# Patient Record
Sex: FEMALE | Race: WHITE | NOT HISPANIC OR LATINO | URBAN - METROPOLITAN AREA
[De-identification: names, ages, dates, MRNs, and addresses within clinical notes are randomized per-mention and may not be internally consistent; named-entity substitution may affect disease eponyms.]

---

## 2017-06-01 ENCOUNTER — OUTPATIENT (OUTPATIENT)
Dept: OUTPATIENT SERVICES | Facility: HOSPITAL | Age: 82
LOS: 1 days | End: 2017-06-01
Payer: MEDICARE

## 2017-06-01 DIAGNOSIS — M54.16 RADICULOPATHY, LUMBAR REGION: ICD-10-CM

## 2017-06-01 PROCEDURE — 77003 FLUOROGUIDE FOR SPINE INJECT: CPT

## 2017-06-01 PROCEDURE — 62323 NJX INTERLAMINAR LMBR/SAC: CPT

## 2017-06-15 ENCOUNTER — OUTPATIENT (OUTPATIENT)
Dept: OUTPATIENT SERVICES | Facility: HOSPITAL | Age: 82
LOS: 1 days | End: 2017-06-15
Payer: MEDICARE

## 2017-06-15 DIAGNOSIS — M54.16 RADICULOPATHY, LUMBAR REGION: ICD-10-CM

## 2017-06-15 PROCEDURE — 77003 FLUOROGUIDE FOR SPINE INJECT: CPT

## 2017-06-15 PROCEDURE — 62323 NJX INTERLAMINAR LMBR/SAC: CPT

## 2018-08-02 ENCOUNTER — INPATIENT (INPATIENT)
Facility: HOSPITAL | Age: 83
LOS: 6 days | Discharge: HOME | DRG: 683 | End: 2018-08-09
Attending: INTERNAL MEDICINE | Admitting: INTERNAL MEDICINE
Payer: MEDICARE

## 2018-08-02 VITALS
HEIGHT: 61 IN | TEMPERATURE: 98 F | DIASTOLIC BLOOD PRESSURE: 72 MMHG | OXYGEN SATURATION: 96 % | RESPIRATION RATE: 23 BRPM | WEIGHT: 139.99 LBS | SYSTOLIC BLOOD PRESSURE: 114 MMHG | HEART RATE: 76 BPM

## 2018-08-02 DIAGNOSIS — R53.1 WEAKNESS: ICD-10-CM

## 2018-08-02 DIAGNOSIS — M10.9 GOUT, UNSPECIFIED: ICD-10-CM

## 2018-08-02 DIAGNOSIS — Z29.9 ENCOUNTER FOR PROPHYLACTIC MEASURES, UNSPECIFIED: ICD-10-CM

## 2018-08-02 DIAGNOSIS — N39.0 URINARY TRACT INFECTION, SITE NOT SPECIFIED: ICD-10-CM

## 2018-08-02 DIAGNOSIS — I10 ESSENTIAL (PRIMARY) HYPERTENSION: ICD-10-CM

## 2018-08-02 DIAGNOSIS — N17.9 ACUTE KIDNEY FAILURE, UNSPECIFIED: ICD-10-CM

## 2018-08-02 DIAGNOSIS — K85.90 ACUTE PANCREATITIS WITHOUT NECROSIS OR INFECTION, UNSPECIFIED: ICD-10-CM

## 2018-08-02 DIAGNOSIS — E78.5 HYPERLIPIDEMIA, UNSPECIFIED: ICD-10-CM

## 2018-08-02 DIAGNOSIS — E83.51 HYPOCALCEMIA: ICD-10-CM

## 2018-08-02 DIAGNOSIS — F32.9 MAJOR DEPRESSIVE DISORDER, SINGLE EPISODE, UNSPECIFIED: ICD-10-CM

## 2018-08-02 LAB
ALBUMIN SERPL ELPH-MCNC: 3.5 G/DL — SIGNIFICANT CHANGE UP (ref 3.3–5)
ALP SERPL-CCNC: 67 U/L — SIGNIFICANT CHANGE UP (ref 40–120)
ALT FLD-CCNC: 19 U/L — SIGNIFICANT CHANGE UP (ref 12–78)
ANION GAP SERPL CALC-SCNC: 18 MMOL/L — HIGH (ref 5–17)
ANION GAP SERPL CALC-SCNC: 24 MMOL/L — HIGH (ref 5–17)
APPEARANCE UR: ABNORMAL
AST SERPL-CCNC: 15 U/L — SIGNIFICANT CHANGE UP (ref 15–37)
BASOPHILS # BLD AUTO: 0.03 K/UL — SIGNIFICANT CHANGE UP (ref 0–0.2)
BASOPHILS NFR BLD AUTO: 0.5 % — SIGNIFICANT CHANGE UP (ref 0–2)
BILIRUB SERPL-MCNC: 0.3 MG/DL — SIGNIFICANT CHANGE UP (ref 0.2–1.2)
BILIRUB UR-MCNC: NEGATIVE — SIGNIFICANT CHANGE UP
BUN SERPL-MCNC: 122 MG/DL — HIGH (ref 7–23)
BUN SERPL-MCNC: 137 MG/DL — HIGH (ref 7–23)
CALCIUM SERPL-MCNC: 5.5 MG/DL — CRITICAL LOW (ref 8.5–10.1)
CALCIUM SERPL-MCNC: 6 MG/DL — CRITICAL LOW (ref 8.5–10.1)
CHLORIDE SERPL-SCNC: 100 MMOL/L — SIGNIFICANT CHANGE UP (ref 96–108)
CHLORIDE SERPL-SCNC: 106 MMOL/L — SIGNIFICANT CHANGE UP (ref 96–108)
CK MB BLD-MCNC: 1.3 % — SIGNIFICANT CHANGE UP (ref 0–3.5)
CK MB CFR SERPL CALC: 4.2 NG/ML — HIGH (ref 0–3.6)
CK SERPL-CCNC: 317 U/L — HIGH (ref 26–192)
CO2 SERPL-SCNC: 13 MMOL/L — LOW (ref 22–31)
CO2 SERPL-SCNC: 16 MMOL/L — LOW (ref 22–31)
COLOR SPEC: YELLOW — SIGNIFICANT CHANGE UP
CREAT SERPL-MCNC: 12 MG/DL — HIGH (ref 0.5–1.3)
CREAT SERPL-MCNC: 9.7 MG/DL — HIGH (ref 0.5–1.3)
DIFF PNL FLD: ABNORMAL
EOSINOPHIL # BLD AUTO: 0.27 K/UL — SIGNIFICANT CHANGE UP (ref 0–0.5)
EOSINOPHIL NFR BLD AUTO: 4.2 % — SIGNIFICANT CHANGE UP (ref 0–6)
GLUCOSE SERPL-MCNC: 154 MG/DL — HIGH (ref 70–99)
GLUCOSE SERPL-MCNC: 166 MG/DL — HIGH (ref 70–99)
GLUCOSE UR QL: NEGATIVE — SIGNIFICANT CHANGE UP
HCT VFR BLD CALC: 31.1 % — LOW (ref 34.5–45)
HGB BLD-MCNC: 10.6 G/DL — LOW (ref 11.5–15.5)
IMM GRANULOCYTES NFR BLD AUTO: 0.5 % — SIGNIFICANT CHANGE UP (ref 0–1.5)
KETONES UR-MCNC: NEGATIVE — SIGNIFICANT CHANGE UP
LACTATE SERPL-SCNC: 1.2 MMOL/L — SIGNIFICANT CHANGE UP (ref 0.7–2)
LEUKOCYTE ESTERASE UR-ACNC: ABNORMAL
LIDOCAIN IGE QN: 1072 U/L — HIGH (ref 73–393)
LYMPHOCYTES # BLD AUTO: 0.77 K/UL — LOW (ref 1–3.3)
LYMPHOCYTES # BLD AUTO: 12 % — LOW (ref 13–44)
MAGNESIUM SERPL-MCNC: 2 MG/DL — SIGNIFICANT CHANGE UP (ref 1.6–2.6)
MCHC RBC-ENTMCNC: 29.9 PG — SIGNIFICANT CHANGE UP (ref 27–34)
MCHC RBC-ENTMCNC: 34.1 GM/DL — SIGNIFICANT CHANGE UP (ref 32–36)
MCV RBC AUTO: 87.6 FL — SIGNIFICANT CHANGE UP (ref 80–100)
MONOCYTES # BLD AUTO: 0.65 K/UL — SIGNIFICANT CHANGE UP (ref 0–0.9)
MONOCYTES NFR BLD AUTO: 10.1 % — SIGNIFICANT CHANGE UP (ref 2–14)
NEUTROPHILS # BLD AUTO: 4.67 K/UL — SIGNIFICANT CHANGE UP (ref 1.8–7.4)
NEUTROPHILS NFR BLD AUTO: 72.7 % — SIGNIFICANT CHANGE UP (ref 43–77)
NITRITE UR-MCNC: NEGATIVE — SIGNIFICANT CHANGE UP
NT-PROBNP SERPL-SCNC: 1496 PG/ML — HIGH (ref 0–450)
PH UR: 5 — SIGNIFICANT CHANGE UP (ref 5–8)
PLATELET # BLD AUTO: 245 K/UL — SIGNIFICANT CHANGE UP (ref 150–400)
POTASSIUM SERPL-MCNC: 3 MMOL/L — LOW (ref 3.5–5.3)
POTASSIUM SERPL-MCNC: 3.3 MMOL/L — LOW (ref 3.5–5.3)
POTASSIUM SERPL-SCNC: 3 MMOL/L — LOW (ref 3.5–5.3)
POTASSIUM SERPL-SCNC: 3.3 MMOL/L — LOW (ref 3.5–5.3)
PROCALCITONIN SERPL-MCNC: 0.41 NG/ML — HIGH (ref 0–0.04)
PROT SERPL-MCNC: 7.7 G/DL — SIGNIFICANT CHANGE UP (ref 6–8.3)
PROT UR-MCNC: 100
RBC # BLD: 3.55 M/UL — LOW (ref 3.8–5.2)
RBC # FLD: 14 % — SIGNIFICANT CHANGE UP (ref 10.3–14.5)
SODIUM SERPL-SCNC: 137 MMOL/L — SIGNIFICANT CHANGE UP (ref 135–145)
SODIUM SERPL-SCNC: 140 MMOL/L — SIGNIFICANT CHANGE UP (ref 135–145)
SP GR SPEC: 1.02 — SIGNIFICANT CHANGE UP (ref 1.01–1.02)
TROPONIN I SERPL-MCNC: <.015 NG/ML — SIGNIFICANT CHANGE UP (ref 0.01–0.04)
URATE SERPL-MCNC: 10 MG/DL — HIGH (ref 2.5–7)
UROBILINOGEN FLD QL: NEGATIVE — SIGNIFICANT CHANGE UP
WBC # BLD: 6.42 K/UL — SIGNIFICANT CHANGE UP (ref 3.8–10.5)
WBC # FLD AUTO: 6.42 K/UL — SIGNIFICANT CHANGE UP (ref 3.8–10.5)

## 2018-08-02 PROCEDURE — 99285 EMERGENCY DEPT VISIT HI MDM: CPT

## 2018-08-02 PROCEDURE — 93010 ELECTROCARDIOGRAM REPORT: CPT

## 2018-08-02 PROCEDURE — 71045 X-RAY EXAM CHEST 1 VIEW: CPT | Mod: 26

## 2018-08-02 PROCEDURE — 74176 CT ABD & PELVIS W/O CONTRAST: CPT | Mod: 26

## 2018-08-02 PROCEDURE — 99223 1ST HOSP IP/OBS HIGH 75: CPT | Mod: AI,GC

## 2018-08-02 PROCEDURE — 70450 CT HEAD/BRAIN W/O DYE: CPT | Mod: 26

## 2018-08-02 RX ORDER — CEFTRIAXONE 500 MG/1
1 INJECTION, POWDER, FOR SOLUTION INTRAMUSCULAR; INTRAVENOUS EVERY 24 HOURS
Qty: 0 | Refills: 0 | Status: DISCONTINUED | OUTPATIENT
Start: 2018-08-02 | End: 2018-08-06

## 2018-08-02 RX ORDER — BACITRACIN ZINC 500 UNIT/G
1 OINTMENT IN PACKET (EA) TOPICAL
Qty: 0 | Refills: 0 | Status: DISCONTINUED | OUTPATIENT
Start: 2018-08-02 | End: 2018-08-09

## 2018-08-02 RX ORDER — SODIUM BICARBONATE 1 MEQ/ML
0.24 SYRINGE (ML) INTRAVENOUS
Qty: 150 | Refills: 0 | Status: DISCONTINUED | OUTPATIENT
Start: 2018-08-02 | End: 2018-08-02

## 2018-08-02 RX ORDER — FLUOXETINE HCL 10 MG
20 CAPSULE ORAL DAILY
Qty: 0 | Refills: 0 | Status: DISCONTINUED | OUTPATIENT
Start: 2018-08-02 | End: 2018-08-09

## 2018-08-02 RX ORDER — ACETAMINOPHEN 500 MG
650 TABLET ORAL EVERY 6 HOURS
Qty: 0 | Refills: 0 | Status: DISCONTINUED | OUTPATIENT
Start: 2018-08-02 | End: 2018-08-09

## 2018-08-02 RX ORDER — SODIUM CHLORIDE 9 MG/ML
1000 INJECTION INTRAMUSCULAR; INTRAVENOUS; SUBCUTANEOUS ONCE
Qty: 0 | Refills: 0 | Status: COMPLETED | OUTPATIENT
Start: 2018-08-02 | End: 2018-08-02

## 2018-08-02 RX ORDER — TRAMADOL HYDROCHLORIDE 50 MG/1
50 TABLET ORAL EVERY 6 HOURS
Qty: 0 | Refills: 0 | Status: DISCONTINUED | OUTPATIENT
Start: 2018-08-02 | End: 2018-08-02

## 2018-08-02 RX ORDER — FENOFIBRATE,MICRONIZED 130 MG
145 CAPSULE ORAL DAILY
Qty: 0 | Refills: 0 | Status: DISCONTINUED | OUTPATIENT
Start: 2018-08-02 | End: 2018-08-02

## 2018-08-02 RX ORDER — ASPIRIN/CALCIUM CARB/MAGNESIUM 324 MG
81 TABLET ORAL DAILY
Qty: 0 | Refills: 0 | Status: DISCONTINUED | OUTPATIENT
Start: 2018-08-02 | End: 2018-08-09

## 2018-08-02 RX ORDER — MAGNESIUM SULFATE 500 MG/ML
2 VIAL (ML) INJECTION ONCE
Qty: 0 | Refills: 0 | Status: COMPLETED | OUTPATIENT
Start: 2018-08-02 | End: 2018-08-02

## 2018-08-02 RX ORDER — HEPARIN SODIUM 5000 [USP'U]/ML
5000 INJECTION INTRAVENOUS; SUBCUTANEOUS EVERY 8 HOURS
Qty: 0 | Refills: 0 | Status: DISCONTINUED | OUTPATIENT
Start: 2018-08-02 | End: 2018-08-09

## 2018-08-02 RX ORDER — SODIUM CHLORIDE 9 MG/ML
3 INJECTION INTRAMUSCULAR; INTRAVENOUS; SUBCUTANEOUS ONCE
Qty: 0 | Refills: 0 | Status: COMPLETED | OUTPATIENT
Start: 2018-08-02 | End: 2018-08-02

## 2018-08-02 RX ORDER — CEFTRIAXONE 500 MG/1
1 INJECTION, POWDER, FOR SOLUTION INTRAMUSCULAR; INTRAVENOUS ONCE
Qty: 0 | Refills: 0 | Status: COMPLETED | OUTPATIENT
Start: 2018-08-02 | End: 2018-08-02

## 2018-08-02 RX ORDER — CALCIUM GLUCONATE 100 MG/ML
1 VIAL (ML) INTRAVENOUS ONCE
Qty: 0 | Refills: 0 | Status: COMPLETED | OUTPATIENT
Start: 2018-08-02 | End: 2018-08-02

## 2018-08-02 RX ORDER — CALCIUM CARBONATE 500(1250)
1 TABLET ORAL THREE TIMES A DAY
Qty: 0 | Refills: 0 | Status: DISCONTINUED | OUTPATIENT
Start: 2018-08-02 | End: 2018-08-02

## 2018-08-02 RX ORDER — POTASSIUM CHLORIDE 20 MEQ
40 PACKET (EA) ORAL ONCE
Qty: 0 | Refills: 0 | Status: COMPLETED | OUTPATIENT
Start: 2018-08-02 | End: 2018-08-02

## 2018-08-02 RX ORDER — POTASSIUM CHLORIDE 20 MEQ
40 PACKET (EA) ORAL EVERY 4 HOURS
Qty: 0 | Refills: 0 | Status: COMPLETED | OUTPATIENT
Start: 2018-08-02 | End: 2018-08-03

## 2018-08-02 RX ORDER — SODIUM CHLORIDE 9 MG/ML
1000 INJECTION, SOLUTION INTRAVENOUS
Qty: 0 | Refills: 0 | Status: DISCONTINUED | OUTPATIENT
Start: 2018-08-02 | End: 2018-08-04

## 2018-08-02 RX ORDER — METOPROLOL TARTRATE 50 MG
12.5 TABLET ORAL
Qty: 0 | Refills: 0 | Status: DISCONTINUED | OUTPATIENT
Start: 2018-08-02 | End: 2018-08-08

## 2018-08-02 RX ORDER — SIMVASTATIN 20 MG/1
20 TABLET, FILM COATED ORAL AT BEDTIME
Qty: 0 | Refills: 0 | Status: DISCONTINUED | OUTPATIENT
Start: 2018-08-02 | End: 2018-08-09

## 2018-08-02 RX ORDER — CALCIUM GLUCONATE 100 MG/ML
2 VIAL (ML) INTRAVENOUS ONCE
Qty: 0 | Refills: 0 | Status: COMPLETED | OUTPATIENT
Start: 2018-08-02 | End: 2018-08-02

## 2018-08-02 RX ADMIN — Medication 40 MILLIEQUIVALENT(S): at 18:24

## 2018-08-02 RX ADMIN — Medication 1 TABLET(S): at 23:54

## 2018-08-02 RX ADMIN — SODIUM CHLORIDE 1000 MILLILITER(S): 9 INJECTION INTRAMUSCULAR; INTRAVENOUS; SUBCUTANEOUS at 19:02

## 2018-08-02 RX ADMIN — SODIUM CHLORIDE 100 MILLILITER(S): 9 INJECTION, SOLUTION INTRAVENOUS at 23:55

## 2018-08-02 RX ADMIN — Medication 40 MILLIEQUIVALENT(S): at 23:54

## 2018-08-02 RX ADMIN — SODIUM CHLORIDE 1000 MILLILITER(S): 9 INJECTION INTRAMUSCULAR; INTRAVENOUS; SUBCUTANEOUS at 16:10

## 2018-08-02 RX ADMIN — Medication 200 GRAM(S): at 23:55

## 2018-08-02 RX ADMIN — Medication 200 GRAM(S): at 19:01

## 2018-08-02 RX ADMIN — SIMVASTATIN 20 MILLIGRAM(S): 20 TABLET, FILM COATED ORAL at 23:54

## 2018-08-02 RX ADMIN — SODIUM CHLORIDE 1000 MILLILITER(S): 9 INJECTION INTRAMUSCULAR; INTRAVENOUS; SUBCUTANEOUS at 16:29

## 2018-08-02 RX ADMIN — Medication 200 MEQ/KG/HR: at 16:30

## 2018-08-02 RX ADMIN — SODIUM CHLORIDE 3 MILLILITER(S): 9 INJECTION INTRAMUSCULAR; INTRAVENOUS; SUBCUTANEOUS at 16:06

## 2018-08-02 RX ADMIN — Medication 50 GRAM(S): at 18:23

## 2018-08-02 RX ADMIN — CEFTRIAXONE 1 GRAM(S): 500 INJECTION, POWDER, FOR SOLUTION INTRAMUSCULAR; INTRAVENOUS at 18:20

## 2018-08-02 RX ADMIN — HEPARIN SODIUM 5000 UNIT(S): 5000 INJECTION INTRAVENOUS; SUBCUTANEOUS at 23:54

## 2018-08-02 RX ADMIN — CEFTRIAXONE 100 GRAM(S): 500 INJECTION, POWDER, FOR SOLUTION INTRAMUSCULAR; INTRAVENOUS at 20:56

## 2018-08-02 RX ADMIN — Medication 1 TABLET(S): at 18:24

## 2018-08-02 RX ADMIN — CEFTRIAXONE 100 GRAM(S): 500 INJECTION, POWDER, FOR SOLUTION INTRAMUSCULAR; INTRAVENOUS at 17:54

## 2018-08-02 RX ADMIN — Medication 100 MEQ/KG/HR: at 18:24

## 2018-08-02 NOTE — H&P ADULT - PROBLEM SELECTOR PLAN 9
chronic, stable  -Continue Bystolic 2.5mg IMPROVE VTE Individual Risk Assessment          RISK                                                          Points  [  ] Previous VTE                                                3  [  ] Thrombophilia                                             2  [  ] Lower limb paralysis                                   2        (unable to hold up >15 seconds)    [  ] Current Cancer                                             2         (within 6 months)  [  ] Immobilization > 24 hrs                              1  [  ] ICU/CCU stay > 24 hours                             1  [x  ] Age > 60                                                         1    IMPROVE VTE Score: 1  DVT prophylaxis: Heparin 5000u q8   Renal diet

## 2018-08-02 NOTE — ED ADULT NURSE NOTE - PMH
Cervical cancer  1970's  Chronic UTI (urinary tract infection)    Colostomy status  2006  Depression    Diabetes  type 2  Dyslipidemia    Hernia, incisional    Herniated lumbar intervertebral disc    Kidney atrophy  right  Tremor

## 2018-08-02 NOTE — ED ADULT NURSE NOTE - OBJECTIVE STATEMENT
c/o weak/dizzy/SOB   x 2 wks getting progressively worse.. red raised itching rash back legs arms c/o weak/dizzy/SOB   x 2 wks getting progressively worse.. red raised itching rash upper back legs arms. colostomy present

## 2018-08-02 NOTE — H&P ADULT - PROBLEM SELECTOR PLAN 4
CT showed the ureter is also thickening which may represent inflammation/infection. Patient reports symptom of hematuria when experiencing chronic UTIs  -Start IV Rocephin -Hold Allopurinol and Colchicine in setting of MEHDI

## 2018-08-02 NOTE — ED ADULT NURSE NOTE - NSIMPLEMENTINTERV_GEN_ALL_ED
Implemented All Fall with Harm Risk Interventions:  [place] to call system. Call bell, personal items and telephone within reach. Instruct patient to call for assistance. Room bathroom lighting operational. Non-slip footwear when patient is off stretcher. Physically safe environment: no spills, clutter or unnecessary equipment. Stretcher in lowest position, wheels locked, appropriate side rails in place. Provide visual cue, wrist band, yellow gown, etc. Monitor gait and stability. Monitor for mental status changes and reorient to person, place, and time. Review medications for side effects contributing to fall risk. Reinforce activity limits and safety measures with patient and family. Provide visual clues: red socks.

## 2018-08-02 NOTE — H&P ADULT - PROBLEM SELECTOR PLAN 1
likely due to dehydration, baseline Cr 3-4 (3/2016 Cr 2.1)  -Admit to medicine  -Start IVF with bicarb  -Electrolytes repleted   -Strict I&O, renea in place   -Avoid nephrotoxic agents - hold Allopurinol, Colchicine  -Hold Methenamine Hippurate as it needs an acidic environment and patient receiving Sodium Bicarbonate   -Dr. You, nephrologist, recs appreciated - Plan to follow electrolytes and renal function trend to determine need for RRT likely due to dehydration, baseline Cr 3-4 (3/2016 Cr 2.1)  -Admit to medicine  -Start IVF with bicarb  -Electrolytes repleted, monitor electrolytes closely as patient on bicarbonate drip which can cause hypokalemia   -Strict I&O, Perez in place   -Avoid nephrotoxic agents - hold Allopurinol, Colchicine  -Hold Methenamine Hippurate as it needs an acidic environment and patient receiving Sodium Bicarbonate   -Dr. You, nephrologist, recs appreciated - Plan to follow electrolytes and renal function trend to determine need for RRT  -Follow up 9PM CMP if bicarbonate increase then switch to 1/2NS + 75mEq sodium bicarbonate at 100cc/hr  -Follow up 12AM CMP - replete potassium likely due to dehydration, baseline Cr 3-4 (3/2016 Cr 2.1)  -Admit to medicine  -Start IVF with bicarb  -Electrolytes repleted, monitor electrolytes closely as patient on bicarbonate drip which can cause hypokalemia   -Strict I&O, Perez in place   -Avoid nephrotoxic agents - hold Allopurinol, Colchicine  -Hold Methenamine Hippurate as it needs an acidic environment and patient receiving Sodium Bicarbonate   -Dr. You, nephrologist, recs appreciated - Plan to follow electrolytes and renal function trend to determine need for RRT  -Follow up 9PM CMP if bicarbonate increase then switch to 1/2NS + 75mEq sodium bicarbonate at 100cc/hr  -Follow up 12AM CMP - replete potassium  f/u with nephrology

## 2018-08-02 NOTE — H&P ADULT - NSHPPHYSICALEXAM_GEN_ALL_CORE
Height (cm): 154.94 (08-02 @ 14:10)  Weight (kg): 63.5 (08-02 @ 14:10)  BMI (kg/m2): 26.5 (08-02 @ 14:10)  BSA (m2): 1.62 (08-02 @ 14:10)  Vital Signs Last 24 Hrs  T(C): 36.7 (02 Aug 2018 19:06), Max: 36.8 (02 Aug 2018 14:10)  T(F): 98 (02 Aug 2018 19:06), Max: 98.2 (02 Aug 2018 14:10)  HR: 59 (02 Aug 2018 19:06) (59 - 76)  BP: 129/82 (02 Aug 2018 19:06) (114/72 - 129/82)  BP(mean): --  RR: 15 (02 Aug 2018 19:06) (15 - 23)  SpO2: 99% (02 Aug 2018 19:06) (96% - 99%)  [ X] room air   [ ] 02    PHYSICAL EXAM:  GENERAL:  No acute distress, well appearing, chronic facial twitching   HEAD:  AT/NC  ENMT: EOMI, PERRL  NECK:  supple  NERVOUS SYSTEM:  Alert & Oriented X3, no focal deficits, grossly moves upper and lower extremities  CHEST/LUNG: Clear to auscultation bilaterally  HEART:  Regular rate and rhythm, No murmurs, rubs, or gallops  ABDOMEN:  soft, nontender, nondistended, positive bowel sounds, +colostomy with appropriately colored stool, +renae draining light yellow urine  EXTREMITIES: No clubbing, cyanosis or edema  SKIN: diffuse scabs/abrasions from chronic itching on upper back and lower extremities

## 2018-08-02 NOTE — ED PROVIDER NOTE - CARE PLAN
Principal Discharge DX:	Acute renal failure  Assessment and plan of treatment:	admit  Secondary Diagnosis:	Hypocalcemia  Secondary Diagnosis:	Pancreatitis

## 2018-08-02 NOTE — H&P ADULT - PROBLEM SELECTOR PLAN 3
Lipase elevated  -Continue IVF CT showed the ureter is also thickening which may represent inflammation/infection. Patient reports symptom of hematuria when experiencing chronic UTIs  -Start IV Rocephin

## 2018-08-02 NOTE — H&P ADULT - NSHPREVIEWOFSYSTEMS_GEN_ALL_CORE
CONSTITUTIONAL: No fever, No chills, positive fatigue  EYES: No eye pain, visual disturbances, or discharge  ENMT:  No ear pain; No sinus or throat pain  NECK: No pain, No stiffness  RESPIRATORY: No cough, wheezing, No hemoptysis; No shortness of breath  CARDIOVASCULAR: No chest pain, palpitations, leg swelling  GASTROINTESTINAL: No abdominal or epigastric pain. No nausea, No vomiting; No diarrhea or constipation.   GENITOURINARY: No dysuria, decreased frequency, No urgency, positive hematuria  NEUROLOGICAL: alert and oriented x 3,  No headaches, positive dizziness, No numbness  SKIN: chronic itching/scratching   MUSCULOSKELETAL: No joint pain or swelling; chronic back pain, No extremity pain  PSYCHIATRIC: No anxiety, mood swings

## 2018-08-02 NOTE — ED PROVIDER NOTE - PHYSICAL EXAMINATION
gouty flare noted to left foot 1st digit no evidence of cellulitis gouty flare noted to left foot 1st digit no evidence of cellulitis  Open shingles rash noted to right posterior shoulder region

## 2018-08-02 NOTE — H&P ADULT - PROBLEM SELECTOR PLAN 5
-Hold Allopurinol and Colchicine in setting of MEHDI Likely due to MEHDI on CKD  -Physical Therapy evaluation

## 2018-08-02 NOTE — H&P ADULT - PROBLEM SELECTOR PLAN 8
chronic, stable  -Continue Simvastatin   -Will hold Fenofibrate as combination can cause worsening renal function chronic, stable  -Continue Bystolic 2.5mg chronic, stable  -Continue Metoprolol 12.5mg daily (therapeutic interchange for Bystolic 2.5mg)

## 2018-08-02 NOTE — CONSULT NOTE ADULT - ASSESSMENT
·	MEHDI, CKD 4, Solitary functioning kidney: Prerenal azotemia, ? ATN  ·	Metabolic acidosis  ·	Diabetes  ·	Hypertension  ·	Hypokalemia    IV hydration with bicarb. Will lower bicarb in IVF. Replete potassium. Monitor potassium closely on bicarb drip. Monitor I & O. No emergent need for hemodialysis.   Avoid nephrotoxic meds as possible. Monitor blood sugar levels. Insulin coverage as needed. Dietary restriction. Monitor BP trend. Titrate BP meds as needed. Salt restriction.   Will follow electrolytes and renal function trend. D/w pt and her son at bedside regarding possible need for RRT if no improvement in renal function.   Further recommendations pending clinical course. Thank you for the courtesy of this referral.

## 2018-08-02 NOTE — H&P ADULT - PROBLEM SELECTOR PLAN 7
chronic, stable  -Continue Fluoxetine chronic, stable  -Continue Simvastatin   -Will hold Fenofibrate as combination can cause worsening renal function

## 2018-08-02 NOTE — H&P ADULT - HISTORY OF PRESENT ILLNESS
83F with PMH HTN, HLD, Colon Ca s/p colostomy, CKD (baseline Cr 3-4 per daughter), Depression, Gout, Osteoporosis, hx of recurrent UTI presents with weakness, fatigue, dizziness, lightheadedness, and double vision for one week. Reports one week ago moved into her son's house because her bathroom was getting remodeled, reports significant decrease in fluid intake because she didn't want to use the stairs to go to the bathroom. Admits to decreased urination and hematuria for two days. Reports hematuria usually associated with infection, denies dysuria and burning while voiding. Reports "extremely tired, I had to sit down to brush my teeth." Reports laying down improved symptoms and movement exacerbated symptoms. Also reports chronic significant itching throughout back and lower extremities. Denies change in appetite, recent sick contacts, recent travel. Reports seeing nephrologist, Dr. Coto less than 2 months ago and Cr improved to ~3, per daughter usual baseline is 4. Also reports recently recovering from Gout attack and was prescribed colchicine.     In the ED: temp 98.2, HR 76, /72, RR 23 repeat 15, SpO2 96% RA. H/H: 10.6/31.1, K 3.3, BUN/Cr: 137/12, Ca 6, Lipase 1072. UA moderate LEC, large blood, WBC >50. Chest xray: no acute findings. CT head: No acute intracranial hemorrhage or acute territorial infarct. CT stone hunt: Relative atrophy of the right kidney with mild hydronephrosis and hydroureter without evidence of stone. The right ureter appears to course to the left pelvis. The ureter is also thickening which may represent inflammation/infection. T12 compression fracture indeterminate in age. Suture line in small bowel loop in the midabdomen with distention of the small bowel loop at the area of the sutures with herniation of the small bowel through the anterior abdominal wall without obstruction. Dr. You, nephrologist, evaluated patient in the ED. 83F with PMH HTN, HLD, Colon Ca s/p colostomy, CKD (baseline Cr 3-4 per daughter), Depression, Gout, Osteoporosis, hx of recurrent UTI presents with weakness, fatigue, dizziness, lightheadedness,  for one week. Reports one week ago moved into her son's house because her bathroom was getting remodeled, reports significant decrease in fluid intake because she didn't want to use the stairs to go to the bathroom. Admits to decreased urination and hematuria for two days. Reports hematuria usually associated with infection, denies dysuria and burning while voiding. Reports "extremely tired, I had to sit down to brush my teeth." Reports laying down improved symptoms and movement exacerbated symptoms. Also reports chronic significant itching throughout back and lower extremities. Denies change in appetite, recent sick contacts, recent travel. Reports seeing nephrologist, Dr. Coto less than 2 months ago and Cr improved to ~3, per daughter usual baseline is 4. Also reports recently recovering from Gout attack and was prescribed colchicine.     In the ED: temp 98.2, HR 76, /72, RR 23 repeat 15, SpO2 96% RA. H/H: 10.6/31.1, K 3.3, BUN/Cr: 137/12, Ca 6, Lipase 1072. UA moderate LEC, large blood, WBC >50. Chest xray: no acute findings. CT head: No acute intracranial hemorrhage or acute territorial infarct. CT stone hunt: Relative atrophy of the right kidney with mild hydronephrosis and hydroureter without evidence of stone. The right ureter appears to course to the left pelvis. The ureter is also thickening which may represent inflammation/infection. T12 compression fracture indeterminate in age. Suture line in small bowel loop in the midabdomen with distention of the small bowel loop at the area of the sutures with herniation of the small bowel through the anterior abdominal wall without obstruction. Dr. You, nephrologist, evaluated patient in the ED.

## 2018-08-02 NOTE — ED PROVIDER NOTE - OBJECTIVE STATEMENT
Pt is a 84 yo female who presents to the ED with a cc of weakness.  PMHx of cervical cancer, chronic UTIs, depression, DM, HLD, atrophy of right kidney, essential tremor, h/o colostomy, gout.  Pt report that for some time she has been experiencing increasing weakness but that symptoms have progressively worsened over the last 4-5 days.  She reports worsening exertional SOB, and lightheadedness.  Pt reports that she has become so fatigued she needs assistance ambulating around the house.  Family is concerned that she may be dehydrated because she dehydrates easily.  Denies HA, N/V/C, CP, SOB, abd pain, cough.  Pt does report pain to her 1st digit left foot and states that she is suffering from a gout flare.  Denies numbness or tingling in ext

## 2018-08-02 NOTE — ED PROVIDER NOTE - MEDICAL DECISION MAKING DETAILS
screening septic work up, Mg, Phos, cardiac enzymes, BNP, uric acid, procalcitonin, EKG, chest x-ray, CT head, renal stone hunt, bicarb gtt, observation

## 2018-08-02 NOTE — H&P ADULT - NSHPSOCIALHISTORY_GEN_ALL_CORE
Lives with: daughter, no stairs in house  Performs ADLs independently     Tobacco Usage: former, quit 40 years ago, 10 pack year hx   Alcohol Usage: social

## 2018-08-02 NOTE — CONSULT NOTE ADULT - SUBJECTIVE AND OBJECTIVE BOX
Patient is a 83y old  Female who presents with a chief complaint of weakness, nausea.     HPI: 82 y/o F with h/o DM, HTN, HLD, Colon Ca with radiation injury, s/p colostomy, CKD 4, Rt atrophic kidney, Depression. Anemia, Gout, Osteoporosis, RLS, Sec hyperpara, UTI, Uterine CA came to [facility] ER with c/o weakness, Nausea.     Renal consult called for MEHDI. Pt last seen by Dr. Coto. ? Last crea 2.7. Pt denies use of NSAIDs.       PAST MEDICAL HISTORY:  Herniated lumbar intervertebral disc  Depression  Tremor  Rt Kidney atrophy  Colostomy status  Chronic UTI (urinary tract infection)  Cervical cancer  Dyslipidemia  Diabetes  Hernia, incisional  CKD 4      PAST SURGICAL HISTORY:  S/P hip replacement  S/P colon resection  S/P hysterectomy  Colostomy      FAMILY HISTORY:  Family history of ovarian cancer (Mother, Sibling)      SOCIAL HISTORY: No smoking, Occasional alcohol use    Allergies    Levaquin (Pruritus)  mercury (Pruritus; Rash)  sulfa drugs (Pruritus)    Intolerances      Home Medications:  allopurinol 100 mg oral tablet: 1 tab(s) orally 2 times a day (16 Mar 2016 08:48)  CoQ10 300 mg oral capsule: 1 cap(s) orally once a day (16 Mar 2016 08:48)  methenamine hippurate hippurate 1 g oral tablet: 1 tab(s) orally 2 times a day (16 Mar 2016 08:48)  multivitamin:    (16 Mar 2016 08:48)  Percocet 10/325 oral tablet: 1 tab(s) orally every 6 hours, As Needed for back pain (16 Mar 2016 08:48)  PROzac 20 mg oral capsule: 1 cap(s) orally once a day (16 Mar 2016 08:48)  simvastatin 20 mg oral tablet: 1 tab(s) orally once a day (at bedtime) (16 Mar 2016 08:48)  TheraCran HP oral capsule:  orally  (16 Mar 2016 08:48)    MEDICATIONS  (STANDING):  sodium bicarbonate  Infusion 0.472 mEq/kG/Hr (200 mL/Hr) IV Continuous <Continuous>    MEDICATIONS  (PRN):      REVIEW OF SYSTEMS:  General: wekaness  Respiratory: No cough, SOB  Cardiovascular: No CP or Palpitations	  Gastrointestinal: + nausea, Vomiting. + colostomy  Genitourinary: No urinary complaints	  Musculoskeletal: No leg swelling, No new rash or lesions	  Neurological: AA  all other systems negative    T(F): 98.2 (18 @ 14:10), Max: 98.2 (18 @ 14:10)  HR: 76 (18 @ 14:10) (76 - 76)  BP: 114/72 (18 @ 14:10) (114/72 - 114/72)  RR: 23 (18 @ 14:10) (23 - 23)  SpO2: 96% (18 @ 14:10) (96% - 96%)  Wt(kg): --    PHYSICAL EXAM:  General: NAD  Respiratory: b/l air entry  Cardiovascular: S1 S2  Gastrointestinal: soft, + colostomy   Extremities: no edema            137  |  100  |  137<H>  ----------------------------<  154<H>  3.3<L>   |  13<L>  |  12.00<H>    Ca    6.0<LL>      02 Aug 2018 15:05  Phos  11.9       Mg     1.2         TPro  7.7  /  Alb  3.5  /  TBili  0.3  /  DBili  x   /  AST  15  /  ALT  19  /  AlkPhos  67                            10.6   6.42  )-----------( 245      ( 02 Aug 2018 15:05 )             31.1       Hemoglobin: 10.6 g/dL ( @ 15:05)  Hematocrit: 31.1 % ( @ 15:05)  Potassium, Serum: 3.3 mmol/L ( @ 15:05)  Blood Urea Nitrogen, Serum: 137 mg/dL ( @ 15:05)      Creatinine, Serum: 12.00 ( @ 15:05)      Urinalysis Basic - ( 02 Aug 2018 16:04 )    Color: Yellow / Appearance: Turbid / S.020 / pH: x  Gluc: x / Ketone: Negative  / Bili: Negative / Urobili: Negative   Blood: x / Protein: 100 / Nitrite: Negative   Leuk Esterase: Moderate / RBC: 3-5 /HPF / WBC >50   Sq Epi: x / Non Sq Epi: Occasional / Bacteria: Moderate      LIVER FUNCTIONS - ( 02 Aug 2018 15:05 )  Alb: 3.5 g/dL / Pro: 7.7 g/dL / ALK PHOS: 67 U/L / ALT: 19 U/L / AST: 15 U/L / GGT: x           CARDIAC MARKERS ( 02 Aug 2018 15:05 )  <.015 ng/mL / x     / 317 U/L / x     / 4.2 ng/mL      Creatine Kinase, Serum: 317 U/L (18 @ 15:05)        < from: Xray Chest 1 View- PORTABLE-Urgent (18 @ 14:54) >  EXAM:  XR CHEST PORTABLE URGENT 1V                            PROCEDURE DATE:  2018          INTERPRETATION:  AP semierect chest on 2018 at 2:42 PM. Patient   has weakness and dizziness.    The heart is magnified by technique.    On2016 there was a congested appearance in the lower lung   fields which is not presently seen. Presently lungs are clear.    IMPRESSION: Clear lungs at this time.    < end of copied text >

## 2018-08-02 NOTE — ED ADULT NURSE REASSESSMENT NOTE - NS ED NURSE REASSESS COMMENT FT1
bladder scan showing 100cc.  m18F renae cath placed 24cc urine immediately emitted. UA urine C&S sent to lab

## 2018-08-02 NOTE — H&P ADULT - ATTENDING COMMENTS
pt is 83  year female with h/o ckd , htn , hyperlipidemia , gout admitted for generalized body weakness , fatigue , w/u show MEHDI , UTI   mehdi most likely sec to decrease po fluid intake as she does not want to  to go down stairs to get water  evaluated by nephro   she is started on iv sodium bicarbonate drip with electrolyte replacement   will do careful monitoring of electrolytes and replace as needed  nephro f/u   hold nephrotoxic agents   high lipase ??etiology she is completely asymptomatic will trend and f/u   fall precaution   dvt ppx

## 2018-08-02 NOTE — H&P ADULT - ASSESSMENT
83F with PMH HTN, HLD, Colon Ca s/p colostomy, CKD (baseline Cr 3-4 per daughter), Depression, Gout, Osteoporosis, hx of recurrent UTI presents with weakness, fatigue, dizziness, lightheadedness, and double vision for one week admitted for MEHDI on CKD, pancreatitis, UTI. 83F with PMH HTN, HLD, Colon Ca s/p colostomy, CKD (baseline Cr 3-4 per daughter), Depression, Gout, Osteoporosis, hx of recurrent UTI presents with weakness, fatigue, dizziness, lightheadedness, and double vision for one week admitted for MEHDI on CKD, UTI. 83F with PMH HTN, HLD, Colon Ca s/p colostomy, CKD (baseline Cr 3-4 per daughter), Depression, Gout, Osteoporosis, hx of recurrent UTI presents with weakness, fatigue, dizziness, lightheadedness, x 1 week admitted for MEHDI on CKD, UTI.

## 2018-08-03 ENCOUNTER — TRANSCRIPTION ENCOUNTER (OUTPATIENT)
Age: 83
End: 2018-08-03

## 2018-08-03 DIAGNOSIS — N18.9 CHRONIC KIDNEY DISEASE, UNSPECIFIED: ICD-10-CM

## 2018-08-03 DIAGNOSIS — R19.7 DIARRHEA, UNSPECIFIED: ICD-10-CM

## 2018-08-03 LAB
ALBUMIN SERPL ELPH-MCNC: 2.6 G/DL — LOW (ref 3.3–5)
ALP SERPL-CCNC: 49 U/L — SIGNIFICANT CHANGE UP (ref 40–120)
ALT FLD-CCNC: 15 U/L — SIGNIFICANT CHANGE UP (ref 12–78)
ANION GAP SERPL CALC-SCNC: 19 MMOL/L — HIGH (ref 5–17)
AST SERPL-CCNC: 16 U/L — SIGNIFICANT CHANGE UP (ref 15–37)
BILIRUB SERPL-MCNC: 0.3 MG/DL — SIGNIFICANT CHANGE UP (ref 0.2–1.2)
BUN SERPL-MCNC: 115 MG/DL — HIGH (ref 7–23)
CALCIUM SERPL-MCNC: 6.1 MG/DL — CRITICAL LOW (ref 8.5–10.1)
CHLORIDE SERPL-SCNC: 105 MMOL/L — SIGNIFICANT CHANGE UP (ref 96–108)
CO2 SERPL-SCNC: 18 MMOL/L — LOW (ref 22–31)
CREAT SERPL-MCNC: 9.2 MG/DL — HIGH (ref 0.5–1.3)
GLUCOSE SERPL-MCNC: 74 MG/DL — SIGNIFICANT CHANGE UP (ref 70–99)
HCT VFR BLD CALC: 24.2 % — LOW (ref 34.5–45)
HGB BLD-MCNC: 8.4 G/DL — LOW (ref 11.5–15.5)
MCHC RBC-ENTMCNC: 30.2 PG — SIGNIFICANT CHANGE UP (ref 27–34)
MCHC RBC-ENTMCNC: 34.7 GM/DL — SIGNIFICANT CHANGE UP (ref 32–36)
MCV RBC AUTO: 87.1 FL — SIGNIFICANT CHANGE UP (ref 80–100)
NRBC # BLD: 0 /100 WBCS — SIGNIFICANT CHANGE UP (ref 0–0)
PHOSPHATE SERPL-MCNC: 8.7 MG/DL — HIGH (ref 2.5–4.5)
PLATELET # BLD AUTO: 202 K/UL — SIGNIFICANT CHANGE UP (ref 150–400)
POTASSIUM SERPL-MCNC: 3.3 MMOL/L — LOW (ref 3.5–5.3)
POTASSIUM SERPL-SCNC: 3.3 MMOL/L — LOW (ref 3.5–5.3)
PROT SERPL-MCNC: 4.8 G/DL — LOW (ref 6–8.3)
PROT SERPL-MCNC: 4.8 G/DL — LOW (ref 6–8.3)
PROT SERPL-MCNC: 5.6 G/DL — LOW (ref 6–8.3)
PTH-INTACT FLD-MCNC: 463 PG/ML — HIGH (ref 15–65)
RBC # BLD: 2.78 M/UL — LOW (ref 3.8–5.2)
RBC # FLD: 13.7 % — SIGNIFICANT CHANGE UP (ref 10.3–14.5)
SODIUM SERPL-SCNC: 142 MMOL/L — SIGNIFICANT CHANGE UP (ref 135–145)
WBC # BLD: 5.9 K/UL — SIGNIFICANT CHANGE UP (ref 3.8–10.5)
WBC # FLD AUTO: 5.9 K/UL — SIGNIFICANT CHANGE UP (ref 3.8–10.5)

## 2018-08-03 PROCEDURE — 99233 SBSQ HOSP IP/OBS HIGH 50: CPT | Mod: GC

## 2018-08-03 RX ORDER — LACTOBACILLUS ACIDOPHILUS 100MM CELL
1 CAPSULE ORAL DAILY
Qty: 0 | Refills: 0 | Status: DISCONTINUED | OUTPATIENT
Start: 2018-08-03 | End: 2018-08-06

## 2018-08-03 RX ORDER — POTASSIUM CHLORIDE 20 MEQ
20 PACKET (EA) ORAL
Qty: 0 | Refills: 0 | Status: COMPLETED | OUTPATIENT
Start: 2018-08-03 | End: 2018-08-03

## 2018-08-03 RX ADMIN — Medication 1 TABLET(S): at 13:19

## 2018-08-03 RX ADMIN — Medication 81 MILLIGRAM(S): at 11:38

## 2018-08-03 RX ADMIN — CEFTRIAXONE 100 GRAM(S): 500 INJECTION, POWDER, FOR SOLUTION INTRAMUSCULAR; INTRAVENOUS at 21:03

## 2018-08-03 RX ADMIN — HEPARIN SODIUM 5000 UNIT(S): 5000 INJECTION INTRAVENOUS; SUBCUTANEOUS at 21:03

## 2018-08-03 RX ADMIN — HEPARIN SODIUM 5000 UNIT(S): 5000 INJECTION INTRAVENOUS; SUBCUTANEOUS at 13:19

## 2018-08-03 RX ADMIN — SIMVASTATIN 20 MILLIGRAM(S): 20 TABLET, FILM COATED ORAL at 21:03

## 2018-08-03 RX ADMIN — Medication 20 MILLIEQUIVALENT(S): at 09:07

## 2018-08-03 RX ADMIN — Medication 12.5 MILLIGRAM(S): at 06:11

## 2018-08-03 RX ADMIN — Medication 20 MILLIGRAM(S): at 11:38

## 2018-08-03 RX ADMIN — Medication 12.5 MILLIGRAM(S): at 17:00

## 2018-08-03 RX ADMIN — SODIUM CHLORIDE 100 MILLILITER(S): 9 INJECTION, SOLUTION INTRAVENOUS at 11:38

## 2018-08-03 RX ADMIN — Medication 40 MILLIEQUIVALENT(S): at 03:41

## 2018-08-03 RX ADMIN — Medication 1 TABLET(S): at 06:11

## 2018-08-03 RX ADMIN — Medication 1 APPLICATION(S): at 06:12

## 2018-08-03 RX ADMIN — Medication 1 TABLET(S): at 21:03

## 2018-08-03 RX ADMIN — Medication 1 APPLICATION(S): at 17:00

## 2018-08-03 RX ADMIN — HEPARIN SODIUM 5000 UNIT(S): 5000 INJECTION INTRAVENOUS; SUBCUTANEOUS at 06:11

## 2018-08-03 RX ADMIN — Medication 20 MILLIEQUIVALENT(S): at 09:06

## 2018-08-03 RX ADMIN — Medication 1 TABLET(S): at 11:38

## 2018-08-03 NOTE — DISCHARGE NOTE ADULT - SECONDARY DIAGNOSIS.
Depression Dyslipidemia Gout HTN (hypertension) UTI (urinary tract infection) Diarrhea, unspecified type Anemia

## 2018-08-03 NOTE — PHYSICAL THERAPY INITIAL EVALUATION ADULT - PERTINENT HX OF CURRENT PROBLEM, REHAB EVAL
Pt admitted with weakness, fatigue, dizziness, lightheadedness, Pt had moved into son's house while her bathroom was being remodeled and didn't want to use the stairs to go up to the bathroom. Pt diagnosed with ARF, hypocalcemia, UTI, gout. PMH includes HTN, HLD, colon CA, sp colostomy, depression, recurrent UTI

## 2018-08-03 NOTE — PHYSICAL THERAPY INITIAL EVALUATION ADULT - GAIT TRAINING, PT EVAL
In 3-5 sessions pt will amb 40 feet x 1 with appropriate assistive device independently and climb up and down 5 steps

## 2018-08-03 NOTE — PROGRESS NOTE ADULT - PROBLEM SELECTOR PLAN 2
replete with Calcium Carbonate   -Follow up CMP Corrected Ca this AM is 7.2  - will continue to trend lytes Corrected Ca this AM is 7.2  - will continue to trend

## 2018-08-03 NOTE — PROGRESS NOTE ADULT - PROBLEM SELECTOR PLAN 1
likely due to dehydration, baseline Cr 3-4 (3/2016 Cr 2.1). Cr trending down at 9.2  - continue with 1/2NS with 75 mEq sodium bicarbonate  - K repleted 20mEq PO x2, will continue to monitor electrolytes  -Strict I&O, Perez in place   -Avoid nephrotoxic agents - hold Allopurinol, Colchicine  -Hold Methenamine Hippurate as it needs an acidic environment and patient receiving Sodium Bicarbonate   -Dr. You, nephrologist, recs appreciated - Plan to follow electrolytes and renal function trend to determine need for RRT  f/u with nephrology Likely secondary to dehydration, baseline Cr 3-4 (3/2016 Cr 2.1). Renal function improving - Cr trending down at 9.2  - As per nephro, RRT is not indicated at this time.  - continue with 1/2NS with 75 mEq sodium bicarbonate  - K repleted 20mEq PO x2, will continue to monitor electrolytes  - Strict I&O, Perez in place   -Avoid nephrotoxic agents - hold Allopurinol, Colchicine  -Hold Methenamine Hippurate as it needs an acidic environment and patient receiving Sodium Bicarbonate   - Nephrology recs appreciated

## 2018-08-03 NOTE — DISCHARGE NOTE ADULT - PATIENT PORTAL LINK FT
You can access the [facility] Patient Portal, offered by [facility], by registering with the following website: [email or web address]

## 2018-08-03 NOTE — PHYSICAL THERAPY INITIAL EVALUATION ADULT - BALANCE TRAINING, PT EVAL
Statement Selected In 3-5 sessions pt. will demonstrate good dynamic balance with appropriate assistive device

## 2018-08-03 NOTE — PROGRESS NOTE ADULT - SUBJECTIVE AND OBJECTIVE BOX
HPI: 83F with PMH HTN, HLD, Colon Ca s/p colostomy, CKD (baseline Cr 3-4 per daughter), Depression, Gout, Osteoporosis, hx of recurrent UTI presents with weakness, fatigue, dizziness, lightheadedness, x 1 week admitted for MEHDI on CKD, UTI.     Interval history: Patient was seen at bedside and examined. She reports loose stools in her colostomy bag, blurry vision. Denies weakness, chills, CP, dysuria.      REVIEW OF SYSTEMS:    CONSTITUTIONAL: No weakness, fevers or chills  EYES/ENT: + blurry vision  RESPIRATORY: No shortness of breath  CARDIOVASCULAR: No chest pain or palpitations  GASTROINTESTINAL: + loose stools in her colostomy bag. No abdominal pain  GENITOURINARY: No dysuria, frequency or hematuria  NEUROLOGICAL: No dizziness, numbness, or weakness  All other review of systems is negative unless indicated above.    VITAL SIGNS:  Vital Signs Last 24 Hrs  T(C): 36.3 (03 Aug 2018 12:00), Max: 37.1 (03 Aug 2018 07:41)  T(F): 97.3 (03 Aug 2018 12:00), Max: 98.7 (03 Aug 2018 07:41)  HR: 55 (03 Aug 2018 12:00) (53 - 76)  BP: 126/67 (03 Aug 2018 12:00) (103/63 - 129/82)  BP(mean): --  RR: 15 (03 Aug 2018 12:00) (15 - 23)  SpO2: 99% (03 Aug 2018 12:00) (96% - 100%)      PHYSICAL EXAM:     GENERAL: no acute distress  HEENT: NC/AT  RESPIRATORY: LCTAB/L, no rhonchi, rales, or wheezing  CARDIOVASCULAR: RRR, no murmurs, gallops, rubs  ABDOMINAL: soft, non-tender, non-distended. Skin at colostomy site clean, dry, intact.  EXTREMITIES: no pitting edema  NEUROLOGICAL: alert and oriented x 3, non-focal                          8.4    5.90  )-----------( 202      ( 03 Aug 2018 06:34 )             24.2     08-03    142  |  105  |  115<H>  ----------------------------<  74  3.3<L>   |  18<L>  |  9.20<H>    Ca    6.1<LL>      03 Aug 2018 06:34  Phos  8.7     08-03  Mg     2.0     08-02    TPro  5.6<L>  /  Alb  2.6<L>  /  TBili  0.3  /  DBili  x   /  AST  16  /  ALT  15  /  AlkPhos  49  08-03      CAPILLARY BLOOD GLUCOSE          MEDICATIONS  (STANDING):  aspirin enteric coated 81 milliGRAM(s) Oral daily  BACItracin   Ointment 1 Application(s) Topical two times a day  calcium carbonate 1250 mG  + Vitamin D (OsCal 500 + D) 1 Tablet(s) Oral three times a day  cefTRIAXone   IVPB 1 Gram(s) IV Intermittent every 24 hours  FLUoxetine 20 milliGRAM(s) Oral daily  heparin  Injectable 5000 Unit(s) SubCutaneous every 8 hours  lactobacillus acidophilus 1 Tablet(s) Oral daily  metoprolol tartrate 12.5 milliGRAM(s) Oral two times a day  simvastatin 20 milliGRAM(s) Oral at bedtime  sodium chloride 0.45% 1000 milliLiter(s) (100 mL/Hr) IV Continuous <Continuous> HPI: 83F with PMH HTN, HLD, Colon Ca s/p colostomy, CKD (baseline Cr 3-4 per daughter), Depression, Gout, Osteoporosis, hx of recurrent UTI presents with weakness, fatigue, dizziness, lightheadedness, x 1 week admitted for MEHDI on CKD, UTI.     Interval history: Patient was seen at bedside and examined. She reports loose stools in her colostomy bag, blurry vision. Denies weakness, chills, CP, dysuria.      REVIEW OF SYSTEMS:    CONSTITUTIONAL: No weakness, fevers or chills  EYES/ENT: + blurry vision  RESPIRATORY: No shortness of breath  CARDIOVASCULAR: No chest pain or palpitations  GASTROINTESTINAL: + loose stools in her colostomy bag. No abdominal pain  GENITOURINARY: No dysuria, frequency or hematuria  NEUROLOGICAL: No dizziness, numbness, or weakness  All other review of systems is negative unless indicated above.    VITAL SIGNS:  Vital Signs Last 24 Hrs  T(C): 36.3 (03 Aug 2018 12:00), Max: 37.1 (03 Aug 2018 07:41)  T(F): 97.3 (03 Aug 2018 12:00), Max: 98.7 (03 Aug 2018 07:41)  HR: 55 (03 Aug 2018 12:00) (53 - 76)  BP: 126/67 (03 Aug 2018 12:00) (103/63 - 129/82)  BP(mean): --  RR: 15 (03 Aug 2018 12:00) (15 - 23)  SpO2: 99% (03 Aug 2018 12:00) (96% - 100%)      PHYSICAL EXAM:     GENERAL: no acute distress  HEENT: NC/AT  RESPIRATORY: LCTAB/L, no rhonchi, rales, or wheezing  CARDIOVASCULAR: RRR, no murmurs, gallops, rubs  ABDOMINAL: soft, non-tender, non-distended. Colostomy bag at RLQ. Skin at colostomy site clean, dry, intact.  : Perez catheter in place. Significant amount of sediment noted in catheter tubing.  EXTREMITIES: no pitting edema  NEUROLOGICAL: alert and oriented x 3, non-focal                          8.4    5.90  )-----------( 202      ( 03 Aug 2018 06:34 )             24.2     08-03    142  |  105  |  115<H>  ----------------------------<  74  3.3<L>   |  18<L>  |  9.20<H>    Ca    6.1<LL>      03 Aug 2018 06:34  Phos  8.7     08-03  Mg     2.0     08-02    TPro  5.6<L>  /  Alb  2.6<L>  /  TBili  0.3  /  DBili  x   /  AST  16  /  ALT  15  /  AlkPhos  49  08-03      CAPILLARY BLOOD GLUCOSE          MEDICATIONS  (STANDING):  aspirin enteric coated 81 milliGRAM(s) Oral daily  BACItracin   Ointment 1 Application(s) Topical two times a day  calcium carbonate 1250 mG  + Vitamin D (OsCal 500 + D) 1 Tablet(s) Oral three times a day  cefTRIAXone   IVPB 1 Gram(s) IV Intermittent every 24 hours  FLUoxetine 20 milliGRAM(s) Oral daily  heparin  Injectable 5000 Unit(s) SubCutaneous every 8 hours  lactobacillus acidophilus 1 Tablet(s) Oral daily  metoprolol tartrate 12.5 milliGRAM(s) Oral two times a day  simvastatin 20 milliGRAM(s) Oral at bedtime  sodium chloride 0.45% 1000 milliLiter(s) (100 mL/Hr) IV Continuous <Continuous> HPI: 83F with PMH HTN, HLD, Colon Ca s/p colostomy, CKD (baseline Cr 3-4 per daughter), Depression, Gout, Osteoporosis, hx of recurrent UTI presents with weakness, fatigue, dizziness, lightheadedness, x 1 week admitted for MEHDI on CKD, UTI.     Interval history: Patient was seen at bedside and examined. She reports watery stool in her colostomy bag, episoded of blurry vision lately. Denies weakness, chills, CP, dysuria.      REVIEW OF SYSTEMS:    CONSTITUTIONAL: No weakness, fevers or chills  EYES/ENT: + blurry vision  RESPIRATORY: No shortness of breath  CARDIOVASCULAR: No chest pain or palpitations  GASTROINTESTINAL: + loose stools in her colostomy bag. No abdominal pain  GENITOURINARY: No dysuria, frequency or hematuria  NEUROLOGICAL: No dizziness, numbness, or weakness  All other review of systems is negative unless indicated above.    VITAL SIGNS:  Vital Signs Last 24 Hrs  T(C): 36.3 (03 Aug 2018 12:00), Max: 37.1 (03 Aug 2018 07:41)  T(F): 97.3 (03 Aug 2018 12:00), Max: 98.7 (03 Aug 2018 07:41)  HR: 55 (03 Aug 2018 12:00) (53 - 76)  BP: 126/67 (03 Aug 2018 12:00) (103/63 - 129/82)  BP(mean): --  RR: 15 (03 Aug 2018 12:00) (15 - 23)  SpO2: 99% (03 Aug 2018 12:00) (96% - 100%)      PHYSICAL EXAM:     GENERAL: no acute distress  HEENT: NC/AT  RESPIRATORY: LCTAB/L, no rhonchi, rales, or wheezing  CARDIOVASCULAR: RRR, no murmurs, gallops, rubs  ABDOMINAL: soft, non-tender, non-distended. Colostomy bag at RLQ. Skin at colostomy site clean, dry, intact.  : Perez catheter in place. Significant amount of sediment noted in catheter tubing.  EXTREMITIES: no pitting edema  NEUROLOGICAL: alert and oriented x 3, non-focal                          8.4    5.90  )-----------( 202      ( 03 Aug 2018 06:34 )             24.2     08-03    142  |  105  |  115<H>  ----------------------------<  74  3.3<L>   |  18<L>  |  9.20<H>    Ca    6.1<LL>      03 Aug 2018 06:34  Phos  8.7     08-03  Mg     2.0     08-02    TPro  5.6<L>  /  Alb  2.6<L>  /  TBili  0.3  /  DBili  x   /  AST  16  /  ALT  15  /  AlkPhos  49  08-03      CAPILLARY BLOOD GLUCOSE          MEDICATIONS  (STANDING):  aspirin enteric coated 81 milliGRAM(s) Oral daily  BACItracin   Ointment 1 Application(s) Topical two times a day  calcium carbonate 1250 mG  + Vitamin D (OsCal 500 + D) 1 Tablet(s) Oral three times a day  cefTRIAXone   IVPB 1 Gram(s) IV Intermittent every 24 hours  FLUoxetine 20 milliGRAM(s) Oral daily  heparin  Injectable 5000 Unit(s) SubCutaneous every 8 hours  lactobacillus acidophilus 1 Tablet(s) Oral daily  metoprolol tartrate 12.5 milliGRAM(s) Oral two times a day  simvastatin 20 milliGRAM(s) Oral at bedtime  sodium chloride 0.45% 1000 milliLiter(s) (100 mL/Hr) IV Continuous <Continuous>

## 2018-08-03 NOTE — PROGRESS NOTE ADULT - PROBLEM SELECTOR PLAN 6
Likely due to MEHDI on CKD  -Physical Therapy evaluation -Hold Allopurinol and Colchicine in setting of MEHDI

## 2018-08-03 NOTE — DISCHARGE NOTE ADULT - PLAN OF CARE
stable continue fluoextine continue simvastatin  discontinue fenofibrate ?allopurinol, colchicine continue metoprolol You had a UTI which was treated with antibiotics Resolved During your stay you developed acute renal failure likely due to dehydration which has since resolved. Please follow up with your nephrologist (Dr. Coto - see contact information below) During your stay you developed a UTI which was treated successfully with antibiotics. Please follow up with your PCP should you develop symptoms including burning or pain with urination. During your stay you developed diarrhea During your stay you were found to be anemic likely secondary to your CKD. Please continue to see your nephrology regularly and followup with your PCP should you feel light headed or tired, or notice blood in your urine or stool. Continue your home fluoxetine. Your home medications of allopurinol and colchicine were held due to your acute renal failure. Continue to monitor for clinical symptoms of gout. Continue your home simvastatin. During your stay you developed acute renal failure likely due to dehydration which has since resolved. Please try to eat regularly and drink fluids regularly. Please follow up with your nephrologist (Dr. Coto - see contact information below) During your stay you developed diarrhea which was treated with cholestyramine, bacid, loperamide and PRN lomotil. Please follow up with your PCP if you start to notice high output from your ostomy. resolution You had very poor kidney function when you came to the hospital which has been improving with hydration. Please try to eat regularly and drink fluids regularly. Please follow up with your nephrologist (Dr. Coto - see contact information below) within a week and monitor you kidney function. Your current BUN/Creatinine are 62/4.9. Do not take NSAIDS (medications like Advil, Aleve, Motrin, Ibuprofen). If you have pain, take Tylenol, or if severe, may take your tramadol (but no more than two tramadol tablets per day). You had a urinary tract infection which was treated in the hospital and has resolved. Please follow up with your PMD should you develop symptoms including burning or pain with urination. You had diarrhea that may have resulted in you getting dehydrated and caused your admission to the hospital. This has resolved with medications and all the stool studies were negative for infection. Take the prescribed cholestyramine once daily. You were also prescribed loperamide, which you can take only if needed for diarrhea /loose stools. . Please follow up with gastroenterologist, Dr. Jones (phone number below) in 2 weeks. If you start to notice high output from your ostomy despite taking the imodium call Dr. Jones for more medication. During your stay you were found to be anemic likely secondary to your CKD. Please continue to see your nephrologist regularly and follow-up with your PCP should you feel lightheaded or tired, or notice blood in your urine or stool. You may take your allopurinol but stop taking colchicine for now as your kidneys are recovering. Ask Dr. Coto when it is safe for you to resume. Continue your home simvastatin. Do not take the fenofibrate with it as that may affect your kidney function.

## 2018-08-03 NOTE — DISCHARGE NOTE ADULT - HOSPITAL COURSE
83F with PMH HTN, HLD, Colon Ca s/p colostomy, CKD (baseline Cr 3-4 per daughter), Depression, Gout, Osteoporosis, hx of recurrent UTI presented with weakness, fatigue, dizziness, lightheadedness, x 1 week admitted for MEHDI on CKD, UTI. Patient was started on IVF with electrolyte replacement and IV Bicarb. Allopurinol, colichine was held. Methenamine was held as well. Nephrologist consulted. Calcium was repleted. Fenofibrate held. UTI treated with Rocephin. 83F with PMH HTN, HLD, Colon Ca s/p colostomy, CKD (baseline Cr 3-4 per daughter), Depression, Gout, Osteoporosis, hx of recurrent UTI presented with weakness, fatigue, dizziness, lightheadedness, x 1 week admitted for MEHDI on CKD, UTI. Patient was started on IVF with electrolyte replacement and IV Bicarb. Allopurinol, colichine and methenamine was held as well. Nephrologist consulted. Calcium was repleted. Fenofibrate held. UTI treated with Rocephin. 83F with PMH HTN, HLD, Colon Ca s/p colostomy, CKD (baseline Cr 3-4 per daughter), Depression, Gout, Osteoporosis, hx of recurrent UTI presented with weakness, fatigue, dizziness, lightheadedness, x 1 week admitted for MEHDI on CKD and UTI on 8/2. Patient was started on IVF with electrolyte replacement and IV Bicarb and continued with just IVF. Urine cultures grew positive for Serratia and CT abdomen/pelvis exhibited thickening of the right ureter. US of kidney showed no post renal obstruction and evidence of a small right kidney consisted with chronic kidney disease. Patient completed a full course of rocephin for UTI. Patient endorsed watery diarrhea output from colostomy, GI PCR, C diff, stool culture and ova & parasites were all negative. Patient's was placed on cholestyramine, bacid, loperamide and PRN lomotil - ostomy output gradually became more solid and symptoms resolved. Patient also exhibited elevated lipase and amylase - US gallbladder showed findings suggestive of chronic calculus cholecystitis, thus surgery was consulted but recommended no intervention and indicated a low suspicious for cholecystitis. 83F with PMH HTN, HLD, Colon Ca s/p colostomy, CKD (baseline Cr 3-4 per daughter), Depression, Gout, Osteoporosis, hx of recurrent UTI presented with weakness, fatigue, dizziness, lightheadedness, x 1 week admitted for MEHDI on CKD and UTI on 8/2. Patient was started on IVF with electrolyte replacement and IV Bicarb and continued with just IVF. Urine cultures grew positive for Serratia and CT abdomen/pelvis exhibited thickening of the right ureter. US of kidney showed no post renal obstruction and evidence of a small right kidney consisted with chronic kidney disease. Patient completed a full course of rocephin for UTI. Patient endorsed watery diarrhea output from colostomy, GI PCR, C diff, stool culture and ova & parasites were all negative. Patient's was placed on cholestyramine, bacid, loperamide and PRN lomotil - ostomy output gradually became more solid and symptoms resolved. Patient also exhibited elevated lipase and amylase - US gallbladder showed findings suggestive of chronic calculus cholecystitis, thus surgery was consulted but recommended no intervention and indicated a low suspicious for cholecystitis.Patient improved clinically throughout hospital course. Patient seen and examined on day of discharge.    Vital Signs Last 24 Hrs  T(C): 37.1 (09 Aug 2018 06:57), Max: 37.1 (09 Aug 2018 04:32)  T(F): 98.8 (09 Aug 2018 06:57), Max: 98.8 (09 Aug 2018 04:32)  HR: 78 (09 Aug 2018 06:57) (57 - 78)  BP: 113/66 (09 Aug 2018 06:57) (99/61 - 128/77)  BP(mean): --  RR: 18 (09 Aug 2018 06:57) (16 - 18)  SpO2: 97% (09 Aug 2018 06:57) (96% - 97%)    Physical Exam:  General: NAD  HEENT: NCAT  Neurology: A&Ox3, nonfocal deficits  Respiratory: CTA B/L, No W/R/R  CV: RRR, +S1/S2, no murmurs, rubs or gallops  Abdominal: Soft, NT, ND +BSx4. + ostomy with semi-formed pasty stool. Skin around site clear, dry and intact.  Extremities: +1 pitting edema in LE b/l, + peripheral pulses    Patient is medically stable for discharge home with outpatient follow up. 83F with PMH HTN, HLD, Colon Ca s/p colostomy, CKD (baseline Cr 3-4 per daughter), Depression, Gout, Osteoporosis, hx of recurrent UTI presented with weakness, fatigue, dizziness, lightheadedness, x 1 week admitted for MEHDI on CKD and UTI on 8/2. Patient was started on IVF with electrolyte replacement and IV Bicarb and continued with just IVF. Urine cultures grew positive for Serratia and CT abdomen/pelvis exhibited thickening of the right ureter. US of kidney showed no post renal obstruction and evidence of a small right kidney consisted with chronic kidney disease. Patient completed a full course of rocephin for UTI. Patient endorsed watery diarrhea output from colostomy, GI PCR, C diff, stool culture and ova & parasites were all negative. Patient's was placed on cholestyramine, bacid, loperamide and PRN lomotil - ostomy output gradually became more solid and symptoms resolved. Patient also exhibited elevated lipase and amylase - US gallbladder showed findings suggestive of chronic calculus cholecystitis, thus surgery was consulted but recommended no intervention and indicated a low suspicious for cholecystitis. Of note, patient's home PPI was discontinued during this stay as there was no indication for patient to continue as prescribed. Patient improved clinically throughout hospital course. Patient seen and examined on day of discharge.    Vital Signs Last 24 Hrs  T(C): 37.1 (09 Aug 2018 06:57), Max: 37.1 (09 Aug 2018 04:32)  T(F): 98.8 (09 Aug 2018 06:57), Max: 98.8 (09 Aug 2018 04:32)  HR: 78 (09 Aug 2018 06:57) (57 - 78)  BP: 113/66 (09 Aug 2018 06:57) (99/61 - 128/77)  BP(mean): --  RR: 18 (09 Aug 2018 06:57) (16 - 18)  SpO2: 97% (09 Aug 2018 06:57) (96% - 97%)    Physical Exam:  General: NAD  HEENT: NCAT  Neurology: A&Ox3, nonfocal deficits  Respiratory: CTA B/L, No W/R/R  CV: RRR, +S1/S2, no murmurs, rubs or gallops  Abdominal: Soft, NT, ND +BSx4. + ostomy with semi-formed pasty stool. Skin around site clear, dry and intact.  Extremities: +1 pitting edema in LE b/l, + peripheral pulses    Patient is medically stable for discharge home with outpatient follow up.

## 2018-08-03 NOTE — PROGRESS NOTE ADULT - PROBLEM SELECTOR PLAN 4
300 mL watery stool in colostomy bag  - f/u C diff toxin 300 mL watery stool in colostomy bag  - f/u C diff toxin, output is typically semi-formed per patient.   Start Bacid.  If C.diff negative consider Cholestyramine.

## 2018-08-03 NOTE — PROGRESS NOTE ADULT - PROBLEM SELECTOR PLAN 5
-Hold Allopurinol and Colchicine in setting of MEHDI Hgb 10.6 in ED, likely secondary to hemoconcentration/ dehydration  - Hgb 8.4 today, likely baseline and secondary to CKD

## 2018-08-03 NOTE — DISCHARGE NOTE ADULT - CARE PLAN
Principal Discharge DX:	Acute renal failure  Secondary Diagnosis:	Depression  Goal:	stable  Assessment and plan of treatment:	continue fluoextine  Secondary Diagnosis:	Dyslipidemia  Goal:	stable  Assessment and plan of treatment:	continue simvastatin  discontinue fenofibrate  Secondary Diagnosis:	Gout  Goal:	stable  Assessment and plan of treatment:	?allopurinol, colchicine  Secondary Diagnosis:	HTN (hypertension)  Goal:	stable  Assessment and plan of treatment:	continue metoprolol  Secondary Diagnosis:	UTI (urinary tract infection)  Goal:	stable  Assessment and plan of treatment:	You had a UTI which was treated with antibiotics Principal Discharge DX:	Acute renal failure  Goal:	Resolved  Assessment and plan of treatment:	During your stay you developed acute renal failure likely due to dehydration which has since resolved. Please follow up with your nephrologist (Dr. Coto - see contact information below)  Secondary Diagnosis:	UTI (urinary tract infection)  Assessment and plan of treatment:	During your stay you developed a UTI which was treated successfully with antibiotics. Please follow up with your PCP should you develop symptoms including burning or pain with urination.  Secondary Diagnosis:	Diarrhea, unspecified type  Assessment and plan of treatment:	During your stay you developed diarrhea  Goal:	stable Principal Discharge DX:	Acute renal failure  Goal:	Resolved  Assessment and plan of treatment:	During your stay you developed acute renal failure likely due to dehydration which has since resolved. Please try to eat regularly and drink fluids regularly. Please follow up with your nephrologist (Dr. Coto - see contact information below)  Secondary Diagnosis:	UTI (urinary tract infection)  Assessment and plan of treatment:	During your stay you developed a UTI which was treated successfully with antibiotics. Please follow up with your PCP should you develop symptoms including burning or pain with urination.  Secondary Diagnosis:	Diarrhea, unspecified type  Assessment and plan of treatment:	During your stay you developed diarrhea which was treated with cholestyramine, bacid, loperamide and PRN lomotil. Please follow up with your PCP if you start to notice high output from your ostomy.  Secondary Diagnosis:	Anemia  Assessment and plan of treatment:	During your stay you were found to be anemic likely secondary to your CKD. Please continue to see your nephrology regularly and followup with your PCP should you feel light headed or tired, or notice blood in your urine or stool.  Secondary Diagnosis:	Depression  Assessment and plan of treatment:	Continue your home fluoxetine.  Secondary Diagnosis:	Gout  Assessment and plan of treatment:	Your home medications of allopurinol and colchicine were held due to your acute renal failure. Continue to monitor for clinical symptoms of gout.  Secondary Diagnosis:	Dyslipidemia  Assessment and plan of treatment:	Continue your home simvastatin. Principal Discharge DX:	Acute renal failure  Goal:	resolution  Assessment and plan of treatment:	During your stay you developed acute renal failure likely due to dehydration which has since resolved. Please try to eat regularly and drink fluids regularly. Please follow up with your nephrologist (Dr. Coto - see contact information below)  Secondary Diagnosis:	UTI (urinary tract infection)  Assessment and plan of treatment:	During your stay you developed a UTI which was treated successfully with antibiotics. Please follow up with your PCP should you develop symptoms including burning or pain with urination.  Secondary Diagnosis:	Diarrhea, unspecified type  Assessment and plan of treatment:	During your stay you developed diarrhea which was treated with cholestyramine, bacid, loperamide and PRN lomotil. Please follow up with your PCP if you start to notice high output from your ostomy.  Secondary Diagnosis:	Anemia  Assessment and plan of treatment:	During your stay you were found to be anemic likely secondary to your CKD. Please continue to see your nephrology regularly and followup with your PCP should you feel light headed or tired, or notice blood in your urine or stool.  Secondary Diagnosis:	Depression  Assessment and plan of treatment:	Continue your home fluoxetine.  Secondary Diagnosis:	Gout  Assessment and plan of treatment:	Your home medications of allopurinol and colchicine were held due to your acute renal failure. Continue to monitor for clinical symptoms of gout.  Secondary Diagnosis:	Dyslipidemia  Assessment and plan of treatment:	Continue your home simvastatin. Principal Discharge DX:	Acute renal failure  Goal:	resolution  Assessment and plan of treatment:	You had very poor kidney function when you came to the hospital which has been improving with hydration. Please try to eat regularly and drink fluids regularly. Please follow up with your nephrologist (Dr. Coto - see contact information below) within a week and monitor you kidney function. Your current BUN/Creatinine are 62/4.9. Do not take NSAIDS (medications like Advil, Aleve, Motrin, Ibuprofen). If you have pain, take Tylenol, or if severe, may take your tramadol (but no more than two tramadol tablets per day).  Secondary Diagnosis:	UTI (urinary tract infection)  Assessment and plan of treatment:	You had a urinary tract infection which was treated in the hospital and has resolved. Please follow up with your PMD should you develop symptoms including burning or pain with urination.  Secondary Diagnosis:	Diarrhea, unspecified type  Assessment and plan of treatment:	You had diarrhea that may have resulted in you getting dehydrated and caused your admission to the hospital. This has resolved with medications and all the stool studies were negative for infection. Take the prescribed cholestyramine once daily. You were also prescribed loperamide, which you can take only if needed for diarrhea /loose stools. . Please follow up with gastroenterologist, Dr. Jones (phone number below) in 2 weeks. If you start to notice high output from your ostomy despite taking the imodium call Dr. Jones for more medication.  Secondary Diagnosis:	Anemia  Assessment and plan of treatment:	During your stay you were found to be anemic likely secondary to your CKD. Please continue to see your nephrologist regularly and follow-up with your PCP should you feel lightheaded or tired, or notice blood in your urine or stool.  Secondary Diagnosis:	Depression  Assessment and plan of treatment:	Continue your home fluoxetine.  Secondary Diagnosis:	Gout  Assessment and plan of treatment:	You may take your allopurinol but stop taking colchicine for now as your kidneys are recovering. Ask Dr. Coto when it is safe for you to resume.  Secondary Diagnosis:	Dyslipidemia  Assessment and plan of treatment:	Continue your home simvastatin. Do not take the fenofibrate with it as that may affect your kidney function.

## 2018-08-03 NOTE — DISCHARGE NOTE ADULT - PROVIDER TOKENS
TOKEN:'[id number]:'[id number]' TOKEN:'[id number]:'[id number]:'[id number]'

## 2018-08-03 NOTE — DISCHARGE NOTE ADULT - ADDITIONAL INSTRUCTIONS
-Please follow up with your primary doctor within one week.  -Please follow up with Nephrologist outpatient (information below).  -Patient and family to set up follow up appointments.  -Continue taking your medications as directed above.  -If symptoms persist/worsen, please call your PMD or return to the ED. Stop taking bystolic as your blood pressure was a bit low in the hospital at times.

## 2018-08-03 NOTE — DISCHARGE NOTE ADULT - MEDICATION SUMMARY - MEDICATIONS TO TAKE
I will START or STAY ON the medications listed below when I get home from the hospital:    aspirin 81 mg oral tablet  -- 1 tab(s) by mouth once a day  -- Indication: For Heart health    traMADol 50 mg oral tablet  -- 1 tab(s) by mouth every 12 hours, As Needed for severe pain  -- Indication: For take for severe pain only if needed    FLUoxetine 20 mg oral capsule  -- 1 cap(s) by mouth once a day  -- Indication: For Depression    loperamide 2 mg oral capsule  -- 1 cap(s) by mouth 4 times a day, As Needed for diarrhea  -- Indication: For take for diarrhea only if needed    allopurinol 100 mg oral tablet  -- 1 tab(s) by mouth once a day  -- Indication: For Gout    simvastatin 20 mg oral tablet  -- 1 tab(s) by mouth once a day (at bedtime)  -- Indication: For Hyperlipidemia    Questran Packets 4 g/9 g oral powder for reconstitution  -- 1 packet(s) by mouth once a day   -- Indication: For loose ostomy output    methenamine hippurate hippurate 1 g oral tablet  -- 1 tab(s) by mouth once a day  -- Indication: For Home medication - prevent UTIs    multivitamin  --     -- Indication: For Home vitamin    Vitamin D3 5000 intl units oral capsule  -- 1 cap(s) by mouth once a day  -- Indication: For Home vitamin D

## 2018-08-03 NOTE — PROGRESS NOTE ADULT - ASSESSMENT
83F with PMH HTN, HLD, Colon Ca s/p colostomy, CKD (baseline Cr 3-4 per daughter), Depression, Gout, Osteoporosis, hx of recurrent UTI presents with weakness, fatigue, dizziness, lightheadedness, x 1 week admitted for MEHDI on CKD, UTI.

## 2018-08-03 NOTE — DISCHARGE NOTE ADULT - CARE PROVIDER_API CALL
Scott Galvan (), Family Medicine  [address] [facility]  2nd floor  [place], [place] [id number]  Phone: [phone]  Fax: [phone]    Oscar Coto), Nephrology  [address] [facility]  [address] [place] [id number]  Phone: [phone]  Fax: [phone] Scott Galvan (DO), Family Medicine  [address] [facility]  2nd floor  [place], [place] [id number]  Phone: [phone]  Fax: [phone]    Oscar Coto (MD), Nephrology  [address] [facility]  [address] [place] [id number]  Phone: [phone]  Fax: [phone]    Maximus Jones (DO), Gastroenterology; Internal Medicine  [address] [place] [id number]  Phone: [phone]  Fax: [phone]

## 2018-08-03 NOTE — DISCHARGE NOTE ADULT - MEDICATION SUMMARY - MEDICATIONS TO STOP TAKING
I will STOP taking the medications listed below when I get home from the hospital:    Bystolic 2.5 mg oral tablet  -- 1 tab(s) by mouth once a day    fenofibrate 145 mg oral tablet  -- 1 tab(s) by mouth once a day    Colcrys 0.6 mg oral tablet  -- 1 tab(s) by mouth once a day

## 2018-08-03 NOTE — PROGRESS NOTE ADULT - PROBLEM SELECTOR PLAN 3
CT showed the ureter is also thickening which may represent inflammation/infection. Patient reports symptom of hematuria when experiencing chronic UTIs  - continue with IV Rocephin CT showed the ureter is also thickening which may represent inflammation/infection. Patient reports symptom of hematuria when experiencing chronic UTIs  - continue with IV Rocephin  - f/u blood and urine cultures

## 2018-08-03 NOTE — PHYSICAL THERAPY INITIAL EVALUATION ADULT - ADDITIONAL COMMENTS
Pt lives in a private home. It is [place]. There are several small stair cases with one handrail.

## 2018-08-04 LAB
-  AMIKACIN: SIGNIFICANT CHANGE UP
-  AMOXICILLIN/CLAVULANIC ACID: SIGNIFICANT CHANGE UP
-  AMPICILLIN/SULBACTAM: SIGNIFICANT CHANGE UP
-  AMPICILLIN: SIGNIFICANT CHANGE UP
-  AZTREONAM: SIGNIFICANT CHANGE UP
-  CEFAZOLIN: SIGNIFICANT CHANGE UP
-  CEFEPIME: SIGNIFICANT CHANGE UP
-  CEFOXITIN: SIGNIFICANT CHANGE UP
-  CEFTRIAXONE: SIGNIFICANT CHANGE UP
-  CIPROFLOXACIN: SIGNIFICANT CHANGE UP
-  ERTAPENEM: SIGNIFICANT CHANGE UP
-  GENTAMICIN: SIGNIFICANT CHANGE UP
-  IMIPENEM: SIGNIFICANT CHANGE UP
-  LEVOFLOXACIN: SIGNIFICANT CHANGE UP
-  MEROPENEM: SIGNIFICANT CHANGE UP
-  NITROFURANTOIN: SIGNIFICANT CHANGE UP
-  PIPERACILLIN/TAZOBACTAM: SIGNIFICANT CHANGE UP
-  TIGECYCLINE: SIGNIFICANT CHANGE UP
-  TOBRAMYCIN: SIGNIFICANT CHANGE UP
-  TRIMETHOPRIM/SULFAMETHOXAZOLE: SIGNIFICANT CHANGE UP
ALBUMIN SERPL ELPH-MCNC: 2.6 G/DL — LOW (ref 3.3–5)
ALP SERPL-CCNC: 47 U/L — SIGNIFICANT CHANGE UP (ref 40–120)
ALT FLD-CCNC: 14 U/L — SIGNIFICANT CHANGE UP (ref 12–78)
ANION GAP SERPL CALC-SCNC: 14 MMOL/L — SIGNIFICANT CHANGE UP (ref 5–17)
AST SERPL-CCNC: 17 U/L — SIGNIFICANT CHANGE UP (ref 15–37)
BASOPHILS # BLD AUTO: 0.03 K/UL — SIGNIFICANT CHANGE UP (ref 0–0.2)
BASOPHILS NFR BLD AUTO: 0.4 % — SIGNIFICANT CHANGE UP (ref 0–2)
BILIRUB SERPL-MCNC: 0.2 MG/DL — SIGNIFICANT CHANGE UP (ref 0.2–1.2)
BUN SERPL-MCNC: 94 MG/DL — HIGH (ref 7–23)
C DIFF BY PCR RESULT: SIGNIFICANT CHANGE UP
C DIFF TOX GENS STL QL NAA+PROBE: SIGNIFICANT CHANGE UP
CALCIUM SERPL-MCNC: 5.7 MG/DL — CRITICAL LOW (ref 8.5–10.1)
CHLORIDE SERPL-SCNC: 106 MMOL/L — SIGNIFICANT CHANGE UP (ref 96–108)
CO2 SERPL-SCNC: 26 MMOL/L — SIGNIFICANT CHANGE UP (ref 22–31)
CREAT SERPL-MCNC: 8 MG/DL — HIGH (ref 0.5–1.3)
CULTURE RESULTS: SIGNIFICANT CHANGE UP
EOSINOPHIL # BLD AUTO: 0.44 K/UL — SIGNIFICANT CHANGE UP (ref 0–0.5)
EOSINOPHIL NFR BLD AUTO: 6 % — SIGNIFICANT CHANGE UP (ref 0–6)
GLUCOSE SERPL-MCNC: 82 MG/DL — SIGNIFICANT CHANGE UP (ref 70–99)
HCT VFR BLD CALC: 24 % — LOW (ref 34.5–45)
HGB BLD-MCNC: 8.3 G/DL — LOW (ref 11.5–15.5)
IMM GRANULOCYTES NFR BLD AUTO: 0.3 % — SIGNIFICANT CHANGE UP (ref 0–1.5)
LYMPHOCYTES # BLD AUTO: 0.89 K/UL — LOW (ref 1–3.3)
LYMPHOCYTES # BLD AUTO: 12.1 % — LOW (ref 13–44)
MCHC RBC-ENTMCNC: 30.1 PG — SIGNIFICANT CHANGE UP (ref 27–34)
MCHC RBC-ENTMCNC: 34.6 GM/DL — SIGNIFICANT CHANGE UP (ref 32–36)
MCV RBC AUTO: 87 FL — SIGNIFICANT CHANGE UP (ref 80–100)
METHOD TYPE: SIGNIFICANT CHANGE UP
MONOCYTES # BLD AUTO: 0.6 K/UL — SIGNIFICANT CHANGE UP (ref 0–0.9)
MONOCYTES NFR BLD AUTO: 8.1 % — SIGNIFICANT CHANGE UP (ref 2–14)
NEUTROPHILS # BLD AUTO: 5.4 K/UL — SIGNIFICANT CHANGE UP (ref 1.8–7.4)
NEUTROPHILS NFR BLD AUTO: 73.1 % — SIGNIFICANT CHANGE UP (ref 43–77)
ORGANISM # SPEC MICROSCOPIC CNT: SIGNIFICANT CHANGE UP
ORGANISM # SPEC MICROSCOPIC CNT: SIGNIFICANT CHANGE UP
PLATELET # BLD AUTO: 188 K/UL — SIGNIFICANT CHANGE UP (ref 150–400)
POTASSIUM SERPL-MCNC: 3.4 MMOL/L — LOW (ref 3.5–5.3)
POTASSIUM SERPL-SCNC: 3.4 MMOL/L — LOW (ref 3.5–5.3)
PROT SERPL-MCNC: 5.5 G/DL — LOW (ref 6–8.3)
RBC # BLD: 2.76 M/UL — LOW (ref 3.8–5.2)
RBC # FLD: 14 % — SIGNIFICANT CHANGE UP (ref 10.3–14.5)
SODIUM SERPL-SCNC: 146 MMOL/L — HIGH (ref 135–145)
SPECIMEN SOURCE: SIGNIFICANT CHANGE UP
WBC # BLD: 7.38 K/UL — SIGNIFICANT CHANGE UP (ref 3.8–10.5)
WBC # FLD AUTO: 7.38 K/UL — SIGNIFICANT CHANGE UP (ref 3.8–10.5)

## 2018-08-04 PROCEDURE — 99233 SBSQ HOSP IP/OBS HIGH 50: CPT

## 2018-08-04 RX ORDER — SODIUM CHLORIDE 9 MG/ML
1000 INJECTION, SOLUTION INTRAVENOUS
Qty: 0 | Refills: 0 | Status: DISCONTINUED | OUTPATIENT
Start: 2018-08-04 | End: 2018-08-08

## 2018-08-04 RX ORDER — POTASSIUM CHLORIDE 20 MEQ
40 PACKET (EA) ORAL ONCE
Qty: 0 | Refills: 0 | Status: COMPLETED | OUTPATIENT
Start: 2018-08-04 | End: 2018-08-04

## 2018-08-04 RX ORDER — CALCIUM GLUCONATE 100 MG/ML
1 VIAL (ML) INTRAVENOUS EVERY 8 HOURS
Qty: 0 | Refills: 0 | Status: COMPLETED | OUTPATIENT
Start: 2018-08-04 | End: 2018-08-05

## 2018-08-04 RX ORDER — CALCIUM ACETATE 667 MG
1334 TABLET ORAL
Qty: 0 | Refills: 0 | Status: COMPLETED | OUTPATIENT
Start: 2018-08-04 | End: 2018-08-07

## 2018-08-04 RX ADMIN — Medication 20 MILLIGRAM(S): at 11:19

## 2018-08-04 RX ADMIN — SIMVASTATIN 20 MILLIGRAM(S): 20 TABLET, FILM COATED ORAL at 21:47

## 2018-08-04 RX ADMIN — HEPARIN SODIUM 5000 UNIT(S): 5000 INJECTION INTRAVENOUS; SUBCUTANEOUS at 05:02

## 2018-08-04 RX ADMIN — Medication 200 GRAM(S): at 21:47

## 2018-08-04 RX ADMIN — CEFTRIAXONE 100 GRAM(S): 500 INJECTION, POWDER, FOR SOLUTION INTRAMUSCULAR; INTRAVENOUS at 19:58

## 2018-08-04 RX ADMIN — Medication 1334 MILLIGRAM(S): at 11:21

## 2018-08-04 RX ADMIN — Medication 1 APPLICATION(S): at 13:43

## 2018-08-04 RX ADMIN — Medication 1334 MILLIGRAM(S): at 17:21

## 2018-08-04 RX ADMIN — Medication 1 TABLET(S): at 11:19

## 2018-08-04 RX ADMIN — Medication 650 MILLIGRAM(S): at 00:42

## 2018-08-04 RX ADMIN — Medication 81 MILLIGRAM(S): at 11:19

## 2018-08-04 RX ADMIN — Medication 40 MILLIEQUIVALENT(S): at 11:20

## 2018-08-04 RX ADMIN — Medication 650 MILLIGRAM(S): at 01:00

## 2018-08-04 RX ADMIN — Medication 1 TABLET(S): at 05:02

## 2018-08-04 RX ADMIN — Medication 200 GRAM(S): at 13:37

## 2018-08-04 RX ADMIN — Medication 1 APPLICATION(S): at 05:02

## 2018-08-04 RX ADMIN — HEPARIN SODIUM 5000 UNIT(S): 5000 INJECTION INTRAVENOUS; SUBCUTANEOUS at 21:47

## 2018-08-04 RX ADMIN — HEPARIN SODIUM 5000 UNIT(S): 5000 INJECTION INTRAVENOUS; SUBCUTANEOUS at 13:37

## 2018-08-04 NOTE — PROGRESS NOTE ADULT - PROBLEM SELECTOR PLAN 3
CT showed the ureter is also thickening which may represent inflammation/infection. Patient reports symptom of hematuria when experiencing chronic UTIs  - continue with IV Rocephin  - f/u blood and urine cultures (UCx growing Serratia - f/up sensitivities)

## 2018-08-04 NOTE — PROGRESS NOTE ADULT - SUBJECTIVE AND OBJECTIVE BOX
Patient is a 83y old  Female who presents with a chief complaint of I was dizzy and out of breath (03 Aug 2018 14:09)      INTERVAL HPI/OVERNIGHT EVENTS: Pt states she feels much improved. Still has some generalized weakness, and continues to have high output of liquid stool in the colostomy. Denies fever, chills, CP, SOB.    MEDICATIONS  (STANDING):  aspirin enteric coated 81 milliGRAM(s) Oral daily  BACItracin   Ointment 1 Application(s) Topical two times a day  calcium acetate 1334 milliGRAM(s) Oral three times a day with meals  calcium gluconate IVPB 1 Gram(s) IV Intermittent every 8 hours  cefTRIAXone   IVPB 1 Gram(s) IV Intermittent every 24 hours  FLUoxetine 20 milliGRAM(s) Oral daily  heparin  Injectable 5000 Unit(s) SubCutaneous every 8 hours  lactobacillus acidophilus 1 Tablet(s) Oral daily  metoprolol tartrate 12.5 milliGRAM(s) Oral two times a day  simvastatin 20 milliGRAM(s) Oral at bedtime  sodium chloride 0.45%. 1000 milliLiter(s) (100 mL/Hr) IV Continuous <Continuous>    MEDICATIONS  (PRN):  acetaminophen   Tablet. 650 milliGRAM(s) Oral every 6 hours PRN pain      Allergies    Levaquin (Pruritus)  mercury (Pruritus; Rash)  sulfa drugs (Pruritus)    Intolerances        REVIEW OF SYSTEMS:  CONSTITUTIONAL: +generalized weakness ; No fever or chills  HEENT:  No headache, no sore throat  RESPIRATORY: No cough, wheezing, or shortness of breath  CARDIOVASCULAR: No chest pain, palpitations, or leg swelling  GASTROINTESTINAL: No abd pain, nausea, vomiting; ++diarrhea  GENITOURINARY: No dysuria, frequency, or hematuria, though has renae in currently  NEUROLOGICAL: no focal weakness or dizziness  MUSCULOSKELETAL: no myalgias     Vital Signs Last 24 Hrs  T(C): 37.1 (04 Aug 2018 23:18), Max: 37.2 (04 Aug 2018 15:51)  T(F): 98.8 (04 Aug 2018 23:18), Max: 98.9 (04 Aug 2018 15:51)  HR: 67 (04 Aug 2018 23:18) (60 - 67)  BP: 121/74 (04 Aug 2018 23:18) (92/50 - 121/74)  BP(mean): --  RR: 18 (04 Aug 2018 23:18) (16 - 18)  SpO2: 95% (04 Aug 2018 23:18) (95% - 98%)    PHYSICAL EXAM:  GENERAL: NAD  HEENT:  anicteric, moist mucous membranes  CHEST/LUNG:  CTA b/l, no rales, wheezes, or rhonchi  HEART:  RRR, S1, S2  ABDOMEN:  BS+, soft, nontender, nondistended, colostomy in place  : renae in place, draining light yellow urine  EXTREMITIES: no edema, cyanosis, or calf tenderness  NERVOUS SYSTEM: AA&Ox3    LABS:                        8.3    7.38  )-----------( 188      ( 04 Aug 2018 05:52 )             24.0     CBC Full  -  ( 04 Aug 2018 05:52 )  WBC Count : 7.38 K/uL  Hemoglobin : 8.3 g/dL  Hematocrit : 24.0 %  Platelet Count - Automated : 188 K/uL  Mean Cell Volume : 87.0 fl  Mean Cell Hemoglobin : 30.1 pg  Mean Cell Hemoglobin Concentration : 34.6 gm/dL  Auto Neutrophil # : 5.40 K/uL  Auto Lymphocyte # : 0.89 K/uL  Auto Monocyte # : 0.60 K/uL  Auto Eosinophil # : 0.44 K/uL  Auto Basophil # : 0.03 K/uL  Auto Neutrophil % : 73.1 %  Auto Lymphocyte % : 12.1 %  Auto Monocyte % : 8.1 %  Auto Eosinophil % : 6.0 %  Auto Basophil % : 0.4 %    04 Aug 2018 05:52    146    |  106    |  94     ----------------------------<  82     3.4     |  26     |  8.00     Ca    5.7        04 Aug 2018 05:52    TPro  5.5    /  Alb  2.6    /  TBili  0.2    /  DBili  x      /  AST  17     /  ALT  14     /  AlkPhos  47     04 Aug 2018 05:52        CAPILLARY BLOOD GLUCOSE            Culture - Blood (collected 08-02-18 @ 21:55)  Source: .Blood Blood-Venous  Preliminary Report (08-03-18 @ 22:01):    No growth to date.    Culture - Blood (collected 08-02-18 @ 21:55)  Source: .Blood Blood-Venous  Preliminary Report (08-03-18 @ 22:01):    No growth to date.    Culture - Urine (collected 08-02-18 @ 21:39)  Source: .Urine Clean Catch (Midstream)  Final Report (08-04-18 @ 21:03):    >100,000 CFU/ml Serratia marcescens  Organism: Serratia marcescens (08-04-18 @ 21:03)  Organism: Serratia marcescens (08-04-18 @ 21:03)      -  Amikacin: S <=8      -  Amoxicillin/Clavulanic Acid: R >16/8      -  Ampicillin: R 16 These ampicillin results predict results for amoxicillin      -  Ampicillin/Sulbactam: R 16/8      -  Aztreonam: S <=4      -  Cefazolin: R >16      -  Cefepime: S <=2      -  Cefoxitin: R 16      -  Ceftriaxone: S <=1 Enterobacter, Citrobacter, and Serratia may develop resistance during prolonged therapy      -  Ciprofloxacin: I 2      -  Ertapenem: S <=0.5      -  Gentamicin: S <=1      -  Imipenem: S <=1      -  Levofloxacin: S <=1      -  Meropenem: S <=1      -  Nitrofurantoin: R >64 Should not be used to treat pyelonephritis      -  Piperacillin/Tazobactam: S <=8      -  Tigecycline: S 2      -  Tobramycin: S <=2      -  Trimethoprim/Sulfamethoxazole: S <=0.5/9.5      Method Type: SUBHASH        RADIOLOGY & ADDITIONAL TESTS:    Personally reviewed.     Consultant(s) Notes Reviewed:  [x] YES  [ ] NO Patient is a 83y old  Female who presents with a chief complaint of I was dizzy and out of breath (03 Aug 2018 14:09)    INTERVAL HPI/OVERNIGHT EVENTS: Pt states she feels much improved. Still has some generalized weakness, and continues to have high output of liquid stool in the colostomy. Denies fever, chills, CP, SOB.    MEDICATIONS  (STANDING):  aspirin enteric coated 81 milliGRAM(s) Oral daily  BACItracin   Ointment 1 Application(s) Topical two times a day  calcium acetate 1334 milliGRAM(s) Oral three times a day with meals  calcium gluconate IVPB 1 Gram(s) IV Intermittent every 8 hours  cefTRIAXone   IVPB 1 Gram(s) IV Intermittent every 24 hours  FLUoxetine 20 milliGRAM(s) Oral daily  heparin  Injectable 5000 Unit(s) SubCutaneous every 8 hours  lactobacillus acidophilus 1 Tablet(s) Oral daily  metoprolol tartrate 12.5 milliGRAM(s) Oral two times a day  simvastatin 20 milliGRAM(s) Oral at bedtime  sodium chloride 0.45%. 1000 milliLiter(s) (100 mL/Hr) IV Continuous <Continuous>    MEDICATIONS  (PRN):  acetaminophen   Tablet. 650 milliGRAM(s) Oral every 6 hours PRN pain      Allergies    Levaquin (Pruritus)  mercury (Pruritus; Rash)  sulfa drugs (Pruritus)    Intolerances        REVIEW OF SYSTEMS:  CONSTITUTIONAL: +generalized weakness ; No fever or chills  HEENT:  No headache, no sore throat  RESPIRATORY: No cough, wheezing, or shortness of breath  CARDIOVASCULAR: No chest pain, palpitations, or leg swelling  GASTROINTESTINAL: No abd pain, nausea, vomiting; ++diarrhea  GENITOURINARY: No dysuria, frequency, or hematuria, though has renae in currently  NEUROLOGICAL: no focal weakness or dizziness  MUSCULOSKELETAL: no myalgias     Vital Signs Last 24 Hrs  T(C): 37.1 (04 Aug 2018 23:18), Max: 37.2 (04 Aug 2018 15:51)  T(F): 98.8 (04 Aug 2018 23:18), Max: 98.9 (04 Aug 2018 15:51)  HR: 67 (04 Aug 2018 23:18) (60 - 67)  BP: 121/74 (04 Aug 2018 23:18) (92/50 - 121/74)  BP(mean): --  RR: 18 (04 Aug 2018 23:18) (16 - 18)  SpO2: 95% (04 Aug 2018 23:18) (95% - 98%)    PHYSICAL EXAM:  GENERAL: NAD  HEENT:  anicteric, moist mucous membranes  CHEST/LUNG:  CTA b/l, no rales, wheezes, or rhonchi  HEART:  RRR, S1, S2  ABDOMEN:  BS+, soft, nontender, nondistended, colostomy in place  : renae in place, draining light yellow urine  EXTREMITIES: no edema, cyanosis, or calf tenderness  NERVOUS SYSTEM: AA&Ox3    LABS:                        8.3    7.38  )-----------( 188      ( 04 Aug 2018 05:52 )             24.0     CBC Full  -  ( 04 Aug 2018 05:52 )  WBC Count : 7.38 K/uL  Hemoglobin : 8.3 g/dL  Hematocrit : 24.0 %  Platelet Count - Automated : 188 K/uL  Mean Cell Volume : 87.0 fl  Mean Cell Hemoglobin : 30.1 pg  Mean Cell Hemoglobin Concentration : 34.6 gm/dL  Auto Neutrophil # : 5.40 K/uL  Auto Lymphocyte # : 0.89 K/uL  Auto Monocyte # : 0.60 K/uL  Auto Eosinophil # : 0.44 K/uL  Auto Basophil # : 0.03 K/uL  Auto Neutrophil % : 73.1 %  Auto Lymphocyte % : 12.1 %  Auto Monocyte % : 8.1 %  Auto Eosinophil % : 6.0 %  Auto Basophil % : 0.4 %    04 Aug 2018 05:52    146    |  106    |  94     ----------------------------<  82     3.4     |  26     |  8.00     Ca    5.7        04 Aug 2018 05:52    TPro  5.5    /  Alb  2.6    /  TBili  0.2    /  DBili  x      /  AST  17     /  ALT  14     /  AlkPhos  47     04 Aug 2018 05:52        CAPILLARY BLOOD GLUCOSE            Culture - Blood (collected 08-02-18 @ 21:55)  Source: .Blood Blood-Venous  Preliminary Report (08-03-18 @ 22:01):    No growth to date.    Culture - Blood (collected 08-02-18 @ 21:55)  Source: .Blood Blood-Venous  Preliminary Report (08-03-18 @ 22:01):    No growth to date.    Culture - Urine (collected 08-02-18 @ 21:39)  Source: .Urine Clean Catch (Midstream)  Final Report (08-04-18 @ 21:03):    >100,000 CFU/ml Serratia marcescens  Organism: Serratia marcescens (08-04-18 @ 21:03)  Organism: Serratia marcescens (08-04-18 @ 21:03)      -  Amikacin: S <=8      -  Amoxicillin/Clavulanic Acid: R >16/8      -  Ampicillin: R 16 These ampicillin results predict results for amoxicillin      -  Ampicillin/Sulbactam: R 16/8      -  Aztreonam: S <=4      -  Cefazolin: R >16      -  Cefepime: S <=2      -  Cefoxitin: R 16      -  Ceftriaxone: S <=1 Enterobacter, Citrobacter, and Serratia may develop resistance during prolonged therapy      -  Ciprofloxacin: I 2      -  Ertapenem: S <=0.5      -  Gentamicin: S <=1      -  Imipenem: S <=1      -  Levofloxacin: S <=1      -  Meropenem: S <=1      -  Nitrofurantoin: R >64 Should not be used to treat pyelonephritis      -  Piperacillin/Tazobactam: S <=8      -  Tigecycline: S 2      -  Tobramycin: S <=2      -  Trimethoprim/Sulfamethoxazole: S <=0.5/9.5      Method Type: SUBHASH        RADIOLOGY & ADDITIONAL TESTS:    Personally reviewed.     Consultant(s) Notes Reviewed:  [x] YES  [ ] NO

## 2018-08-04 NOTE — PROGRESS NOTE ADULT - SUBJECTIVE AND OBJECTIVE BOX
Patient is a 83y old  Female who presents with a chief complaint of I was dizzy and out of breathe (03 Aug 2018 14:09)  Patient seen in follow up for MEHDI, CKD 4, Metabolic acidosis, Hypo-k, Ca, Mg.       Slowly improving renal indices. + Electrolyte imbalance.     PAST MEDICAL HISTORY:  Herniated lumbar intervertebral disc  Depression  Tremor  Kidney atrophy  Colostomy status  Chronic UTI (urinary tract infection)  Cervical cancer  Dyslipidemia  Diabetes  Hernia, incisional    MEDICATIONS  (STANDING):  aspirin enteric coated 81 milliGRAM(s) Oral daily  BACItracin   Ointment 1 Application(s) Topical two times a day  calcium acetate 1334 milliGRAM(s) Oral three times a day with meals  calcium carbonate 1250 mG  + Vitamin D (OsCal 500 + D) 1 Tablet(s) Oral three times a day  calcium gluconate IVPB 1 Gram(s) IV Intermittent every 8 hours  cefTRIAXone   IVPB 1 Gram(s) IV Intermittent every 24 hours  FLUoxetine 20 milliGRAM(s) Oral daily  heparin  Injectable 5000 Unit(s) SubCutaneous every 8 hours  lactobacillus acidophilus 1 Tablet(s) Oral daily  metoprolol tartrate 12.5 milliGRAM(s) Oral two times a day  simvastatin 20 milliGRAM(s) Oral at bedtime  sodium chloride 0.45% 1000 milliLiter(s) (100 mL/Hr) IV Continuous <Continuous>    MEDICATIONS  (PRN):  acetaminophen   Tablet. 650 milliGRAM(s) Oral every 6 hours PRN pain    T(C): 37.1 (18 @ 08:00), Max: 37.3 (18 @ 00:06)  HR: 65 (18 @ 08:00) (53 - 66)  BP: 116/70 (18 @ 08:00) (92/50 - 126/67)  RR: 16 (18 @ 08:00) (15 - 18)  SpO2: 97% (18 @ 08:00) (95% - 99%)  Wt(kg): --  I&O's Detail    03 Aug 2018 07:01  -  04 Aug 2018 07:00  --------------------------------------------------------  IN:    Oral Fluid: 620 mL    sodium chloride 0.45%: 2400 mL  Total IN: 3020 mL    OUT:    Colostomy: 2300 mL    Indwelling Catheter - Urethral: 500 mL  Total OUT: 2800 mL    Total NET: 220 mL          PHYSICAL EXAM:  General: NAD  Respiratory: b/l air entry  Cardiovascular: S1 S2  Gastrointestinal: soft, + Colostomy  Extremities:  no edema                          8.3    7.38  )-----------( 188      ( 04 Aug 2018 05:52 )             24.0     08-    146<H>  |  106  |  94<H>  ----------------------------<  82  3.4<L>   |  26  |  8.00<H>    Ca    5.7<LL>      04 Aug 2018 05:52  Phos  8.7       Mg     2.0         TPro  5.5<L>  /  Alb  2.6<L>  /  TBili  0.2  /  DBili  x   /  AST  17  /  ALT  14  /  AlkPhos  47  0804    CARDIAC MARKERS ( 02 Aug 2018 15:05 )  <.015 ng/mL / x     / 317 U/L / x     / 4.2 ng/mL      LIVER FUNCTIONS - ( 04 Aug 2018 05:52 )  Alb: 2.6 g/dL / Pro: 5.5 g/dL / ALK PHOS: 47 U/L / ALT: 14 U/L / AST: 17 U/L / GGT: x           Urinalysis Basic - ( 02 Aug 2018 16:04 )    Color: Yellow / Appearance: Turbid / S.020 / pH: x  Gluc: x / Ketone: Negative  / Bili: Negative / Urobili: Negative   Blood: x / Protein: 100 / Nitrite: Negative   Leuk Esterase: Moderate / RBC: 3-5 /HPF / WBC >50   Sq Epi: x / Non Sq Epi: Occasional / Bacteria: Moderate        Sodium, Serum: 146 ( @ 05:52)  Sodium, Serum: 142 ( @ 06:34)  Sodium, Serum: 140 ( @ 22:22)  Sodium, Serum: 137 ( @ 15:05)    Creatinine, Serum: 8.00 ( @ 05:52)  Creatinine, Serum: 9.20 ( @ 06:34)  Creatinine, Serum: 9.70 ( @ 22:22)  Creatinine, Serum: 12.00 ( @ 15:05)    Potassium, Serum: 3.4 ( @ 05:52)  Potassium, Serum: 3.3 ( @ 06:34)  Potassium, Serum: 3.0 ( @ 22:22)  Potassium, Serum: 3.3 ( @ 15:05)    Hemoglobin: 8.3 ( @ 05:52)  Hemoglobin: 8.4 ( @ 06:34)  Hemoglobin: 10.6 ( @ 15:05) Patient is a 83y old  Female who presents with a chief complaint of I was dizzy and out of breathe (03 Aug 2018 14:09)  Patient seen in follow up for MEHDI, CKD 4, Metabolic acidosis, Hypo-k, Ca, Mg.       Slowly improving renal indices. + Electrolyte imbalance. Large colostomy out put.     PAST MEDICAL HISTORY:  Herniated lumbar intervertebral disc  Depression  Tremor  Kidney atrophy  Colostomy status  Chronic UTI (urinary tract infection)  Cervical cancer  Dyslipidemia  Diabetes  Hernia, incisional    MEDICATIONS  (STANDING):  aspirin enteric coated 81 milliGRAM(s) Oral daily  BACItracin   Ointment 1 Application(s) Topical two times a day  calcium acetate 1334 milliGRAM(s) Oral three times a day with meals  calcium carbonate 1250 mG  + Vitamin D (OsCal 500 + D) 1 Tablet(s) Oral three times a day  calcium gluconate IVPB 1 Gram(s) IV Intermittent every 8 hours  cefTRIAXone   IVPB 1 Gram(s) IV Intermittent every 24 hours  FLUoxetine 20 milliGRAM(s) Oral daily  heparin  Injectable 5000 Unit(s) SubCutaneous every 8 hours  lactobacillus acidophilus 1 Tablet(s) Oral daily  metoprolol tartrate 12.5 milliGRAM(s) Oral two times a day  simvastatin 20 milliGRAM(s) Oral at bedtime  sodium chloride 0.45% 1000 milliLiter(s) (100 mL/Hr) IV Continuous <Continuous>    MEDICATIONS  (PRN):  acetaminophen   Tablet. 650 milliGRAM(s) Oral every 6 hours PRN pain    T(C): 37.1 (18 @ 08:00), Max: 37.3 (18 @ 00:06)  HR: 65 (18 @ 08:00) (53 - 66)  BP: 116/70 (18 @ 08:00) (92/50 - 126/67)  RR: 16 (18 @ 08:00) (15 - 18)  SpO2: 97% (18 @ 08:00) (95% - 99%)  Wt(kg): --  I&O's Detail    03 Aug 2018 07:01  -  04 Aug 2018 07:00  --------------------------------------------------------  IN:    Oral Fluid: 620 mL    sodium chloride 0.45%: 2400 mL  Total IN: 3020 mL    OUT:    Colostomy: 2300 mL    Indwelling Catheter - Urethral: 500 mL  Total OUT: 2800 mL    Total NET: 220 mL          PHYSICAL EXAM:  General: NAD  Respiratory: b/l air entry  Cardiovascular: S1 S2  Gastrointestinal: soft, + Colostomy  Extremities:  no edema                          8.3    7.38  )-----------( 188      ( 04 Aug 2018 05:52 )             24.0     08-    146<H>  |  106  |  94<H>  ----------------------------<  82  3.4<L>   |  26  |  8.00<H>    Ca    5.7<LL>      04 Aug 2018 05:52  Phos  8.7     08-  Mg     2.0     08-    TPro  5.5<L>  /  Alb  2.6<L>  /  TBili  0.2  /  DBili  x   /  AST  17  /  ALT  14  /  AlkPhos  47  08-04    CARDIAC MARKERS ( 02 Aug 2018 15:05 )  <.015 ng/mL / x     / 317 U/L / x     / 4.2 ng/mL      LIVER FUNCTIONS - ( 04 Aug 2018 05:52 )  Alb: 2.6 g/dL / Pro: 5.5 g/dL / ALK PHOS: 47 U/L / ALT: 14 U/L / AST: 17 U/L / GGT: x           Urinalysis Basic - ( 02 Aug 2018 16:04 )    Color: Yellow / Appearance: Turbid / S.020 / pH: x  Gluc: x / Ketone: Negative  / Bili: Negative / Urobili: Negative   Blood: x / Protein: 100 / Nitrite: Negative   Leuk Esterase: Moderate / RBC: 3-5 /HPF / WBC >50   Sq Epi: x / Non Sq Epi: Occasional / Bacteria: Moderate        Sodium, Serum: 146 ( @ 05:52)  Sodium, Serum: 142 ( @ 06:34)  Sodium, Serum: 140 ( @ 22:22)  Sodium, Serum: 137 ( @ 15:05)    Creatinine, Serum: 8.00 ( @ 05:52)  Creatinine, Serum: 9.20 ( @ 06:34)  Creatinine, Serum: 9.70 ( @ 22:22)  Creatinine, Serum: 12.00 ( @ 15:05)    Potassium, Serum: 3.4 ( @ 05:52)  Potassium, Serum: 3.3 ( @ 06:34)  Potassium, Serum: 3.0 ( @ 22:22)  Potassium, Serum: 3.3 ( @ 15:05)    Hemoglobin: 8.3 ( @ 05:52)  Hemoglobin: 8.4 ( @ 06:34)  Hemoglobin: 10.6 ( @ 15:05)

## 2018-08-04 NOTE — PROGRESS NOTE ADULT - PROBLEM SELECTOR PLAN 1
Likely secondary to dehydration and possibly an element of ATN;, baseline Cr 3-4 (3/2016 Cr 2.1). Renal function improving - Cr trending down at 8.0 (down from 9.2 yesterday)  - As per nephro, HD is not indicated at this time.  - continue with 1/2NS at 100cc/hr ; the bicarb portion was discontinued today by renal   - repleted hypokalemia.  - Strict I&O, Perez in place   -Avoid nephrotoxic agents - hold Allopurinol, Colchicine  -Hold Methenamine Hippurate as it needs an acidic environment and patient receiving Sodium Bicarbonate   - Nephrology recs appreciated

## 2018-08-04 NOTE — PROGRESS NOTE ADULT - ASSESSMENT
·	MEHDI, CKD 4, Solitary functioning kidney: Prerenal azotemia, ? ATN  ·	Metabolic acidosis  ·	Diabetes  ·	Hypertension  ·	Hypo- K, Ca, Mg  ·	Hyperphosphatemia    Improving renal function. Acidosis improved. Will d/c IV with bicarb. Continue IVF. Encourage PO intake as tolerated.   Potassium, Calcium supps as ordered. Start phoslo. Monitor blood sugar levels. Insulin coverage as needed. Dietary restriction.   Monitor BP trend. Titrate BP meds as needed. Salt restriction. Will follow electrolytes and renal function trend. ·	MEHDI, CKD 4, Solitary functioning kidney: Prerenal azotemia, ? ATN  ·	Metabolic acidosis  ·	Diabetes  ·	Hypertension  ·	Hypo- K, Ca, Mg  ·	Hyperphosphatemia    Improving renal function. Acidosis improved. Will d/c IV with bicarb. Continue IVF. Encourage PO intake as tolerated.   Potassium, Calcium supps as ordered. Start phoslo. Monitor blood sugar levels. Insulin coverage as needed. Dietary restriction.   Monitor BP trend. Titrate BP meds as needed. Salt restriction. Consider GI evaluation for large colostomy out put and elevated lipase on admission.   Will follow electrolytes and renal function trend.

## 2018-08-04 NOTE — PROGRESS NOTE ADULT - PROBLEM SELECTOR PLAN 4
-c. diff negative; output is typically soft to liquid per patient   c/w Bacid.  Pt has no abd pain or any tenderness to palpation, but will recheck lipase, which was elevated on admission. CT Abd/P (albeit non-con) on admission did not see anything notable in the pancreas.   -consider Cholestyramine.

## 2018-08-04 NOTE — PROGRESS NOTE ADULT - PROBLEM SELECTOR PLAN 2
Corrected Ca this AM is 6.9  - will continue to trend; asymptomatic currently  - renal changed po calcium supplement to calcium gluconate via the IV; monitor Ca++

## 2018-08-04 NOTE — PROVIDER CONTACT NOTE (CRITICAL VALUE NOTIFICATION) - ACTION/TREATMENT ORDERED:
no new orders now , f/u with morning team
calcium gluconate 2gm x 1 dose , potassium 40meq x 2 doses , bicarb infusion in  1/2 ns @ 100mls /hr

## 2018-08-05 DIAGNOSIS — K85.80 OTHER ACUTE PANCREATITIS WITHOUT NECROSIS OR INFECTION: ICD-10-CM

## 2018-08-05 LAB
ALBUMIN SERPL ELPH-MCNC: 2.5 G/DL — LOW (ref 3.3–5)
AMYLASE P1 CFR SERPL: 296 U/L — HIGH (ref 25–115)
ANION GAP SERPL CALC-SCNC: 15 MMOL/L — SIGNIFICANT CHANGE UP (ref 5–17)
BASOPHILS # BLD AUTO: 0.03 K/UL — SIGNIFICANT CHANGE UP (ref 0–0.2)
BASOPHILS NFR BLD AUTO: 0.5 % — SIGNIFICANT CHANGE UP (ref 0–2)
BUN SERPL-MCNC: 82 MG/DL — HIGH (ref 7–23)
CALCIUM SERPL-MCNC: 6.2 MG/DL — CRITICAL LOW (ref 8.4–10.5)
CALCIUM SERPL-MCNC: 6.7 MG/DL — LOW (ref 8.5–10.1)
CHLORIDE SERPL-SCNC: 103 MMOL/L — SIGNIFICANT CHANGE UP (ref 96–108)
CO2 SERPL-SCNC: 24 MMOL/L — SIGNIFICANT CHANGE UP (ref 22–31)
CREAT SERPL-MCNC: 7 MG/DL — HIGH (ref 0.5–1.3)
EOSINOPHIL # BLD AUTO: 0.59 K/UL — HIGH (ref 0–0.5)
EOSINOPHIL NFR BLD AUTO: 9 % — HIGH (ref 0–6)
GLUCOSE SERPL-MCNC: 85 MG/DL — SIGNIFICANT CHANGE UP (ref 70–99)
HCT VFR BLD CALC: 24.4 % — LOW (ref 34.5–45)
HGB BLD-MCNC: 8.3 G/DL — LOW (ref 11.5–15.5)
IMM GRANULOCYTES NFR BLD AUTO: 0.3 % — SIGNIFICANT CHANGE UP (ref 0–1.5)
LIDOCAIN IGE QN: 445 U/L — HIGH (ref 73–393)
LYMPHOCYTES # BLD AUTO: 1.18 K/UL — SIGNIFICANT CHANGE UP (ref 1–3.3)
LYMPHOCYTES # BLD AUTO: 17.9 % — SIGNIFICANT CHANGE UP (ref 13–44)
MAGNESIUM SERPL-MCNC: 1.2 MG/DL — LOW (ref 1.6–2.6)
MCHC RBC-ENTMCNC: 30.5 PG — SIGNIFICANT CHANGE UP (ref 27–34)
MCHC RBC-ENTMCNC: 34 GM/DL — SIGNIFICANT CHANGE UP (ref 32–36)
MCV RBC AUTO: 89.7 FL — SIGNIFICANT CHANGE UP (ref 80–100)
MONOCYTES # BLD AUTO: 0.52 K/UL — SIGNIFICANT CHANGE UP (ref 0–0.9)
MONOCYTES NFR BLD AUTO: 7.9 % — SIGNIFICANT CHANGE UP (ref 2–14)
NEUTROPHILS # BLD AUTO: 4.25 K/UL — SIGNIFICANT CHANGE UP (ref 1.8–7.4)
NEUTROPHILS NFR BLD AUTO: 64.4 % — SIGNIFICANT CHANGE UP (ref 43–77)
PHOSPHATE SERPL-MCNC: 5.5 MG/DL — HIGH (ref 2.5–4.5)
PLATELET # BLD AUTO: 192 K/UL — SIGNIFICANT CHANGE UP (ref 150–400)
POTASSIUM SERPL-MCNC: 3.1 MMOL/L — LOW (ref 3.5–5.3)
POTASSIUM SERPL-SCNC: 3.1 MMOL/L — LOW (ref 3.5–5.3)
RBC # BLD: 2.72 M/UL — LOW (ref 3.8–5.2)
RBC # FLD: 14 % — SIGNIFICANT CHANGE UP (ref 10.3–14.5)
SODIUM SERPL-SCNC: 142 MMOL/L — SIGNIFICANT CHANGE UP (ref 135–145)
WBC # BLD: 6.59 K/UL — SIGNIFICANT CHANGE UP (ref 3.8–10.5)
WBC # FLD AUTO: 6.59 K/UL — SIGNIFICANT CHANGE UP (ref 3.8–10.5)

## 2018-08-05 PROCEDURE — 99233 SBSQ HOSP IP/OBS HIGH 50: CPT

## 2018-08-05 RX ORDER — CHOLESTYRAMINE 4 G/9G
4 POWDER, FOR SUSPENSION ORAL
Qty: 0 | Refills: 0 | Status: DISCONTINUED | OUTPATIENT
Start: 2018-08-05 | End: 2018-08-09

## 2018-08-05 RX ORDER — POTASSIUM CHLORIDE 20 MEQ
10 PACKET (EA) ORAL
Qty: 0 | Refills: 0 | Status: COMPLETED | OUTPATIENT
Start: 2018-08-05 | End: 2018-08-05

## 2018-08-05 RX ORDER — DIPHENOXYLATE HCL/ATROPINE 2.5-.025MG
1 TABLET ORAL THREE TIMES A DAY
Qty: 0 | Refills: 0 | Status: DISCONTINUED | OUTPATIENT
Start: 2018-08-05 | End: 2018-08-09

## 2018-08-05 RX ORDER — MAGNESIUM SULFATE 500 MG/ML
2 VIAL (ML) INJECTION ONCE
Qty: 0 | Refills: 0 | Status: COMPLETED | OUTPATIENT
Start: 2018-08-05 | End: 2018-08-05

## 2018-08-05 RX ORDER — LOPERAMIDE HCL 2 MG
2 TABLET ORAL
Qty: 0 | Refills: 0 | Status: DISCONTINUED | OUTPATIENT
Start: 2018-08-05 | End: 2018-08-09

## 2018-08-05 RX ORDER — MAGNESIUM OXIDE 400 MG ORAL TABLET 241.3 MG
400 TABLET ORAL EVERY 4 HOURS
Qty: 0 | Refills: 0 | Status: COMPLETED | OUTPATIENT
Start: 2018-08-05 | End: 2018-08-05

## 2018-08-05 RX ADMIN — Medication 100 MILLIEQUIVALENT(S): at 15:36

## 2018-08-05 RX ADMIN — CEFTRIAXONE 100 GRAM(S): 500 INJECTION, POWDER, FOR SOLUTION INTRAMUSCULAR; INTRAVENOUS at 22:12

## 2018-08-05 RX ADMIN — Medication 2 MILLIGRAM(S): at 16:59

## 2018-08-05 RX ADMIN — MAGNESIUM OXIDE 400 MG ORAL TABLET 400 MILLIGRAM(S): 241.3 TABLET ORAL at 11:08

## 2018-08-05 RX ADMIN — Medication 81 MILLIGRAM(S): at 11:08

## 2018-08-05 RX ADMIN — HEPARIN SODIUM 5000 UNIT(S): 5000 INJECTION INTRAVENOUS; SUBCUTANEOUS at 22:12

## 2018-08-05 RX ADMIN — Medication 1 TABLET(S): at 11:08

## 2018-08-05 RX ADMIN — HEPARIN SODIUM 5000 UNIT(S): 5000 INJECTION INTRAVENOUS; SUBCUTANEOUS at 05:43

## 2018-08-05 RX ADMIN — Medication 50 GRAM(S): at 18:55

## 2018-08-05 RX ADMIN — Medication 100 MILLIEQUIVALENT(S): at 14:01

## 2018-08-05 RX ADMIN — Medication 1 APPLICATION(S): at 17:01

## 2018-08-05 RX ADMIN — Medication 200 GRAM(S): at 05:42

## 2018-08-05 RX ADMIN — Medication 1334 MILLIGRAM(S): at 16:59

## 2018-08-05 RX ADMIN — MAGNESIUM OXIDE 400 MG ORAL TABLET 400 MILLIGRAM(S): 241.3 TABLET ORAL at 16:59

## 2018-08-05 RX ADMIN — SODIUM CHLORIDE 100 MILLILITER(S): 9 INJECTION, SOLUTION INTRAVENOUS at 05:42

## 2018-08-05 RX ADMIN — Medication 1 APPLICATION(S): at 05:51

## 2018-08-05 RX ADMIN — Medication 12.5 MILLIGRAM(S): at 05:43

## 2018-08-05 RX ADMIN — HEPARIN SODIUM 5000 UNIT(S): 5000 INJECTION INTRAVENOUS; SUBCUTANEOUS at 13:10

## 2018-08-05 RX ADMIN — Medication 50 GRAM(S): at 11:07

## 2018-08-05 RX ADMIN — Medication 2 MILLIGRAM(S): at 21:10

## 2018-08-05 RX ADMIN — SIMVASTATIN 20 MILLIGRAM(S): 20 TABLET, FILM COATED ORAL at 21:10

## 2018-08-05 RX ADMIN — CHOLESTYRAMINE 4 GRAM(S): 4 POWDER, FOR SUSPENSION ORAL at 22:12

## 2018-08-05 RX ADMIN — Medication 20 MILLIGRAM(S): at 11:08

## 2018-08-05 RX ADMIN — Medication 1334 MILLIGRAM(S): at 12:22

## 2018-08-05 NOTE — PROGRESS NOTE ADULT - PROBLEM SELECTOR PLAN 4
-c. diff negative; output is typically soft to liquid per patient   c/w Bacid.  Pt has no abd pain or any tenderness to palpation, and rechecked lipase (trended down toward normal range, amylase is elevated), which was elevated on admission. CT Abd/P (albeit non-con) on admission did not see anything notable in the pancreas.   -consulted GI who started pt on Cholestyramine, loperamide and PRN lomotil  -monitor output  -ordered GI PCR, O&P, stool culture

## 2018-08-05 NOTE — PROGRESS NOTE ADULT - PROBLEM SELECTOR PLAN 3
CT showed the ureter has some thickening, which may represent inflammation/infection. Patient reports symptom of hematuria when experiencing chronic UTIs  - continue with IV Rocephin  - f/up blood and urine cultures (UCx growing Serratia - f/up sensitivities -- switch Abx in the morning.)

## 2018-08-05 NOTE — PROGRESS NOTE ADULT - PROBLEM SELECTOR PLAN 2
Corrected Ca this AM is 7.9 so significant improvement.  - will continue to trend; asymptomatic currently  - renal changed po calcium supplement to calcium gluconate via the IV; monitor Ca++

## 2018-08-05 NOTE — PROGRESS NOTE ADULT - ASSESSMENT
·	MEHDI, CKD 4, Solitary functioning kidney: Prerenal azotemia, ? ATN  ·	Metabolic acidosis  ·	Diabetes  ·	Hypertension  ·	Hypo- K, Ca, Mg  ·	Hyperphosphatemia    Improving renal function. Continue IVF. Encourage PO intake as tolerated. GI follow up. Can d/c renae. Check renal sonogram.   Potassium, Calcium supps as ordered. Start phoslo. Monitor blood sugar levels. Insulin coverage as needed. Dietary restriction.   Monitor BP trend. Titrate BP meds as needed. Salt restriction. Will follow electrolytes and renal function trend.

## 2018-08-05 NOTE — CONSULT NOTE ADULT - SUBJECTIVE AND OBJECTIVE BOX
Chief Complaint:  Patient is a 83y old  Female who presents with a chief complaint of I was dizzy and out of breathe 83F with PMH HTN, HLD, Colon Ca s/p colostomy, CKD (baseline Cr 3-4 per daughter), Depression, Gout, Osteoporosis, hx of recurrent UTI presents with weakness, fatigue, dizziness, lightheadedness,  for one week. Reports one week ago moved into her son's house because her bathroom was getting remodeled, reports significant decrease in fluid intake because she didn't want to use the stairs to go to the bathroom. Admits to decreased urination and hematuria for two days. Reports hematuria usually associated with infection, denies dysuria and burning while voiding. Reports "extremely tired, I had to sit down to brush my teeth." Reports laying down improved symptoms and movement exacerbated symptoms. Also reports chronic significant itching throughout back and lower extremities. Denies change in appetite, recent sick contacts, recent travel. Reports seeing nephrologist, Dr. Coto less than 2 months ago and Cr improved to ~3, per daughter usual baseline is 4. Also reports recently recovering from Gout attack and was prescribed colchicine.     In the ED: temp 98.2, HR 76, /72, RR 23 repeat 15, SpO2 96% RA. H/H: 10.6/31.1, K 3.3, BUN/Cr: 137/12, Ca 6, Lipase 1072. UA moderate LEC, large blood, WBC >50. Chest xray: no acute findings. CT head: No acute intracranial hemorrhage or acute territorial infarct. CT stone hunt: Relative atrophy of the right kidney with mild hydronephrosis and hydroureter without evidence of stone. The right ureter appears to course to the left pelvis. The ureter is also thickening which may represent inflammation/infection. T12 compression fracture indeterminate in age. Suture line in small bowel loop in the midabdomen with distention of the small bowel loop at the area of the sutures with herniation of the small bowel through the anterior abdominal wall without obstruction. Dr. You, nephrologist, evaluated patient in the ED  had colonosocpy done in the past    Allergies:  Levaquin (Pruritus)  mercury (Pruritus; Rash)  sulfa drugs (Pruritus)      Medications:  acetaminophen   Tablet. 650 milliGRAM(s) Oral every 6 hours PRN  aspirin enteric coated 81 milliGRAM(s) Oral daily  BACItracin   Ointment 1 Application(s) Topical two times a day  calcium acetate 1334 milliGRAM(s) Oral three times a day with meals  cefTRIAXone   IVPB 1 Gram(s) IV Intermittent every 24 hours  FLUoxetine 20 milliGRAM(s) Oral daily  heparin  Injectable 5000 Unit(s) SubCutaneous every 8 hours  lactobacillus acidophilus 1 Tablet(s) Oral daily  magnesium oxide 400 milliGRAM(s) Oral every 4 hours  magnesium sulfate  IVPB 2 Gram(s) IV Intermittent once  metoprolol tartrate 12.5 milliGRAM(s) Oral two times a day  simvastatin 20 milliGRAM(s) Oral at bedtime  sodium chloride 0.45%. 1000 milliLiter(s) IV Continuous <Continuous>      PMHX/PSHX:  Herniated lumbar intervertebral disc  Depression  Tremor  Kidney atrophy  Colostomy status  Chronic UTI (urinary tract infection)  Cervical cancer  Dyslipidemia  Diabetes  Hernia, incisional  S/P hip replacement  S/P colon resection  S/P hysterectomy      Family history:  Family history of ovarian cancer (Sibling)      Social History: no etoh no cigs no ivda no prbc    ROS:     General:  No wt loss, fevers, chills, night sweats, fatigue,   Eyes:  Good vision, no reported pain  ENT:  No sore throat, pain, runny nose, dysphagia  CV:  No pain, palpitations, hypo/hypertension  Resp:  No dyspnea, cough, tachypnea, wheezing  GI:  No pain, No nausea, No vomiting, No diarrhea, No constipation, No weight loss, No fever, No pruritis, No rectal bleeding, No tarry stools, No dysphagia,  :  No pain, bleeding, incontinence, nocturia  Muscle:  No pain, weakness  Neuro:  No weakness, tingling, memory problems  Psych:  No fatigue, insomnia, mood problems, depression  Endocrine:  No polyuria, polydipsia, cold/heat intolerance  Heme:  No petechiae, ecchymosis, easy bruisability  Skin:  No rash, tattoos, scars, edema      PHYSICAL EXAM:   Vital Signs:  Vital Signs Last 24 Hrs  T(C): 36.9 (05 Aug 2018 13:06), Max: 37.2 (04 Aug 2018 15:51)  T(F): 98.5 (05 Aug 2018 13:06), Max: 98.9 (04 Aug 2018 15:51)  HR: 65 (05 Aug 2018 13:06) (60 - 67)  BP: 128/62 (05 Aug 2018 13:06) (103/62 - 128/62)  BP(mean): --  RR: 16 (05 Aug 2018 13:06) (16 - 18)  SpO2: 99% (05 Aug 2018 13:06) (95% - 99%)  Daily     Daily Weight in k.5 (05 Aug 2018 04:29)    GENERAL:  Appears stated age, well-groomed, well-nourished, no distress  HEENT:  NC/AT,  conjunctivae clear and pink, no thyromegaly, nodules, adenopathy, no JVD, sclera -anicteric  CHEST:  Full & symmetric excursion, no increased effort, breath sounds clear  HEART:  Regular rhythm, S1, S2, no murmur/rub/S3/S4, no abdominal bruit, no edema  ABDOMEN:  Soft, non-tender, non-distended, normoactive bowel sounds,  no masses ,no hepato-splenomegaly, no signs of chronic liver disease  EXTEREMITIES:  no cyanosis,clubbing or edema  SKIN:  No rash/erythema/ecchymoses/petechiae/wounds/abscess/warm/dry  NEURO:  Alert, oriented, no asterixis, no tremor, no encephalopathy    LABS:                        8.3    6.59  )-----------( 192      ( 05 Aug 2018 08:36 )             24.4     08-05    142  |  103  |  82<H>  ----------------------------<  85  3.1<L>   |  24  |  7.00<H>    Ca    6.7<L>      05 Aug 2018 08:36  Phos  5.5     08-05  Mg     1.2     08-05    TPro  x   /  Alb  2.5<L>  /  TBili  x   /  DBili  x   /  AST  x   /  ALT  x   /  AlkPhos  x   08-05    LIVER FUNCTIONS - ( 05 Aug 2018 08:36 )  Alb: 2.5 g/dL / Pro: x     / ALK PHOS: x     / ALT: x     / AST: x     / GGT: x               Amylase [id number]      Lipase [id number]       Ammonia--      Imaging:

## 2018-08-05 NOTE — PROGRESS NOTE ADULT - SUBJECTIVE AND OBJECTIVE BOX
Patient is a 83y old  Female who presents with a chief complaint of I was dizzy and out of breathe (03 Aug 2018 14:09)  Patient seen in follow up for MEHDI, CKD 4, Metabolic acidosis, Hypo-k, Ca, Mg.       Slowly improving renal indices. + Electrolyte imbalance. Large colostomy out put.     PAST MEDICAL HISTORY:  Herniated lumbar intervertebral disc  Depression  Tremor  Kidney atrophy  Colostomy status  Chronic UTI (urinary tract infection)  Cervical cancer  Dyslipidemia  Diabetes  Hernia, incisional    MEDICATIONS  (STANDING):  aspirin enteric coated 81 milliGRAM(s) Oral daily  BACItracin   Ointment 1 Application(s) Topical two times a day  calcium acetate 1334 milliGRAM(s) Oral three times a day with meals  cefTRIAXone   IVPB 1 Gram(s) IV Intermittent every 24 hours  cholestyramine Powder (Sugar-Free) 4 Gram(s) Oral two times a day  FLUoxetine 20 milliGRAM(s) Oral daily  heparin  Injectable 5000 Unit(s) SubCutaneous every 8 hours  lactobacillus acidophilus 1 Tablet(s) Oral daily  loperamide 2 milliGRAM(s) Oral five times a day  magnesium oxide 400 milliGRAM(s) Oral every 4 hours  magnesium sulfate  IVPB 2 Gram(s) IV Intermittent once  metoprolol tartrate 12.5 milliGRAM(s) Oral two times a day  potassium chloride  10 mEq/100 mL IVPB 10 milliEquivalent(s) IV Intermittent every 1 hour  simvastatin 20 milliGRAM(s) Oral at bedtime  sodium chloride 0.45%. 1000 milliLiter(s) (100 mL/Hr) IV Continuous <Continuous>    MEDICATIONS  (PRN):  acetaminophen   Tablet. 650 milliGRAM(s) Oral every 6 hours PRN pain  diphenoxylate/atropine 1 Tablet(s) Oral three times a day PRN Diarrhea    T(C): 36.9 (08-05-18 @ 13:06), Max: 37.3 (08-04-18 @ 00:06)  HR: 65 (08-05-18 @ 13:06) (58 - 67)  BP: 128/62 (08-05-18 @ 13:06) (92/50 - 128/62)  RR: 16 (08-05-18 @ 13:06)  SpO2: 99% (08-05-18 @ 13:06)  Wt(kg): --  I&O's Detail    04 Aug 2018 07:01  -  05 Aug 2018 07:00  --------------------------------------------------------  IN:  Total IN: 0 mL    OUT:    Colostomy: 1750 mL    Indwelling Catheter - Urethral: 850 mL  Total OUT: 2600 mL    Total NET: -2600 mL        PHYSICAL EXAM:  General: NAD  Respiratory: b/l air entry  Cardiovascular: S1 S2  Gastrointestinal: soft, + Colostomy  Extremities:  no edema         LABORATORY:                        8.3    6.59  )-----------( 192      ( 05 Aug 2018 08:36 )             24.4     08-05    142  |  103  |  82<H>  ----------------------------<  85  3.1<L>   |  24  |  7.00<H>    Ca    6.7<L>      05 Aug 2018 08:36  Phos  5.5     08-05  Mg     1.2     08-05    TPro  x   /  Alb  2.5<L>  /  TBili  x   /  DBili  x   /  AST  x   /  ALT  x   /  AlkPhos  x   08-05    Sodium, Serum: 142 mmol/L (08-05 @ 08:36)  Sodium, Serum: 146 mmol/L (08-04 @ 05:52)    Potassium, Serum: 3.1 mmol/L (08-05 @ 08:36)  Potassium, Serum: 3.4 mmol/L (08-04 @ 05:52)    Hemoglobin: 8.3 g/dL (08-05 @ 08:36)  Hemoglobin: 8.3 g/dL (08-04 @ 05:52)  Hemoglobin: 8.4 g/dL (08-03 @ 06:34)  Hemoglobin: 10.6 g/dL (08-02 @ 15:05)    Creatinine, Serum 7.00 (08-05 @ 08:36)  Creatinine, Serum 8.00 (08-04 @ 05:52)  Creatinine, Serum 9.20 (08-03 @ 06:34)  Creatinine, Serum 9.70 (08-02 @ 22:22)        LIVER FUNCTIONS - ( 05 Aug 2018 08:36 )  Alb: 2.5 g/dL / Pro: x     / ALK PHOS: x     / ALT: x     / AST: x     / GGT: x

## 2018-08-05 NOTE — PROGRESS NOTE ADULT - PROBLEM SELECTOR PLAN 1
Likely secondary to dehydration and possibly an element of ATN;, baseline Cr 3-4 (3/2016 Cr 2.1). Renal function improving - Cr trending down, at 7.0 (down from 9.2 earlier).  - As per nephro, HD is not indicated at this time.  - continue with 1/2NS at 100cc/hr ; the bicarb portion was discontinued by renal   - repleted hypokalemia.  - Strict I&O, Perez in place   -Avoid nephrotoxic agents - hold Allopurinol, Colchicine   - Nephrology recs appreciated

## 2018-08-05 NOTE — PROGRESS NOTE ADULT - ASSESSMENT
84yo F with PMH of HTN, HLD, colon resection w/ colostomy s/p extensive radiation damage , CKD (baseline Cr 3-4 per daughter), Depression, Gout, Osteoporosis, hx of recurrent UTI presents with weakness, fatigue, dizziness, lightheadedness, x 1 week admitted for MEHDI on CKD, UTI, course c/b diarrhea.

## 2018-08-06 LAB
% ALBUMIN: 56.2 % — SIGNIFICANT CHANGE UP
% ALPHA 1: 7.4 % — SIGNIFICANT CHANGE UP
% ALPHA 2: 14.5 % — SIGNIFICANT CHANGE UP
% BETA: 10.9 % — SIGNIFICANT CHANGE UP
% GAMMA: 11 % — SIGNIFICANT CHANGE UP
ALBUMIN SERPL ELPH-MCNC: 2.7 G/DL — LOW (ref 3.6–5.5)
ALBUMIN/GLOB SERPL ELPH: 1.3 RATIO — SIGNIFICANT CHANGE UP
ALPHA1 GLOB SERPL ELPH-MCNC: 0.4 G/DL — SIGNIFICANT CHANGE UP (ref 0.1–0.4)
ALPHA2 GLOB SERPL ELPH-MCNC: 0.7 G/DL — SIGNIFICANT CHANGE UP (ref 0.5–1)
AMYLASE P1 CFR SERPL: 342 U/L — HIGH (ref 25–115)
ANION GAP SERPL CALC-SCNC: 15 MMOL/L — SIGNIFICANT CHANGE UP (ref 5–17)
B-GLOBULIN SERPL ELPH-MCNC: 0.5 G/DL — SIGNIFICANT CHANGE UP (ref 0.5–1)
BUN SERPL-MCNC: 72 MG/DL — HIGH (ref 7–23)
CALCIUM SERPL-MCNC: 6.9 MG/DL — LOW (ref 8.5–10.1)
CHLORIDE SERPL-SCNC: 103 MMOL/L — SIGNIFICANT CHANGE UP (ref 96–108)
CHOLEST SERPL-MCNC: 74 MG/DL — SIGNIFICANT CHANGE UP (ref 10–199)
CO2 SERPL-SCNC: 25 MMOL/L — SIGNIFICANT CHANGE UP (ref 22–31)
CREAT SERPL-MCNC: 6.3 MG/DL — HIGH (ref 0.5–1.3)
CULTURE RESULTS: SIGNIFICANT CHANGE UP
GAMMA GLOBULIN: 0.5 G/DL — LOW (ref 0.6–1.6)
GLUCOSE SERPL-MCNC: 84 MG/DL — SIGNIFICANT CHANGE UP (ref 70–99)
HCT VFR BLD CALC: 26.2 % — LOW (ref 34.5–45)
HGB BLD-MCNC: 8.6 G/DL — LOW (ref 11.5–15.5)
LIDOCAIN IGE QN: 511 U/L — HIGH (ref 73–393)
MCHC RBC-ENTMCNC: 30.2 PG — SIGNIFICANT CHANGE UP (ref 27–34)
MCHC RBC-ENTMCNC: 32.8 GM/DL — SIGNIFICANT CHANGE UP (ref 32–36)
MCV RBC AUTO: 91.9 FL — SIGNIFICANT CHANGE UP (ref 80–100)
NRBC # BLD: 0 /100 WBCS — SIGNIFICANT CHANGE UP (ref 0–0)
PLATELET # BLD AUTO: 194 K/UL — SIGNIFICANT CHANGE UP (ref 150–400)
POTASSIUM SERPL-MCNC: 3.7 MMOL/L — SIGNIFICANT CHANGE UP (ref 3.5–5.3)
POTASSIUM SERPL-SCNC: 3.7 MMOL/L — SIGNIFICANT CHANGE UP (ref 3.5–5.3)
PROT PATTERN SERPL ELPH-IMP: SIGNIFICANT CHANGE UP
RBC # BLD: 2.85 M/UL — LOW (ref 3.8–5.2)
RBC # FLD: 13.7 % — SIGNIFICANT CHANGE UP (ref 10.3–14.5)
SODIUM SERPL-SCNC: 143 MMOL/L — SIGNIFICANT CHANGE UP (ref 135–145)
SPECIMEN SOURCE: SIGNIFICANT CHANGE UP
TRIGL SERPL-MCNC: 69 MG/DL — SIGNIFICANT CHANGE UP (ref 10–149)
TSH SERPL-MCNC: 0.52 UIU/ML — SIGNIFICANT CHANGE UP (ref 0.36–3.74)
WBC # BLD: 7.48 K/UL — SIGNIFICANT CHANGE UP (ref 3.8–10.5)
WBC # FLD AUTO: 7.48 K/UL — SIGNIFICANT CHANGE UP (ref 3.8–10.5)

## 2018-08-06 PROCEDURE — 99233 SBSQ HOSP IP/OBS HIGH 50: CPT | Mod: GC

## 2018-08-06 PROCEDURE — 76705 ECHO EXAM OF ABDOMEN: CPT | Mod: 26

## 2018-08-06 PROCEDURE — 76770 US EXAM ABDO BACK WALL COMP: CPT | Mod: 26

## 2018-08-06 RX ORDER — LACTOBACILLUS ACIDOPHILUS 100MM CELL
1 CAPSULE ORAL
Qty: 0 | Refills: 0 | Status: DISCONTINUED | OUTPATIENT
Start: 2018-08-06 | End: 2018-08-09

## 2018-08-06 RX ORDER — PANTOPRAZOLE SODIUM 20 MG/1
40 TABLET, DELAYED RELEASE ORAL
Qty: 0 | Refills: 0 | Status: DISCONTINUED | OUTPATIENT
Start: 2018-08-06 | End: 2018-08-07

## 2018-08-06 RX ADMIN — Medication 12.5 MILLIGRAM(S): at 04:50

## 2018-08-06 RX ADMIN — Medication 1334 MILLIGRAM(S): at 07:45

## 2018-08-06 RX ADMIN — Medication 1 APPLICATION(S): at 17:02

## 2018-08-06 RX ADMIN — Medication 1 TABLET(S): at 11:14

## 2018-08-06 RX ADMIN — CHOLESTYRAMINE 4 GRAM(S): 4 POWDER, FOR SUSPENSION ORAL at 08:13

## 2018-08-06 RX ADMIN — CHOLESTYRAMINE 4 GRAM(S): 4 POWDER, FOR SUSPENSION ORAL at 21:14

## 2018-08-06 RX ADMIN — HEPARIN SODIUM 5000 UNIT(S): 5000 INJECTION INTRAVENOUS; SUBCUTANEOUS at 21:14

## 2018-08-06 RX ADMIN — Medication 1 TABLET(S): at 17:04

## 2018-08-06 RX ADMIN — Medication 2 MILLIGRAM(S): at 11:14

## 2018-08-06 RX ADMIN — Medication 81 MILLIGRAM(S): at 11:14

## 2018-08-06 RX ADMIN — Medication 2 MILLIGRAM(S): at 17:03

## 2018-08-06 RX ADMIN — Medication 2 MILLIGRAM(S): at 07:45

## 2018-08-06 RX ADMIN — Medication 2 MILLIGRAM(S): at 02:35

## 2018-08-06 RX ADMIN — Medication 1334 MILLIGRAM(S): at 17:03

## 2018-08-06 RX ADMIN — SIMVASTATIN 20 MILLIGRAM(S): 20 TABLET, FILM COATED ORAL at 21:14

## 2018-08-06 RX ADMIN — Medication 1 APPLICATION(S): at 04:50

## 2018-08-06 RX ADMIN — Medication 20 MILLIGRAM(S): at 11:14

## 2018-08-06 RX ADMIN — Medication 2 MILLIGRAM(S): at 21:14

## 2018-08-06 RX ADMIN — HEPARIN SODIUM 5000 UNIT(S): 5000 INJECTION INTRAVENOUS; SUBCUTANEOUS at 04:49

## 2018-08-06 RX ADMIN — HEPARIN SODIUM 5000 UNIT(S): 5000 INJECTION INTRAVENOUS; SUBCUTANEOUS at 14:04

## 2018-08-06 RX ADMIN — Medication 1334 MILLIGRAM(S): at 11:14

## 2018-08-06 NOTE — PROGRESS NOTE ADULT - PROBLEM SELECTOR PLAN 2
questionable  pancreas unremarkable on CT  lipase levels noted, possibly 2/2 renal insufficiency  abd exam benign  US suggestive of chronic calculus cholecystitis in the appropriate setting  lfts wnl, afebrile, no leukocytosis, denies abd pain  recheck lfts in am  consider surgery eval  monitor abd exam  trend labs  triglycerides noted check [id number] for completeness  PPI QD  will follow

## 2018-08-06 NOTE — PROGRESS NOTE ADULT - PROBLEM SELECTOR PLAN 4
- C. diff negative; output is typically soft to liquid per patient   - Pt has no abd pain or any tenderness to palpation  - amylase and lipase values trending upwards: CT Abd/P (albeit non-con) on admission did not see anything notable in the pancreas.   - continue with bacid, Cholestyramine, loperamide and PRN lomotil  -monitor output  - GI PCR negative,  - f/u O&P and stool culture  - US of gallbladder states findings suggestive of chronic calculus cholecystitis in   the appropriate setting - continue to monitor for clinical symptoms. - C. diff negative; output is typically soft to liquid per patient   - Pt has no abd pain or any tenderness to palpation  - amylase and lipase values trended somewhat upwards today, though lower than admission: CT Abd/P (albeit non-con) on admission did not see anything notable in the pancreas.   - continue with bacid, Cholestyramine, loperamide and PRN lomotil  -monitor output  - GI PCR negative,  - f/u O&P and stool culture  - US of gallbladder states findings may be suggestive of chronic calculus cholecystitis in the appropriate setting - continue to monitor for clinical symptoms, which pt does not currently have --surgery eval

## 2018-08-06 NOTE — PROGRESS NOTE ADULT - SUBJECTIVE AND OBJECTIVE BOX
Patient is a 83y old  Female who presents with a chief complaint of I was dizzy and out of breathe (03 Aug 2018 14:09)  Patient seen in follow up for MEHDI, CKD 4, Metabolic acidosis, Hypo-k, Ca, Mg.       Slowly improving renal indices. + Electrolyte imbalance. Colostomy out put improving.     PAST MEDICAL HISTORY:  Herniated lumbar intervertebral disc  Depression  Tremor  Kidney atrophy  Colostomy status  Chronic UTI (urinary tract infection)  Cervical cancer  Dyslipidemia  Diabetes  Hernia, incisional    MEDICATIONS  (STANDING):  aspirin enteric coated 81 milliGRAM(s) Oral daily  BACItracin   Ointment 1 Application(s) Topical two times a day  calcium acetate 1334 milliGRAM(s) Oral three times a day with meals  cholestyramine Powder (Sugar-Free) 4 Gram(s) Oral two times a day  FLUoxetine 20 milliGRAM(s) Oral daily  heparin  Injectable 5000 Unit(s) SubCutaneous every 8 hours  lactobacillus acidophilus 1 Tablet(s) Oral three times a day with meals  loperamide 2 milliGRAM(s) Oral five times a day  metoprolol tartrate 12.5 milliGRAM(s) Oral two times a day  pantoprazole    Tablet 40 milliGRAM(s) Oral before breakfast  simvastatin 20 milliGRAM(s) Oral at bedtime  sodium chloride 0.45%. 1000 milliLiter(s) (100 mL/Hr) IV Continuous <Continuous>    MEDICATIONS  (PRN):  acetaminophen   Tablet. 650 milliGRAM(s) Oral every 6 hours PRN pain  diphenoxylate/atropine 1 Tablet(s) Oral three times a day PRN Diarrhea    T(C): 36.8 (08-06-18 @ 10:05), Max: 37.2 (08-04-18 @ 15:51)  HR: 56 (08-06-18 @ 10:05) (55 - 67)  BP: 115/69 (08-06-18 @ 10:05) (103/62 - 151/75)  RR: 14 (08-06-18 @ 10:05)  SpO2: 98% (08-06-18 @ 10:05)  Wt(kg): --  I&O's Detail    05 Aug 2018 07:01  -  06 Aug 2018 07:00  --------------------------------------------------------  IN:    sodium chloride 0.45%.: 900 mL  Total IN: 900 mL    OUT:    Colostomy: 1400 mL    Indwelling Catheter - Urethral: 700 mL  Total OUT: 2100 mL    Total NET: -1200 mL      PHYSICAL EXAM:  General: NAD  Respiratory: b/l air entry  Cardiovascular: S1 S2  Gastrointestinal: soft, + Colostomy  Extremities:  no edema    LABORATORY:                        8.6    7.48  )-----------( 194      ( 06 Aug 2018 06:33 )             26.2     08-06    143  |  103  |  72<H>  ----------------------------<  84  3.7   |  25  |  6.30<H>    Ca    6.9<L>      06 Aug 2018 06:33  Phos  5.1     08-06  Mg     2.7     08-06    TPro  x   /  Alb  2.5<L>  /  TBili  x   /  DBili  x   /  AST  x   /  ALT  x   /  AlkPhos  x   08-06    Sodium, Serum: 143 mmol/L (08-06 @ 06:33)  Sodium, Serum: 142 mmol/L (08-05 @ 08:36)    Potassium, Serum: 3.7 mmol/L (08-06 @ 06:33)  Potassium, Serum: 3.1 mmol/L (08-05 @ 08:36)    Hemoglobin: 8.6 g/dL (08-06 @ 06:33)  Hemoglobin: 8.3 g/dL (08-05 @ 08:36)  Hemoglobin: 8.3 g/dL (08-04 @ 05:52)    Creatinine, Serum 6.30 (08-06 @ 06:33)  Creatinine, Serum 7.00 (08-05 @ 08:36)  Creatinine, Serum 8.00 (08-04 @ 05:52)        LIVER FUNCTIONS - ( 06 Aug 2018 06:33 )  Alb: 2.5 g/dL / Pro: x     / ALK PHOS: x     / ALT: x     / AST: x     / GGT: x           < from: US Kidney and Bladder (08.06.18 @ 08:58) >  EXAM:  US KIDNEYS AND BLADDER                            PROCEDURE DATE:  08/06/2018          INTERPRETATION:  History: Acute kidney injury.    Bilateral renal ultrasound.    Right kidney 7.1 the left 9.3 cm long dimension. Bilateral increased   cortical echotexture suggests medical renal disease. Relatively small   right kidney may reflect  remote or chronic ischemic insult. There is no   hydronephrosis bilaterally. No shadowing calculus solid or cystic renal   mass. Bladder decompressed with Perez.    Impression: No post renal obstruction. Medical renal disease with a small   right kidney.    < end of copied text >

## 2018-08-06 NOTE — PROGRESS NOTE ADULT - SUBJECTIVE AND OBJECTIVE BOX
INTERVAL HPI/OVERNIGHT EVENTS:  pt seen and examined  denies n/v/abd pain, states loose bms improving, pasty stool in ostomy  per overnight rn stool more formed, no vomiting  labs noted afebrile overnight    MEDICATIONS  (STANDING):  aspirin enteric coated 81 milliGRAM(s) Oral daily  BACItracin   Ointment 1 Application(s) Topical two times a day  calcium acetate 1334 milliGRAM(s) Oral three times a day with meals  cefTRIAXone   IVPB 1 Gram(s) IV Intermittent every 24 hours  cholestyramine Powder (Sugar-Free) 4 Gram(s) Oral two times a day  FLUoxetine 20 milliGRAM(s) Oral daily  heparin  Injectable 5000 Unit(s) SubCutaneous every 8 hours  lactobacillus acidophilus 1 Tablet(s) Oral daily  loperamide 2 milliGRAM(s) Oral five times a day  metoprolol tartrate 12.5 milliGRAM(s) Oral two times a day  simvastatin 20 milliGRAM(s) Oral at bedtime  sodium chloride 0.45%. 1000 milliLiter(s) (100 mL/Hr) IV Continuous <Continuous>    MEDICATIONS  (PRN):  acetaminophen   Tablet. 650 milliGRAM(s) Oral every 6 hours PRN pain  diphenoxylate/atropine 1 Tablet(s) Oral three times a day PRN Diarrhea      Allergies    Levaquin (Pruritus)  mercury (Pruritus; Rash)  sulfa drugs (Pruritus)    Intolerances        Review of Systems:    General:  No wt loss, fevers, chills, night sweats, fatigue   Eyes:  Good vision, no reported pain  ENT:  No sore throat, pain, runny nose, dysphagia  CV:  No pain, palpitations, hypo/hypertension  Resp:  No dyspnea, cough, tachypnea, wheezing  GI:  No pain, No nausea, No vomiting, +ostomy loose bms improving, No weight loss, No fever, No pruritis, No rectal bleeding, No melena, No dysphagia  :  No pain, bleeding, incontinence, nocturia  Muscle:  No pain, weakness  Neuro:  No weakness, tingling, memory problems  Psych:  No fatigue, insomnia, mood problems, depression  Endocrine:  No polyuria, polydypsia, cold/heat intolerance  Heme:  No petechiae, ecchymosis, easy bruisability  Skin:  No rash, tattoos, scars, edema      Vital Signs Last 24 Hrs  T(C): 36.8 (06 Aug 2018 10:05), Max: 37.1 (05 Aug 2018 23:13)  T(F): 98.2 (06 Aug 2018 10:05), Max: 98.7 (05 Aug 2018 23:13)  HR: 56 (06 Aug 2018 10:05) (55 - 65)  BP: 115/69 (06 Aug 2018 10:05) (115/69 - 151/75)  BP(mean): --  RR: 14 (06 Aug 2018 10:05) (14 - 18)  SpO2: 98% (06 Aug 2018 10:05) (97% - 100%)    PHYSICAL EXAM:    Constitutional: NAD, lying in bed  HEENT: EOMI, perrl  Neck: No LAD  Respiratory: dec bs  Cardiovascular: S1 and S2  Gastrointestinal: obese, BS+, soft, nt nd +ostomy with pasty stool +scar to abd  Extremities: mild edema  Vascular: 2+ peripheral pulses  Neurological: Awake alert responds appropriately  Skin: No rashes      LABS:                        8.6    7.48  )-----------( 194      ( 06 Aug 2018 06:33 )             26.2     08-06    143  |  103  |  72<H>  ----------------------------<  84  3.7   |  25  |  6.30<H>    Ca    6.9<L>      06 Aug 2018 06:33  Phos  5.1     08-06  Mg     2.7     08-06    TPro  x   /  Alb  2.5<L>  /  TBili  x   /  DBili  x   /  AST  x   /  ALT  x   /  AlkPhos  x   08-06          RADIOLOGY & ADDITIONAL TESTS:  < from: US Gallbladder (08.06.18 @ 08:57) >    EXAM:  US GALLBLADDER                            PROCEDURE DATE:  08/06/2018          INTERPRETATION:  History: Gallstone noted on CT of 8/2/2018.    Gallbladder ultrasound.    Gallbladder partially contracted with a shadowing calculus. Mild diffuse   gallbladder wall thickening. No pericholecystic fluid. In the appropriate   clinical setting, findings could represent  chronic calculus   cholecystitis. If there is suspicion for superimposed acute cholecystitis   HIDA scan can be obtained. Thereis no biliary dilatation. Common duct   0.5 cm.    Impression: Findings are suggestive of chronic calculus cholecystitis in   the appropriate setting. If there is suspicion for acute cholecystitis,   HIDA scan can be obtained.                CLAUDIO MICHEL M.D., ATTENDING RADIOLOGIST  This document has been electronically signed. Aug  6 2018  9:11AM                < end of copied text >

## 2018-08-06 NOTE — PROGRESS NOTE ADULT - PROBLEM SELECTOR PLAN 6
- Patient endorses on and off pain in the left toe in the same area she experienced a reaction from gout however at time of exam did not endorse pain - on physical exam area was not red or inflamed.   - Allopurinol and Colchicine held due to MEHDI  - Continue to monitor for clinical symptoms of gout. - Patient endorses on and off pain in the left toe in the same area she experienced a reaction from gout, however, at time of exam did not endorse pain - on physical exam area was not red or inflamed.   - Allopurinol and Colchicine held due to MEHDI  - Continue to monitor for clinical symptoms of gout.

## 2018-08-06 NOTE — PROGRESS NOTE ADULT - PROBLEM SELECTOR PLAN 1
pt states stools becoming more formed, tolerating diet, afebrile, no leukocytosis, denies abd pain  bld cxs ngtd, cdiff neg  f/u gi pcr  cont bacid, cholestyramine  cont low residue lactose free diet as tolerated

## 2018-08-06 NOTE — PROGRESS NOTE ADULT - ASSESSMENT
82yo F with PMH of HTN, HLD, colon resection w/ colostomy s/p extensive radiation damage , CKD (baseline Cr 3-4 per daughter), Depression, Gout, Osteoporosis, hx of recurrent UTI presents with weakness, fatigue, dizziness, lightheadedness, x 1 week admitted for MEHDI on CKD, UTI, course c/b diarrhea.

## 2018-08-06 NOTE — PROGRESS NOTE ADULT - ASSESSMENT
·	MEHDI, CKD 4, Solitary functioning kidney: Prerenal azotemia, ? ATN (Atrophic right kidney)  ·	Metabolic acidosis  ·	Diabetes  ·	Hypertension  ·	Hypo- K, Ca, Mg  ·	Hyperphosphatemia    Improving renal function. Continue IVF. Encourage PO intake as tolerated. GI follow up. Can d/c renae.   Potassium, Calcium supps as needed. Monitor blood sugar levels. Insulin coverage as needed. Dietary restriction.   Monitor BP trend. Titrate BP meds as needed. Salt restriction. Will follow electrolytes and renal function trend.

## 2018-08-06 NOTE — PROGRESS NOTE ADULT - SUBJECTIVE AND OBJECTIVE BOX
HPI:  83F with PMH HTN, HLD, Colon Ca s/p colostomy, CKD (baseline Cr 3-4 per daughter), Depression, Gout, Osteoporosis, hx of recurrent UTI presents with weakness, fatigue, dizziness, lightheadedness for one week.    INTERVAL EVENTS:  No significant events overnight. Patient denies light headedness, dizziness, chest pain, nausea/vomiting. Endorses on and off left toe pain.    REVIEW OF SYSTEMS:    CONSTITUTIONAL: No weakness, light headedness.  CARDIOVASCULAR: No chest pain  GASTROINTESTINAL: No nausea/vomiting  NEUROLOGICAL: No dizziness  All other review of systems is negative unless indicated above.    Vital Signs Last 24 Hrs  T(C): 36.8 (06 Aug 2018 12:20), Max: 37.1 (05 Aug 2018 23:13)  T(F): 98.2 (06 Aug 2018 12:20), Max: 98.7 (05 Aug 2018 23:13)  HR: 57 (06 Aug 2018 12:20) (55 - 64)  BP: 103/67 (06 Aug 2018 12:20) (103/67 - 151/75)  BP(mean): --  RR: 15 (06 Aug 2018 12:20) (14 - 18)  SpO2: 98% (06 Aug 2018 12:20) (97% - 100%)    PHYSICAL EXAM:     GENERAL: No acute distress  HEENT: NC/AT  RESPIRATORY: LCTAB/L, no rhonchi, rales, or wheezing  CARDIOVASCULAR: RRR, no murmurs, gallops, rubs  ABDOMINAL: soft, non-tender, non-distended. + colostomy with dark green, soft solid and liquid output. Area around colostomy clear, dry and intact.  EXTREMITIES: no clubbing, cyanosis. +1 non pitting edema of left extremity  NEUROLOGICAL: alert and oriented x 3, non-focal  SKIN: no rashes or lesions   MUSCULOSKELETAL: no gross joint deformity  : + renae                          8.6    7.48  )-----------( 194      ( 06 Aug 2018 06:33 )             26.2     08-06    143  |  103  |  72<H>  ----------------------------<  84  3.7   |  25  |  6.30<H>    Ca    6.9<L>      06 Aug 2018 06:33  Phos  5.1     08-06  Mg     2.7     08-06    TPro  x   /  Alb  2.5<L>  /  TBili  x   /  DBili  x   /  AST  x   /  ALT  x   /  AlkPhos  x   08-06      CAPILLARY BLOOD GLUCOSE          MEDICATIONS  (STANDING):  aspirin enteric coated 81 milliGRAM(s) Oral daily  BACItracin   Ointment 1 Application(s) Topical two times a day  calcium acetate 1334 milliGRAM(s) Oral three times a day with meals  cholestyramine Powder (Sugar-Free) 4 Gram(s) Oral two times a day  FLUoxetine 20 milliGRAM(s) Oral daily  heparin  Injectable 5000 Unit(s) SubCutaneous every 8 hours  lactobacillus acidophilus 1 Tablet(s) Oral three times a day with meals  loperamide 2 milliGRAM(s) Oral five times a day  metoprolol tartrate 12.5 milliGRAM(s) Oral two times a day  pantoprazole    Tablet 40 milliGRAM(s) Oral before breakfast  simvastatin 20 milliGRAM(s) Oral at bedtime  sodium chloride 0.45%. 1000 milliLiter(s) (100 mL/Hr) IV Continuous <Continuous> HPI:  83F with PMH HTN, HLD, Colon Ca s/p colostomy, CKD (baseline Cr 3-4 per daughter), Depression, Gout, Osteoporosis, hx of recurrent UTI presents with weakness, fatigue, dizziness, lightheadedness for one week.    INTERVAL EVENTS:  No significant events overnight. Patient denies light headedness, dizziness, chest pain, nausea/vomiting. Endorses on and off left toe pain. Improving ostomy output. No fever, chills, abd pain.     REVIEW OF SYSTEMS:    CONSTITUTIONAL: No weakness, light headedness.  CARDIOVASCULAR: No chest pain, palpitations  GASTROINTESTINAL: No nausea/vomiting, abd pain; ostomy output decreasing  NEUROLOGICAL: No dizziness  All other review of systems is negative unless indicated above.    Vital Signs Last 24 Hrs  T(C): 36.8 (06 Aug 2018 12:20), Max: 37.1 (05 Aug 2018 23:13)  T(F): 98.2 (06 Aug 2018 12:20), Max: 98.7 (05 Aug 2018 23:13)  HR: 57 (06 Aug 2018 12:20) (55 - 64)  BP: 103/67 (06 Aug 2018 12:20) (103/67 - 151/75)  BP(mean): --  RR: 15 (06 Aug 2018 12:20) (14 - 18)  SpO2: 98% (06 Aug 2018 12:20) (97% - 100%)    PHYSICAL EXAM:     GENERAL: No acute distress  HEENT: NC/AT  RESPIRATORY: CTA B/L, no rhonchi, rales, or wheezing  CARDIOVASCULAR: RRR, no murmurs, gallops, rubs  ABDOMINAL: soft, non-tender, non-distended. + colostomy with dark green, soft solid and liquid output. Area around colostomy clear, dry and intact.  EXTREMITIES: no clubbing, cyanosis. +1 non pitting edema of left extremity  NEUROLOGICAL: alert and oriented x 3, non-focal  SKIN: no rash  MUSCULOSKELETAL: no gross joint deformity  : + renae draining yellow urine                          8.6    7.48  )-----------( 194      ( 06 Aug 2018 06:33 )             26.2     08-06    143  |  103  |  72<H>  ----------------------------<  84  3.7   |  25  |  6.30<H>    Ca    6.9<L>      06 Aug 2018 06:33  Phos  5.1     08-06  Mg     2.7     08-06    TPro  x   /  Alb  2.5<L>  /  TBili  x   /  DBili  x   /  AST  x   /  ALT  x   /  AlkPhos  x   08-06      CAPILLARY BLOOD GLUCOSE          MEDICATIONS  (STANDING):  aspirin enteric coated 81 milliGRAM(s) Oral daily  BACItracin   Ointment 1 Application(s) Topical two times a day  calcium acetate 1334 milliGRAM(s) Oral three times a day with meals  cholestyramine Powder (Sugar-Free) 4 Gram(s) Oral two times a day  FLUoxetine 20 milliGRAM(s) Oral daily  heparin  Injectable 5000 Unit(s) SubCutaneous every 8 hours  lactobacillus acidophilus 1 Tablet(s) Oral three times a day with meals  loperamide 2 milliGRAM(s) Oral five times a day  metoprolol tartrate 12.5 milliGRAM(s) Oral two times a day  pantoprazole    Tablet 40 milliGRAM(s) Oral before breakfast  simvastatin 20 milliGRAM(s) Oral at bedtime  sodium chloride 0.45%. 1000 milliLiter(s) (100 mL/Hr) IV Continuous <Continuous>

## 2018-08-06 NOTE — CONSULT NOTE ADULT - SUBJECTIVE AND OBJECTIVE BOX
History of Present Illness: 	  83F with PMH HTN, HLD, Colon Ca s/p colostomy, CKD (baseline Cr 3-4 per daughter), Depression, Gout, Osteoporosis, hx of recurrent UTI presents with weakness, fatigue, dizziness, lightheadedness,  for one week. Reports one week ago moved into her son's house because her bathroom was getting remodeled, reports significant decrease in fluid intake because she didn't want to use the stairs to go to the bathroom. Admits to decreased urination and hematuria for two days. Reports hematuria usually associated with infection, denies dysuria and burning while voiding. Reports "extremely tired, I had to sit down to brush my teeth." Reports laying down improved symptoms and movement exacerbated symptoms. Also reports chronic significant itching throughout back and lower extremities. Denies change in appetite, recent sick contacts, recent travel. Reports seeing nephrologist, Dr. Coto less than 2 months ago and Cr improved to ~3, per daughter usual baseline is 4. Also reports recently recovering from Gout attack and was prescribed colchicine.     Pt has no upper abdominal pain.  No history of fatty food intolerance or gallbladder type symptoms. Never jaundiced    PE:  No upper abdominal tenderness, mass or guarding    Bili and LFTs normal   WBC normal  lipase 511  Sonogram images and report reviewed    Impr; not suspicious for acute cholecystitis; I defer to GI as to significance of slight elevation of lipase

## 2018-08-06 NOTE — PROGRESS NOTE ADULT - PROBLEM SELECTOR PLAN 1
Likely secondary to dehydration and possibly an element of ATN;, baseline Cr 3-4 (3/2016 Cr 2.1). Renal function improving - Cr trending down 7.0 --> 6.3  - continue with 1/2NS at 100cc/hr ; the bicarb portion was discontinued by renal   - Strict I&O, Perez in place - will d/c today  -Avoid nephrotoxic agents - hold Allopurinol, Colchicine   - Renal ultrasound shows evidence of no post renal obstruction.  - Nephrology recs appreciated Likely mostly secondary to dehydration and possibly an element of ATN; baseline Cr 3-4. Renal function improving - Cr trending down 7.0 --> 6.3  - continue with 1/2NS at 100cc/hr ; the bicarb portion was discontinued by renal   - Strict I&O, Perez in place - will d/c today and have TOV  -Avoid nephrotoxic agents - hold Allopurinol, Colchicine   - Renal ultrasound shows evidence of no post renal obstruction.  - Nephrology recs appreciated

## 2018-08-06 NOTE — PROGRESS NOTE ADULT - PROBLEM SELECTOR PLAN 3
CT showed the ureter has some thickening, which may represent inflammation/infection. Patient reports symptom of hematuria when experiencing chronic UTIs  - IV Rocephin course completed and discontinued.  - f/up blood and urine cultures (UCx growing Serratia - f/up sensitivities -- switch Abx in the morning.) CT showed the ureter has some thickening, which may represent inflammation/infection. Patient reports symptom of hematuria when experiencing chronic UTIs  - IV Rocephin course completed and discontinued.

## 2018-08-07 DIAGNOSIS — D64.9 ANEMIA, UNSPECIFIED: ICD-10-CM

## 2018-08-07 LAB
ANION GAP SERPL CALC-SCNC: 13 MMOL/L — SIGNIFICANT CHANGE UP (ref 5–17)
BUN SERPL-MCNC: 68 MG/DL — HIGH (ref 7–23)
CALCIUM SERPL-MCNC: 7.3 MG/DL — LOW (ref 8.5–10.1)
CHLORIDE SERPL-SCNC: 104 MMOL/L — SIGNIFICANT CHANGE UP (ref 96–108)
CO2 SERPL-SCNC: 24 MMOL/L — SIGNIFICANT CHANGE UP (ref 22–31)
CREAT SERPL-MCNC: 5.6 MG/DL — HIGH (ref 0.5–1.3)
CULTURE RESULTS: SIGNIFICANT CHANGE UP
GLUCOSE SERPL-MCNC: 86 MG/DL — SIGNIFICANT CHANGE UP (ref 70–99)
HCT VFR BLD CALC: 26 % — LOW (ref 34.5–45)
HGB BLD-MCNC: 8.3 G/DL — LOW (ref 11.5–15.5)
MAGNESIUM SERPL-MCNC: 2.2 MG/DL — SIGNIFICANT CHANGE UP (ref 1.6–2.6)
MCHC RBC-ENTMCNC: 30.1 PG — SIGNIFICANT CHANGE UP (ref 27–34)
MCHC RBC-ENTMCNC: 31.9 GM/DL — LOW (ref 32–36)
MCV RBC AUTO: 94.2 FL — SIGNIFICANT CHANGE UP (ref 80–100)
NRBC # BLD: 0 /100 WBCS — SIGNIFICANT CHANGE UP (ref 0–0)
PHOSPHATE SERPL-MCNC: 4.5 MG/DL — SIGNIFICANT CHANGE UP (ref 2.5–4.5)
PLATELET # BLD AUTO: 187 K/UL — SIGNIFICANT CHANGE UP (ref 150–400)
POTASSIUM SERPL-MCNC: 4.1 MMOL/L — SIGNIFICANT CHANGE UP (ref 3.5–5.3)
POTASSIUM SERPL-SCNC: 4.1 MMOL/L — SIGNIFICANT CHANGE UP (ref 3.5–5.3)
RBC # BLD: 2.76 M/UL — LOW (ref 3.8–5.2)
RBC # FLD: 13.6 % — SIGNIFICANT CHANGE UP (ref 10.3–14.5)
SODIUM SERPL-SCNC: 141 MMOL/L — SIGNIFICANT CHANGE UP (ref 135–145)
SPECIMEN SOURCE: SIGNIFICANT CHANGE UP
WBC # BLD: 7.91 K/UL — SIGNIFICANT CHANGE UP (ref 3.8–10.5)
WBC # FLD AUTO: 7.91 K/UL — SIGNIFICANT CHANGE UP (ref 3.8–10.5)

## 2018-08-07 PROCEDURE — 99233 SBSQ HOSP IP/OBS HIGH 50: CPT | Mod: GC

## 2018-08-07 RX ADMIN — Medication 2 MILLIGRAM(S): at 12:23

## 2018-08-07 RX ADMIN — Medication 1 APPLICATION(S): at 17:06

## 2018-08-07 RX ADMIN — Medication 20 MILLIGRAM(S): at 12:23

## 2018-08-07 RX ADMIN — CHOLESTYRAMINE 4 GRAM(S): 4 POWDER, FOR SUSPENSION ORAL at 12:18

## 2018-08-07 RX ADMIN — HEPARIN SODIUM 5000 UNIT(S): 5000 INJECTION INTRAVENOUS; SUBCUTANEOUS at 15:04

## 2018-08-07 RX ADMIN — Medication 1 TABLET(S): at 12:23

## 2018-08-07 RX ADMIN — Medication 1334 MILLIGRAM(S): at 12:18

## 2018-08-07 RX ADMIN — SIMVASTATIN 20 MILLIGRAM(S): 20 TABLET, FILM COATED ORAL at 21:46

## 2018-08-07 RX ADMIN — Medication 81 MILLIGRAM(S): at 12:23

## 2018-08-07 RX ADMIN — HEPARIN SODIUM 5000 UNIT(S): 5000 INJECTION INTRAVENOUS; SUBCUTANEOUS at 05:19

## 2018-08-07 RX ADMIN — Medication 1 APPLICATION(S): at 05:18

## 2018-08-07 RX ADMIN — Medication 2 MILLIGRAM(S): at 08:43

## 2018-08-07 RX ADMIN — PANTOPRAZOLE SODIUM 40 MILLIGRAM(S): 20 TABLET, DELAYED RELEASE ORAL at 05:18

## 2018-08-07 RX ADMIN — Medication 1 TABLET(S): at 17:06

## 2018-08-07 RX ADMIN — Medication 2 MILLIGRAM(S): at 21:46

## 2018-08-07 RX ADMIN — Medication 2 MILLIGRAM(S): at 17:06

## 2018-08-07 RX ADMIN — Medication 2 MILLIGRAM(S): at 23:31

## 2018-08-07 RX ADMIN — Medication 1 TABLET(S): at 08:43

## 2018-08-07 RX ADMIN — HEPARIN SODIUM 5000 UNIT(S): 5000 INJECTION INTRAVENOUS; SUBCUTANEOUS at 21:46

## 2018-08-07 RX ADMIN — Medication 12.5 MILLIGRAM(S): at 05:18

## 2018-08-07 RX ADMIN — CHOLESTYRAMINE 4 GRAM(S): 4 POWDER, FOR SUSPENSION ORAL at 21:46

## 2018-08-07 NOTE — PROGRESS NOTE ADULT - PROBLEM SELECTOR PLAN 6
- Patient no longer endorses on and off pain in the left toe.  - Allopurinol and Colchicine held due to MEHDI  - Continue to monitor for clinical symptoms of gout.

## 2018-08-07 NOTE — PROGRESS NOTE ADULT - PROBLEM SELECTOR PLAN 4
- C. diff negative; output is typically soft to liquid per patient   - Pt has no abd pain or any tenderness to palpation  - amylase and lipase values trending downward today. CT Abd/P (albeit non-con) on admission did not see anything notable in the pancreas.   - continue with bacid, Cholestyramine, loperamide and PRN lomotil  -monitor output  - GI PCR negative; stool cultures NGTD  - f/u O&P and stool culture  - US of gallbladder states findings may be suggestive of chronic calculus cholecystitis in the appropriate setting.  - Surgery recs appreciated - suspicious for acute cholecystitis low - no interventions at this point. - C. diff negative; output is typically soft to liquid per patient   - Pt has no abd pain or any tenderness to palpation  - amylase and lipase values trending downward today. CT Abd/P (albeit non-con) on admission did not see anything notable in the pancreas.   - continue with bacid, Cholestyramine, loperamide and PRN lomotil  -monitor output  - GI PCR negative; stool cultures NGTD; O&P negative  - US of gallbladder states findings may be suggestive of chronic calculus cholecystitis in the appropriate setting.  - Surgery recs appreciated - suspicion for acute cholecystitis low - no interventions at this point.

## 2018-08-07 NOTE — PROGRESS NOTE ADULT - SUBJECTIVE AND OBJECTIVE BOX
GURJIT HERRERA  83y  Female    Patient is a 83y old  Female who presents with a chief complaint of I was dizzy and out of breathe (03 Aug 2018 14:09)      out of bed to chair.   feels much better.     PAST MEDICAL & SURGICAL HISTORY:  Herniated lumbar intervertebral disc  Depression  Tremor  Kidney atrophy: right  Colostomy status: 2006  Chronic UTI (urinary tract infection)  Cervical cancer: 1970&#x27;s  Dyslipidemia  Diabetes: type 2  Hernia, incisional  S/P hip replacement: right 2013  S/P colon resection: 2006  S/P hysterectomy: 1977      PHYSICAL EXAM:    T(C): 36.7 (08-07-18 @ 11:50), Max: 37.1 (08-06-18 @ 23:20)  HR: 66 (08-07-18 @ 11:50) (53 - 66)  BP: 100/63 (08-07-18 @ 11:50) (98/61 - 120/67)  RR: 16 (08-07-18 @ 11:50) (16 - 17)  SpO2: 97% (08-07-18 @ 11:50) (95% - 97%)  Wt(kg): --    I&O's Detail    06 Aug 2018 07:01  -  07 Aug 2018 07:00  --------------------------------------------------------  IN:    sodium chloride 0.45%.: 1200 mL  Total IN: 1200 mL    OUT:  Total OUT: 0 mL    Total NET: 1200 mL      07 Aug 2018 07:01  -  07 Aug 2018 12:59  --------------------------------------------------------  IN:    sodium chloride 0.45%.: 400 mL  Total IN: 400 mL    OUT:  Total OUT: 0 mL    Total NET: 400 mL      Respiratory: clear anteriorly, decreased BS at bases  Cardiovascular: S1 S2  Gastrointestinal: soft NT ND +BS  Extremities: trace edema   Neuro: Awake and alert    MEDICATIONS  (STANDING):  aspirin enteric coated 81 milliGRAM(s) Oral daily  BACItracin   Ointment 1 Application(s) Topical two times a day  cholestyramine Powder (Sugar-Free) 4 Gram(s) Oral two times a day  FLUoxetine 20 milliGRAM(s) Oral daily  heparin  Injectable 5000 Unit(s) SubCutaneous every 8 hours  lactobacillus acidophilus 1 Tablet(s) Oral three times a day with meals  loperamide 2 milliGRAM(s) Oral five times a day  metoprolol tartrate 12.5 milliGRAM(s) Oral two times a day  simvastatin 20 milliGRAM(s) Oral at bedtime  sodium chloride 0.45%. 1000 milliLiter(s) (100 mL/Hr) IV Continuous <Continuous>    MEDICATIONS  (PRN):  acetaminophen   Tablet. 650 milliGRAM(s) Oral every 6 hours PRN pain  diphenoxylate/atropine 1 Tablet(s) Oral three times a day PRN Diarrhea                            8.3    7.91  )-----------( 187      ( 07 Aug 2018 07:20 )             26.0       08-07    141  |  104  |  68<H>  ----------------------------<  86  4.1   |  24  |  5.60<H>    Ca    7.3<L>      07 Aug 2018 07:20  Phos  4.5     08-07  Mg     2.2     08-07    TPro  5.4<L>  /  Alb  2.5<L>  /  TBili  0.2  /  DBili  <.10  /  AST  16  /  ALT  16  /  AlkPhos  58  08-07

## 2018-08-07 NOTE — PROGRESS NOTE ADULT - PROBLEM SELECTOR PLAN 1
Likely mostly secondary to dehydration and possibly an element of ATN; baseline Cr 3-4. Renal function improving - Cr trending down 6.3 --> 5.6  - continue with 1/2NS at 100cc/hr  - Strict I&O, renae removed  -Avoid nephrotoxic agents - hold Allopurinol, Colchicine   - Renal ultrasound shows evidence of no post renal obstruction.  - Nephrology recs appreciated Likely mostly secondary to dehydration and possibly an element of ATN; baseline Cr 3-4. Renal function improving - Cr trending down 6.3 --> 5.6  - continue with 1/2NS at 100cc/hr today as per renal recs  - Strict I&O, renae removed  -Avoid nephrotoxic agents - hold Allopurinol, Colchicine   - Renal ultrasound shows no evidence of post renal obstruction.  - Nephrology recs appreciated

## 2018-08-07 NOTE — PROGRESS NOTE ADULT - PROBLEM SELECTOR PLAN 3
hgb low but stable, no overt s/s gib  possibly 2/2 ckd, chronic dz  serial cbcs, transfuse prn   consider iron studies  cont ppi  will follow hgb low but stable, no overt s/s gib  possibly 2/2 ckd, chronic dz  serial cbcs, transfuse prn   consider iron studies  will follow

## 2018-08-07 NOTE — PROGRESS NOTE ADULT - PROBLEM SELECTOR PLAN 2
questionable  pancreas unremarkable on CT  lipase levels noted, likely 2/2 renal insufficiency, now downtrending  abd exam benign  US suggestive of chronic calculus cholecystitis in the appropriate setting  seen by surgery, no suspicion for acute christel  monitor abd exam  PPI QD  will follow questionable  pancreas unremarkable on CT  lipase levels noted, likely 2/2 renal insufficiency, now downtrending  abd exam benign  US suggestive of chronic calculus cholecystitis in the appropriate setting  seen by surgery, no suspicion for acute christel  will follow

## 2018-08-07 NOTE — PROGRESS NOTE ADULT - ASSESSMENT
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 Renal indices are now improving with IVF resuscitation. will follow closely.

## 2018-08-07 NOTE — PROGRESS NOTE ADULT - PROBLEM SELECTOR PLAN 1
improving  bld cxs ngtd, cdiff neg, gi pcr neg  cont bacid, cholestyramine, lomotil prn, imodium  cont low residue lactose free diet as tolerated

## 2018-08-07 NOTE — PROGRESS NOTE ADULT - SUBJECTIVE AND OBJECTIVE BOX
HPI:  83F with PMH HTN, HLD, Colon Ca s/p colostomy, CKD (baseline Cr 3-4 per daughter), Depression, Gout, Osteoporosis, hx of recurrent UTI presents with weakness, fatigue, dizziness, lightheadedness for one week.    INTERVAL EVENTS: No significant events overnight. Patient denies chest pain, abdominal pain, n/v, foot pain or weakness.    REVIEW OF SYSTEMS:    CONSTITUTIONAL: No weakness, fevers or chills  RESPIRATORY: No shortness of breath  CARDIOVASCULAR: No chest pain  GASTROINTESTINAL: No abdominal pain, nausea, vomiting.  NEUROLOGICAL: No dizziness or weakness  All other review of systems is negative unless indicated above.    Vital Signs Last 24 Hrs  T(C): 36.9 (07 Aug 2018 07:37), Max: 37.1 (06 Aug 2018 23:20)  T(F): 98.5 (07 Aug 2018 07:37), Max: 98.7 (06 Aug 2018 23:20)  HR: 53 (07 Aug 2018 07:37) (53 - 62)  BP: 120/67 (07 Aug 2018 07:37) (98/61 - 120/67)  BP(mean): --  RR: 16 (07 Aug 2018 07:37) (14 - 17)  SpO2: 95% (07 Aug 2018 07:37) (95% - 98%)      PHYSICAL EXAM:     GENERAL: no acute distress  HEENT: NC/AT  RESPIRATORY: LCTAB/L, no rhonchi, rales, or wheezing  CARDIOVASCULAR: RRR, no murmurs, gallops, rubs  ABDOMINAL: soft, non-tender, non-distended. + ostomy in LLQ - output soft solid, area is clear, dry and intact  EXTREMITIES: no clubbing or cyanosis. 1+ non-pitting edema in legs bilaterally  NEUROLOGICAL: alert and oriented x 3, non-focal                          8.3    7.91  )-----------( 187      ( 07 Aug 2018 07:20 )             26.0     08-07    141  |  104  |  68<H>  ----------------------------<  86  4.1   |  24  |  5.60<H>    Ca    7.3<L>      07 Aug 2018 07:20  Phos  4.5     08-07  Mg     2.2     08-07    TPro  5.4<L>  /  Alb  2.5<L>  /  TBili  0.2  /  DBili  <.10  /  AST  16  /  ALT  16  /  AlkPhos  58  08-07      CAPILLARY BLOOD GLUCOSE          MEDICATIONS  (STANDING):  aspirin enteric coated 81 milliGRAM(s) Oral daily  BACItracin   Ointment 1 Application(s) Topical two times a day  calcium acetate 1334 milliGRAM(s) Oral three times a day with meals  cholestyramine Powder (Sugar-Free) 4 Gram(s) Oral two times a day  FLUoxetine 20 milliGRAM(s) Oral daily  heparin  Injectable 5000 Unit(s) SubCutaneous every 8 hours  lactobacillus acidophilus 1 Tablet(s) Oral three times a day with meals  loperamide 2 milliGRAM(s) Oral five times a day  metoprolol tartrate 12.5 milliGRAM(s) Oral two times a day  pantoprazole    Tablet 40 milliGRAM(s) Oral before breakfast  simvastatin 20 milliGRAM(s) Oral at bedtime  sodium chloride 0.45%. 1000 milliLiter(s) (100 mL/Hr) IV Continuous <Continuous> HPI:  83F with PMH HTN, HLD, Colon Ca s/p colostomy, CKD (baseline Cr 3-4 per daughter), Depression, Gout, Osteoporosis, hx of recurrent UTI presents with weakness, fatigue, dizziness, lightheadedness for one week.    INTERVAL EVENTS: No significant events overnight. Stool output from ostomy back to baseline (thicker, less frequent than earlier on admission). Patient denies chest pain, abdominal pain, n/v, foot pain or weakness.    REVIEW OF SYSTEMS:    CONSTITUTIONAL: No weakness, fevers or chills  RESPIRATORY: No shortness of breath  CARDIOVASCULAR: No chest pain  GASTROINTESTINAL: No abdominal pain, nausea, vomiting.  NEUROLOGICAL: No dizziness or weakness  All other review of systems is negative unless indicated above.    Vital Signs Last 24 Hrs  T(C): 36.9 (07 Aug 2018 07:37), Max: 37.1 (06 Aug 2018 23:20)  T(F): 98.5 (07 Aug 2018 07:37), Max: 98.7 (06 Aug 2018 23:20)  HR: 53 (07 Aug 2018 07:37) (53 - 62)  BP: 120/67 (07 Aug 2018 07:37) (98/61 - 120/67)  BP(mean): --  RR: 16 (07 Aug 2018 07:37) (14 - 17)  SpO2: 95% (07 Aug 2018 07:37) (95% - 98%)      PHYSICAL EXAM:     GENERAL: no acute distress  HEENT: NC/AT, anicteric  RESPIRATORY: CTA B/L, no rhonchi, rales, or wheezing  CARDIOVASCULAR: RRR, no murmurs, gallops, rubs  ABDOMINAL: soft, non-tender, non-distended. + ostomy in LLQ - output soft solid, area is clear, dry and intact  EXTREMITIES: no clubbing or cyanosis. trace edema in legs bilaterally  NEUROLOGICAL: alert and oriented x 3, non-focal                          8.3    7.91  )-----------( 187      ( 07 Aug 2018 07:20 )             26.0     08-07    141  |  104  |  68<H>  ----------------------------<  86  4.1   |  24  |  5.60<H>    Ca    7.3<L>      07 Aug 2018 07:20  Phos  4.5     08-07  Mg     2.2     08-07    TPro  5.4<L>  /  Alb  2.5<L>  /  TBili  0.2  /  DBili  <.10  /  AST  16  /  ALT  16  /  AlkPhos  58  08-07      CAPILLARY BLOOD GLUCOSE          MEDICATIONS  (STANDING):  aspirin enteric coated 81 milliGRAM(s) Oral daily  BACItracin   Ointment 1 Application(s) Topical two times a day  calcium acetate 1334 milliGRAM(s) Oral three times a day with meals  cholestyramine Powder (Sugar-Free) 4 Gram(s) Oral two times a day  FLUoxetine 20 milliGRAM(s) Oral daily  heparin  Injectable 5000 Unit(s) SubCutaneous every 8 hours  lactobacillus acidophilus 1 Tablet(s) Oral three times a day with meals  loperamide 2 milliGRAM(s) Oral five times a day  metoprolol tartrate 12.5 milliGRAM(s) Oral two times a day  pantoprazole    Tablet 40 milliGRAM(s) Oral before breakfast  simvastatin 20 milliGRAM(s) Oral at bedtime  sodium chloride 0.45%. 1000 milliLiter(s) (100 mL/Hr) IV Continuous <Continuous>

## 2018-08-07 NOTE — PROGRESS NOTE ADULT - SUBJECTIVE AND OBJECTIVE BOX
INTERVAL HPI/OVERNIGHT EVENTS:  pt seen and examined  denies n/v/abd pain, states stool output more formed  labs noted afebrile overnight  300cc output from ostomy  noted for yesterday    MEDICATIONS  (STANDING):  aspirin enteric coated 81 milliGRAM(s) Oral daily  BACItracin   Ointment 1 Application(s) Topical two times a day  calcium acetate 1334 milliGRAM(s) Oral three times a day with meals  cholestyramine Powder (Sugar-Free) 4 Gram(s) Oral two times a day  FLUoxetine 20 milliGRAM(s) Oral daily  heparin  Injectable 5000 Unit(s) SubCutaneous every 8 hours  lactobacillus acidophilus 1 Tablet(s) Oral three times a day with meals  loperamide 2 milliGRAM(s) Oral five times a day  metoprolol tartrate 12.5 milliGRAM(s) Oral two times a day  pantoprazole    Tablet 40 milliGRAM(s) Oral before breakfast  simvastatin 20 milliGRAM(s) Oral at bedtime  sodium chloride 0.45%. 1000 milliLiter(s) (100 mL/Hr) IV Continuous <Continuous>    MEDICATIONS  (PRN):  acetaminophen   Tablet. 650 milliGRAM(s) Oral every 6 hours PRN pain  diphenoxylate/atropine 1 Tablet(s) Oral three times a day PRN Diarrhea      Allergies    Levaquin (Pruritus)  mercury (Pruritus; Rash)  sulfa drugs (Pruritus)    Intolerances        Review of Systems:    General:  No wt loss, fevers, chills, night sweats, fatigue   Eyes:  Good vision, no reported pain  ENT:  No sore throat, pain, runny nose, dysphagia  CV:  No pain, palpitations, hypo/hypertension  Resp:  No dyspnea, cough, tachypnea, wheezing  GI:  No pain, No nausea, No vomiting, +ostomy No weight loss, No fever, No pruritis, No rectal bleeding, No melena, No dysphagia  :  No pain, bleeding, incontinence, nocturia  Muscle:  No pain, weakness  Neuro:  No weakness, tingling, memory problems  Psych:  No fatigue, insomnia, mood problems, depression  Endocrine:  No polyuria, polydypsia, cold/heat intolerance  Heme:  No petechiae, ecchymosis, easy bruisability  Skin:  No rash, tattoos, scars, edema      Vital Signs Last 24 Hrs  T(C): 36.9 (07 Aug 2018 07:37), Max: 37.1 (06 Aug 2018 23:20)  T(F): 98.5 (07 Aug 2018 07:37), Max: 98.7 (06 Aug 2018 23:20)  HR: 53 (07 Aug 2018 07:37) (53 - 62)  BP: 120/67 (07 Aug 2018 07:37) (98/61 - 120/67)  BP(mean): --  RR: 16 (07 Aug 2018 07:37) (15 - 17)  SpO2: 95% (07 Aug 2018 07:37) (95% - 98%)    PHYSICAL EXAM:  Constitutional: NAD, lying in bed  HEENT: EOMI, perrl  Neck: No LAD  Respiratory: dec bs  Cardiovascular: S1 and S2  Gastrointestinal: obese, BS+, soft, nt nd +ostomy with semi liquid stool +scar to abd  Extremities: mild edema  Vascular: 2+ peripheral pulses  Neurological: Awake alert responds appropriately  Skin: No rashes      LABS:                        8.3    7.91  )-----------( 187      ( 07 Aug 2018 07:20 )             26.0     08-07    141  |  104  |  68<H>  ----------------------------<  86  4.1   |  24  |  5.60<H>    Ca    7.3<L>      07 Aug 2018 07:20  Phos  4.5     08-07  Mg     2.2     08-07    TPro  5.4<L>  /  Alb  2.5<L>  /  TBili  0.2  /  DBili  <.10  /  AST  16  /  ALT  16  /  AlkPhos  58  08-07          RADIOLOGY & ADDITIONAL TESTS:

## 2018-08-07 NOTE — PROGRESS NOTE ADULT - PROBLEM SELECTOR PLAN 3
CT showed the ureter has some thickening, which may represent inflammation/infection. Patient reports symptom of hematuria when experiencing chronic UTIs  - IV Rocephin course completed and discontinued.  - Patient's renae removed. CT showed the ureter has some thickening, which may represent inflammation/infection. Patient reports symptom of hematuria when experiencing chronic UTIs  - IV Rocephin course completed and discontinued.  - Patient's renae removed and pt passed TOV

## 2018-08-08 LAB
ALBUMIN SERPL ELPH-MCNC: 2.2 G/DL — LOW (ref 3.3–5)
ANION GAP SERPL CALC-SCNC: 10 MMOL/L — SIGNIFICANT CHANGE UP (ref 5–17)
BUN SERPL-MCNC: 62 MG/DL — HIGH (ref 7–23)
CALCIUM SERPL-MCNC: 7.3 MG/DL — LOW (ref 8.5–10.1)
CHLORIDE SERPL-SCNC: 105 MMOL/L — SIGNIFICANT CHANGE UP (ref 96–108)
CO2 SERPL-SCNC: 22 MMOL/L — SIGNIFICANT CHANGE UP (ref 22–31)
CREAT SERPL-MCNC: 5.5 MG/DL — HIGH (ref 0.5–1.3)
CULTURE RESULTS: SIGNIFICANT CHANGE UP
GLUCOSE SERPL-MCNC: 87 MG/DL — SIGNIFICANT CHANGE UP (ref 70–99)
HCT VFR BLD CALC: 24 % — LOW (ref 34.5–45)
HCT VFR BLD CALC: 25.8 % — LOW (ref 34.5–45)
HGB BLD-MCNC: 7.6 G/DL — LOW (ref 11.5–15.5)
HGB BLD-MCNC: 8.2 G/DL — LOW (ref 11.5–15.5)
MCHC RBC-ENTMCNC: 30.4 PG — SIGNIFICANT CHANGE UP (ref 27–34)
MCHC RBC-ENTMCNC: 30.7 PG — SIGNIFICANT CHANGE UP (ref 27–34)
MCHC RBC-ENTMCNC: 31.7 GM/DL — LOW (ref 32–36)
MCHC RBC-ENTMCNC: 31.8 GM/DL — LOW (ref 32–36)
MCV RBC AUTO: 96 FL — SIGNIFICANT CHANGE UP (ref 80–100)
MCV RBC AUTO: 96.6 FL — SIGNIFICANT CHANGE UP (ref 80–100)
NRBC # BLD: 0 /100 WBCS — SIGNIFICANT CHANGE UP (ref 0–0)
NRBC # BLD: 0 /100 WBCS — SIGNIFICANT CHANGE UP (ref 0–0)
PLATELET # BLD AUTO: 160 K/UL — SIGNIFICANT CHANGE UP (ref 150–400)
PLATELET # BLD AUTO: 173 K/UL — SIGNIFICANT CHANGE UP (ref 150–400)
POTASSIUM SERPL-MCNC: 4.3 MMOL/L — SIGNIFICANT CHANGE UP (ref 3.5–5.3)
POTASSIUM SERPL-SCNC: 4.3 MMOL/L — SIGNIFICANT CHANGE UP (ref 3.5–5.3)
RBC # BLD: 2.5 M/UL — LOW (ref 3.8–5.2)
RBC # BLD: 2.67 M/UL — LOW (ref 3.8–5.2)
RBC # FLD: 13.4 % — SIGNIFICANT CHANGE UP (ref 10.3–14.5)
RBC # FLD: 13.5 % — SIGNIFICANT CHANGE UP (ref 10.3–14.5)
SODIUM SERPL-SCNC: 137 MMOL/L — SIGNIFICANT CHANGE UP (ref 135–145)
SPECIMEN SOURCE: SIGNIFICANT CHANGE UP
WBC # BLD: 6.69 K/UL — SIGNIFICANT CHANGE UP (ref 3.8–10.5)
WBC # BLD: 7.16 K/UL — SIGNIFICANT CHANGE UP (ref 3.8–10.5)
WBC # FLD AUTO: 6.69 K/UL — SIGNIFICANT CHANGE UP (ref 3.8–10.5)
WBC # FLD AUTO: 7.16 K/UL — SIGNIFICANT CHANGE UP (ref 3.8–10.5)

## 2018-08-08 PROCEDURE — 99233 SBSQ HOSP IP/OBS HIGH 50: CPT | Mod: GC

## 2018-08-08 RX ADMIN — Medication 1 TABLET(S): at 07:37

## 2018-08-08 RX ADMIN — Medication 1 APPLICATION(S): at 17:03

## 2018-08-08 RX ADMIN — Medication 81 MILLIGRAM(S): at 11:16

## 2018-08-08 RX ADMIN — Medication 1 TABLET(S): at 13:30

## 2018-08-08 RX ADMIN — HEPARIN SODIUM 5000 UNIT(S): 5000 INJECTION INTRAVENOUS; SUBCUTANEOUS at 13:30

## 2018-08-08 RX ADMIN — Medication 2 MILLIGRAM(S): at 20:45

## 2018-08-08 RX ADMIN — Medication 2 MILLIGRAM(S): at 07:37

## 2018-08-08 RX ADMIN — Medication 12.5 MILLIGRAM(S): at 05:02

## 2018-08-08 RX ADMIN — SIMVASTATIN 20 MILLIGRAM(S): 20 TABLET, FILM COATED ORAL at 21:32

## 2018-08-08 RX ADMIN — Medication 1 TABLET(S): at 16:59

## 2018-08-08 RX ADMIN — Medication 1 APPLICATION(S): at 05:02

## 2018-08-08 RX ADMIN — Medication 2 MILLIGRAM(S): at 16:59

## 2018-08-08 RX ADMIN — CHOLESTYRAMINE 4 GRAM(S): 4 POWDER, FOR SUSPENSION ORAL at 09:11

## 2018-08-08 RX ADMIN — CHOLESTYRAMINE 4 GRAM(S): 4 POWDER, FOR SUSPENSION ORAL at 20:45

## 2018-08-08 RX ADMIN — HEPARIN SODIUM 5000 UNIT(S): 5000 INJECTION INTRAVENOUS; SUBCUTANEOUS at 05:02

## 2018-08-08 RX ADMIN — Medication 2 MILLIGRAM(S): at 11:16

## 2018-08-08 RX ADMIN — Medication 20 MILLIGRAM(S): at 11:16

## 2018-08-08 RX ADMIN — HEPARIN SODIUM 5000 UNIT(S): 5000 INJECTION INTRAVENOUS; SUBCUTANEOUS at 21:32

## 2018-08-08 RX ADMIN — Medication 2 MILLIGRAM(S): at 23:09

## 2018-08-08 NOTE — PROGRESS NOTE ADULT - NSHPATTENDINGPLANDISCUSS_GEN_ALL_CORE
pt, nursing, consultants
pt, nursing, consultants
pt, pt's family, consultants
patient, renal

## 2018-08-08 NOTE — PROGRESS NOTE ADULT - PROBLEM SELECTOR PLAN 1
Likely mostly secondary to dehydration and possibly an element of ATN; baseline Cr 3-4. Renal function improving - Cr stable from yesterday 5.6 ---> 5.5  - d/c 1/2NS as per nephro rec and encourage PO fluid intake  - Strict I&O, renae removed  -Avoid nephrotoxic agents - hold Allopurinol, Colchicine   - Renal ultrasound shows no evidence of post renal obstruction. Likely mostly secondary to dehydration and possibly an element of ATN; baseline Cr 3-4. Renal function improving - Cr stable from yesterday 5.6 ---> 5.5  - d/c IVF as per nephro rec and encourage PO fluid intake  - Strict I&O, renae removed  -Avoid nephrotoxic agents - hold Allopurinol, Colchicine   - Renal ultrasound shows no evidence of post renal obstruction.

## 2018-08-08 NOTE — PROGRESS NOTE ADULT - PROBLEM SELECTOR PLAN 1
improving  bld cxs ngtd, cdiff neg, gi pcr neg  cont bacid, cholestyramine, lomotil prn, imodium  cont diet as tolerated

## 2018-08-08 NOTE — PROGRESS NOTE ADULT - PROBLEM SELECTOR PROBLEM 9
HTN (hypertension)
Depression
HTN (hypertension)
Depression

## 2018-08-08 NOTE — PROGRESS NOTE ADULT - SUBJECTIVE AND OBJECTIVE BOX
84yo F with PMH of HTN, HLD, colon resection w/ colostomy s/p extensive radiation damage , CKD (baseline Cr 3-4 per daughter), Depression, Gout, Osteoporosis, hx of recurrent UTI presents with weakness, fatigue, dizziness, lightheadedness, x 1 week admitted for MEHDI on CKD, UTI, course c/b diarrhea.     INTERVAL HPI/OVERNIGHT EVENTS: Patient was seen and examined. Patient denies HA, CP, SOB, N/V, dysuria, loose stools in colostomy. She reports weakness and dizziness when standing and ambulating "to the point of exhaustion" and trace hematuria. As per telemonitoring, she was sinus jose @ 55.     REVIEW OF SYSTEMS:  CONSTITUTIONAL: No fever or chills  HEENT:  No headache  RESPIRATORY: No cough, shortness of breath  CARDIOVASCULAR: No chest pain, palpitations, or leg swelling  GASTROINTESTINAL: No abd pain, nausea, vomiting, or diarrhea  GENITOURINARY: + trace hematuria; no dysuria  NEUROLOGICAL: + weakness and dizziness on standing and ambulation    Vital Signs Last 24 Hrs  T(C): 36.3 (08 Aug 2018 11:18), Max: 37.2 (08 Aug 2018 04:28)  T(F): 97.4 (08 Aug 2018 11:18), Max: 98.9 (08 Aug 2018 04:28)  HR: 57 (08 Aug 2018 11:18) (57 - 67)  BP: 115/77 (08 Aug 2018 11:18) (103/55 - 116/65)  BP(mean): --  RR: 16 (08 Aug 2018 11:18) (16 - 18)  SpO2: 97% (08 Aug 2018 11:18) (96% - 98%)    PHYSICAL EXAM:  GENERAL: No acute distress, sitting comfortably in chair  CHEST/LUNG:  CTA b/l, no rales, wheezes, or rhonchi  HEART:  RRR, S1, S2  ABDOMEN:  BS+, soft, nontender, nondistendended. Ostomy output is soft and solid. Area around ostomy is clean dry and intact.  NERVOUS SYSTEM: AA&Ox3      MEDICATIONS  (STANDING):  aspirin enteric coated 81 milliGRAM(s) Oral daily  BACItracin   Ointment 1 Application(s) Topical two times a day  cholestyramine Powder (Sugar-Free) 4 Gram(s) Oral two times a day  FLUoxetine 20 milliGRAM(s) Oral daily  heparin  Injectable 5000 Unit(s) SubCutaneous every 8 hours  lactobacillus acidophilus 1 Tablet(s) Oral three times a day with meals  loperamide 2 milliGRAM(s) Oral five times a day  simvastatin 20 milliGRAM(s) Oral at bedtime    MEDICATIONS  (PRN):  acetaminophen   Tablet. 650 milliGRAM(s) Oral every 6 hours PRN pain  diphenoxylate/atropine 1 Tablet(s) Oral three times a day PRN Diarrhea      Allergies    Levaquin (Pruritus)  mercury (Pruritus; Rash)  sulfa drugs (Pruritus)    Intolerances          LABS:                        7.6    6.69  )-----------( 173      ( 08 Aug 2018 06:23 )             24.0     CBC Full  -  ( 08 Aug 2018 06:23 )  WBC Count : 6.69 K/uL  Hemoglobin : 7.6 g/dL  Hematocrit : 24.0 %  Platelet Count - Automated : 173 K/uL  Mean Cell Volume : 96.0 fl  Mean Cell Hemoglobin : 30.4 pg  Mean Cell Hemoglobin Concentration : 31.7 gm/dL  Auto Neutrophil # : x  Auto Lymphocyte # : x  Auto Monocyte # : x  Auto Eosinophil # : x  Auto Basophil # : x  Auto Neutrophil % : x  Auto Lymphocyte % : x  Auto Monocyte % : x  Auto Eosinophil % : x  Auto Basophil % : x    08 Aug 2018 06:23    137    |  105    |  62     ----------------------------<  87     4.3     |  22     |  5.50     Ca    7.3        08 Aug 2018 06:23    TPro  x      /  Alb  2.2    /  TBili  x      /  DBili  x      /  AST  x      /  ALT  x      /  AlkPhos  x      08 Aug 2018 06:23        CAPILLARY BLOOD GLUCOSE            Culture - Stool (collected 08-06-18 @ 00:46)  Source: .Stool Feces  Final Report (08-07-18 @ 19:30):    No enteric pathogens isolated.    (Stool culture examined for Salmonella,    Shigella, Campylobacter, Aeromonas, Plesiomonas,    Vibrio, E.coli O157 and Yersinia)    No enteric gram negative rods isolated    Few Yeast like cells    GI PCR Panel, Stool (collected 08-06-18 @ 00:12)  Source: .Stool Feces  Final Report (08-06-18 @ 12:03):    GI PCR Results: NOT detected    *******Please Note:*******    GI panel PCR evaluates for:    Campylobacter, Plesiomonas shigelloides, Salmonella,    Vibrio, Yersinia enterocolitica, Enteroaggregative    Escherichia coli (EAEC), Enteropathogenic E.coli (EPEC),    Enterotoxigenic E. coli (ETEC) lt/st, Shiga-like    toxin-producing E. coli (STEC) stx1/stx2,    Shigella/ Enteroinvasive E. coli (EIEC), Cryptosporidium,    Cyclospora cayetanensis, Entamoeba histolytica,    Giardia lamblia, Adenovirus F 40/41, Astrovirus,    Norovirus GI/GII, Rotavirus A, Sapovirus    Culture - Blood (collected 08-02-18 @ 21:55)  Source: .Blood Blood-Venous  Final Report (08-07-18 @ 22:00):    No growth at 5 days.    Culture - Blood (collected 08-02-18 @ 21:55)  Source: .Blood Blood-Venous  Final Report (08-07-18 @ 22:00):    No growth at 5 days.    Culture - Urine (collected 08-02-18 @ 21:39)  Source: .Urine Clean Catch (Midstream)  Final Report (08-04-18 @ 21:03):    >100,000 CFU/ml Serratia marcescens  Organism: Serratia marcescens (08-04-18 @ 21:03)  Organism: Serratia marcescens (08-04-18 @ 21:03)      -  Amikacin: S <=8      -  Amoxicillin/Clavulanic Acid: R >16/8      -  Ampicillin: R 16 These ampicillin results predict results for amoxicillin      -  Ampicillin/Sulbactam: R 16/8      -  Aztreonam: S <=4      -  Cefazolin: R >16      -  Cefepime: S <=2      -  Cefoxitin: R 16      -  Ceftriaxone: S <=1 Enterobacter, Citrobacter, and Serratia may develop resistance during prolonged therapy      -  Ciprofloxacin: I 2      -  Ertapenem: S <=0.5      -  Gentamicin: S <=1      -  Imipenem: S <=1      -  Levofloxacin: S <=1      -  Meropenem: S <=1      -  Nitrofurantoin: R >64 Should not be used to treat pyelonephritis      -  Piperacillin/Tazobactam: S <=8      -  Tigecycline: S 2      -  Tobramycin: S <=2      -  Trimethoprim/Sulfamethoxazole: S <=0.5/9.5      Method Type: SUBHASH        RADIOLOGY & ADDITIONAL TESTS:    Personally reviewed.     Consultant(s) Notes Reviewed:  [x] YES  [ ] NO 82yo F with PMH of HTN, HLD, colon resection w/ colostomy s/p extensive radiation damage , CKD (baseline Cr 3-4 per daughter), Depression, Gout, Osteoporosis, hx of recurrent UTI presents with weakness, fatigue, dizziness, lightheadedness, x 1 week admitted for MEHDI on CKD, UTI, course c/b diarrhea.     INTERVAL HPI/OVERNIGHT EVENTS: Patient was seen and examined. Patient denies HA, CP, SOB, N/V, dysuria, loose stools in colostomy. She reports having some lightheadedness when she stood up this morning. As per telemonitoring, she was sinus jose @ ~55bpm.     REVIEW OF SYSTEMS:  CONSTITUTIONAL: No fever or chills  HEENT:  No headache  RESPIRATORY: No cough, shortness of breath  CARDIOVASCULAR: No chest pain, palpitations, or leg swelling  GASTROINTESTINAL: No abd pain, nausea, vomiting, or diarrhea  GENITOURINARY: + trace hematuria after renae removal; no dysuria  NEUROLOGICAL: + weakness and dizziness on standing and ambulation    Vital Signs Last 24 Hrs  T(C): 36.3 (08 Aug 2018 11:18), Max: 37.2 (08 Aug 2018 04:28)  T(F): 97.4 (08 Aug 2018 11:18), Max: 98.9 (08 Aug 2018 04:28)  HR: 57 (08 Aug 2018 11:18) (57 - 67)  BP: 115/77 (08 Aug 2018 11:18) (103/55 - 116/65)  BP(mean): --  RR: 16 (08 Aug 2018 11:18) (16 - 18)  SpO2: 97% (08 Aug 2018 11:18) (96% - 98%)    PHYSICAL EXAM:  GENERAL: No acute distress, sitting comfortably in chair  CHEST/LUNG:  CTA b/l, no rales, wheezes, or rhonchi  HEART:  RRR, S1, S2  ABDOMEN:  BS+, soft, nontender, nondistendended. Ostomy output is soft and solid. Area around ostomy is clean dry and intact.  NERVOUS SYSTEM: AA&Ox3      MEDICATIONS  (STANDING):  aspirin enteric coated 81 milliGRAM(s) Oral daily  BACItracin   Ointment 1 Application(s) Topical two times a day  cholestyramine Powder (Sugar-Free) 4 Gram(s) Oral two times a day  FLUoxetine 20 milliGRAM(s) Oral daily  heparin  Injectable 5000 Unit(s) SubCutaneous every 8 hours  lactobacillus acidophilus 1 Tablet(s) Oral three times a day with meals  loperamide 2 milliGRAM(s) Oral five times a day  simvastatin 20 milliGRAM(s) Oral at bedtime    MEDICATIONS  (PRN):  acetaminophen   Tablet. 650 milliGRAM(s) Oral every 6 hours PRN pain  diphenoxylate/atropine 1 Tablet(s) Oral three times a day PRN Diarrhea      Allergies    Levaquin (Pruritus)  mercury (Pruritus; Rash)  sulfa drugs (Pruritus)    Intolerances          LABS:                        7.6    6.69  )-----------( 173      ( 08 Aug 2018 06:23 )             24.0     CBC Full  -  ( 08 Aug 2018 06:23 )  WBC Count : 6.69 K/uL  Hemoglobin : 7.6 g/dL  Hematocrit : 24.0 %  Platelet Count - Automated : 173 K/uL  Mean Cell Volume : 96.0 fl  Mean Cell Hemoglobin : 30.4 pg  Mean Cell Hemoglobin Concentration : 31.7 gm/dL  Auto Neutrophil # : x  Auto Lymphocyte # : x  Auto Monocyte # : x  Auto Eosinophil # : x  Auto Basophil # : x  Auto Neutrophil % : x  Auto Lymphocyte % : x  Auto Monocyte % : x  Auto Eosinophil % : x  Auto Basophil % : x    08 Aug 2018 06:23    137    |  105    |  62     ----------------------------<  87     4.3     |  22     |  5.50     Ca    7.3        08 Aug 2018 06:23    TPro  x      /  Alb  2.2    /  TBili  x      /  DBili  x      /  AST  x      /  ALT  x      /  AlkPhos  x      08 Aug 2018 06:23        CAPILLARY BLOOD GLUCOSE            Culture - Stool (collected 08-06-18 @ 00:46)  Source: .Stool Feces  Final Report (08-07-18 @ 19:30):    No enteric pathogens isolated.    (Stool culture examined for Salmonella,    Shigella, Campylobacter, Aeromonas, Plesiomonas,    Vibrio, E.coli O157 and Yersinia)    No enteric gram negative rods isolated    Few Yeast like cells    GI PCR Panel, Stool (collected 08-06-18 @ 00:12)  Source: .Stool Feces  Final Report (08-06-18 @ 12:03):    GI PCR Results: NOT detected    *******Please Note:*******    GI panel PCR evaluates for:    Campylobacter, Plesiomonas shigelloides, Salmonella,    Vibrio, Yersinia enterocolitica, Enteroaggregative    Escherichia coli (EAEC), Enteropathogenic E.coli (EPEC),    Enterotoxigenic E. coli (ETEC) lt/st, Shiga-like    toxin-producing E. coli (STEC) stx1/stx2,    Shigella/ Enteroinvasive E. coli (EIEC), Cryptosporidium,    Cyclospora cayetanensis, Entamoeba histolytica,    Giardia lamblia, Adenovirus F 40/41, Astrovirus,    Norovirus GI/GII, Rotavirus A, Sapovirus    Culture - Blood (collected 08-02-18 @ 21:55)  Source: .Blood Blood-Venous  Final Report (08-07-18 @ 22:00):    No growth at 5 days.    Culture - Blood (collected 08-02-18 @ 21:55)  Source: .Blood Blood-Venous  Final Report (08-07-18 @ 22:00):    No growth at 5 days.    Culture - Urine (collected 08-02-18 @ 21:39)  Source: .Urine Clean Catch (Midstream)  Final Report (08-04-18 @ 21:03):    >100,000 CFU/ml Serratia marcescens  Organism: Serratia marcescens (08-04-18 @ 21:03)  Organism: Serratia marcescens (08-04-18 @ 21:03)      -  Amikacin: S <=8      -  Amoxicillin/Clavulanic Acid: R >16/8      -  Ampicillin: R 16 These ampicillin results predict results for amoxicillin      -  Ampicillin/Sulbactam: R 16/8      -  Aztreonam: S <=4      -  Cefazolin: R >16      -  Cefepime: S <=2      -  Cefoxitin: R 16      -  Ceftriaxone: S <=1 Enterobacter, Citrobacter, and Serratia may develop resistance during prolonged therapy      -  Ciprofloxacin: I 2      -  Ertapenem: S <=0.5      -  Gentamicin: S <=1      -  Imipenem: S <=1      -  Levofloxacin: S <=1      -  Meropenem: S <=1      -  Nitrofurantoin: R >64 Should not be used to treat pyelonephritis      -  Piperacillin/Tazobactam: S <=8      -  Tigecycline: S 2      -  Tobramycin: S <=2      -  Trimethoprim/Sulfamethoxazole: S <=0.5/9.5      Method Type: SUBHASH        RADIOLOGY & ADDITIONAL TESTS:    Personally reviewed.     Consultant(s) Notes Reviewed:  [x] YES  [ ] NO

## 2018-08-08 NOTE — PROGRESS NOTE ADULT - ASSESSMENT
·	MEHDI, CKD 4, Solitary functioning kidney: Prerenal azotemia, ? ATN (Atrophic right kidney)  ·	Metabolic acidosis  ·	Diabetes  ·	Hypertension  ·	Hypo- K, Ca, Mg  ·	Hyperphosphatemia    Improving renal function. Will d/c IVF. Encourage PO intake as tolerated. GI follow up.  Low BP. D/c metoprolol.   Monitor blood sugar levels. Insulin coverage as needed. Dietary restriction.   Monitor BP trend. Titrate BP meds as needed. Salt restriction. Will follow electrolytes and renal function trend.   D/c planning if stable and out pt follow up with Dr. Coto.

## 2018-08-08 NOTE — PROGRESS NOTE ADULT - PROBLEM SELECTOR PLAN 4
Improving. Ostomy output is soft, solid  - C. diff, GI PCR, stool O&P negative  - Final stool cx shows no growth of enteric pathogens  - blood cx NGTD  - continue with bacid, Cholestyramine, loperamide and PRN lomotil  - monitor output Improving. Ostomy output is soft, solid  - C. diff, GI PCR, stool O&P negative  - Final stool cx shows no growth of enteric pathogens  - blood cx NGTD  - continue with bacid, Cholestyramine, loperamide and PRN lomotil -- consider decrease in loperamide  - monitor output

## 2018-08-08 NOTE — PROGRESS NOTE ADULT - PROBLEM SELECTOR PLAN 8
-Continue Simvastatin   -Will hold Fenofibrate as combination can cause worsening renal function
- Patient no longer endorses on and off pain in the left toe.  - Allopurinol and Colchicine held due to MEHDI  - Continue to monitor for clinical symptoms of gout.
chronic, stable  -Continue Simvastatin   -Will hold Fenofibrate as combination can cause worsening renal function
-Continue Simvastatin   -Will hold Fenofibrate as combination can cause worsening renal function
- Patient no longer endorses on and off pain in the left toe.  - Allopurinol and Colchicine held due to MEHDI  - Continue to monitor for clinical symptoms of gout.

## 2018-08-08 NOTE — PROGRESS NOTE ADULT - PROBLEM SELECTOR PROBLEM 4
Diarrhea, unspecified type

## 2018-08-08 NOTE — PROGRESS NOTE ADULT - PROBLEM SELECTOR PLAN 4
Improving. Ostomy output is soft, solid  - C. diff, GI PCR, stool O&P negative  - Final stool cx shows no growth of enteric pathogens  - blood cx NGTD  - continue with bacid, Cholestyramine, loperamide and PRN lomotil  - monitor output

## 2018-08-08 NOTE — PROGRESS NOTE ADULT - PROBLEM SELECTOR PLAN 2
questionable, pt asymptomatic  pancreas unremarkable on CT  lipase levels noted, likely 2/2 renal insufficiency, now downtrending  abd exam benign  US suggestive of chronic calculus cholecystitis in the appropriate setting  seen by surgery, no suspicion for acute christel  will follow

## 2018-08-08 NOTE — PROGRESS NOTE ADULT - PROBLEM SELECTOR PLAN 5
- Continue Metoprolol 12.5mg daily (therapeutic interchange for Bystolic 2.5mg) BP's have been low/normal throughout hospital course.   - d/c metoprolol as per nephro rec  - monitor BP and HH

## 2018-08-08 NOTE — PROGRESS NOTE ADULT - PROBLEM SELECTOR PLAN 6
As per GI, questionable as patient is asymptomatic and pancreas was unremarkable on CT. Lipase levels noted, likely 2/2 renal insufficiency, now downtrending  - abdominal exam: soft, NT/ND, +BS  - US suggestive of chronic calculus cholecystitis in the appropriate setting  seen by surgery, no suspicion for acute christel  - GI following

## 2018-08-08 NOTE — PROGRESS NOTE ADULT - PROBLEM SELECTOR PLAN 7
-Continue Fluoxetine
- Corrected Ca this AM is 8.7  - will continue to trend; asymptomatic currently
chronic, stable  -Continue Fluoxetine
-Continue Fluoxetine
- Corrected Ca this AM is 8.7  - will continue to trend; asymptomatic currently

## 2018-08-08 NOTE — PROGRESS NOTE ADULT - SUBJECTIVE AND OBJECTIVE BOX
82yo F with PMH of HTN, HLD, colon resection w/ colostomy s/p extensive radiation damage , CKD (baseline Cr 3-4 per daughter), Depression, Gout, Osteoporosis, hx of recurrent UTI presents with weakness, fatigue, dizziness, lightheadedness, x 1 week admitted for MEHDI on CKD, UTI, course c/b diarrhea.     INTERVAL HPI/OVERNIGHT EVENTS: Patient was seen and examined. Patient denies HA, CP, SOB, N/V, dysuria, loose stools in colostomy. She reports weakness and dizziness when standing and ambulating "to the point of exhaustion" and trace hematuria. As per telemonitoring, she was sinus jose @ 55.     MEDICATIONS  (STANDING):  aspirin enteric coated 81 milliGRAM(s) Oral daily  BACItracin   Ointment 1 Application(s) Topical two times a day  cholestyramine Powder (Sugar-Free) 4 Gram(s) Oral two times a day  FLUoxetine 20 milliGRAM(s) Oral daily  heparin  Injectable 5000 Unit(s) SubCutaneous every 8 hours  lactobacillus acidophilus 1 Tablet(s) Oral three times a day with meals  loperamide 2 milliGRAM(s) Oral five times a day  simvastatin 20 milliGRAM(s) Oral at bedtime    MEDICATIONS  (PRN):  acetaminophen   Tablet. 650 milliGRAM(s) Oral every 6 hours PRN pain  diphenoxylate/atropine 1 Tablet(s) Oral three times a day PRN Diarrhea      Allergies    Levaquin (Pruritus)  mercury (Pruritus; Rash)  sulfa drugs (Pruritus)    Intolerances        REVIEW OF SYSTEMS:  CONSTITUTIONAL: No fever or chills  HEENT:  No headache  RESPIRATORY: No cough, shortness of breath  CARDIOVASCULAR: No chest pain, palpitations, or leg swelling  GASTROINTESTINAL: No abd pain, nausea, vomiting, or diarrhea  GENITOURINARY: + trace hematuria; no dysuria  NEUROLOGICAL: + weakness and dizziness on standing and ambulation    Vital Signs Last 24 Hrs  T(C): 36.3 (08 Aug 2018 11:18), Max: 37.2 (08 Aug 2018 04:28)  T(F): 97.4 (08 Aug 2018 11:18), Max: 98.9 (08 Aug 2018 04:28)  HR: 57 (08 Aug 2018 11:18) (57 - 67)  BP: 115/77 (08 Aug 2018 11:18) (103/55 - 116/65)  BP(mean): --  RR: 16 (08 Aug 2018 11:18) (16 - 18)  SpO2: 97% (08 Aug 2018 11:18) (96% - 98%)    PHYSICAL EXAM:  GENERAL: No acute distress, sitting comfortably in chair  CHEST/LUNG:  CTA b/l, no rales, wheezes, or rhonchi  HEART:  RRR, S1, S2  ABDOMEN:  BS+, soft, nontender, nondistendended. Ostomy output is soft and solid. Area around ostomy is clean dry and intact.  NERVOUS SYSTEM: AA&Ox3    LABS:                        7.6    6.69  )-----------( 173      ( 08 Aug 2018 06:23 )             24.0     CBC Full  -  ( 08 Aug 2018 06:23 )  WBC Count : 6.69 K/uL  Hemoglobin : 7.6 g/dL  Hematocrit : 24.0 %  Platelet Count - Automated : 173 K/uL  Mean Cell Volume : 96.0 fl  Mean Cell Hemoglobin : 30.4 pg  Mean Cell Hemoglobin Concentration : 31.7 gm/dL  Auto Neutrophil # : x  Auto Lymphocyte # : x  Auto Monocyte # : x  Auto Eosinophil # : x  Auto Basophil # : x  Auto Neutrophil % : x  Auto Lymphocyte % : x  Auto Monocyte % : x  Auto Eosinophil % : x  Auto Basophil % : x    08 Aug 2018 06:23    137    |  105    |  62     ----------------------------<  87     4.3     |  22     |  5.50     Ca    7.3        08 Aug 2018 06:23    TPro  x      /  Alb  2.2    /  TBili  x      /  DBili  x      /  AST  x      /  ALT  x      /  AlkPhos  x      08 Aug 2018 06:23        CAPILLARY BLOOD GLUCOSE            Culture - Stool (collected 08-06-18 @ 00:46)  Source: .Stool Feces  Final Report (08-07-18 @ 19:30):    No enteric pathogens isolated.    (Stool culture examined for Salmonella,    Shigella, Campylobacter, Aeromonas, Plesiomonas,    Vibrio, E.coli O157 and Yersinia)    No enteric gram negative rods isolated    Few Yeast like cells    GI PCR Panel, Stool (collected 08-06-18 @ 00:12)  Source: .Stool Feces  Final Report (08-06-18 @ 12:03):    GI PCR Results: NOT detected    *******Please Note:*******    GI panel PCR evaluates for:    Campylobacter, Plesiomonas shigelloides, Salmonella,    Vibrio, Yersinia enterocolitica, Enteroaggregative    Escherichia coli (EAEC), Enteropathogenic E.coli (EPEC),    Enterotoxigenic E. coli (ETEC) lt/st, Shiga-like    toxin-producing E. coli (STEC) stx1/stx2,    Shigella/ Enteroinvasive E. coli (EIEC), Cryptosporidium,    Cyclospora cayetanensis, Entamoeba histolytica,    Giardia lamblia, Adenovirus F 40/41, Astrovirus,    Norovirus GI/GII, Rotavirus A, Sapovirus    Culture - Blood (collected 08-02-18 @ 21:55)  Source: .Blood Blood-Venous  Final Report (08-07-18 @ 22:00):    No growth at 5 days.    Culture - Blood (collected 08-02-18 @ 21:55)  Source: .Blood Blood-Venous  Final Report (08-07-18 @ 22:00):    No growth at 5 days.    Culture - Urine (collected 08-02-18 @ 21:39)  Source: .Urine Clean Catch (Midstream)  Final Report (08-04-18 @ 21:03):    >100,000 CFU/ml Serratia marcescens  Organism: Serratia marcescens (08-04-18 @ 21:03)  Organism: Serratia marcescens (08-04-18 @ 21:03)      -  Amikacin: S <=8      -  Amoxicillin/Clavulanic Acid: R >16/8      -  Ampicillin: R 16 These ampicillin results predict results for amoxicillin      -  Ampicillin/Sulbactam: R 16/8      -  Aztreonam: S <=4      -  Cefazolin: R >16      -  Cefepime: S <=2      -  Cefoxitin: R 16      -  Ceftriaxone: S <=1 Enterobacter, Citrobacter, and Serratia may develop resistance during prolonged therapy      -  Ciprofloxacin: I 2      -  Ertapenem: S <=0.5      -  Gentamicin: S <=1      -  Imipenem: S <=1      -  Levofloxacin: S <=1      -  Meropenem: S <=1      -  Nitrofurantoin: R >64 Should not be used to treat pyelonephritis      -  Piperacillin/Tazobactam: S <=8      -  Tigecycline: S 2      -  Tobramycin: S <=2      -  Trimethoprim/Sulfamethoxazole: S <=0.5/9.5      Method Type: SUBHASH        RADIOLOGY & ADDITIONAL TESTS:    Personally reviewed.     Consultant(s) Notes Reviewed:  [x] YES  [ ] NO

## 2018-08-08 NOTE — PROGRESS NOTE ADULT - SUBJECTIVE AND OBJECTIVE BOX
Patient is a 83y old  Female who presents with a chief complaint of I was dizzy and out of breathe (03 Aug 2018 14:09)  Patient seen in follow up for MEHDI, CKD 4, Metabolic acidosis, Hypo-k, Ca, Mg.     Slowly improving renal indices. + Electrolyte imbalance. Colostomy out put improved.     PAST MEDICAL HISTORY:  Herniated lumbar intervertebral disc  Depression  Tremor  Kidney atrophy  Colostomy status  Chronic UTI (urinary tract infection)  Cervical cancer  Dyslipidemia  Diabetes  Hernia, incisional    MEDICATIONS  (STANDING):  aspirin enteric coated 81 milliGRAM(s) Oral daily  BACItracin   Ointment 1 Application(s) Topical two times a day  cholestyramine Powder (Sugar-Free) 4 Gram(s) Oral two times a day  FLUoxetine 20 milliGRAM(s) Oral daily  heparin  Injectable 5000 Unit(s) SubCutaneous every 8 hours  lactobacillus acidophilus 1 Tablet(s) Oral three times a day with meals  loperamide 2 milliGRAM(s) Oral five times a day  metoprolol tartrate 12.5 milliGRAM(s) Oral two times a day  simvastatin 20 milliGRAM(s) Oral at bedtime    MEDICATIONS  (PRN):  acetaminophen   Tablet. 650 milliGRAM(s) Oral every 6 hours PRN pain  diphenoxylate/atropine 1 Tablet(s) Oral three times a day PRN Diarrhea    T(C): 36.3 (08-08-18 @ 11:18), Max: 37.2 (08-08-18 @ 04:28)  HR: 57 (08-08-18 @ 11:18) (53 - 67)  BP: 115/77 (08-08-18 @ 11:18) (98/61 - 120/67)  RR: 16 (08-08-18 @ 11:18)  SpO2: 97% (08-08-18 @ 11:18)  Wt(kg): --  I&O's Detail    07 Aug 2018 07:01  -  08 Aug 2018 07:00  --------------------------------------------------------  IN:    sodium chloride 0.45%: 2300 mL  Total IN: 2300 mL    OUT:    Colostomy: 200 mL  Total OUT: 200 mL    Total NET: 2100 mL          PHYSICAL EXAM:  General: NAD  Respiratory: b/l air entry  Cardiovascular: S1 S2  Gastrointestinal: soft, + Colostomy  Extremities:  no edema    LABORATORY:                        7.6    6.69  )-----------( 173      ( 08 Aug 2018 06:23 )             24.0     08-08    137  |  105  |  62<H>  ----------------------------<  87  4.3   |  22  |  5.50<H>    Ca    7.3<L>      08 Aug 2018 06:23  Phos  4.5     08-07  Mg     2.2     08-07    TPro  x   /  Alb  2.2<L>  /  TBili  x   /  DBili  x   /  AST  x   /  ALT  x   /  AlkPhos  x   08-08    Sodium, Serum: 137 mmol/L (08-08 @ 06:23)  Sodium, Serum: 141 mmol/L (08-07 @ 07:20)    Potassium, Serum: 4.3 mmol/L (08-08 @ 06:23)  Potassium, Serum: 4.1 mmol/L (08-07 @ 07:20)    Hemoglobin: 7.6 g/dL (08-08 @ 06:23)  Hemoglobin: 8.3 g/dL (08-07 @ 07:20)  Hemoglobin: 8.6 g/dL (08-06 @ 06:33)    Creatinine, Serum 5.50 (08-08 @ 06:23)  Creatinine, Serum 5.60 (08-07 @ 07:20)  Creatinine, Serum 6.30 (08-06 @ 06:33)        LIVER FUNCTIONS - ( 08 Aug 2018 06:23 )  Alb: 2.2 g/dL / Pro: x     / ALK PHOS: x     / ALT: x     / AST: x     / GGT: x

## 2018-08-08 NOTE — PROGRESS NOTE ADULT - PROBLEM SELECTOR PLAN 10
DVT prophylaxis: Heparin 5000un sq q8h    11. Dyslipidemia  -Continue Simvastatin   -Will hold Fenofibrate as combination can cause worsening renal function
DVT prophylaxis: Heparin 5000un sq q8h
IMPROVE VTE Individual Risk Assessment          RISK                                                          Points  [  ] Previous VTE                                                3  [  ] Thrombophilia                                             2  [  ] Lower limb paralysis                                   2        (unable to hold up >15 seconds)    [  ] Current Cancer                                             2         (within 6 months)  [  ] Immobilization > 24 hrs                              1  [  ] ICU/CCU stay > 24 hours                             1  [x  ] Age > 60                                                         1    IMPROVE VTE Score: 1  DVT prophylaxis: Heparin 5000u q8   Renal diet
DVT prophylaxis: Heparin 5000un sq q8h
DVT prophylaxis: Heparin 5000un sq q8h    11. Dyslipidemia  -Continue Simvastatin   -Will hold Fenofibrate as combination can cause worsening renal function

## 2018-08-08 NOTE — PROGRESS NOTE ADULT - ATTENDING COMMENTS
Advanced care planning was discussed with patient and family.  Advanced care planning forms were reviewed and discussed.  Risks, benefits and alternatives of gastroenterologic procedures were discussed in detail and all questions were answered.    30 minutes spent.
Note written by attending, see above.  Time spent: 45min. More than 50% of the visit was spent counseling the patient on her condition - hypocalcemia, acute renal failure.
Note written by attending, see above.  Time spent: 45min. More than 50% of the visit was spent counseling the patient on her condition - hypocalcemia, acute renal failure.
Pt seen + examined. Case discussed with intern/resident. Agree with assessment and plan above (edited) with following addendum:  Time spent: 40min. More than 50% of the visit was spent counseling the patient on medical condition -- hypocalcemia, MEHDI on CKD, cholelithiasis.    82yo F with PMH of HTN, HLD, colon resection w/ colostomy s/p extensive radiation damage, CKD (baseline Cr 3-4 per daughter), Depression, Gout, Osteoporosis, hx of recurrent UTI presents with weakness, fatigue, dizziness, lightheadedness, x 1 week admitted for MEHDI on CKD, UTI, course c/b diarrhea.   -ostomy output improving to baseline -- all cultures, O&P, and GI PCR and c diff PCR negative  -some equivocal RUQ US findings that may be interpreted as chronic calculous cholecystitis in appropriate clinical setting --- consulted surgery, Dr. Baig - recs appreciated; no concern for pathology that needs intervention and does not recommend further investigation such as HIDA scan. GI recs appreciated.  -MEHDI on CKD continues to improve -- c/w IVF as per renal ; pt tolerating well, no sign of fluid overload  -electrolytes improving; hypocalcemia resolved as of today's corrected value of 8.5  -mildly elevated lipase of unclear significance (trending down) -- GI recs appreciated / f/up  -completed 3-day course of rocephin for a suspected uncomplicated UTI; no significant symptoms of infection, will not prolong course further - monitor  -removed renae and pt passed TOV
Pt seen + examined. Case discussed with intern/resident. Agree with assessment and plan above (edited) with following addendum:  Time spent: 40min. More than 50% of the visit was spent counseling the patient on medical condition -- hypocalcemia, MEHDI on CKD, cholelithiasis.    84yo F with PMH of HTN, HLD, colon resection w/ colostomy s/p extensive radiation damage, CKD (baseline Cr 3-4 per daughter), Depression, Gout, Osteoporosis, hx of recurrent UTI presents with weakness, fatigue, dizziness, lightheadedness, x 1 week admitted for MEHDI on CKD, UTI, course c/b diarrhea.   -ostomy output improving -- all cultures and GI PCR and c diff negative  -some equivocal RUQ US findings that may be interpreted as chronic calculous cholecystitis in appropriate clinical setting --- consulted surgery, Dr. Baig - recs appreciated; no concern for pathology that needs intervention and does not recommend further investigation such as HIDA scan. GI recs appreciated.  -MEHDI on CKD continues to improve -- c/w IVF as per renal ; pt tolerating well, no sign of fluid overload  -electrolytes improving; hypocalcemia nearly resolved  -mildly elevated lipase of unclear significance -- GI recs appreciated / f/up  -completed 3-day course of rocephin for a possible UTI; no significant symptoms of infection, will not prolong course further - monitor  -remove renae and TOV today
Pt seen + examined. Case discussed with intern/resident. Agree with assessment and plan above (edited) with following addendum:  Time spent: 40min. More than 50% of the visit was spent counseling the patient on medical condition -- hypocalcemia, MEHDI on CKD, cholelithiasis.    84yo F with PMH of HTN, HLD, colon resection w/ colostomy s/p extensive radiation damage, CKD (baseline Cr 3-4 per daughter), Depression, Gout, Osteoporosis, hx of recurrent UTI presents with weakness, fatigue, dizziness, lightheadedness, x 1 week admitted for MEHDI on CKD, UTI, course c/b diarrhea.   -ostomy output improving to baseline -- all cultures, O&P, and GI PCR and c diff PCR negative  -some equivocal RUQ US findings that may be interpreted as chronic calculous cholecystitis in appropriate clinical setting --- consulted surgery, Dr. Baig - recs appreciated; no concern for pathology that needs intervention and does not recommend further investigation such as HIDA scan. GI recs appreciated.  -MEHDI on CKD continues to improve though slightly today -- d/c IVF as per renal and monitor BMP; if no significant rise in BUN/Cr on po intake, consider d/c tomorrow with outpt f/up  -electrolytes improving; hypocalcemia resolved as of today's corrected value of 8.7  -mildly elevated lipase of unclear significance (trending down) -- GI recs appreciated / f/up - may be due to acute renal failure on admission  -completed 3-day course of rocephin for a suspected uncomplicated UTI; no significant symptoms of infection, will not prolong course further - monitor  -removed renae and pt passed TOV  -discuss epogen with renal regarding anemia

## 2018-08-08 NOTE — PROGRESS NOTE ADULT - PROBLEM SELECTOR PLAN 3
CT showed the ureter has some thickening, which may represent inflammation/infection. Patient reports symptom of hematuria when experiencing chronic UTIs  - IV Rocephin course completed and discontinued.  - Patient's renae removed and pt passed TOV

## 2018-08-08 NOTE — PROGRESS NOTE ADULT - SUBJECTIVE AND OBJECTIVE BOX
INTERVAL HPI/OVERNIGHT EVENTS:  pt seen and examined  pt denies current n/v/abd pain, states stools more formed in ostomy  labs noted afebrile overnight  per overnight rn no overt s/s gib no acute gi issues    MEDICATIONS  (STANDING):  aspirin enteric coated 81 milliGRAM(s) Oral daily  BACItracin   Ointment 1 Application(s) Topical two times a day  cholestyramine Powder (Sugar-Free) 4 Gram(s) Oral two times a day  FLUoxetine 20 milliGRAM(s) Oral daily  heparin  Injectable 5000 Unit(s) SubCutaneous every 8 hours  lactobacillus acidophilus 1 Tablet(s) Oral three times a day with meals  loperamide 2 milliGRAM(s) Oral five times a day  metoprolol tartrate 12.5 milliGRAM(s) Oral two times a day  simvastatin 20 milliGRAM(s) Oral at bedtime    MEDICATIONS  (PRN):  acetaminophen   Tablet. 650 milliGRAM(s) Oral every 6 hours PRN pain  diphenoxylate/atropine 1 Tablet(s) Oral three times a day PRN Diarrhea      Allergies    Levaquin (Pruritus)  mercury (Pruritus; Rash)  sulfa drugs (Pruritus)    Intolerances        Review of Systems:    General:  No wt loss, fevers, chills, night sweats, fatigue   Eyes:  Good vision, no reported pain  ENT:  No sore throat, pain, runny nose, dysphagia  CV:  No pain, palpitations, hypo/hypertension  Resp:  No dyspnea, cough, tachypnea, wheezing  GI:  No pain, No nausea, No vomiting, +ostomy  No weight loss, No fever, No pruritis, No rectal bleeding, No melena, No dysphagia  :  No pain, bleeding, incontinence, nocturia  Muscle:  No pain, weakness  Neuro:  No weakness, tingling, memory problems  Psych:  No fatigue, insomnia, mood problems, depression  Endocrine:  No polyuria, polydypsia, cold/heat intolerance  Heme:  No petechiae, ecchymosis, easy bruisability  Skin:  No rash, tattoos, scars, edema      Vital Signs Last 24 Hrs  T(C): 36.3 (08 Aug 2018 11:18), Max: 37.2 (08 Aug 2018 04:28)  T(F): 97.4 (08 Aug 2018 11:18), Max: 98.9 (08 Aug 2018 04:28)  HR: 57 (08 Aug 2018 11:18) (57 - 67)  BP: 115/77 (08 Aug 2018 11:18) (103/55 - 116/65)  BP(mean): --  RR: 16 (08 Aug 2018 11:18) (16 - 18)  SpO2: 97% (08 Aug 2018 11:18) (96% - 98%)    PHYSICAL EXAM:    Constitutional: NAD, lying in bed  HEENT: EOMI, perrl  Neck: No LAD  Respiratory: dec bs  Cardiovascular: S1 and S2  Gastrointestinal: obese, BS+, soft, nt nd +ostomy with semi formed stool +scar to abd  Extremities: trace edema  Vascular: 2+ peripheral pulses  Neurological: Awake alert responds appropriately  Skin: No rashes    LABS:                        7.6    6.69  )-----------( 173      ( 08 Aug 2018 06:23 )             24.0     08-08    137  |  105  |  62<H>  ----------------------------<  87  4.3   |  22  |  5.50<H>    Ca    7.3<L>      08 Aug 2018 06:23  Phos  4.5     08-07  Mg     2.2     08-07    TPro  x   /  Alb  2.2<L>  /  TBili  x   /  DBili  x   /  AST  x   /  ALT  x   /  AlkPhos  x   08-08          RADIOLOGY & ADDITIONAL TESTS:

## 2018-08-08 NOTE — PROGRESS NOTE ADULT - PROBLEM SELECTOR PLAN 3
drop in hgb noted, ?etiology  no overt s/s gib  repeat labs pending  possibly 2/2 ckd, chronic dz  serial cbcs, transfuse prn   consider iron studies  consider checking fobt if hgb downtrends  further w/u pending above

## 2018-08-08 NOTE — PROGRESS NOTE ADULT - PROBLEM SELECTOR PLAN 5
BP's have been low/normal throughout hospital course.   - d/c metoprolol as per nephro rec  - monitor BP and HH BP's have been low/normal throughout hospital course.   - d/c metoprolol as per nephro rec  - monitor BP and HR BP's have been low/normal throughout hospital course.   - d/c metoprolol as per nephro rec as it is only given for "HTN" and pt has some lightheadedness with walkingL  - monitor BP and HR

## 2018-08-08 NOTE — PROGRESS NOTE ADULT - PROBLEM SELECTOR PLAN 9
-Continue Metoprolol 12.5mg daily (therapeutic interchange for Bystolic 2.5mg)
-Continue Fluoxetine
chronic, stable  -Continue Metoprolol 12.5mg daily (therapeutic interchange for Bystolic 2.5mg)
-Continue Metoprolol 12.5mg daily (therapeutic interchange for Bystolic 2.5mg)
-Continue Fluoxetine

## 2018-08-08 NOTE — PROGRESS NOTE ADULT - PROBLEM SELECTOR PLAN 2
- Likely secondary to CKD  - Hgb dropped to 7.6 from 8.3 yesterday. Patient reports weakness, dizziness on standing and ambulation.  - Repeat Hgb 8.2 at noon, no signs of active bleeding. Continue to monitor H/H  - Measured orthostatics - patient's HTN is stable. - Likely secondary to CKD  - Hgb dropped to 7.6 from 8.3 yesterday.   - Repeat Hgb 8.2 at noon, no signs of significant active bleeding. Continue to monitor H/H  - Measured orthostatics which were normal - patient's HTN is stable.  - discuss epogen with renal

## 2018-08-08 NOTE — PROGRESS NOTE ADULT - PROBLEM SELECTOR PLAN 2
Likely secondary to CKD  - H  - monitor H&H, consider epogen Likely secondary to CKD  - Hgb dropped to 7.6 from 8.3 yesterday. Patient reports weakness, dizziness on standing and ambulation.  - Repeat Hgb 8.2. Will continue to monitor H/H  - Monitor orthostatics  - Consider epogen

## 2018-08-09 VITALS
RESPIRATION RATE: 17 BRPM | DIASTOLIC BLOOD PRESSURE: 70 MMHG | TEMPERATURE: 98 F | OXYGEN SATURATION: 96 % | SYSTOLIC BLOOD PRESSURE: 104 MMHG | HEART RATE: 70 BPM

## 2018-08-09 LAB
ANA PAT FLD IF-IMP: SIGNIFICANT CHANGE UP
ANA TITR SER: ABNORMAL
ANION GAP SERPL CALC-SCNC: 13 MMOL/L — SIGNIFICANT CHANGE UP (ref 5–17)
BUN SERPL-MCNC: 62 MG/DL — HIGH (ref 7–23)
CALCIUM SERPL-MCNC: 7.8 MG/DL — LOW (ref 8.5–10.1)
CHLORIDE SERPL-SCNC: 109 MMOL/L — HIGH (ref 96–108)
CO2 SERPL-SCNC: 19 MMOL/L — LOW (ref 22–31)
CREAT SERPL-MCNC: 4.9 MG/DL — HIGH (ref 0.5–1.3)
GLUCOSE SERPL-MCNC: 87 MG/DL — SIGNIFICANT CHANGE UP (ref 70–99)
POTASSIUM SERPL-MCNC: 4.9 MMOL/L — SIGNIFICANT CHANGE UP (ref 3.5–5.3)
POTASSIUM SERPL-SCNC: 4.9 MMOL/L — SIGNIFICANT CHANGE UP (ref 3.5–5.3)
SODIUM SERPL-SCNC: 141 MMOL/L — SIGNIFICANT CHANGE UP (ref 135–145)

## 2018-08-09 PROCEDURE — 99239 HOSP IP/OBS DSCHRG MGMT >30: CPT

## 2018-08-09 RX ORDER — COLCHICINE 0.6 MG
1 TABLET ORAL
Qty: 0 | Refills: 0 | COMMUNITY

## 2018-08-09 RX ORDER — TRAMADOL HYDROCHLORIDE 50 MG/1
1 TABLET ORAL
Qty: 0 | Refills: 0 | COMMUNITY

## 2018-08-09 RX ORDER — LOPERAMIDE HCL 2 MG
1 TABLET ORAL
Qty: 0 | Refills: 0 | DISCHARGE
Start: 2018-08-09 | End: 2018-08-29

## 2018-08-09 RX ORDER — CHOLESTYRAMINE 4 G/9G
1 POWDER, FOR SUSPENSION ORAL
Qty: 30 | Refills: 0
Start: 2018-08-09

## 2018-08-09 RX ORDER — LOPERAMIDE HCL 2 MG
1 TABLET ORAL
Qty: 84 | Refills: 0 | OUTPATIENT
Start: 2018-08-09 | End: 2018-08-29

## 2018-08-09 RX ORDER — FENOFIBRATE,MICRONIZED 130 MG
1 CAPSULE ORAL
Qty: 0 | Refills: 0 | COMMUNITY

## 2018-08-09 RX ORDER — CHOLESTYRAMINE 4 G/9G
1 POWDER, FOR SUSPENSION ORAL
Qty: 30 | Refills: 0 | OUTPATIENT
Start: 2018-08-09

## 2018-08-09 RX ORDER — NEBIVOLOL HYDROCHLORIDE 5 MG/1
1 TABLET ORAL
Qty: 0 | Refills: 0 | COMMUNITY

## 2018-08-09 RX ADMIN — Medication 20 MILLIGRAM(S): at 12:47

## 2018-08-09 RX ADMIN — HEPARIN SODIUM 5000 UNIT(S): 5000 INJECTION INTRAVENOUS; SUBCUTANEOUS at 05:00

## 2018-08-09 RX ADMIN — Medication 1 TABLET(S): at 12:47

## 2018-08-09 RX ADMIN — CHOLESTYRAMINE 4 GRAM(S): 4 POWDER, FOR SUSPENSION ORAL at 09:00

## 2018-08-09 RX ADMIN — HEPARIN SODIUM 5000 UNIT(S): 5000 INJECTION INTRAVENOUS; SUBCUTANEOUS at 13:48

## 2018-08-09 RX ADMIN — Medication 1 TABLET(S): at 07:59

## 2018-08-09 RX ADMIN — Medication 2 MILLIGRAM(S): at 07:59

## 2018-08-09 RX ADMIN — Medication 2 MILLIGRAM(S): at 12:47

## 2018-08-09 RX ADMIN — Medication 1 APPLICATION(S): at 05:00

## 2018-08-09 RX ADMIN — Medication 81 MILLIGRAM(S): at 12:47

## 2018-08-09 NOTE — PROGRESS NOTE ADULT - PROBLEM SELECTOR PROBLEM 2
Hypocalcemia
Other acute pancreatitis without infection or necrosis
Anemia due to chronic kidney disease
Other acute pancreatitis without infection or necrosis
Hypocalcemia
Anemia due to chronic kidney disease

## 2018-08-09 NOTE — DIETITIAN INITIAL EVALUATION ADULT. - PROBLEM SELECTOR PLAN 1
likely due to dehydration, baseline Cr 3-4 (3/2016 Cr 2.1)  -Admit to medicine  -Start IVF with bicarb  -Electrolytes repleted, monitor electrolytes closely as patient on bicarbonate drip which can cause hypokalemia   -Strict I&O, Perez in place   -Avoid nephrotoxic agents - hold Allopurinol, Colchicine  -Hold Methenamine Hippurate as it needs an acidic environment and patient receiving Sodium Bicarbonate   -Dr. You, nephrologist, recs appreciated - Plan to follow electrolytes and renal function trend to determine need for RRT  -Follow up 9PM CMP if bicarbonate increase then switch to 1/2NS + 75mEq sodium bicarbonate at 100cc/hr  -Follow up 12AM CMP - replete potassium  f/u with nephrology

## 2018-08-09 NOTE — DIETITIAN INITIAL EVALUATION ADULT. - PROBLEM SELECTOR PLAN 3
CT showed the ureter is also thickening which may represent inflammation/infection. Patient reports symptom of hematuria when experiencing chronic UTIs  -Start IV Rocephin

## 2018-08-09 NOTE — PROGRESS NOTE ADULT - PROBLEM SELECTOR PLAN 1
continues to improve  bld cxs ngtd, cdiff neg, gi pcr neg  cont bacid, cholestyramine, lomotil prn, imodium  cont diet as tolerated

## 2018-08-09 NOTE — DIETITIAN INITIAL EVALUATION ADULT. - NS AS NUTRI INTERV MEALS SNACK
Continue with current low residue, no concentrated phosphorus diet as medically feasible. Consider adding additional DASH/TLC diet restriction./General/healthful diet

## 2018-08-09 NOTE — PROGRESS NOTE ADULT - PROVIDER SPECIALTY LIST ADULT
Gastroenterology
Hospitalist
Nephrology
Gastroenterology
Hospitalist
Hospitalist

## 2018-08-09 NOTE — PROGRESS NOTE ADULT - PROBLEM SELECTOR PLAN 3
am cbc pending  ?etiology of fluctuating hgb  no overt s/s gib, renal function improving  possibly 2/2 ckd, chronic dz  serial cbcs, transfuse prn   consider iron studies  consider checking fobt if hgb downtrends

## 2018-08-09 NOTE — DIETITIAN INITIAL EVALUATION ADULT. - OTHER INFO
As per chart pt is 83 year old female with PMH of HTN, HLD, colon resection w/ colostomy s/p extensive radiation damage , CKD (baseline Cr 3-4 per daughter), Depression, Gout, Osteoporosis, hx of recurrent UTI presents with weakness, fatigue, dizziness, lightheadedness, x 1 week admitted for MEHDI on CKD, UTI, course c/b diarrhea. Pt found to have hypo- K, Ca, Mg; Hyperphosphatemia, pt's levels are currently within normal limits. Pt reports currently good appetite and PO intake, pt reports she loves the food and is eating well, per chart 100% PO intake of meals in house, Pt's current weight per chart (8/9) 148.1lbs, (admission wt) 140lbs. Pt reports current UBW of 137lbs. Pt reports intentional 60lbs weight loss x 2 years due to diet modification and exercise. Per chart pt still with loose stool in colostomy, however pt reports that the output is becoming thicker. Pt denies any chewing or swallowing issues, denies any nausea or vomiting. Pt with +1 generalized edema and stage 1 sacrum pressure injury. As per chart pt is 83 year old female with PMH of HTN, HLD, colon resection w/ colostomy s/p extensive radiation damage , CKD (baseline Cr 3-4 per daughter), Depression, Gout, Osteoporosis, hx of recurrent UTI presents with weakness, fatigue, dizziness, lightheadedness, x 1 week admitted for MEHDI on CKD, UTI, course c/b diarrhea. Pt found to have hypo- K, Ca, Mg; Hyperphosphatemia, pt's K, Mg, Phos levels are currently within normal limits. Pt reports currently good appetite and PO intake, pt reports she loves the food and is eating well, per chart 100% PO intake of meals in house, Pt's current weight per chart (8/9) 148.1lbs, (admission wt) 140lbs. Pt reports current UBW of 137lbs. Pt reports intentional 60lbs weight loss x 2 years due to diet modification and exercise. Per chart pt still with loose stool in colostomy, however pt reports that the output is becoming thicker. Pt denies any chewing or swallowing issues, denies any nausea or vomiting. Pt with +1 generalized edema and stage 1 sacrum pressure injury.

## 2018-08-09 NOTE — DIETITIAN INITIAL EVALUATION ADULT. - NS AS NUTRI INTERV ED CONTENT
Purpose of the nutrition education/Priority modifications/Recommended modifications/Nutrition relationship to health/disease/Pt provided with written and oral eduction low fiber, colostomy diet. Reviewed with pt: 1) food low in fiber, 2) foods to help thicken output. RD to remain available.

## 2018-08-09 NOTE — DIETITIAN INITIAL EVALUATION ADULT. - PROBLEM SELECTOR PLAN 9
IMPROVE VTE Individual Risk Assessment          RISK                                                          Points  [  ] Previous VTE                                                3  [  ] Thrombophilia                                             2  [  ] Lower limb paralysis                                   2        (unable to hold up >15 seconds)    [  ] Current Cancer                                             2         (within 6 months)  [  ] Immobilization > 24 hrs                              1  [  ] ICU/CCU stay > 24 hours                             1  [x  ] Age > 60                                                         1    IMPROVE VTE Score: 1  DVT prophylaxis: Heparin 5000u q8   Renal diet

## 2018-08-09 NOTE — PROGRESS NOTE ADULT - PROBLEM SELECTOR PROBLEM 1
Diarrhea, unspecified type
Acute renal failure
Diarrhea, unspecified type
Acute renal failure

## 2018-08-09 NOTE — PROGRESS NOTE ADULT - SUBJECTIVE AND OBJECTIVE BOX
Patient is a 83y old  Female who presents with a chief complaint of I was dizzy and out of breathe (03 Aug 2018 14:09)  Patient seen in follow up for MEHDI, CKD 4, Metabolic acidosis, Hypo-k, Ca, Mg.     Slowly improving renal indices. + Electrolyte imbalance. Colostomy out put improved.     PAST MEDICAL HISTORY:  Herniated lumbar intervertebral disc  Depression  Tremor  Kidney atrophy  Colostomy status  Chronic UTI (urinary tract infection)  Cervical cancer  Dyslipidemia  Diabetes  Hernia, incisional     MEDICATIONS  (STANDING):  aspirin enteric coated 81 milliGRAM(s) Oral daily  BACItracin   Ointment 1 Application(s) Topical two times a day  cholestyramine Powder (Sugar-Free) 4 Gram(s) Oral two times a day  FLUoxetine 20 milliGRAM(s) Oral daily  heparin  Injectable 5000 Unit(s) SubCutaneous every 8 hours  lactobacillus acidophilus 1 Tablet(s) Oral three times a day with meals  loperamide 2 milliGRAM(s) Oral five times a day  simvastatin 20 milliGRAM(s) Oral at bedtime    MEDICATIONS  (PRN):  acetaminophen   Tablet. 650 milliGRAM(s) Oral every 6 hours PRN pain  diphenoxylate/atropine 1 Tablet(s) Oral three times a day PRN Diarrhea    T(C): 36.8 (08-09-18 @ 12:00), Max: 37.2 (08-08-18 @ 04:28)  HR: 69 (08-09-18 @ 12:00) (57 - 78)  BP: 120/59 (08-09-18 @ 12:00) (99/61 - 128/77)  RR: 18 (08-09-18 @ 12:00)  SpO2: 97% (08-09-18 @ 12:00)  Wt(kg): --  I&O's Detail        PHYSICAL EXAM:  General: NAD  Respiratory: b/l air entry  Cardiovascular: S1 S2  Gastrointestinal: soft, + Colostomy  Extremities:  no edema    LABORATORY:                        8.2    7.16  )-----------( 160      ( 08 Aug 2018 12:48 )             25.8     08-09    141  |  109<H>  |  62<H>  ----------------------------<  87  4.9   |  19<L>  |  4.90<H>    Ca    7.8<L>      09 Aug 2018 06:36    TPro  x   /  Alb  2.2<L>  /  TBili  x   /  DBili  x   /  AST  x   /  ALT  x   /  AlkPhos  x   08-08    Sodium, Serum: 141 mmol/L (08-09 @ 06:36)  Sodium, Serum: 137 mmol/L (08-08 @ 06:23)    Potassium, Serum: 4.9 mmol/L (08-09 @ 06:36)  Potassium, Serum: 4.3 mmol/L (08-08 @ 06:23)    Hemoglobin: 8.2 g/dL (08-08 @ 12:48)  Hemoglobin: 7.6 g/dL (08-08 @ 06:23)  Hemoglobin: 8.3 g/dL (08-07 @ 07:20)    Creatinine, Serum 4.90 (08-09 @ 06:36)  Creatinine, Serum 5.50 (08-08 @ 06:23)  Creatinine, Serum 5.60 (08-07 @ 07:20)        LIVER FUNCTIONS - ( 08 Aug 2018 06:23 )  Alb: 2.2 g/dL / Pro: x     / ALK PHOS: x     / ALT: x     / AST: x     / GGT: x

## 2018-08-09 NOTE — DIETITIAN INITIAL EVALUATION ADULT. - PROBLEM SELECTOR PLAN 7
chronic, stable  -Continue Simvastatin   -Will hold Fenofibrate as combination can cause worsening renal function

## 2018-08-09 NOTE — PROGRESS NOTE ADULT - ASSESSMENT
·	MEHDI, CKD 4, Solitary functioning kidney: Prerenal azotemia, ? ATN (Atrophic right kidney)  ·	Metabolic acidosis  ·	Diabetes  ·	Hypertension  ·	Hypo- K, Ca, Mg  ·	Hyperphosphatemia    Improving renal function. Off IVF. Encourage PO intake as tolerated. GI follow up. BP stable.   Monitor blood sugar levels. Insulin coverage as needed. Dietary restriction.   Monitor BP trend. Titrate BP meds as needed. Salt restriction. Will follow electrolytes and renal function trend.   D/c planning, out pt follow up with Dr. Coto.

## 2018-08-09 NOTE — PROGRESS NOTE ADULT - PROBLEM SELECTOR PROBLEM 3
UTI (urinary tract infection)
Anemia
UTI (urinary tract infection)

## 2018-08-09 NOTE — PROGRESS NOTE ADULT - SUBJECTIVE AND OBJECTIVE BOX
INTERVAL HPI/OVERNIGHT EVENTS:  pt seen and examined  pt denies gi complaints, no acute gi issues overnight per rn  bmp noted afebrile overnight    MEDICATIONS  (STANDING):  aspirin enteric coated 81 milliGRAM(s) Oral daily  BACItracin   Ointment 1 Application(s) Topical two times a day  cholestyramine Powder (Sugar-Free) 4 Gram(s) Oral two times a day  FLUoxetine 20 milliGRAM(s) Oral daily  heparin  Injectable 5000 Unit(s) SubCutaneous every 8 hours  lactobacillus acidophilus 1 Tablet(s) Oral three times a day with meals  loperamide 2 milliGRAM(s) Oral five times a day  simvastatin 20 milliGRAM(s) Oral at bedtime    MEDICATIONS  (PRN):  acetaminophen   Tablet. 650 milliGRAM(s) Oral every 6 hours PRN pain  diphenoxylate/atropine 1 Tablet(s) Oral three times a day PRN Diarrhea      Allergies    Levaquin (Pruritus)  mercury (Pruritus; Rash)  sulfa drugs (Pruritus)    Intolerances        Review of Systems:    General:  No wt loss, fevers, chills, night sweats, fatigue   Eyes:  Good vision, no reported pain  ENT:  No sore throat, pain, runny nose, dysphagia  CV:  No pain, palpitations, hypo/hypertension  Resp:  No dyspnea, cough, tachypnea, wheezing  GI:  No pain, No nausea, No vomiting, +ostomy, No weight loss, No fever, No pruritis, No rectal bleeding, No melena, No dysphagia  :  No pain, bleeding, incontinence, nocturia  Muscle:  No pain, weakness  Neuro:  No weakness, tingling, memory problems  Psych:  No fatigue, insomnia, mood problems, depression  Endocrine:  No polyuria, polydypsia, cold/heat intolerance  Heme:  No petechiae, ecchymosis, easy bruisability  Skin:  No rash, tattoos, scars, edema      Vital Signs Last 24 Hrs  T(C): 37.1 (09 Aug 2018 06:57), Max: 37.1 (09 Aug 2018 04:32)  T(F): 98.8 (09 Aug 2018 06:57), Max: 98.8 (09 Aug 2018 04:32)  HR: 78 (09 Aug 2018 06:57) (57 - 78)  BP: 113/66 (09 Aug 2018 06:57) (99/61 - 128/77)  BP(mean): --  RR: 18 (09 Aug 2018 06:57) (16 - 18)  SpO2: 97% (09 Aug 2018 06:57) (96% - 97%)    PHYSICAL EXAM:    Constitutional: NAD, lying in bed  HEENT: EOMI, perrl  Neck: No LAD  Respiratory: dec bs  Cardiovascular: S1 and S2  Gastrointestinal: obese, BS+, soft, nt nd +ostomy with semi formed pasty stool +scar to abd  Extremities: no edema  Vascular: 2+ peripheral pulses  Neurological: Awake alert responds appropriately  Skin: No rashes      LABS:                        8.2    7.16  )-----------( 160      ( 08 Aug 2018 12:48 )             25.8     08-09    141  |  109<H>  |  62<H>  ----------------------------<  87  4.9   |  19<L>  |  4.90<H>    Ca    7.8<L>      09 Aug 2018 06:36    TPro  x   /  Alb  2.2<L>  /  TBili  x   /  DBili  x   /  AST  x   /  ALT  x   /  AlkPhos  x   08-08          RADIOLOGY & ADDITIONAL TESTS:

## 2018-10-03 ENCOUNTER — INPATIENT (INPATIENT)
Facility: HOSPITAL | Age: 83
LOS: 2 days | Discharge: HOME | DRG: 683 | End: 2018-10-06
Attending: STUDENT IN AN ORGANIZED HEALTH CARE EDUCATION/TRAINING PROGRAM | Admitting: FAMILY MEDICINE
Payer: MEDICARE

## 2018-10-03 VITALS
RESPIRATION RATE: 14 BRPM | OXYGEN SATURATION: 96 % | SYSTOLIC BLOOD PRESSURE: 104 MMHG | WEIGHT: 130.07 LBS | DIASTOLIC BLOOD PRESSURE: 55 MMHG | HEIGHT: 64 IN | HEART RATE: 72 BPM | TEMPERATURE: 99 F

## 2018-10-03 DIAGNOSIS — Z93.3 COLOSTOMY STATUS: ICD-10-CM

## 2018-10-03 DIAGNOSIS — Z29.9 ENCOUNTER FOR PROPHYLACTIC MEASURES, UNSPECIFIED: ICD-10-CM

## 2018-10-03 DIAGNOSIS — E78.5 HYPERLIPIDEMIA, UNSPECIFIED: ICD-10-CM

## 2018-10-03 DIAGNOSIS — N17.9 ACUTE KIDNEY FAILURE, UNSPECIFIED: ICD-10-CM

## 2018-10-03 DIAGNOSIS — E86.0 DEHYDRATION: ICD-10-CM

## 2018-10-03 DIAGNOSIS — K56.609 UNSPECIFIED INTESTINAL OBSTRUCTION, UNSPECIFIED AS TO PARTIAL VERSUS COMPLETE OBSTRUCTION: ICD-10-CM

## 2018-10-03 DIAGNOSIS — R25.1 TREMOR, UNSPECIFIED: ICD-10-CM

## 2018-10-03 DIAGNOSIS — R74.0 NONSPECIFIC ELEVATION OF LEVELS OF TRANSAMINASE AND LACTIC ACID DEHYDROGENASE [LDH]: ICD-10-CM

## 2018-10-03 LAB
ALBUMIN SERPL ELPH-MCNC: 3.3 G/DL — SIGNIFICANT CHANGE UP (ref 3.3–5)
ALP SERPL-CCNC: 77 U/L — SIGNIFICANT CHANGE UP (ref 40–120)
ALT FLD-CCNC: 14 U/L — SIGNIFICANT CHANGE UP (ref 12–78)
AMYLASE P1 CFR SERPL: 239 U/L — HIGH (ref 25–115)
ANION GAP SERPL CALC-SCNC: 13 MMOL/L — SIGNIFICANT CHANGE UP (ref 5–17)
AST SERPL-CCNC: 17 U/L — SIGNIFICANT CHANGE UP (ref 15–37)
BILIRUB SERPL-MCNC: 0.2 MG/DL — SIGNIFICANT CHANGE UP (ref 0.2–1.2)
BUN SERPL-MCNC: 95 MG/DL — HIGH (ref 7–23)
CALCIUM SERPL-MCNC: 7.4 MG/DL — LOW (ref 8.5–10.1)
CHLORIDE SERPL-SCNC: 109 MMOL/L — HIGH (ref 96–108)
CO2 SERPL-SCNC: 18 MMOL/L — LOW (ref 22–31)
CREAT SERPL-MCNC: 5.4 MG/DL — HIGH (ref 0.5–1.3)
GLUCOSE SERPL-MCNC: 93 MG/DL — SIGNIFICANT CHANGE UP (ref 70–99)
HCT VFR BLD CALC: 28.1 % — LOW (ref 34.5–45)
HGB BLD-MCNC: 9 G/DL — LOW (ref 11.5–15.5)
LIDOCAIN IGE QN: 802 U/L — HIGH (ref 73–393)
MCHC RBC-ENTMCNC: 30.5 PG — SIGNIFICANT CHANGE UP (ref 27–34)
MCHC RBC-ENTMCNC: 32 GM/DL — SIGNIFICANT CHANGE UP (ref 32–36)
MCV RBC AUTO: 95.3 FL — SIGNIFICANT CHANGE UP (ref 80–100)
NRBC # BLD: 0 /100 WBCS — SIGNIFICANT CHANGE UP (ref 0–0)
PLATELET # BLD AUTO: 233 K/UL — SIGNIFICANT CHANGE UP (ref 150–400)
POTASSIUM SERPL-MCNC: 5.1 MMOL/L — SIGNIFICANT CHANGE UP (ref 3.5–5.3)
POTASSIUM SERPL-SCNC: 5.1 MMOL/L — SIGNIFICANT CHANGE UP (ref 3.5–5.3)
PROT SERPL-MCNC: 7.3 G/DL — SIGNIFICANT CHANGE UP (ref 6–8.3)
RBC # BLD: 2.95 M/UL — LOW (ref 3.8–5.2)
RBC # FLD: 13.9 % — SIGNIFICANT CHANGE UP (ref 10.3–14.5)
SODIUM SERPL-SCNC: 140 MMOL/L — SIGNIFICANT CHANGE UP (ref 135–145)
WBC # BLD: 6.63 K/UL — SIGNIFICANT CHANGE UP (ref 3.8–10.5)
WBC # FLD AUTO: 6.63 K/UL — SIGNIFICANT CHANGE UP (ref 3.8–10.5)

## 2018-10-03 PROCEDURE — 93010 ELECTROCARDIOGRAM REPORT: CPT

## 2018-10-03 PROCEDURE — 99285 EMERGENCY DEPT VISIT HI MDM: CPT

## 2018-10-03 PROCEDURE — 99223 1ST HOSP IP/OBS HIGH 75: CPT | Mod: AI,GC

## 2018-10-03 RX ORDER — SODIUM CHLORIDE 9 MG/ML
1000 INJECTION INTRAMUSCULAR; INTRAVENOUS; SUBCUTANEOUS ONCE
Qty: 0 | Refills: 0 | Status: COMPLETED | OUTPATIENT
Start: 2018-10-03 | End: 2018-10-03

## 2018-10-03 RX ORDER — FLUOXETINE HCL 10 MG
1 CAPSULE ORAL
Qty: 0 | Refills: 0 | COMMUNITY

## 2018-10-03 RX ORDER — LOPERAMIDE HCL 2 MG
2 TABLET ORAL THREE TIMES A DAY
Qty: 0 | Refills: 0 | Status: DISCONTINUED | OUTPATIENT
Start: 2018-10-03 | End: 2018-10-03

## 2018-10-03 RX ORDER — CHOLESTYRAMINE 4 G/9G
4 POWDER, FOR SUSPENSION ORAL DAILY
Qty: 0 | Refills: 0 | Status: DISCONTINUED | OUTPATIENT
Start: 2018-10-03 | End: 2018-10-06

## 2018-10-03 RX ORDER — INFLUENZA VIRUS VACCINE 15; 15; 15; 15 UG/.5ML; UG/.5ML; UG/.5ML; UG/.5ML
0.5 SUSPENSION INTRAMUSCULAR ONCE
Qty: 0 | Refills: 0 | Status: COMPLETED | OUTPATIENT
Start: 2018-10-03 | End: 2018-10-03

## 2018-10-03 RX ORDER — ONDANSETRON 8 MG/1
4 TABLET, FILM COATED ORAL ONCE
Qty: 0 | Refills: 0 | Status: COMPLETED | OUTPATIENT
Start: 2018-10-03 | End: 2018-10-03

## 2018-10-03 RX ORDER — SIMVASTATIN 20 MG/1
20 TABLET, FILM COATED ORAL AT BEDTIME
Qty: 0 | Refills: 0 | Status: DISCONTINUED | OUTPATIENT
Start: 2018-10-03 | End: 2018-10-06

## 2018-10-03 RX ORDER — LACTOBACILLUS ACIDOPHILUS 100MM CELL
1 CAPSULE ORAL
Qty: 0 | Refills: 0 | Status: DISCONTINUED | OUTPATIENT
Start: 2018-10-03 | End: 2018-10-06

## 2018-10-03 RX ORDER — ALLOPURINOL 300 MG
1 TABLET ORAL
Qty: 0 | Refills: 0 | COMMUNITY

## 2018-10-03 RX ORDER — SODIUM CHLORIDE 9 MG/ML
250 INJECTION INTRAMUSCULAR; INTRAVENOUS; SUBCUTANEOUS ONCE
Qty: 0 | Refills: 0 | Status: COMPLETED | OUTPATIENT
Start: 2018-10-03 | End: 2018-10-04

## 2018-10-03 RX ORDER — HEPARIN SODIUM 5000 [USP'U]/ML
5000 INJECTION INTRAVENOUS; SUBCUTANEOUS EVERY 12 HOURS
Qty: 0 | Refills: 0 | Status: DISCONTINUED | OUTPATIENT
Start: 2018-10-03 | End: 2018-10-04

## 2018-10-03 RX ORDER — SODIUM CHLORIDE 9 MG/ML
1000 INJECTION INTRAMUSCULAR; INTRAVENOUS; SUBCUTANEOUS
Qty: 0 | Refills: 0 | Status: DISCONTINUED | OUTPATIENT
Start: 2018-10-03 | End: 2018-10-04

## 2018-10-03 RX ORDER — TRAMADOL HYDROCHLORIDE 50 MG/1
1 TABLET ORAL
Qty: 0 | Refills: 0 | COMMUNITY

## 2018-10-03 RX ORDER — FAMOTIDINE 10 MG/ML
20 INJECTION INTRAVENOUS ONCE
Qty: 0 | Refills: 0 | Status: COMPLETED | OUTPATIENT
Start: 2018-10-03 | End: 2018-10-03

## 2018-10-03 RX ADMIN — SIMVASTATIN 20 MILLIGRAM(S): 20 TABLET, FILM COATED ORAL at 21:49

## 2018-10-03 RX ADMIN — SODIUM CHLORIDE 100 MILLILITER(S): 9 INJECTION INTRAMUSCULAR; INTRAVENOUS; SUBCUTANEOUS at 21:52

## 2018-10-03 RX ADMIN — SODIUM CHLORIDE 1000 MILLILITER(S): 9 INJECTION INTRAMUSCULAR; INTRAVENOUS; SUBCUTANEOUS at 15:23

## 2018-10-03 RX ADMIN — HEPARIN SODIUM 5000 UNIT(S): 5000 INJECTION INTRAVENOUS; SUBCUTANEOUS at 20:04

## 2018-10-03 RX ADMIN — FAMOTIDINE 20 MILLIGRAM(S): 10 INJECTION INTRAVENOUS at 15:27

## 2018-10-03 RX ADMIN — SODIUM CHLORIDE 1000 MILLILITER(S): 9 INJECTION INTRAMUSCULAR; INTRAVENOUS; SUBCUTANEOUS at 15:27

## 2018-10-03 NOTE — CONSULT NOTE ADULT - PROBLEM SELECTOR RECOMMENDATION 9
Writer contacted \[facility]\"" as patient is currently in the chamber and not able to take a call. Writer spoke with Marylin Moscoso and made her aware of the fluid bolus needed today and tomorrow and writer will contact him later today to speak with him about the [facility]. Therapy plan addended for fluid bolus and CPK draw tomorrow. avoid all non essential hepatotoxic drugs	  Patient presented with persistent abnormal LFT  Obtain ABD sonogram  Check viral hepatitis panel  JENNIE,ASMA,AMA  Iron studies, celiac serologies  AFP, alpha 1 antitrypsin level, ceruloplasmin    Potential liver biopsy in future may be needed if persistent elevated liver function.    diet and wt control  avoid all hepatotoxic drugs also  avoid alcohol and herbel medication.  follow up liver function test.    consider milk thistle one tablet once a day and vitamin E one tablet a day  If lft's remain elevated may try pentoxyfilline

## 2018-10-03 NOTE — H&P ADULT - NSHPREVIEWOFSYSTEMS_GEN_ALL_CORE
REVIEW OF SYSTEMS:    CONSTITUTIONAL: + weakness, no fevers or chills  EYES/ENT: No visual changes;  No vertigo or throat pain   NECK: No pain or stiffness  RESPIRATORY: No cough, wheezing, hemoptysis; No shortness of breath  CARDIOVASCULAR: No chest pain or palpitations  GASTROINTESTINAL: No abdominal or epigastric pain. No nausea, vomiting, or hematemesis; No diarrhea or constipation. No melena or hematochezia.  GENITOURINARY: No dysuria, frequency or hematuria  NEUROLOGICAL: No numbness or weakness  SKIN: No itching, rashes REVIEW OF SYSTEMS:    CONSTITUTIONAL: + weakness, no fevers or chills  EYES/ENT: No visual changes;  No vertigo or throat pain   NECK: No pain or stiffness  RESPIRATORY: No cough, wheezing, No shortness of breath  CARDIOVASCULAR: No chest pain or palpitations  GASTROINTESTINAL: No abdominal or epigastric pain. No nausea, vomiting, or hematemesis; No diarrhea or constipation. No melena  GENITOURINARY: No dysuria, frequency or hematuria  NEUROLOGICAL: No numbness or weakness  SKIN: No itching, rashes

## 2018-10-03 NOTE — H&P ADULT - NSHPSOCIALHISTORY_GEN_ALL_CORE
Patient lives with daughter at home.  Ambulates with a cane at baseline.  Patient denies alcohol, smoking or illicit drug use, quit smoking 30 years ago.  Patient has had pneumonia shot but has not had influenza shot for this year.

## 2018-10-03 NOTE — H&P ADULT - PROBLEM SELECTOR PLAN 1
- Acute, MEHDI on CKD stage 5 likely secondary to dehydration.  - s/p 2L fluid bolus in ED, will continue   - Avoid all nephrotoxic agents - will hold home baby aspirin  - Patient produces urine - f/u bladder scan to assess for retention.  - Monitor Is&Os  - Renal restriction, DASH/TLC diet  - Dr. You (nephrology) consulted - Acute, MEHDI on CKD stage 5 likely secondary to dehydration.  - s/p 2L fluid bolus in ED, will continue fluids 100 cc/hr of NS.  - Avoid all nephrotoxic agents - will hold home baby aspirin  - Patient produces urine - f/u bladder scan to assess for retention.  - Monitor Is&Os  - Renal restriction, DASH/TLC diet  - Dr. You (nephrology) consulted

## 2018-10-03 NOTE — H&P ADULT - PROBLEM SELECTOR PLAN 2
- Chronic, s/p colon obstruction with ostomy 15 years ago  - Continue home loperamide TID  - Continue home cholestyramine daily  - Monitor ostomy output - Chronic, s/p colon obstruction with ostomy 15 years ago  - Will hold home loperamide given QT prolongation.  - Continue home cholestyramine daily  - Monitor ostomy output  - GI (Dr. Jones consulted) - Chronic, s/p colon obstruction with ostomy 15 years ago  - Will hold home loperamide given QT prolongation on ekg .   - Continue home cholestyramine daily  - Monitor ostomy output  - GI (Dr. Jones consulted)

## 2018-10-03 NOTE — H&P ADULT - NSHPPHYSICALEXAM_GEN_ALL_CORE
Vital Signs Last 24 Hrs  T(C): 37 (03 Oct 2018 14:27), Max: 37 (03 Oct 2018 14:27)  T(F): 98.6 (03 Oct 2018 14:27), Max: 98.6 (03 Oct 2018 14:27)  HR: 72 (03 Oct 2018 14:27) (72 - 72)  BP: 104/55 (03 Oct 2018 14:27) (104/55 - 104/55)  RR: 14 (03 Oct 2018 14:27) (14 - 14)  SpO2: 96% (03 Oct 2018 14:27) (96% - 96%) Vital Signs Last 24 Hrs  T(C): 37 (03 Oct 2018 14:27), Max: 37 (03 Oct 2018 14:27)  T(F): 98.6 (03 Oct 2018 14:27), Max: 98.6 (03 Oct 2018 14:27)  HR: 72 (03 Oct 2018 14:27) (72 - 72)  BP: 104/55 (03 Oct 2018 14:27) (104/55 - 104/55)  RR: 14 (03 Oct 2018 14:27) (14 - 14)  SpO2: 96% (03 Oct 2018 14:27) (96% - 96%)    Physical Exam:  General: Well developed, well nourished, NAD  HEENT: NCAT, PERRLA, EOMI bl, moist mucous membranes   Neck: Supple, nontender, no mass  Neurology: A&Ox3, nonfocal, CN II-XII grossly intact, sensation intact  Respiratory: CTA B/L, No W/R/R  CV: RRR, +S1/S2, no murmurs, rubs or gallops  Abdominal: Soft, NT, ND +BSx4. + left sided ostomy with little output, skin around site c/d/i  Extremities: No C/C/E, + peripheral pulses  MSK: Normal ROM, no joint erythema or warmth, no joint swelling   Skin: warm, dry, normal color. +diffuse abrasions throughout lower back

## 2018-10-03 NOTE — H&P ADULT - PROBLEM SELECTOR PLAN 6
- Heparin 5000 q12  IMPROVE VTE Individual Risk Assessment        RISK                                                          Points  [  ] Previous VTE                                                3  [  ] Thrombophilia                                             2  [  ] Lower limb paralysis                                   2        (unable to hold up >15 seconds)    [  ] Current Cancer                                            2         (within 6 months)  [  ] Immobilization > 24 hrs                              1  [  ] ICU/CCU stay > 24 hours                            1  [ x ] Age > 60                                                    1    IMPROVE VTE Score _1___

## 2018-10-03 NOTE — H&P ADULT - PROBLEM SELECTOR PLAN 4
- Chronic, continue home simvastatin  - Renal restriction, DASH/TLC diet - Chronic, patient takes bystolic 2.5 mg every other day.  - Unavailable in pharmacy, will inform patient to bring it in from home.

## 2018-10-03 NOTE — CONSULT NOTE ADULT - SUBJECTIVE AND OBJECTIVE BOX
Chief Complaint:  Patient is a 83y old  Female who presents with a chief complaint of MEHDI on CKD Stage 5 83 year old female with PMH of CKD (baseline creatinine around 4), colon obstruction (s/p ostomy 15 years ago), cervical cancer (s/p radical hysterectomy), tremors, HLD, history of frequent UTIs, presenting with a chief complaint of weakness. As per patient and daughter at bedside patient has been experiencing worsening weakness and fatigue over the past 10 days. Patient states she has been sleeping more frequently than usual, has not had any desire to ambulate and when she does ambulate she begins to feel tired and weak. Patient felt nauseous last night with nonbloody, bilious vomiting prompting visit to her nephrologist who recommended she come to the ED. Of note patient was recently hospitalized 8/2 - 8/9 for MEHDI on CKD 4 after experiencing similar symptoms and found to have a creatinine of 12, started on IVF and clinically improved. Patient currently denies any light headedness, dizziness, shortness of breath, abdominal pain, current nausea or vomiting, dysuria or increased or decreased output from ostomy.      In the ED, /55, HR 72, RR 14, 96% on RA.   Labs significant for Cr 5.4, BUN 95, Amylase 239, Lipase 802. Given 2L NS, Zofran and Pepcid.    EKG - Sinus bradycardia with prolonged QTc of 492     Review of Systems:  Review of Systems: REVIEW OF SYSTEMS:   CONSTITUTIONAL: + weakness, no fevers or chills  EYES/ENT: No visual changes;  No vertigo or throat pain   NECK: No pain or stiffness  RESPIRATORY: No cough, wheezing, No shortness of breath  CARDIOVASCULAR: No chest pain or palpitations  GASTROINTESTINAL: No abdominal or epigastric pain. No nausea, vomiting, or hematemesis; No diarrhea or constipation. No melena  GENITOURINARY: No dysuria, frequency or hematuria  NEUROLOGICAL: No numbness or weakness SKIN: No itching, rashes	  	  Other Review of Systems: All other review of systems negative, except as noted in HPI	  pt reports outpt is less from the ostomy   Allergies:  Levaquin (Pruritus)  mercury (Pruritus; Rash)  sulfa drugs (Pruritus)      Medications:  cholestyramine Powder (Sugar-Free) 4 Gram(s) Oral daily  heparin  Injectable 5000 Unit(s) SubCutaneous every 12 hours  multivitamin 1 Tablet(s) Oral daily  simvastatin 20 milliGRAM(s) Oral at bedtime  sodium chloride 0.9%. 1000 milliLiter(s) IV Continuous <Continuous>      PMHX/PSHX:  CKD (chronic kidney disease) stage 5, GFR less than 15 ml/min  Herniated lumbar intervertebral disc  Depression  Tremor  Kidney atrophy  Colostomy status  Chronic UTI (urinary tract infection)  Cervical cancer  Dyslipidemia  Diabetes  Hernia, incisional  S/P hip replacement  S/P colon resection  S/P hysterectomy      Family history:  Family history of ovarian cancer (Sibling)      Social History:     ROS:     General:  No wt loss, fevers, chills, night sweats, fatigue,   Eyes:  Good vision, no reported pain  ENT:  No sore throat, pain, runny nose, dysphagia  CV:  No pain, palpitations, hypo/hypertension  Resp:  No dyspnea, cough, tachypnea, wheezing  GI:  No pain, No nausea, No vomiting, No diarrhea, No constipation, No weight loss, No fever, No pruritis, No rectal bleeding, No tarry stools, No dysphagia,  :  No pain, bleeding, incontinence, nocturia  Muscle:  No pain, weakness  Neuro:  No weakness, tingling, memory problems  Psych:  No fatigue, insomnia, mood problems, depression  Endocrine:  No polyuria, polydipsia, cold/heat intolerance  Heme:  No petechiae, ecchymosis, easy bruisability  Skin:  No rash, tattoos, scars, edema      PHYSICAL EXAM:   Vital Signs:  Vital Signs Last 24 Hrs  T(C): 37 (03 Oct 2018 14:27), Max: 37 (03 Oct 2018 14:27)  T(F): 98.6 (03 Oct 2018 14:27), Max: 98.6 (03 Oct 2018 14:27)  HR: 72 (03 Oct 2018 14:27) (72 - 72)  BP: 104/55 (03 Oct 2018 14:27) (104/55 - 104/55)  BP(mean): --  RR: 14 (03 Oct 2018 14:27) (14 - 14)  SpO2: 96% (03 Oct 2018 14:27) (96% - 96%)  Daily Height in cm: 162.56 (03 Oct 2018 14:27)    Daily     GENERAL:  Appears stated age, well-groomed, well-nourished, no distress  HEENT:  NC/AT,  conjunctivae clear and pink, no thyromegaly, nodules, adenopathy, no JVD, sclera -anicteric  CHEST:  Full & symmetric excursion, no increased effort, breath sounds clear  HEART:  Regular rhythm, S1, S2, no murmur/rub/S3/S4, no abdominal bruit, no edema  ABDOMEN:  Soft, non-tender, non-distended, normoactive bowel sounds,  no masses ,no hepato-splenomegaly, no signs of chronic liver disease ostomy llq  EXTEREMITIES:  no cyanosis,clubbing or edema  SKIN:  No rash/erythema/ecchymoses/petechiae/wounds/abscess/warm/dry  NEURO:  Alert, oriented, no asterixis, no tremor, no encephalopathy    LABS:                        9.0    6.63  )-----------( 233      ( 03 Oct 2018 15:30 )             28.1     10-03    140  |  109<H>  |  95<H>  ----------------------------<  93  5.1   |  18<L>  |  5.40<H>    Ca    7.4<L>      03 Oct 2018 15:30    TPro  7.3  /  Alb  3.3  /  TBili  0.2  /  DBili  x   /  AST  17  /  ALT  14  /  AlkPhos  77  10-03    LIVER FUNCTIONS - ( 03 Oct 2018 15:30 )  Alb: 3.3 g/dL / Pro: 7.3 g/dL / ALK PHOS: 77 U/L / ALT: 14 U/L / AST: 17 U/L / GGT: x               Amylase [id number]      Lipase [id number]       Ammonia--      Imaging:

## 2018-10-03 NOTE — H&P ADULT - HISTORY OF PRESENT ILLNESS
In the ED /55, Cr 5.4, BUN 95, Amylase 239, lipase 802.  Given 2L NS, Zofran and Pepcid.  EKG shows............ 83 year old female with PMH of CKD (baseline creatinine around 4), colon obstruction (s/p ostomy 15 years ago), cervical cancer (s/p radical hysterectomy), tremors, HLD, history of frequent UTIs, presenting with a chief complaint of weakness. As per patient and daughter at bedside patient has been experiencing worsening weakness and fatigue over the past 10 days. Patient states she has been sleeping more frequently than usual, has not had any desire to ambulate and when she does ambulate she begins to feel tired and weak. Patient felt nauseous last night with nonbloody, bilious vomiting prompting visit to her nephrologist who recommended she come to the ED. Of note patient was recently hospitalized 8/2 - 8/9 for MEHDI on CKD after experiencing similar symptoms and found to have a creatinine of 12, started on IVF and clinically improved. Patient currently denies any light headedness, dizziness, shortness of breath, abdominal pain, current nausea or vomiting, dysuria or increased or decreased output from ostomy.        In the ED, /55, HR 72, RR 14, 96% on RA.   Labs significant for Cr 5.4, BUN 95, Amylase 239, Lipase 802. Given 2L NS, Zofran and Pepcid.    EKG - 83 year old female with PMH of CKD (baseline creatinine around 4), colon obstruction (s/p ostomy 15 years ago), cervical cancer (s/p radical hysterectomy), tremors, HLD, history of frequent UTIs, presenting with a chief complaint of weakness. As per patient and daughter at bedside patient has been experiencing worsening weakness and fatigue over the past 10 days. Patient states she has been sleeping more frequently than usual, has not had any desire to ambulate and when she does ambulate she begins to feel tired and weak. Patient felt nauseous last night with nonbloody, bilious vomiting prompting visit to her nephrologist who recommended she come to the ED. Of note patient was recently hospitalized 8/2 - 8/9 for MEHDI on CKD after experiencing similar symptoms and found to have a creatinine of 12, started on IVF and clinically improved. Patient currently denies any light headedness, dizziness, shortness of breath, abdominal pain, current nausea or vomiting, dysuria or increased or decreased output from ostomy.      In the ED, /55, HR 72, RR 14, 96% on RA.   Labs significant for Cr 5.4, BUN 95, Amylase 239, Lipase 802. Given 2L NS, Zofran and Pepcid.    EKG - Sinus bradycardia with prolonged QTc of 492 83 year old female with PMH of CKD (baseline creatinine around 4), colon obstruction (s/p ostomy 15 years ago), cervical cancer (s/p radical hysterectomy), tremors, HLD, history of frequent UTIs, presenting with a chief complaint of weakness. As per patient and daughter at bedside patient has been experiencing worsening weakness and fatigue over the past 10 days. Patient states she has been sleeping more frequently than usual, has not had any desire to ambulate and when she does ambulate she begins to feel tired and weak. Patient felt nauseous last night with nonbloody, bilious vomiting prompting visit to her nephrologist who recommended she come to the ED. Of note patient was recently hospitalized 8/2 - 8/9 for MEHDI on CKD 4 after experiencing similar symptoms and found to have a creatinine of 12, started on IVF and clinically improved. Patient currently denies any light headedness, dizziness, shortness of breath, abdominal pain, current nausea or vomiting, dysuria or increased or decreased output from ostomy.      In the ED, /55, HR 72, RR 14, 96% on RA.   Labs significant for Cr 5.4, BUN 95, Amylase 239, Lipase 802. Given 2L NS, Zofran and Pepcid.    EKG - Sinus bradycardia with prolonged QTc of 492

## 2018-10-03 NOTE — H&P ADULT - ASSESSMENT
83 year old female with PMH of CKD (baseline creatinine around 4), colon obstruction (s/p ostomy 15 years ago), cervical cancer (s/p radical hysterectomy), tremors, HLD, history of frequent UTIs, presenting with a chief complaint of weakness admitted for MEHDI on CKD stage 5. 83 year old female with PMH of CKD (baseline creatinine around 4), colon obstruction (s/p ostomy 15 years ago), cervical cancer (s/p radical hysterectomy), tremors, HLD, history of frequent UTIs, presenting with a chief complaint of weakness admitted for MEHDI on CKD stage 5 secondary to dehydration.

## 2018-10-03 NOTE — H&P ADULT - PROBLEM SELECTOR PLAN 5
- Heparin 5000 q12  IMPROVE VTE Individual Risk Assessment        RISK                                                          Points  [  ] Previous VTE                                                3  [  ] Thrombophilia                                             2  [  ] Lower limb paralysis                                   2        (unable to hold up >15 seconds)    [  ] Current Cancer                                            2         (within 6 months)  [  ] Immobilization > 24 hrs                              1  [  ] ICU/CCU stay > 24 hours                            1  [ x ] Age > 60                                                    1    IMPROVE VTE Score _1___ - Chronic, continue home simvastatin  - Renal restriction, DASH/TLC diet

## 2018-10-03 NOTE — ED PROVIDER NOTE - OBJECTIVE STATEMENT
84 yo white female with H/O CKD, HTN, DM, Colon Ca and High Cholesterol well up until 10-days ago when she began to experience weakness, fatigue and nausea w/o fever, chills, vomiting, diarrhea, chest pain, abdominal pain, dysuria, hematuria or SOB. States that she felt like this last month and was found to have creatinine that went from 4 to 12. After hospitalization and after receiving IVFs>>>>Felt better. 84 yo white female with H/O CKD, HTN, DM, Cervical Ca and High Cholesterol well up until 10-days ago when she began to experience weakness, fatigue and nausea w/o fever, chills, vomiting, diarrhea, chest pain, abdominal pain, dysuria, hematuria or SOB. States that she felt like this last month and was found to have creatinine that went from 4 to 12. After hospitalization and after receiving IVFs>>>>Felt better.

## 2018-10-03 NOTE — H&P ADULT - PROBLEM SELECTOR PLAN 3
- Chronic, patient takes bystolic 2.5 mg every other day.  - Unavailable in pharmacy, will inform patient to bring it in from home. - Elevated lipase of 802 and amylase of 239.  - As per chart review, patient has elevated lipase and amylase on previous admission and had ultrasound of gallbladder which showed findings suggestive of chronic calculus cholecystitis. Surgery was consulted but recommended no intervention and indicated a low suspicious for cholecystitis at the time.  - Suspicion still remains low given lack of fever, lack of leukocytosis and lack of abdominal pain, will continue to monitor.

## 2018-10-03 NOTE — H&P ADULT - ATTENDING COMMENTS
pt seen and examine see above -admitted for MEHDI on CKD stage 5 secondary to dehydration  possible  low po intake and high colostomy out put   some time - started on iv hydration , fu bmp , gi siderdis  . pt daughter bed side  full code.  on ekg qt prolongation hold imodium .

## 2018-10-03 NOTE — H&P ADULT - PMH
Cervical cancer  1970's  Chronic UTI (urinary tract infection)    Colostomy status  2006  Depression    Diabetes  type 2  Dyslipidemia    Hernia, incisional    Herniated lumbar intervertebral disc    Kidney atrophy  right  Tremor Cervical cancer  1970's  Chronic UTI (urinary tract infection)    CKD (chronic kidney disease) stage 5, GFR less than 15 ml/min    Colostomy status  2006  Dyslipidemia    Hernia, incisional    Herniated lumbar intervertebral disc    Kidney atrophy  right  Tremor

## 2018-10-04 ENCOUNTER — TRANSCRIPTION ENCOUNTER (OUTPATIENT)
Age: 83
End: 2018-10-04

## 2018-10-04 DIAGNOSIS — D64.9 ANEMIA, UNSPECIFIED: ICD-10-CM

## 2018-10-04 DIAGNOSIS — M79.675 PAIN IN LEFT TOE(S): ICD-10-CM

## 2018-10-04 LAB
ALBUMIN SERPL ELPH-MCNC: 2.5 G/DL — LOW (ref 3.3–5)
ALP SERPL-CCNC: 56 U/L — SIGNIFICANT CHANGE UP (ref 40–120)
ALT FLD-CCNC: 11 U/L — LOW (ref 12–78)
ANION GAP SERPL CALC-SCNC: 9 MMOL/L — SIGNIFICANT CHANGE UP (ref 5–17)
AST SERPL-CCNC: 17 U/L — SIGNIFICANT CHANGE UP (ref 15–37)
BASOPHILS # BLD AUTO: 0.03 K/UL — SIGNIFICANT CHANGE UP (ref 0–0.2)
BASOPHILS NFR BLD AUTO: 0.7 % — SIGNIFICANT CHANGE UP (ref 0–2)
BILIRUB SERPL-MCNC: 0.2 MG/DL — SIGNIFICANT CHANGE UP (ref 0.2–1.2)
BUN SERPL-MCNC: 82 MG/DL — HIGH (ref 7–23)
CALCIUM SERPL-MCNC: 6.7 MG/DL — LOW (ref 8.5–10.1)
CHLORIDE SERPL-SCNC: 118 MMOL/L — HIGH (ref 96–108)
CO2 SERPL-SCNC: 17 MMOL/L — LOW (ref 22–31)
CREAT SERPL-MCNC: 4.6 MG/DL — HIGH (ref 0.5–1.3)
EOSINOPHIL # BLD AUTO: 0.29 K/UL — SIGNIFICANT CHANGE UP (ref 0–0.5)
EOSINOPHIL NFR BLD AUTO: 6.8 % — HIGH (ref 0–6)
GLUCOSE SERPL-MCNC: 75 MG/DL — SIGNIFICANT CHANGE UP (ref 70–99)
HCT VFR BLD CALC: 23.3 % — LOW (ref 34.5–45)
HCT VFR BLD CALC: 23.4 % — LOW (ref 34.5–45)
HGB BLD-MCNC: 7.3 G/DL — LOW (ref 11.5–15.5)
HGB BLD-MCNC: 7.4 G/DL — LOW (ref 11.5–15.5)
IMM GRANULOCYTES NFR BLD AUTO: 0.2 % — SIGNIFICANT CHANGE UP (ref 0–1.5)
LYMPHOCYTES # BLD AUTO: 0.96 K/UL — LOW (ref 1–3.3)
LYMPHOCYTES # BLD AUTO: 22.6 % — SIGNIFICANT CHANGE UP (ref 13–44)
MCHC RBC-ENTMCNC: 30.2 PG — SIGNIFICANT CHANGE UP (ref 27–34)
MCHC RBC-ENTMCNC: 31.2 GM/DL — LOW (ref 32–36)
MCV RBC AUTO: 96.7 FL — SIGNIFICANT CHANGE UP (ref 80–100)
MONOCYTES # BLD AUTO: 0.29 K/UL — SIGNIFICANT CHANGE UP (ref 0–0.9)
MONOCYTES NFR BLD AUTO: 6.8 % — SIGNIFICANT CHANGE UP (ref 2–14)
NEUTROPHILS # BLD AUTO: 2.67 K/UL — SIGNIFICANT CHANGE UP (ref 1.8–7.4)
NEUTROPHILS NFR BLD AUTO: 62.9 % — SIGNIFICANT CHANGE UP (ref 43–77)
PLATELET # BLD AUTO: 187 K/UL — SIGNIFICANT CHANGE UP (ref 150–400)
POTASSIUM SERPL-MCNC: 5.1 MMOL/L — SIGNIFICANT CHANGE UP (ref 3.5–5.3)
POTASSIUM SERPL-SCNC: 5.1 MMOL/L — SIGNIFICANT CHANGE UP (ref 3.5–5.3)
PROT SERPL-MCNC: 5.7 G/DL — LOW (ref 6–8.3)
RBC # BLD: 2.42 M/UL — LOW (ref 3.8–5.2)
RBC # FLD: 14.1 % — SIGNIFICANT CHANGE UP (ref 10.3–14.5)
SODIUM SERPL-SCNC: 144 MMOL/L — SIGNIFICANT CHANGE UP (ref 135–145)
URATE SERPL-MCNC: 11.6 MG/DL — HIGH (ref 2.5–7)
WBC # BLD: 4.25 K/UL — SIGNIFICANT CHANGE UP (ref 3.8–10.5)
WBC # FLD AUTO: 4.25 K/UL — SIGNIFICANT CHANGE UP (ref 3.8–10.5)

## 2018-10-04 PROCEDURE — 99233 SBSQ HOSP IP/OBS HIGH 50: CPT | Mod: GC

## 2018-10-04 PROCEDURE — 93010 ELECTROCARDIOGRAM REPORT: CPT

## 2018-10-04 RX ORDER — SODIUM CHLORIDE 9 MG/ML
1000 INJECTION, SOLUTION INTRAVENOUS
Qty: 0 | Refills: 0 | Status: DISCONTINUED | OUTPATIENT
Start: 2018-10-04 | End: 2018-10-05

## 2018-10-04 RX ORDER — SODIUM CHLORIDE 9 MG/ML
250 INJECTION INTRAMUSCULAR; INTRAVENOUS; SUBCUTANEOUS ONCE
Qty: 0 | Refills: 0 | Status: COMPLETED | OUTPATIENT
Start: 2018-10-04 | End: 2018-10-04

## 2018-10-04 RX ORDER — ERYTHROPOIETIN 10000 [IU]/ML
10000 INJECTION, SOLUTION INTRAVENOUS; SUBCUTANEOUS
Qty: 0 | Refills: 0 | Status: DISCONTINUED | OUTPATIENT
Start: 2018-10-04 | End: 2018-10-06

## 2018-10-04 RX ORDER — SODIUM BICARBONATE 1 MEQ/ML
650 SYRINGE (ML) INTRAVENOUS
Qty: 0 | Refills: 0 | Status: DISCONTINUED | OUTPATIENT
Start: 2018-10-04 | End: 2018-10-06

## 2018-10-04 RX ADMIN — ERYTHROPOIETIN 10000 UNIT(S): 10000 INJECTION, SOLUTION INTRAVENOUS; SUBCUTANEOUS at 15:00

## 2018-10-04 RX ADMIN — SODIUM CHLORIDE 60 MILLILITER(S): 9 INJECTION, SOLUTION INTRAVENOUS at 22:52

## 2018-10-04 RX ADMIN — CHOLESTYRAMINE 4 GRAM(S): 4 POWDER, FOR SUSPENSION ORAL at 10:37

## 2018-10-04 RX ADMIN — HEPARIN SODIUM 5000 UNIT(S): 5000 INJECTION INTRAVENOUS; SUBCUTANEOUS at 05:42

## 2018-10-04 RX ADMIN — SODIUM CHLORIDE 60 MILLILITER(S): 9 INJECTION, SOLUTION INTRAVENOUS at 14:25

## 2018-10-04 RX ADMIN — Medication 650 MILLIGRAM(S): at 18:07

## 2018-10-04 RX ADMIN — Medication 1 TABLET(S): at 08:53

## 2018-10-04 RX ADMIN — SODIUM CHLORIDE 1000 MILLILITER(S): 9 INJECTION INTRAMUSCULAR; INTRAVENOUS; SUBCUTANEOUS at 02:38

## 2018-10-04 RX ADMIN — SODIUM CHLORIDE 1000 MILLILITER(S): 9 INJECTION INTRAMUSCULAR; INTRAVENOUS; SUBCUTANEOUS at 00:10

## 2018-10-04 RX ADMIN — SIMVASTATIN 20 MILLIGRAM(S): 20 TABLET, FILM COATED ORAL at 22:52

## 2018-10-04 NOTE — DISCHARGE NOTE ADULT - CARE PLAN
Principal Discharge DX:	MEHDI (acute kidney injury)  Goal:	Improvement of symptoms  Secondary Diagnosis:	Colostomy status  Secondary Diagnosis:	Tremor  Secondary Diagnosis:	Dyslipidemia Principal Discharge DX:	MEHDI (acute kidney injury)  Goal:	Improvement of symptoms  Assessment and plan of treatment:	Encourage oral hydration.  Secondary Diagnosis:	Colostomy status  Secondary Diagnosis:	Tremor  Secondary Diagnosis:	Dyslipidemia Principal Discharge DX:	MEHDI (acute kidney injury)  Goal:	Improvement of symptoms  Assessment and plan of treatment:	Encourage oral hydration. Please follow up with your nephrologist, Dr. Coto within 1 week of discharge.  Secondary Diagnosis:	Colostomy status  Assessment and plan of treatment:	Please continue your ostomy care. Continue home cholestyramine.  Secondary Diagnosis:	Tremor  Assessment and plan of treatment:	Please continue bystolic 2.5mg every other day.  Secondary Diagnosis:	Dyslipidemia  Assessment and plan of treatment:	Please continue home Simvastatin.

## 2018-10-04 NOTE — PROGRESS NOTE ADULT - PROBLEM SELECTOR PLAN 5
- Chronic, patient takes bystolic 2.5 mg every other day.  - Therapeutic equivalent - Chronic, patient takes bystolic 2.5 mg every other day.  - Hold for now due to hypotension

## 2018-10-04 NOTE — CONSULT NOTE ADULT - ASSESSMENT
·	MEHDI, CKD 4, Solitary functioning kidney: Prerenal azotemia, (Atrophic right kidney)  ·	Metabolic acidosis  ·	Diabetes  ·	Hypertension  ·	Anemia    Improving renal function. Continue IVF. Encourage PO intake as tolerated. BP stable. PO bicarb.   Monitor blood sugar levels. Insulin coverage as needed. Dietary restriction.   Monitor BP trend. Titrate BP meds as needed. Salt restriction. Will follow electrolytes and renal function trend.   Monitor h/h trend. Procrit for anemia. Further recommendations pending clinical course. Thank you for the courtesy of this referral. ·	MEHDI, CKD 4, Solitary functioning kidney: Prerenal azotemia, (Atrophic right kidney)  ·	Metabolic acidosis  ·	Diabetes  ·	Hypertension  ·	Anemia    Improving renal function. Continue IVF. Encourage PO intake as tolerated. BP stable. PO bicarb.   Monitor blood sugar levels. Insulin coverage as needed. Dietary restriction.   Monitor BP trend. Titrate BP meds as needed. Salt restriction. Will follow electrolytes and renal function trend.   Monitor h/h trend. Procrit for anemia. Check iron studies. Transfuse PRBCs PRN. Further recommendations pending clinical course.   Thank you for the courtesy of this referral.

## 2018-10-04 NOTE — DISCHARGE NOTE ADULT - PLAN OF CARE
Improvement of symptoms Encourage oral hydration. Encourage oral hydration. Please follow up with your nephrologist, Dr. Coto within 1 week of discharge. Please continue your ostomy care. Continue home cholestyramine. Please continue bystolic 2.5mg every other day. Please continue home Simvastatin.

## 2018-10-04 NOTE — DISCHARGE NOTE ADULT - ADDITIONAL INSTRUCTIONS
-Please follow up with your PMD within one week.  -Please follow up with outpatient nephrologist.   -Continue taking your medications as directed above.  -If symptoms persist/worsen, please call your PMD or return to the ED. -Please follow up with your PMD within 3 to 5 days   -Please follow up with outpatient nephrologist.   -Continue taking your medications as directed above.  -If symptoms persist/worsen, please call your PMD or return to the ED.  -F/u with all your doctors.

## 2018-10-04 NOTE — DISCHARGE NOTE ADULT - CARE PROVIDER_API CALL
Scott Galvan (DO), Family Medicine  [address] [facility]  2nd floor  [place], [place] [id number]  Phone: [phone]  Fax: [phone]    Salvador Villa (MD; MPH), Urology  [address] [place] [address]  Phone: [phone]  Fax: [phone]    Oscar Coto), Nephrology  [address] [id number]  Phone: [phone]  Fax: [phone]

## 2018-10-04 NOTE — PROGRESS NOTE ADULT - SUBJECTIVE AND OBJECTIVE BOX
INTERVAL HPI/OVERNIGHT EVENTS:  pt seen and examined earlier this am  denies n/v/abd pain  per overnight rn ostomy functioning, no vomiting, pt empties bag independently  afebrile overnight labs noted    MEDICATIONS  (STANDING):  cholestyramine Powder (Sugar-Free) 4 Gram(s) Oral daily  dextrose 5% + sodium chloride 0.45%. 1000 milliLiter(s) (60 mL/Hr) IV Continuous <Continuous>  epoetin jean carlos Injectable 25048 Unit(s) SubCutaneous <User Schedule>  heparin  Injectable 5000 Unit(s) SubCutaneous every 12 hours  influenza   Vaccine 0.5 milliLiter(s) IntraMuscular once  lactobacillus acidophilus 1 Tablet(s) Oral three times a day with meals  multivitamin 1 Tablet(s) Oral daily  simvastatin 20 milliGRAM(s) Oral at bedtime  sodium bicarbonate 650 milliGRAM(s) Oral two times a day    MEDICATIONS  (PRN):      Allergies    Levaquin (Pruritus)  mercury (Pruritus; Rash)  sulfa drugs (Pruritus)    Intolerances        Review of Systems:    General:  No wt loss, fevers, chills, night sweats, fatigue   Eyes:  Good vision, no reported pain  ENT:  No sore throat, pain, runny nose, dysphagia  CV:  No pain, palpitations, hypo/hypertension  Resp:  No dyspnea, cough, tachypnea, wheezing  GI:  No pain, No nausea, No vomiting, +ostomy, No weight loss, No fever, No pruritis, No rectal bleeding, No melena, No dysphagia  :  No pain, bleeding, incontinence, nocturia  Muscle:  No pain, weakness  Neuro:  No weakness, tingling, memory problems  Psych:  No fatigue, insomnia, mood problems, depression  Endocrine:  No polyuria, polydypsia, cold/heat intolerance  Heme:  No petechiae, ecchymosis, easy bruisability  Skin:  No rash, tattoos, scars, edema      Vital Signs Last 24 Hrs  T(C): 36.4 (04 Oct 2018 07:47), Max: 37 (03 Oct 2018 14:27)  T(F): 97.6 (04 Oct 2018 07:47), Max: 98.6 (03 Oct 2018 14:27)  HR: 55 (04 Oct 2018 07:47) (51 - 72)  BP: 115/48 (04 Oct 2018 07:47) (90/54 - 132/51)  BP(mean): --  RR: 17 (04 Oct 2018 07:47) (14 - 17)  SpO2: 99% (04 Oct 2018 07:47) (96% - 99%)    PHYSICAL EXAM:    Constitutional: NAD, lying in bed  HEENT: perrl  Neck: No LAD  Respiratory:  dec bs  Cardiovascular: S1 and S2, RRR  Gastrointestinal: soft nt nd +ostomy site c/d/i with pasty semi solid yellow stool  Extremities: No peripheral edema  Vascular: 2+ peripheral pulses  Neurological: awake alert responds appropriately  Skin: No rashes      LABS:                        7.3    4.25  )-----------( 187      ( 04 Oct 2018 06:54 )             23.4     10-04    144  |  118<H>  |  82<H>  ----------------------------<  75  5.1   |  17<L>  |  4.60<H>    Ca    6.7<L>      04 Oct 2018 06:54    TPro  5.7<L>  /  Alb  2.5<L>  /  TBili  0.2  /  DBili  x   /  AST  17  /  ALT  11<L>  /  AlkPhos  56  10-04          RADIOLOGY & ADDITIONAL TESTS:

## 2018-10-04 NOTE — PROGRESS NOTE ADULT - PROBLEM SELECTOR PLAN 8
- Heparin 5000 q12  IMPROVE VTE Individual Risk Assessment        RISK                                                          Points  [  ] Previous VTE                                                3  [  ] Thrombophilia                                             2  [  ] Lower limb paralysis                                   2        (unable to hold up >15 seconds)    [  ] Current Cancer                                            2         (within 6 months)  [  ] Immobilization > 24 hrs                              1  [  ] ICU/CCU stay > 24 hours                            1  [ x ] Age > 60                                                    1    IMPROVE VTE Score _1___ - Sequential compression stockings due to anemia  IMPROVE VTE Individual Risk Assessment        RISK                                                          Points  [  ] Previous VTE                                                3  [  ] Thrombophilia                                             2  [  ] Lower limb paralysis                                   2        (unable to hold up >15 seconds)    [  ] Current Cancer                                            2         (within 6 months)  [  ] Immobilization > 24 hrs                              1  [  ] ICU/CCU stay > 24 hours                            1  [ x ] Age > 60                                                    1    IMPROVE VTE Score _1___

## 2018-10-04 NOTE — PHYSICAL THERAPY INITIAL EVALUATION ADULT - ADDITIONAL COMMENTS
Pt lives with her dtr in a house with steps to enter/exit. Pt ambulates independently without device. Uses SC or RW as needed and is independent with ADLs.

## 2018-10-04 NOTE — PROGRESS NOTE ADULT - PROBLEM SELECTOR PLAN 3
Patient's baseline has been in 8's most of previous hospitalization and patient has baseline CKD. Patient also received 2.5 L of fluid since admission  - Procrit per nephrology   - Stat H&H  - Considering Heme eval if sx persist or worsen  - Hold ASA, NSAIDS  - May need transfusion if level drops further Patient's baseline has been in 8's most of previous hospitalization and patient has baseline CKD. Patient also received 2.5 L of fluid since admission  - Procrit per nephrology   - Stat H&H  - Hold ASA, NSAIDS  - May need transfusion if level drops further etiology: anemia of chronic disease and mostly chronic kidney disease  Patient's baseline has been 8-9 most of previous hospitalization and patient has baseline CKD. Patient also received 2.5 L of fluid since admission  - Procrit per nephrology   - Stat H&H  - Hold ASA, NSAIDS

## 2018-10-04 NOTE — PROGRESS NOTE ADULT - SUBJECTIVE AND OBJECTIVE BOX
Patient is a 83y old  Female who presents with a chief complaint of MEHDI on CKD Stage 5 (04 Oct 2018 13:45)      FROM ADMISSION H+P:   HPI:  83 year old female with PMH of CKD (baseline creatinine around 4), colon obstruction (s/p ostomy 15 years ago), cervical cancer (s/p radical hysterectomy), tremors, HLD, history of frequent UTIs, presenting with a chief complaint of weakness. As per patient and daughter at bedside patient has been experiencing worsening weakness and fatigue over the past 10 days. Patient states she has been sleeping more frequently than usual, has not had any desire to ambulate and when she does ambulate she begins to feel tired and weak. Patient felt nauseous last night with nonbloody, bilious vomiting prompting visit to her nephrologist who recommended she come to the ED. Of note patient was recently hospitalized 8/2 - 8/9 for MEHDI on CKD 4 after experiencing similar symptoms and found to have a creatinine of 12, started on IVF and clinically improved. Patient currently denies any light headedness, dizziness, shortness of breath, abdominal pain, current nausea or vomiting, dysuria or increased or decreased output from ostomy.      In the ED, /55, HR 72, RR 14, 96% on RA.   Labs significant for Cr 5.4, BUN 95, Amylase 239, Lipase 802. Given 2L NS, Zofran and Pepcid.    EKG - Sinus bradycardia with prolonged QTc of 492 (03 Oct 2018 16:36)      ----  INTERVAL HPI/OVERNIGHT EVENTS: Pt seen and evaluated at the bedside. Patient was found to be hypotensive overnight. Weakness improved. Patient complains of left toe pain, worse with movement. Also complains of occasional numbness of feet bilaterally, has hx of neuropathy. Admits chronic diarrhea. Denies headaches, dizziness, chest pain, palpitations, nausea, vomiting, abd pain, dysuria, hematuria.    ----  PAST MEDICAL & SURGICAL HISTORY:  CKD (chronic kidney disease) stage 5, GFR less than 15 ml/min  Herniated lumbar intervertebral disc  Tremor  Kidney atrophy: right  Colostomy status: 2006  Chronic UTI (urinary tract infection)  Cervical cancer: 1970&#x27;s  Dyslipidemia  Hernia, incisional  S/P hip replacement: right 2013  S/P colon resection: 2006  S/P hysterectomy: 1977      FAMILY HISTORY:  Family history of ovarian cancer (Sibling)      ----  MEDICATIONS  (STANDING):  cholestyramine Powder (Sugar-Free) 4 Gram(s) Oral daily  dextrose 5% + sodium chloride 0.45%. 1000 milliLiter(s) (60 mL/Hr) IV Continuous <Continuous>  epoetin jean carlos Injectable 55916 Unit(s) SubCutaneous <User Schedule>  heparin  Injectable 5000 Unit(s) SubCutaneous every 12 hours  influenza   Vaccine 0.5 milliLiter(s) IntraMuscular once  lactobacillus acidophilus 1 Tablet(s) Oral three times a day with meals  multivitamin 1 Tablet(s) Oral daily  simvastatin 20 milliGRAM(s) Oral at bedtime  sodium bicarbonate 650 milliGRAM(s) Oral two times a day    MEDICATIONS  (PRN):      ----  REVIEW OF SYSTEMS:  CONSTITUTIONAL: admits weakness denies fever, chills, fatigue  HEENT: denies blurred vision, sore throat  SKIN: denies new lesions, rash  CARDIOVASCULAR: denies chest pain, chest pressure, palpitations  RESPIRATORY: denies shortness of breath, sputum production  GASTROINTESTINAL: denies nausea, vomiting, diarrhea, abdominal pain  GENITOURINARY: denies dysuria, discharge  NEUROLOGICAL: admits occasional numbness of feet, denies headache, focal weakness  MUSCULOSKELETAL: admits left toe pain denies joint pain, muscle aches  HEMATOLOGIC: denies gross bleeding, bruising  LYMPHATICS: denies enlarged lymph nodes, extremity swelling  PSYCHIATRIC: denies recent changes in anxiety, depression  ENDOCRINOLOGIC: denies sweating, cold or heat intolerance    ----  PHYSICAL EXAM:  GENERAL: patient appears well, no acute distress, appropriate, pleasant  EYES: sclera clear, no exudates  ENMT: oropharynx clear without erythema, no exudates, moist mucous membranes  NECK: supple, soft, no thyromegaly noted  LUNGS: good air entry bilaterally, clear to auscultation, symmetric breath sound  HEART: soft S1/S2, regular rate and rhythm, no murmurs noted, no lower extremity edema  GASTROINTESTINAL: Colostomy in place. Abdomen is soft, nontender, nondistended, normoactive bowel sounds, no palpable masses  INTEGUMENT: good skin turgor, no lesions noted  MUSCULOSKELETAL: no clubbing or cyanosis, no obvious deformity  NEUROLOGIC: awake, alert, oriented x3, good muscle tone in 4 extremities, no obvious sensory deficits  PSYCHIATRIC: mood is good, affect is congruent, linear and logical thought process  HEME/LYMPH: no palpable supraclavicular nodules, no obvious ecchymosis or petechiae     T(C): 36.9 (10-04-18 @ 13:45), Max: 36.9 (10-04-18 @ 13:45)  HR: 58 (10-04-18 @ 13:45) (51 - 58)  BP: 100/58 (10-04-18 @ 13:45) (90/54 - 132/51)  RR: 15 (10-04-18 @ 13:45) (15 - 17)  SpO2: 100% (10-04-18 @ 13:45) (96% - 100%)  Wt(kg): --    ----  I&O's Summary      LABS:                        7.3    4.25  )-----------( 187      ( 04 Oct 2018 06:54 )             23.4     10-04    144  |  118<H>  |  82<H>  ----------------------------<  75  5.1   |  17<L>  |  4.60<H>    Ca    6.7<L>      04 Oct 2018 06:54    TPro  5.7<L>  /  Alb  2.5<L>  /  TBili  0.2  /  DBili  x   /  AST  17  /  ALT  11<L>  /  AlkPhos  56  10-04        CAPILLARY BLOOD GLUCOSE                    ----  Personally reviewed:  Vital sign trends: [x  ] yes    [  ] no     [  ] n/a  Laboratory results: x[  ] yes    [  ] no     [  ] n/a  Radiology results: [ x ] yes    [  ] no     [  ] n/a  Culture results: [  ] yes    [  ] no     [  ] n/a  Consultant recommendations: [  x] yes    [  ] no     [  ] n/a Patient is a 83y old  Female who presents with a chief complaint of MEHDI on CKD Stage 5 (04 Oct 2018 13:45)      FROM ADMISSION H+P:   HPI:  83 year old female with PMH of CKD (baseline creatinine around 4), colon obstruction (s/p ostomy 15 years ago), cervical cancer (s/p radical hysterectomy), tremors, HLD, history of frequent UTIs, presenting with a chief complaint of weakness. As per patient and daughter at bedside patient has been experiencing worsening weakness and fatigue over the past 10 days. Patient states she has been sleeping more frequently than usual, has not had any desire to ambulate and when she does ambulate she begins to feel tired and weak. Patient felt nauseous last night with nonbloody, bilious vomiting prompting visit to her nephrologist who recommended she come to the ED. Of note patient was recently hospitalized 8/2 - 8/9 for MEHDI on CKD 4 after experiencing similar symptoms and found to have a creatinine of 12, started on IVF and clinically improved. Patient currently denies any light headedness, dizziness, shortness of breath, abdominal pain, current nausea or vomiting, dysuria or increased or decreased output from ostomy.      In the ED, /55, HR 72, RR 14, 96% on RA.   Labs significant for Cr 5.4, BUN 95, Amylase 239, Lipase 802. Given 2L NS, Zofran and Pepcid.    EKG - Sinus bradycardia with prolonged QTc of 492 (03 Oct 2018 16:36)      ----  INTERVAL HPI/OVERNIGHT EVENTS: Pt seen and evaluated at the bedside. Patient was found to be hypotensive overnight. Weakness improved. Patient complains of left toe pain, worse with movement. Also complains of occasional numbness of feet bilaterally, has hx of neuropathy. Admits chronic diarrhea. Denies headaches, dizziness, chest pain, palpitations, nausea, vomiting, abd pain, dysuria, hematuria.    ----  PAST MEDICAL & SURGICAL HISTORY:  CKD (chronic kidney disease) stage 5, GFR less than 15 ml/min  Herniated lumbar intervertebral disc  Tremor  Kidney atrophy: right  Colostomy status: 2006  Chronic UTI (urinary tract infection)  Cervical cancer: 1970&#x27;s  Dyslipidemia  Hernia, incisional  S/P hip replacement: right 2013  S/P colon resection: 2006  S/P hysterectomy: 1977      FAMILY HISTORY:  Family history of ovarian cancer (Sibling)      ----  MEDICATIONS  (STANDING):  cholestyramine Powder (Sugar-Free) 4 Gram(s) Oral daily  dextrose 5% + sodium chloride 0.45%. 1000 milliLiter(s) (60 mL/Hr) IV Continuous <Continuous>  epoetin jean carlos Injectable 95028 Unit(s) SubCutaneous <User Schedule>  heparin  Injectable 5000 Unit(s) SubCutaneous every 12 hours  influenza   Vaccine 0.5 milliLiter(s) IntraMuscular once  lactobacillus acidophilus 1 Tablet(s) Oral three times a day with meals  multivitamin 1 Tablet(s) Oral daily  simvastatin 20 milliGRAM(s) Oral at bedtime  sodium bicarbonate 650 milliGRAM(s) Oral two times a day    MEDICATIONS  (PRN):      ----  REVIEW OF SYSTEMS:  CONSTITUTIONAL: admits weakness denies fever, chills, fatigue  HEENT: denies blurred vision, sore throat  SKIN: denies new lesions, rash  CARDIOVASCULAR: denies chest pain, chest pressure, palpitations  RESPIRATORY: denies shortness of breath, sputum production  GASTROINTESTINAL: denies nausea, vomiting, diarrhea, abdominal pain  GENITOURINARY: denies dysuria, discharge  NEUROLOGICAL: admits occasional numbness of feet, denies headache, focal weakness  MUSCULOSKELETAL: admits left toe pain denies joint pain, muscle aches  HEMATOLOGIC: denies gross bleeding, bruising  LYMPHATICS: denies enlarged lymph nodes, extremity swelling  PSYCHIATRIC: denies recent changes in anxiety, depression  ENDOCRINOLOGIC: denies sweating, cold or heat intolerance    ----  PHYSICAL EXAM:  GENERAL: patient appears well, no acute distress, appropriate, pleasant  EYES: sclera clear, no exudates  ENMT: oropharynx clear without erythema, no exudates, moist mucous membranes  NECK: supple, soft, no thyromegaly noted  LUNGS: good air entry bilaterally, clear to auscultation, symmetric breath sound  HEART: soft S1/S2, regular rate and rhythm, no murmurs noted, no lower extremity edema  GASTROINTESTINAL: Colostomy in place. Abdomen is soft, nontender, nondistended, normoactive bowel sounds, no palpable masses  INTEGUMENT: good skin turgor, no lesions noted  MUSCULOSKELETAL: + tremor no clubbing or cyanosis, no obvious deformity  NEUROLOGIC: awake, alert, oriented x3, good muscle tone in 4 extremities, no obvious sensory deficits  PSYCHIATRIC: mood is good, affect is congruent, linear and logical thought process  HEME/LYMPH: no palpable supraclavicular nodules, no obvious ecchymosis or petechiae     T(C): 36.9 (10-04-18 @ 13:45), Max: 36.9 (10-04-18 @ 13:45)  HR: 58 (10-04-18 @ 13:45) (51 - 58)  BP: 100/58 (10-04-18 @ 13:45) (90/54 - 132/51)  RR: 15 (10-04-18 @ 13:45) (15 - 17)  SpO2: 100% (10-04-18 @ 13:45) (96% - 100%)  Wt(kg): --    ----  I&O's Summary      LABS:                        7.3    4.25  )-----------( 187      ( 04 Oct 2018 06:54 )             23.4     10-04    144  |  118<H>  |  82<H>  ----------------------------<  75  5.1   |  17<L>  |  4.60<H>    Ca    6.7<L>      04 Oct 2018 06:54    TPro  5.7<L>  /  Alb  2.5<L>  /  TBili  0.2  /  DBili  x   /  AST  17  /  ALT  11<L>  /  AlkPhos  56  10-04        CAPILLARY BLOOD GLUCOSE                    ----  Personally reviewed:  Vital sign trends: [x  ] yes    [  ] no     [  ] n/a  Laboratory results: x[  ] yes    [  ] no     [  ] n/a  Radiology results: [ x ] yes    [  ] no     [  ] n/a  Culture results: [  ] yes    [  ] no     [  ] n/a  Consultant recommendations: [  x] yes    [  ] no     [  ] n/a

## 2018-10-04 NOTE — CONSULT NOTE ADULT - SUBJECTIVE AND OBJECTIVE BOX
Patient is a 83y old  Female who presents with a chief complaint of weakness (03 Oct 2018 22:26)    HPI:  83 year old female with PMH of CKD (baseline creatinine around 4), colon obstruction (s/p ostomy 15 years ago), cervical cancer (s/p radical hysterectomy), tremors, HLD, history of frequent UTIs, presenting with a chief complaint of weakness. As per patient and daughter at bedside patient has been experiencing worsening weakness and fatigue over the past 10 days. Patient states she has been sleeping more frequently than usual, has not had any desire to ambulate and when she does ambulate she begins to feel tired and weak. Patient felt nauseous last night with nonbloody, bilious vomiting prompting visit to her nephrologist who recommended she come to the ED. Of note patient was recently hospitalized 8/2 - 8/9 for MEHDI on CKD 4 after experiencing similar symptoms and found to have a creatinine of 12, started on IVF and clinically improved. Patient currently denies any light headedness, dizziness, shortness of breath, abdominal pain, current nausea or vomiting, dysuria or increased or decreased output from ostomy.      In the ED, /55, HR 72, RR 14, 96% on RA.   Labs significant for Cr 5.4, BUN 95, Amylase 239, Lipase 802. Given 2L NS, Zofran and Pepcid.    EKG - Sinus bradycardia with prolonged QTc of 492 (03 Oct 2018 16:36)    Renal consult called for MEHDI.       PAST MEDICAL HISTORY:  CKD (chronic kidney disease) stage 5, GFR less than 15 ml/min  Herniated lumbar intervertebral disc  Depression  Tremor  Kidney atrophy  Colostomy status  Chronic UTI (urinary tract infection)  Cervical cancer  Dyslipidemia  Diabetes  Hernia, incisional      PAST SURGICAL HISTORY:  S/P hip replacement  S/P colon resection  S/P hysterectomy      FAMILY HISTORY:  Family history of ovarian cancer (Sibling)      SOCIAL HISTORY: No smoking or alcohol use     Allergies    Levaquin (Pruritus)  mercury (Pruritus; Rash)  sulfa drugs (Pruritus)    Intolerances      Home Medications:  aspirin 81 mg oral tablet: 1 tab(s) orally once a day (03 Oct 2018 17:28)  Bystolic 2.5 mg oral tablet: 1 tab(s) orally every other day (03 Oct 2018 17:28)  loperamide 2 mg oral capsule: 1 cap(s) orally 3 times a day, As Needed (03 Oct 2018 17:28)  methenamine hippurate hippurate 1 g oral tablet: 1 tab(s) orally once a day (03 Oct 2018 17:28)  multivitamin:    (03 Oct 2018 17:28)  simvastatin 20 mg oral tablet: 1 tab(s) orally once a day (at bedtime) (03 Oct 2018 17:28)  Vitamin D3 5000 intl units oral capsule: 1 cap(s) orally once a day (03 Oct 2018 17:28)    MEDICATIONS  (STANDING):  cholestyramine Powder (Sugar-Free) 4 Gram(s) Oral daily  heparin  Injectable 5000 Unit(s) SubCutaneous every 12 hours  influenza   Vaccine 0.5 milliLiter(s) IntraMuscular once  lactobacillus acidophilus 1 Tablet(s) Oral three times a day with meals  multivitamin 1 Tablet(s) Oral daily  simvastatin 20 milliGRAM(s) Oral at bedtime  sodium chloride 0.9%. 1000 milliLiter(s) (100 mL/Hr) IV Continuous <Continuous>    MEDICATIONS  (PRN):      REVIEW OF SYSTEMS:  General: wekaness  Respiratory: No cough, SOB  Cardiovascular: No CP or Palpitations	  Gastrointestinal: No nausea, Vomiting. No diarrhea  Genitourinary: No urinary complaints	  Musculoskeletal: No leg swelling, No new rash or lesions	  Neurological: 	  all other systems negative    T(F): 97.6 (10-04-18 @ 07:47), Max: 98.6 (10-03-18 @ 14:27)  HR: 55 (10-04-18 @ 07:47) (51 - 72)  BP: 115/48 (10-04-18 @ 07:47) (90/54 - 132/51)  RR: 17 (10-04-18 @ 07:47) (14 - 17)  SpO2: 99% (10-04-18 @ 07:47) (96% - 99%)  Wt(kg): --    PHYSICAL EXAM:  General: NAD  Respiratory: b/l air entry  Cardiovascular: S1 S2  Gastrointestinal: soft  Extremities: no edema        10-04    144  |  118<H>  |  82<H>  ----------------------------<  75  5.1   |  17<L>  |  4.60<H>    Ca    6.7<L>      04 Oct 2018 06:54    TPro  5.7<L>  /  Alb  2.5<L>  /  TBili  0.2  /  DBili  x   /  AST  17  /  ALT  11<L>  /  AlkPhos  56  10-04                          7.3    4.25  )-----------( 187      ( 04 Oct 2018 06:54 )             23.4       Potassium, Serum: 5.1 mmol/L (10-04 @ 06:54)  Blood Urea Nitrogen, Serum: 82 mg/dL (10-04 @ 06:54)  Calcium, Total Serum: 6.7 mg/dL (10-04 @ 06:54)  Hemoglobin: 7.3 g/dL (10-04 @ 06:54)      Creatinine, Serum: 4.60 (10-04 @ 06:54)  Creatinine, Serum: 5.40 (10-03 @ 15:30)        LIVER FUNCTIONS - ( 04 Oct 2018 06:54 )  Alb: 2.5 g/dL / Pro: 5.7 g/dL / ALK PHOS: 56 U/L / ALT: 11 U/L / AST: 17 U/L / GGT: x Patient is a 83y old  Female who presents with a chief complaint of weakness (03 Oct 2018 22:26)    HPI:  83 year old female with PMH of CKD (baseline creatinine around 4), colon obstruction (s/p ostomy 15 years ago), cervical cancer (s/p radical hysterectomy), tremors, HLD, history of frequent UTIs, presenting with a chief complaint of weakness. As per patient and daughter at bedside patient has been experiencing worsening weakness and fatigue over the past 10 days. Patient states she has been sleeping more frequently than usual, has not had any desire to ambulate and when she does ambulate she begins to feel tired and weak. Patient felt nauseous last night with nonbloody, bilious vomiting prompting visit to her nephrologist who recommended she come to the ED. Of note patient was recently hospitalized 8/2 - 8/9 for MEHDI on CKD 4 after experiencing similar symptoms and found to have a creatinine of 12, started on IVF and clinically improved. Patient currently denies any light headedness, dizziness, shortness of breath, abdominal pain, current nausea or vomiting, dysuria or increased or decreased output from ostomy.      In the ED, /55, HR 72, RR 14, 96% on RA.   Labs significant for Cr 5.4, BUN 95, Amylase 239, Lipase 802. Given 2L NS, Zofran and Pepcid.    EKG - Sinus bradycardia with prolonged QTc of 492 (03 Oct 2018 16:36)    Renal consult called for MEHDI.       PAST MEDICAL HISTORY:  CKD (chronic kidney disease) stage 5, GFR less than 15 ml/min  Herniated lumbar intervertebral disc  Depression  Tremor  Kidney atrophy  Colostomy status  Chronic UTI (urinary tract infection)  Cervical cancer  Dyslipidemia  Diabetes  Hernia, incisional      PAST SURGICAL HISTORY:  S/P hip replacement  S/P colon resection  S/P hysterectomy      FAMILY HISTORY:  Family history of ovarian cancer (Sibling)      SOCIAL HISTORY: No smoking or alcohol use     Allergies    Levaquin (Pruritus)  mercury (Pruritus; Rash)  sulfa drugs (Pruritus)    Intolerances      Home Medications:  aspirin 81 mg oral tablet: 1 tab(s) orally once a day (03 Oct 2018 17:28)  Bystolic 2.5 mg oral tablet: 1 tab(s) orally every other day (03 Oct 2018 17:28)  loperamide 2 mg oral capsule: 1 cap(s) orally 3 times a day, As Needed (03 Oct 2018 17:28)  methenamine hippurate hippurate 1 g oral tablet: 1 tab(s) orally once a day (03 Oct 2018 17:28)  multivitamin:    (03 Oct 2018 17:28)  simvastatin 20 mg oral tablet: 1 tab(s) orally once a day (at bedtime) (03 Oct 2018 17:28)  Vitamin D3 5000 intl units oral capsule: 1 cap(s) orally once a day (03 Oct 2018 17:28)    MEDICATIONS  (STANDING):  cholestyramine Powder (Sugar-Free) 4 Gram(s) Oral daily  heparin  Injectable 5000 Unit(s) SubCutaneous every 12 hours  influenza   Vaccine 0.5 milliLiter(s) IntraMuscular once  lactobacillus acidophilus 1 Tablet(s) Oral three times a day with meals  multivitamin 1 Tablet(s) Oral daily  simvastatin 20 milliGRAM(s) Oral at bedtime  sodium chloride 0.9%. 1000 milliLiter(s) (100 mL/Hr) IV Continuous <Continuous>    MEDICATIONS  (PRN):      REVIEW OF SYSTEMS:  General: wekaness  Respiratory: No cough, SOB  Cardiovascular: No CP or Palpitations	  Gastrointestinal: No nausea, Vomiting. No diarrhea  Genitourinary: No urinary complaints	  Musculoskeletal: No leg swelling, No new rash or lesions	  Neurological: 	  all other systems negative    T(F): 97.6 (10-04-18 @ 07:47), Max: 98.6 (10-03-18 @ 14:27)  HR: 55 (10-04-18 @ 07:47) (51 - 72)  BP: 115/48 (10-04-18 @ 07:47) (90/54 - 132/51)  RR: 17 (10-04-18 @ 07:47) (14 - 17)  SpO2: 99% (10-04-18 @ 07:47) (96% - 99%)  Wt(kg): --    PHYSICAL EXAM:  General: NAD  Respiratory: b/l air entry  Cardiovascular: S1 S2  Gastrointestinal: soft, colostomy  Extremities: no edema        10-04    144  |  118<H>  |  82<H>  ----------------------------<  75  5.1   |  17<L>  |  4.60<H>    Ca    6.7<L>      04 Oct 2018 06:54    TPro  5.7<L>  /  Alb  2.5<L>  /  TBili  0.2  /  DBili  x   /  AST  17  /  ALT  11<L>  /  AlkPhos  56  10-04                          7.3    4.25  )-----------( 187      ( 04 Oct 2018 06:54 )             23.4       Potassium, Serum: 5.1 mmol/L (10-04 @ 06:54)  Blood Urea Nitrogen, Serum: 82 mg/dL (10-04 @ 06:54)  Calcium, Total Serum: 6.7 mg/dL (10-04 @ 06:54)  Hemoglobin: 7.3 g/dL (10-04 @ 06:54)      Creatinine, Serum: 4.60 (10-04 @ 06:54)  Creatinine, Serum: 5.40 (10-03 @ 15:30)        LIVER FUNCTIONS - ( 04 Oct 2018 06:54 )  Alb: 2.5 g/dL / Pro: 5.7 g/dL / ALK PHOS: 56 U/L / ALT: 11 U/L / AST: 17 U/L / GGT: x

## 2018-10-04 NOTE — DISCHARGE NOTE ADULT - MEDICATION SUMMARY - MEDICATIONS TO TAKE
I will START or STAY ON the medications listed below when I get home from the hospital:    aspirin 81 mg oral tablet  -- 1 tab(s) by mouth once a day  -- Indication: For Home Med    loperamide 2 mg oral capsule  -- 1 cap(s) by mouth 3 times a day, As Needed  -- Indication: For Home Med    simvastatin 20 mg oral tablet  -- 1 tab(s) by mouth once a day (at bedtime)  -- Indication: For Home Med    Questran Packets 4 g/9 g oral powder for reconstitution  -- 1 packet(s) by mouth once a day   -- Indication: For Home Med    Bystolic 2.5 mg oral tablet  -- 1 tab(s) by mouth every other day  -- Indication: For Home Med    guaiFENesin 100 mg/5 mL oral liquid  -- 10 milliliter(s) by mouth every 6 hours, As needed, Cough  -- Indication: For As needed for cough     fluticasone 50 mcg/inh nasal spray  -- 1 spray(s) into nose 2 times a day  -- Indication: For Nasal congestion     methenamine hippurate hippurate 1 g oral tablet  -- 1 tab(s) by mouth once a day  -- Indication: For Home Med    multivitamin  --     -- Indication: For Home Supplements    Vitamin D3 5000 intl units oral capsule  -- 1 cap(s) by mouth once a day  -- Indication: For Home Supplements

## 2018-10-04 NOTE — CHART NOTE - NSCHARTNOTEFT_GEN_A_CORE
83/F with hx of CKD5, HTN, s/p colon resection with colostomy, admitted for MEHDI, notified by RN for low BP reading, 90/54 manual, HR 51. Patient receiving NS at 100ml/hr, and is s/p 2L in ED    Patient seen and evaluated at the bedside, resting comfortably, NAD. Patient denies any difficulty breathing, difficulty laying flat, chest pain, dizziness n/v/d. Patient reports that she was very dizzy and weak, unsteady on her feet when she came to the ED, but feels significantly better at present. No complaints at the current time, VS stable aside from BP.     Physical Exam:  General: Well developed, well nourished, NAD  HEENT: NCAT, moist mucous membranes   Neurology: A&Ox3, nonfocal  Respiratory: CTA B/L, No W/R/R  CV: RRR, +S1/S2, no murmurs, rubs or gallops  Abdominal: Soft, NT, ND +BSx4, left sided ostomy in place with some output, clean dry intact otherwise.   Extremities: No C/C/E, + peripheral pulses, No LE edema,  MSK: Normal ROM, no joint erythema or warmth, no joint swelling   Skin: warm, dry, normal color, no rash or abnormal lesions    A/p    83 year old female with PMH of CKD (baseline creatinine around 4), colon obstruction (s/p ostomy 15 years ago), cervical cancer (s/p radical hysterectomy), tremors, HLD, history of frequent UTIs, presenting with a chief complaint of weakness admitted for MEHDI on CKD stage 5 secondary to dehydration, notified by RN for low BP 90/54    -Patient currently asymptomatic, afebrile, reports subjective improvement of symptoms since morning.  -Hypotension likely secondary to dehydration, continue IVF, f/u BUN/Cr  -Will administer 250cc bolus fluid challenge, reassess BP after completion, may give second 250cc bolus if not improved  -continue to monitor VS, BP  -Plan discussed with senior resident and Dr. Stephens, in agreement

## 2018-10-04 NOTE — PROGRESS NOTE ADULT - PROBLEM SELECTOR PLAN 1
[facility] wnl  elev amylase/lipase noted, pt asymptomatic, possibly related to renal insufficiency  hydrate as able   surgery consulted, no interventions planned, low suspicion for acute christel  monitor abd exam/labs/need for further w/u pending clinical course

## 2018-10-04 NOTE — DISCHARGE NOTE ADULT - HOSPITAL COURSE
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 As per patient and daughter at bedside patient has been experiencing worsening weakness and fatigue over the past 10 days. Patient states she has been sleeping more frequently than usual, has not had any desire to ambulate and when she does ambulate she begins to feel tired and weak. Patient felt nauseous last night with nonbloody, bilious vomiting prompting visit to her nephrologist who recommended she come to the ED. Of note patient was recently hospitalized 8/2 - 8/9 for MEHDI on CKD 4 after experiencing similar symptoms and found to have a creatinine of 12, started on IVF and clinically improved. Patient currently denies any light headedness, dizziness, shortness of breath, abdominal pain, current nausea or vomiting, dysuria or increased or decreased output from ostomy.      In the ED, /55, HR 72, RR 14, 96% on RA.   Labs significant for Cr 5.4, BUN 95, Amylase 239, Lipase 802. Given 2L NS, Zofran and Pepcid.    EKG - Sinus bradycardia with prolonged QTc of 492 (03 Oct 2018 16:36)      ---  HOSPITAL COURSE:     ---  CONSULTANTS:   GI: Dr. Jones   Nephro: Dr. 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 As per patient and daughter at bedside patient has been experiencing worsening weakness and fatigue over the past 10 days. Patient states she has been sleeping more frequently than usual, has not had any desire to ambulate and when she does ambulate she begins to feel tired and weak. Patient felt nauseous last night with nonbloody, bilious vomiting prompting visit to her nephrologist who recommended she come to the ED. Of note patient was recently hospitalized 8/2 - 8/9 for MEHDI on CKD 4 after experiencing similar symptoms and found to have a creatinine of 12, started on IVF and clinically improved. Patient currently denies any light headedness, dizziness, shortness of breath, abdominal pain, current nausea or vomiting, dysuria or increased or decreased output from ostomy.      In the ED, /55, HR 72, RR 14, 96% on RA.   Labs significant for Cr 5.4, BUN 95, Amylase 239, Lipase 802. Given 2L NS, Zofran and Pepcid.    EKG - Sinus bradycardia with prolonged QTc of 492 (03 Oct 2018 16:36)      ---  HOSPITAL COURSE:   Pt admitted to telemetry. No tele events. Renal function improved w/ IVF. Nephro/GI consulted. No events occurred during hospitalization. Lytes repleted. D/c home in stable condition. Discussed likely need for HD in the near future. Pt prefers to monitor and "wait and see" what happens w/ renal function. Nephro f/u as outpatient. Needs AV fistula. Needs to plan for eventual HD. High risk for admission to due complex comorbidity and poor overall functional status. Pt is aware.     ---  CONSULTANTS:   GI: Dr. Jones   Nephro: Dr. You     ---  TIME SPENT:  The total amount of time spent reviewing the hospital notes, laboratory values, imaging findings, assessing/counseling the patient, discussing with consultant physicians, social work, nursing staff took -- minutes    ---  FINAL DISCHARGE DIAGNOSIS LIST:  Please see last daily progress note for final discharge diagnoses    ---  Primary care provider was made aware of plan for discharge:      [  ] NO     [  ] YES

## 2018-10-04 NOTE — DISCHARGE NOTE ADULT - PATIENT PORTAL LINK FT
You can access the [facility], offered by [facility], by registering with the following website: [email or web address]

## 2018-10-04 NOTE — PROGRESS NOTE ADULT - PROBLEM SELECTOR PLAN 1
- Acute, MEHDI on CKD stage 5 likely secondary to dehydration.  - Improving renal function.  - Nephro recs appreciated  - Started on procrit for anemia.   - Check iron studies.   - May need PRBC transfusion if HGB drops further   - Avoid all nephrotoxic agents - will hold home baby aspirin  - Renal restriction, DASH/TLC diet - Acute, MEHDI on CKD stage 5 likely secondary to dehydration - renal indices trending toward improvement but pt has advanced dz at baseline  - Nephro recs appreciated  - Started on procrit for anemia, Check iron studies in AM (alone w/ B12/folate)  - May need PRBC transfusion if HGB drops further but will only transfuse if symptoms present  - Avoid all nephrotoxic agents. holding [id number]

## 2018-10-05 LAB
ANION GAP SERPL CALC-SCNC: 13 MMOL/L — SIGNIFICANT CHANGE UP (ref 5–17)
BUN SERPL-MCNC: 73 MG/DL — HIGH (ref 7–23)
CALCIUM SERPL-MCNC: 6.7 MG/DL — LOW (ref 8.5–10.1)
CHLORIDE SERPL-SCNC: 116 MMOL/L — HIGH (ref 96–108)
CO2 SERPL-SCNC: 15 MMOL/L — LOW (ref 22–31)
CREAT SERPL-MCNC: 4.3 MG/DL — HIGH (ref 0.5–1.3)
FERRITIN SERPL-MCNC: 92 NG/ML — SIGNIFICANT CHANGE UP (ref 15–150)
FOLATE SERPL-MCNC: 15.7 NG/ML — SIGNIFICANT CHANGE UP
GLUCOSE SERPL-MCNC: 101 MG/DL — HIGH (ref 70–99)
HCT VFR BLD CALC: 24.8 % — LOW (ref 34.5–45)
HGB BLD-MCNC: 7.7 G/DL — LOW (ref 11.5–15.5)
INR BLD: 1.18 RATIO — HIGH (ref 0.88–1.16)
IRON SATN MFR SERPL: 15 UG/DL — LOW (ref 30–160)
IRON SATN MFR SERPL: 8 % — LOW (ref 14–50)
MCHC RBC-ENTMCNC: 30 PG — SIGNIFICANT CHANGE UP (ref 27–34)
MCHC RBC-ENTMCNC: 31 GM/DL — LOW (ref 32–36)
MCV RBC AUTO: 96.5 FL — SIGNIFICANT CHANGE UP (ref 80–100)
NRBC # BLD: 0 /100 WBCS — SIGNIFICANT CHANGE UP (ref 0–0)
PLATELET # BLD AUTO: 202 K/UL — SIGNIFICANT CHANGE UP (ref 150–400)
POTASSIUM SERPL-MCNC: 4.5 MMOL/L — SIGNIFICANT CHANGE UP (ref 3.5–5.3)
POTASSIUM SERPL-SCNC: 4.5 MMOL/L — SIGNIFICANT CHANGE UP (ref 3.5–5.3)
PROTHROM AB SERPL-ACNC: 12.9 SEC — HIGH (ref 9.8–12.7)
RBC # BLD: 2.57 M/UL — LOW (ref 3.8–5.2)
RBC # FLD: 14.2 % — SIGNIFICANT CHANGE UP (ref 10.3–14.5)
SODIUM SERPL-SCNC: 144 MMOL/L — SIGNIFICANT CHANGE UP (ref 135–145)
TIBC SERPL-MCNC: 191 UG/DL — LOW (ref 220–430)
UIBC SERPL-MCNC: 176 UG/DL — SIGNIFICANT CHANGE UP (ref 110–370)
VIT B12 SERPL-MCNC: 254 PG/ML — SIGNIFICANT CHANGE UP (ref 232–1245)
WBC # BLD: 4.84 K/UL — SIGNIFICANT CHANGE UP (ref 3.8–10.5)
WBC # FLD AUTO: 4.84 K/UL — SIGNIFICANT CHANGE UP (ref 3.8–10.5)

## 2018-10-05 PROCEDURE — 99233 SBSQ HOSP IP/OBS HIGH 50: CPT | Mod: GC

## 2018-10-05 RX ORDER — SODIUM CHLORIDE 9 MG/ML
1000 INJECTION, SOLUTION INTRAVENOUS
Qty: 0 | Refills: 0 | Status: DISCONTINUED | OUTPATIENT
Start: 2018-10-05 | End: 2018-10-06

## 2018-10-05 RX ORDER — BENZOCAINE AND MENTHOL 5; 1 G/100ML; G/100ML
1 LIQUID ORAL THREE TIMES A DAY
Qty: 0 | Refills: 0 | Status: DISCONTINUED | OUTPATIENT
Start: 2018-10-05 | End: 2018-10-06

## 2018-10-05 RX ADMIN — Medication 1 TABLET(S): at 11:32

## 2018-10-05 RX ADMIN — Medication 200 MILLIGRAM(S): at 22:39

## 2018-10-05 RX ADMIN — Medication 200 MILLIGRAM(S): at 15:41

## 2018-10-05 RX ADMIN — SODIUM CHLORIDE 60 MILLILITER(S): 9 INJECTION, SOLUTION INTRAVENOUS at 15:10

## 2018-10-05 RX ADMIN — SIMVASTATIN 20 MILLIGRAM(S): 20 TABLET, FILM COATED ORAL at 22:39

## 2018-10-05 RX ADMIN — SODIUM CHLORIDE 60 MILLILITER(S): 9 INJECTION, SOLUTION INTRAVENOUS at 11:06

## 2018-10-05 RX ADMIN — Medication 650 MILLIGRAM(S): at 17:29

## 2018-10-05 RX ADMIN — Medication 650 MILLIGRAM(S): at 04:32

## 2018-10-05 RX ADMIN — Medication 1 TABLET(S): at 11:15

## 2018-10-05 RX ADMIN — Medication 1 TABLET(S): at 17:29

## 2018-10-05 RX ADMIN — CHOLESTYRAMINE 4 GRAM(S): 4 POWDER, FOR SUSPENSION ORAL at 11:16

## 2018-10-05 RX ADMIN — BENZOCAINE AND MENTHOL 1 LOZENGE: 5; 1 LIQUID ORAL at 22:39

## 2018-10-05 NOTE — PROGRESS NOTE ADULT - ATTENDING COMMENTS
Advanced care planning was discussed with patient and family.  Advanced care planning forms were reviewed and discussed.  Risks, benefits and alternatives of gastroenterologic procedures were discussed in detail and all questions were answered.    30 minutes spent.
I personally conducted a physical examination of the patient. I personally gathered the patient's history. I edited the above listed findings which were prepared by the listed resident physician. I personally discussed the plan of care with the patient. The questions and concerns were addressed to the best of my ability. The patient is in agreement with the listed treatment plan. '    A/P:  - d/c home tomorrow. monitor renal function. discussed needed for AV fistula w/ pt. she's aware. declines this today. wants to continue monitoring renal function as outpatient w/ nephro. spoke w/ nephro. nephro attending requests 1 additional day of IVF and monitor renal function.  - daughter aware and agrees w/ plan [phone]
I personally conducted a physical examination of the patient. I personally gathered the patient's history. I edited the above listed findings which were prepared by the listed resident physician. I personally discussed the plan of care with the patient. The questions and concerns were addressed to the best of my ability. The patient is in agreement with the listed treatment plan.     A/P:   - advanced CKD - monitor closely - MEHDI present but resolving - nephro recs appreciated. hemodynamically stable. d/c planning  - anemia - monitor for blood loss but likely chronic anemia - may need transfusion prior to d/c home due to advanced comorbidity  - spoke w/ pt's daughter @ [phone] - updated her regarding progress and tx plan. d/c planning back home within 24hrs

## 2018-10-05 NOTE — PROGRESS NOTE ADULT - PROBLEM SELECTOR PLAN 1
monitor intake/output  IVF as needed  cont cholestyramine, bacid  resume home loperamide as needed if w increased output and qt improved  trend labs, renal function improved  routine ostomy care

## 2018-10-05 NOTE — PROGRESS NOTE ADULT - PROBLEM SELECTOR PLAN 3
lfts wnl  elev amylase/lipase noted, pt asymptomatic, possibly related to renal insufficiency  no further labs to assess  surgery consulted, no interventions planned, low suspicion for acute christel  monitor abd exam/labs/need for further w/u

## 2018-10-05 NOTE — PROGRESS NOTE ADULT - PROBLEM SELECTOR PLAN 6
- Chronic, continue home simvastatin  - Renal restriction, DASH/TLC diet
- Chronic, continue home simvastatin  - Renal restriction, DASH/TLC diet

## 2018-10-05 NOTE — PROGRESS NOTE ADULT - SUBJECTIVE AND OBJECTIVE BOX
INTERVAL HPI/OVERNIGHT EVENTS:  pt seen and examined earlier this am  denies n/v/abd pain  states ostomy output now more loose  per overnight rn no s/s overt gib, pt self empties ostomy  afebrile overnight labs noted    MEDICATIONS  (STANDING):  cholestyramine Powder (Sugar-Free) 4 Gram(s) Oral daily  epoetin jean carlos Injectable 96292 Unit(s) SubCutaneous <User Schedule>  influenza   Vaccine 0.5 milliLiter(s) IntraMuscular once  lactobacillus acidophilus 1 Tablet(s) Oral three times a day with meals  multivitamin 1 Tablet(s) Oral daily  simvastatin 20 milliGRAM(s) Oral at bedtime  sodium bicarbonate 650 milliGRAM(s) Oral two times a day    MEDICATIONS  (PRN):      Allergies    Levaquin (Pruritus)  mercury (Pruritus; Rash)  sulfa drugs (Pruritus)    Intolerances        Review of Systems:    General:  No wt loss, fevers, chills, night sweats, fatigue   Eyes:  Good vision, no reported pain  ENT:  No sore throat, pain, runny nose, dysphagia  CV:  No pain, palpitations, hypo/hypertension  Resp:  No dyspnea, cough, tachypnea, wheezing  GI:  No pain, No nausea, No vomiting, inc ostomy output dysphagia  :  No pain, bleeding, incontinence, nocturia  Muscle:  No pain, weakness  Neuro:  No weakness, tingling, memory problems  Psych:  No fatigue, insomnia, mood problems, depression  Endocrine:  No polyuria, polydypsia, cold/heat intolerance  Heme:  No petechiae, ecchymosis, easy bruisability  Skin:  No rash, tattoos, scars, edema      Vital Signs Last 24 Hrs  T(C): 36.8 (05 Oct 2018 07:56), Max: 37.1 (04 Oct 2018 19:56)  T(F): 98.2 (05 Oct 2018 07:56), Max: 98.7 (04 Oct 2018 19:56)  HR: 66 (05 Oct 2018 07:56) (56 - 66)  BP: 127/64 (05 Oct 2018 07:56) (100/58 - 144/56)  BP(mean): --  RR: 22 (05 Oct 2018 07:56) (15 - 22)  SpO2: 98% (05 Oct 2018 07:56) (97% - 100%)    PHYSICAL EXAM:    Constitutional: NAD, lying in bed  HEENT: perrl  Neck: No LAD  Respiratory:  dec bs  Cardiovascular: S1 and S2, RRR  Gastrointestinal: soft nt nd +ostomy site c/d/i with liquid green stool  Extremities: No peripheral edema  Vascular: 2+ peripheral pulses  Neurological: awake alert responds appropriately  Skin: No rashes    LABS:                        7.7    4.84  )-----------( 202      ( 05 Oct 2018 06:43 )             24.8     10-05    144  |  116<H>  |  73<H>  ----------------------------<  101<H>  4.5   |  15<L>  |  4.30<H>    Ca    6.7<L>      05 Oct 2018 06:43    TPro  5.7<L>  /  Alb  2.5<L>  /  TBili  0.2  /  DBili  x   /  AST  17  /  ALT  11<L>  /  AlkPhos  56  10-04    PT/INR - ( 05 Oct 2018 06:43 )   PT: 12.9 sec;   INR: 1.18 ratio               RADIOLOGY & ADDITIONAL TESTS:

## 2018-10-05 NOTE — PROGRESS NOTE ADULT - SUBJECTIVE AND OBJECTIVE BOX
Patient is a 83y old  Female who presents with a chief complaint of MEHDI on CKD Stage 5 (04 Oct 2018 15:04)      FROM ADMISSION H+P:   HPI:  83 year old female with PMH of CKD (baseline creatinine around 4), colon obstruction (s/p ostomy 15 years ago), cervical cancer (s/p radical hysterectomy), tremors, HLD, history of frequent UTIs, presenting with a chief complaint of weakness. As per patient and daughter at bedside patient has been experiencing worsening weakness and fatigue over the past 10 days. Patient states she has been sleeping more frequently than usual, has not had any desire to ambulate and when she does ambulate she begins to feel tired and weak. Patient felt nauseous last night with nonbloody, bilious vomiting prompting visit to her nephrologist who recommended she come to the ED. Of note patient was recently hospitalized 8/2 - 8/9 for MEHDI on CKD 4 after experiencing similar symptoms and found to have a creatinine of 12, started on IVF and clinically improved. Patient currently denies any light headedness, dizziness, shortness of breath, abdominal pain, current nausea or vomiting, dysuria or increased or decreased output from ostomy.      In the ED, /55, HR 72, RR 14, 96% on RA.   Labs significant for Cr 5.4, BUN 95, Amylase 239, Lipase 802. Given 2L NS, Zofran and Pepcid.    EKG - Sinus bradycardia with prolonged QTc of 492 (03 Oct 2018 16:36)      ----  INTERVAL HPI/OVERNIGHT EVENTS: Pt seen and evaluated at the bedside. No acute overnight events occurred.     ----  PAST MEDICAL & SURGICAL HISTORY:  CKD (chronic kidney disease) stage 5, GFR less than 15 ml/min  Herniated lumbar intervertebral disc  Tremor  Kidney atrophy: right  Colostomy status: 2006  Chronic UTI (urinary tract infection)  Cervical cancer: 1970&#x27;s  Dyslipidemia  Hernia, incisional  S/P hip replacement: right 2013  S/P colon resection: 2006  S/P hysterectomy: 1977      FAMILY HISTORY:  Family history of ovarian cancer (Sibling)      ----  MEDICATIONS  (STANDING):  cholestyramine Powder (Sugar-Free) 4 Gram(s) Oral daily  epoetin jean carlos Injectable 71651 Unit(s) SubCutaneous <User Schedule>  influenza   Vaccine 0.5 milliLiter(s) IntraMuscular once  lactobacillus acidophilus 1 Tablet(s) Oral three times a day with meals  multivitamin 1 Tablet(s) Oral daily  simvastatin 20 milliGRAM(s) Oral at bedtime  sodium bicarbonate 650 milliGRAM(s) Oral two times a day    MEDICATIONS  (PRN):      ----  REVIEW OF SYSTEMS:  CONSTITUTIONAL: denies fever, chills, fatigue, weakness  HEENT: denies blurred vision, sore throat  SKIN: denies new lesions, rash  CARDIOVASCULAR: denies chest pain, chest pressure, palpitations  RESPIRATORY: denies shortness of breath, sputum production  GASTROINTESTINAL: denies nausea, vomiting, diarrhea, abdominal pain  GENITOURINARY: denies dysuria, discharge  NEUROLOGICAL: denies numbness, headache, focal weakness  MUSCULOSKELETAL: denies new joint pain, muscle aches  HEMATOLOGIC: denies gross bleeding, bruising  LYMPHATICS: denies enlarged lymph nodes, extremity swelling  PSYCHIATRIC: denies recent changes in anxiety, depression  ENDOCRINOLOGIC: denies sweating, cold or heat intolerance    ----  PHYSICAL EXAM:  GENERAL: patient appears well, no acute distress, appropriate, pleasant  EYES: sclera clear, no exudates  ENMT: oropharynx clear without erythema, no exudates, moist mucous membranes  NECK: supple, soft, no thyromegaly noted  LUNGS: good air entry bilaterally, clear to auscultation, symmetric breath sounds, no wheezing or rhonchi appreciated  HEART: soft S1/S2, regular rate and rhythm, no murmurs noted, no lower extremity edema  GASTROINTESTINAL: abdomen is soft, nontender, nondistended, normoactive bowel sounds, no palpable masses  INTEGUMENT: good skin turgor, no lesions noted  MUSCULOSKELETAL: no clubbing or cyanosis, no obvious deformity  NEUROLOGIC: awake, alert, oriented x3, good muscle tone in 4 extremities, no obvious sensory deficits  PSYCHIATRIC: mood is good, affect is congruent, linear and logical thought process  HEME/LYMPH: no palpable supraclavicular nodules, no obvious ecchymosis or petechiae     T(C): 36.8 (10-05-18 @ 07:56), Max: 37.1 (10-04-18 @ 19:56)  HR: 66 (10-05-18 @ 07:56) (56 - 66)  BP: 127/64 (10-05-18 @ 07:56) (100/58 - 144/56)  RR: 22 (10-05-18 @ 07:56) (15 - 22)  SpO2: 98% (10-05-18 @ 07:56) (97% - 100%)  Wt(kg): --    ----  I&O's Summary    04 Oct 2018 07:01  -  05 Oct 2018 07:00  --------------------------------------------------------  IN: 1140 mL / OUT: 0 mL / NET: 1140 mL        LABS:                        7.7    4.84  )-----------( 202      ( 05 Oct 2018 06:43 )             24.8     10-05    144  |  116<H>  |  73<H>  ----------------------------<  101<H>  4.5   |  15<L>  |  4.30<H>    Ca    6.7<L>      05 Oct 2018 06:43    TPro  5.7<L>  /  Alb  2.5<L>  /  TBili  0.2  /  DBili  x   /  AST  17  /  ALT  11<L>  /  AlkPhos  56  10-04    PT/INR - ( 05 Oct 2018 06:43 )   PT: 12.9 sec;   INR: 1.18 ratio             CAPILLARY BLOOD GLUCOSE                    ----  Personally reviewed:  Vital sign trends: [  ] yes    [  ] no     [  ] n/a  Laboratory results: [  ] yes    [  ] no     [  ] n/a  Radiology results: [  ] yes    [  ] no     [  ] n/a  Culture results: [  ] yes    [  ] no     [  ] n/a  Consultant recommendations: [  ] yes    [  ] no     [  ] n/a Patient is a 83y old  Female who presents with a chief complaint of MEHDI on CKD Stage 5 (04 Oct 2018 15:04)      FROM ADMISSION H+P:   HPI:  83 year old female with PMH of CKD (baseline creatinine around 4), colon obstruction (s/p ostomy 15 years ago), cervical cancer (s/p radical hysterectomy), tremors, HLD, history of frequent UTIs, presenting with a chief complaint of weakness. As per patient and daughter at bedside patient has been experiencing worsening weakness and fatigue over the past 10 days. Patient states she has been sleeping more frequently than usual, has not had any desire to ambulate and when she does ambulate she begins to feel tired and weak. Patient felt nauseous last night with nonbloody, bilious vomiting prompting visit to her nephrologist who recommended she come to the ED. Of note patient was recently hospitalized 8/2 - 8/9 for MEHDI on CKD 4 after experiencing similar symptoms and found to have a creatinine of 12, started on IVF and clinically improved. Patient currently denies any light headedness, dizziness, shortness of breath, abdominal pain, current nausea or vomiting, dysuria or increased or decreased output from ostomy.      In the ED, /55, HR 72, RR 14, 96% on RA.   Labs significant for Cr 5.4, BUN 95, Amylase 239, Lipase 802. Given 2L NS, Zofran and Pepcid.    EKG - Sinus bradycardia with prolonged QTc of 492 (03 Oct 2018 16:36)      ----  INTERVAL HPI/OVERNIGHT EVENTS: Pt seen and evaluated at the bedside. No acute overnight events occurred.     ----  PAST MEDICAL & SURGICAL HISTORY:  CKD (chronic kidney disease) stage 5, GFR less than 15 ml/min  Herniated lumbar intervertebral disc  Tremor  Kidney atrophy: right  Colostomy status: 2006  Chronic UTI (urinary tract infection)  Cervical cancer: 1970&#x27;s  Dyslipidemia  Hernia, incisional  S/P hip replacement: right 2013  S/P colon resection: 2006  S/P hysterectomy: 1977      FAMILY HISTORY:  Family history of ovarian cancer (Sibling)      ----  MEDICATIONS  (STANDING):  cholestyramine Powder (Sugar-Free) 4 Gram(s) Oral daily  epoetin jean carlos Injectable 44039 Unit(s) SubCutaneous <User Schedule>  influenza   Vaccine 0.5 milliLiter(s) IntraMuscular once  lactobacillus acidophilus 1 Tablet(s) Oral three times a day with meals  multivitamin 1 Tablet(s) Oral daily  simvastatin 20 milliGRAM(s) Oral at bedtime  sodium bicarbonate 650 milliGRAM(s) Oral two times a day    MEDICATIONS  (PRN):      ----  REVIEW OF SYSTEMS:  CONSTITUTIONAL: denies fever, chills, fatigue, weakness  HEENT: denies blurred vision, sore throat  SKIN: denies new lesions, rash  CARDIOVASCULAR: denies chest pain, chest pressure, palpitations  RESPIRATORY: denies shortness of breath, sputum production  GASTROINTESTINAL: denies nausea, vomiting, diarrhea, abdominal pain  GENITOURINARY: denies dysuria, discharge  NEUROLOGICAL: denies numbness, headache, focal weakness  MUSCULOSKELETAL: denies new joint pain, muscle aches  HEMATOLOGIC: denies gross bleeding, bruising  LYMPHATICS: denies enlarged lymph nodes, extremity swelling  PSYCHIATRIC: denies recent changes in anxiety, depression  ENDOCRINOLOGIC: denies sweating, cold or heat intolerance    ----  PHYSICAL EXAM:  GENERAL: patient appears well, no acute distress, appropriate, pleasant  EYES: sclera clear, no exudates  ENMT: oropharynx clear without erythema, no exudates, moist mucous membranes  NECK: supple, soft, no thyromegaly noted  LUNGS: good air entry bilaterally, clear to auscultation, symmetric breath sounds, no wheezing or rhonchi appreciated  HEART: soft S1/S2, regular rate and rhythm, no murmurs noted, no lower extremity edema  GASTROINTESTINAL: abdomen is soft, nontender, nondistended, normoactive bowel sounds, no palpable masses  INTEGUMENT: good skin turgor, no lesions noted  MUSCULOSKELETAL: no clubbing or cyanosis, no obvious deformity  NEUROLOGIC: awake, alert, oriented x3, good muscle tone in 4 extremities, no obvious sensory deficits  PSYCHIATRIC: mood is good, affect is congruent, linear and logical thought process  HEME/LYMPH: no palpable supraclavicular nodules, no obvious ecchymosis or petechiae     T(C): 36.8 (10-05-18 @ 07:56), Max: 37.1 (10-04-18 @ 19:56)  HR: 66 (10-05-18 @ 07:56) (56 - 66)  BP: 127/64 (10-05-18 @ 07:56) (100/58 - 144/56)  RR: 22 (10-05-18 @ 07:56) (15 - 22)  SpO2: 98% (10-05-18 @ 07:56) (97% - 100%)  Wt(kg): --    ----  I&O's Summary    04 Oct 2018 07:01  -  05 Oct 2018 07:00  --------------------------------------------------------  IN: 1140 mL / OUT: 0 mL / NET: 1140 mL        LABS:                        7.7    4.84  )-----------( 202      ( 05 Oct 2018 06:43 )             24.8     10-05    144  |  116<H>  |  73<H>  ----------------------------<  101<H>  4.5   |  15<L>  |  4.30<H>    Ca    6.7<L>      05 Oct 2018 06:43    TPro  5.7<L>  /  Alb  2.5<L>  /  TBili  0.2  /  DBili  x   /  AST  17  /  ALT  11<L>  /  AlkPhos  56  10-04    PT/INR - ( 05 Oct 2018 06:43 )   PT: 12.9 sec;   INR: 1.18 ratio             CAPILLARY BLOOD GLUCOSE                    ----  Personally reviewed:  Vital sign trends: [x  ] yes    [  ] no     [  ] n/a  Laboratory results: [ x] yes    [  ] no     [  ] n/a  Radiology results: [ x ] yes    [  ] no     [  ] n/a  Culture results: [  ] yes    [  ] no     [x  ] n/a  Consultant recommendations: [x  ] yes    [  ] no     [  ] n/a Patient is a 83y old  Female who presents with a chief complaint of MEHDI on CKD Stage 5 (04 Oct 2018 15:04)      FROM ADMISSION H+P:   HPI:  83 year old female with PMH of CKD (baseline creatinine around 4), colon obstruction (s/p ostomy 15 years ago), cervical cancer (s/p radical hysterectomy), tremors, HLD, history of frequent UTIs, presenting with a chief complaint of weakness. As per patient and daughter at bedside patient has been experiencing worsening weakness and fatigue over the past 10 days. Patient states she has been sleeping more frequently than usual, has not had any desire to ambulate and when she does ambulate she begins to feel tired and weak. Patient felt nauseous last night with nonbloody, bilious vomiting prompting visit to her nephrologist who recommended she come to the ED. Of note patient was recently hospitalized 8/2 - 8/9 for MEHDI on CKD 4 after experiencing similar symptoms and found to have a creatinine of 12, started on IVF and clinically improved. Patient currently denies any light headedness, dizziness, shortness of breath, abdominal pain, current nausea or vomiting, dysuria or increased or decreased output from ostomy.      In the ED, /55, HR 72, RR 14, 96% on RA.   Labs significant for Cr 5.4, BUN 95, Amylase 239, Lipase 802. Given 2L NS, Zofran and Pepcid.    EKG - Sinus bradycardia with prolonged QTc of 492 (03 Oct 2018 16:36)      ----  INTERVAL HPI/OVERNIGHT EVENTS: Pt seen and evaluated at the bedside. No acute overnight events occurred. Toe pain improving. C/o sneezing and runny nose this am. Has been afebrile overnight. Denies CP, SOB, n, v,d     ----  PAST MEDICAL & SURGICAL HISTORY:  CKD (chronic kidney disease) stage 5, GFR less than 15 ml/min  Herniated lumbar intervertebral disc  Tremor  Kidney atrophy: right  Colostomy status: 2006  Chronic UTI (urinary tract infection)  Cervical cancer: 1970&#x27;s  Dyslipidemia  Hernia, incisional  S/P hip replacement: right 2013  S/P colon resection: 2006  S/P hysterectomy: 1977      FAMILY HISTORY:  Family history of ovarian cancer (Sibling)      ----  MEDICATIONS  (STANDING):  cholestyramine Powder (Sugar-Free) 4 Gram(s) Oral daily  epoetin jean carlos Injectable 77540 Unit(s) SubCutaneous <User Schedule>  influenza   Vaccine 0.5 milliLiter(s) IntraMuscular once  lactobacillus acidophilus 1 Tablet(s) Oral three times a day with meals  multivitamin 1 Tablet(s) Oral daily  simvastatin 20 milliGRAM(s) Oral at bedtime  sodium bicarbonate 650 milliGRAM(s) Oral two times a day    MEDICATIONS  (PRN):      ----  REVIEW OF SYSTEMS:  CONSTITUTIONAL: improving weakness denies fever, chills, fatigue  HEENT: denies blurred vision, sore throat  SKIN: denies new lesions, rash  CARDIOVASCULAR: denies chest pain, chest pressure, palpitations  RESPIRATORY: denies shortness of breath, sputum production  GASTROINTESTINAL: denies nausea, vomiting, diarrhea, abdominal pain  GENITOURINARY: denies dysuria, discharge  NEUROLOGICAL: admits occasional numbness of feet, denies headache, focal weakness  MUSCULOSKELETAL: denies joint pain, muscle aches  HEMATOLOGIC: denies gross bleeding, bruising  LYMPHATICS: denies enlarged lymph nodes, extremity swelling  PSYCHIATRIC: denies recent changes in anxiety, depression  ENDOCRINOLOGIC: denies sweating, cold or heat intolerance    ----  PHYSICAL EXAM:  GENERAL: patient appears well, no acute distress, appropriate, pleasant  EYES: sclera clear, no exudates  ENMT: oropharynx clear without erythema, no exudates, moist mucous membranes  NECK: supple, soft, no thyromegaly noted  LUNGS: good air entry bilaterally, clear to auscultation, symmetric breath sound  HEART: soft S1/S2, regular rate and rhythm, no murmurs noted, no lower extremity edema  GASTROINTESTINAL: Colostomy in place. Abdomen is soft, nontender, nondistended, normoactive bowel sounds, no palpable masses  INTEGUMENT: good skin turgor, no lesions noted  MUSCULOSKELETAL: + tremor no clubbing or cyanosis, no obvious deformity  NEUROLOGIC: awake, alert, oriented x3, good muscle tone in 4 extremities, no obvious sensory deficits  PSYCHIATRIC: mood is good, affect is congruent, linear and logical thought process  HEME/LYMPH: no palpable supraclavicular nodules, no obvious ecchymosis or petechiae     T(C): 36.8 (10-05-18 @ 07:56), Max: 37.1 (10-04-18 @ 19:56)  HR: 66 (10-05-18 @ 07:56) (56 - 66)  BP: 127/64 (10-05-18 @ 07:56) (100/58 - 144/56)  RR: 22 (10-05-18 @ 07:56) (15 - 22)  SpO2: 98% (10-05-18 @ 07:56) (97% - 100%)  Wt(kg): --    ----  I&O's Summary    04 Oct 2018 07:01  -  05 Oct 2018 07:00  --------------------------------------------------------  IN: 1140 mL / OUT: 0 mL / NET: 1140 mL        LABS:                        7.7    4.84  )-----------( 202      ( 05 Oct 2018 06:43 )             24.8     10-05    144  |  116<H>  |  73<H>  ----------------------------<  101<H>  4.5   |  15<L>  |  4.30<H>    Ca    6.7<L>      05 Oct 2018 06:43    TPro  5.7<L>  /  Alb  2.5<L>  /  TBili  0.2  /  DBili  x   /  AST  17  /  ALT  11<L>  /  AlkPhos  56  10-04    PT/INR - ( 05 Oct 2018 06:43 )   PT: 12.9 sec;   INR: 1.18 ratio             CAPILLARY BLOOD GLUCOSE                    ----  Personally reviewed:  Vital sign trends: [x  ] yes    [  ] no     [  ] n/a  Laboratory results: [ x] yes    [  ] no     [  ] n/a  Radiology results: [ x ] yes    [  ] no     [  ] n/a  Culture results: [  ] yes    [  ] no     [x  ] n/a  Consultant recommendations: [x  ] yes    [  ] no     [  ] n/a

## 2018-10-05 NOTE — PROGRESS NOTE ADULT - NSHPATTENDINGPLANDISCUSS_GEN_ALL_CORE
pt, RN, SW, CM, residency team, nephro attending, family over the phone
pt, RN, SW, CM, residency team, family

## 2018-10-05 NOTE — PROGRESS NOTE ADULT - PROBLEM SELECTOR PLAN 1
- Acute, MEHDI on CKD stage 5 likely secondary to dehydration - renal indices trending toward improvement but pt has advanced dz at baseline  - Nephro recs appreciated  - Started on procrit for anemia  - May need PRBC transfusion if HGB drops further but will only transfuse if symptoms present  - Avoid all nephrotoxic agents. holding [id number]

## 2018-10-05 NOTE — PROGRESS NOTE ADULT - PROBLEM SELECTOR PLAN 8
- Sequential compression stockings due to anemia  IMPROVE VTE Individual Risk Assessment        RISK                                                          Points  [  ] Previous VTE                                                3  [  ] Thrombophilia                                             2  [  ] Lower limb paralysis                                   2        (unable to hold up >15 seconds)    [  ] Current Cancer                                            2         (within 6 months)  [  ] Immobilization > 24 hrs                              1  [  ] ICU/CCU stay > 24 hours                            1  [ x ] Age > 60                                                    1    IMPROVE VTE Score _1___

## 2018-10-05 NOTE — PROGRESS NOTE ADULT - PROBLEM SELECTOR PLAN 4
multifactorial  hgb trend noted  sp IVF  baseline around 8 range per prior records  no overt s/s gib  trend h/h, transfuse prn  check coags  hold ac asa nsaids  consider fobt, iron studies, heme eval  will follow
- Elevated lipase of 802 and amylase of 239.  - As per chart review, patient has elevated lipase and amylase on previous admission and had ultrasound of gallbladder which showed findings suggestive of chronic calculus cholecystitis. Surgery was consulted on last admission but recommended no intervention and indicated a low suspicious for cholecystitis at the time.
- As per chart review, patient has elevated lipase and amylase on previous admission and had ultrasound of gallbladder which showed findings suggestive of chronic calculus cholecystitis. Surgery was consulted on last admission but recommended no intervention and indicated a low suspicious for cholecystitis at the time.

## 2018-10-05 NOTE — PROGRESS NOTE ADULT - ASSESSMENT
83 year old female with PMH of CKD (baseline creatinine around 4), colon obstruction (s/p ostomy 15 years ago), cervical cancer (s/p radical hysterectomy), tremors, HLD, history of frequent UTIs, presenting with a chief complaint of weakness admitted for MEHDI on CKD stage 5 secondary to dehydration.
·	MEHDI, CKD 4, Solitary functioning kidney: Prerenal azotemia, (Atrophic right kidney)  ·	Metabolic acidosis due to CKD, will  continue bicarbonate tabs  ·	Diabetes  ·	Hypertension  ·	Anemia    Improving renal function. Continue IVF. Encourage PO intake as tolerated. BP stable.   spoke to patient at bed side in detail regarding the possibility of AVF creation in the near future. patient is agreeable for the same. continue IVF until AM and possible discharge in AM.
83 year old female with PMH of CKD (baseline creatinine around 4), colon obstruction (s/p ostomy 15 years ago), cervical cancer (s/p radical hysterectomy), tremors, HLD, history of frequent UTIs, presenting with a chief complaint of weakness admitted for MEHDI on CKD stage 5 secondary to dehydration.

## 2018-10-05 NOTE — PROGRESS NOTE ADULT - SUBJECTIVE AND OBJECTIVE BOX
GURJIT HERRERA  83y  Female    Patient is a 83y old  Female who presents with a chief complaint of MEHDI on CKD Stage 5 (05 Oct 2018 11:59)      feels much better.   has CKD stage 4-5  with a baseline creatinine of around 4    PAST MEDICAL & SURGICAL HISTORY:  CKD (chronic kidney disease) stage 5, GFR less than 15 ml/min  Herniated lumbar intervertebral disc  Tremor  Kidney atrophy: right  Colostomy status: 2006  Chronic UTI (urinary tract infection)  Cervical cancer: 1970&#x27;s  Dyslipidemia  Hernia, incisional  S/P hip replacement: right 2013  S/P colon resection: 2006  S/P hysterectomy: 1977    PHYSICAL EXAM:    T(C): 36.8 (10-05-18 @ 07:56), Max: 37.1 (10-04-18 @ 19:56)  HR: 66 (10-05-18 @ 07:56) (56 - 66)  BP: 127/64 (10-05-18 @ 07:56) (100/58 - 144/56)  RR: 22 (10-05-18 @ 07:56) (15 - 22)  SpO2: 98% (10-05-18 @ 07:56) (97% - 100%)  Wt(kg): --    I&O's Detail    04 Oct 2018 07:01  -  05 Oct 2018 07:00  --------------------------------------------------------  IN:    dextrose 5% + sodium chloride 0.45%.: 900 mL    Oral Fluid: 240 mL  Total IN: 1140 mL    OUT:  Total OUT: 0 mL    Total NET: 1140 mL    Respiratory: clear anteriorly, decreased BS at bases  Cardiovascular: S1 S2  Gastrointestinal: soft NT ND +BS  Extremities: trace edema   Neuro: Awake and alert    MEDICATIONS  (STANDING):  cholestyramine Powder (Sugar-Free) 4 Gram(s) Oral daily  epoetin jean carlos Injectable 18627 Unit(s) SubCutaneous <User Schedule>  influenza   Vaccine 0.5 milliLiter(s) IntraMuscular once  lactobacillus acidophilus 1 Tablet(s) Oral three times a day with meals  multivitamin 1 Tablet(s) Oral daily  simvastatin 20 milliGRAM(s) Oral at bedtime  sodium bicarbonate 650 milliGRAM(s) Oral two times a day    MEDICATIONS  (PRN):                            7.7    4.84  )-----------( 202      ( 05 Oct 2018 06:43 )             24.8       10-05    144  |  116<H>  |  73<H>  ----------------------------<  101<H>  4.5   |  15<L>  |  4.30<H>    Ca    6.7<L>      05 Oct 2018 06:43    TPro  5.7<L>  /  Alb  2.5<L>  /  TBili  0.2  /  DBili  x   /  AST  17  /  ALT  11<L>  /  AlkPhos  56  10-04

## 2018-10-05 NOTE — PROGRESS NOTE ADULT - PROBLEM SELECTOR PLAN 3
etiology: anemia of chronic disease and mostly chronic kidney disease  Patient's baseline has been 8-9 most of previous hospitalization and patient has baseline CKD.  - Procrit per nephrology   - Hold ASA, NSAIDS

## 2018-10-06 VITALS
OXYGEN SATURATION: 100 % | HEART RATE: 76 BPM | TEMPERATURE: 99 F | RESPIRATION RATE: 16 BRPM | DIASTOLIC BLOOD PRESSURE: 66 MMHG | SYSTOLIC BLOOD PRESSURE: 133 MMHG

## 2018-10-06 LAB
ANION GAP SERPL CALC-SCNC: 14 MMOL/L — SIGNIFICANT CHANGE UP (ref 5–17)
BUN SERPL-MCNC: 66 MG/DL — HIGH (ref 7–23)
CALCIUM SERPL-MCNC: 6.5 MG/DL — CRITICAL LOW (ref 8.5–10.1)
CHLORIDE SERPL-SCNC: 119 MMOL/L — HIGH (ref 96–108)
CO2 SERPL-SCNC: 12 MMOL/L — LOW (ref 22–31)
CREAT SERPL-MCNC: 3.8 MG/DL — HIGH (ref 0.5–1.3)
GLUCOSE SERPL-MCNC: 110 MG/DL — HIGH (ref 70–99)
HCT VFR BLD CALC: 25.3 % — LOW (ref 34.5–45)
HGB BLD-MCNC: 8 G/DL — LOW (ref 11.5–15.5)
MCHC RBC-ENTMCNC: 29.9 PG — SIGNIFICANT CHANGE UP (ref 27–34)
MCHC RBC-ENTMCNC: 31.6 GM/DL — LOW (ref 32–36)
MCV RBC AUTO: 94.4 FL — SIGNIFICANT CHANGE UP (ref 80–100)
NRBC # BLD: 0 /100 WBCS — SIGNIFICANT CHANGE UP (ref 0–0)
PLATELET # BLD AUTO: 228 K/UL — SIGNIFICANT CHANGE UP (ref 150–400)
POTASSIUM SERPL-MCNC: 4.2 MMOL/L — SIGNIFICANT CHANGE UP (ref 3.5–5.3)
POTASSIUM SERPL-SCNC: 4.2 MMOL/L — SIGNIFICANT CHANGE UP (ref 3.5–5.3)
RBC # BLD: 2.68 M/UL — LOW (ref 3.8–5.2)
RBC # FLD: 14.3 % — SIGNIFICANT CHANGE UP (ref 10.3–14.5)
SODIUM SERPL-SCNC: 145 MMOL/L — SIGNIFICANT CHANGE UP (ref 135–145)
WBC # BLD: 5.41 K/UL — SIGNIFICANT CHANGE UP (ref 3.8–10.5)
WBC # FLD AUTO: 5.41 K/UL — SIGNIFICANT CHANGE UP (ref 3.8–10.5)

## 2018-10-06 PROCEDURE — 99239 HOSP IP/OBS DSCHRG MGMT >30: CPT

## 2018-10-06 RX ORDER — FLUTICASONE PROPIONATE 50 MCG
1 SPRAY, SUSPENSION NASAL
Qty: 1 | Refills: 0
Start: 2018-10-06

## 2018-10-06 RX ORDER — FLUTICASONE PROPIONATE 50 MCG
1 SPRAY, SUSPENSION NASAL
Qty: 0 | Refills: 0 | Status: DISCONTINUED | OUTPATIENT
Start: 2018-10-06 | End: 2018-10-06

## 2018-10-06 RX ADMIN — CHOLESTYRAMINE 4 GRAM(S): 4 POWDER, FOR SUSPENSION ORAL at 08:56

## 2018-10-06 RX ADMIN — Medication 650 MILLIGRAM(S): at 08:56

## 2018-10-06 RX ADMIN — Medication 1 TABLET(S): at 11:35

## 2018-10-06 RX ADMIN — Medication 200 MILLIGRAM(S): at 06:32

## 2018-10-06 RX ADMIN — Medication 1 TABLET(S): at 08:55

## 2018-10-06 RX ADMIN — BENZOCAINE AND MENTHOL 1 LOZENGE: 5; 1 LIQUID ORAL at 00:45

## 2018-10-06 RX ADMIN — Medication 1 SPRAY(S): at 07:22

## 2018-10-06 NOTE — PROGRESS NOTE ADULT - PROBLEM SELECTOR PLAN 2
monitor intake/output  IVF  cont cholestyramine, bacid  trend labs  routine ostomy care
multifactorial  hgb trend noted  sp IVF  baseline around 8 range per prior records  no overt s/s gib  trend h/h, transfuse prn  on epo  hold ac asa nsaids in setting of active gib  fobt/iron studies pending  will follow
multifactorial  hgb trend noted  sp IVF  baseline around 8 range per prior records  no overt s/s gib  trend h/h, transfuse prn  on epo  hold ac asa nsaids in setting of active gib  fobt/iron studies pending  will follow
- Chronic, s/p colon obstruction with ostomy 15 years ago  - Loperamide held in setting of prolonged QT, repeat EKG ordered  - Continue home cholestyramine and Bacid daily  - GI recs appreciated
- Chronic, s/p colon obstruction with ostomy 15 years ago  - Loperamide held in setting of prolonged QT  - Continue home cholestyramine and Bacid daily  - GI recs appreciated

## 2018-10-06 NOTE — CHART NOTE - NSCHARTNOTEFT_GEN_A_CORE
83 year old female with PMH of CKD (baseline creatinine around 4), colon obstruction (s/p ostomy 15 years ago), cervical cancer (s/p radical hysterectomy), tremors, HLD, history of frequent UTIs, presenting with a chief complaint of weakness, admitted with MEHDI.  Called by RN to evaluate patient who was having difficulty breathing out of her nose and an associated dry cough.     Patient was seen and examined at the bedside, seen resting comfortably, NAD. Patient reports nasal congestion and runny nose with clear runny discharge since waking this morning with associated intermittent dry cough, and sinus congestion and pressure. Cough is not productive. Reports having had a dose of robitussin earlier in the night with temporary relief. Denies any sore throat. No fever, chills SOB n/v/d chest pain.     Vital Signs Last 24 Hrs  T(C): 36.6 (06 Oct 2018 02:30), Max: 37.3 (05 Oct 2018 23:51)  T(F): 97.8 (06 Oct 2018 02:30), Max: 99.2 (05 Oct 2018 23:51)  HR: 85 (06 Oct 2018 02:30) (63 - 85)  BP: 114/82 (06 Oct 2018 02:30) (110/66 - 132/74)  BP(mean): --  RR: 22 (06 Oct 2018 02:30) (16 - 22)  SpO2: 99% (06 Oct 2018 02:30) (98% - 100%)    PHYSICAL EXAM:  GENERAL: NAD pleasant  ENMT: No erythema, no exudates, moist mucous membranes  LUNGS: CTA b/l  HEART: RRR, S1 S2, No murmurs rubs gallops  GASTROINTESTINAL: Colostomy in place. Abdomen is soft, NT, ND,BSx4  NEUROLOGIC: a&ox3                            7.7    4.84  )-----------( 202      ( 05 Oct 2018 06:43 )             24.8     05 Oct 2018 06:43    144    |  116    |  73     ----------------------------<  101    4.5     |  15     |  4.30     Ca    6.7        05 Oct 2018 06:43    TPro  5.7    /  Alb  2.5    /  TBili  0.2    /  DBili  x      /  AST  17     /  ALT  11     /  AlkPhos  56     04 Oct 2018 06:54    LIVER FUNCTIONS - ( 04 Oct 2018 06:54 )  Alb: 2.5 g/dL / Pro: 5.7 g/dL / ALK PHOS: 56 U/L / ALT: 11 U/L / AST: 17 U/L / GGT: x           PT/INR - ( 05 Oct 2018 06:43 )   PT: 12.9 sec;   INR: 1.18 ratio           CAPILLARY BLOOD GLUCOSE      A/P: 83 year old female with PMH of CKD5, HTN, HLD, Colonic obstruction with ostomy admitted for MEHDI, complaining of nasal congestion dry cough and sinus pressure  -Patient's symptoms likely secondary to viral URI - no evidence of purulent sputum, tonsillar exudates, fever, or other suggestions of bacterial source  -VS and labs reviewed, stable, NAD. No need for additional labs or imaging at present time  -Will order for flonase nasal spray, has cepacol lozenges as PRN cough order  -Continue to monitor patient

## 2018-10-06 NOTE — PROGRESS NOTE ADULT - PROBLEM SELECTOR PROBLEM 1
Colostomy status
Colostomy status
Transaminitis
MEHDI (acute kidney injury)
MEHDI (acute kidney injury)

## 2018-10-06 NOTE — PROGRESS NOTE ADULT - REASON FOR ADMISSION
MEHDI on CKD Stage 5

## 2018-10-06 NOTE — PROGRESS NOTE ADULT - SUBJECTIVE AND OBJECTIVE BOX
INTERVAL HPI/OVERNIGHT EVENTS:  No new overnight event.  No N/V/D.  Tolerating diet.    Allergies    Levaquin (Pruritus)  mercury (Pruritus; Rash)  sulfa drugs (Pruritus)    Intolerances          General:  No wt loss, fevers, chills, night sweats, fatigue,   Eyes:  Good vision, no reported pain  ENT:  No sore throat, pain, runny nose, dysphagia  CV:  No pain, palpitations, hypo/hypertension  Resp:  No dyspnea, cough, tachypnea, wheezing  GI:  No pain, No nausea, No vomiting, No diarrhea, No constipation, No weight loss, No fever, No pruritis, No rectal bleeding, No tarry stools, No dysphagia,  :  No pain, bleeding, incontinence, nocturia  Muscle:  No pain, weakness  Neuro:  No weakness, tingling, memory problems  Psych:  No fatigue, insomnia, mood problems, depression  Endocrine:  No polyuria, polydipsia, cold/heat intolerance  Heme:  No petechiae, ecchymosis, easy bruisability  Skin:  No rash, tattoos, scars, edema      PHYSICAL EXAM:   Vital Signs:  Vital Signs Last 24 Hrs  T(C): 37.3 (06 Oct 2018 07:42), Max: 37.3 (05 Oct 2018 23:51)  T(F): 99.2 (06 Oct 2018 07:42), Max: 99.2 (05 Oct 2018 23:51)  HR: 76 (06 Oct 2018 07:42) (63 - 85)  BP: 133/66 (06 Oct 2018 07:42) (110/66 - 142/76)  BP(mean): --  RR: 16 (06 Oct 2018 07:42) (16 - 22)  SpO2: 100% (06 Oct 2018 07:42) (99% - 100%)  Daily     Daily Weight in k.8 (06 Oct 2018 04:11)I&O's Summary    05 Oct 2018 07:  -  06 Oct 2018 07:00  --------------------------------------------------------  IN: 400 mL / OUT: 0 mL / NET: 400 mL    06 Oct 2018 07:01  -  06 Oct 2018 14:27  --------------------------------------------------------  IN: 480 mL / OUT: 2 mL / NET: 478 mL        GENERAL:  Appears stated age, well-groomed, well-nourished, no distress  HEENT:  NC/AT,  conjunctivae clear and pink, no thyromegaly, nodules, adenopathy, no JVD, sclera -anicteric  CHEST:  Full & symmetric excursion, no increased effort, breath sounds clear  HEART:  Regular rhythm, S1, S2, no murmur/rub/S3/S4, no abdominal bruit, no edema  ABDOMEN:  Soft, non-tender, non-distended, normoactive bowel sounds,  no masses ,no hepato-splenomegaly, no signs of chronic liver disease, ostomy  EXTEREMITIES:  no cyanosis,clubbing or edema  SKIN:  No rash/erythema/ecchymoses/petechiae/wounds/abscess/warm/dry  NEURO:  Alert, oriented, no asterixis, no tremor, no encephalopathy      LABS:                        8.0    5.41  )-----------( 228      ( 06 Oct 2018 06:21 )             25.3     10-06    145  |  119<H>  |  66<H>  ----------------------------<  110<H>  4.2   |  12<L>  |  3.80<H>    Ca    6.5<LL>      06 Oct 2018 06:21      PT/INR - ( 05 Oct 2018 06:43 )   PT: 12.9 sec;   INR: 1.18 ratio             amylaseAmylase, Serum Total: 239 U/L (10-03 @ 15:30)     lipaseLipase, Serum: 802 U/L (10-03 @ 15:30)    RADIOLOGY & ADDITIONAL TESTS:

## 2018-10-24 PROCEDURE — 93005 ELECTROCARDIOGRAM TRACING: CPT

## 2018-10-24 PROCEDURE — 83970 ASSAY OF PARATHORMONE: CPT

## 2018-10-24 PROCEDURE — 82310 ASSAY OF CALCIUM: CPT

## 2018-10-24 PROCEDURE — 71045 X-RAY EXAM CHEST 1 VIEW: CPT

## 2018-10-24 PROCEDURE — 82465 ASSAY BLD/SERUM CHOLESTEROL: CPT

## 2018-10-24 PROCEDURE — 70450 CT HEAD/BRAIN W/O DYE: CPT

## 2018-10-24 PROCEDURE — 84100 ASSAY OF PHOSPHORUS: CPT

## 2018-10-24 PROCEDURE — 76770 US EXAM ABDO BACK WALL COMP: CPT

## 2018-10-24 PROCEDURE — 87086 URINE CULTURE/COLONY COUNT: CPT

## 2018-10-24 PROCEDURE — 87177 OVA AND PARASITES SMEARS: CPT

## 2018-10-24 PROCEDURE — 96376 TX/PRO/DX INJ SAME DRUG ADON: CPT

## 2018-10-24 PROCEDURE — 74176 CT ABD & PELVIS W/O CONTRAST: CPT

## 2018-10-24 PROCEDURE — 84484 ASSAY OF TROPONIN QUANT: CPT

## 2018-10-24 PROCEDURE — 80053 COMPREHEN METABOLIC PANEL: CPT

## 2018-10-24 PROCEDURE — 84478 ASSAY OF TRIGLYCERIDES: CPT

## 2018-10-24 PROCEDURE — 84443 ASSAY THYROID STIM HORMONE: CPT

## 2018-10-24 PROCEDURE — 96372 THER/PROPH/DIAG INJ SC/IM: CPT | Mod: XU

## 2018-10-24 PROCEDURE — 82962 GLUCOSE BLOOD TEST: CPT

## 2018-10-24 PROCEDURE — 83521 IG LIGHT CHAINS FREE EACH: CPT

## 2018-10-24 PROCEDURE — 36415 COLL VENOUS BLD VENIPUNCTURE: CPT

## 2018-10-24 PROCEDURE — 96375 TX/PRO/DX INJ NEW DRUG ADDON: CPT

## 2018-10-24 PROCEDURE — 82040 ASSAY OF SERUM ALBUMIN: CPT

## 2018-10-24 PROCEDURE — 80076 HEPATIC FUNCTION PANEL: CPT

## 2018-10-24 PROCEDURE — 87493 C DIFF AMPLIFIED PROBE: CPT

## 2018-10-24 PROCEDURE — 83735 ASSAY OF MAGNESIUM: CPT

## 2018-10-24 PROCEDURE — 82553 CREATINE MB FRACTION: CPT

## 2018-10-24 PROCEDURE — 84165 PROTEIN E-PHORESIS SERUM: CPT

## 2018-10-24 PROCEDURE — 85027 COMPLETE CBC AUTOMATED: CPT

## 2018-10-24 PROCEDURE — 96365 THER/PROPH/DIAG IV INF INIT: CPT

## 2018-10-24 PROCEDURE — 87507 IADNA-DNA/RNA PROBE TQ 12-25: CPT

## 2018-10-24 PROCEDURE — 87045 FECES CULTURE AEROBIC BACT: CPT

## 2018-10-24 PROCEDURE — 97162 PT EVAL MOD COMPLEX 30 MIN: CPT

## 2018-10-24 PROCEDURE — 82150 ASSAY OF AMYLASE: CPT

## 2018-10-24 PROCEDURE — 86038 ANTINUCLEAR ANTIBODIES: CPT

## 2018-10-24 PROCEDURE — 83690 ASSAY OF LIPASE: CPT

## 2018-10-24 PROCEDURE — 80048 BASIC METABOLIC PNL TOTAL CA: CPT

## 2018-10-24 PROCEDURE — 76705 ECHO EXAM OF ABDOMEN: CPT

## 2018-10-24 PROCEDURE — 83605 ASSAY OF LACTIC ACID: CPT

## 2018-10-24 PROCEDURE — 82550 ASSAY OF CK (CPK): CPT

## 2018-10-24 PROCEDURE — 87046 STOOL CULTR AEROBIC BACT EA: CPT

## 2018-10-24 PROCEDURE — 87186 SC STD MICRODIL/AGAR DIL: CPT

## 2018-10-24 PROCEDURE — 97530 THERAPEUTIC ACTIVITIES: CPT

## 2018-10-24 PROCEDURE — 99285 EMERGENCY DEPT VISIT HI MDM: CPT | Mod: 25

## 2018-10-24 PROCEDURE — 97116 GAIT TRAINING THERAPY: CPT

## 2018-10-24 PROCEDURE — 81001 URINALYSIS AUTO W/SCOPE: CPT

## 2018-10-24 PROCEDURE — 83880 ASSAY OF NATRIURETIC PEPTIDE: CPT

## 2018-10-24 PROCEDURE — 87040 BLOOD CULTURE FOR BACTERIA: CPT

## 2018-10-24 PROCEDURE — 84155 ASSAY OF PROTEIN SERUM: CPT

## 2018-10-24 PROCEDURE — 84145 PROCALCITONIN (PCT): CPT

## 2018-10-24 PROCEDURE — 84550 ASSAY OF BLOOD/URIC ACID: CPT

## 2018-11-11 PROCEDURE — 80053 COMPREHEN METABOLIC PANEL: CPT

## 2018-11-11 PROCEDURE — 82728 ASSAY OF FERRITIN: CPT

## 2018-11-11 PROCEDURE — 36415 COLL VENOUS BLD VENIPUNCTURE: CPT

## 2018-11-11 PROCEDURE — 84550 ASSAY OF BLOOD/URIC ACID: CPT

## 2018-11-11 PROCEDURE — 86900 BLOOD TYPING SEROLOGIC ABO: CPT

## 2018-11-11 PROCEDURE — 97116 GAIT TRAINING THERAPY: CPT

## 2018-11-11 PROCEDURE — 93005 ELECTROCARDIOGRAM TRACING: CPT

## 2018-11-11 PROCEDURE — 86901 BLOOD TYPING SEROLOGIC RH(D): CPT

## 2018-11-11 PROCEDURE — 85014 HEMATOCRIT: CPT

## 2018-11-11 PROCEDURE — 82746 ASSAY OF FOLIC ACID SERUM: CPT

## 2018-11-11 PROCEDURE — 99285 EMERGENCY DEPT VISIT HI MDM: CPT | Mod: 25

## 2018-11-11 PROCEDURE — 82150 ASSAY OF AMYLASE: CPT

## 2018-11-11 PROCEDURE — 85018 HEMOGLOBIN: CPT

## 2018-11-11 PROCEDURE — 97161 PT EVAL LOW COMPLEX 20 MIN: CPT

## 2018-11-11 PROCEDURE — 83550 IRON BINDING TEST: CPT

## 2018-11-11 PROCEDURE — 82607 VITAMIN B-12: CPT

## 2018-11-11 PROCEDURE — 80048 BASIC METABOLIC PNL TOTAL CA: CPT

## 2018-11-11 PROCEDURE — 85027 COMPLETE CBC AUTOMATED: CPT

## 2018-11-11 PROCEDURE — 94640 AIRWAY INHALATION TREATMENT: CPT

## 2018-11-11 PROCEDURE — 83690 ASSAY OF LIPASE: CPT

## 2018-11-11 PROCEDURE — 97530 THERAPEUTIC ACTIVITIES: CPT

## 2018-11-11 PROCEDURE — 86850 RBC ANTIBODY SCREEN: CPT

## 2018-11-11 PROCEDURE — 85610 PROTHROMBIN TIME: CPT

## 2019-04-16 NOTE — ED PROVIDER NOTE - CONDUCTED A DETAILED DISCUSSION WITH PATIENT AND/OR GUARDIAN REGARDING, MDM
need to admit/lab results/return to ED if symptoms worsen, persist or questions arise/radiology results No

## 2019-07-26 NOTE — PROGRESS NOTE ADULT - SUBJECTIVE AND OBJECTIVE BOX
GURJIT HERRERA  83y  Female    Patient is a 83y old  Female who presents with a chief complaint of I was dizzy and out of breathe (02 Aug 2018 22:08)      HPI:  83F with PMH HTN, HLD, Colon Ca s/p colostomy, CKD (baseline Cr 3-4 per daughter), Depression, Gout, Osteoporosis, hx of recurrent UTI presents with weakness, fatigue, dizziness, lightheadedness,  for one week. Reports one week ago moved into her son's house because her bathroom was getting remodeled, reports significant decrease in fluid intake because she didn't want to use the stairs to go to the bathroom. Admits to decreased urination and hematuria for two days. Reports hematuria usually associated with infection, denies dysuria and burning while voiding. Reports "extremely tired, I had to sit down to brush my teeth." Reports laying down improved symptoms and movement exacerbated symptoms. Also reports chronic significant itching throughout back and lower extremities. Denies change in appetite, recent sick contacts, recent travel. Reports seeing nephrologist, Dr. Coto less than 2 months ago and Cr improved to ~3, per daughter usual baseline is 4. Also reports recently recovering from Gout attack and was prescribed colchicine.     out of bed to chair.   denies any chest pain or shortness of breath.   ate better today.    PAST MEDICAL & SURGICAL HISTORY:  Herniated lumbar intervertebral disc  Depression  Tremor  Kidney atrophy: right  Colostomy status:   Chronic UTI (urinary tract infection)  Cervical cancer: &#x27;s  Dyslipidemia  Diabetes: type 2  Hernia, incisional  S/P hip replacement: right   S/P colon resection:   S/P hysterectomy:     PHYSICAL EXAM:    T(C): 36.3 (18 @ 12:00), Max: 37.1 (18 @ 07:41)  HR: 55 (18 @ 12:00) (53 - 76)  BP: 126/67 (18 @ 12:00) (103/63 - 129/82)  RR: 15 (18 @ 12:00) (15 - 23)  SpO2: 99% (18 @ 12:00) (96% - 100%)  Wt(kg): --    I&O's Detail    02 Aug 2018 07:01  -  03 Aug 2018 07:00  --------------------------------------------------------  IN:    IV PiggyBack: 50 mL    Oral Fluid: 100 mL    sodium bicarbonate  Infusion: 300 mL    sodium bicarbonate  Infusion: 200 mL    sodium chloride 0.45%: 400 mL    Sodium Chloride 0.9% IV Bolus: 1000 mL  Total IN: 2050 mL    OUT:    Colostomy: 300 mL    Indwelling Catheter - Urethral: 200 mL  Total OUT: 500 mL    Total NET: 1550 mL    Respiratory: clear anteriorly, decreased BS at bases  Cardiovascular: S1 S2  Gastrointestinal: soft NT ND +BS  Extremities: edema   Neuro: Awake and alert    MEDICATIONS  (STANDING):  aspirin enteric coated 81 milliGRAM(s) Oral daily  BACItracin   Ointment 1 Application(s) Topical two times a day  calcium carbonate 1250 mG  + Vitamin D (OsCal 500 + D) 1 Tablet(s) Oral three times a day  cefTRIAXone   IVPB 1 Gram(s) IV Intermittent every 24 hours  FLUoxetine 20 milliGRAM(s) Oral daily  heparin  Injectable 5000 Unit(s) SubCutaneous every 8 hours  lactobacillus acidophilus 1 Tablet(s) Oral daily  metoprolol tartrate 12.5 milliGRAM(s) Oral two times a day  simvastatin 20 milliGRAM(s) Oral at bedtime  sodium chloride 0.45% 1000 milliLiter(s) (100 mL/Hr) IV Continuous <Continuous>    MEDICATIONS  (PRN):  acetaminophen   Tablet. 650 milliGRAM(s) Oral every 6 hours PRN pain                           8.4    5.90  )-----------( 202      ( 03 Aug 2018 06:34 )             24.2       08-03    142  |  105  |  115<H>  ----------------------------<  74  3.3<L>   |  18<L>  |  9.20<H>    Ca    6.1<LL>      03 Aug 2018 06:34  Phos  8.7     08-  Mg     2.0     08-    TPro  5.6<L>  /  Alb  2.6<L>  /  TBili  0.3  /  DBili  x   /  AST  16  /  ALT  15  /  AlkPhos  49        Urinalysis Basic - ( 02 Aug 2018 16:04 )    Color: Yellow / Appearance: Turbid / S.020 / pH: x  Gluc: x / Ketone: Negative  / Bili: Negative / Urobili: Negative   Blood: x / Protein: 100 / Nitrite: Negative   Leuk Esterase: Moderate / RBC: 3-5 /HPF / WBC >50   Sq Epi: x / Non Sq Epi: Occasional / Bacteria: Moderate      < from: CT Renal Stone Hunt (18 @ 17:24) >  EXAM:  CT RENAL STONE HUNT                            PROCEDURE DATE:  2018          INTERPRETATION:  CLINICAL STATEMENT: acute renal failure, assess for stone    TECHNIQUE: CT of the abdomen and pelvis was performed in the axial plane   utilizing thin slices without IV or oral contrast. Sagittal and coronal   reformatting was performed.    COMPARISON: None.    FINDINGS:    The lower chest demonstrates coronary artery calcification.    The evaluation of the abdominal viscera and the bowelis limited without   contrast.    The liver is grossly unremarkable. The gallbladder is partially distended   and demonstrates calculus    The spleen, pancreas and adrenal glands are grossly unremarkable.    There is no hydronephrosis or urinary calculus. The right kidney is   atrophic The bladder is decompressed by a Perez catheter. There are   multiple surgical clips in the pelvis. Please correlate with surgical   history.     There is no bowel obstruction.  There is no intraperitoneal free air.    There is no free fluid. There is a midline ventral hernia containing a   dilated bowel loop with a suture line. There is a left lower quadrant   colostomy.    There is no abdominal or pelvic lymphadenopathy.  The pelvic structures   are grossly unremarkable.    The aorta is not aneurysmal. There is no significant retroperitoneal or   pelvic lymphadenopathy.     The bony structures demonstrate osteopenia and degenerative change. There   is a right total hip replacement. There is moderate compression fracture   of T12 which is indeterminate in age. There is levoscoliosis.    IMPRESSION: Left lower quadrant colostomy  Relative atrophy of the right kidney with mild hydronephrosis and   hydroureter without evidence of stone. The right ureter appears to course   to the left pelvis. The ureter is also thickening which may represent   inflammation/infection.  T12 compression fracture indeterminate in age   multiple surgical clips in the pelvis with hysterectomy.  Suture line in small bowel loop in the midabdomen with distention of the   small bowel loop at the area of the sutures with herniation of the small   bowel through the anterior abdominal wall without obstruction Yes

## 2020-02-16 ENCOUNTER — INPATIENT (INPATIENT)
Facility: HOSPITAL | Age: 85
LOS: 2 days | Discharge: HOME | DRG: 682 | End: 2020-02-19
Attending: STUDENT IN AN ORGANIZED HEALTH CARE EDUCATION/TRAINING PROGRAM | Admitting: STUDENT IN AN ORGANIZED HEALTH CARE EDUCATION/TRAINING PROGRAM
Payer: MEDICARE

## 2020-02-16 VITALS
TEMPERATURE: 98 F | OXYGEN SATURATION: 97 % | DIASTOLIC BLOOD PRESSURE: 79 MMHG | WEIGHT: 119.93 LBS | SYSTOLIC BLOOD PRESSURE: 153 MMHG | RESPIRATION RATE: 16 BRPM | HEART RATE: 78 BPM | HEIGHT: 61 IN

## 2020-02-16 DIAGNOSIS — R10.30 LOWER ABDOMINAL PAIN, UNSPECIFIED: ICD-10-CM

## 2020-02-16 LAB
ALBUMIN SERPL ELPH-MCNC: 2.7 G/DL — LOW (ref 3.3–5)
ALP SERPL-CCNC: 74 U/L — SIGNIFICANT CHANGE UP (ref 40–120)
ALT FLD-CCNC: 16 U/L — SIGNIFICANT CHANGE UP (ref 12–78)
ANION GAP SERPL CALC-SCNC: 13 MMOL/L — SIGNIFICANT CHANGE UP (ref 5–17)
APPEARANCE UR: ABNORMAL
APTT BLD: 26.5 SEC — LOW (ref 28.5–37)
AST SERPL-CCNC: 15 U/L — SIGNIFICANT CHANGE UP (ref 15–37)
BACTERIA # UR AUTO: ABNORMAL
BASOPHILS # BLD AUTO: 0.04 K/UL — SIGNIFICANT CHANGE UP (ref 0–0.2)
BASOPHILS NFR BLD AUTO: 0.3 % — SIGNIFICANT CHANGE UP (ref 0–2)
BILIRUB SERPL-MCNC: 0.2 MG/DL — SIGNIFICANT CHANGE UP (ref 0.2–1.2)
BILIRUB UR-MCNC: NEGATIVE — SIGNIFICANT CHANGE UP
BUN SERPL-MCNC: 60 MG/DL — HIGH (ref 7–23)
CALCIUM SERPL-MCNC: 7 MG/DL — LOW (ref 8.5–10.1)
CHLORIDE SERPL-SCNC: 110 MMOL/L — HIGH (ref 96–108)
CO2 SERPL-SCNC: 14 MMOL/L — LOW (ref 22–31)
COLOR SPEC: YELLOW — SIGNIFICANT CHANGE UP
CREAT SERPL-MCNC: 6.6 MG/DL — HIGH (ref 0.5–1.3)
DIFF PNL FLD: ABNORMAL
EOSINOPHIL # BLD AUTO: 0.02 K/UL — SIGNIFICANT CHANGE UP (ref 0–0.5)
EOSINOPHIL NFR BLD AUTO: 0.1 % — SIGNIFICANT CHANGE UP (ref 0–6)
EPI CELLS # UR: SIGNIFICANT CHANGE UP
GLUCOSE SERPL-MCNC: 136 MG/DL — HIGH (ref 70–99)
GLUCOSE UR QL: NEGATIVE — SIGNIFICANT CHANGE UP
HCT VFR BLD CALC: 30 % — LOW (ref 34.5–45)
HGB BLD-MCNC: 9.4 G/DL — LOW (ref 11.5–15.5)
IMM GRANULOCYTES NFR BLD AUTO: 0.4 % — SIGNIFICANT CHANGE UP (ref 0–1.5)
INR BLD: 1.56 RATIO — HIGH (ref 0.88–1.16)
KETONES UR-MCNC: NEGATIVE — SIGNIFICANT CHANGE UP
LEUKOCYTE ESTERASE UR-ACNC: ABNORMAL
LIDOCAIN IGE QN: 56 U/L — LOW (ref 73–393)
LYMPHOCYTES # BLD AUTO: 0.68 K/UL — LOW (ref 1–3.3)
LYMPHOCYTES # BLD AUTO: 5 % — LOW (ref 13–44)
MCHC RBC-ENTMCNC: 29.5 PG — SIGNIFICANT CHANGE UP (ref 27–34)
MCHC RBC-ENTMCNC: 31.3 GM/DL — LOW (ref 32–36)
MCV RBC AUTO: 94 FL — SIGNIFICANT CHANGE UP (ref 80–100)
MONOCYTES # BLD AUTO: 0.78 K/UL — SIGNIFICANT CHANGE UP (ref 0–0.9)
MONOCYTES NFR BLD AUTO: 5.7 % — SIGNIFICANT CHANGE UP (ref 2–14)
NEUTROPHILS # BLD AUTO: 12.05 K/UL — HIGH (ref 1.8–7.4)
NEUTROPHILS NFR BLD AUTO: 88.5 % — HIGH (ref 43–77)
NITRITE UR-MCNC: NEGATIVE — SIGNIFICANT CHANGE UP
NRBC # BLD: 0 /100 WBCS — SIGNIFICANT CHANGE UP (ref 0–0)
PH UR: 6 — SIGNIFICANT CHANGE UP (ref 5–8)
PLATELET # BLD AUTO: 368 K/UL — SIGNIFICANT CHANGE UP (ref 150–400)
POTASSIUM SERPL-MCNC: 5.4 MMOL/L — HIGH (ref 3.5–5.3)
POTASSIUM SERPL-SCNC: 5.4 MMOL/L — HIGH (ref 3.5–5.3)
PROT SERPL-MCNC: 6.8 G/DL — SIGNIFICANT CHANGE UP (ref 6–8.3)
PROT UR-MCNC: 500 MG/DL
PROTHROM AB SERPL-ACNC: 17.7 SEC — HIGH (ref 10–12.9)
RBC # BLD: 3.19 M/UL — LOW (ref 3.8–5.2)
RBC # FLD: 14.8 % — HIGH (ref 10.3–14.5)
RBC CASTS # UR COMP ASSIST: ABNORMAL /HPF (ref 0–4)
SODIUM SERPL-SCNC: 137 MMOL/L — SIGNIFICANT CHANGE UP (ref 135–145)
SP GR SPEC: 1.02 — SIGNIFICANT CHANGE UP (ref 1.01–1.02)
UROBILINOGEN FLD QL: NEGATIVE — SIGNIFICANT CHANGE UP
WBC # BLD: 13.63 K/UL — HIGH (ref 3.8–10.5)
WBC # FLD AUTO: 13.63 K/UL — HIGH (ref 3.8–10.5)
WBC UR QL: >50

## 2020-02-16 PROCEDURE — 99223 1ST HOSP IP/OBS HIGH 75: CPT | Mod: GC,AI

## 2020-02-16 PROCEDURE — 93010 ELECTROCARDIOGRAM REPORT: CPT

## 2020-02-16 PROCEDURE — 74176 CT ABD & PELVIS W/O CONTRAST: CPT | Mod: 26

## 2020-02-16 PROCEDURE — 99284 EMERGENCY DEPT VISIT MOD MDM: CPT

## 2020-02-16 RX ORDER — CEFTRIAXONE 500 MG/1
1000 INJECTION, POWDER, FOR SOLUTION INTRAMUSCULAR; INTRAVENOUS ONCE
Refills: 0 | Status: COMPLETED | OUTPATIENT
Start: 2020-02-16 | End: 2020-02-16

## 2020-02-16 RX ORDER — IOHEXOL 300 MG/ML
30 INJECTION, SOLUTION INTRAVENOUS ONCE
Refills: 0 | Status: COMPLETED | OUTPATIENT
Start: 2020-02-16 | End: 2020-02-16

## 2020-02-16 RX ORDER — SODIUM CHLORIDE 9 MG/ML
1000 INJECTION INTRAMUSCULAR; INTRAVENOUS; SUBCUTANEOUS ONCE
Refills: 0 | Status: COMPLETED | OUTPATIENT
Start: 2020-02-16 | End: 2020-02-16

## 2020-02-16 RX ORDER — ONDANSETRON 8 MG/1
4 TABLET, FILM COATED ORAL ONCE
Refills: 0 | Status: COMPLETED | OUTPATIENT
Start: 2020-02-16 | End: 2020-02-16

## 2020-02-16 RX ADMIN — IOHEXOL 30 MILLILITER(S): 300 INJECTION, SOLUTION INTRAVENOUS at 19:22

## 2020-02-16 RX ADMIN — SODIUM CHLORIDE 1000 MILLILITER(S): 9 INJECTION INTRAMUSCULAR; INTRAVENOUS; SUBCUTANEOUS at 20:22

## 2020-02-16 RX ADMIN — ONDANSETRON 4 MILLIGRAM(S): 8 TABLET, FILM COATED ORAL at 19:22

## 2020-02-16 RX ADMIN — CEFTRIAXONE 100 MILLIGRAM(S): 500 INJECTION, POWDER, FOR SOLUTION INTRAMUSCULAR; INTRAVENOUS at 23:47

## 2020-02-16 RX ADMIN — SODIUM CHLORIDE 1000 MILLILITER(S): 9 INJECTION INTRAMUSCULAR; INTRAVENOUS; SUBCUTANEOUS at 19:22

## 2020-02-16 NOTE — H&P ADULT - PROBLEM SELECTOR PLAN 6
anemia likely due to iron deficiency vs. chronic kidney disease  - Continue ferrous sulfate 325mg qd Stable. Hgb 9.5  - anemia likely due to iron deficiency vs. chronic kidney disease  - Continue ferrous sulfate 325mg qd

## 2020-02-16 NOTE — H&P ADULT - PROBLEM SELECTOR PLAN 1
Admit to [facility]  - Likely due to dehydration and urinary retention from renae catheter problem  - s/p renae catheter replacement in ER  - s/p 1L normal saline IVF  - continue gentle fluid IVF hydration  - Hyperkalemia likely due to chronic renal failure. No EKG changes noted  - Follow up AM BMP  - Avoid nephrotoxic medications  - Nephro consulted- Dr. Coto Admit to [facility]  - Likely due to dehydration and urinary retention from renae catheter problem  - s/p renae catheter replacement in ER  - Continue vitamin D3  - s/p 1L normal saline IVF  - continue gentle fluid IVF hydration  - Hyperkalemia likely due to chronic renal failure. No EKG changes noted  - Follow up AM BMP  - Avoid nephrotoxic medications  - Nephro consulted- Dr. Coto Admit to [facility]  - Likely due to dehydration and urinary retention from renae catheter problem  - s/p renae catheter replacement in ER  - Continue vitamin D3  - s/p 1L normal saline IVF  - continue gentle fluid IVF hydration  - Hyperkalemia likely due to chronic renal failure. No EKG changes noted  - with metabolic acidosis on bicarb  - Follow up AM BMP  - Avoid nephrotoxic medications  - Nephro consulted- Dr. Coto

## 2020-02-16 NOTE — H&P ADULT - NSICDXPASTSURGICALHX_GEN_ALL_CORE_FT
PAST SURGICAL HISTORY:  S/P colon resection 2006    S/P hip replacement right 2013    S/P hysterectomy 1977

## 2020-02-16 NOTE — H&P ADULT - ASSESSMENT
84 year old female with PMH of CKD (baseline creatinine <5), colon obstruction (s/p ostomy 16 years ago) and chronic diarrhea, cervical cancer (s/p radical hysterectomy), tremors, eczema, depression, recent hx of sacral wound, HTN, HLD, history of frequent UTIs with chronic renae, presenting with lower abdominal pain and leaking renae catheter. Admitted for MEHDI on CKD and Sacral wound infection. 84 year old female with PMH of CKD (baseline creatinine <5), colon obstruction (s/p ostomy 16 years ago) and chronic diarrhea, cervical cancer (s/p radical hysterectomy), tremors, eczema, depression, recent hx of sacral wound, HTN, HLD, history of frequent UTIs with chronic renae, presenting with lower abdominal pain and leaking renae catheter. Admitted for MEHDI on CKD5 and Sacral wound infection.

## 2020-02-16 NOTE — ED ADULT NURSE NOTE - NSIMPLEMENTINTERV_GEN_ALL_ED
Implemented All Fall Risk Interventions:  [place] to call system. Call bell, personal items and telephone within reach. Instruct patient to call for assistance. Room bathroom lighting operational. Non-slip footwear when patient is off stretcher. Physically safe environment: no spills, clutter or unnecessary equipment. Stretcher in lowest position, wheels locked, appropriate side rails in place. Provide visual cue, wrist band, yellow gown, etc. Monitor gait and stability. Monitor for mental status changes and reorient to person, place, and time. Review medications for side effects contributing to fall risk. Reinforce activity limits and safety measures with patient and family.

## 2020-02-16 NOTE — ED PROVIDER NOTE - CARE PLAN
Principal Discharge DX:	Lower abdominal pain Principal Discharge DX:	Lower abdominal pain  Secondary Diagnosis:	Acute on chronic renal failure

## 2020-02-16 NOTE — ED ADULT NURSE NOTE - PMH
Cervical cancer  1970's  Chronic UTI (urinary tract infection)    CKD (chronic kidney disease) stage 5, GFR less than 15 ml/min    Colostomy status  2006  Dyslipidemia    Hernia, incisional    Herniated lumbar intervertebral disc    Kidney atrophy  right  Tremor

## 2020-02-16 NOTE — H&P ADULT - PROBLEM SELECTOR PLAN 3
Pt with recent hx of sacral wound infection requiring IV abx x 2 months and a wound vac from [facility] (Dr. Arceo)  - Primary team to retrieve records in AM  - Wound care MD to be consulted in AM Pt with recent hx of sacral wound infection requiring IV abx x 2 months and a wound vac from [facility] (Dr. Arceo)  - Primary team to retrieve records in AM  - Pain management with tylenol and tramadol prn. Lidocaine patch qd  - Wound care MD to be consulted in AM Pt with recent hx of sacral wound infection requiring IV abx x 2 months and a wound vac from [facility] (Dr. Arceo)  - Primary team to retrieve records in AM  - Pain management with tylenol and tramadol prn. Lidocaine patch qd  - I-STOP completed and placed in chart  - Wound care MD to be consulted in AM Pt with recent hx of sacral wound infection requiring IV abx x 2 months and a wound vac from [facility] (Dr. Arceo)  - CT A/P showed New presacral fat stranding without definite fluid collection.  - Primary team to retrieve records in AM  - Pain management with tylenol and tramadol prn. Lidocaine patch qd  - I-STOP completed and placed in chart  - Wound care MD to be consulted in AM

## 2020-02-16 NOTE — H&P ADULT - NSHPSOCIALHISTORY_GEN_ALL_CORE
Patient lives with daughter at home.  Ambulates with a cane at baseline.  Patient denies alcohol, smoking or illicit drug use, quit smoking 30 years ago.  Patient has had pneumonia shot but has not had influenza shot for this year. Patient lives with daughter at home. Has a visiting nurse  Ambulates with a cane at baseline but does not often walk  Patient denies alcohol, smoking or illicit drug use, quit smoking over 30 years ago. 20 pack year history

## 2020-02-16 NOTE — H&P ADULT - PROBLEM SELECTOR PLAN 2
with chronic renae, s/p replaced in ER  - s/p Rocephin in ER. Continue Rocephin  - Follow up urine culture  - gentle hydration

## 2020-02-16 NOTE — H&P ADULT - NSHPPHYSICALEXAM_GEN_ALL_CORE
Vital Signs Last 24 Hrs  T(C): 36.7 (16 Feb 2020 18:06), Max: 36.7 (16 Feb 2020 18:06)  T(F): 98 (16 Feb 2020 18:06), Max: 98 (16 Feb 2020 18:06)  HR: 78 (16 Feb 2020 18:06) (78 - 78)  BP: 153/79 (16 Feb 2020 18:06) (153/79 - 153/79)  RR: 16 (16 Feb 2020 18:06) (16 - 16)  SpO2: 97% (16 Feb 2020 18:06) (97% - 97%)    Physical Exam:  General: Well developed, well nourished, NAD  HEENT: NCAT, PERRLA, EOMI bl, moist mucous membranes   Neck: Supple, nontender, no mass  Neurology: A&Ox3, nonfocal, CN II-XII grossly intact, sensation intact, no gait abnormalities   Respiratory: CTA B/L, No W/R/R  CV: RRR, +S1/S2, no murmurs, rubs or gallops  Abdominal: Soft, NT, ND +BSx4  Extremities: No C/C/E, + peripheral pulses  MSK: Normal ROM, no joint erythema or warmth, no joint swelling   Skin: warm, dry, normal color, no rash or abnormal lesions Vital Signs Last 24 Hrs  T(C): 36.7 (16 Feb 2020 18:06), Max: 36.7 (16 Feb 2020 18:06)  T(F): 98 (16 Feb 2020 18:06), Max: 98 (16 Feb 2020 18:06)  HR: 78 (16 Feb 2020 18:06) (78 - 78)  BP: 153/79 (16 Feb 2020 18:06) (153/79 - 153/79)  RR: 16 (16 Feb 2020 18:06) (16 - 16)  SpO2: 97% (16 Feb 2020 18:06) (97% - 97%)    Physical Exam:  General: Well developed, well nourished, NAD  HEENT: NCAT, PERRLA, EOMI bl, moist mucous membranes   Neck: Supple  Neurology: A&Ox3, nonfocal, CN II-XII grossly intact, sensation intact, no gait abnormalities   Respiratory: CTA B/L, No W/R/R  CV: RRR, +S1/S2, no murmurs, rubs or gallops  Abdominal: +LLQ colostomy with green/light brown soft stool, Soft, NT, ND +BSx4  Extremities: No C/C/E, + peripheral pulses  MSK: Normal ROM, no joint erythema or warmth, no joint swelling   Skin: diffuse excoriations on b/l extensor surfaces of arms and legs, erythematous excoriated lesions on back, stage 3 sacral wound with bandage without active bleeding or drainage

## 2020-02-16 NOTE — H&P ADULT - NSICDXPASTMEDICALHX_GEN_ALL_CORE_FT
PAST MEDICAL HISTORY:  Cervical cancer 1970's    Chronic UTI (urinary tract infection)     CKD (chronic kidney disease) stage 5, GFR less than 15 ml/min     Colostomy status 2006    Dyslipidemia     Hernia, incisional     Herniated lumbar intervertebral disc     Kidney atrophy right    Tremor PAST MEDICAL HISTORY:  Cervical cancer 1970's    Chronic UTI (urinary tract infection) chronic renae    CKD (chronic kidney disease) stage 5, GFR less than 15 ml/min not on ESRD    Colostomy status 2006    Depression     Dyslipidemia     Eczema     Hernia, incisional     Herniated lumbar intervertebral disc     HTN (hypertension)     Iron deficiency anemia     Kidney atrophy right    Tremor     Wound of sacral region

## 2020-02-16 NOTE — H&P ADULT - PROBLEM SELECTOR PLAN 5
Stable, chronic diarrhea  - Pt takes imodium TID (will hold for prolonged QTc 503). Repeat EKG in AM  - with recent hx of long term use of abx, continue bacid

## 2020-02-16 NOTE — H&P ADULT - PROBLEM SELECTOR PLAN 7
With diffuse pruritis  - Continue Levocetirizine with interchange to Claritin 10 mg qd  - Continue home med of Hydroxyzine 25mg q8 prn pruritis. Patient takes 50mg at night.   - Pt uses clobetasol foam at home which is not on formulary. If patient requests, would try alternative or await wound care marjorie

## 2020-02-16 NOTE — H&P ADULT - NSHPOUTPATIENTPROVIDERS_GEN_ALL_CORE
[facility]- Dr. Santos  Nephro- Dr. Gusman PCP: Dr. Felix Santos   Urologist: Dr. Ko  Nephrologist: Dr. Coto  Cardio- [facility]  Derm- Dr. Zuri Thompson PCP: Dr. Scott Snatos   Urologist: Dr. Ko  Nephrologist: Dr. Coto  Cardio- [facility]  Derm- Dr. Zuri Thompson

## 2020-02-16 NOTE — H&P ADULT - NSHPREVIEWOFSYSTEMS_GEN_ALL_CORE
Constitutional: denies fever, chills, diaphoresis   HEENT: denies blurry vision, difficulty hearing  Respiratory: denies SOB, WALKER, cough, sputum production, wheezing, hemoptysis  Cardiovascular: denies CP, palpitations, edema  Gastrointestinal: denies nausea, vomiting, diarrhea, constipation, abdominal pain, melena, hematochezia   Genitourinary: denies dysuria, frequency, urgency, hematuria   Skin/Breast: denies rash, itching  Musculoskeletal: denies myalgias, joint swelling, muscle weakness  Neurologic: denies headache, weakness, dizziness, paresthesias, numbness/tingling  Psychiatric: denies feeling anxious, depressed, suicidal, homicidal thoughts  Hematology/Oncology: denies bruising, tender or enlarged lymph nodes   ROS negative except as noted above Constitutional: denies fever, chills, diaphoresis   HEENT: denies blurry vision, difficulty hearing  Respiratory: denies SOB, WALKER, cough, sputum production, wheezing, hemoptysis  Cardiovascular: denies CP, palpitations, edema  Gastrointestinal: admits abdominal pain, admits chronic diarrhea denies nausea, vomiting, constipation, melena, hematochezia   Genitourinary: admits chronic renae, denies dysuria, frequency, urgency, hematuria   Skin/Breast: admits diffuse eczema, pruritis  Musculoskeletal: admits back pain  Neurologic: denies headache, weakness, dizziness, paresthesias, numbness/tingling  Psychiatric: denies feeling anxious, depressed

## 2020-02-16 NOTE — ED PROVIDER NOTE - OBJECTIVE STATEMENT
pt c/o abd pain since last night with nausea and vomiting. no fevers, chills, change in colostomy output. pt has indwelling renae overdue for change, has been leaking around catheter. pt declining pain meds  pmd - diggeetha  [facility]

## 2020-02-16 NOTE — H&P ADULT - NSICDXFAMILYHX_GEN_ALL_CORE_FT
FAMILY HISTORY:  Sibling  Still living? No  Family history of ovarian cancer, Age at diagnosis: Age Unknown

## 2020-02-16 NOTE — H&P ADULT - HISTORY OF PRESENT ILLNESS
84 year old female with PMH of CKD (baseline creatinine around 4), colon obstruction (s/p ostomy 15 years ago), cervical cancer (s/p radical hysterectomy), tremors, HLD, history of frequent UTIs, presenting with abdominal pain and leaking renae catheter.     In the ED, patient's vitals were: T98F, HR 78, /79, RR 16 and SpO2 97% on room air. Significant labs include: WBC 13.63, H/H 9.4/30, INR 1.56, K 5.4, BUN/Cr 60/6.6 (per chart review, baseline Cr ~5), Calcium 7.0 (corrected 8).   UA: RBC 11-25, WBC >50, mod bacteria, turbid appearance, protein 500, large blood, mod LE, neg nitrites  CT A/P: New presacral fat stranding without definite fluid collection.  s/p 1L Normal saline IVF and Zofran 4mg IV x1  EKG: 84 year old female with PMH of CKD (baseline creatinine <5), colon obstruction (s/p ostomy 16 years ago) and chronic diarrhea, cervical cancer (s/p radical hysterectomy), tremors, eczema, depression, recent hx of sacral wound, HTN, HLD, history of frequent UTIs with chronic renae, presenting with lower abdominal pain and leaking renae catheter. Daughter at bedside assisting with history. States that patient developed lower abdominal pain for the past day which felt as if the renae catheter was "sitting in the wrong place for too long." Denies fevers, dysuria, nausea/vomiting, constipation or eating anything out of the ordinary. Patient has chronic diarrhea. Renae cathter was last changed 12/24/19 and needs monthly changing. Daughter requesting wound care evaluation as patient had a wound vac for a sacral wound while in [place] 1 month ago. She completed a 2 month course of IV abx via picc line but daughter does not recall name of abx.     In the ED, patient's vitals were: T98F, HR 78, /79, RR 16 and SpO2 97% on room air. Significant labs include: WBC 13.63, H/H 9.4/30, INR 1.56, K 5.4, BUN/Cr 60/6.6 (per chart review, baseline Cr ~5), Calcium 7.0 (corrected 8).   UA: RBC 11-25, WBC >50, mod bacteria, turbid appearance, protein 500, large blood, mod LE, neg nitrites  CT A/P: New presacral fat stranding without definite fluid collection.  s/p 1L Normal saline IVF and Zofran 4mg IV x1  EKG: NSR, HR 64, prolonged QTc 503

## 2020-02-17 DIAGNOSIS — L30.9 DERMATITIS, UNSPECIFIED: ICD-10-CM

## 2020-02-17 DIAGNOSIS — D50.9 IRON DEFICIENCY ANEMIA, UNSPECIFIED: ICD-10-CM

## 2020-02-17 DIAGNOSIS — Z29.9 ENCOUNTER FOR PROPHYLACTIC MEASURES, UNSPECIFIED: ICD-10-CM

## 2020-02-17 DIAGNOSIS — Z93.3 COLOSTOMY STATUS: ICD-10-CM

## 2020-02-17 DIAGNOSIS — I10 ESSENTIAL (PRIMARY) HYPERTENSION: ICD-10-CM

## 2020-02-17 DIAGNOSIS — N17.9 ACUTE KIDNEY FAILURE, UNSPECIFIED: ICD-10-CM

## 2020-02-17 DIAGNOSIS — F32.9 MAJOR DEPRESSIVE DISORDER, SINGLE EPISODE, UNSPECIFIED: ICD-10-CM

## 2020-02-17 DIAGNOSIS — L89.154 PRESSURE ULCER OF SACRAL REGION, STAGE 4: ICD-10-CM

## 2020-02-17 DIAGNOSIS — E78.5 HYPERLIPIDEMIA, UNSPECIFIED: ICD-10-CM

## 2020-02-17 DIAGNOSIS — N39.0 URINARY TRACT INFECTION, SITE NOT SPECIFIED: ICD-10-CM

## 2020-02-17 DIAGNOSIS — S31.000A UNSPECIFIED OPEN WOUND OF LOWER BACK AND PELVIS WITHOUT PENETRATION INTO RETROPERITONEUM, INITIAL ENCOUNTER: ICD-10-CM

## 2020-02-17 LAB
ANION GAP SERPL CALC-SCNC: 13 MMOL/L — SIGNIFICANT CHANGE UP (ref 5–17)
ANION GAP SERPL CALC-SCNC: 15 MMOL/L — SIGNIFICANT CHANGE UP (ref 5–17)
BUN SERPL-MCNC: 62 MG/DL — HIGH (ref 7–23)
BUN SERPL-MCNC: 68 MG/DL — HIGH (ref 7–23)
CALCIUM SERPL-MCNC: 6.5 MG/DL — CRITICAL LOW (ref 8.5–10.1)
CALCIUM SERPL-MCNC: 6.6 MG/DL — LOW (ref 8.5–10.1)
CHLORIDE SERPL-SCNC: 112 MMOL/L — HIGH (ref 96–108)
CHLORIDE SERPL-SCNC: 114 MMOL/L — HIGH (ref 96–108)
CO2 SERPL-SCNC: 13 MMOL/L — LOW (ref 22–31)
CO2 SERPL-SCNC: 13 MMOL/L — LOW (ref 22–31)
CREAT SERPL-MCNC: 6 MG/DL — HIGH (ref 0.5–1.3)
CREAT SERPL-MCNC: 6.3 MG/DL — HIGH (ref 0.5–1.3)
CULTURE RESULTS: SIGNIFICANT CHANGE UP
GLUCOSE SERPL-MCNC: 150 MG/DL — HIGH (ref 70–99)
GLUCOSE SERPL-MCNC: 72 MG/DL — SIGNIFICANT CHANGE UP (ref 70–99)
HCT VFR BLD CALC: 26.4 % — LOW (ref 34.5–45)
HGB BLD-MCNC: 8.3 G/DL — LOW (ref 11.5–15.5)
MAGNESIUM SERPL-MCNC: 1 MG/DL — CRITICAL LOW (ref 1.6–2.6)
MCHC RBC-ENTMCNC: 29.7 PG — SIGNIFICANT CHANGE UP (ref 27–34)
MCHC RBC-ENTMCNC: 31.4 GM/DL — LOW (ref 32–36)
MCV RBC AUTO: 94.6 FL — SIGNIFICANT CHANGE UP (ref 80–100)
NRBC # BLD: 0 /100 WBCS — SIGNIFICANT CHANGE UP (ref 0–0)
PHOSPHATE SERPL-MCNC: 5.8 MG/DL — HIGH (ref 2.5–4.5)
PLATELET # BLD AUTO: 314 K/UL — SIGNIFICANT CHANGE UP (ref 150–400)
POTASSIUM SERPL-MCNC: 4.4 MMOL/L — SIGNIFICANT CHANGE UP (ref 3.5–5.3)
POTASSIUM SERPL-MCNC: 4.9 MMOL/L — SIGNIFICANT CHANGE UP (ref 3.5–5.3)
POTASSIUM SERPL-SCNC: 4.4 MMOL/L — SIGNIFICANT CHANGE UP (ref 3.5–5.3)
POTASSIUM SERPL-SCNC: 4.9 MMOL/L — SIGNIFICANT CHANGE UP (ref 3.5–5.3)
RBC # BLD: 2.79 M/UL — LOW (ref 3.8–5.2)
RBC # FLD: 14.7 % — HIGH (ref 10.3–14.5)
SODIUM SERPL-SCNC: 140 MMOL/L — SIGNIFICANT CHANGE UP (ref 135–145)
SODIUM SERPL-SCNC: 140 MMOL/L — SIGNIFICANT CHANGE UP (ref 135–145)
SPECIMEN SOURCE: SIGNIFICANT CHANGE UP
WBC # BLD: 10.16 K/UL — SIGNIFICANT CHANGE UP (ref 3.8–10.5)
WBC # FLD AUTO: 10.16 K/UL — SIGNIFICANT CHANGE UP (ref 3.8–10.5)

## 2020-02-17 PROCEDURE — 93010 ELECTROCARDIOGRAM REPORT: CPT

## 2020-02-17 PROCEDURE — 99233 SBSQ HOSP IP/OBS HIGH 50: CPT

## 2020-02-17 PROCEDURE — 99223 1ST HOSP IP/OBS HIGH 75: CPT

## 2020-02-17 RX ORDER — CHOLECALCIFEROL (VITAMIN D3) 125 MCG
1000 CAPSULE ORAL DAILY
Refills: 0 | Status: DISCONTINUED | OUTPATIENT
Start: 2020-02-17 | End: 2020-02-19

## 2020-02-17 RX ORDER — METOPROLOL TARTRATE 50 MG
25 TABLET ORAL DAILY
Refills: 0 | Status: DISCONTINUED | OUTPATIENT
Start: 2020-02-17 | End: 2020-02-19

## 2020-02-17 RX ORDER — CHOLESTYRAMINE 4 G/9G
4 POWDER, FOR SUSPENSION ORAL DAILY
Refills: 0 | Status: DISCONTINUED | OUTPATIENT
Start: 2020-02-17 | End: 2020-02-19

## 2020-02-17 RX ORDER — ATORVASTATIN CALCIUM 80 MG/1
10 TABLET, FILM COATED ORAL AT BEDTIME
Refills: 0 | Status: DISCONTINUED | OUTPATIENT
Start: 2020-02-17 | End: 2020-02-19

## 2020-02-17 RX ORDER — LACTOBACILLUS ACIDOPHILUS 100MM CELL
1 CAPSULE ORAL THREE TIMES A DAY
Refills: 0 | Status: DISCONTINUED | OUTPATIENT
Start: 2020-02-17 | End: 2020-02-19

## 2020-02-17 RX ORDER — BUDESONIDE AND FORMOTEROL FUMARATE DIHYDRATE 160; 4.5 UG/1; UG/1
2 AEROSOL RESPIRATORY (INHALATION)
Refills: 0 | Status: DISCONTINUED | OUTPATIENT
Start: 2020-02-17 | End: 2020-02-19

## 2020-02-17 RX ORDER — TRAMADOL HYDROCHLORIDE 50 MG/1
50 TABLET ORAL EVERY 6 HOURS
Refills: 0 | Status: DISCONTINUED | OUTPATIENT
Start: 2020-02-17 | End: 2020-02-19

## 2020-02-17 RX ORDER — CALCIUM CARBONATE 500(1250)
1 TABLET ORAL DAILY
Refills: 0 | Status: DISCONTINUED | OUTPATIENT
Start: 2020-02-17 | End: 2020-02-19

## 2020-02-17 RX ORDER — METHENAMINE MANDELATE 1 G
1 TABLET ORAL
Qty: 0 | Refills: 0 | DISCHARGE

## 2020-02-17 RX ORDER — ASPIRIN/CALCIUM CARB/MAGNESIUM 324 MG
81 TABLET ORAL DAILY
Refills: 0 | Status: DISCONTINUED | OUTPATIENT
Start: 2020-02-17 | End: 2020-02-19

## 2020-02-17 RX ORDER — NYSTATIN CREAM 100000 [USP'U]/G
1 CREAM TOPICAL
Refills: 0 | Status: DISCONTINUED | OUTPATIENT
Start: 2020-02-17 | End: 2020-02-19

## 2020-02-17 RX ORDER — SODIUM CHLORIDE 9 MG/ML
1000 INJECTION, SOLUTION INTRAVENOUS
Refills: 0 | Status: DISCONTINUED | OUTPATIENT
Start: 2020-02-17 | End: 2020-02-19

## 2020-02-17 RX ORDER — SODIUM BICARBONATE 1 MEQ/ML
650 SYRINGE (ML) INTRAVENOUS THREE TIMES A DAY
Refills: 0 | Status: DISCONTINUED | OUTPATIENT
Start: 2020-02-17 | End: 2020-02-17

## 2020-02-17 RX ORDER — CEFTRIAXONE 500 MG/1
1000 INJECTION, POWDER, FOR SOLUTION INTRAMUSCULAR; INTRAVENOUS EVERY 24 HOURS
Refills: 0 | Status: DISCONTINUED | OUTPATIENT
Start: 2020-02-17 | End: 2020-02-17

## 2020-02-17 RX ORDER — LIDOCAINE 4 G/100G
1 CREAM TOPICAL DAILY
Refills: 0 | Status: DISCONTINUED | OUTPATIENT
Start: 2020-02-17 | End: 2020-02-19

## 2020-02-17 RX ORDER — COLLAGENASE CLOSTRIDIUM HIST. 250 UNIT/G
1 OINTMENT (GRAM) TOPICAL DAILY
Refills: 0 | Status: DISCONTINUED | OUTPATIENT
Start: 2020-02-17 | End: 2020-02-19

## 2020-02-17 RX ORDER — FLUOXETINE HCL 10 MG
20 CAPSULE ORAL DAILY
Refills: 0 | Status: DISCONTINUED | OUTPATIENT
Start: 2020-02-17 | End: 2020-02-19

## 2020-02-17 RX ORDER — NEBIVOLOL HYDROCHLORIDE 5 MG/1
1 TABLET ORAL
Qty: 0 | Refills: 0 | DISCHARGE

## 2020-02-17 RX ORDER — HEPARIN SODIUM 5000 [USP'U]/ML
5000 INJECTION INTRAVENOUS; SUBCUTANEOUS EVERY 8 HOURS
Refills: 0 | Status: DISCONTINUED | OUTPATIENT
Start: 2020-02-17 | End: 2020-02-19

## 2020-02-17 RX ORDER — ACETAMINOPHEN 500 MG
650 TABLET ORAL EVERY 6 HOURS
Refills: 0 | Status: DISCONTINUED | OUTPATIENT
Start: 2020-02-17 | End: 2020-02-19

## 2020-02-17 RX ORDER — HYDROXYZINE HCL 10 MG
25 TABLET ORAL THREE TIMES A DAY
Refills: 0 | Status: DISCONTINUED | OUTPATIENT
Start: 2020-02-17 | End: 2020-02-19

## 2020-02-17 RX ORDER — FERROUS SULFATE 325(65) MG
325 TABLET ORAL DAILY
Refills: 0 | Status: DISCONTINUED | OUTPATIENT
Start: 2020-02-17 | End: 2020-02-19

## 2020-02-17 RX ORDER — TRAMADOL HYDROCHLORIDE 50 MG/1
1 TABLET ORAL
Qty: 0 | Refills: 0 | DISCHARGE

## 2020-02-17 RX ORDER — ERYTHROPOIETIN 10000 [IU]/ML
10000 INJECTION, SOLUTION INTRAVENOUS; SUBCUTANEOUS
Refills: 0 | Status: DISCONTINUED | OUTPATIENT
Start: 2020-02-17 | End: 2020-02-19

## 2020-02-17 RX ORDER — SODIUM CHLORIDE 9 MG/ML
1000 INJECTION INTRAMUSCULAR; INTRAVENOUS; SUBCUTANEOUS
Refills: 0 | Status: DISCONTINUED | OUTPATIENT
Start: 2020-02-17 | End: 2020-02-17

## 2020-02-17 RX ORDER — LORATADINE 10 MG/1
10 TABLET ORAL DAILY
Refills: 0 | Status: DISCONTINUED | OUTPATIENT
Start: 2020-02-17 | End: 2020-02-19

## 2020-02-17 RX ORDER — AMLODIPINE BESYLATE 2.5 MG/1
5 TABLET ORAL DAILY
Refills: 0 | Status: DISCONTINUED | OUTPATIENT
Start: 2020-02-17 | End: 2020-02-19

## 2020-02-17 RX ORDER — MAGNESIUM SULFATE 500 MG/ML
2 VIAL (ML) INJECTION EVERY 4 HOURS
Refills: 0 | Status: COMPLETED | OUTPATIENT
Start: 2020-02-17 | End: 2020-02-17

## 2020-02-17 RX ADMIN — Medication 25 MILLIGRAM(S): at 17:37

## 2020-02-17 RX ADMIN — HEPARIN SODIUM 5000 UNIT(S): 5000 INJECTION INTRAVENOUS; SUBCUTANEOUS at 15:06

## 2020-02-17 RX ADMIN — LIDOCAINE 1 PATCH: 4 CREAM TOPICAL at 23:25

## 2020-02-17 RX ADMIN — TRAMADOL HYDROCHLORIDE 50 MILLIGRAM(S): 50 TABLET ORAL at 02:50

## 2020-02-17 RX ADMIN — CHOLESTYRAMINE 4 GRAM(S): 4 POWDER, FOR SUSPENSION ORAL at 08:25

## 2020-02-17 RX ADMIN — NYSTATIN CREAM 1 APPLICATION(S): 100000 CREAM TOPICAL at 23:25

## 2020-02-17 RX ADMIN — Medication 50 GRAM(S): at 14:14

## 2020-02-17 RX ADMIN — ERYTHROPOIETIN 10000 UNIT(S): 10000 INJECTION, SOLUTION INTRAVENOUS; SUBCUTANEOUS at 14:15

## 2020-02-17 RX ADMIN — Medication 1000 UNIT(S): at 11:41

## 2020-02-17 RX ADMIN — TRAMADOL HYDROCHLORIDE 50 MILLIGRAM(S): 50 TABLET ORAL at 09:00

## 2020-02-17 RX ADMIN — Medication 650 MILLIGRAM(S): at 05:46

## 2020-02-17 RX ADMIN — TRAMADOL HYDROCHLORIDE 50 MILLIGRAM(S): 50 TABLET ORAL at 01:55

## 2020-02-17 RX ADMIN — HEPARIN SODIUM 5000 UNIT(S): 5000 INJECTION INTRAVENOUS; SUBCUTANEOUS at 05:47

## 2020-02-17 RX ADMIN — Medication 50 GRAM(S): at 11:33

## 2020-02-17 RX ADMIN — Medication 1 TABLET(S): at 23:25

## 2020-02-17 RX ADMIN — Medication 20 MILLIGRAM(S): at 11:42

## 2020-02-17 RX ADMIN — HEPARIN SODIUM 5000 UNIT(S): 5000 INJECTION INTRAVENOUS; SUBCUTANEOUS at 23:25

## 2020-02-17 RX ADMIN — TRAMADOL HYDROCHLORIDE 50 MILLIGRAM(S): 50 TABLET ORAL at 16:15

## 2020-02-17 RX ADMIN — AMLODIPINE BESYLATE 5 MILLIGRAM(S): 2.5 TABLET ORAL at 05:46

## 2020-02-17 RX ADMIN — LORATADINE 10 MILLIGRAM(S): 10 TABLET ORAL at 11:43

## 2020-02-17 RX ADMIN — Medication 1 TABLET(S): at 11:41

## 2020-02-17 RX ADMIN — Medication 25 MILLIGRAM(S): at 05:46

## 2020-02-17 RX ADMIN — Medication 1 APPLICATION(S): at 14:14

## 2020-02-17 RX ADMIN — LIDOCAINE 1 PATCH: 4 CREAM TOPICAL at 19:25

## 2020-02-17 RX ADMIN — Medication 1 TABLET(S): at 05:46

## 2020-02-17 RX ADMIN — TRAMADOL HYDROCHLORIDE 50 MILLIGRAM(S): 50 TABLET ORAL at 08:24

## 2020-02-17 RX ADMIN — ATORVASTATIN CALCIUM 10 MILLIGRAM(S): 80 TABLET, FILM COATED ORAL at 23:25

## 2020-02-17 RX ADMIN — BUDESONIDE AND FORMOTEROL FUMARATE DIHYDRATE 2 PUFF(S): 160; 4.5 AEROSOL RESPIRATORY (INHALATION) at 23:27

## 2020-02-17 RX ADMIN — SODIUM CHLORIDE 75 MILLILITER(S): 9 INJECTION INTRAMUSCULAR; INTRAVENOUS; SUBCUTANEOUS at 05:59

## 2020-02-17 RX ADMIN — Medication 81 MILLIGRAM(S): at 11:41

## 2020-02-17 RX ADMIN — NYSTATIN CREAM 1 APPLICATION(S): 100000 CREAM TOPICAL at 05:47

## 2020-02-17 RX ADMIN — Medication 325 MILLIGRAM(S): at 11:41

## 2020-02-17 RX ADMIN — Medication 1 TABLET(S): at 15:06

## 2020-02-17 RX ADMIN — LIDOCAINE 1 PATCH: 4 CREAM TOPICAL at 11:43

## 2020-02-17 NOTE — CONSULT NOTE ADULT - SUBJECTIVE AND OBJECTIVE BOX
Chief Complaint: Sacral pressure ulcer.    HPI: Several month history of sacral pressure ulcer.    PAST MEDICAL & SURGICAL HISTORY:  Wound of sacral region  Depression  Iron deficiency anemia  Eczema  HTN (hypertension)  CKD (chronic kidney disease) stage 5, GFR less than 15 ml/min: not on ESRD  Herniated lumbar intervertebral disc  Tremor  Kidney atrophy: right  Colostomy status: 2006  Chronic UTI (urinary tract infection): chronic renae  Cervical cancer: 1970&#x27;s  Dyslipidemia  Hernia, incisional  S/P hip replacement: right 2013  S/P colon resection: 2006  S/P hysterectomy: 1977      Allergies    Levaquin (Pruritus)  mercury (Pruritus; Rash)  sulfa drugs (Pruritus)    Intolerances        MEDICATIONS  (STANDING):  amLODIPine   Tablet 5 milliGRAM(s) Oral daily  aspirin enteric coated 81 milliGRAM(s) Oral daily  atorvastatin 10 milliGRAM(s) Oral at bedtime  budesonide 160 MICROgram(s)/formoterol 4.5 MICROgram(s) Inhaler 2 Puff(s) Inhalation two times a day  calcium carbonate    500 mG (Tums) Chewable 1 Tablet(s) Chew daily  cefTRIAXone   IVPB 1000 milliGRAM(s) IV Intermittent every 24 hours  cholecalciferol 1000 Unit(s) Oral daily  cholestyramine Powder (Sugar-Free) 4 Gram(s) Oral daily  ferrous    sulfate 325 milliGRAM(s) Oral daily  FLUoxetine 20 milliGRAM(s) Oral daily  heparin  Injectable 5000 Unit(s) SubCutaneous every 8 hours  lactobacillus acidophilus 1 Tablet(s) Oral three times a day  lidocaine   Patch 1 Patch Transdermal daily  loratadine 10 milliGRAM(s) Oral daily  magnesium sulfate  IVPB 2 Gram(s) IV Intermittent every 4 hours  metoprolol succinate ER 25 milliGRAM(s) Oral daily  multivitamin 1 Tablet(s) Oral daily  nystatin Powder 1 Application(s) Topical two times a day  sodium bicarbonate 650 milliGRAM(s) Oral three times a day  sodium chloride 0.9%. 1000 milliLiter(s) (75 mL/Hr) IV Continuous <Continuous>    MEDICATIONS  (PRN):  acetaminophen   Tablet .. 650 milliGRAM(s) Oral every 6 hours PRN Temp greater or equal to 38C (100.4F), Mild Pain (1 - 3)  hydrOXYzine hydrochloride 25 milliGRAM(s) Oral three times a day PRN Itching  traMADol 50 milliGRAM(s) Oral every 6 hours PRN Severe Pain (7 - 10)      FAMILY HISTORY:  Family history of ovarian cancer (Sibling)          ROS:  CONSTITUTIONAL: No fever, weight loss, or fatigue  EYES: No eye pain, visual disturbances, or discharge  ENMT:  No difficulty hearing, tinnitus, vertigo; No sinus or throat pain  NECK: No pain or stiffness  BREASTS: No pain, masses, or nipple discharge  RESPIRATORY: No cough, wheezing, chills or hemoptysis; No shortness of breath  CARDIOVASCULAR: No chest pain, palpitations, dizziness, or leg swelling  GASTROINTESTINAL: No abdominal or epigastric pain. No nausea, vomiting, or hematemesis; No diarrhea or constipation. No melena or hematochezia.  GENITOURINARY: No dysuria, frequency, hematuria, or incontinence  NEUROLOGICAL: No headaches, memory loss, loss of strength, numbness, or tremors  SKIN: No itching, burning, rashes, or lesions   LYMPH NODES: No enlarged glands  ENDOCRINE: No heat or cold intolerance; No hair loss  MUSCULOSKELETAL: No joint pain or swelling; No muscle, back, or extremity pain  PSYCHIATRIC: No depression, anxiety, mood swings, or difficulty sleeping  HEME/LYMPH: No easy bruising, or bleeding gums  ALLERGY AND IMMUNOLOGIC: No hives or eczema    PHYSICAL EXAM-    Height (cm): 154.94 (02-16 @ 18:06)  Weight (kg): 54.4 (02-16 @ 18:06)  BMI (kg/m2): 22.7 (02-16 @ 18:06)  Vital Signs Last 24 Hrs  T(C): 36.8 (17 Feb 2020 05:34), Max: 36.8 (16 Feb 2020 23:51)  T(F): 98.3 (17 Feb 2020 05:34), Max: 98.3 (17 Feb 2020 05:34)  HR: 73 (17 Feb 2020 05:34) (63 - 78)  BP: 107/65 (17 Feb 2020 05:34) (107/65 - 153/79)  BP(mean): --  RR: 18 (17 Feb 2020 05:34) (16 - 18)  SpO2: 96% (17 Feb 2020 05:34) (96% - 99%)    Constitutional: well developed, well nourished, no apparent distress, alert, oriented x 3.  Neck: Supple   Pulmonary: no respiratory distress, normal respiratory rhythm and effort, lungs are clear to auscultation/percussion. No CVA tenderness.  Cardiovascular: heart rate normal, normal sinus rhythm; no murmurs, gallops, rubs, heaves or thrills   Abdomen: soft, non-tender, +BS, no guarding/rebound/rigidity.  Vascular: Lower extremities are well perfused.   Extremities: Mild bilateral edema.  Skin: Clean stage 4 sacral pressure ulcer, base is red and viable with some slough, bone is not exposed but palpable, slight periwound skin maceration, no acute infection.                             8.3    10.16 )-----------( 314      ( 17 Feb 2020 05:55 )             26.4     02-17    140  |  114<H>  |  62<H>  ----------------------------<  72  4.9   |  13<L>  |  6.00<H>    Ca    6.6<L>      17 Feb 2020 05:55  Phos  5.8     02-17  Mg     1.0     02-17    TPro  6.8  /  Alb  2.7<L>  /  TBili  0.2  /  DBili  x   /  AST  15  /  ALT  16  /  AlkPhos  74  02-16      Radiology:

## 2020-02-17 NOTE — CONSULT NOTE ADULT - PROBLEM SELECTOR RECOMMENDATION 2
as per wound care there is no evidence of acute infection and chronic care recommended  -recommend no specific abx in this context

## 2020-02-17 NOTE — CONSULT NOTE ADULT - ASSESSMENT
·	MEHDI, CKD 4, Solitary functioning kidney: Malpositioned renae  ·	Metabolic acidosis, hyperchloremic  ·	Diabetes  ·	Hypertension  ·	Anemia    Renae catheter changed. Potassium levels better. Continue IV hydration. Encourage PO intake as tolerated. BP stable. Add. bicarb.   Monitor blood sugar levels. Insulin coverage as needed. Dietary restriction. Monitor BP trend. Titrate BP meds as needed. Salt restriction.   Will follow electrolytes and renal function trend. Monitor h/h trend. Procrit for anemia. Check iron studies. Transfuse PRBCs PRN.   Further recommendations pending clinical course. Thank you for the courtesy of this referral.

## 2020-02-17 NOTE — CHART NOTE - NSCHARTNOTEFT_GEN_A_CORE
Reference #: [id number]    Others' Prescriptions  Patient Name:	Eliza Batres	YOB: 1935  Address:	[address] [place] [address]	Sex:	Female  Rx Written	Rx Dispensed	Drug	Quantity	Days Supply	Prescriber Name  01/14/2020	01/14/2020	tramadol hcl 50 mg tablet	120	30	Scott Santos DO  12/14/2019	12/16/2019	tramadol hcl 50 mg tablet	90	23	Fátima Daniel  09/14/2019	09/14/2019	tramadol hcl 50 mg tablet	120	30	DigScott hughes DO  03/06/2019	03/06/2019	tramadol hcl 50 mg tablet	120	30	Tom, Scott GUNN DO  Patient Name:	Eliza Batres	YOB: 1935  Address:	[address] [place] [address]	Sex:	Female  Rx Written	Rx Dispensed	Drug	Quantity	Days Supply	Prescriber Name  10/22/2019	10/23/2019	tramadol hcl 50 mg tablet	28	7	Fátima Daniel  10/23/2019	10/23/2019	tramadol hcl 50 mg tablet	60	15	Fátima Daniel

## 2020-02-17 NOTE — PROGRESS NOTE ADULT - SUBJECTIVE AND OBJECTIVE BOX
Patient is a 84y old  Female who presents with a chief complaint of abdominal pain, MEHDI on CKD V (16 Feb 2020 23:03)       INTERVAL HPI/OVERNIGHT EVENTS:    MEDICATIONS  (STANDING):  amLODIPine   Tablet 5 milliGRAM(s) Oral daily  aspirin enteric coated 81 milliGRAM(s) Oral daily  atorvastatin 10 milliGRAM(s) Oral at bedtime  budesonide 160 MICROgram(s)/formoterol 4.5 MICROgram(s) Inhaler 2 Puff(s) Inhalation two times a day  calcium carbonate    500 mG (Tums) Chewable 1 Tablet(s) Chew daily  cefTRIAXone   IVPB 1000 milliGRAM(s) IV Intermittent every 24 hours  cholecalciferol 1000 Unit(s) Oral daily  cholestyramine Powder (Sugar-Free) 4 Gram(s) Oral daily  ferrous    sulfate 325 milliGRAM(s) Oral daily  FLUoxetine 20 milliGRAM(s) Oral daily  heparin  Injectable 5000 Unit(s) SubCutaneous every 8 hours  lactobacillus acidophilus 1 Tablet(s) Oral three times a day  lidocaine   Patch 1 Patch Transdermal daily  loratadine 10 milliGRAM(s) Oral daily  magnesium sulfate  IVPB 2 Gram(s) IV Intermittent every 4 hours  metoprolol succinate ER 25 milliGRAM(s) Oral daily  multivitamin 1 Tablet(s) Oral daily  nystatin Powder 1 Application(s) Topical two times a day  sodium bicarbonate 650 milliGRAM(s) Oral three times a day  sodium chloride 0.9%. 1000 milliLiter(s) (75 mL/Hr) IV Continuous <Continuous>    MEDICATIONS  (PRN):  acetaminophen   Tablet .. 650 milliGRAM(s) Oral every 6 hours PRN Temp greater or equal to 38C (100.4F), Mild Pain (1 - 3)  hydrOXYzine hydrochloride 25 milliGRAM(s) Oral three times a day PRN Itching  traMADol 50 milliGRAM(s) Oral every 6 hours PRN Severe Pain (7 - 10)      Allergies    Levaquin (Pruritus)  mercury (Pruritus; Rash)  sulfa drugs (Pruritus)    Intolerances        REVIEW OF SYSTEMS:  Constitutional: denies fever, chills, diaphoresis   HEENT: denies blurry vision, difficulty hearing  Respiratory: denies SOB, WALKER, cough, sputum production, wheezing, hemoptysis  Cardiovascular: denies CP, palpitations, edema  Gastrointestinal: admits abdominal pain, admits chronic diarrhea denies nausea, vomiting, constipation, melena, hematochezia   Genitourinary: admits chronic renae, denies dysuria, frequency, urgency, hematuria   Skin/Breast: admits diffuse eczema, pruritis  Musculoskeletal: admits back pain  Neurologic: denies headache, weakness, dizziness, paresthesias, numbness/tingling  Psychiatric: denies feeling anxious, depressed  Vital Signs Last 24 Hrs  T(C): 36.8 (17 Feb 2020 05:34), Max: 36.8 (16 Feb 2020 23:51)  T(F): 98.3 (17 Feb 2020 05:34), Max: 98.3 (17 Feb 2020 05:34)  HR: 73 (17 Feb 2020 05:34) (63 - 78)  BP: 107/65 (17 Feb 2020 05:34) (107/65 - 153/79)  BP(mean): --  RR: 18 (17 Feb 2020 05:34) (16 - 18)  SpO2: 96% (17 Feb 2020 05:34) (96% - 99%)    PHYSICAL EXAM:  GEN: NAD, Awake, Alert  HEENT: PERRLA, NCAT  Lungs: clear bilat, no wheezing   Heart: S1S2+, regular   Abdominal: +LLQ colostomy with green/light brown soft stool, Soft, NT, ND +BSx4  Extremities: No C/C/E, + peripheral pulses  MSK: Normal ROM, no joint erythema or warmth, no joint swelling   Skin: diffuse excoriations on b/l extensor surfaces of arms and legs, erythematous excoriated lesions on back, stage 3 sacral wound ( POA) with bandage without active bleeding or drainage  LABS:                        8.3    10.16 )-----------( 314      ( 17 Feb 2020 05:55 )             26.4     17 Feb 2020 05:55    140    |  114    |  62     ----------------------------<  72     4.9     |  13     |  6.00     Ca    6.6        17 Feb 2020 05:55  Phos  5.8       17 Feb 2020 05:55  Mg     1.0       17 Feb 2020 05:55    TPro  6.8    /  Alb  2.7    /  TBili  0.2    /  DBili  x      /  AST  15     /  ALT  16     /  AlkPhos  74     16 Feb 2020 19:15    PT/INR - ( 16 Feb 2020 19:15 )   PT: 17.7 sec;   INR: 1.56 ratio         PTT - ( 16 Feb 2020 19:15 )  PTT:26.5 sec  CAPILLARY BLOOD GLUCOSE        BLOOD CULTURE    RADIOLOGY & ADDITIONAL TESTS:    Imaging Personally Reviewed:  [ ] YES     Consultant(s) Notes Reviewed:      Care Discussed with Consultants/Other Providers: Patient is a 84y old  Female who presents with a chief complaint of abdominal pain, MEHDI on CKD V (16 Feb 2020 23:03)       INTERVAL HPI/OVERNIGHT EVENTS: 84 year old female with PMH of CKD (baseline creatinine <5), colon obstruction (s/p ostomy 16 years ago) and chronic diarrhea, cervical cancer (s/p radical hysterectomy), tremors, eczema, depression, recent hx of sacral wound, HTN, HLD, history of frequent UTIs with chronic renae, presenting with lower abdominal pain and leaking renae catheter. Admitted for MEHDI on CKD5 and suspected  Sacral wound infection. C/o pain at the wound site. Denies chest pain, palpitation, sob      MEDICATIONS  (STANDING):  amLODIPine   Tablet 5 milliGRAM(s) Oral daily  aspirin enteric coated 81 milliGRAM(s) Oral daily  atorvastatin 10 milliGRAM(s) Oral at bedtime  budesonide 160 MICROgram(s)/formoterol 4.5 MICROgram(s) Inhaler 2 Puff(s) Inhalation two times a day  calcium carbonate    500 mG (Tums) Chewable 1 Tablet(s) Chew daily  cefTRIAXone   IVPB 1000 milliGRAM(s) IV Intermittent every 24 hours  cholecalciferol 1000 Unit(s) Oral daily  cholestyramine Powder (Sugar-Free) 4 Gram(s) Oral daily  ferrous    sulfate 325 milliGRAM(s) Oral daily  FLUoxetine 20 milliGRAM(s) Oral daily  heparin  Injectable 5000 Unit(s) SubCutaneous every 8 hours  lactobacillus acidophilus 1 Tablet(s) Oral three times a day  lidocaine   Patch 1 Patch Transdermal daily  loratadine 10 milliGRAM(s) Oral daily  magnesium sulfate  IVPB 2 Gram(s) IV Intermittent every 4 hours  metoprolol succinate ER 25 milliGRAM(s) Oral daily  multivitamin 1 Tablet(s) Oral daily  nystatin Powder 1 Application(s) Topical two times a day  sodium bicarbonate 650 milliGRAM(s) Oral three times a day  sodium chloride 0.9%. 1000 milliLiter(s) (75 mL/Hr) IV Continuous <Continuous>    MEDICATIONS  (PRN):  acetaminophen   Tablet .. 650 milliGRAM(s) Oral every 6 hours PRN Temp greater or equal to 38C (100.4F), Mild Pain (1 - 3)  hydrOXYzine hydrochloride 25 milliGRAM(s) Oral three times a day PRN Itching  traMADol 50 milliGRAM(s) Oral every 6 hours PRN Severe Pain (7 - 10)      Allergies    Levaquin (Pruritus)  mercury (Pruritus; Rash)  sulfa drugs (Pruritus)    Intolerances        REVIEW OF SYSTEMS:  Constitutional: denies fever, chills, diaphoresis   HEENT: denies blurry vision, difficulty hearing  Respiratory: denies SOB, WALKER, cough, sputum production, wheezing, hemoptysis  Cardiovascular: denies CP, palpitations, edema  Gastrointestinal: admits abdominal pain, admits chronic diarrhea denies nausea, vomiting, constipation, melena, hematochezia   Genitourinary: admits chronic renae, denies dysuria, frequency, urgency, hematuria   Skin/Breast: admits diffuse eczema, pruritis  Musculoskeletal: admits back pain  Neurologic: denies headache, weakness, dizziness, paresthesias, numbness/tingling  Psychiatric: denies feeling anxious, depressed  Vital Signs Last 24 Hrs  T(C): 36.8 (17 Feb 2020 05:34), Max: 36.8 (16 Feb 2020 23:51)  T(F): 98.3 (17 Feb 2020 05:34), Max: 98.3 (17 Feb 2020 05:34)  HR: 73 (17 Feb 2020 05:34) (63 - 78)  BP: 107/65 (17 Feb 2020 05:34) (107/65 - 153/79)  BP(mean): --  RR: 18 (17 Feb 2020 05:34) (16 - 18)  SpO2: 96% (17 Feb 2020 05:34) (96% - 99%)    PHYSICAL EXAM:  GEN: NAD, Awake, Alert  HEENT: PERRLA, NCAT  Lungs: clear bilat, no wheezing   Heart: S1S2+, regular   Abdominal: +LLQ colostomy with green/light brown soft stool, Soft, NT, ND +BSx4  Extremities: No C/C/E, + peripheral pulses  MSK: Normal ROM, no joint erythema or warmth, no joint swelling   Skin: diffuse excoriations on b/l extensor surfaces of arms and legs, erythematous excoriated lesions on back, stage 4 sacral wound ( POA) with bandage without active bleeding or drainage  LABS:                        8.3    10.16 )-----------( 314      ( 17 Feb 2020 05:55 )             26.4     17 Feb 2020 05:55    140    |  114    |  62     ----------------------------<  72     4.9     |  13     |  6.00     Ca    6.6        17 Feb 2020 05:55  Phos  5.8       17 Feb 2020 05:55  Mg     1.0       17 Feb 2020 05:55    TPro  6.8    /  Alb  2.7    /  TBili  0.2    /  DBili  x      /  AST  15     /  ALT  16     /  AlkPhos  74     16 Feb 2020 19:15    PT/INR - ( 16 Feb 2020 19:15 )   PT: 17.7 sec;   INR: 1.56 ratio         PTT - ( 16 Feb 2020 19:15 )  PTT:26.5 sec  CAPILLARY BLOOD GLUCOSE        BLOOD CULTURE    RADIOLOGY & ADDITIONAL TESTS:    Imaging Personally Reviewed:  [ ] YES     Consultant(s) Notes Reviewed:      Care Discussed with Consultants/Other Providers:

## 2020-02-17 NOTE — PROGRESS NOTE ADULT - PROBLEM SELECTOR PLAN 3
Pt with recent hx of sacral wound infection requiring IV abx x 2 months and a wound vac from [facility] (Dr. Arceo)  - CT A/P showed New presacral fat stranding without definite fluid collection.  - Primary team to retrieve records in AM  - Pain management with tylenol and tramadol prn. Lidocaine patch qd  - I-STOP completed and placed in chart  - Wound care Dr. Caceres to see patient Pt with recent hx of sacral wound infection requiring IV abx x 2 months and a wound vac from [facility] (Dr. Arceo)  - CT A/P showed New presacral fat stranding without definite fluid collection.  - Pain management with tylenol and tramadol prn. Lidocaine patch qd  - I-STOP completed and placed in chart  - Wound care Dr. Caceres to see patient

## 2020-02-17 NOTE — CONSULT NOTE ADULT - SUBJECTIVE AND OBJECTIVE BOX
Patient is a 84y old  Female who presents with a chief complaint of abdominal pain, MEHDI on CKD V (2020 09:00)    HPI:  84 year old female with PMH of CKD (baseline creatinine <5), colon obstruction (s/p ostomy 16 years ago) and chronic diarrhea, cervical cancer (s/p radical hysterectomy), tremors, eczema, depression, recent hx of sacral wound, HTN, HLD, history of frequent UTIs with chronic renae, presenting with lower abdominal pain and leaking renae catheter. Daughter at bedside assisting with history. States that patient developed lower abdominal pain for the past day which felt as if the renae catheter was "sitting in the wrong place for too long." Denies fevers, dysuria, nausea/vomiting, constipation or eating anything out of the ordinary. Patient has chronic diarrhea. Renae cathter was last changed 19 and needs monthly changing. Daughter requesting wound care evaluation as patient had a wound vac for a sacral wound while in [place] 1 month ago. She completed a 2 month course of IV abx via picc line but daughter does not recall name of abx.     In the ED, patient's vitals were: T98F, HR 78, /79, RR 16 and SpO2 97% on room air. Significant labs include: WBC 13.63, H/H 9.4/30, INR 1.56, K 5.4, BUN/Cr 60/6.6 (per chart review, baseline Cr ~5), Calcium 7.0 (corrected 8).   UA: RBC 11-25, WBC >50, mod bacteria, turbid appearance, protein 500, large blood, mod LE, neg nitrites  CT A/P: New presacral fat stranding without definite fluid collection.  s/p 1L Normal saline IVF and Zofran 4mg IV x1  EKG: NSR, HR 64, prolonged QTc 503 (2020 23:03)    Renal consult called for MEHDI, Hyperkalemia. Renae catheter was changed in ER.       PAST MEDICAL HISTORY:  Wound of sacral region  Depression  Iron deficiency anemia  Eczema  HTN (hypertension)  CKD (chronic kidney disease) stage 5, GFR less than 15 ml/min  Herniated lumbar intervertebral disc  Depression  Tremor  Kidney atrophy  Colostomy status  Chronic UTI (urinary tract infection)  Cervical cancer  Dyslipidemia  Diabetes  Hernia, incisional      PAST SURGICAL HISTORY:  S/P hip replacement  S/P colon resection  S/P hysterectomy      FAMILY HISTORY:  Family history of ovarian cancer (Sibling)      SOCIAL HISTORY: No smoking or alcohol use     Allergies    Levaquin (Pruritus)  mercury (Pruritus; Rash)  sulfa drugs (Pruritus)    Intolerances      Home Medications:  Acidophilus oral capsule: 1 cap(s) orally 3 times a day ()  aspirin 81 mg oral tablet: 1 tab(s) orally once a day ()  calcium (as carbonate) 600 mg oral tablet: 1 tab(s) orally once a day ()  cholestyramine 4 g/5 g oral powder for reconstitution: 4 gram(s) orally once a day ()  clobetasol 0.05% topical foam: Apply topically to affected area 2 times a day ()  ferrous sulfate 325 mg (65 mg elemental iron) oral delayed release tablet: 1 tab(s) orally once a day ()  hydrOXYzine pamoate 25 mg oral capsule: 1 cap(s) orally every 8 hours (2 caps in the evening) ()  Imodium 2 mg oral capsule: 1 tab(s) orally 3 times a day ()  levocetirizine 5 mg oral tablet: 1 tab(s) orally once a day (in the evening) ()  lidocaine 5% topical film: Apply topically to affected area once a day ()  Metoprolol Succinate ER 25 mg oral tablet, extended release: 1 tab(s) orally once a day ()  Norvasc 5 mg oral tablet: 1 tab(s) orally once a day ()  nystatin 100,000 units/g topical powder: Apply topically to affected area 2 times a day ()  Pravachol 20 mg oral tablet: 1 tab(s) orally once a day ()  PROzac 20 mg oral capsule: 1 cap(s) orally once a day ()  Nelly-Ami oral tablet: 1 tab(s) orally once a day (2020 00:34)  sodium bicarbonate 650 mg oral tablet: 1 tab(s) orally 3 times a day (:34)  Symbicort 160 mcg-4.5 mcg/inh inhalation aerosol: 2 puff(s) inhaled 2 times a day (:34)  traMADol 50 mg oral tablet: 1 tab(s) orally every 6 hours, As Needed (:)  Tylenol 325 mg oral tablet: 2 tab(s) orally every 6 hours, As Needed (:34)  Vitamin D3 5000 intl units oral capsule: 1 cap(s) orally once a day (:)    MEDICATIONS  (STANDING):  amLODIPine   Tablet 5 milliGRAM(s) Oral daily  aspirin enteric coated 81 milliGRAM(s) Oral daily  atorvastatin 10 milliGRAM(s) Oral at bedtime  budesonide 160 MICROgram(s)/formoterol 4.5 MICROgram(s) Inhaler 2 Puff(s) Inhalation two times a day  calcium carbonate    500 mG (Tums) Chewable 1 Tablet(s) Chew daily  cefTRIAXone   IVPB 1000 milliGRAM(s) IV Intermittent every 24 hours  cholecalciferol 1000 Unit(s) Oral daily  cholestyramine Powder (Sugar-Free) 4 Gram(s) Oral daily  collagenase Ointment 1 Application(s) Topical daily  ferrous    sulfate 325 milliGRAM(s) Oral daily  FLUoxetine 20 milliGRAM(s) Oral daily  heparin  Injectable 5000 Unit(s) SubCutaneous every 8 hours  lactobacillus acidophilus 1 Tablet(s) Oral three times a day  lidocaine   Patch 1 Patch Transdermal daily  loratadine 10 milliGRAM(s) Oral daily  magnesium sulfate  IVPB 2 Gram(s) IV Intermittent every 4 hours  metoprolol succinate ER 25 milliGRAM(s) Oral daily  multivitamin 1 Tablet(s) Oral daily  nystatin Powder 1 Application(s) Topical two times a day  sodium bicarbonate 650 milliGRAM(s) Oral three times a day  sodium chloride 0.9%. 1000 milliLiter(s) (75 mL/Hr) IV Continuous <Continuous>    MEDICATIONS  (PRN):  acetaminophen   Tablet .. 650 milliGRAM(s) Oral every 6 hours PRN Temp greater or equal to 38C (100.4F), Mild Pain (1 - 3)  hydrOXYzine hydrochloride 25 milliGRAM(s) Oral three times a day PRN Itching  traMADol 50 milliGRAM(s) Oral every 6 hours PRN Severe Pain (7 - 10)      REVIEW OF SYSTEMS:  General: no distress  Respiratory: No cough, SOB  Cardiovascular: No CP or Palpitations	  Gastrointestinal: abd pain better , + colostomy  Genitourinary: No urinary complaints	  Musculoskeletal: No leg swelling, No new rash or lesions	  all other systems negative    T(F): 98.3 (20 @ 05:34), Max: 98.3 (20 @ 05:34)  HR: 73 (20 @ 05:34) (63 - 78)  BP: 107/65 (20 @ 05:34) (107/65 - 153/79)  RR: 18 (20 @ 05:34) (16 - 18)  SpO2: 96% (20 @ 05:34) (96% - 99%)  Wt(kg): --    PHYSICAL EXAM:  General: NAD  Respiratory: b/l air entry  Cardiovascular: S1 S2  Gastrointestinal: soft, + colostomy, + renae catheter  Extremities: edema            140  |  114<H>  |  62<H>  ----------------------------<  72  4.9   |  13<L>  |  6.00<H>    Ca    6.6<L>      2020 05:55  Phos  5.8       Mg     1.0         TPro  6.8  /  Alb  2.7<L>  /  TBili  0.2  /  DBili  x   /  AST  15  /  ALT  16  /  AlkPhos  74                            8.3    10.16 )-----------( 314      ( 2020 05:55 )             26.4       Hemoglobin: 8.3 g/dL ( @ 05:55)  Hematocrit: 26.4 % ( 05:55)  Potassium, Serum: 4.9 mmol/L ( 05:55)  Blood Urea Nitrogen, Serum: 62 mg/dL ( @ 05:55)      Creatinine, Serum: 6.00 ( @ 05:55)  Creatinine, Serum: 6.60 ( @ 19:15)      Urinalysis Basic - ( 2020 20:55 )    Color: Yellow / Appearance: Turbid / S.020 / pH: x  Gluc: x / Ketone: Negative  / Bili: Negative / Urobili: Negative   Blood: x / Protein: 500 mg/dL / Nitrite: Negative   Leuk Esterase: Moderate / RBC: 11-25 /HPF / WBC >50   Sq Epi: x / Non Sq Epi: Occasional / Bacteria: Moderate      LIVER FUNCTIONS - ( 2020 19:15 )  Alb: 2.7 g/dL / Pro: 6.8 g/dL / ALK PHOS: 74 U/L / ALT: 16 U/L / AST: 15 U/L / GGT: x               < from: CT Abdomen and Pelvis w/ Oral Cont (20 @ 21:26) >    EXAM:  CT ABDOMEN AND PELVIS OC                            PROCEDURE DATE:  2020          INTERPRETATION:  CLINICAL INFORMATION: Abdominal pain    COMPARISON: 2018    PROCEDURE:   CT of the Abdomen and Pelvis was performed without intravenous contrast.   Intravenous contrast: None.  Oral contrast: None.  Sagittal and coronal reformats were performed.    FINDINGS:    LOWER CHEST: Within normal limits.    LIVER: Within normal limits.  BILE DUCTS: Normal caliber.  GALLBLADDER: Cholelithiasis.  SPLEEN: Within normal limits.  PANCREAS: Within normal limits.  ADRENALS: Within normal limits.  KIDNEYS/URETERS: Atrophic right kidney. Vascular calcification of the right kidney, new since prior.    BLADDER: Decompressed with a Renae catheterin it.  REPRODUCTIVE ORGANS: Status post hysterectomy.    BOWEL: No bowel obstruction. Left lower quadrant colostomy.  PERITONEUM: No ascites. Multiple surgical clips.  VESSELS: Extensive atherosclerotic calcific dictation.  RETROPERITONEUM/LYMPH NODES: No lymphadenopathy. Multiple surgical clips. Presacral fat stranding new since prior. Unknown clinical significance. No definite fluid collection.  ABDOMINAL WALL: Ventral hernia containing dilated bowel loop with a suture line.  BONES: Stable compression of T12 body. Diffuse osteopenia and osteomalacia.    IMPRESSION:     New presacral fat stranding without definite fluid collection.    Otherwise no change.        OWEN TENORIO M.D., ATTENDING RADIOLOGIST  This document has been electronically signed. 2020 10:12PM                < end of copied text >

## 2020-02-17 NOTE — CONSULT NOTE ADULT - PROBLEM SELECTOR RECOMMENDATION 3
per renal    Thank you for consulting us and involving us in the management of this most interesting and challenging case.     We will follow along in the care of this patient.

## 2020-02-17 NOTE — CONSULT NOTE ADULT - PROBLEM SELECTOR RECOMMENDATION 9
Pt reporting that pain completely resolved as soon as the renae was replaced calling to into question whether this was an actual infection versus an issue with renae just needing to be replaced.  At this point will stop abx and follow off abx

## 2020-02-17 NOTE — PROGRESS NOTE ADULT - ASSESSMENT
84 year old female with PMH of CKD (baseline creatinine <5), colon obstruction (s/p ostomy 16 years ago) and chronic diarrhea, cervical cancer (s/p radical hysterectomy), tremors, eczema, depression, recent hx of sacral wound, HTN, HLD, history of frequent UTIs with chronic renae, presenting with lower abdominal pain and leaking renae catheter. Admitted for MEHDI on CKD5 and suspected  Sacral wound infection.

## 2020-02-17 NOTE — CONSULT NOTE ADULT - SUBJECTIVE AND OBJECTIVE BOX
HPI:  84 year old female with PMH of CKD (baseline creatinine <5), colon obstruction (s/p ostomy 16 years ago) and chronic diarrhea, cervical cancer (s/p radical hysterectomy), tremors, eczema, depression, recent hx of sacral wound, HTN, HLD, history of frequent UTIs with chronic renae, presenting with lower abdominal pain and leaking renae catheter. Daughter at bedside assisting with history. States that patient developed lower abdominal pain for the past day which felt as if the renae catheter was "sitting in the wrong place for too long." Denies fevers, dysuria, nausea/vomiting, constipation or eating anything out of the ordinary. Patient has chronic diarrhea. Renae cathter was last changed 19 and needs monthly changing. Daughter requesting wound care evaluation as patient had a wound vac for a sacral wound while in [place] 1 month ago. She completed a 2 month course of IV abx via picc line but daughter does not recall name of abx.     In the ED, patient's vitals were: T98F, HR 78, /79, RR 16 and SpO2 97% on room air. Significant labs include: WBC 13.63, H/H 9.4/30, INR 1.56, K 5.4, BUN/Cr 60/6.6 (per chart review, baseline Cr ~5), Calcium 7.0 (corrected 8).   UA: RBC 11-25, WBC >50, mod bacteria, turbid appearance, protein 500, large blood, mod LE, neg nitrites  CT A/P: New presacral fat stranding without definite fluid collection.  s/p 1L Normal saline IVF and Zofran 4mg IV x1  EKG: NSR, HR 64, prolonged QTc 503 (2020 23:03)      PAST MEDICAL & SURGICAL HISTORY:  Wound of sacral region  Depression  Iron deficiency anemia  Eczema  HTN (hypertension)  CKD (chronic kidney disease) stage 5, GFR less than 15 ml/min: not on ESRD  Herniated lumbar intervertebral disc  Tremor  Kidney atrophy: right  Colostomy status:   Chronic UTI (urinary tract infection): chronic renae  Cervical cancer: 1970&#x27;s  Dyslipidemia  Hernia, incisional  S/P hip replacement: right   S/P colon resection:   S/P hysterectomy:       Antimicrobials  cefTRIAXone   IVPB 1000 milliGRAM(s) IV Intermittent every 24 hours      Immunological  epoetin jean carlos Injectable 87031 Unit(s) SubCutaneous <User Schedule>      Other  acetaminophen   Tablet .. 650 milliGRAM(s) Oral every 6 hours PRN  amLODIPine   Tablet 5 milliGRAM(s) Oral daily  aspirin enteric coated 81 milliGRAM(s) Oral daily  atorvastatin 10 milliGRAM(s) Oral at bedtime  budesonide 160 MICROgram(s)/formoterol 4.5 MICROgram(s) Inhaler 2 Puff(s) Inhalation two times a day  calcium carbonate    500 mG (Tums) Chewable 1 Tablet(s) Chew daily  cholecalciferol 1000 Unit(s) Oral daily  cholestyramine Powder (Sugar-Free) 4 Gram(s) Oral daily  collagenase Ointment 1 Application(s) Topical daily  ferrous    sulfate 325 milliGRAM(s) Oral daily  FLUoxetine 20 milliGRAM(s) Oral daily  heparin  Injectable 5000 Unit(s) SubCutaneous every 8 hours  hydrOXYzine hydrochloride 25 milliGRAM(s) Oral three times a day PRN  lactobacillus acidophilus 1 Tablet(s) Oral three times a day  lidocaine   Patch 1 Patch Transdermal daily  loratadine 10 milliGRAM(s) Oral daily  magnesium sulfate  IVPB 2 Gram(s) IV Intermittent every 4 hours  metoprolol succinate ER 25 milliGRAM(s) Oral daily  multivitamin 1 Tablet(s) Oral daily  nystatin Powder 1 Application(s) Topical two times a day  sodium chloride 0.45% 1000 milliLiter(s) IV Continuous <Continuous>  traMADol 50 milliGRAM(s) Oral every 6 hours PRN      Allergies    Levaquin (Pruritus)  mercury (Pruritus; Rash)  sulfa drugs (Pruritus)    Intolerances        SOCIAL HISTORY:  Patient lives with daughter at home. Has a visiting nurse  Ambulates with a cane at baseline but does not often walk  Patient denies alcohol, smoking or illicit drug use, quit smoking over 30 years ago. 20 pack year history (2020 23:03)      FAMILY HISTORY:  Family history of ovarian cancer (Sibling)      ROS:    EYES:  Negative  blurry vision or double vision  GASTROINTESTINAL:  Negative for nausea, vomiting, diarrhea  -otherwise negative except for subjective    Vital Signs Last 24 Hrs  T(C): 37.1 (2020 11:49), Max: 37.1 (2020 11:49)  T(F): 98.7 (2020 11:49), Max: 98.7 (2020 11:49)  HR: 74 (2020 11:49) (63 - 78)  BP: 120/61 (2020 11:49) (107/65 - 153/79)  BP(mean): --  RR: 17 (2020 11:49) (16 - 18)  SpO2: 95% (2020 11:49) (95% - 99%)    PE:  WDWN in no distress  HEENT:  NC, PERRL, sclerae anicteric, conjunctivae clear, EOMI.  Sinuses nontender, no nasal exudate.  No buccal or pharyngeal lesions, erythema or exudate  Neck:  Supple, no adenopathy  Lungs:  No accessory muscle use, bilaterally clear to auscultation  Cor:  RRR, S1, S2, no murmur appreciated  Abd:  Symmetric, normoactive BS.  Soft, nontender, no masses, guarding or rebound.  Liver and spleen not enlarged  Extrem:  No cyanosis or edema  Neuro: grossly intact  Musc: moving all limbs freely, no focal deficits  -renae in place    LABS:                        8.3    10.16 )-----------( 314      ( 2020 05:55 )             26.4       WBC Count: 10.16 K/uL (20 @ 05:55)  WBC Count: 13.63 K/uL (20 @ 19:15)          140  |  114<H>  |  62<H>  ----------------------------<  72  4.9   |  13<L>  |  6.00<H>    Ca    6.6<L>      2020 05:55  Phos  5.8       Mg     1.0         TPro  6.8  /  Alb  2.7<L>  /  TBili  0.2  /  DBili  x   /  AST  15  /  ALT  16  /  AlkPhos  74        Creatinine, Serum: 6.00 mg/dL (20 @ 05:55)  Creatinine, Serum: 6.60 mg/dL (20 @ 19:15)      Urinalysis Basic - ( 2020 20:55 )    Color: Yellow / Appearance: Turbid / S.020 / pH: x  Gluc: x / Ketone: Negative  / Bili: Negative / Urobili: Negative   Blood: x / Protein: 500 mg/dL / Nitrite: Negative   Leuk Esterase: Moderate / RBC: 11-25 /HPF / WBC >50   Sq Epi: x / Non Sq Epi: Occasional / Bacteria: Moderate      MICROBIOLOGY:      RADIOLOGY & ADDITIONAL STUDIES:    --< from: CT Abdomen and Pelvis w/ Oral Cont (20 @ 21:26) >    EXAM:  CT ABDOMEN AND PELVIS OC                            PROCEDURE DATE:  2020          INTERPRETATION:  CLINICAL INFORMATION: Abdominal pain    COMPARISON: 2018    PROCEDURE:   CT of the Abdomen and Pelvis was performed without intravenous contrast.   Intravenous contrast: None.  Oral contrast: None.  Sagittal and coronal reformats were performed.    FINDINGS:    LOWER CHEST: Within normal limits.    LIVER: Within normal limits.  BILE DUCTS: Normal caliber.  GALLBLADDER: Cholelithiasis.  SPLEEN: Within normal limits.  PANCREAS: Within normal limits.  ADRENALS: Within normal limits.  KIDNEYS/URETERS: Atrophic right kidney. Vascular calcification of the right kidney, new since prior.    BLADDER: Decompressed with a Renae catheterin it.  REPRODUCTIVE ORGANS: Status post hysterectomy.    BOWEL: No bowel obstruction. Left lower quadrant colostomy.  PERITONEUM: No ascites. Multiple surgical clips.  VESSELS: Extensive atherosclerotic calcific dictation.  RETROPERITONEUM/LYMPH NODES: No lymphadenopathy. Multiple surgical clips. Presacral fat stranding new since prior. Unknown clinical significance. No definite fluid collection.  ABDOMINAL WALL: Ventral hernia containing dilated bowel loop with a suture line.  BONES: Stable compression of T12 body. Diffuse osteopenia and osteomalacia.    IMPRESSION:     New presacral fat stranding without definite fluid collection.    Otherwise no change.

## 2020-02-17 NOTE — GOALS OF CARE CONVERSATION - ADVANCED CARE PLANNING - CONVERSATION DETAILS
With pt consent spoke to her daughter about advance directives. Daughter states she has discussed resuscitation with the pt who states she wants CPR and wants to be kept alive for up to two weeks on machines. Daughter was encouraged to continue discussions.

## 2020-02-17 NOTE — CONSULT NOTE ADULT - ASSESSMENT
84 year old female with PMH of CKD (baseline creatinine <5), colon obstruction (s/p ostomy 16 years ago) and chronic diarrhea, cervical cancer (s/p radical hysterectomy), tremors, eczema, depression, recent hx of sacral wound, HTN, HLD, history of frequent UTIs with chronic renae, presenting with lower abdominal pain and leaking renae catheter and concerns regarding chronic sacral ulcer.

## 2020-02-17 NOTE — PROGRESS NOTE ADULT - PROBLEM SELECTOR PLAN 2
with chronic renae, s/p replaced in ER  - s/p Rocephin in ER. Continue Rocephin  - Follow up urine culture  - gentle hydration with chronic renae, s/p replaced in ER  - s/p Rocephin in ER. Continue Rocephin  - Follow up urine culture

## 2020-02-17 NOTE — PROGRESS NOTE ADULT - ATTENDING COMMENTS
Check Procalcitonin  F/u cultures  Wound care per Dr. Caceres.  Nutrition consult  PT evaluation  Fall precautions  Palliative care for [facility] discussion Check Procalcitonin  F/u cultures  Wound care per Dr. Caceres.  Nutrition consult  PT evaluation  Fall precautions  Palliative care for [facility] discussion  F/u ID consult

## 2020-02-18 LAB
ALBUMIN SERPL ELPH-MCNC: 2 G/DL — LOW (ref 3.3–5)
ALP SERPL-CCNC: 67 U/L — SIGNIFICANT CHANGE UP (ref 40–120)
ALT FLD-CCNC: 12 U/L — SIGNIFICANT CHANGE UP (ref 12–78)
ANION GAP SERPL CALC-SCNC: 14 MMOL/L — SIGNIFICANT CHANGE UP (ref 5–17)
AST SERPL-CCNC: 12 U/L — LOW (ref 15–37)
BILIRUB SERPL-MCNC: 0.2 MG/DL — SIGNIFICANT CHANGE UP (ref 0.2–1.2)
BUN SERPL-MCNC: 66 MG/DL — HIGH (ref 7–23)
CALCIUM SERPL-MCNC: 6.6 MG/DL — LOW (ref 8.5–10.1)
CHLORIDE SERPL-SCNC: 112 MMOL/L — HIGH (ref 96–108)
CO2 SERPL-SCNC: 14 MMOL/L — LOW (ref 22–31)
CREAT SERPL-MCNC: 6 MG/DL — HIGH (ref 0.5–1.3)
GLUCOSE SERPL-MCNC: 84 MG/DL — SIGNIFICANT CHANGE UP (ref 70–99)
HCT VFR BLD CALC: 22.4 % — LOW (ref 34.5–45)
HCT VFR BLD CALC: 22.6 % — LOW (ref 34.5–45)
HGB BLD-MCNC: 7.1 G/DL — LOW (ref 11.5–15.5)
HGB BLD-MCNC: 7.2 G/DL — LOW (ref 11.5–15.5)
MAGNESIUM SERPL-MCNC: 2.4 MG/DL — SIGNIFICANT CHANGE UP (ref 1.6–2.6)
MCHC RBC-ENTMCNC: 29.2 PG — SIGNIFICANT CHANGE UP (ref 27–34)
MCHC RBC-ENTMCNC: 31.4 GM/DL — LOW (ref 32–36)
MCV RBC AUTO: 93 FL — SIGNIFICANT CHANGE UP (ref 80–100)
NRBC # BLD: 0 /100 WBCS — SIGNIFICANT CHANGE UP (ref 0–0)
PLATELET # BLD AUTO: 297 K/UL — SIGNIFICANT CHANGE UP (ref 150–400)
POTASSIUM SERPL-MCNC: 4.6 MMOL/L — SIGNIFICANT CHANGE UP (ref 3.5–5.3)
POTASSIUM SERPL-SCNC: 4.6 MMOL/L — SIGNIFICANT CHANGE UP (ref 3.5–5.3)
PROT SERPL-MCNC: 5.9 G/DL — LOW (ref 6–8.3)
RBC # BLD: 2.43 M/UL — LOW (ref 3.8–5.2)
RBC # FLD: 14.8 % — HIGH (ref 10.3–14.5)
SODIUM SERPL-SCNC: 140 MMOL/L — SIGNIFICANT CHANGE UP (ref 135–145)
TSH SERPL-MCNC: 0.97 UIU/ML — SIGNIFICANT CHANGE UP (ref 0.36–3.74)
WBC # BLD: 8.75 K/UL — SIGNIFICANT CHANGE UP (ref 3.8–10.5)
WBC # FLD AUTO: 8.75 K/UL — SIGNIFICANT CHANGE UP (ref 3.8–10.5)

## 2020-02-18 PROCEDURE — 99233 SBSQ HOSP IP/OBS HIGH 50: CPT

## 2020-02-18 RX ADMIN — Medication 1 TABLET(S): at 05:47

## 2020-02-18 RX ADMIN — LIDOCAINE 1 PATCH: 4 CREAM TOPICAL at 19:06

## 2020-02-18 RX ADMIN — Medication 20 MILLIGRAM(S): at 12:42

## 2020-02-18 RX ADMIN — TRAMADOL HYDROCHLORIDE 50 MILLIGRAM(S): 50 TABLET ORAL at 21:00

## 2020-02-18 RX ADMIN — ATORVASTATIN CALCIUM 10 MILLIGRAM(S): 80 TABLET, FILM COATED ORAL at 21:41

## 2020-02-18 RX ADMIN — AMLODIPINE BESYLATE 5 MILLIGRAM(S): 2.5 TABLET ORAL at 05:47

## 2020-02-18 RX ADMIN — CHOLESTYRAMINE 4 GRAM(S): 4 POWDER, FOR SUSPENSION ORAL at 12:42

## 2020-02-18 RX ADMIN — Medication 1 TABLET(S): at 12:34

## 2020-02-18 RX ADMIN — HEPARIN SODIUM 5000 UNIT(S): 5000 INJECTION INTRAVENOUS; SUBCUTANEOUS at 21:41

## 2020-02-18 RX ADMIN — NYSTATIN CREAM 1 APPLICATION(S): 100000 CREAM TOPICAL at 17:40

## 2020-02-18 RX ADMIN — HEPARIN SODIUM 5000 UNIT(S): 5000 INJECTION INTRAVENOUS; SUBCUTANEOUS at 05:48

## 2020-02-18 RX ADMIN — BUDESONIDE AND FORMOTEROL FUMARATE DIHYDRATE 2 PUFF(S): 160; 4.5 AEROSOL RESPIRATORY (INHALATION) at 17:40

## 2020-02-18 RX ADMIN — NYSTATIN CREAM 1 APPLICATION(S): 100000 CREAM TOPICAL at 05:48

## 2020-02-18 RX ADMIN — LORATADINE 10 MILLIGRAM(S): 10 TABLET ORAL at 12:34

## 2020-02-18 RX ADMIN — Medication 1 TABLET(S): at 12:42

## 2020-02-18 RX ADMIN — TRAMADOL HYDROCHLORIDE 50 MILLIGRAM(S): 50 TABLET ORAL at 09:35

## 2020-02-18 RX ADMIN — Medication 81 MILLIGRAM(S): at 12:42

## 2020-02-18 RX ADMIN — TRAMADOL HYDROCHLORIDE 50 MILLIGRAM(S): 50 TABLET ORAL at 20:14

## 2020-02-18 RX ADMIN — Medication 1 APPLICATION(S): at 16:06

## 2020-02-18 RX ADMIN — LIDOCAINE 1 PATCH: 4 CREAM TOPICAL at 12:37

## 2020-02-18 RX ADMIN — TRAMADOL HYDROCHLORIDE 50 MILLIGRAM(S): 50 TABLET ORAL at 10:10

## 2020-02-18 RX ADMIN — Medication 325 MILLIGRAM(S): at 12:42

## 2020-02-18 RX ADMIN — Medication 25 MILLIGRAM(S): at 05:47

## 2020-02-18 RX ADMIN — HEPARIN SODIUM 5000 UNIT(S): 5000 INJECTION INTRAVENOUS; SUBCUTANEOUS at 14:45

## 2020-02-18 RX ADMIN — Medication 1 TABLET(S): at 21:41

## 2020-02-18 RX ADMIN — BUDESONIDE AND FORMOTEROL FUMARATE DIHYDRATE 2 PUFF(S): 160; 4.5 AEROSOL RESPIRATORY (INHALATION) at 05:49

## 2020-02-18 RX ADMIN — Medication 1000 UNIT(S): at 12:42

## 2020-02-18 RX ADMIN — Medication 1 TABLET(S): at 14:45

## 2020-02-18 NOTE — DIETITIAN INITIAL EVALUATION ADULT. - ADD RECOMMEND
1) Continue renal + DASH/TLC diet. 2) Provide Nepro two times per day. 3) Continue daily Multivitamin for wound healing. Consider adding Vit C. 4) Encourage adequate intake. 5) Monitor weights, labs, intake, GI tolerance, skin integrity.

## 2020-02-18 NOTE — PROGRESS NOTE ADULT - ASSESSMENT
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 Renal indices are now improving with IVF resuscitation. Continue the epogen as ordered. will follow closely. May need to start on RRT in the near future.

## 2020-02-18 NOTE — PROGRESS NOTE ADULT - SUBJECTIVE AND OBJECTIVE BOX
GURJIT HERRERA  84y  Female    Patient is a 84y old  Female who presents with a chief complaint of abdominal pain, MEHDI on CKD V (2020 14:11)      HPI:  84 year old female with PMH of CKD (baseline creatinine <5), colon obstruction (s/p ostomy 16 years ago) and chronic diarrhea, cervical cancer (s/p radical hysterectomy), tremors, eczema, depression, recent hx of sacral wound, HTN, HLD, history of frequent UTIs with chronic renae, presenting with lower abdominal pain and leaking renae catheter. Daughter at bedside assisting with history. States that patient developed lower abdominal pain for the past day which felt as if the renae catheter was "sitting in the wrong place for too long." Denies fevers, dysuria, nausea/vomiting, constipation or eating anything out of the ordinary. Patient has chronic diarrhea. Renae cathter was last changed 19 and needs monthly changing. Daughter requesting wound care evaluation as patient had a wound vac for a sacral wound while in [place] 1 month ago. She completed a 2 month course of IV abx via picc line but daughter does not recall name of abx.     seen at bed side.   urine out put is good.       PAST MEDICAL & SURGICAL HISTORY:  Wound of sacral region  Depression  Iron deficiency anemia  Eczema  HTN (hypertension)  CKD (chronic kidney disease) stage 5, GFR less than 15 ml/min: not on ESRD  Herniated lumbar intervertebral disc  Tremor  Kidney atrophy: right  Colostomy status:   Chronic UTI (urinary tract infection): chronic renae  Cervical cancer: 1970&#x27;s  Dyslipidemia  Hernia, incisional  S/P hip replacement: right   S/P colon resection:   S/P hysterectomy:           PHYSICAL EXAM:    T(C): 36.7 (20 @ 07:28), Max: 36.9 (20 @ 17:39)  HR: 100 (20 @ 12:31) (61 - 100)  BP: 118/67 (20 @ 12:31) (117/63 - 148/76)  RR: 17 (20 @ 07:28) (17 - 17)  SpO2: 97% (20 @ 07:28) (94% - 98%)  Wt(kg): --    I&O's Detail    2020 07:01  -  2020 07:00  --------------------------------------------------------  IN:  Total IN: 0 mL    OUT:    Colostomy: 400 mL    Indwelling Catheter - Urethral: 550 mL  Total OUT: 950 mL    Total NET: -950 mL      2020 07:01  -  2020 16:31  --------------------------------------------------------  IN:    Oral Fluid: 360 mL  Total IN: 360 mL    OUT:    Colostomy: 300 mL  Total OUT: 300 mL    Total NET: 60 mL          Respiratory: clear anteriorly, decreased BS at bases  Cardiovascular: S1 S2  Gastrointestinal: soft NT ND +BS  Extremities: trace edema   Neuro: Awake and alert    MEDICATIONS  (STANDING):  amLODIPine   Tablet 5 milliGRAM(s) Oral daily  aspirin enteric coated 81 milliGRAM(s) Oral daily  atorvastatin 10 milliGRAM(s) Oral at bedtime  budesonide 160 MICROgram(s)/formoterol 4.5 MICROgram(s) Inhaler 2 Puff(s) Inhalation two times a day  calcium carbonate    500 mG (Tums) Chewable 1 Tablet(s) Chew daily  cholecalciferol 1000 Unit(s) Oral daily  cholestyramine Powder (Sugar-Free) 4 Gram(s) Oral daily  collagenase Ointment 1 Application(s) Topical daily  epoetin jean carlos Injectable 15681 Unit(s) SubCutaneous <User Schedule>  ferrous    sulfate 325 milliGRAM(s) Oral daily  FLUoxetine 20 milliGRAM(s) Oral daily  heparin  Injectable 5000 Unit(s) SubCutaneous every 8 hours  lactobacillus acidophilus 1 Tablet(s) Oral three times a day  lidocaine   Patch 1 Patch Transdermal daily  loratadine 10 milliGRAM(s) Oral daily  metoprolol succinate ER 25 milliGRAM(s) Oral daily  multivitamin 1 Tablet(s) Oral daily  nystatin Powder 1 Application(s) Topical two times a day  sodium chloride 0.45% 1000 milliLiter(s) (80 mL/Hr) IV Continuous <Continuous>    MEDICATIONS  (PRN):  acetaminophen   Tablet .. 650 milliGRAM(s) Oral every 6 hours PRN Temp greater or equal to 38C (100.4F), Mild Pain (1 - 3)  hydrOXYzine hydrochloride 25 milliGRAM(s) Oral three times a day PRN Itching  traMADol 50 milliGRAM(s) Oral every 6 hours PRN Severe Pain (7 - 10)                            7.2    x     )-----------( x        ( 2020 10:32 )             22.4       02-18    140  |  112<H>  |  66<H>  ----------------------------<  84  4.6   |  14<L>  |  6.00<H>    Ca    6.6<L>      2020 07:00  Phos  5.8       Mg     2.4         TPro  5.9<L>  /  Alb  2.0<L>  /  TBili  0.2  /  DBili  x   /  AST  12<L>  /  ALT  12  /  AlkPhos  67        Creatinine Trend: Creatinine Trend: 6.00<--, 6.30<--, 6.00<--, 6.60<--    Urinalysis Basic - ( 2020 20:55 )    Color: Yellow / Appearance: Turbid / S.020 / pH: x  Gluc: x / Ketone: Negative  / Bili: Negative / Urobili: Negative   Blood: x / Protein: 500 mg/dL / Nitrite: Negative   Leuk Esterase: Moderate / RBC: 11-25 /HPF / WBC >50   Sq Epi: x / Non Sq Epi: Occasional / Bacteria: Moderate    < from: CT Abdomen and Pelvis w/ Oral Cont (20 @ 21:26) >  EXAM:  CT ABDOMEN AND PELVIS OC                            PROCEDURE DATE:  2020          INTERPRETATION:  CLINICAL INFORMATION: Abdominal pain    COMPARISON: 2018    PROCEDURE:   CT of the Abdomen and Pelvis was performed without intravenous contrast.   Intravenous contrast: None.  Oral contrast: None.  Sagittal and coronal reformats were performed.    FINDINGS:    LOWER CHEST: Within normal limits.    LIVER: Within normal limits.  BILE DUCTS: Normal caliber.  GALLBLADDER: Cholelithiasis.  SPLEEN: Within normal limits.  PANCREAS: Within normal limits.  ADRENALS: Within normal limits.  KIDNEYS/URETERS: Atrophic right kidney. Vascular calcification of the right kidney, new since prior.    BLADDER: Decompressed with a Renae catheterin it.  REPRODUCTIVE ORGANS: Status post hysterectomy.    BOWEL: No bowel obstruction. Left lower quadrant colostomy.  PERITONEUM: No ascites. Multiple surgical clips.  VESSELS: Extensive atherosclerotic calcific dictation.  RETROPERITONEUM/LYMPH NODES: No lymphadenopathy. Multiple surgical clips. Presacral fat stranding new since prior. Unknown clinical significance. No definite fluid collection.  ABDOMINAL WALL: Ventral hernia containing dilated bowel loop with a suture line.  BONES: Stable compression of T12 body. Diffuse osteopenia and osteomalacia.    IMPRESSION:     New presacral fat stranding without definite fluid collection.    Otherwise no change.

## 2020-02-18 NOTE — PROGRESS NOTE ADULT - PROBLEM SELECTOR PLAN 10
IMPROVE VTE Individual Risk Assessment          RISK                                                          Points  [  ] Previous VTE                                                3  [  ] Thrombophilia                                             2  [ x ] Lower limb paralysis                                   2        (unable to hold up >15 seconds)    [  ] Current Cancer                                             2         (within 6 months)  [ x ] Immobilization > 24 hrs                              1  [  ] ICU/CCU stay > 24 hours                             1  [ x ] Age > 60                                                         1    IMPROVE VTE Score: 3  DVT ppx: heparin 5000 sq q8    11. Prolonged QTc- 503 ms. Check AM EKG. Avoid meds that may prolong QTc

## 2020-02-18 NOTE — PROGRESS NOTE ADULT - PROBLEM SELECTOR PLAN 3
Pt with recent hx of sacral wound infection requiring IV abx x 2 months and a wound vac from [facility] (Dr. Arceo)  - CT A/P showed New presacral fat stranding without definite fluid collection.  - Pain management with tylenol and tramadol prn. Lidocaine patch qd  - I-STOP completed and placed in chart  -Wound care consult appreciated by Dr. Caceres   - Wound care Dr. Caceres to see patient

## 2020-02-18 NOTE — PROGRESS NOTE ADULT - SUBJECTIVE AND OBJECTIVE BOX
infectious diseases progress note:    GURJIT HERRERA is a 84y y. o. Female patient    Patient reports: "please let me know when I can go home"    ROS:    EYES:  Negative  blurry vision or double vision  GASTROINTESTINAL:  Negative for nausea, vomiting, diarrhea  -otherwise negative except for subjective    Allergies    Levaquin (Pruritus)  mercury (Pruritus; Rash)  sulfa drugs (Pruritus)    Intolerances        ANTIBIOTICS/RELEVANT:  antimicrobials    immunologic:  epoetin jean carlos Injectable 20759 Unit(s) SubCutaneous <User Schedule>    OTHER:  acetaminophen   Tablet .. 650 milliGRAM(s) Oral every 6 hours PRN  amLODIPine   Tablet 5 milliGRAM(s) Oral daily  aspirin enteric coated 81 milliGRAM(s) Oral daily  atorvastatin 10 milliGRAM(s) Oral at bedtime  budesonide 160 MICROgram(s)/formoterol 4.5 MICROgram(s) Inhaler 2 Puff(s) Inhalation two times a day  calcium carbonate    500 mG (Tums) Chewable 1 Tablet(s) Chew daily  cholecalciferol 1000 Unit(s) Oral daily  cholestyramine Powder (Sugar-Free) 4 Gram(s) Oral daily  collagenase Ointment 1 Application(s) Topical daily  ferrous    sulfate 325 milliGRAM(s) Oral daily  FLUoxetine 20 milliGRAM(s) Oral daily  heparin  Injectable 5000 Unit(s) SubCutaneous every 8 hours  hydrOXYzine hydrochloride 25 milliGRAM(s) Oral three times a day PRN  lactobacillus acidophilus 1 Tablet(s) Oral three times a day  lidocaine   Patch 1 Patch Transdermal daily  loratadine 10 milliGRAM(s) Oral daily  metoprolol succinate ER 25 milliGRAM(s) Oral daily  multivitamin 1 Tablet(s) Oral daily  nystatin Powder 1 Application(s) Topical two times a day  sodium chloride 0.45% 1000 milliLiter(s) IV Continuous <Continuous>  traMADol 50 milliGRAM(s) Oral every 6 hours PRN      Objective:  Vital Signs Last 24 Hrs  T(C): 36.7 (2020 07:28), Max: 36.9 (2020 17:39)  T(F): 98.1 (2020 07:28), Max: 98.4 (2020 17:39)  HR: 100 (2020 12:31) (61 - 100)  BP: 118/67 (2020 12:31) (117/63 - 148/76)  BP(mean): --  RR: 17 (2020 07:28) (17 - 17)  SpO2: 97% (2020 07:28) (94% - 98%)    T(C): 36.7 (20 @ 07:28), Max: 37.1 (20 @ 11:49)  T(C): 36.7 (20 @ 07:28), Max: 37.1 (20 @ 11:49)  T(C): 36.7 (20 @ 07:28), Max: 37.1 (20 @ 11:49)    PHYSICAL EXAM:  Constitutional: Well-developed, well nourished  Eyes: PERRLA, EOMI  Ear/Nose/Throat: oropharynx normal	  Neck: no JVD, no lymphadenopathy, supple  Respiratory: no accessory muscle use  Cardiovascular: RRR,   Gastrointestinal: soft, NT  Extremities: no clubbing, no cyanosis, edema absent      LABS:                        7.2    x     )-----------( x        ( 2020 10:32 )             22.4       8.75  @ 07:00  10.16  @ 05:55  13.63  @ 19:15          140  |  112<H>  |  66<H>  ----------------------------<  84  4.6   |  14<L>  |  6.00<H>    Ca    6.6<L>      2020 07:00  Phos  5.8       Mg     2.4         TPro  5.9<L>  /  Alb  2.0<L>  /  TBili  0.2  /  DBili  x   /  AST  12<L>  /  ALT  12  /  AlkPhos  67        Creatinine, Serum: 6.00 mg/dL (20 @ 07:00)  Creatinine, Serum: 6.30 mg/dL (20 @ 19:57)  Creatinine, Serum: 6.00 mg/dL (20 @ 05:55)  Creatinine, Serum: 6.60 mg/dL (20 @ 19:15)      PT/INR - ( 2020 19:15 )   PT: 17.7 sec;   INR: 1.56 ratio         PTT - ( 2020 19:15 )  PTT:26.5 sec  Urinalysis Basic - ( 2020 20:55 )    Color: Yellow / Appearance: Turbid / S.020 / pH: x  Gluc: x / Ketone: Negative  / Bili: Negative / Urobili: Negative   Blood: x / Protein: 500 mg/dL / Nitrite: Negative   Leuk Esterase: Moderate / RBC: 11-25 /HPF / WBC >50   Sq Epi: x / Non Sq Epi: Occasional / Bacteria: Moderate            MICROBIOLOGY:    Culture - Urine (20 @ 00:40)    Specimen Source: .Urine Catheterized    Culture Results:   >=3 organisms. Probable collection contamination.        RADIOLOGY & ADDITIONAL STUDIES:

## 2020-02-18 NOTE — PROGRESS NOTE ADULT - ASSESSMENT
84 year old female with PMH of CKD5 (baseline creatinine <5), colon obstruction (s/p ostomy 16 years ago) and chronic diarrhea, cervical cancer (s/p radical hysterectomy), tremors, eczema, depression, recent hx of sacral wound, HTN, anemia due to CKD5, HLD, history of frequent UTIs with chronic renae, presenting with lower abdominal pain and leaking renae catheter. Admitted for MEHDI on CKD5 and suspected  Sacral wound infection.

## 2020-02-18 NOTE — PROGRESS NOTE ADULT - PROBLEM SELECTOR PLAN 3
Pt with recent hx of sacral wound infection requiring IV abx x 2 months and a wound vac from [facility] (Dr. Arceo)  - CT A/P showed New presacral fat stranding without definite fluid collection.  - Pain management with tylenol and tramadol prn. Lidocaine patch qd  - I-STOP completed and placed in chart  - Wound care Dr. Caceres to see patient

## 2020-02-18 NOTE — PROGRESS NOTE ADULT - SUBJECTIVE AND OBJECTIVE BOX
Patient is a 84y old  Female who presents with a chief complaint of abdominal pain, MEHDI on CKD V (17 Feb 2020 13:40)      INTERVAL HPI/OVERNIGHT EVENTS: 84 year old female with PMH of CKD5 (baseline creatinine <5), colon obstruction (s/p ostomy 16 years ago) and chronic diarrhea, cervical cancer (s/p radical hysterectomy), tremors, eczema, depression, recent hx of sacral wound, HTN, anemia due to CKD5, HLD, history of frequent UTIs with chronic renae, presenting with lower abdominal pain and leaking renae catheter. Admitted for MEHDI on CKD5 and suspected  Sacral wound infection. Seen and examined at bedside. Feeling better, still has pain and feels weak. Denies chest pain, palpitation, sob, nausea, vomiting or diarrhea.       MEDICATIONS  (STANDING):  amLODIPine   Tablet 5 milliGRAM(s) Oral daily  aspirin enteric coated 81 milliGRAM(s) Oral daily  atorvastatin 10 milliGRAM(s) Oral at bedtime  budesonide 160 MICROgram(s)/formoterol 4.5 MICROgram(s) Inhaler 2 Puff(s) Inhalation two times a day  calcium carbonate    500 mG (Tums) Chewable 1 Tablet(s) Chew daily  cholecalciferol 1000 Unit(s) Oral daily  cholestyramine Powder (Sugar-Free) 4 Gram(s) Oral daily  collagenase Ointment 1 Application(s) Topical daily  epoetin jean carlos Injectable 71371 Unit(s) SubCutaneous <User Schedule>  ferrous    sulfate 325 milliGRAM(s) Oral daily  FLUoxetine 20 milliGRAM(s) Oral daily  heparin  Injectable 5000 Unit(s) SubCutaneous every 8 hours  lactobacillus acidophilus 1 Tablet(s) Oral three times a day  lidocaine   Patch 1 Patch Transdermal daily  loratadine 10 milliGRAM(s) Oral daily  metoprolol succinate ER 25 milliGRAM(s) Oral daily  multivitamin 1 Tablet(s) Oral daily  nystatin Powder 1 Application(s) Topical two times a day  sodium chloride 0.45% 1000 milliLiter(s) (80 mL/Hr) IV Continuous <Continuous>    MEDICATIONS  (PRN):  acetaminophen   Tablet .. 650 milliGRAM(s) Oral every 6 hours PRN Temp greater or equal to 38C (100.4F), Mild Pain (1 - 3)  hydrOXYzine hydrochloride 25 milliGRAM(s) Oral three times a day PRN Itching  traMADol 50 milliGRAM(s) Oral every 6 hours PRN Severe Pain (7 - 10)      Allergies    Levaquin (Pruritus)  mercury (Pruritus; Rash)  sulfa drugs (Pruritus)    Intolerances        REVIEW OF SYSTEMS:  CONSTITUTIONAL: No fever, + Gen weakness   EYES: No eye pain, visual disturbances, or discharge  ENMT:  No difficulty hearing, tinnitus, vertigo; No sinus or throat pain  NECK: No pain or stiffness  RESPIRATORY: No cough, wheezing, chills or hemoptysis; No shortness of breath  CARDIOVASCULAR: No chest pain, palpitations, dizziness, or leg swelling  GASTROINTESTINAL: No abdominal or epigastric pain. No nausea, vomiting, or hematemesis; No diarrhea or constipation. No melena or BRBPR.   NEUROLOGICAL: No headaches, memory loss, loss of strength, numbness, or tremors  SKIN: No itching, burning, rashes, or lesions   LYMPH NODES: No enlarged glands  ENDOCRINE: No heat or cold intolerance; No hair loss; No polydipsia or polyuria  MUSCULOSKELETAL: No joint pain or swelling; + pain at sacral wound site   HEME/LYMPH: No easy bruising, or bleeding gums  ALLERGY AND IMMUNOLOGIC: No hives or eczema    Vital Signs Last 24 Hrs  T(C): 36.7 (18 Feb 2020 07:28), Max: 37.1 (17 Feb 2020 11:49)  T(F): 98.1 (18 Feb 2020 07:28), Max: 98.7 (17 Feb 2020 11:49)  HR: 61 (18 Feb 2020 07:28) (61 - 88)  BP: 117/63 (18 Feb 2020 07:28) (117/63 - 148/76)  BP(mean): --  RR: 17 (18 Feb 2020 07:28) (17 - 17)  SpO2: 97% (18 Feb 2020 07:28) (94% - 98%)    PHYSICAL EXAM:  GENERAL: NAD, Awake, Alert   HEAD:  Atraumatic, Normocephalic  EYES: EOMI, PERRLA, conjunctiva and sclera clear  ENMT: No tonsillar erythema, exudates, or enlargement; Moist mucous membranes  NECK: Supple, No JVD, Normal thyroid  NERVOUS SYSTEM:  Alert & Awake, no focal motor/ sensory deficits   CHEST/LUNG: Clear to auscultation bilaterally; No rales, rhonchi, wheezing, or rubs  HEART: S1S2+, Regular rate and rhythm  ABDOMEN: Soft, Nontender, Nondistended; Bowel sounds present  EXTREMITIES:  2+ Peripheral Pulses, No clubbing, cyanosis  LYMPH: No lymphadenopathy noted  SKIN: + Stage 4 Sacral Decub POA    LABS:                        7.1    8.75  )-----------( 297      ( 18 Feb 2020 07:00 )             22.6     18 Feb 2020 07:00    140    |  112    |  66     ----------------------------<  84     4.6     |  14     |  6.00     Ca    6.6        18 Feb 2020 07:00  Mg     2.4       18 Feb 2020 07:00    TPro  5.9    /  Alb  2.0    /  TBili  0.2    /  DBili  x      /  AST  12     /  ALT  12     /  AlkPhos  67     18 Feb 2020 07:00    PT/INR - ( 16 Feb 2020 19:15 )   PT: 17.7 sec;   INR: 1.56 ratio         PTT - ( 16 Feb 2020 19:15 )  PTT:26.5 sec  CAPILLARY BLOOD GLUCOSE        BLOOD CULTURE  02-17 @ 00:40   >=3 organisms. Probable collection contamination.  --  --    RADIOLOGY & ADDITIONAL TESTS:    Imaging Personally Reviewed:  [ ] YES     Consultant(s) Notes Reviewed:      Care Discussed with Consultants/Other Providers:

## 2020-02-18 NOTE — DIETITIAN INITIAL EVALUATION ADULT. - OTHER INFO
Pt is a 83 y/o F with PMH CKD, colon obstruction (s/p ostomy x 16 yrs ago), chronic diarrhea, cervical Ca, HTN, HLD. Presents with MEHDI on CKD Stage 5, sacral wound infection. Upon visit, pt denies nausea/vomiting. No issues chewing/swallowing. NKFA. Pt does not follow a therapeutic diet at home. Reports UBW of 123 lb. Endorses 10 lb unintentional weight loss "6-8 months ago" due to loss of appetite. Per previous RD note (8/2018), pt weighed 140 lb. Current weight 119.9 lb. Pt states her appetite/intake has currently improved. Reports she consumed 100% of breakfast today. Good intake noted in-house. Tolerating renal + DASH/TLC diet. Reinforced increased protein-calorie needs for wound healing (pt with Stage IV pressure injury). Pt amenable to Nepro two times per day to optimize nutritional needs. Denies education regarding therapeutic diet at this time.

## 2020-02-18 NOTE — DIETITIAN INITIAL EVALUATION ADULT. - PHYSICAL APPEARANCE
BMI 22.7. Nutrition focused physical exam deferred at this time as pt reports she was in significant pain at time of visit./well nourished

## 2020-02-18 NOTE — DIETITIAN INITIAL EVALUATION ADULT. - PROBLEM SELECTOR PLAN 4
Stable  - Continue metoprolol 25mg qd and norvasc 5mg qd with hold parameters  - monitor hemodynamics

## 2020-02-18 NOTE — PROGRESS NOTE ADULT - PROBLEM SELECTOR PLAN 2
with chronic renae, s/p replaced in ER  - s/p Rocephin in ER. Continue Rocephin  - Follow up urine culture

## 2020-02-18 NOTE — PROGRESS NOTE ADULT - PROBLEM SELECTOR PLAN 9
Continue Prozac 20mg qhs  - Check TSH

## 2020-02-18 NOTE — DIETITIAN INITIAL EVALUATION ADULT. - PROBLEM SELECTOR PLAN 6
Stable. Hgb 9.5  - anemia likely due to iron deficiency vs. chronic kidney disease  - Continue ferrous sulfate 325mg qd

## 2020-02-18 NOTE — PROGRESS NOTE ADULT - PROBLEM SELECTOR PLAN 2
with chronic renae, s/p replaced in ER  - s/p Rocephin in ER. Continue Rocephin  - Doubt infection.  -ID and wound care consults appreciated

## 2020-02-18 NOTE — PROGRESS NOTE ADULT - SUBJECTIVE AND OBJECTIVE BOX
Patient is a 84y old  Female who presents with a chief complaint of abdominal pain, MEHDI on CKD V (18 Feb 2020 09:27)       INTERVAL HPI/OVERNIGHT EVENTS: 84 year old female with PMH of CKD5 (baseline creatinine <5), colon obstruction (s/p ostomy 16 years ago) and chronic diarrhea, cervical cancer (s/p radical hysterectomy), tremors, eczema, depression, recent hx of sacral wound, HTN, anemia due to CKD5, HLD, history of frequent UTIs with chronic renae, presenting with lower abdominal pain and leaking renae catheter. Admitted for MEHDI on CKD5 and stage 4  Sacral wound ( POA), doubt infection . Seen and examined at bedside. Denies new symptoms, complaints. Still has pain in sacral decub ulcer site. Denies chest pain, palpitation, sob, nausea, vomiting       MEDICATIONS  (STANDING):  amLODIPine   Tablet 5 milliGRAM(s) Oral daily  aspirin enteric coated 81 milliGRAM(s) Oral daily  atorvastatin 10 milliGRAM(s) Oral at bedtime  budesonide 160 MICROgram(s)/formoterol 4.5 MICROgram(s) Inhaler 2 Puff(s) Inhalation two times a day  calcium carbonate    500 mG (Tums) Chewable 1 Tablet(s) Chew daily  cholecalciferol 1000 Unit(s) Oral daily  cholestyramine Powder (Sugar-Free) 4 Gram(s) Oral daily  collagenase Ointment 1 Application(s) Topical daily  epoetin jean carlos Injectable 93971 Unit(s) SubCutaneous <User Schedule>  ferrous    sulfate 325 milliGRAM(s) Oral daily  FLUoxetine 20 milliGRAM(s) Oral daily  heparin  Injectable 5000 Unit(s) SubCutaneous every 8 hours  lactobacillus acidophilus 1 Tablet(s) Oral three times a day  lidocaine   Patch 1 Patch Transdermal daily  loratadine 10 milliGRAM(s) Oral daily  metoprolol succinate ER 25 milliGRAM(s) Oral daily  multivitamin 1 Tablet(s) Oral daily  nystatin Powder 1 Application(s) Topical two times a day  sodium chloride 0.45% 1000 milliLiter(s) (80 mL/Hr) IV Continuous <Continuous>    MEDICATIONS  (PRN):  acetaminophen   Tablet .. 650 milliGRAM(s) Oral every 6 hours PRN Temp greater or equal to 38C (100.4F), Mild Pain (1 - 3)  hydrOXYzine hydrochloride 25 milliGRAM(s) Oral three times a day PRN Itching  traMADol 50 milliGRAM(s) Oral every 6 hours PRN Severe Pain (7 - 10)      Allergies    Levaquin (Pruritus)  mercury (Pruritus; Rash)  sulfa drugs (Pruritus)    Intolerances        REVIEW OF SYSTEMS:  CONSTITUTIONAL: No fever, or fatigue  EYES: No eye pain, visual disturbances, or discharge  ENMT:  No difficulty hearing, tinnitus, vertigo; No sinus or throat pain  NECK: No pain or stiffness  RESPIRATORY: No cough, wheezing, chills or hemoptysis; No shortness of breath  CARDIOVASCULAR: No chest pain, palpitations, dizziness  GASTROINTESTINAL: No abdominal or epigastric pain. No nausea, vomiting, or hematemesis; No diarrhea or constipation. No melena or hematochezia.  GENITOURINARY: No dysuria, frequency, hematuria, or incontinence  NEUROLOGICAL: No headaches, memory loss, loss of strength, numbness, or tremors  SKIN: No itching, burning, rashes, or lesions   LYMPH NODES: No enlarged glands  ENDOCRINE: No heat or cold intolerance; No hair loss; No polydipsia or polyuria  MUSCULOSKELETAL: No joint pain or swelling; No muscle, back pain. + pain at sacral wound site   HEME/LYMPH: No easy bruising, or bleeding gums  ALLERGY AND IMMUNOLOGIC: No hives or eczema    Vital Signs Last 24 Hrs  T(C): 36.7 (18 Feb 2020 07:28), Max: 37.1 (17 Feb 2020 11:49)  T(F): 98.1 (18 Feb 2020 07:28), Max: 98.7 (17 Feb 2020 11:49)  HR: 61 (18 Feb 2020 07:28) (61 - 88)  BP: 117/63 (18 Feb 2020 07:28) (117/63 - 148/76)  BP(mean): --  RR: 17 (18 Feb 2020 07:28) (17 - 17)  SpO2: 97% (18 Feb 2020 07:28) (94% - 98%)    PHYSICAL EXAM:  GENERAL: NAD, Awake, Alert   HEAD:  Atraumatic, Normocephalic  EYES: EOMI, PERRLA, conjunctiva and sclera clear  ENMT: No tonsillar erythema, exudates, or enlargement; Moist mucous membranes  NECK: Supple, No JVD, Normal thyroid  NERVOUS SYSTEM:  Alert & Oriented X3, No focal motor/ sensory deficits   CHEST/LUNG: Decreased to auscultation bilaterally; No rales, rhonchi, wheezing, or rubs  HEART: S1S2+, Regular rate and rhythm  ABDOMEN: Soft, Nontender, Nondistended; Bowel sounds present  EXTREMITIES:  2+ Peripheral Pulses, No clubbing, cyanosis  LYMPH: No lymphadenopathy noted  SKIN: + Stage 4 sacral Decub     LABS:                        7.2    x     )-----------( x        ( 18 Feb 2020 10:32 )             22.4     18 Feb 2020 07:00    140    |  112    |  66     ----------------------------<  84     4.6     |  14     |  6.00     Ca    6.6        18 Feb 2020 07:00  Mg     2.4       18 Feb 2020 07:00    TPro  5.9    /  Alb  2.0    /  TBili  0.2    /  DBili  x      /  AST  12     /  ALT  12     /  AlkPhos  67     18 Feb 2020 07:00    PT/INR - ( 16 Feb 2020 19:15 )   PT: 17.7 sec;   INR: 1.56 ratio         PTT - ( 16 Feb 2020 19:15 )  PTT:26.5 sec  CAPILLARY BLOOD GLUCOSE        BLOOD CULTURE  02-17 @ 00:40   >=3 organisms. Probable collection contamination.  --  --    RADIOLOGY & ADDITIONAL TESTS:    Imaging Personally Reviewed:  [ ] YES     Consultant(s) Notes Reviewed:      Care Discussed with Consultants/Other Providers:

## 2020-02-18 NOTE — PROGRESS NOTE ADULT - ASSESSMENT
84 year old female with PMH of CKD5 (baseline creatinine <5), colon obstruction (s/p ostomy 16 years ago) and chronic diarrhea, cervical cancer (s/p radical hysterectomy), tremors, eczema, depression, recent hx of sacral wound, HTN, anemia due to CKD5, HLD, history of frequent UTIs with chronic renae, presenting with lower abdominal pain and leaking renae catheter. Admitted for MEHDI on CKD5 and stage 4  Sacral wound ( POA), doubt infection

## 2020-02-18 NOTE — DIETITIAN INITIAL EVALUATION ADULT. - PROBLEM SELECTOR PLAN 3
Pt with recent hx of sacral wound infection requiring IV abx x 2 months and a wound vac from [facility] (Dr. Arceo)  - CT A/P showed New presacral fat stranding without definite fluid collection.  - Primary team to retrieve records in AM  - Pain management with tylenol and tramadol prn. Lidocaine patch qd  - I-STOP completed and placed in chart  - Wound care MD to be consulted in AM

## 2020-02-18 NOTE — PROGRESS NOTE ADULT - PROBLEM SELECTOR PLAN 6
Stable. Hgb 9.5  - anemia likely due to iron deficiency vs. chronic kidney disease  - Continue ferrous sulfate 325mg qd
- Check iron Panel , might need IV iron. Check Stool for OB and folate, B12 levels   - anemia likely due to iron deficiency and chronic kidney disease  -Will d/e Nephro to start Epogen   - Continue ferrous sulfate 325mg qd
Stable. Hgb 9.5  - anemia likely due to iron deficiency vs. chronic kidney disease  - Continue ferrous sulfate 325mg qd

## 2020-02-18 NOTE — PROGRESS NOTE ADULT - PROBLEM SELECTOR PLAN 8
Continue home Pravachol 20mg qhs with therapeutic interchange atorvastatin 10mg qhs

## 2020-02-18 NOTE — PROGRESS NOTE ADULT - ATTENDING COMMENTS
Anemia work up  Monitor renal function  Gentle hydration  Monitor off antibiotics.   Wound care per Dr. Caceres.

## 2020-02-18 NOTE — DIETITIAN INITIAL EVALUATION ADULT. - PROBLEM SELECTOR PLAN 1
Admit to [facility]  - Likely due to dehydration and urinary retention from renae catheter problem  - s/p renae catheter replacement in ER  - Continue vitamin D3  - s/p 1L normal saline IVF  - continue gentle fluid IVF hydration  - Hyperkalemia likely due to chronic renal failure. No EKG changes noted  - with metabolic acidosis on bicarb  - Follow up AM BMP  - Avoid nephrotoxic medications  - Nephro consulted- Dr. Coto

## 2020-02-19 ENCOUNTER — TRANSCRIPTION ENCOUNTER (OUTPATIENT)
Age: 85
End: 2020-02-19

## 2020-02-19 VITALS
TEMPERATURE: 98 F | HEART RATE: 62 BPM | SYSTOLIC BLOOD PRESSURE: 141 MMHG | OXYGEN SATURATION: 98 % | RESPIRATION RATE: 17 BRPM | DIASTOLIC BLOOD PRESSURE: 58 MMHG

## 2020-02-19 LAB
ANION GAP SERPL CALC-SCNC: 13 MMOL/L — SIGNIFICANT CHANGE UP (ref 5–17)
BUN SERPL-MCNC: 63 MG/DL — HIGH (ref 7–23)
CALCIUM SERPL-MCNC: 7.8 MG/DL — LOW (ref 8.5–10.1)
CHLORIDE SERPL-SCNC: 110 MMOL/L — HIGH (ref 96–108)
CO2 SERPL-SCNC: 17 MMOL/L — LOW (ref 22–31)
CREAT SERPL-MCNC: 5.7 MG/DL — HIGH (ref 0.5–1.3)
GLUCOSE SERPL-MCNC: 80 MG/DL — SIGNIFICANT CHANGE UP (ref 70–99)
HCT VFR BLD CALC: 23.4 % — LOW (ref 34.5–45)
HGB BLD-MCNC: 7.6 G/DL — LOW (ref 11.5–15.5)
MCHC RBC-ENTMCNC: 29.7 PG — SIGNIFICANT CHANGE UP (ref 27–34)
MCHC RBC-ENTMCNC: 32.5 GM/DL — SIGNIFICANT CHANGE UP (ref 32–36)
MCV RBC AUTO: 91.4 FL — SIGNIFICANT CHANGE UP (ref 80–100)
NRBC # BLD: 0 /100 WBCS — SIGNIFICANT CHANGE UP (ref 0–0)
PLATELET # BLD AUTO: 310 K/UL — SIGNIFICANT CHANGE UP (ref 150–400)
POTASSIUM SERPL-MCNC: 4.2 MMOL/L — SIGNIFICANT CHANGE UP (ref 3.5–5.3)
POTASSIUM SERPL-SCNC: 4.2 MMOL/L — SIGNIFICANT CHANGE UP (ref 3.5–5.3)
RBC # BLD: 2.56 M/UL — LOW (ref 3.8–5.2)
RBC # FLD: 14.6 % — HIGH (ref 10.3–14.5)
SODIUM SERPL-SCNC: 140 MMOL/L — SIGNIFICANT CHANGE UP (ref 135–145)
WBC # BLD: 7.12 K/UL — SIGNIFICANT CHANGE UP (ref 3.8–10.5)
WBC # FLD AUTO: 7.12 K/UL — SIGNIFICANT CHANGE UP (ref 3.8–10.5)

## 2020-02-19 PROCEDURE — 82728 ASSAY OF FERRITIN: CPT

## 2020-02-19 PROCEDURE — 84100 ASSAY OF PHOSPHORUS: CPT

## 2020-02-19 PROCEDURE — 36415 COLL VENOUS BLD VENIPUNCTURE: CPT

## 2020-02-19 PROCEDURE — 87086 URINE CULTURE/COLONY COUNT: CPT

## 2020-02-19 PROCEDURE — 81001 URINALYSIS AUTO W/SCOPE: CPT

## 2020-02-19 PROCEDURE — 99285 EMERGENCY DEPT VISIT HI MDM: CPT | Mod: 25

## 2020-02-19 PROCEDURE — 83540 ASSAY OF IRON: CPT

## 2020-02-19 PROCEDURE — 93005 ELECTROCARDIOGRAM TRACING: CPT

## 2020-02-19 PROCEDURE — 85730 THROMBOPLASTIN TIME PARTIAL: CPT

## 2020-02-19 PROCEDURE — 84145 PROCALCITONIN (PCT): CPT

## 2020-02-19 PROCEDURE — 80053 COMPREHEN METABOLIC PANEL: CPT

## 2020-02-19 PROCEDURE — 85018 HEMOGLOBIN: CPT

## 2020-02-19 PROCEDURE — 85014 HEMATOCRIT: CPT

## 2020-02-19 PROCEDURE — 80048 BASIC METABOLIC PNL TOTAL CA: CPT

## 2020-02-19 PROCEDURE — 96374 THER/PROPH/DIAG INJ IV PUSH: CPT

## 2020-02-19 PROCEDURE — 93010 ELECTROCARDIOGRAM REPORT: CPT

## 2020-02-19 PROCEDURE — 99239 HOSP IP/OBS DSCHRG MGMT >30: CPT

## 2020-02-19 PROCEDURE — 74176 CT ABD & PELVIS W/O CONTRAST: CPT

## 2020-02-19 PROCEDURE — 99232 SBSQ HOSP IP/OBS MODERATE 35: CPT

## 2020-02-19 PROCEDURE — 85610 PROTHROMBIN TIME: CPT

## 2020-02-19 PROCEDURE — 83550 IRON BINDING TEST: CPT

## 2020-02-19 PROCEDURE — 94640 AIRWAY INHALATION TREATMENT: CPT

## 2020-02-19 PROCEDURE — 84443 ASSAY THYROID STIM HORMONE: CPT

## 2020-02-19 PROCEDURE — 83690 ASSAY OF LIPASE: CPT

## 2020-02-19 PROCEDURE — 85027 COMPLETE CBC AUTOMATED: CPT

## 2020-02-19 PROCEDURE — 83735 ASSAY OF MAGNESIUM: CPT

## 2020-02-19 RX ORDER — IRON SUCROSE 20 MG/ML
100 INJECTION, SOLUTION INTRAVENOUS EVERY 24 HOURS
Refills: 0 | Status: DISCONTINUED | OUTPATIENT
Start: 2020-02-19 | End: 2020-02-19

## 2020-02-19 RX ORDER — COLLAGENASE CLOSTRIDIUM HIST. 250 UNIT/G
1 OINTMENT (GRAM) TOPICAL
Qty: 0 | Refills: 0 | DISCHARGE
Start: 2020-02-19

## 2020-02-19 RX ORDER — TRAMADOL HYDROCHLORIDE 50 MG/1
1 TABLET ORAL
Qty: 20 | Refills: 0
Start: 2020-02-19 | End: 2020-02-23

## 2020-02-19 RX ORDER — ERYTHROPOIETIN 10000 [IU]/ML
0 INJECTION, SOLUTION INTRAVENOUS; SUBCUTANEOUS
Qty: 0 | Refills: 0 | DISCHARGE
Start: 2020-02-19

## 2020-02-19 RX ORDER — TRAMADOL HYDROCHLORIDE 50 MG/1
1 TABLET ORAL
Qty: 0 | Refills: 0 | DISCHARGE

## 2020-02-19 RX ADMIN — HEPARIN SODIUM 5000 UNIT(S): 5000 INJECTION INTRAVENOUS; SUBCUTANEOUS at 13:41

## 2020-02-19 RX ADMIN — Medication 1 TABLET(S): at 12:53

## 2020-02-19 RX ADMIN — Medication 1 TABLET(S): at 13:42

## 2020-02-19 RX ADMIN — LORATADINE 10 MILLIGRAM(S): 10 TABLET ORAL at 12:53

## 2020-02-19 RX ADMIN — LIDOCAINE 1 PATCH: 4 CREAM TOPICAL at 12:51

## 2020-02-19 RX ADMIN — Medication 81 MILLIGRAM(S): at 12:53

## 2020-02-19 RX ADMIN — Medication 25 MILLIGRAM(S): at 06:19

## 2020-02-19 RX ADMIN — Medication 1 APPLICATION(S): at 12:51

## 2020-02-19 RX ADMIN — TRAMADOL HYDROCHLORIDE 50 MILLIGRAM(S): 50 TABLET ORAL at 11:56

## 2020-02-19 RX ADMIN — Medication 1 TABLET(S): at 12:52

## 2020-02-19 RX ADMIN — BUDESONIDE AND FORMOTEROL FUMARATE DIHYDRATE 2 PUFF(S): 160; 4.5 AEROSOL RESPIRATORY (INHALATION) at 13:00

## 2020-02-19 RX ADMIN — LIDOCAINE 1 PATCH: 4 CREAM TOPICAL at 00:20

## 2020-02-19 RX ADMIN — SODIUM CHLORIDE 80 MILLILITER(S): 9 INJECTION, SOLUTION INTRAVENOUS at 01:13

## 2020-02-19 RX ADMIN — Medication 325 MILLIGRAM(S): at 12:53

## 2020-02-19 RX ADMIN — IRON SUCROSE 210 MILLIGRAM(S): 20 INJECTION, SOLUTION INTRAVENOUS at 12:42

## 2020-02-19 RX ADMIN — Medication 1 TABLET(S): at 06:19

## 2020-02-19 RX ADMIN — NYSTATIN CREAM 1 APPLICATION(S): 100000 CREAM TOPICAL at 06:19

## 2020-02-19 RX ADMIN — Medication 20 MILLIGRAM(S): at 12:53

## 2020-02-19 RX ADMIN — HEPARIN SODIUM 5000 UNIT(S): 5000 INJECTION INTRAVENOUS; SUBCUTANEOUS at 06:20

## 2020-02-19 RX ADMIN — Medication 1000 UNIT(S): at 12:52

## 2020-02-19 RX ADMIN — TRAMADOL HYDROCHLORIDE 50 MILLIGRAM(S): 50 TABLET ORAL at 10:56

## 2020-02-19 RX ADMIN — CHOLESTYRAMINE 4 GRAM(S): 4 POWDER, FOR SUSPENSION ORAL at 08:06

## 2020-02-19 RX ADMIN — AMLODIPINE BESYLATE 5 MILLIGRAM(S): 2.5 TABLET ORAL at 06:19

## 2020-02-19 NOTE — DISCHARGE NOTE PROVIDER - NSDCCPCAREPLAN_GEN_ALL_CORE_FT
PRINCIPAL DISCHARGE DIAGNOSIS  Diagnosis: Lower abdominal pain  Assessment and Plan of Treatment: Likely caused by dysfunction renae. Issue resolved when renae changed.      SECONDARY DISCHARGE DIAGNOSES  Diagnosis: Acute on chronic renal failure  Assessment and Plan of Treatment: Renal function is improving without intervention. Please continue to follow regularly with nephrology for close monitoring of your kidney function.

## 2020-02-19 NOTE — PROGRESS NOTE ADULT - SUBJECTIVE AND OBJECTIVE BOX
Chief Complaint; Sacral ulcer          T(C): 36.7 (02-19-20 @ 07:45), Max: 37.3 (02-19-20 @ 00:21)  HR: 62 (02-19-20 @ 07:45) (62 - 100)  BP: 141/58 (02-19-20 @ 07:45) (118/67 - 153/60)  RR: 17 (02-19-20 @ 07:45) (17 - 17)  SpO2: 98% (02-19-20 @ 07:45) (95% - 100%)    Levaquin (Pruritus)  mercury (Pruritus; Rash)  sulfa drugs (Pruritus)        Physical Exam:   Skin: Sacral ulcer remains clean, some slough, no drainage, no infection, periwound skin is intact.

## 2020-02-19 NOTE — PROGRESS NOTE ADULT - SUBJECTIVE AND OBJECTIVE BOX
JOSE GUADALUPE HERRERACE  84y  Female    Patient is a 84y old  Female who presents with a chief complaint of abdominal pain, MEDHI on CKD V (19 Feb 2020 09:01)      awake and alert      PAST MEDICAL & SURGICAL HISTORY:  Wound of sacral region  Depression  Iron deficiency anemia  Eczema  HTN (hypertension)  CKD (chronic kidney disease) stage 5, GFR less than 15 ml/min: not on ESRD  Herniated lumbar intervertebral disc  Tremor  Kidney atrophy: right  Colostomy status: 2006  Chronic UTI (urinary tract infection): chronic renae  Cervical cancer: 1970&#x27;s  Dyslipidemia  Hernia, incisional  S/P hip replacement: right 2013  S/P colon resection: 2006  S/P hysterectomy: 1977          PHYSICAL EXAM:    T(C): 36.7 (02-19-20 @ 07:45), Max: 37.3 (02-19-20 @ 00:21)  HR: 62 (02-19-20 @ 07:45) (62 - 100)  BP: 141/58 (02-19-20 @ 07:45) (118/67 - 153/60)  RR: 17 (02-19-20 @ 07:45) (17 - 17)  SpO2: 98% (02-19-20 @ 07:45) (95% - 100%)  Wt(kg): --    I&O's Detail    18 Feb 2020 07:01  -  19 Feb 2020 07:00  --------------------------------------------------------  IN:    Oral Fluid: 660 mL  Total IN: 660 mL    OUT:    Colostomy: 1250 mL    Indwelling Catheter - Urethral: 800 mL  Total OUT: 2050 mL    Total NET: -1390 mL      19 Feb 2020 07:01  -  19 Feb 2020 11:01  --------------------------------------------------------  IN:  Total IN: 0 mL    OUT:    Colostomy: 300 mL  Total OUT: 300 mL    Total NET: -300 mL          Respiratory: clear anteriorly, decreased BS at bases  Cardiovascular: S1 S2  Gastrointestinal: soft NT ND +BS  Extremities: trace edema   Neuro: Awake and alert    MEDICATIONS  (STANDING):  amLODIPine   Tablet 5 milliGRAM(s) Oral daily  aspirin enteric coated 81 milliGRAM(s) Oral daily  atorvastatin 10 milliGRAM(s) Oral at bedtime  budesonide 160 MICROgram(s)/formoterol 4.5 MICROgram(s) Inhaler 2 Puff(s) Inhalation two times a day  calcium carbonate    500 mG (Tums) Chewable 1 Tablet(s) Chew daily  cholecalciferol 1000 Unit(s) Oral daily  cholestyramine Powder (Sugar-Free) 4 Gram(s) Oral daily  collagenase Ointment 1 Application(s) Topical daily  epoetin jean carlos Injectable 18155 Unit(s) SubCutaneous <User Schedule>  ferrous    sulfate 325 milliGRAM(s) Oral daily  FLUoxetine 20 milliGRAM(s) Oral daily  heparin  Injectable 5000 Unit(s) SubCutaneous every 8 hours  iron sucrose IVPB 100 milliGRAM(s) IV Intermittent every 24 hours  lactobacillus acidophilus 1 Tablet(s) Oral three times a day  lidocaine   Patch 1 Patch Transdermal daily  loratadine 10 milliGRAM(s) Oral daily  metoprolol succinate ER 25 milliGRAM(s) Oral daily  multivitamin 1 Tablet(s) Oral daily  nystatin Powder 1 Application(s) Topical two times a day  sodium chloride 0.45% 1000 milliLiter(s) (80 mL/Hr) IV Continuous <Continuous>    MEDICATIONS  (PRN):  acetaminophen   Tablet .. 650 milliGRAM(s) Oral every 6 hours PRN Temp greater or equal to 38C (100.4F), Mild Pain (1 - 3)  hydrOXYzine hydrochloride 25 milliGRAM(s) Oral three times a day PRN Itching  traMADol 50 milliGRAM(s) Oral every 6 hours PRN Severe Pain (7 - 10)                            7.6    7.12  )-----------( 310      ( 19 Feb 2020 06:50 )             23.4       02-19    140  |  110<H>  |  63<H>  ----------------------------<  80  4.2   |  17<L>  |  5.70<H>    Ca    7.8<L>      19 Feb 2020 06:50  Mg     2.4     02-18    TPro  5.9<L>  /  Alb  2.0<L>  /  TBili  0.2  /  DBili  x   /  AST  12<L>  /  ALT  12  /  AlkPhos  67  02-18      Creatinine Trend: Creatinine Trend: 5.70<--, 6.00<--, 6.30<--, 6.00<--, 6.60<--

## 2020-02-19 NOTE — DISCHARGE NOTE PROVIDER - NSDCFUADDINST_GEN_ALL_CORE_FT
Recommend call to schedule your follow up appointments with your doctors (primary care doctor, wound care doctor, nephrologist)  Recommend that you take your medications as detailed in your medication reconciliation  Recommend return to the ED for worsening of your medical condition

## 2020-02-19 NOTE — PROGRESS NOTE ADULT - ASSESSMENT
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 Renal indices are now improving with IVF resuscitation and a change in renae. Continue the epogen as ordered. will follow closely. May need to start on RRT in the near future.

## 2020-02-19 NOTE — DISCHARGE NOTE NURSING/CASE MANAGEMENT/SOCIAL WORK - PATIENT PORTAL LINK FT
You can access the FollowMyHealth Patient Portal offered by [facility] by registering at the following website: [email or web address]. By joining [employer]’s FollowMyHealth portal, you will also be able to view your health information using other applications (apps) compatible with our system.

## 2020-02-19 NOTE — DISCHARGE NOTE PROVIDER - PROVIDER TOKENS
PROVIDER:[TOKEN:[[id number]],FOLLOWUP:[1 week],ESTABLISHEDPATIENT:[T]],PROVIDER:[TOKEN:[[id number]],FOLLOWUP:[2 weeks],ESTABLISHEDPATIENT:[T]],PROVIDER:[TOKEN:[[id number]],FOLLOWUP:[1-3 days],ESTABLISHEDPATIENT:[T]]

## 2020-02-19 NOTE — CHART NOTE - NSCHARTNOTEFT_GEN_A_CORE
Search Terms: Eliza Batres, 1935     Search Date: 02/19/2020 01:39:19 PM     The Drug Utilization Report below displays all of the controlled substance prescriptions, if any, that your patient has filled in the last twelve months. The information displayed on this report is compiled from pharmacy submissions to the Department, and accurately reflects the information as submitted by the pharmacies.    This report was requested by: Ishan Dorsey | Reference #: [id number]           Others' Prescriptions    Patient Name: Eliza Batres YOB: 1935   Address: [address] [place], [place] [address] Sex: Female         Rx Written    Rx Dispensed    Drug    Quantity    Days Supply    Prescriber Name              01/14/2020 01/14/2020 tramadol hcl 50 mg tablet  120 30 Scott Santos DO     12/14/2019 12/16/2019 tramadol hcl 50 mg tablet  90 23 Fátima Daniel     09/14/2019 09/14/2019 tramadol hcl 50 mg tablet  120 30 Scott Santos DO     03/06/2019 03/06/2019 tramadol hcl 50 mg tablet  120 30 Scott Santos DO         Patient Name: Eliza Batres YOB: 1935   Address: [address] [place] [address] Sex: Female         Rx Written    Rx Dispensed    Drug    Quantity    Days Supply    Prescriber Name              10/22/2019 10/23/2019 tramadol hcl 50 mg tablet  28 7 Fátima Daniel     10/23/2019 10/23/2019 tramadol hcl 50 mg tablet  60 15 Fátima Daniel

## 2020-02-19 NOTE — DISCHARGE NOTE PROVIDER - NSDCMRMEDTOKEN_GEN_ALL_CORE_FT
Acidophilus oral capsule: 1 cap(s) orally 3 times a day  aspirin 81 mg oral tablet: 1 tab(s) orally once a day  calcium (as carbonate) 600 mg oral tablet: 1 tab(s) orally once a day  cholestyramine 4 g/5 g oral powder for reconstitution: 4 gram(s) orally once a day  clobetasol 0.05% topical foam: Apply topically to affected area 2 times a day  collagenase 250 units/g topical ointment: 1 application topically once a day  epoetin jean carlos: coordinated with nephrology  ferrous sulfate 325 mg (65 mg elemental iron) oral delayed release tablet: 1 tab(s) orally once a day  hydrOXYzine pamoate 25 mg oral capsule: 1 cap(s) orally every 8 hours (2 caps in the evening)  Imodium 2 mg oral capsule: 1 tab(s) orally 3 times a day  levocetirizine 5 mg oral tablet: 1 tab(s) orally once a day (in the evening)  lidocaine 5% topical film: Apply topically to affected area once a day  Metoprolol Succinate ER 25 mg oral tablet, extended release: 1 tab(s) orally once a day  Norvasc 5 mg oral tablet: 1 tab(s) orally once a day  nystatin 100,000 units/g topical powder: Apply topically to affected area 2 times a day  Pravachol 20 mg oral tablet: 1 tab(s) orally once a day  PROzac 20 mg oral capsule: 1 cap(s) orally once a day  Nelly-Ami oral tablet: 1 tab(s) orally once a day  sodium bicarbonate 650 mg oral tablet: 1 tab(s) orally 3 times a day  Symbicort 160 mcg-4.5 mcg/inh inhalation aerosol: 2 puff(s) inhaled 2 times a day  traMADol 50 mg oral tablet: 1 tab(s) orally every 6 hours, As Needed MDD:4  Tylenol 325 mg oral tablet: 2 tab(s) orally every 6 hours, As Needed  Vitamin D3 5000 intl units oral capsule: 1 cap(s) orally once a day

## 2020-02-19 NOTE — PROGRESS NOTE ADULT - REASON FOR ADMISSION
abdominal pain, MEHDI on CKD V

## 2020-02-19 NOTE — DISCHARGE NOTE PROVIDER - CARE PROVIDER_API CALL
Scott Galvan ()  Family Medicine  [address], [address]nd floor  [place], [place] [address]  Phone: [phone]  Fax: [phone]  Established Patient  Follow Up Time: 1 week    Oscar Coto)  Nephrology  [address], [address]  [place], [place] [id number]  Phone: [phone]  Fax: [phone]  Established Patient  Follow Up Time: 2 weeks    Riccardo Blas)  Surgery  [address] [place] [address]  Phone: [phone]  Fax: [phone]  Established Patient  Follow Up Time: 1-3 days

## 2020-02-19 NOTE — PROGRESS NOTE ADULT - PROBLEM SELECTOR PLAN 1
- Likely due to dehydration and urinary retention from renae catheter problem  - s/p renae catheter replacement in ER  - Continue vitamin D3  - s/p 1L normal saline IVF  - continue gentle fluid IVF hydration  - Hyperkalemia likely due to chronic renal failure. No EKG changes noted  - with metabolic acidosis on bicarb  - Follow up AM BMP  - Avoid nephrotoxic medications  - Nephro consulted- Dr. Coto
- Likely due to dehydration and urinary retention from renae catheter problem  - s/p renae catheter replacement in ER  - Continue vitamin D3  - s/p 1L normal saline IVF  - continue gentle fluid IVF hydration  - Hyperkalemia likely due to chronic renal failure. No EKG changes noted  - with metabolic acidosis on bicarb  - Follow up AM BMP  - Avoid nephrotoxic medications  - Nephro consulted- Dr. Coto
- Likely due to dehydration and urinary retention from renae catheter problem  - s/p renae catheter replacement in ER  - Continue vitamin D3  - s/p 1L normal saline IVF  - continue gentle fluid IVF hydration  - Hyperkalemia likely due to chronic renal failure. No EKG changes noted  - with metabolic acidosis on bicarb  - Follow up AM BMP  - Avoid nephrotoxic medications  - Nephro consulted- Dr. You
Continue Collagenase, dry dressing, F/U at Wound care center when discharged.
Pt reporting that pain completely resolved as soon as the renae was replaced calling to into question whether this was an actual infection versus an issue with renae just needing to be replaced.  recommend no further abx  Thank you for consulting us and involving us in the management of this most interesting and challenging case.     Please Call with any further questions

## 2020-02-19 NOTE — DISCHARGE NOTE PROVIDER - CARE PROVIDERS DIRECT ADDRESSES
,DirectAddress_Unknown,DirectAddress_Unknown,[email or web address]

## 2020-02-19 NOTE — DISCHARGE NOTE PROVIDER - HOSPITAL COURSE
FROM ADMISSION H+P:     HPI:    84 year old female with PMH of CKD (baseline creatinine <5), colon obstruction (s/p ostomy 16 years ago) and chronic diarrhea, cervical cancer (s/p radical hysterectomy), tremors, eczema, depression, recent hx of sacral wound, HTN, HLD, history of frequent UTIs with chronic renae, presenting with lower abdominal pain and leaking renae catheter. Daughter at bedside assisting with history. States that patient developed lower abdominal pain for the past day which felt as if the reane catheter was "sitting in the wrong place for too long." Denies fevers, dysuria, nausea/vomiting, constipation or eating anything out of the ordinary. Patient has chronic diarrhea. Renae cathter was last changed 12/24/19 and needs monthly changing. Daughter requesting wound care evaluation as patient had a wound vac for a sacral wound while in [place] 1 month ago. She completed a 2 month course of IV abx via picc line but daughter does not recall name of abx.         ---    HOSPITAL COURSE:     Pt admitted w/ abd pain likely 2/2 renae dysfunction. Renae replaced and symptoms resolved. Pt had a chronic stage IV decubitus ulcer and was started on broad spectrum abx out of concern for possible superimposed infection. ID and wound care assessed the patient and recommended observing off of antibiotics and no significant evidence of active infection. Pt has f/u with the wound care center. We discussed pain regimen and she requests short term supply of narcotic be prescribed. She has been receiving tramadol here. Will check i-stop and prescribe. Pt has CKD stage V, reluctant to start dialysis. No volume overload or hyperkalemia here. Mental status is baseline. No urgent indication for HD here, worsening indices are likely 2/2 obstructive nephropathy related to catheter dysfunction. Renal indices have improved q daily here. No further indication for further inpatient treatment. Afebrile. No leukocytosis. H/h is chronic stable baseline. Continue outpatient monitoring. Given 1 dose of venofer as inpatient.         ---    CONSULTANTS:     ID Sarmad)    Wound care (Wan)    Nephro (Nnamdi)        ---    TIME SPENT:    The total amount of time spent reviewing the hospital notes, laboratory values, imaging findings, assessing/counseling the patient, discussing with consultant physicians, social work, nursing staff took -- minutes        ---    Primary care provider was made aware of plan for discharge:      [  ] NO     [  ] YES

## 2020-03-03 ENCOUNTER — INPATIENT (INPATIENT)
Facility: HOSPITAL | Age: 85
LOS: 23 days | Discharge: HOME | DRG: 871 | End: 2020-03-27
Attending: FAMILY MEDICINE | Admitting: INTERNAL MEDICINE
Payer: MEDICARE

## 2020-03-03 VITALS
HEART RATE: 71 BPM | TEMPERATURE: 98 F | WEIGHT: 119.93 LBS | DIASTOLIC BLOOD PRESSURE: 77 MMHG | RESPIRATION RATE: 18 BRPM | OXYGEN SATURATION: 100 % | SYSTOLIC BLOOD PRESSURE: 199 MMHG

## 2020-03-03 DIAGNOSIS — E87.5 HYPERKALEMIA: ICD-10-CM

## 2020-03-03 DIAGNOSIS — S31.000A UNSPECIFIED OPEN WOUND OF LOWER BACK AND PELVIS WITHOUT PENETRATION INTO RETROPERITONEUM, INITIAL ENCOUNTER: ICD-10-CM

## 2020-03-03 DIAGNOSIS — N18.5 CHRONIC KIDNEY DISEASE, STAGE 5: ICD-10-CM

## 2020-03-03 DIAGNOSIS — N39.0 URINARY TRACT INFECTION, SITE NOT SPECIFIED: ICD-10-CM

## 2020-03-03 DIAGNOSIS — L30.9 DERMATITIS, UNSPECIFIED: ICD-10-CM

## 2020-03-03 DIAGNOSIS — R41.82 ALTERED MENTAL STATUS, UNSPECIFIED: ICD-10-CM

## 2020-03-03 DIAGNOSIS — E78.5 HYPERLIPIDEMIA, UNSPECIFIED: ICD-10-CM

## 2020-03-03 DIAGNOSIS — R91.8 OTHER NONSPECIFIC ABNORMAL FINDING OF LUNG FIELD: ICD-10-CM

## 2020-03-03 DIAGNOSIS — D50.9 IRON DEFICIENCY ANEMIA, UNSPECIFIED: ICD-10-CM

## 2020-03-03 DIAGNOSIS — Z29.9 ENCOUNTER FOR PROPHYLACTIC MEASURES, UNSPECIFIED: ICD-10-CM

## 2020-03-03 PROBLEM — I10 ESSENTIAL (PRIMARY) HYPERTENSION: Chronic | Status: ACTIVE | Noted: 2020-02-17

## 2020-03-03 PROBLEM — F32.9 MAJOR DEPRESSIVE DISORDER, SINGLE EPISODE, UNSPECIFIED: Chronic | Status: ACTIVE | Noted: 2020-02-17

## 2020-03-03 LAB
ALBUMIN SERPL ELPH-MCNC: 2.8 G/DL — LOW (ref 3.3–5)
ALP SERPL-CCNC: 62 U/L — SIGNIFICANT CHANGE UP (ref 40–120)
ALT FLD-CCNC: 18 U/L — SIGNIFICANT CHANGE UP (ref 12–78)
ANION GAP SERPL CALC-SCNC: 12 MMOL/L — SIGNIFICANT CHANGE UP (ref 5–17)
APPEARANCE UR: ABNORMAL
APTT BLD: 26.7 SEC — LOW (ref 28.5–37)
AST SERPL-CCNC: 15 U/L — SIGNIFICANT CHANGE UP (ref 15–37)
BASE EXCESS BLDA CALC-SCNC: -9.7 MMOL/L — LOW (ref -2–2)
BASOPHILS # BLD AUTO: 0.06 K/UL — SIGNIFICANT CHANGE UP (ref 0–0.2)
BASOPHILS NFR BLD AUTO: 0.9 % — SIGNIFICANT CHANGE UP (ref 0–2)
BILIRUB SERPL-MCNC: 0.2 MG/DL — SIGNIFICANT CHANGE UP (ref 0.2–1.2)
BILIRUB UR-MCNC: NEGATIVE — SIGNIFICANT CHANGE UP
BLOOD GAS COMMENTS ARTERIAL: SIGNIFICANT CHANGE UP
BUN SERPL-MCNC: 45 MG/DL — HIGH (ref 7–23)
CALCIUM SERPL-MCNC: 8.6 MG/DL — SIGNIFICANT CHANGE UP (ref 8.5–10.1)
CHLORIDE SERPL-SCNC: 118 MMOL/L — HIGH (ref 96–108)
CO2 SERPL-SCNC: 14 MMOL/L — LOW (ref 22–31)
COLOR SPEC: ABNORMAL
CREAT SERPL-MCNC: 4 MG/DL — HIGH (ref 0.5–1.3)
DIFF PNL FLD: ABNORMAL
EOSINOPHIL # BLD AUTO: 0.38 K/UL — SIGNIFICANT CHANGE UP (ref 0–0.5)
EOSINOPHIL NFR BLD AUTO: 5.6 % — SIGNIFICANT CHANGE UP (ref 0–6)
GLUCOSE SERPL-MCNC: 76 MG/DL — SIGNIFICANT CHANGE UP (ref 70–99)
GLUCOSE UR QL: NEGATIVE — SIGNIFICANT CHANGE UP
HCO3 BLDA-SCNC: 17 MMOL/L — LOW (ref 23–27)
HCT VFR BLD CALC: 31.7 % — LOW (ref 34.5–45)
HGB BLD-MCNC: 9.8 G/DL — LOW (ref 11.5–15.5)
HOROWITZ INDEX BLDA+IHG-RTO: 21 — SIGNIFICANT CHANGE UP
IMM GRANULOCYTES NFR BLD AUTO: 0.1 % — SIGNIFICANT CHANGE UP (ref 0–1.5)
INR BLD: 1.13 RATIO — SIGNIFICANT CHANGE UP (ref 0.88–1.16)
KETONES UR-MCNC: NEGATIVE — SIGNIFICANT CHANGE UP
LACTATE SERPL-SCNC: 0.6 MMOL/L — LOW (ref 0.7–2)
LEUKOCYTE ESTERASE UR-ACNC: ABNORMAL
LYMPHOCYTES # BLD AUTO: 1.3 K/UL — SIGNIFICANT CHANGE UP (ref 1–3.3)
LYMPHOCYTES # BLD AUTO: 19.2 % — SIGNIFICANT CHANGE UP (ref 13–44)
MCHC RBC-ENTMCNC: 29.3 PG — SIGNIFICANT CHANGE UP (ref 27–34)
MCHC RBC-ENTMCNC: 30.9 GM/DL — LOW (ref 32–36)
MCV RBC AUTO: 94.6 FL — SIGNIFICANT CHANGE UP (ref 80–100)
MONOCYTES # BLD AUTO: 0.4 K/UL — SIGNIFICANT CHANGE UP (ref 0–0.9)
MONOCYTES NFR BLD AUTO: 5.9 % — SIGNIFICANT CHANGE UP (ref 2–14)
NEUTROPHILS # BLD AUTO: 4.62 K/UL — SIGNIFICANT CHANGE UP (ref 1.8–7.4)
NEUTROPHILS NFR BLD AUTO: 68.3 % — SIGNIFICANT CHANGE UP (ref 43–77)
NITRITE UR-MCNC: NEGATIVE — SIGNIFICANT CHANGE UP
NRBC # BLD: 0 /100 WBCS — SIGNIFICANT CHANGE UP (ref 0–0)
PCO2 BLDA: 31 MMHG — LOW (ref 32–46)
PH BLDA: 7.31 — LOW (ref 7.35–7.45)
PH UR: 5 — SIGNIFICANT CHANGE UP (ref 5–8)
PLATELET # BLD AUTO: 292 K/UL — SIGNIFICANT CHANGE UP (ref 150–400)
PO2 BLDA: 87 MMHG — SIGNIFICANT CHANGE UP (ref 74–108)
POTASSIUM SERPL-MCNC: 5.3 MMOL/L — SIGNIFICANT CHANGE UP (ref 3.5–5.3)
POTASSIUM SERPL-MCNC: 6.3 MMOL/L — CRITICAL HIGH (ref 3.5–5.3)
POTASSIUM SERPL-SCNC: 5.3 MMOL/L — SIGNIFICANT CHANGE UP (ref 3.5–5.3)
POTASSIUM SERPL-SCNC: 6.3 MMOL/L — CRITICAL HIGH (ref 3.5–5.3)
PROT SERPL-MCNC: 6.7 G/DL — SIGNIFICANT CHANGE UP (ref 6–8.3)
PROT UR-MCNC: 500 MG/DL
PROTHROM AB SERPL-ACNC: 12.7 SEC — SIGNIFICANT CHANGE UP (ref 10–12.9)
RBC # BLD: 3.35 M/UL — LOW (ref 3.8–5.2)
RBC # FLD: 15.1 % — HIGH (ref 10.3–14.5)
SAO2 % BLDA: 96 % — SIGNIFICANT CHANGE UP (ref 92–96)
SODIUM SERPL-SCNC: 144 MMOL/L — SIGNIFICANT CHANGE UP (ref 135–145)
SP GR SPEC: 1.02 — SIGNIFICANT CHANGE UP (ref 1.01–1.02)
UROBILINOGEN FLD QL: NEGATIVE — SIGNIFICANT CHANGE UP
WBC # BLD: 6.77 K/UL — SIGNIFICANT CHANGE UP (ref 3.8–10.5)
WBC # FLD AUTO: 6.77 K/UL — SIGNIFICANT CHANGE UP (ref 3.8–10.5)

## 2020-03-03 PROCEDURE — 71045 X-RAY EXAM CHEST 1 VIEW: CPT | Mod: 26

## 2020-03-03 PROCEDURE — 93010 ELECTROCARDIOGRAM REPORT: CPT

## 2020-03-03 PROCEDURE — 99223 1ST HOSP IP/OBS HIGH 75: CPT | Mod: GC

## 2020-03-03 PROCEDURE — 70450 CT HEAD/BRAIN W/O DYE: CPT | Mod: 26

## 2020-03-03 PROCEDURE — 99285 EMERGENCY DEPT VISIT HI MDM: CPT

## 2020-03-03 RX ORDER — LIDOCAINE 4 G/100G
1 CREAM TOPICAL DAILY
Refills: 0 | Status: DISCONTINUED | OUTPATIENT
Start: 2020-03-03 | End: 2020-03-06

## 2020-03-03 RX ORDER — HEPARIN SODIUM 5000 [USP'U]/ML
5000 INJECTION INTRAVENOUS; SUBCUTANEOUS EVERY 8 HOURS
Refills: 0 | Status: COMPLETED | OUTPATIENT
Start: 2020-03-03 | End: 2020-03-22

## 2020-03-03 RX ORDER — LORATADINE 10 MG/1
10 TABLET ORAL DAILY
Refills: 0 | Status: DISCONTINUED | OUTPATIENT
Start: 2020-03-03 | End: 2020-03-10

## 2020-03-03 RX ORDER — ACETAMINOPHEN 500 MG
650 TABLET ORAL EVERY 6 HOURS
Refills: 0 | Status: DISCONTINUED | OUTPATIENT
Start: 2020-03-03 | End: 2020-03-06

## 2020-03-03 RX ORDER — DEXTROSE 50 % IN WATER 50 %
50 SYRINGE (ML) INTRAVENOUS ONCE
Refills: 0 | Status: COMPLETED | OUTPATIENT
Start: 2020-03-03 | End: 2020-03-03

## 2020-03-03 RX ORDER — FLUOXETINE HCL 10 MG
20 CAPSULE ORAL DAILY
Refills: 0 | Status: DISCONTINUED | OUTPATIENT
Start: 2020-03-03 | End: 2020-03-27

## 2020-03-03 RX ORDER — SODIUM CHLORIDE 9 MG/ML
1000 INJECTION, SOLUTION INTRAVENOUS
Refills: 0 | Status: DISCONTINUED | OUTPATIENT
Start: 2020-03-03 | End: 2020-03-04

## 2020-03-03 RX ORDER — SODIUM CHLORIDE 9 MG/ML
1700 INJECTION, SOLUTION INTRAVENOUS ONCE
Refills: 0 | Status: COMPLETED | OUTPATIENT
Start: 2020-03-03 | End: 2020-03-03

## 2020-03-03 RX ORDER — ALBUTEROL 90 UG/1
2.5 AEROSOL, METERED ORAL ONCE
Refills: 0 | Status: COMPLETED | OUTPATIENT
Start: 2020-03-03 | End: 2020-03-03

## 2020-03-03 RX ORDER — ASPIRIN/CALCIUM CARB/MAGNESIUM 324 MG
81 TABLET ORAL DAILY
Refills: 0 | Status: DISCONTINUED | OUTPATIENT
Start: 2020-03-03 | End: 2020-03-22

## 2020-03-03 RX ORDER — FERROUS SULFATE 325(65) MG
325 TABLET ORAL DAILY
Refills: 0 | Status: DISCONTINUED | OUTPATIENT
Start: 2020-03-03 | End: 2020-03-25

## 2020-03-03 RX ORDER — PIPERACILLIN AND TAZOBACTAM 4; .5 G/20ML; G/20ML
3.38 INJECTION, POWDER, LYOPHILIZED, FOR SOLUTION INTRAVENOUS EVERY 12 HOURS
Refills: 0 | Status: DISCONTINUED | OUTPATIENT
Start: 2020-03-04 | End: 2020-03-06

## 2020-03-03 RX ORDER — SODIUM POLYSTYRENE SULFONATE 4.1 MEQ/G
15 POWDER, FOR SUSPENSION ORAL ONCE
Refills: 0 | Status: COMPLETED | OUTPATIENT
Start: 2020-03-03 | End: 2020-03-03

## 2020-03-03 RX ORDER — CHOLESTYRAMINE 4 G/9G
4 POWDER, FOR SUSPENSION ORAL DAILY
Refills: 0 | Status: DISCONTINUED | OUTPATIENT
Start: 2020-03-03 | End: 2020-03-27

## 2020-03-03 RX ORDER — AMLODIPINE BESYLATE 2.5 MG/1
5 TABLET ORAL DAILY
Refills: 0 | Status: DISCONTINUED | OUTPATIENT
Start: 2020-03-03 | End: 2020-03-04

## 2020-03-03 RX ORDER — PIPERACILLIN AND TAZOBACTAM 4; .5 G/20ML; G/20ML
3.38 INJECTION, POWDER, LYOPHILIZED, FOR SOLUTION INTRAVENOUS ONCE
Refills: 0 | Status: COMPLETED | OUTPATIENT
Start: 2020-03-03 | End: 2020-03-03

## 2020-03-03 RX ORDER — METOPROLOL TARTRATE 50 MG
1 TABLET ORAL
Qty: 0 | Refills: 0 | DISCHARGE

## 2020-03-03 RX ORDER — SODIUM BICARBONATE 1 MEQ/ML
650 SYRINGE (ML) INTRAVENOUS THREE TIMES A DAY
Refills: 0 | Status: DISCONTINUED | OUTPATIENT
Start: 2020-03-03 | End: 2020-03-19

## 2020-03-03 RX ORDER — INSULIN HUMAN 100 [IU]/ML
8 INJECTION, SOLUTION SUBCUTANEOUS ONCE
Refills: 0 | Status: COMPLETED | OUTPATIENT
Start: 2020-03-03 | End: 2020-03-03

## 2020-03-03 RX ORDER — CHOLECALCIFEROL (VITAMIN D3) 125 MCG
1000 CAPSULE ORAL DAILY
Refills: 0 | Status: DISCONTINUED | OUTPATIENT
Start: 2020-03-03 | End: 2020-03-10

## 2020-03-03 RX ORDER — NYSTATIN CREAM 100000 [USP'U]/G
1 CREAM TOPICAL
Refills: 0 | Status: DISCONTINUED | OUTPATIENT
Start: 2020-03-03 | End: 2020-03-27

## 2020-03-03 RX ORDER — BUDESONIDE AND FORMOTEROL FUMARATE DIHYDRATE 160; 4.5 UG/1; UG/1
2 AEROSOL RESPIRATORY (INHALATION)
Refills: 0 | Status: DISCONTINUED | OUTPATIENT
Start: 2020-03-03 | End: 2020-03-27

## 2020-03-03 RX ORDER — ATORVASTATIN CALCIUM 80 MG/1
10 TABLET, FILM COATED ORAL AT BEDTIME
Refills: 0 | Status: DISCONTINUED | OUTPATIENT
Start: 2020-03-03 | End: 2020-03-25

## 2020-03-03 RX ORDER — METOPROLOL TARTRATE 50 MG
25 TABLET ORAL
Refills: 0 | Status: DISCONTINUED | OUTPATIENT
Start: 2020-03-03 | End: 2020-03-07

## 2020-03-03 RX ORDER — SODIUM CHLORIDE 9 MG/ML
1700 INJECTION INTRAMUSCULAR; INTRAVENOUS; SUBCUTANEOUS ONCE
Refills: 0 | Status: COMPLETED | OUTPATIENT
Start: 2020-03-03 | End: 2020-03-03

## 2020-03-03 RX ORDER — CALCIUM CARBONATE 500(1250)
1 TABLET ORAL DAILY
Refills: 0 | Status: DISCONTINUED | OUTPATIENT
Start: 2020-03-03 | End: 2020-03-03

## 2020-03-03 RX ORDER — LACTOBACILLUS ACIDOPHILUS 100MM CELL
1 CAPSULE ORAL THREE TIMES A DAY
Refills: 0 | Status: DISCONTINUED | OUTPATIENT
Start: 2020-03-03 | End: 2020-03-10

## 2020-03-03 RX ORDER — SODIUM BICARBONATE 1 MEQ/ML
50 SYRINGE (ML) INTRAVENOUS ONCE
Refills: 0 | Status: COMPLETED | OUTPATIENT
Start: 2020-03-03 | End: 2020-03-03

## 2020-03-03 RX ADMIN — AMLODIPINE BESYLATE 5 MILLIGRAM(S): 2.5 TABLET ORAL at 22:01

## 2020-03-03 RX ADMIN — NYSTATIN CREAM 1 APPLICATION(S): 100000 CREAM TOPICAL at 19:30

## 2020-03-03 RX ADMIN — SODIUM CHLORIDE 50 MILLILITER(S): 9 INJECTION, SOLUTION INTRAVENOUS at 19:30

## 2020-03-03 RX ADMIN — HEPARIN SODIUM 5000 UNIT(S): 5000 INJECTION INTRAVENOUS; SUBCUTANEOUS at 22:01

## 2020-03-03 RX ADMIN — Medication 50 MILLIEQUIVALENT(S): at 14:31

## 2020-03-03 RX ADMIN — BUDESONIDE AND FORMOTEROL FUMARATE DIHYDRATE 2 PUFF(S): 160; 4.5 AEROSOL RESPIRATORY (INHALATION) at 19:30

## 2020-03-03 RX ADMIN — PIPERACILLIN AND TAZOBACTAM 3.38 GRAM(S): 4; .5 INJECTION, POWDER, LYOPHILIZED, FOR SOLUTION INTRAVENOUS at 15:00

## 2020-03-03 RX ADMIN — Medication 1 APPLICATION(S): at 19:30

## 2020-03-03 RX ADMIN — Medication 1 TABLET(S): at 22:01

## 2020-03-03 RX ADMIN — Medication 650 MILLIGRAM(S): at 22:03

## 2020-03-03 RX ADMIN — SODIUM POLYSTYRENE SULFONATE 15 GRAM(S): 4.1 POWDER, FOR SUSPENSION ORAL at 15:11

## 2020-03-03 RX ADMIN — PIPERACILLIN AND TAZOBACTAM 200 GRAM(S): 4; .5 INJECTION, POWDER, LYOPHILIZED, FOR SOLUTION INTRAVENOUS at 14:30

## 2020-03-03 RX ADMIN — ATORVASTATIN CALCIUM 10 MILLIGRAM(S): 80 TABLET, FILM COATED ORAL at 22:01

## 2020-03-03 RX ADMIN — INSULIN HUMAN 8 UNIT(S): 100 INJECTION, SOLUTION SUBCUTANEOUS at 14:33

## 2020-03-03 RX ADMIN — SODIUM CHLORIDE 1700 MILLILITER(S): 9 INJECTION, SOLUTION INTRAVENOUS at 15:30

## 2020-03-03 RX ADMIN — Medication 50 MILLILITER(S): at 14:31

## 2020-03-03 RX ADMIN — SODIUM CHLORIDE 1700 MILLILITER(S): 9 INJECTION, SOLUTION INTRAVENOUS at 14:30

## 2020-03-03 RX ADMIN — Medication 650 MILLIGRAM(S): at 22:01

## 2020-03-03 RX ADMIN — Medication 25 MILLIGRAM(S): at 19:31

## 2020-03-03 RX ADMIN — ALBUTEROL 2.5 MILLIGRAM(S): 90 AEROSOL, METERED ORAL at 15:28

## 2020-03-03 NOTE — H&P ADULT - PROBLEM SELECTOR PLAN 4
- CXR showing questionable infiltrate of upper lobes  - Patient afebrile, no leukocytosis, no respiratory symptoms  - Continue zosyn as above - CXR showing questionable infiltrate of upper lobes  - Patient afebrile, no leukocytosis, no respiratory symptoms  - Continue zosyn, renal dosing as above - CXR showing questionable infiltrate of upper lobes  - Patient afebrile, no leukocytosis, no respiratory symptoms  - Continue zosyn, renal dosing as above  -f/u CXR in 4-6 weeks

## 2020-03-03 NOTE — H&P ADULT - PROBLEM SELECTOR PLAN 3
- K 6.3 on admission, decreased to 5.3 in ED  - S/p insulin, albuterol, kayexalate  - Trend K q6 - K 6.3 on admission, decreased to 5.3 in ED  - S/p insulin, albuterol, kayexalate  - Trend K q6  -no EKG changes

## 2020-03-03 NOTE — H&P ADULT - ASSESSMENT
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 Admitted for UTI and hyperkalemia 2/2 MEHDI on CKD 5.

## 2020-03-03 NOTE — ED PROVIDER NOTE - CLINICAL SUMMARY MEDICAL DECISION MAKING FREE TEXT BOX
BIBA due to confusion. AMS work up. CT head, sepsis work up, re-assess [place]: BIBA due to confusion. AMS work up. CT head, sepsis work up, re-assess  pt found to have UTI and hyperkalemia in setting of MEHDI, requiring reversal of hyperkalemia.

## 2020-03-03 NOTE — ED ADULT NURSE NOTE - CHPI ED NUR SYMPTOMS NEG
no weakness/no loss of consciousness/no nausea/no blurred vision/no vomiting/no fever/no dizziness/no numbness/no change in level of consciousness

## 2020-03-03 NOTE — ED PROVIDER NOTE - CARE PLAN
Principal Discharge DX:	UTI (urinary tract infection)  Secondary Diagnosis:	Pneumonia  Secondary Diagnosis:	Hyperkalemia  Secondary Diagnosis:	AMS (altered mental status)

## 2020-03-03 NOTE — ED ADULT NURSE NOTE - NSIMPLEMENTINTERV_GEN_ALL_ED
Implemented All Fall with Harm Risk Interventions:  [place] to call system. Call bell, personal items and telephone within reach. Instruct patient to call for assistance. Room bathroom lighting operational. Non-slip footwear when patient is off stretcher. Physically safe environment: no spills, clutter or unnecessary equipment. Stretcher in lowest position, wheels locked, appropriate side rails in place. Provide visual cue, wrist band, yellow gown, etc. Monitor gait and stability. Monitor for mental status changes and reorient to person, place, and time. Review medications for side effects contributing to fall risk. Reinforce activity limits and safety measures with patient and family. Provide visual clues: red socks.

## 2020-03-03 NOTE — H&P ADULT - PROBLEM SELECTOR PLAN 7
- Patient with metabolic acidosis in setting of UTI   - BUN/Cr 45/4 on admission, improved from prior  - S/p IV sodium bicarb  - Continue home sodium bicarb  - Nephrology consulted, Dr. Coto - Patient with metabolic acidosis in setting of UTI   - BUN/Cr 45/4 on admission, improved from prior  - S/p IV sodium bicarb  - Continue home sodium bicarb  - No signs of volume overload, no decreased UOP per daughter  - Nephrology consulted, Dr. Coto

## 2020-03-03 NOTE — H&P ADULT - PROBLEM SELECTOR PLAN 2
-CT head negative for acute intracranial hemorrhage, infarct  - Hold centrally acting meds: hydroxyzine, tramadol

## 2020-03-03 NOTE — CONSULT NOTE ADULT - SUBJECTIVE AND OBJECTIVE BOX
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 Patient's daughter present at bedside providing history due to patient AMS.  Patient daughter reports that over the past few days, patient seems weaker than usual (not able to empty ostomy, not able to assist with sacral wound cleaning, etc.) Last night she seemed confused, and was answering questions inappropriately, and having difficulty communicating. She has not had decreased urine output, and has not had any recent complaints. Denies any use of NSAIDS.         PAST MEDICAL & SURGICAL HISTORY:  Wound of sacral region  Depression  Iron deficiency anemia  Eczema  HTN (hypertension)  CKD (chronic kidney disease) stage 5, GFR less than 15 ml/min: not on HD  Herniated lumbar intervertebral disc  Tremor  Kidney atrophy: right  Colostomy status:   Chronic UTI (urinary tract infection): chronic renae  Cervical cancer: 1970&#x27;s  Dyslipidemia  Hernia, incisional  S/P hip replacement: right   S/P colon resection:   S/P hysterectomy:       Allergies    Levaquin (Pruritus)  mercury (Pruritus; Rash)  sulfa drugs (Pruritus)    Intolerances        Home Medications:  Acidophilus oral capsule: 1 cap(s) orally 3 times a day (03 Mar 2020 17:10)  aspirin 81 mg oral tablet: 1 tab(s) orally once a day (03 Mar 2020 17:10)  Bystolic 5 mg oral tablet: 1 tab(s) orally once a day (03 Mar 2020 17:10)  calcium (as carbonate) 600 mg oral tablet: 1 tab(s) orally once a day (03 Mar 2020 17:10)  cholestyramine 4 g/5 g oral powder for reconstitution: 4 gram(s) orally once a day (03 Mar 2020 17:10)  clobetasol 0.05% topical foam: Apply topically to affected area 2 times a day (03 Mar 2020 17:10)  epoetin jean carlos: coordinated with nephrology (03 Mar 2020 17:10)  ferrous sulfate 325 mg (65 mg elemental iron) oral delayed release tablet: 1 tab(s) orally once a day (03 Mar 2020 17:10)  hydrOXYzine pamoate 25 mg oral capsule: 1 cap(s) orally every 8 hours (2 caps in the evening) (03 Mar 2020 17:10)  Imodium 2 mg oral capsule: 1 tab(s) orally 3 times a day (03 Mar 2020 17:10)  levocetirizine 5 mg oral tablet: 1 tab(s) orally once a day (in the evening) (03 Mar 2020 17:10)  lidocaine 5% topical film: Apply topically to affected area once a day (03 Mar 2020 17:10)  Norvasc 5 mg oral tablet: 1 tab(s) orally once a day (03 Mar 2020 17:10)  nystatin 100,000 units/g topical powder: Apply topically to affected area 2 times a day (03 Mar 2020 17:10)  Pravachol 20 mg oral tablet: 1 tab(s) orally once a day (03 Mar 2020 17:10)  PROzac 20 mg oral capsule: 1 cap(s) orally once a day (03 Mar 2020 17:10)  Nelly-Ami oral tablet: 1 tab(s) orally once a day (03 Mar 2020 17:10)  sodium bicarbonate 650 mg oral tablet: 1 tab(s) orally 3 times a day (03 Mar 2020 17:10)  Symbicort 160 mcg-4.5 mcg/inh inhalation aerosol: 2 puff(s) inhaled 2 times a day (03 Mar 2020 17:10)  Tylenol 325 mg oral tablet: 2 tab(s) orally every 6 hours, As Needed (03 Mar 2020 17:10)  Vitamin D3 5000 intl units oral capsule: 1 cap(s) orally once a day (03 Mar 2020 17:10)        FAMILY HISTORY:  Family history of ovarian cancer (Sibling): mother      SOCIAL HISTORY:    REVIEW OF SYSTEMS:    Constitutional: No fever, weight loss or fatigue  Eyes: No eye pain, visual disturbances, or discharge  ENT:  No difficulty hearing, tinnitus, vertigo; No sinus or throat pain  Neck: No pain or stiffness  Breasts: No pain, masses or nipple discharge  Respiratory: No cough, wheezing, chills or hemoptysis  Cardiovascular: No chest pain, palpitations, shortness of breath, dizziness or leg swelling  Gastrointestinal: No abdominal or epigastric pain. No nausea, vomiting or hematemesis; No diarrhea or constipation. No melena or hematochezia.  Genitourinary: No dysuria, frequency, hematuria or incontinence  Rectal: No pain, hemorrhoids or incontinence  Neurological: No headaches, memory loss, loss of strength, numbness or tremors  Skin: No itching, burning, rashes or lesions   Lymph Nodes: No enlarged glands  Endocrine: No heat or cold intolerance; No hair loss  Musculoskeletal: No joint pain or swelling; No muscle, back or extremity pain  Psychiatric: No depression, anxiety, mood swings or difficulty sleeping  Heme/Lymph: No easy bruising or bleeding gums  Allergy and Immunologic: No hives or eczema    current medications:    acetaminophen   Tablet .. 650 milliGRAM(s) Oral every 6 hours PRN  amLODIPine   Tablet 5 milliGRAM(s) Oral daily  aspirin enteric coated 81 milliGRAM(s) Oral daily  atorvastatin 10 milliGRAM(s) Oral at bedtime  budesonide 160 MICROgram(s)/formoterol 4.5 MICROgram(s) Inhaler 2 Puff(s) Inhalation two times a day  calcium carbonate 1250 mG  + Vitamin D (OsCal 500 + D) 1 Tablet(s) Oral daily  cholecalciferol 1000 Unit(s) Oral daily  cholestyramine Powder (Sugar-Free) 4 Gram(s) Oral daily  clobetasol 0.05% Cream 1 Application(s) Topical two times a day  ferrous    sulfate 325 milliGRAM(s) Oral daily  FLUoxetine 20 milliGRAM(s) Oral daily  lactobacillus acidophilus 1 Tablet(s) Oral three times a day  lidocaine   Patch 1 Patch Transdermal daily PRN  loratadine 10 milliGRAM(s) Oral daily  metoprolol tartrate 25 milliGRAM(s) Oral two times a day  multivitamin 1 Tablet(s) Oral daily  nystatin Cream 1 Application(s) Topical two times a day  sodium bicarbonate 650 milliGRAM(s) Oral three times a day      Vital Signs Last 24 Hrs  T(C): 36.7 (03 Mar 2020 15:00), Max: 36.7 (03 Mar 2020 13:00)  T(F): 98 (03 Mar 2020 15:00), Max: 98.1 (03 Mar 2020 13:00)  HR: 60 (03 Mar 2020 15:00) (58 - 71)  BP: 194/94 (03 Mar 2020 15:00) (194/94 - 211/95)  BP(mean): --  RR: 16 (03 Mar 2020 15:00) (16 - 18)  SpO2: 99% (03 Mar 2020 15:00) (99% - 100%)    PHYSICAL EXAM:    Constitutional: NAD, well-groomed, well-developed  HEENT: PERRLA, EOMI, Normal Hearing, MMM  Neck: No LAD, No JVD  Back: Normal spine flexure, No CVA tenderness  Respiratory: CTAB/L   Cardiovascular: S1 and S2, RRR, no M/G/R  Gastrointestinal: BS+, soft, NT/ND  Extremities: trace peripheral edema  Vascular: 2+ peripheral pulses  Neurological: A/O x 3, no focal deficits  Skin: No rashes      LABS:                        9.8    6.77  )-----------( 292      ( 03 Mar 2020 13:44 )             31.7     03-03    x   |  x   |  x   ----------------------------<  x   5.3   |  x   |  x     Ca    8.6      03 Mar 2020 13:44    TPro  6.7  /  Alb  2.8<L>  /  TBili  0.2  /  DBili  x   /  AST  15  /  ALT  18  /  AlkPhos  62  03-03    PT/INR - ( 03 Mar 2020 13:44 )   PT: 12.7 sec;   INR: 1.13 ratio         PTT - ( 03 Mar 2020 13:44 )  PTT:26.7 sec  Urinalysis Basic - ( 03 Mar 2020 13:29 )    Color: Pink / Appearance: Turbid / S.020 / pH: x  Gluc: x / Ketone: Negative  / Bili: Negative / Urobili: Negative   Blood: x / Protein: 500 mg/dL / Nitrite: Negative   Leuk Esterase: Moderate / RBC: >50 /HPF / WBC 26-50   Sq Epi: x / Non Sq Epi: Occasional / Bacteria: Moderate      Creatinine Trend: 4.00<--, 5.70<--, 6.00<--, 6.30<--, 6.00<--, 6.60<--    MICROBIOLOGY:  RECENT CULTURES:        RADIOLOGY & ADDITIONAL STUDIES:

## 2020-03-03 NOTE — H&P ADULT - NSHPSOCIALHISTORY_GEN_ALL_CORE
Lives with daughter. Dependent on daughter for cleaning, cooking, taking medications. Quit smoking 35 years ago. No etoh, no illicit drugs. Walks with [facility] (HCP)

## 2020-03-03 NOTE — H&P ADULT - PROBLEM SELECTOR PLAN 1
- S/p zosyn in ED  - - Patient has chronic renae, frequent UTI  - UA positive for leuk esterase, bacteria  - No fever, leukocytosis  - Renae replaced in ED  - S/p zosyn in ED  - S/p 3.4 L in ED  - Urine culture from Feb 2020 with >3 organisms  - Continue zosyn  - Follow up blood and urine cultures  - ID consult, Dr. Coronado - Patient has chronic renae, frequent UTI  - UA positive for leuk esterase, bacteria  - No fever, leukocytosis  - Renae replaced in ED  - S/p zosyn in ED  - S/p 3.4 L in ED  - Urine culture from Feb 2020 with >3 organisms  - Continue zosyn, renal dosing  - Follow up blood and urine cultures  - ID consult, Dr. Coronado

## 2020-03-03 NOTE — ED PROVIDER NOTE - PHYSICAL EXAMINATION
Constitutional: Awake, Alert, non-toxic. NAD. Well appearing, well nourished.   HEAD: Normocephalic, atraumatic.   EYES: PERRL, EOM intact, conjunctiva and sclera are clear bilaterally. No raccoon eyes.   ENT: No rhinorrhea, mucous membranes pink/moist  NECK: Supple, non-tender; no cervical LAD, no JVD, no goiter.  CARDIOVASCULAR: Normal S1, S2; regular rate and rhythm.  RESPIRATORY: Normal respiratory effort; breath sounds CTAB, no wheezes, rhonchi, or rales. Speaking in full sentences. No accessory muscle use.   ABDOMEN: Soft; non-tender, non-distended. (+) colostomy bag   EXTREMITIES: Full passive and active ROM in all extremities; non-tender to palpation; distal pulses palpable and symmetric, no edema, no crepitus or step off  SKIN: Warm, dry; good skin turgor, (+) 4 cm sacral ulcer with minor surrounding erythema, no ecchymosis, brisk capillary refill.  NEURO: A&O x2. Sensory and motor functions are grossly intact. Speech is normal. Appearance and judgement seem appropriate for gender and age. 5/5 motor function, sensation to light touch intact, CN 2-12 intact

## 2020-03-03 NOTE — H&P ADULT - NSHPPHYSICALEXAM_GEN_ALL_CORE
T(C): 36.7 (03-03-20 @ 15:00), Max: 36.7 (03-03-20 @ 13:00)  HR: 60 (03-03-20 @ 15:00) (58 - 71)  BP: 194/94 (03-03-20 @ 15:00) (194/94 - 211/95)  RR: 16 (03-03-20 @ 15:00) (16 - 18)  SpO2: 99% (03-03-20 @ 15:00) (99% - 100%)    GENERAL: patient appears elderly, tremulous, no acute distress, appropriate, pleasant  EYES: sclera clear, no exudates  ENMT: oropharynx clear without erythema, no exudates, moist mucous membranes  NECK: supple, soft   LUNGS: good air entry bilaterally, clear to auscultation, symmetric breath sounds, no wheezing or rhonchi appreciated  HEART: soft S1/S2, regular rate and rhythm, no murmurs noted, no lower extremity edema  GASTROINTESTINAL: abdomen is soft, ostomy with large amount of green liquid and surrounding pink skin, mildly tender suprapubic region, nondistended, normoactive bowel sounds  INTEGUMENT: good skin turgor, eczema  MUSCULOSKELETAL: no clubbing or cyanosis, no obvious deformity  NEUROLOGIC: awake, alert, oriented to self and place, answers questions appropriately, follows commands, good muscle tone in 4 extremities, no obvious sensory deficits  PSYCHIATRIC: mood is good, affect is congruent, linear and logical thought process  HEME/LYMPH: no obvious ecchymosis or petechiae T(C): 36.7 (03-03-20 @ 15:00), Max: 36.7 (03-03-20 @ 13:00)  HR: 60 (03-03-20 @ 15:00) (58 - 71)  BP: 194/94 (03-03-20 @ 15:00) (194/94 - 211/95)  RR: 16 (03-03-20 @ 15:00) (16 - 18)  SpO2: 99% (03-03-20 @ 15:00) (99% - 100%)    GENERAL: patient appears elderly, tremulous, no acute distress, appropriate, pleasant  EYES: sclera clear, no exudates  ENMT: oropharynx clear without erythema, no exudates, moist mucous membranes  NECK: supple, soft   LUNGS: good air entry bilaterally, clear to auscultation, symmetric breath sounds, no wheezing or rhonchi appreciated  HEART: soft S1/S2, regular rate and rhythm, no murmurs noted, no lower extremity edema  GASTROINTESTINAL: abdomen is soft, ostomy with large amount of green liquid and surrounding pink skin, mildly tender suprapubic region, nondistended, normoactive bowel sounds  INTEGUMENT: good skin turgor, eczema  MUSCULOSKELETAL: no clubbing or cyanosis, no obvious deformity  NEUROLOGIC: awake, alert, oriented to self and place, answers questions appropriately, follows commands with prompting, good muscle tone in 4 extremities, no obvious sensory deficits  PSYCHIATRIC: mood is good, affect is congruent  HEME/LYMPH: no obvious ecchymosis or petechiae

## 2020-03-03 NOTE — ED ADULT NURSE NOTE - PMH
Cervical cancer  1970's  Chronic UTI (urinary tract infection)  chronic renae  CKD (chronic kidney disease) stage 5, GFR less than 15 ml/min  not on ESRD  Colostomy status  2006  Depression    Dyslipidemia    Eczema    Hernia, incisional    Herniated lumbar intervertebral disc    HTN (hypertension)    Iron deficiency anemia    Kidney atrophy  right  Tremor    Wound of sacral region

## 2020-03-03 NOTE — H&P ADULT - NSHPREVIEWOFSYSTEMS_GEN_ALL_CORE
CONSTITUTIONAL: Admits chills, fatigue, weakness denies fever   HEENT: denies blurred vision, sore throat  SKIN: Admits raw skin around ostomy, sacral ulcer  CARDIOVASCULAR: denies chest pain, chest pressure, palpitations  RESPIRATORY: denies shortness of breath, sputum production  GASTROINTESTINAL: denies nausea, vomiting, diarrhea, abdominal pain  GENITOURINARY: Admits chronic suprapubic discomfort, denies decreased UOP  NEUROLOGICAL: denies numbness, headache, focal weakness  MUSCULOSKELETAL: denies new joint pain, muscle aches  HEMATOLOGIC: denies gross bleeding, bruising  LYMPHATICS: denies extremity swelling

## 2020-03-03 NOTE — H&P ADULT - HISTORY OF PRESENT ILLNESS
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 Patient's daughter present at bedside providing history due to patient AMS.  Patient daughter reports that over the past few days, patient seems weaker than usual (not able to empty ostomy, not able to assist with sacral wound cleaning, etc.) Last night she seemed confused, and was answering questions inappropriately, and having difficulty communicating. She has not had decreased urine output, and has not had any recent complaints. Daughter reports that a similar episode occurred over the summer, when the patient had a UTI. Patient currently does not remember the events of last night/this morning. She reports feeling cold, irritation of the skin around her ostomy site, and suprapubic discomfort (baseline). Patient denies fevers, chest pain, palpitations, LE edema.    ED Course:  Vitals: Temp 97.9, HR 71, /77 ->211/95->194/94, RR 18,  O2 sat 100 on RA.  Labs:  WBC 6.77, H/h 9.8/31.7, RDW 15.1, glucose 180, lactate .6, K 6.3 ->5.3, Cl 118, bicarb 14, anion gap 12, BUN/Cr 45/4 (was ~6 last admission, baseline <5), albumin 2.8, GFR 10  UA: color: pink, appearance: turbid, protein 500, LE moderate, blood large, WBC 26-50, RBC>50, bacteria moderate.  CT head: Negative for intracranial hemorrhage or acute territorial hemorrhage  CXR: Questionable upper lobe infiltrates  EKG: NSR with peaked t waves  Patient s/p zosyn, 1.7L NS blous, 1.7 LR bolus, insulin, albuterol, sodium bicarb 50 meq IV, kayexalate with improvement.  Renae removed and replaced with 50 cc urine output upon insertion. 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 Patient's daughter present at bedside providing history due to patient AMS.  Patient daughter reports that over the past few days, patient seems weaker than usual (not able to empty ostomy, not able to assist with sacral wound cleaning, etc.) Last night she seemed confused, and was answering questions inappropriately, and having difficulty communicating. She has not had decreased urine output, and has not had any recent complaints. Daughter reports that a similar episode occurred over the summer, when the patient had a UTI. Patient currently does not remember the events of last night/this morning. She reports feeling cold, irritation of the skin around her ostomy site, sacral pain, and suprapubic discomfort (baseline). Patient denies fevers, chest pain, palpitations, LE edema.    ED Course:  Vitals: Temp 97.9, HR 71, /77 ->211/95->194/94, RR 18,  O2 sat 100 on RA.  Labs:  WBC 6.77, H/h 9.8/31.7, RDW 15.1, glucose 180, lactate .6, K 6.3 ->5.3, Cl 118, bicarb 14, anion gap 12, BUN/Cr 45/4 (was ~6 last admission, baseline <5), albumin 2.8, GFR 10  UA: color: pink, appearance: turbid, protein 500, LE moderate, blood large, WBC 26-50, RBC>50, bacteria moderate.  CT head: Negative for intracranial hemorrhage or acute territorial hemorrhage  CXR: Questionable upper lobe infiltrates  EKG: NSR with peaked t waves  Patient s/p zosyn, 1.7L NS blous, 1.7 LR bolus, insulin, albuterol, sodium bicarb 50 meq IV, kayexalate with improvement.  Renae removed and replaced with 50 cc urine output upon insertion. 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 Patient's daughter present at bedside providing history due to patient AMS.  Patient daughter reports that over the past few days, patient seems weaker than usual (not able to empty ostomy, not able to assist with sacral wound cleaning, etc.) Last night she seemed confused, and was answering questions inappropriately, and having difficulty communicating. She has not had decreased urine output, and has not had any recent complaints. Daughter reports that a similar episode occurred over the summer, when the patient had a UTI. Patient currently does not remember the events of last night/this morning. She reports feeling cold, irritation of the skin around her ostomy site, sacral pain, and suprapubic discomfort (baseline). Patient denies fevers, chest pain, palpitations, LE edema.    ED Course:  Vitals: Temp 97.9, HR 71, /77 ->211/95->194/94, RR 18,  O2 sat 100 on RA.  Labs:  WBC 6.77, H/h 9.8/31.7, RDW 15.1, glucose 180, lactate .6, K 6.3 ->5.3, Cl 118, bicarb 14, anion gap 12, BUN/Cr 45/4 (was ~6 last admission, baseline <5), albumin 2.8, GFR 10  UA: color: pink, appearance: turbid, protein 500, LE moderate, blood large, WBC 26-50, RBC>50, bacteria moderate.  CT head: Negative for intracranial hemorrhage or acute territorial hemorrhage  CXR: Questionable upper lobe infiltrates  EKG: NSR with peaked t waves  ABG - ( 03 Mar 2020 16:09 )  pH, Arterial: 7.31  pH, Blood: x     /  pCO2: 31    /  pO2: 87    / HCO3: 17    / Base Excess: -9.7  /  SaO2: 96      Patient s/p zosyn, 1.7L NS blous, 1.7 LR bolus, insulin, albuterol, sodium bicarb 50 meq IV, kayexalate with improvement.  Renae removed and replaced with 50 cc urine output upon insertion. 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 Patient's daughter present at bedside providing history due to patient AMS.  Patient daughter reports that over the past few days, patient seems weaker than usual (not able to empty ostomy, not able to assist with sacral wound cleaning, etc.) Last night she seemed confused, and was answering questions inappropriately, and having difficulty communicating. She has not had decreased urine output, and has not had any recent complaints. Daughter reports that a similar episode occurred over the summer, when the patient had a UTI. Patient currently does not remember the events of last night/this morning. She reports feeling cold, irritation of the skin around her ostomy site, sacral pain, and suprapubic discomfort (baseline). Patient denies fevers, chest pain, palpitations, LE edema.    ED Course:  Vitals: Temp 97.9, HR 71, /77 ->211/95->194/94, RR 18,  O2 sat 100 on RA.  Labs:  WBC 6.77, H/h 9.8/31.7, RDW 15.1, glucose 180, lactate .6, K 6.3 ->5.3, Cl 118, bicarb 14, anion gap 12, BUN/Cr 45/4 (was ~6 last admission, baseline <5), albumin 2.8, GFR 10  UA: color: pink, appearance: turbid, protein 500, LE moderate, blood large, WBC 26-50, RBC>50, bacteria moderate.  CT head: Negative for intracranial hemorrhage or acute territorial hemorrhage  CXR: Questionable upper lobe infiltrates  EKG: NSR without peaked t waves and no ischemic changes  ABG - ( 03 Mar 2020 16:09 )  pH, Arterial: 7.31  pH, Blood: x     /  pCO2: 31    /  pO2: 87    / HCO3: 17    / Base Excess: -9.7  /  SaO2: 96      Patient s/p zosyn, 1.7L NS blous, 1.7 LR bolus, insulin, albuterol, sodium bicarb 50 meq IV, kayexalate with improvement.  Renae removed and replaced with 50 cc urine output upon insertion.

## 2020-03-03 NOTE — H&P ADULT - NSICDXPASTMEDICALHX_GEN_ALL_CORE_FT
PAST MEDICAL HISTORY:  Cervical cancer 1970's    Chronic UTI (urinary tract infection) chronic renae    CKD (chronic kidney disease) stage 5, GFR less than 15 ml/min not on HD    Colostomy status 2006    Depression     Dyslipidemia     Eczema     Hernia, incisional     Herniated lumbar intervertebral disc     HTN (hypertension)     Iron deficiency anemia     Kidney atrophy right    Tremor     Wound of sacral region

## 2020-03-03 NOTE — H&P ADULT - PROBLEM SELECTOR PLAN 5
- Tylenol prn  - Lidocaine patch prn  - Wound care consult for morning (also consult for skin around ostomy)

## 2020-03-03 NOTE — H&P ADULT - PROBLEM SELECTOR PLAN 10
Problem 11: Depression: Continue prozac  IMPROVE VTE Individual Risk Assessment  RISK                                                                Points  [  ] Previous VTE                                                  3  [  ] Thrombophilia                                               2  [ x ] Lower limb paralysis                                      2        (unable to hold up >15 seconds)    [  ] Current Cancer                                              2         (within 6 months)  [ x ] Immobilization > 24 hrs                                1  [  ] ICU/CCU stay > 24 hours                              1  [ x ] Age > 60                                                      1  IMPROVE VTE Score ____4_____  IMPROVE Score 0-1: Low Risk, No VTE prophylaxis required for most patients, encourage ambulation.   IMPROVE Score 2-3: At risk, pharmacologic VTE prophylaxis is indicated for most patients (in the absence of a contraindication)  IMPROVE Score > or = 4: High Risk, pharmacologic VTE prophylaxis is indicated for most patients (in the absence of a contraindication)  DVT ppx: lovenox Problem 11: Depression: Continue prozac  Problem 12: HTN: Continue home amlodipine, bystolic therapeutic interchange lopressor 25mg BID w/ hold parameters  IMPROVE VTE Individual Risk Assessment  RISK                                                                Points  [  ] Previous VTE                                                  3  [  ] Thrombophilia                                               2  [ x ] Lower limb paralysis                                      2        (unable to hold up >15 seconds)    [  ] Current Cancer                                              2         (within 6 months)  [ x ] Immobilization > 24 hrs                                1  [  ] ICU/CCU stay > 24 hours                              1  [ x ] Age > 60                                                      1  IMPROVE VTE Score ____4_____  IMPROVE Score 0-1: Low Risk, No VTE prophylaxis required for most patients, encourage ambulation.   IMPROVE Score 2-3: At risk, pharmacologic VTE prophylaxis is indicated for most patients (in the absence of a contraindication)  IMPROVE Score > or = 4: High Risk, pharmacologic VTE prophylaxis is indicated for most patients (in the absence of a contraindication)  DVT ppx: lovenox Problem 11: Depression: Continue prozac  Problem 12: HTN: Continue home amlodipine, bystolic therapeutic interchange lopressor 25mg BID w/ hold parameters  IMPROVE VTE Individual Risk Assessment  RISK                                                                Points  [  ] Previous VTE                                                  3  [  ] Thrombophilia                                               2  [ x ] Lower limb paralysis                                      2        (unable to hold up >15 seconds)    [  ] Current Cancer                                              2         (within 6 months)  [ x ] Immobilization > 24 hrs                                1  [  ] ICU/CCU stay > 24 hours                              1  [ x ] Age > 60                                                      1  IMPROVE VTE Score ____4_____  IMPROVE Score 0-1: Low Risk, No VTE prophylaxis required for most patients, encourage ambulation.   IMPROVE Score 2-3: At risk, pharmacologic VTE prophylaxis is indicated for most patients (in the absence of a contraindication)  IMPROVE Score > or = 4: High Risk, pharmacologic VTE prophylaxis is indicated for most patients (in the absence of a contraindication)  DVT ppx: subq heparin

## 2020-03-03 NOTE — CONSULT NOTE ADULT - ASSESSMENT
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 Recent admission for a malfunctioning indwelling renae. Renal indices are close to baseline. Will continue the IV abx and IVF at the present rate. Will order epogen.  will follow closely. May need to start on RRT in the near future unless the family decides otherwise. Palliative evaluation is probably ideal.

## 2020-03-03 NOTE — ED ADULT NURSE REASSESSMENT NOTE - NS ED NURSE REASSESS COMMENT FT1
Perez catheter present on arrival removed at this time, 10cc balloon deflated. Patient tolerated well. Perez catheter replaced by RN at this time. Patient tolerated well. 50 ml urine output upon insertion. ED PA aware. Urine sample collected and sent to the lab. Safety maintained.

## 2020-03-03 NOTE — ED ADULT NURSE NOTE - CAS DISCH ACCOMP BY
Please provide ICD-10 code as this was not included on prescription and add diagnosis of ADHD to patients problem list. Thank you.    Self/Transport

## 2020-03-03 NOTE — H&P ADULT - PROBLEM SELECTOR PLAN 6
- Anemia likely 2/2 LELO, ACD, and CKD  - H/h 9.8/31.7, low but improved from prior  - Continue home iron  - Continue epo per nephrology

## 2020-03-03 NOTE — ED ADULT NURSE NOTE - OBJECTIVE STATEMENT
84 year old female brought in by EMS from home for complaints of confusion. As per EMS, patient presents from home where she lives with daughter. Daughter called EMS for increased confusion since early morning. Patient alert to self and place, disoriented to situation and unable to tell us why she is in the hospital today. On daughter's arrival, history and information obtained. As per daughter, patient with increased confusion early morning. Daughter reports whenever this occurs, patient usually has a UTI. Patient also with large, unstageable wound to the sacrum. Patient complains of pain to the site. Redness around the wound noted, nonblanchable. Rectal temperature on arrival 98.1. Daughter denies recent fever/chills. Patient is without complaints. Denies pain. Patient complains of discomfort to the buttocks when touched or resting onto the back. Patient denies chest pain, shortness of breath or palpitations. Patient denies headache or dizziness or vision changes. Patient with chronic Perez catheter due to recurrent UTIs. Patient also with colostomy to the left lower quadrant abdomen. Patient and daughter deny change or current issues with either. Patient denies abdominal pain, nausea or vomiting. Abrasions noted to the bilateral upper and lower extremities. Patient unsure of how she sustained this wounds. No rash noted. Patient hypertensive on arrival 199/87 and upon initial RN assessment 211/89. Daughter denies recent illness or sick contacts. Daughter reports patient walks with a walker and has been without difficulty up until today.

## 2020-03-03 NOTE — ED PROVIDER NOTE - OBJECTIVE STATEMENT
83 y/o female with PMHx Dyslipidemia, Colonostomy, Cervical cancer, CKD, chronic UTI BIBA due to confusion since 3am as per EMS. Notes patient lives at home with her daughter who is currently not present in ED. Notes foul smelling urine. Pt admits to feeling well currently. pt denies abdominal pain, fever, chest pain, cough.

## 2020-03-04 DIAGNOSIS — I10 ESSENTIAL (PRIMARY) HYPERTENSION: ICD-10-CM

## 2020-03-04 DIAGNOSIS — N17.9 ACUTE KIDNEY FAILURE, UNSPECIFIED: ICD-10-CM

## 2020-03-04 DIAGNOSIS — L89.154 PRESSURE ULCER OF SACRAL REGION, STAGE 4: ICD-10-CM

## 2020-03-04 LAB
ANION GAP SERPL CALC-SCNC: 10 MMOL/L — SIGNIFICANT CHANGE UP (ref 5–17)
ANION GAP SERPL CALC-SCNC: 9 MMOL/L — SIGNIFICANT CHANGE UP (ref 5–17)
BUN SERPL-MCNC: 40 MG/DL — HIGH (ref 7–23)
BUN SERPL-MCNC: 41 MG/DL — HIGH (ref 7–23)
CALCIUM SERPL-MCNC: 7.9 MG/DL — LOW (ref 8.5–10.1)
CALCIUM SERPL-MCNC: 8.1 MG/DL — LOW (ref 8.5–10.1)
CHLORIDE SERPL-SCNC: 120 MMOL/L — HIGH (ref 96–108)
CHLORIDE SERPL-SCNC: 121 MMOL/L — HIGH (ref 96–108)
CO2 SERPL-SCNC: 16 MMOL/L — LOW (ref 22–31)
CO2 SERPL-SCNC: 16 MMOL/L — LOW (ref 22–31)
CREAT SERPL-MCNC: 3.7 MG/DL — HIGH (ref 0.5–1.3)
CREAT SERPL-MCNC: 3.8 MG/DL — HIGH (ref 0.5–1.3)
GLUCOSE SERPL-MCNC: 76 MG/DL — SIGNIFICANT CHANGE UP (ref 70–99)
GLUCOSE SERPL-MCNC: 79 MG/DL — SIGNIFICANT CHANGE UP (ref 70–99)
HCT VFR BLD CALC: 27.6 % — LOW (ref 34.5–45)
HGB BLD-MCNC: 8.4 G/DL — LOW (ref 11.5–15.5)
MCHC RBC-ENTMCNC: 27.9 PG — SIGNIFICANT CHANGE UP (ref 27–34)
MCHC RBC-ENTMCNC: 30.4 GM/DL — LOW (ref 32–36)
MCV RBC AUTO: 91.7 FL — SIGNIFICANT CHANGE UP (ref 80–100)
NRBC # BLD: 0 /100 WBCS — SIGNIFICANT CHANGE UP (ref 0–0)
PLATELET # BLD AUTO: 267 K/UL — SIGNIFICANT CHANGE UP (ref 150–400)
POTASSIUM SERPL-MCNC: 5.5 MMOL/L — HIGH (ref 3.5–5.3)
POTASSIUM SERPL-MCNC: 5.8 MMOL/L — HIGH (ref 3.5–5.3)
POTASSIUM SERPL-SCNC: 5.5 MMOL/L — HIGH (ref 3.5–5.3)
POTASSIUM SERPL-SCNC: 5.8 MMOL/L — HIGH (ref 3.5–5.3)
RBC # BLD: 3.01 M/UL — LOW (ref 3.8–5.2)
RBC # FLD: 14.9 % — HIGH (ref 10.3–14.5)
SODIUM SERPL-SCNC: 146 MMOL/L — HIGH (ref 135–145)
SODIUM SERPL-SCNC: 146 MMOL/L — HIGH (ref 135–145)
WBC # BLD: 5.43 K/UL — SIGNIFICANT CHANGE UP (ref 3.8–10.5)
WBC # FLD AUTO: 5.43 K/UL — SIGNIFICANT CHANGE UP (ref 3.8–10.5)

## 2020-03-04 PROCEDURE — 99223 1ST HOSP IP/OBS HIGH 75: CPT

## 2020-03-04 PROCEDURE — 99233 SBSQ HOSP IP/OBS HIGH 50: CPT | Mod: GC

## 2020-03-04 RX ORDER — AMLODIPINE BESYLATE 2.5 MG/1
5 TABLET ORAL ONCE
Refills: 0 | Status: COMPLETED | OUTPATIENT
Start: 2020-03-04 | End: 2020-03-04

## 2020-03-04 RX ORDER — IRON SUCROSE 20 MG/ML
100 INJECTION, SOLUTION INTRAVENOUS EVERY 24 HOURS
Refills: 0 | Status: COMPLETED | OUTPATIENT
Start: 2020-03-04 | End: 2020-03-06

## 2020-03-04 RX ORDER — AMLODIPINE BESYLATE 2.5 MG/1
10 TABLET ORAL DAILY
Refills: 0 | Status: DISCONTINUED | OUTPATIENT
Start: 2020-03-05 | End: 2020-03-07

## 2020-03-04 RX ORDER — HYDRALAZINE HCL 50 MG
10 TABLET ORAL ONCE
Refills: 0 | Status: COMPLETED | OUTPATIENT
Start: 2020-03-04 | End: 2020-03-04

## 2020-03-04 RX ORDER — ERYTHROPOIETIN 10000 [IU]/ML
10000 INJECTION, SOLUTION INTRAVENOUS; SUBCUTANEOUS
Refills: 0 | Status: DISCONTINUED | OUTPATIENT
Start: 2020-03-04 | End: 2020-03-19

## 2020-03-04 RX ORDER — SODIUM CHLORIDE 9 MG/ML
1000 INJECTION, SOLUTION INTRAVENOUS
Refills: 0 | Status: DISCONTINUED | OUTPATIENT
Start: 2020-03-04 | End: 2020-03-05

## 2020-03-04 RX ADMIN — Medication 650 MILLIGRAM(S): at 21:09

## 2020-03-04 RX ADMIN — ERYTHROPOIETIN 10000 UNIT(S): 10000 INJECTION, SOLUTION INTRAVENOUS; SUBCUTANEOUS at 17:14

## 2020-03-04 RX ADMIN — SODIUM CHLORIDE 75 MILLILITER(S): 9 INJECTION, SOLUTION INTRAVENOUS at 12:17

## 2020-03-04 RX ADMIN — HEPARIN SODIUM 5000 UNIT(S): 5000 INJECTION INTRAVENOUS; SUBCUTANEOUS at 14:19

## 2020-03-04 RX ADMIN — Medication 1 APPLICATION(S): at 05:16

## 2020-03-04 RX ADMIN — Medication 1 TABLET(S): at 05:16

## 2020-03-04 RX ADMIN — NYSTATIN CREAM 1 APPLICATION(S): 100000 CREAM TOPICAL at 17:20

## 2020-03-04 RX ADMIN — AMLODIPINE BESYLATE 5 MILLIGRAM(S): 2.5 TABLET ORAL at 12:00

## 2020-03-04 RX ADMIN — Medication 650 MILLIGRAM(S): at 05:16

## 2020-03-04 RX ADMIN — Medication 1000 UNIT(S): at 11:54

## 2020-03-04 RX ADMIN — Medication 25 MILLIGRAM(S): at 17:14

## 2020-03-04 RX ADMIN — Medication 325 MILLIGRAM(S): at 11:59

## 2020-03-04 RX ADMIN — Medication 10 MILLIGRAM(S): at 01:19

## 2020-03-04 RX ADMIN — CHOLESTYRAMINE 4 GRAM(S): 4 POWDER, FOR SUSPENSION ORAL at 09:25

## 2020-03-04 RX ADMIN — BUDESONIDE AND FORMOTEROL FUMARATE DIHYDRATE 2 PUFF(S): 160; 4.5 AEROSOL RESPIRATORY (INHALATION) at 07:42

## 2020-03-04 RX ADMIN — Medication 1 TABLET(S): at 14:15

## 2020-03-04 RX ADMIN — Medication 20 MILLIGRAM(S): at 11:54

## 2020-03-04 RX ADMIN — PIPERACILLIN AND TAZOBACTAM 25 GRAM(S): 4; .5 INJECTION, POWDER, LYOPHILIZED, FOR SOLUTION INTRAVENOUS at 14:15

## 2020-03-04 RX ADMIN — NYSTATIN CREAM 1 APPLICATION(S): 100000 CREAM TOPICAL at 05:15

## 2020-03-04 RX ADMIN — Medication 1 TABLET(S): at 21:09

## 2020-03-04 RX ADMIN — Medication 650 MILLIGRAM(S): at 14:15

## 2020-03-04 RX ADMIN — Medication 1 APPLICATION(S): at 17:17

## 2020-03-04 RX ADMIN — LORATADINE 10 MILLIGRAM(S): 10 TABLET ORAL at 11:58

## 2020-03-04 RX ADMIN — ATORVASTATIN CALCIUM 10 MILLIGRAM(S): 80 TABLET, FILM COATED ORAL at 21:09

## 2020-03-04 RX ADMIN — IRON SUCROSE 100 MILLIGRAM(S): 20 INJECTION, SOLUTION INTRAVENOUS at 17:14

## 2020-03-04 RX ADMIN — Medication 25 MILLIGRAM(S): at 05:16

## 2020-03-04 RX ADMIN — Medication 1 TABLET(S): at 11:59

## 2020-03-04 RX ADMIN — AMLODIPINE BESYLATE 5 MILLIGRAM(S): 2.5 TABLET ORAL at 05:16

## 2020-03-04 RX ADMIN — HEPARIN SODIUM 5000 UNIT(S): 5000 INJECTION INTRAVENOUS; SUBCUTANEOUS at 05:15

## 2020-03-04 RX ADMIN — HEPARIN SODIUM 5000 UNIT(S): 5000 INJECTION INTRAVENOUS; SUBCUTANEOUS at 21:09

## 2020-03-04 RX ADMIN — SODIUM CHLORIDE 75 MILLILITER(S): 9 INJECTION, SOLUTION INTRAVENOUS at 21:09

## 2020-03-04 RX ADMIN — PIPERACILLIN AND TAZOBACTAM 25 GRAM(S): 4; .5 INJECTION, POWDER, LYOPHILIZED, FOR SOLUTION INTRAVENOUS at 03:24

## 2020-03-04 RX ADMIN — BUDESONIDE AND FORMOTEROL FUMARATE DIHYDRATE 2 PUFF(S): 160; 4.5 AEROSOL RESPIRATORY (INHALATION) at 21:09

## 2020-03-04 RX ADMIN — Medication 81 MILLIGRAM(S): at 11:54

## 2020-03-04 NOTE — DIETITIAN INITIAL EVALUATION ADULT. - DIET TYPE
DASH/TLC (sodium and cholesterol restricted diet)/60 gm protein/suplena BID/no concentrated potassium/supplement (specify)

## 2020-03-04 NOTE — DIETITIAN INITIAL EVALUATION ADULT. - OTHER INFO
84 year old female dx UTI with history sacral wound Iron deficiency anemia CKD stage 5 HTN dyslipidemia patient on previous admit noted with good PO but with weight loss over last 11/2 ~ 20# now weight stable. PO good PTA per patient. no problems chewing swallowing . no food allergies. colostomy noted and functioning. RD spoke to wound care RN patient with epibole not a candidate for juan pablo supplement or additional protein for wound healing.

## 2020-03-04 NOTE — PROGRESS NOTE ADULT - PROBLEM SELECTOR PLAN 5
- Tylenol prn  - Lidocaine patch prn  - Wound care consult for morning (also consult for skin around ostomy) - Tylenol prn  - Aquacel QOD  - Wound care RN, recs appreciated   - Wound care MD to see - CXR showing questionable infiltrate of upper lobes  - possible upper lobe infiltrates on CXR  - Patient afebrile, no leukocytosis, no respiratory symptoms, procalcitonin low  - Continue zosyn, renal dosing as above  - f/u CXR in 4-6 weeks

## 2020-03-04 NOTE — PROGRESS NOTE ADULT - PROBLEM SELECTOR PLAN 6
- Anemia likely 2/2 LELO, ACD, and CKD  - H/h 9.8/31.7, low but improved from prior  - Continue home iron  - Continue epo per nephrology - Anemia likely 2/2 LELO, ACD, and CKD  - Fe panel from 2/2020 showed low total iron, low total iron binding capacity  - hemoglobin 8.3 today, approximately baseline  - Continue home iron  - Continue epo - Tylenol PRN  - Wound care RN, recs appreciated   - Aquacel dressing every other day  - Wound care MD to see -- f/up recs

## 2020-03-04 NOTE — PROGRESS NOTE ADULT - PROBLEM SELECTOR PLAN 8
- Continue home clobetasol   - Continue home levocetirizine: therapeutic interchange loratidine - hypertensive urgency yesterday: 190/72  - s/p hydralazine 10 mg IV, with BP better controlled  - Amlodipine raised to 10 mg daily, also given 5 mg dose.  - Continue lopressor 25 mg BID with hold parameters - hypertensive urgency yesterday: 190/72  - s/p hydralazine 10 mg IV, with BP better controlled  - Amlodipine raised from 5mg to 10mg po daily, also given additional 5 mg dose today  - Continue lopressor 25 mg BID with hold parameters

## 2020-03-04 NOTE — PROGRESS NOTE ADULT - ASSESSMENT
·	CKD 4, Solitary functioning kidney: Prerenal azotemia, (Atrophic right kidney)  ·	Metabolic acidosis  ·	Hyperkalemia  ·	Diabetes  ·	Hypertension  ·	Anemia    Improving renal indices. Will change IVF. Add Bicarb to IVF. Potassium trending down. Monitor h/h trend.   Monitor blood sugar levels. Insulin coverage as needed. Dietary restriction. Monitor UO.   Avoid nephrotoxic meds as possible. Avoid ACEI, ARB, NSAIDs and IV contrast. Will follow electrolytes and renal function trend. ·	CKD 4, Solitary functioning kidney: Prerenal azotemia, (Atrophic right kidney)  ·	Metabolic acidosis  ·	Hyperkalemia  ·	Diabetes  ·	Hypertension  ·	Anemia    Improving renal indices. Will change IVF. Add Bicarb to IVF. Potassium trending down. Monitor h/h trend.   Monitor blood sugar levels. Insulin coverage as needed. Dietary restriction. Monitor UO. Procrit for anemia. IV venofer.   Avoid nephrotoxic meds as possible. Avoid ACEI, ARB, NSAIDs and IV contrast. Will follow electrolytes and renal function trend.

## 2020-03-04 NOTE — DIETITIAN INITIAL EVALUATION ADULT. - PROBLEM SELECTOR PLAN 4
- CXR showing questionable infiltrate of upper lobes  - Patient afebrile, no leukocytosis, no respiratory symptoms  - Continue zosyn, renal dosing as above  -f/u CXR in 4-6 weeks

## 2020-03-04 NOTE — PROGRESS NOTE ADULT - PROBLEM SELECTOR PROBLEM 3
Hyperkalemia Acute renal failure superimposed on stage 5 chronic kidney disease, not on chronic dialysis, unspecified acute renal failure type

## 2020-03-04 NOTE — DIETITIAN INITIAL EVALUATION ADULT. - PROBLEM SELECTOR PLAN 10
Problem 11: Depression: Continue prozac  Problem 12: HTN: Continue home amlodipine, bystolic therapeutic interchange lopressor 25mg BID w/ hold parameters  IMPROVE VTE Individual Risk Assessment  RISK                                                                Points  [  ] Previous VTE                                                  3  [  ] Thrombophilia                                               2  [ x ] Lower limb paralysis                                      2        (unable to hold up >15 seconds)    [  ] Current Cancer                                              2         (within 6 months)  [ x ] Immobilization > 24 hrs                                1  [  ] ICU/CCU stay > 24 hours                              1  [ x ] Age > 60                                                      1  IMPROVE VTE Score ____4_____  IMPROVE Score 0-1: Low Risk, No VTE prophylaxis required for most patients, encourage ambulation.   IMPROVE Score 2-3: At risk, pharmacologic VTE prophylaxis is indicated for most patients (in the absence of a contraindication)  IMPROVE Score > or = 4: High Risk, pharmacologic VTE prophylaxis is indicated for most patients (in the absence of a contraindication)  DVT ppx: subq heparin

## 2020-03-04 NOTE — DIETITIAN INITIAL EVALUATION ADULT. - PROBLEM SELECTOR PLAN 7
- Patient with metabolic acidosis in setting of UTI   - BUN/Cr 45/4 on admission, improved from prior  - S/p IV sodium bicarb  - Continue home sodium bicarb  - No signs of volume overload, no decreased UOP per daughter  - Nephrology consulted, Dr. Coto

## 2020-03-04 NOTE — PROGRESS NOTE ADULT - PROBLEM SELECTOR PLAN 3
- K 6.3 on admission, decreased to 5.5  - S/p insulin, albuterol, kayexalate  - Trend K q6  -no EKG changes - K 6.3 on admission, decreased to 5.5  - 2/2 to underlying CKD with prerenal component  - S/p insulin, albuterol, kayexalate  - Continue to trend BMP - Patient with metabolic acidosis in setting of UTI and suspected pre-renal MEHDI  - BUN/ Cr 40/3.8 today  - c/w bicarb drip per nephro  - started on sodium bicarb 650 mg po TID overnight, too  - discussed with nephro Dr. You, patient not very compliant, finds it difficult to come to office for follow up appointments. Not a candidate for dialysis at this time  - pt had expressed wishes to avoid HD if at all possible in the past.   - monitor BMP and fluid status

## 2020-03-04 NOTE — CONSULT NOTE ADULT - SUBJECTIVE AND OBJECTIVE BOX
Chief Complaint: Sacral ulcer    HPI: Long standing history of sacral pressure ulcer.     PAST MEDICAL & SURGICAL HISTORY:  Wound of sacral region  Depression  Iron deficiency anemia  Eczema  HTN (hypertension)  CKD (chronic kidney disease) stage 5, GFR less than 15 ml/min: not on HD  Herniated lumbar intervertebral disc  Tremor  Kidney atrophy: right  Colostomy status: 2006  Chronic UTI (urinary tract infection): chronic renae  Cervical cancer: 1970&#x27;s  Dyslipidemia  Hernia, incisional  S/P hip replacement: right 2013  S/P colon resection: 2006  S/P hysterectomy: 1977      Allergies    Levaquin (Pruritus)  mercury (Pruritus; Rash)  sulfa drugs (Pruritus)    Intolerances        MEDICATIONS  (STANDING):  aspirin enteric coated 81 milliGRAM(s) Oral daily  atorvastatin 10 milliGRAM(s) Oral at bedtime  budesonide 160 MICROgram(s)/formoterol 4.5 MICROgram(s) Inhaler 2 Puff(s) Inhalation two times a day  calcium carbonate 1250 mG  + Vitamin D (OsCal 500 + D) 1 Tablet(s) Oral daily  cholecalciferol 1000 Unit(s) Oral daily  cholestyramine Powder (Sugar-Free) 4 Gram(s) Oral daily  clobetasol 0.05% Cream 1 Application(s) Topical two times a day  epoetin jean carlos Injectable 50472 Unit(s) SubCutaneous <User Schedule>  ferrous    sulfate 325 milliGRAM(s) Oral daily  FLUoxetine 20 milliGRAM(s) Oral daily  heparin  Injectable 5000 Unit(s) SubCutaneous every 8 hours  iron sucrose Injectable 100 milliGRAM(s) IV Push every 24 hours  lactobacillus acidophilus 1 Tablet(s) Oral three times a day  loratadine 10 milliGRAM(s) Oral daily  metoprolol tartrate 25 milliGRAM(s) Oral two times a day  multivitamin 1 Tablet(s) Oral daily  nystatin Cream 1 Application(s) Topical two times a day  piperacillin/tazobactam IVPB.. 3.375 Gram(s) IV Intermittent every 12 hours  sodium bicarbonate 650 milliGRAM(s) Oral three times a day  sodium chloride 0.45% 1000 milliLiter(s) (75 mL/Hr) IV Continuous <Continuous>    MEDICATIONS  (PRN):  acetaminophen   Tablet .. 650 milliGRAM(s) Oral every 6 hours PRN Mild Pain (1 - 3)  lidocaine   Patch 1 Patch Transdermal daily PRN back pain      FAMILY HISTORY:  Family history of ovarian cancer (Sibling): mother          ROS:  CONSTITUTIONAL: No fever, weight loss, or fatigue  EYES: No eye pain, visual disturbances, or discharge  ENMT:  No difficulty hearing, tinnitus, vertigo; No sinus or throat pain  NECK: No pain or stiffness  BREASTS: No pain, masses, or nipple discharge  RESPIRATORY: No cough, wheezing, chills or hemoptysis; No shortness of breath  CARDIOVASCULAR: No chest pain, palpitations, dizziness, or leg swelling  GASTROINTESTINAL: No abdominal or epigastric pain. No nausea, vomiting, or hematemesis; No diarrhea or constipation. No melena or hematochezia.  GENITOURINARY: No dysuria, frequency, hematuria, or incontinence  NEUROLOGICAL: No headaches, memory loss, loss of strength, numbness, or tremors  SKIN: No itching, burning, rashes, or lesions   LYMPH NODES: No enlarged glands  ENDOCRINE: No heat or cold intolerance; No hair loss  MUSCULOSKELETAL: No joint pain or swelling; No muscle, back, or extremity pain  PSYCHIATRIC: No depression, anxiety, mood swings, or difficulty sleeping  HEME/LYMPH: No easy bruising, or bleeding gums  ALLERGY AND IMMUNOLOGIC: No hives or eczema    PHYSICAL EXAM-      Vital Signs Last 24 Hrs  T(C): 36.7 (04 Mar 2020 05:11), Max: 36.8 (03 Mar 2020 19:15)  T(F): 98.1 (04 Mar 2020 05:11), Max: 98.2 (03 Mar 2020 19:15)  HR: 68 (04 Mar 2020 05:11) (60 - 68)  BP: 170/66 (04 Mar 2020 07:06) (162/79 - 194/94)  BP(mean): --  RR: 16 (04 Mar 2020 05:11) (15 - 16)  SpO2: 92% (04 Mar 2020 05:11) (92% - 99%)    Constitutional: well developed, well nourished, no apparent distress, alert.  Neck: Supple   Pulmonary: no respiratory distress, normal respiratory rhythm and effort, lungs are clear to auscultation/percussion. No CVA tenderness.  Cardiovascular: heart rate normal, normal sinus rhythm; no murmurs, gallops, rubs, heaves or thrills   Abdomen: soft, non-tender, +BS, no guarding/rebound/rigidity.  Vascular: Lower extremities are well perfused.   Extremities: Mild bilateral edema.  Skin: Round sacral pressure ulcer, base is red and viable, bone can be palpated but not seed, no drainage, periwound skin is intact.                             8.4    5.43  )-----------( 267      ( 04 Mar 2020 08:47 )             27.6     03-04    146<H>  |  120<H>  |  40<H>  ----------------------------<  76  5.5<H>   |  16<L>  |  3.80<H>    Ca    7.9<L>      04 Mar 2020 08:47    TPro  6.7  /  Alb  2.8<L>  /  TBili  0.2  /  DBili  x   /  AST  15  /  ALT  18  /  AlkPhos  62  03-03      Radiology:

## 2020-03-04 NOTE — PROGRESS NOTE ADULT - PROBLEM SELECTOR PLAN 2
-CT head negative for acute intracranial hemorrhage, infarct  - Hold centrally acting meds: hydroxyzine, tramadol - likely was due to acute metabolic encephalopathy (due to UTI or due to worsening renal failure / uremia)  - CT head negative for acute intracranial hemorrhage, infarct  - Hold centrally acting meds: hydroxyzine, tramadol

## 2020-03-04 NOTE — PROVIDER CONTACT NOTE (OTHER) - ASSESSMENT
No signs of distress
patient denies headache, dizziness, vision changes, chest pain, shortness of breath or palpitations. patient denies acute pain

## 2020-03-04 NOTE — PROGRESS NOTE ADULT - ATTENDING COMMENTS
Pt seen + examined. Case discussed with intern/resident. Agree with assessment and plan above (edited) with following addendum:  Time spent: 40min. More than 50% of the visit was spent counseling the patient on medical condition -- acute metabolic encephalopathy, possible UTI, hyperkalemia, hypertensive urgency, MEHDI on CKD 5, metabolic acidosis.    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  - Patient has chronic renae, frequent UTI  - UA positive for leuk esterase, bacteria, but unclear significance in setting of chronic renae  - Remains afebrile, with no leukocytosis  - Renae replaced in ED  - on Zosyn for a suspected UTI with suspicion that pt's AMS on admission was due to infection  - procalcitonin low at .12  - Urine culture from Feb 2020 with >3 organisms, suspect will have positive urine culture as pt likely has at least colonization   - it's possible patient's AMS was related to worsening renal failure (uremia / metabolic acidosis)  - Follow up blood and urine cultures  - ID consult, Dr. Coronado, f/up recs  - likely was due to acute metabolic encephalopathy (due to UTI or due to worsening renal failure / uremia)  - CT head negative for acute intracranial hemorrhage, infarct  - Hold centrally acting meds: hydroxyzine, tramadol  - c/w bicarb drip per nephro  - started on sodium bicarb 650 mg po TID overnight, too  - discussed with nephro Dr. You, patient not very compliant, finds it difficult to come to office for follow up appointments. Not a candidate for dialysis at this time  - pt had expressed wishes to avoid HD if at all possible in the past.   - monitor BMP and fluid status  - f/up wound care recs regarding Stage 4 sacral decub ulcer -- does not appear infected currently

## 2020-03-04 NOTE — PROGRESS NOTE ADULT - ASSESSMENT
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 Admitted for UTI and hyperkalemia 2/2 MEHDI on CKD 5. 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

## 2020-03-04 NOTE — PROGRESS NOTE ADULT - SUBJECTIVE AND OBJECTIVE BOX
Patient is a 84y old  Female who presents with a chief complaint of altered mental status (03 Mar 2020 17:47)      Patient seen in follow up for CKD 4, Hyperkalemia. Improving renal indices.     PAST MEDICAL HISTORY:  Wound of sacral region  Depression  Iron deficiency anemia  Eczema  HTN (hypertension)  CKD (chronic kidney disease) stage 5, GFR less than 15 ml/min  Herniated lumbar intervertebral disc  Depression  Tremor  Kidney atrophy  Colostomy status  Chronic UTI (urinary tract infection)  Cervical cancer  Dyslipidemia  Diabetes  Hernia, incisional    MEDICATIONS  (STANDING):  amLODIPine   Tablet 5 milliGRAM(s) Oral daily  aspirin enteric coated 81 milliGRAM(s) Oral daily  atorvastatin 10 milliGRAM(s) Oral at bedtime  budesonide 160 MICROgram(s)/formoterol 4.5 MICROgram(s) Inhaler 2 Puff(s) Inhalation two times a day  calcium carbonate 1250 mG  + Vitamin D (OsCal 500 + D) 1 Tablet(s) Oral daily  cholecalciferol 1000 Unit(s) Oral daily  cholestyramine Powder (Sugar-Free) 4 Gram(s) Oral daily  clobetasol 0.05% Cream 1 Application(s) Topical two times a day  ferrous    sulfate 325 milliGRAM(s) Oral daily  FLUoxetine 20 milliGRAM(s) Oral daily  heparin  Injectable 5000 Unit(s) SubCutaneous every 8 hours  lactated ringers. 1000 milliLiter(s) (50 mL/Hr) IV Continuous <Continuous>  lactobacillus acidophilus 1 Tablet(s) Oral three times a day  loratadine 10 milliGRAM(s) Oral daily  metoprolol tartrate 25 milliGRAM(s) Oral two times a day  multivitamin 1 Tablet(s) Oral daily  nystatin Cream 1 Application(s) Topical two times a day  piperacillin/tazobactam IVPB.. 3.375 Gram(s) IV Intermittent every 12 hours  sodium bicarbonate 650 milliGRAM(s) Oral three times a day    MEDICATIONS  (PRN):  acetaminophen   Tablet .. 650 milliGRAM(s) Oral every 6 hours PRN Mild Pain (1 - 3)  lidocaine   Patch 1 Patch Transdermal daily PRN back pain    T(C): 36.7 (20 @ 05:11), Max: 36.8 (20 @ 19:15)  HR: 68 (20 @ 05:11) (58 - 71)  BP: 170/66 (20 @ 07:06) (162/79 - 211/95)  RR: 16 (20 @ 05:11) (15 - 18)  SpO2: 92% (20 @ 05:11) (92% - 100%)  Wt(kg): --  I&O's Detail    03 Mar 2020 07:01  -  04 Mar 2020 07:00  --------------------------------------------------------  IN:    lactated ringers.: 400 mL  Total IN: 400 mL    OUT:    Voided: 400 mL  Total OUT: 400 mL    Total NET: 0 mL      PHYSICAL EXAM:  General: NAD  Respiratory: b/l air entry  Cardiovascular: S1 S2  Gastrointestinal: soft  Extremities:  edema, + renae                          8.4    5.43  )-----------( 267      ( 04 Mar 2020 08:47 )             27.6     03-04    146<H>  |  120<H>  |  40<H>  ----------------------------<  76  5.5<H>   |  16<L>  |  3.80<H>    Ca    7.9<L>      04 Mar 2020 08:47    TPro  6.7  /  Alb  2.8<L>  /  TBili  0.2  /  DBili  x   /  AST  15  /  ALT  18  /  AlkPhos  62  03-03        LIVER FUNCTIONS - ( 03 Mar 2020 13:44 )  Alb: 2.8 g/dL / Pro: 6.7 g/dL / ALK PHOS: 62 U/L / ALT: 18 U/L / AST: 15 U/L / GGT: x           Urinalysis Basic - ( 03 Mar 2020 13:29 )    Color: Pink / Appearance: Turbid / S.020 / pH: x  Gluc: x / Ketone: Negative  / Bili: Negative / Urobili: Negative   Blood: x / Protein: 500 mg/dL / Nitrite: Negative   Leuk Esterase: Moderate / RBC: >50 /HPF / WBC 26-50   Sq Epi: x / Non Sq Epi: Occasional / Bacteria: Moderate      ABG - ( 03 Mar 2020 16:09 )  pH, Arterial: 7.31  pH, Blood: x     /  pCO2: 31    /  pO2: 87    / HCO3: 17    / Base Excess: -9.7  /  SaO2: 96                Sodium, Serum: 146 ( @ 08:47)  Sodium, Serum: 146 ( @ 00:35)  Sodium, Serum: 144 ( @ 13:44)    Creatinine, Serum: 3.80 ( @ 08:47)  Creatinine, Serum: 3.70 ( @ 00:35)  Creatinine, Serum: 4.00 ( @ 13:44)    Potassium, Serum: 5.5 ( @ 08:47)  Potassium, Serum: 5.8 ( @ 00:35)  Potassium, Serum: 5.3 ( @ 16:31)  Potassium, Serum: 6.3 ( @ 13:44)    Hemoglobin: 8.4 ( @ 08:47)  Hemoglobin: 9.8 ( @ 13:44)

## 2020-03-04 NOTE — CHART NOTE - NSCHARTNOTEFT_GEN_A_CORE
Called by RN for Elevated BP(manual- 190/72). Of note, pt received her home BP meds(metoprolol 25mg @19:30, Amlodipine 5mg @22:10). Denies any chills, nausea, vomiting, headaches, light-headedness, chest pain, SOB or any other pain.     -Will order Hydralazine 10mg x 1

## 2020-03-04 NOTE — DIETITIAN INITIAL EVALUATION ADULT. - PROBLEM SELECTOR PLAN 1
- Patient has chronic renae, frequent UTI  - UA positive for leuk esterase, bacteria  - No fever, leukocytosis  - Renae replaced in ED  - S/p zosyn in ED  - S/p 3.4 L in ED  - Urine culture from Feb 2020 with >3 organisms  - Continue zosyn, renal dosing  - Follow up blood and urine cultures  - ID consult, Dr. Coronado

## 2020-03-04 NOTE — PROGRESS NOTE ADULT - PROBLEM SELECTOR PLAN 10
Problem 11: Depression: Continue prozac  Problem 12: HTN: Continue home amlodipine, bystolic therapeutic interchange lopressor 25mg BID w/ hold parameters  IMPROVE VTE Individual Risk Assessment  RISK                                                                Points  [  ] Previous VTE                                                  3  [  ] Thrombophilia                                               2  [ x ] Lower limb paralysis                                      2        (unable to hold up >15 seconds)    [  ] Current Cancer                                              2         (within 6 months)  [ x ] Immobilization > 24 hrs                                1  [  ] ICU/CCU stay > 24 hours                              1  [ x ] Age > 60                                                      1  IMPROVE VTE Score ____4_____  IMPROVE Score 0-1: Low Risk, No VTE prophylaxis required for most patients, encourage ambulation.   IMPROVE Score 2-3: At risk, pharmacologic VTE prophylaxis is indicated for most patients (in the absence of a contraindication)  IMPROVE Score > or = 4: High Risk, pharmacologic VTE prophylaxis is indicated for most patients (in the absence of a contraindication)  DVT ppx: subq heparin Problem 11: Depression: Continue prozac  Problem 12: eczema: Continue clobetasol  IMPROVE VTE Individual Risk Assessment  RISK                                                                Points  [  ] Previous VTE                                                  3  [  ] Thrombophilia                                               2  [ x ] Lower limb paralysis                                      2        (unable to hold up >15 seconds)    [  ] Current Cancer                                              2         (within 6 months)  [ x ] Immobilization > 24 hrs                                1  [  ] ICU/CCU stay > 24 hours                              1  [ x ] Age > 60                                                      1  IMPROVE VTE Score ____4_____  IMPROVE Score 0-1: Low Risk, No VTE prophylaxis required for most patients, encourage ambulation.   IMPROVE Score 2-3: At risk, pharmacologic VTE prophylaxis is indicated for most patients (in the absence of a contraindication)  IMPROVE Score > or = 4: High Risk, pharmacologic VTE prophylaxis is indicated for most patients (in the absence of a contraindication)  DVT ppx: subq heparin 5000 mg Q8 DVT ppx: subq heparin 5000un sq Q8h    Problem 11: Depression: Continue prozac  Problem 12: eczema: Continue clobetasol

## 2020-03-04 NOTE — PROGRESS NOTE ADULT - PROBLEM SELECTOR PLAN 7
- Patient with metabolic acidosis in setting of UTI   - BUN/Cr 45/4 on admission, improved from prior  - S/p IV sodium bicarb  - Continue home sodium bicarb  - No signs of volume overload, no decreased UOP per daughter  - Nephrology consulted, Dr. Coto - Patient with metabolic acidosis in setting of UTI   - BUN/ Cr 40/3.8 today  - S/p IV sodium bicarb  - On sodium bicarb 650 mg TID  - discussed with nephro Dr. You, patient not fully compliant, finds it difficult to come to office for follow up appointments. Not a candidate for dialysis at this time  - No signs of volume overload, no decreased UOP per daughter  - Nephrology consulted, Dr. Coto - Anemia likely due to LELO, and anemia of chronic disease in setting of Stage 5 CKD  - Fe panel from 2/2020 showed low total iron, low total iron binding capacity  - hemoglobin 8.3 today, approximately baseline  - Continue home iron  - Continue epogen as per nephro

## 2020-03-04 NOTE — PROGRESS NOTE ADULT - SUBJECTIVE AND OBJECTIVE BOX
Patient is a 84y old  Female who presents with a chief complaint of altered mental status (04 Mar 2020 09:38)      INTERVAL HPI/OVERNIGHT EVENTS:  Patient seen and examined at bedside. Patient has expressive aphasia at baseline, but is able to answer questions. Denies fever, dysuria, flank pain, abdominal pain. Denies confusion, headache, CP, SOB, palpitations.    MEDICATIONS  (STANDING):  amLODIPine   Tablet 5 milliGRAM(s) Oral daily  aspirin enteric coated 81 milliGRAM(s) Oral daily  atorvastatin 10 milliGRAM(s) Oral at bedtime  budesonide 160 MICROgram(s)/formoterol 4.5 MICROgram(s) Inhaler 2 Puff(s) Inhalation two times a day  calcium carbonate 1250 mG  + Vitamin D (OsCal 500 + D) 1 Tablet(s) Oral daily  cholecalciferol 1000 Unit(s) Oral daily  cholestyramine Powder (Sugar-Free) 4 Gram(s) Oral daily  clobetasol 0.05% Cream 1 Application(s) Topical two times a day  epoetin jean carlos Injectable 13666 Unit(s) SubCutaneous <User Schedule>  ferrous    sulfate 325 milliGRAM(s) Oral daily  FLUoxetine 20 milliGRAM(s) Oral daily  heparin  Injectable 5000 Unit(s) SubCutaneous every 8 hours  iron sucrose Injectable 100 milliGRAM(s) IV Push every 24 hours  lactobacillus acidophilus 1 Tablet(s) Oral three times a day  loratadine 10 milliGRAM(s) Oral daily  metoprolol tartrate 25 milliGRAM(s) Oral two times a day  multivitamin 1 Tablet(s) Oral daily  nystatin Cream 1 Application(s) Topical two times a day  piperacillin/tazobactam IVPB.. 3.375 Gram(s) IV Intermittent every 12 hours  sodium bicarbonate 650 milliGRAM(s) Oral three times a day  sodium chloride 0.45% 1000 milliLiter(s) (75 mL/Hr) IV Continuous <Continuous>    MEDICATIONS  (PRN):  acetaminophen   Tablet .. 650 milliGRAM(s) Oral every 6 hours PRN Mild Pain (1 - 3)  lidocaine   Patch 1 Patch Transdermal daily PRN back pain      Allergies    Levaquin (Pruritus)  mercury (Pruritus; Rash)  sulfa drugs (Pruritus)    Intolerances        REVIEW OF SYSTEMS:   CONSTITUTIONAL: No fever or chills  RESPIRATORY: No cough, wheezing, or shortness of breath  CARDIOVASCULAR: No chest pain, palpitations  GASTROINTESTINAL: No abd pain, nausea, vomiting, or diarrhea  GENITOURINARY: No dysuria or hematuria, patient has chronic renae.  NEUROLOGICAL: no focal weakness or dizziness  MUSCULOSKELETAL: no myalgias     Vital Signs Last 24 Hrs  T(C): 36.7 (04 Mar 2020 05:11), Max: 36.8 (03 Mar 2020 19:15)  T(F): 98.1 (04 Mar 2020 05:11), Max: 98.2 (03 Mar 2020 19:15)  HR: 68 (04 Mar 2020 05:11) (58 - 71)  BP: 170/66 (04 Mar 2020 07:06) (162/79 - 211/95)  BP(mean): --  RR: 16 (04 Mar 2020 05:11) (15 - 18)  SpO2: 92% (04 Mar 2020 05:11) (92% - 100%)    PHYSICAL EXAM:  GENERAL: NAD  HEENT:  anicteric, moist mucous membranes  CHEST/LUNG:  Diminished breath sounds B/L bases  HEART:  RRR, S1, S2  ABDOMEN:  BS+, soft, nontender, nondistended  EXTREMITIES: no edema, cyanosis, or calf tenderness  NERVOUS SYSTEM: expressive aphasia. Patient able to answer yes/no, speak in up to 3 word sentences. Patient not oriented to place or time. Good concentration.     LABS:                        8.4    5.43  )-----------( 267      ( 04 Mar 2020 08:47 )             27.6     CBC Full  -  ( 04 Mar 2020 08:47 )  WBC Count : 5.43 K/uL  Hemoglobin : 8.4 g/dL  Hematocrit : 27.6 %  Platelet Count - Automated : 267 K/uL  Mean Cell Volume : 91.7 fl  Mean Cell Hemoglobin : 27.9 pg  Mean Cell Hemoglobin Concentration : 30.4 gm/dL  Auto Neutrophil # : x  Auto Lymphocyte # : x  Auto Monocyte # : x  Auto Eosinophil # : x  Auto Basophil # : x  Auto Neutrophil % : x  Auto Lymphocyte % : x  Auto Monocyte % : x  Auto Eosinophil % : x  Auto Basophil % : x    04 Mar 2020 08:47    146    |  120    |  40     ----------------------------<  76     5.5     |  16     |  3.80     Ca    7.9        04 Mar 2020 08:47    TPro  6.7    /  Alb  2.8    /  TBili  0.2    /  DBili  x      /  AST  15     /  ALT  18     /  AlkPhos  62     03 Mar 2020 13:44    PT/INR - ( 03 Mar 2020 13:44 )   PT: 12.7 sec;   INR: 1.13 ratio         PTT - ( 03 Mar 2020 13:44 )  PTT:26.7 sec  Urinalysis Basic - ( 03 Mar 2020 13:29 )    Color: Pink / Appearance: Turbid / S.020 / pH: x  Gluc: x / Ketone: Negative  / Bili: Negative / Urobili: Negative   Blood: x / Protein: 500 mg/dL / Nitrite: Negative   Leuk Esterase: Moderate / RBC: >50 /HPF / WBC 26-50   Sq Epi: x / Non Sq Epi: Occasional / Bacteria: Moderate      CAPILLARY BLOOD GLUCOSE      POCT Blood Glucose.: 180 mg/dL (03 Mar 2020 15:35)  POCT Blood Glucose.: 78 mg/dL (03 Mar 2020 13:07)        Consultant(s) Notes Reviewed:  [x] YES  [ ] NO Patient is a 84y old  Female who presents with a chief complaint of altered mental status (04 Mar 2020 09:38)      INTERVAL HPI/OVERNIGHT EVENTS:  Patient seen and examined at bedside. Patient has expressive aphasia at baseline, but is able to answer some questions. Denies fever, dysuria, flank pain, abdominal pain. Denies confusion, headache, CP, SOB, palpitations.    MEDICATIONS  (STANDING):  amLODIPine   Tablet 5 milliGRAM(s) Oral daily  aspirin enteric coated 81 milliGRAM(s) Oral daily  atorvastatin 10 milliGRAM(s) Oral at bedtime  budesonide 160 MICROgram(s)/formoterol 4.5 MICROgram(s) Inhaler 2 Puff(s) Inhalation two times a day  calcium carbonate 1250 mG  + Vitamin D (OsCal 500 + D) 1 Tablet(s) Oral daily  cholecalciferol 1000 Unit(s) Oral daily  cholestyramine Powder (Sugar-Free) 4 Gram(s) Oral daily  clobetasol 0.05% Cream 1 Application(s) Topical two times a day  epoetin jean carlos Injectable 34305 Unit(s) SubCutaneous <User Schedule>  ferrous    sulfate 325 milliGRAM(s) Oral daily  FLUoxetine 20 milliGRAM(s) Oral daily  heparin  Injectable 5000 Unit(s) SubCutaneous every 8 hours  iron sucrose Injectable 100 milliGRAM(s) IV Push every 24 hours  lactobacillus acidophilus 1 Tablet(s) Oral three times a day  loratadine 10 milliGRAM(s) Oral daily  metoprolol tartrate 25 milliGRAM(s) Oral two times a day  multivitamin 1 Tablet(s) Oral daily  nystatin Cream 1 Application(s) Topical two times a day  piperacillin/tazobactam IVPB.. 3.375 Gram(s) IV Intermittent every 12 hours  sodium bicarbonate 650 milliGRAM(s) Oral three times a day  sodium chloride 0.45% 1000 milliLiter(s) (75 mL/Hr) IV Continuous <Continuous>    MEDICATIONS  (PRN):  acetaminophen   Tablet .. 650 milliGRAM(s) Oral every 6 hours PRN Mild Pain (1 - 3)  lidocaine   Patch 1 Patch Transdermal daily PRN back pain      Allergies    Levaquin (Pruritus)  mercury (Pruritus; Rash)  sulfa drugs (Pruritus)    Intolerances        REVIEW OF SYSTEMS: may be limited due to expressive aphasia   CONSTITUTIONAL: No fever ; +chills  RESPIRATORY: No cough, wheezing, or shortness of breath  CARDIOVASCULAR: No chest pain, palpitations  GASTROINTESTINAL: No abd pain, nausea, vomiting, or diarrhea  GENITOURINARY: No dysuria or hematuria, (patient has chronic renae)  NEUROLOGICAL: no focal weakness or dizziness  MUSCULOSKELETAL: no myalgias     Vital Signs Last 24 Hrs  T(C): 36.7 (04 Mar 2020 05:11), Max: 36.8 (03 Mar 2020 19:15)  T(F): 98.1 (04 Mar 2020 05:11), Max: 98.2 (03 Mar 2020 19:15)  HR: 68 (04 Mar 2020 05:11) (58 - 71)  BP: 170/66 (04 Mar 2020 07:06) (162/79 - 211/95)  BP(mean): --  RR: 16 (04 Mar 2020 05:11) (15 - 18)  SpO2: 92% (04 Mar 2020 05:11) (92% - 100%)    PHYSICAL EXAM:  GENERAL: NAD  HEENT:  anicteric, moist mucous membranes  CHEST/LUNG:  Diminished breath sounds B/L bases  HEART:  RRR, S1, S2  ABDOMEN:  BS+, soft, nontender, nondistended  EXTREMITIES: no edema, cyanosis, or calf tenderness  BACK: stage 4 sacral decub ulcer, no drainage, round, ~7cm in diameter  NERVOUS SYSTEM: expressive aphasia. Patient able to answer yes/no, speak in ~ 3-word sentences. Patient not oriented to place or time. Good concentration.     LABS:                        8.4    5.43  )-----------( 267      ( 04 Mar 2020 08:47 )             27.6     CBC Full  -  ( 04 Mar 2020 08:47 )  WBC Count : 5.43 K/uL  Hemoglobin : 8.4 g/dL  Hematocrit : 27.6 %  Platelet Count - Automated : 267 K/uL  Mean Cell Volume : 91.7 fl  Mean Cell Hemoglobin : 27.9 pg  Mean Cell Hemoglobin Concentration : 30.4 gm/dL  Auto Neutrophil # : x  Auto Lymphocyte # : x  Auto Monocyte # : x  Auto Eosinophil # : x  Auto Basophil # : x  Auto Neutrophil % : x  Auto Lymphocyte % : x  Auto Monocyte % : x  Auto Eosinophil % : x  Auto Basophil % : x    04 Mar 2020 08:47    146    |  120    |  40     ----------------------------<  76     5.5     |  16     |  3.80     Ca    7.9        04 Mar 2020 08:47    TPro  6.7    /  Alb  2.8    /  TBili  0.2    /  DBili  x      /  AST  15     /  ALT  18     /  AlkPhos  62     03 Mar 2020 13:44    PT/INR - ( 03 Mar 2020 13:44 )   PT: 12.7 sec;   INR: 1.13 ratio         PTT - ( 03 Mar 2020 13:44 )  PTT:26.7 sec  Urinalysis Basic - ( 03 Mar 2020 13:29 )    Color: Pink / Appearance: Turbid / S.020 / pH: x  Gluc: x / Ketone: Negative  / Bili: Negative / Urobili: Negative   Blood: x / Protein: 500 mg/dL / Nitrite: Negative   Leuk Esterase: Moderate / RBC: >50 /HPF / WBC 26-50   Sq Epi: x / Non Sq Epi: Occasional / Bacteria: Moderate      CAPILLARY BLOOD GLUCOSE      POCT Blood Glucose.: 180 mg/dL (03 Mar 2020 15:35)  POCT Blood Glucose.: 78 mg/dL (03 Mar 2020 13:07)        Consultant(s) Notes Reviewed:  [x] YES  [ ] NO

## 2020-03-04 NOTE — PROGRESS NOTE ADULT - PROBLEM SELECTOR PLAN 1
- Patient has chronic renae, frequent UTI  - UA positive for leuk esterase, bacteria  - Remains afebrile, with no leukocytosis  - Renae replaced in ED  - on Zosyn  - procalcitonin low at .12  - Urine culture from Feb 2020 with >3 organisms  - Doubt true UTI, patient's presentation more likely 2/2 uremia vs. metabolic acidosis  - Follow up blood and urine cultures  - ID consult, Dr. Coronado - Patient has chronic renae, frequent UTI  - UA positive for leuk esterase, bacteria  - Remains afebrile, with no leukocytosis  - Renae replaced in ED  - on Zosyn  - procalcitonin low at .12  - Urine culture from Feb 2020 with >3 organisms  - Doubt true UTI, patient's AMS more likely 2/2 uremia vs. metabolic acidosis  - Follow up blood and urine cultures  - ID consult, Dr. Coronado - Patient has chronic renae, frequent UTI  - UA positive for leuk esterase, bacteria, but unclear significance in setting of chronic renae  - Remains afebrile, with no leukocytosis  - Renae replaced in ED  - on Zosyn for a suspected UTI with suspicion that pt's AMS on admission was due to infection  - procalcitonin low at .12  - Urine culture from Feb 2020 with >3 organisms, suspect will have positive urine culture as pt likely has at least colonization   - it's possible patient's AMS was related to worsening renal failure (uremia / metabolic acidosis)  - Follow up blood and urine cultures  - ID consult, Dr. Coronado, f/up recs

## 2020-03-05 DIAGNOSIS — R56.9 UNSPECIFIED CONVULSIONS: ICD-10-CM

## 2020-03-05 DIAGNOSIS — R41.89 OTHER SYMPTOMS AND SIGNS INVOLVING COGNITIVE FUNCTIONS AND AWARENESS: ICD-10-CM

## 2020-03-05 LAB
-  AMIKACIN: SIGNIFICANT CHANGE UP
-  AMIKACIN: SIGNIFICANT CHANGE UP
-  AMPICILLIN/SULBACTAM: SIGNIFICANT CHANGE UP
-  AMPICILLIN/SULBACTAM: SIGNIFICANT CHANGE UP
-  AMPICILLIN: SIGNIFICANT CHANGE UP
-  AMPICILLIN: SIGNIFICANT CHANGE UP
-  AZTREONAM: SIGNIFICANT CHANGE UP
-  AZTREONAM: SIGNIFICANT CHANGE UP
-  CEFAZOLIN: SIGNIFICANT CHANGE UP
-  CEFAZOLIN: SIGNIFICANT CHANGE UP
-  CEFEPIME: SIGNIFICANT CHANGE UP
-  CEFEPIME: SIGNIFICANT CHANGE UP
-  CEFOXITIN: SIGNIFICANT CHANGE UP
-  CEFOXITIN: SIGNIFICANT CHANGE UP
-  CEFTRIAXONE: SIGNIFICANT CHANGE UP
-  CEFTRIAXONE: SIGNIFICANT CHANGE UP
-  CIPROFLOXACIN: SIGNIFICANT CHANGE UP
-  CIPROFLOXACIN: SIGNIFICANT CHANGE UP
-  ERTAPENEM: SIGNIFICANT CHANGE UP
-  GENTAMICIN: SIGNIFICANT CHANGE UP
-  GENTAMICIN: SIGNIFICANT CHANGE UP
-  IMIPENEM: SIGNIFICANT CHANGE UP
-  IMIPENEM: SIGNIFICANT CHANGE UP
-  LEVOFLOXACIN: SIGNIFICANT CHANGE UP
-  LEVOFLOXACIN: SIGNIFICANT CHANGE UP
-  MEROPENEM: SIGNIFICANT CHANGE UP
-  MEROPENEM: SIGNIFICANT CHANGE UP
-  NITROFURANTOIN: SIGNIFICANT CHANGE UP
-  NITROFURANTOIN: SIGNIFICANT CHANGE UP
-  PIPERACILLIN/TAZOBACTAM: SIGNIFICANT CHANGE UP
-  PIPERACILLIN/TAZOBACTAM: SIGNIFICANT CHANGE UP
-  TIGECYCLINE: SIGNIFICANT CHANGE UP
-  TIGECYCLINE: SIGNIFICANT CHANGE UP
-  TOBRAMYCIN: SIGNIFICANT CHANGE UP
-  TOBRAMYCIN: SIGNIFICANT CHANGE UP
-  TRIMETHOPRIM/SULFAMETHOXAZOLE: SIGNIFICANT CHANGE UP
-  TRIMETHOPRIM/SULFAMETHOXAZOLE: SIGNIFICANT CHANGE UP
ALBUMIN SERPL ELPH-MCNC: 3 G/DL — LOW (ref 3.3–5)
ALP SERPL-CCNC: 65 U/L — SIGNIFICANT CHANGE UP (ref 40–120)
ALT FLD-CCNC: 18 U/L — SIGNIFICANT CHANGE UP (ref 12–78)
AMMONIA BLD-MCNC: <17 UMOL/L — SIGNIFICANT CHANGE UP (ref 11–32)
ANION GAP SERPL CALC-SCNC: 10 MMOL/L — SIGNIFICANT CHANGE UP (ref 5–17)
ANION GAP SERPL CALC-SCNC: 11 MMOL/L — SIGNIFICANT CHANGE UP (ref 5–17)
ANION GAP SERPL CALC-SCNC: 14 MMOL/L — SIGNIFICANT CHANGE UP (ref 5–17)
AST SERPL-CCNC: 21 U/L — SIGNIFICANT CHANGE UP (ref 15–37)
BASE EXCESS BLDA CALC-SCNC: -6.6 MMOL/L — LOW (ref -2–2)
BASE EXCESS BLDA CALC-SCNC: -7.8 MMOL/L — LOW (ref -2–2)
BASE EXCESS BLDA CALC-SCNC: -8.6 MMOL/L — LOW (ref -2–2)
BILIRUB SERPL-MCNC: 0.2 MG/DL — SIGNIFICANT CHANGE UP (ref 0.2–1.2)
BLOOD GAS COMMENTS ARTERIAL: SIGNIFICANT CHANGE UP
BUN SERPL-MCNC: 38 MG/DL — HIGH (ref 7–23)
BUN SERPL-MCNC: 39 MG/DL — HIGH (ref 7–23)
BUN SERPL-MCNC: 41 MG/DL — HIGH (ref 7–23)
CALCIUM SERPL-MCNC: 7.3 MG/DL — LOW (ref 8.5–10.1)
CALCIUM SERPL-MCNC: 7.4 MG/DL — LOW (ref 8.5–10.1)
CALCIUM SERPL-MCNC: 7.6 MG/DL — LOW (ref 8.5–10.1)
CHLORIDE SERPL-SCNC: 115 MMOL/L — HIGH (ref 96–108)
CHLORIDE SERPL-SCNC: 115 MMOL/L — HIGH (ref 96–108)
CHLORIDE SERPL-SCNC: 116 MMOL/L — HIGH (ref 96–108)
CO2 SERPL-SCNC: 17 MMOL/L — LOW (ref 22–31)
CO2 SERPL-SCNC: 17 MMOL/L — LOW (ref 22–31)
CO2 SERPL-SCNC: 21 MMOL/L — LOW (ref 22–31)
CREAT SERPL-MCNC: 3.9 MG/DL — HIGH (ref 0.5–1.3)
CREAT SERPL-MCNC: 4.1 MG/DL — HIGH (ref 0.5–1.3)
CREAT SERPL-MCNC: 4.1 MG/DL — HIGH (ref 0.5–1.3)
CULTURE RESULTS: SIGNIFICANT CHANGE UP
GLUCOSE SERPL-MCNC: 132 MG/DL — HIGH (ref 70–99)
GLUCOSE SERPL-MCNC: 189 MG/DL — HIGH (ref 70–99)
GLUCOSE SERPL-MCNC: 83 MG/DL — SIGNIFICANT CHANGE UP (ref 70–99)
HCO3 BLDA-SCNC: 18 MMOL/L — LOW (ref 23–27)
HCO3 BLDA-SCNC: 18 MMOL/L — LOW (ref 23–27)
HCO3 BLDA-SCNC: 19 MMOL/L — LOW (ref 23–27)
HCT VFR BLD CALC: 30.2 % — LOW (ref 34.5–45)
HCT VFR BLD CALC: 34.1 % — LOW (ref 34.5–45)
HGB BLD-MCNC: 10.6 G/DL — LOW (ref 11.5–15.5)
HGB BLD-MCNC: 9.3 G/DL — LOW (ref 11.5–15.5)
HOROWITZ INDEX BLDA+IHG-RTO: 21 — SIGNIFICANT CHANGE UP
HOROWITZ INDEX BLDA+IHG-RTO: 21 — SIGNIFICANT CHANGE UP
HOROWITZ INDEX BLDA+IHG-RTO: 50 — SIGNIFICANT CHANGE UP
LACTATE SERPL-SCNC: 1.1 MMOL/L — SIGNIFICANT CHANGE UP (ref 0.7–2)
LACTATE SERPL-SCNC: 5.3 MMOL/L — CRITICAL HIGH (ref 0.7–2)
MCHC RBC-ENTMCNC: 28.6 PG — SIGNIFICANT CHANGE UP (ref 27–34)
MCHC RBC-ENTMCNC: 28.7 PG — SIGNIFICANT CHANGE UP (ref 27–34)
MCHC RBC-ENTMCNC: 30.8 GM/DL — LOW (ref 32–36)
MCHC RBC-ENTMCNC: 31.1 GM/DL — LOW (ref 32–36)
MCV RBC AUTO: 92.4 FL — SIGNIFICANT CHANGE UP (ref 80–100)
MCV RBC AUTO: 92.9 FL — SIGNIFICANT CHANGE UP (ref 80–100)
METHOD TYPE: SIGNIFICANT CHANGE UP
METHOD TYPE: SIGNIFICANT CHANGE UP
NRBC # BLD: 0 /100 WBCS — SIGNIFICANT CHANGE UP (ref 0–0)
NRBC # BLD: 0 /100 WBCS — SIGNIFICANT CHANGE UP (ref 0–0)
ORGANISM # SPEC MICROSCOPIC CNT: SIGNIFICANT CHANGE UP
PCO2 BLDA: 28 MMHG — LOW (ref 32–46)
PCO2 BLDA: 33 MMHG — SIGNIFICANT CHANGE UP (ref 32–46)
PCO2 BLDA: 33 MMHG — SIGNIFICANT CHANGE UP (ref 32–46)
PH BLDA: 7.32 — LOW (ref 7.35–7.45)
PH BLDA: 7.34 — LOW (ref 7.35–7.45)
PH BLDA: 7.41 — SIGNIFICANT CHANGE UP (ref 7.35–7.45)
PLATELET # BLD AUTO: 264 K/UL — SIGNIFICANT CHANGE UP (ref 150–400)
PLATELET # BLD AUTO: 311 K/UL — SIGNIFICANT CHANGE UP (ref 150–400)
PO2 BLDA: 190 MMHG — HIGH (ref 74–108)
PO2 BLDA: 57 MMHG — LOW (ref 74–108)
PO2 BLDA: 74 MMHG — SIGNIFICANT CHANGE UP (ref 74–108)
POTASSIUM SERPL-MCNC: 4.6 MMOL/L — SIGNIFICANT CHANGE UP (ref 3.5–5.3)
POTASSIUM SERPL-MCNC: 4.8 MMOL/L — SIGNIFICANT CHANGE UP (ref 3.5–5.3)
POTASSIUM SERPL-MCNC: 4.9 MMOL/L — SIGNIFICANT CHANGE UP (ref 3.5–5.3)
POTASSIUM SERPL-SCNC: 4.6 MMOL/L — SIGNIFICANT CHANGE UP (ref 3.5–5.3)
POTASSIUM SERPL-SCNC: 4.8 MMOL/L — SIGNIFICANT CHANGE UP (ref 3.5–5.3)
POTASSIUM SERPL-SCNC: 4.9 MMOL/L — SIGNIFICANT CHANGE UP (ref 3.5–5.3)
PROT SERPL-MCNC: 7.1 G/DL — SIGNIFICANT CHANGE UP (ref 6–8.3)
RBC # BLD: 3.25 M/UL — LOW (ref 3.8–5.2)
RBC # BLD: 3.69 M/UL — LOW (ref 3.8–5.2)
RBC # FLD: 14.9 % — HIGH (ref 10.3–14.5)
RBC # FLD: 15.1 % — HIGH (ref 10.3–14.5)
SAO2 % BLDA: 100 % — HIGH (ref 92–96)
SAO2 % BLDA: 90 % — LOW (ref 92–96)
SAO2 % BLDA: 93 % — SIGNIFICANT CHANGE UP (ref 92–96)
SODIUM SERPL-SCNC: 143 MMOL/L — SIGNIFICANT CHANGE UP (ref 135–145)
SODIUM SERPL-SCNC: 146 MMOL/L — HIGH (ref 135–145)
SODIUM SERPL-SCNC: 147 MMOL/L — HIGH (ref 135–145)
SPECIMEN SOURCE: SIGNIFICANT CHANGE UP
TSH SERPL-MCNC: 1.45 UIU/ML — SIGNIFICANT CHANGE UP (ref 0.36–3.74)
WBC # BLD: 5.34 K/UL — SIGNIFICANT CHANGE UP (ref 3.8–10.5)
WBC # BLD: 8.46 K/UL — SIGNIFICANT CHANGE UP (ref 3.8–10.5)
WBC # FLD AUTO: 5.34 K/UL — SIGNIFICANT CHANGE UP (ref 3.8–10.5)
WBC # FLD AUTO: 8.46 K/UL — SIGNIFICANT CHANGE UP (ref 3.8–10.5)

## 2020-03-05 PROCEDURE — 70544 MR ANGIOGRAPHY HEAD W/O DYE: CPT | Mod: 26,59

## 2020-03-05 PROCEDURE — 99291 CRITICAL CARE FIRST HOUR: CPT

## 2020-03-05 PROCEDURE — 93010 ELECTROCARDIOGRAM REPORT: CPT

## 2020-03-05 PROCEDURE — 71045 X-RAY EXAM CHEST 1 VIEW: CPT | Mod: 26

## 2020-03-05 PROCEDURE — 70551 MRI BRAIN STEM W/O DYE: CPT | Mod: 26

## 2020-03-05 PROCEDURE — 71045 X-RAY EXAM CHEST 1 VIEW: CPT | Mod: 26,77

## 2020-03-05 PROCEDURE — 70450 CT HEAD/BRAIN W/O DYE: CPT | Mod: 26

## 2020-03-05 RX ORDER — ACETAMINOPHEN 500 MG
1000 TABLET ORAL ONCE
Refills: 0 | Status: COMPLETED | OUTPATIENT
Start: 2020-03-05 | End: 2020-03-05

## 2020-03-05 RX ORDER — DEXMEDETOMIDINE HYDROCHLORIDE IN 0.9% SODIUM CHLORIDE 4 UG/ML
0.5 INJECTION INTRAVENOUS
Qty: 200 | Refills: 0 | Status: DISCONTINUED | OUTPATIENT
Start: 2020-03-05 | End: 2020-03-08

## 2020-03-05 RX ORDER — VALPROIC ACID (AS SODIUM SALT) 250 MG/5ML
500 SOLUTION, ORAL ORAL EVERY 12 HOURS
Refills: 0 | Status: DISCONTINUED | OUTPATIENT
Start: 2020-03-05 | End: 2020-03-07

## 2020-03-05 RX ORDER — FENTANYL CITRATE 50 UG/ML
50 INJECTION INTRAVENOUS EVERY 6 HOURS
Refills: 0 | Status: DISCONTINUED | OUTPATIENT
Start: 2020-03-05 | End: 2020-03-06

## 2020-03-05 RX ORDER — DEXAMETHASONE 0.5 MG/5ML
6 ELIXIR ORAL ONCE
Refills: 0 | Status: COMPLETED | OUTPATIENT
Start: 2020-03-05 | End: 2020-03-05

## 2020-03-05 RX ORDER — MAGNESIUM SULFATE 500 MG/ML
2 VIAL (ML) INJECTION ONCE
Refills: 0 | Status: COMPLETED | OUTPATIENT
Start: 2020-03-05 | End: 2020-03-05

## 2020-03-05 RX ORDER — HYDRALAZINE HCL 50 MG
25 TABLET ORAL THREE TIMES A DAY
Refills: 0 | Status: DISCONTINUED | OUTPATIENT
Start: 2020-03-05 | End: 2020-03-05

## 2020-03-05 RX ORDER — PROPOFOL 10 MG/ML
10 INJECTION, EMULSION INTRAVENOUS
Qty: 1000 | Refills: 0 | Status: DISCONTINUED | OUTPATIENT
Start: 2020-03-05 | End: 2020-03-08

## 2020-03-05 RX ORDER — FENTANYL CITRATE 50 UG/ML
100 INJECTION INTRAVENOUS ONCE
Refills: 0 | Status: DISCONTINUED | OUTPATIENT
Start: 2020-03-05 | End: 2020-03-05

## 2020-03-05 RX ORDER — HYDRALAZINE HCL 50 MG
50 TABLET ORAL EVERY 8 HOURS
Refills: 0 | Status: DISCONTINUED | OUTPATIENT
Start: 2020-03-05 | End: 2020-03-07

## 2020-03-05 RX ORDER — CHLORHEXIDINE GLUCONATE 213 G/1000ML
15 SOLUTION TOPICAL EVERY 12 HOURS
Refills: 0 | Status: DISCONTINUED | OUTPATIENT
Start: 2020-03-05 | End: 2020-03-07

## 2020-03-05 RX ORDER — PROPOFOL 10 MG/ML
100 INJECTION, EMULSION INTRAVENOUS ONCE
Refills: 0 | Status: DISCONTINUED | OUTPATIENT
Start: 2020-03-05 | End: 2020-03-05

## 2020-03-05 RX ORDER — SODIUM CHLORIDE 9 MG/ML
1000 INJECTION, SOLUTION INTRAVENOUS ONCE
Refills: 0 | Status: DISCONTINUED | OUTPATIENT
Start: 2020-03-05 | End: 2020-03-05

## 2020-03-05 RX ORDER — SODIUM CHLORIDE 9 MG/ML
1000 INJECTION, SOLUTION INTRAVENOUS
Refills: 0 | Status: DISCONTINUED | OUTPATIENT
Start: 2020-03-05 | End: 2020-03-07

## 2020-03-05 RX ORDER — PANTOPRAZOLE SODIUM 20 MG/1
40 TABLET, DELAYED RELEASE ORAL DAILY
Refills: 0 | Status: DISCONTINUED | OUTPATIENT
Start: 2020-03-05 | End: 2020-03-09

## 2020-03-05 RX ORDER — IPRATROPIUM/ALBUTEROL SULFATE 18-103MCG
3 AEROSOL WITH ADAPTER (GRAM) INHALATION ONCE
Refills: 0 | Status: COMPLETED | OUTPATIENT
Start: 2020-03-05 | End: 2020-03-05

## 2020-03-05 RX ADMIN — PIPERACILLIN AND TAZOBACTAM 25 GRAM(S): 4; .5 INJECTION, POWDER, LYOPHILIZED, FOR SOLUTION INTRAVENOUS at 15:02

## 2020-03-05 RX ADMIN — BUDESONIDE AND FORMOTEROL FUMARATE DIHYDRATE 2 PUFF(S): 160; 4.5 AEROSOL RESPIRATORY (INHALATION) at 05:11

## 2020-03-05 RX ADMIN — Medication 650 MILLIGRAM(S): at 22:53

## 2020-03-05 RX ADMIN — NYSTATIN CREAM 1 APPLICATION(S): 100000 CREAM TOPICAL at 22:53

## 2020-03-05 RX ADMIN — Medication 1 APPLICATION(S): at 22:53

## 2020-03-05 RX ADMIN — CHOLESTYRAMINE 4 GRAM(S): 4 POWDER, FOR SUSPENSION ORAL at 09:02

## 2020-03-05 RX ADMIN — HEPARIN SODIUM 5000 UNIT(S): 5000 INJECTION INTRAVENOUS; SUBCUTANEOUS at 13:49

## 2020-03-05 RX ADMIN — Medication 1 APPLICATION(S): at 05:10

## 2020-03-05 RX ADMIN — ATORVASTATIN CALCIUM 10 MILLIGRAM(S): 80 TABLET, FILM COATED ORAL at 22:53

## 2020-03-05 RX ADMIN — IRON SUCROSE 100 MILLIGRAM(S): 20 INJECTION, SOLUTION INTRAVENOUS at 18:08

## 2020-03-05 RX ADMIN — Medication 1000 MILLIGRAM(S): at 20:45

## 2020-03-05 RX ADMIN — PIPERACILLIN AND TAZOBACTAM 25 GRAM(S): 4; .5 INJECTION, POWDER, LYOPHILIZED, FOR SOLUTION INTRAVENOUS at 01:31

## 2020-03-05 RX ADMIN — Medication 1 TABLET(S): at 22:53

## 2020-03-05 RX ADMIN — Medication 50 MILLIGRAM(S): at 22:53

## 2020-03-05 RX ADMIN — NYSTATIN CREAM 1 APPLICATION(S): 100000 CREAM TOPICAL at 05:10

## 2020-03-05 RX ADMIN — Medication 50 GRAM(S): at 13:41

## 2020-03-05 RX ADMIN — AMLODIPINE BESYLATE 10 MILLIGRAM(S): 2.5 TABLET ORAL at 05:11

## 2020-03-05 RX ADMIN — PROPOFOL 3.26 MICROGRAM(S)/KG/MIN: 10 INJECTION, EMULSION INTRAVENOUS at 20:35

## 2020-03-05 RX ADMIN — Medication 25 MILLIGRAM(S): at 05:08

## 2020-03-05 RX ADMIN — Medication 1 TABLET(S): at 05:08

## 2020-03-05 RX ADMIN — Medication 2 MILLIGRAM(S): at 11:15

## 2020-03-05 RX ADMIN — HEPARIN SODIUM 5000 UNIT(S): 5000 INJECTION INTRAVENOUS; SUBCUTANEOUS at 22:52

## 2020-03-05 RX ADMIN — Medication 6 MILLIGRAM(S): at 23:38

## 2020-03-05 RX ADMIN — FENTANYL CITRATE 50 MICROGRAM(S): 50 INJECTION INTRAVENOUS at 23:38

## 2020-03-05 RX ADMIN — Medication 650 MILLIGRAM(S): at 05:08

## 2020-03-05 RX ADMIN — Medication 400 MILLIGRAM(S): at 20:07

## 2020-03-05 RX ADMIN — Medication 27.5 MILLIGRAM(S): at 18:36

## 2020-03-05 RX ADMIN — Medication 3 MILLILITER(S): at 17:09

## 2020-03-05 RX ADMIN — Medication 650 MILLIGRAM(S): at 09:53

## 2020-03-05 RX ADMIN — SODIUM CHLORIDE 75 MILLILITER(S): 9 INJECTION, SOLUTION INTRAVENOUS at 21:00

## 2020-03-05 RX ADMIN — SODIUM CHLORIDE 75 MILLILITER(S): 9 INJECTION, SOLUTION INTRAVENOUS at 05:12

## 2020-03-05 RX ADMIN — HEPARIN SODIUM 5000 UNIT(S): 5000 INJECTION INTRAVENOUS; SUBCUTANEOUS at 05:10

## 2020-03-05 NOTE — PROGRESS NOTE ADULT - PROBLEM SELECTOR PLAN 2
- Patient has chronic renae, frequent UTI  - UA positive for leuk esterase, bacteria, but unclear significance in setting of chronic renae  - Remains afebrile, with no leukocytosis  - Renae replaced in ED  - on Zosyn for a suspected UTI with suspicion that pt's AMS on admission was due to infection  - Further conversation with daughter reveals that patient's AMS was actually expressive aphasia that began this weekend  - procalcitonin low at .12  - Urine culture from Feb 2020 with >3 organisms, suspect will have positive urine culture as pt likely has at least colonization   - it's possible patient's AMS was related to worsening renal failure (uremia / metabolic acidosis) vs CVA vs seizure  - blood culture NGTD  - urine culture >100,000 GNR  - Continue Zosyn for nwo  - ID consult, Dr. Coronado, recs appreciated - Patient has chronic renae, frequent UTI  - UA positive for leuk esterase, bacteria, but unclear significance in setting of chronic renae  - Remains afebrile, with no leukocytosis  - Renae replaced in ED  - on Zosyn for a suspected UTI with suspicion that pt's AMS on admission was due to infection  - Further conversation with daughter reveals that patient's AMS was actually expressive aphasia that began this weekend  - procalcitonin low at .12  - Urine culture from Feb 2020 with >3 organisms, suspect will have positive urine culture as pt likely has at least colonization   - it's possible patient's AMS was related to worsening renal failure (uremia / metabolic acidosis) vs seizure  - blood culture NGTD  - urine culture >100,000 GNR  - Continue Zosyn for now  - ID consult, Dr. Coronado, recs appreciated - Patient has chronic renae, frequent UTI  - UA positive for leuk esterase, bacteria, but unclear significance in setting of chronic renae  - has been afebrile, with no leukocytosis  - Renae replaced in ED  - on Zosyn for a suspected UTI with suspicion that pt's AMS on admission was due to infection, though now feel higher likelihood it was related to seizure activity (possibly in partial seizures prior to the generalized one pt had today)  - Further information from daughter reveals that patient's AMS was actually expressive aphasia that began this weekend  - procalcitonin low at .12  - Urine culture from Feb 2020 with >3 organisms, suspect will have positive urine culture as pt likely has at least colonization   - it's possible patient's AMS was related to worsening renal failure (uremia / metabolic acidosis) vs seizure  - blood culture NGTD  - urine culture >100,000 GNR  - Continue Zosyn for now  - ID consult, Dr. Kirk, recs appreciated

## 2020-03-05 NOTE — PROGRESS NOTE ADULT - PROBLEM SELECTOR PLAN 5
- K 6.3 on admission, decreased to 4.8  - likely due to MEHDI on CKD Stage 5  - S/p insulin, albuterol, kayexalate and bicarb  - Continue to trend BMP  - pt had expressed wishes to avoid HD if at all possible in the past.   - nephro, Dr. You, recs appreciated - K 6.3 on admission, decreased to <5  - likely due to MEHDI on CKD Stage 5  - S/p insulin, albuterol, kayexalate and bicarb  - Continue to trend BMP  - pt had expressed wishes to avoid HD if at all possible in the past.   - nephro, Dr. You, recs appreciated

## 2020-03-05 NOTE — PROGRESS NOTE ADULT - PROBLEM SELECTOR PLAN 3
- likely was due to acute neuro event such as CVA, seizure vs acute metabolic encephalopathy (due to UTI or due to worsening renal failure / uremia) or a combination  - repeat CT head and MRI negative for acute intracranial hemorrhage, infarct  - Hold centrally acting meds: hydroxyzine, tramadol - likely was due to acute neuro event such as seizure vs acute metabolic encephalopathy (due to UTI or due to worsening renal failure / uremia) or a combination  - repeat CT head and MRI negative for acute intracranial hemorrhage, infarct  - Hold centrally acting meds: hydroxyzine, tramadol - likely was due to acute neuro event such as seizure vs acute metabolic encephalopathy (due to UTI or due to worsening renal failure / uremia) or a combination  - repeat CT head and MRI negative for acute intracranial hemorrhage or infarct  - Hold centrally acting meds: hydroxyzine, tramadol

## 2020-03-05 NOTE — CONSULT NOTE ADULT - ATTENDING COMMENTS
83 yo F with Hx CDK5, chronic renae and frequent UTIs, stage 4 sacral wound with recent osteomyelitis, dementia admitted 3/3 with a few days of confusion, inappropriate speech.  She was treated for a UTI with zosyn.  This am had an episode of sz activity, RRT was called and she received Ativan.  MRI showed no acute changes and her EEG in the afternoon was normal.  Following the sz she remained minimally responsive then developed respiratory distress.  She was transferred to the ICU and required intubation for impending respiratory failure.    --sedate with propofol  continue depakote for sz activity  unclear trigger for the new sz  --currently hypertensive  cautiously continue antihtn meds  check echo  --acute respiratory failure, intubated this evening  check CXR, ABG  full vent support for now  --CKD stage 5 is stable  --start TF  --continue zosyn for now, f/u Cx data  --plan discussed with pt's daughter and son  --pt critically ill.  CC time 45min 83 yo F with Hx CDK5, chronic renae and frequent UTIs, stage 4 sacral wound with recent osteomyelitis, dementia admitted 3/3 with a few days of confusion, inappropriate speech.  She was treated for a UTI with zosyn.  This am had an episode of sz activity, RRT was called and she received Ativan.  MRI showed no acute changes and her EEG in the afternoon was normal.  Following the sz she remained minimally responsive then developed respiratory distress.  She was transferred to the ICU and required intubation for impending respiratory failure.    --sedate with propofol  continue depakote for sz activity  unclear trigger for the new sz  --currently hypertensive  cautiously continue antihtn meds  check echo  --acute respiratory failure, intubated this evening  check CXR, ABG  full vent support for now  --CKD stage 5 is stable  lactic acidosis now resolved, from sz activity  --start TF  --continue zosyn for now, f/u Cx data  --plan discussed with pt's daughter and son  --pt critically ill.  CC time 45min

## 2020-03-05 NOTE — PROVIDER CONTACT NOTE (OTHER) - ACTION/TREATMENT ORDERED:
MD at bedside
MD to see patient.
[facility]
Dr. Salazar will assess pt and enter appropriate orders
as per resident MD Salazar - give amlodipine 5mg daily medication

## 2020-03-05 NOTE — CONSULT NOTE ADULT - SUBJECTIVE AND OBJECTIVE BOX
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 Patient's daughter present at bedside providing history due to patient AMS.  Patient daughter reports that over the past few days, patient seems weaker than usual (not able to empty ostomy, not able to assist with sacral wound cleaning, etc.) Last night she seemed confused, and was answering questions inappropriately, and having difficulty communicating. She has not had decreased urine output, and has not had any recent complaints. Daughter reports that a similar episode occurred over the summer, when the patient had a UTI.    RRT called this morning for unresponsiveness. RN noticed patient having jerking movements. CT head showed scattered chronic ischemic changes again noted with volume loss. No acute abnormality suggested.  Mild thickening in the sinuses without fluid levels. MRA head showed tortuosity of the intracerebral vasculature predominantly the cavernous carotid arteries. No significant stenosis or occlusion. MRI head showed no acute intracranial hemorrhage or acute infarct. Lactate 5.3. Patient received Ativan 2mg IV for seizures.  ABG within normal limits. Chest x-ray: no active pulm infiltrates. Patient remained encephalopathic, ICU consulted.         REVIEW OF SYSTEMS - unable to assess due to mental status    T(F): 98.8 (03-05-20 @ 17:39), Max: 98.8 (03-05-20 @ 13:22)  HR: 103 (03-05-20 @ 17:39) (66 - 103)  BP: 189/84 (03-05-20 @ 17:39) (175/88 - 194/77)  RR: 24 (03-05-20 @ 17:39) (16 - 24)  SpO2: 98% (03-05-20 @ 17:39) (97% - 99%)  Wt(kg): --            I&O's Summary    03-04 @ 07:01 - 03-05 @ 07:00  --------------------------------------------------------  IN: 1025 mL / OUT: 500 mL / NET: 525 mL    03-05 @ 07:01  - 03-05 @ 18:06  --------------------------------------------------------  IN: 0 mL / OUT: 700 mL / NET: -700 mL      PHYSICAL EXAM  General: Lethargic elderly female, belly breathing, drooling   CNS: lethargic, does not respond to verbal commands. left upper arm twitching, retracts to painful stimuli  HEENT: PERRL  Resp: coarse breath sounds diffusely  CVS: RRR, no murmurs  Abd: soft, non-distended, +ostomy bag, pink stoma  Ext: no edema in lower extremities  Skin: healed scabs on bilateral legs    MEDICATIONS  piperacillin/tazobactam IVPB.. IV Intermittent    amLODIPine   Tablet Oral  hydrALAZINE Oral  metoprolol tartrate Oral    atorvastatin Oral  cholestyramine Powder (Sugar-Free) Oral    budesonide 160 MICROgram(s)/formoterol 4.5 MICROgram(s) Inhaler Inhalation  loratadine Oral    acetaminophen   Tablet .. Oral PRN  FLUoxetine Oral  valproate sodium IVPB IV Intermittent      aspirin enteric coated Oral  heparin  Injectable SubCutaneous        calcium carbonate 1250 mG  + Vitamin D (OsCal 500 + D) Oral  cholecalciferol Oral  ferrous    sulfate Oral  iron sucrose Injectable IV Push  multivitamin Oral  sodium bicarbonate Oral  sodium chloride 0.45% IV Continuous    epoetin jean carlos Injectable SubCutaneous    clobetasol 0.05% Cream Topical  lidocaine   Patch Transdermal PRN  nystatin Cream Topical    lactobacillus acidophilus Oral                          10.6   8.46  )-----------( 311      ( 05 Mar 2020 10:46 )             34.1       03-05    143  |  115<H>  |  41<H>  ----------------------------<  189<H>  4.9   |  17<L>  |  4.10<H>    Ca    7.4<L>      05 Mar 2020 14:49  Phos  4.5     03-05  Mg     1.2     03-05    TPro  7.1  /  Alb  3.0<L>  /  TBili  0.2  /  DBili  x   /  AST  21  /  ALT  18  /  AlkPhos  65  03-05    Lactate 1.1           03-05 @ 14:50    Lactate 5.3           03-05 @ 10:46              .Urine Clean Catch (Midstream)   >100,000 CFU/ml Escherichia coli -- 03-03 @ 21:30  .Blood Blood-Peripheral   No growth to date. -- 03-03 @ 21:12  .Blood Blood-Peripheral   No growth to date. -- 03-03 @ 21:10        Radiology: ***  Bedside lung ultrasound: ***  Bedside ECHO: ***    CENTRAL LINE: Y/N          DATE INSERTED:              REMOVE: Y/N  RENAE: Y  A-LINE: Y/N                       DATE INSERTED:              REMOVE: Y/N    GLOBAL ISSUE/BEST PRACTICE  Analgesia:   Sedation:   CAM-ICU:   HOB elevation: yes  Stress ulcer prophylaxis:   VTE prophylaxis:   Glycemic control:   Nutrition:     CODE STATUS: FULL   GOC discussion: Y Patient is a 84y old  Female who presents with a chief complaint of altered mental status (05 Mar 2020 13:47)    CONSULT: 84 year old female with PMH of CKD5 (baseline creatinine <5), 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 Patient's daughter present at bedside providing history due to patient AMS.  Patient daughter reports that over the past few days, patient seems weaker than usual (not able to empty ostomy, not able to assist with sacral wound cleaning, etc.) Last night she seemed confused, and was answering questions inappropriately, and having difficulty communicating. She has not had decreased urine output, and has not had any recent complaints. Daughter reports that a similar episode occurred over the summer, when the patient had a UTI.    RRT called this morning for unresponsiveness. RN noticed patient having jerking movements. CT head showed scattered chronic ischemic changes again noted with volume loss. No acute abnormality suggested.  Mild thickening in the sinuses without fluid levels. MRA head showed tortuosity of the intracerebral vasculature predominantly the cavernous carotid arteries. No significant stenosis or occlusion. MRI head showed no acute intracranial hemorrhage or acute infarct. Lactate 5.3. Patient received Ativan 2mg IV for seizures.  ABG within normal limits. Chest x-ray: no active pulm infiltrates. Patient remained encephalopathic, ICU consulted.         REVIEW OF SYSTEMS - unable to assess due to mental status    T(F): 98.8 (03-05-20 @ 17:39), Max: 98.8 (03-05-20 @ 13:22)  HR: 103 (03-05-20 @ 17:39) (66 - 103)  BP: 189/84 (03-05-20 @ 17:39) (175/88 - 194/77)  RR: 24 (03-05-20 @ 17:39) (16 - 24)  SpO2: 98% (03-05-20 @ 17:39) (97% - 99%)  Wt(kg): --            I&O's Summary    03-04 @ 07:01 - 03-05 @ 07:00  --------------------------------------------------------  IN: 1025 mL / OUT: 500 mL / NET: 525 mL    03-05 @ 07:01  - 03-05 @ 18:06  --------------------------------------------------------  IN: 0 mL / OUT: 700 mL / NET: -700 mL      PHYSICAL EXAM  General: Lethargic elderly female, belly breathing, drooling, grunting breath sounds  CNS: lethargic, does not respond to verbal commands. left upper arm twitching, retracts to painful stimuli  HEENT: PERRL  Resp: coarse breath sounds diffusely  CVS: RRR, no murmurs  Abd: soft, non-distended, +ostomy bag, pink stoma  Ext: no edema in lower extremities  Skin: healed scabs on bilateral legs    MEDICATIONS  piperacillin/tazobactam IVPB.. IV Intermittent    amLODIPine   Tablet Oral  hydrALAZINE Oral  metoprolol tartrate Oral    atorvastatin Oral  cholestyramine Powder (Sugar-Free) Oral    budesonide 160 MICROgram(s)/formoterol 4.5 MICROgram(s) Inhaler Inhalation  loratadine Oral    acetaminophen   Tablet .. Oral PRN  FLUoxetine Oral  valproate sodium IVPB IV Intermittent      aspirin enteric coated Oral  heparin  Injectable SubCutaneous        calcium carbonate 1250 mG  + Vitamin D (OsCal 500 + D) Oral  cholecalciferol Oral  ferrous    sulfate Oral  iron sucrose Injectable IV Push  multivitamin Oral  sodium bicarbonate Oral  sodium chloride 0.45% IV Continuous    epoetin jean carlos Injectable SubCutaneous    clobetasol 0.05% Cream Topical  lidocaine   Patch Transdermal PRN  nystatin Cream Topical    lactobacillus acidophilus Oral                          10.6   8.46  )-----------( 311      ( 05 Mar 2020 10:46 )             34.1       03-05    143  |  115<H>  |  41<H>  ----------------------------<  189<H>  4.9   |  17<L>  |  4.10<H>    Ca    7.4<L>      05 Mar 2020 14:49  Phos  4.5     03-05  Mg     1.2     03-05    TPro  7.1  /  Alb  3.0<L>  /  TBili  0.2  /  DBili  x   /  AST  21  /  ALT  18  /  AlkPhos  65  03-05    Lactate 1.1           03-05 @ 14:50    Lactate 5.3           03-05 @ 10:46              .Urine Clean Catch (Midstream)   >100,000 CFU/ml Escherichia coli -- 03-03 @ 21:30  .Blood Blood-Peripheral   No growth to date. -- 03-03 @ 21:12  .Blood Blood-Peripheral   No growth to date. -- 03-03 @ 21:10        Radiology: ***  Bedside lung ultrasound: ***  Bedside ECHO: ***    CENTRAL LINE: Y/N          DATE INSERTED:              REMOVE: Y/N  RENAE: HAYES  A-LINE: Y/N                       DATE INSERTED:              REMOVE: Y/N    GLOBAL ISSUE/BEST PRACTICE  Analgesia:   Sedation:   CAM-ICU:   HOB elevation: yes  Stress ulcer prophylaxis:   VTE prophylaxis:   Glycemic control:   Nutrition:     CODE STATUS: FULL   GO discussion: Y

## 2020-03-05 NOTE — CONSULT NOTE ADULT - SUBJECTIVE AND OBJECTIVE BOX
[facility], Division of Infectious Diseases  HILDA Giang, DAMON HERRERA, GURJIT  84y, Female  [id number]    HPI--  84F unresponsive and unable to give history. Seen earlier this am.   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 Patient's daughter present at bedside providing history due to patient AMS.  Patient daughter reports that over the past few days, patient seems weaker than usual (not able to empty ostomy, not able to assist with sacral wound cleaning, etc.) Last night she seemed confused, and was answering questions inappropriately, and having difficulty communicating. She has not had decreased urine output, and has not had any recent complaints. Daughter reports that a similar episode occurred over the summer, when the patient had a UTI. Patient currently does not remember the events of last night/this morning. She reports feeling cold, irritation of the skin around her ostomy site, sacral pain, and suprapubic discomfort (baseline). Patient denies fevers, chest pain, palpitations, LE edema.    ED Course:  Vitals: Temp 97.9, HR 71, /77 ->211/95->194/94, RR 18,  O2 sat 100 on RA.  Labs:  WBC 6.77, H/h 9.8/31.7, RDW 15.1, glucose 180, lactate .6, K 6.3 ->5.3, Cl 118, bicarb 14, anion gap 12, BUN/Cr 45/4 (was ~6 last admission, baseline <5), albumin 2.8, GFR 10  UA: color: pink, appearance: turbid, protein 500, LE moderate, blood large, WBC 26-50, RBC>50, bacteria moderate.  CT head: Negative for intracranial hemorrhage or acute territorial hemorrhage  CXR: Questionable upper lobe infiltrates  EKG: NSR without peaked t waves and no ischemic changes  ABG - ( 03 Mar 2020 16:09 )  pH, Arterial: 7.31  pH, Blood: x     /  pCO2: 31    /  pO2: 87    / HCO3: 17    / Base Excess: -9.7  /  SaO2: 96      Patient s/p zosyn, 1.7L NS blous, 1.7 LR bolus, insulin, albuterol, sodium bicarb 50 meq IV, kayexalate with improvement.  Renae removed and replaced with 50 cc urine output upon insertion. (03 Mar 2020 16:51)    Patient at present unresponsive with jerky of torso, head turned to left, grunting at times.       PMH/PSH--  Wound of sacral region  Depression  Iron deficiency anemia  Eczema  HTN (hypertension)  CKD (chronic kidney disease) stage 5, GFR less than 15 ml/min  Herniated lumbar intervertebral disc  Depression  Tremor  Kidney atrophy  Colostomy status  Chronic UTI (urinary tract infection)  Cervical cancer  Dyslipidemia  Diabetes  Hernia, incisional  S/P hip replacement  S/P colon resection  S/P hysterectomy      Allergies--  Levaquin (Pruritus)  mercury (Pruritus; Rash)  sulfa drugs (Pruritus)        Medications--  Antibiotics: piperacillin/tazobactam IVPB.. 3.375 Gram(s) IV Intermittent every 12 hours    Immunologic: epoetin jean carlos Injectable 64244 Unit(s) SubCutaneous <User Schedule>    Other: acetaminophen   Tablet .. PRN  amLODIPine   Tablet  aspirin enteric coated  atorvastatin  budesonide 160 MICROgram(s)/formoterol 4.5 MICROgram(s) Inhaler  calcium carbonate 1250 mG  + Vitamin D (OsCal 500 + D)  cholecalciferol  cholestyramine Powder (Sugar-Free)  clobetasol 0.05% Cream  ferrous    sulfate  FLUoxetine  heparin  Injectable  hydrALAZINE  iron sucrose Injectable  lactobacillus acidophilus  lidocaine   Patch PRN  loratadine  magnesium sulfate  IVPB  metoprolol tartrate  multivitamin  nystatin Cream  sodium bicarbonate  sodium chloride 0.45%      Social History--  EtOH: none known.  Tobacco: none known.  Drug use: none known.     Family/Marital History--  Family history of ovarian cancer (Sibling)    Remainder not relevant to clinical concern.    Travel/Environmental/Occupational History:  Unknown    Review of Systems:  Review of systems unable secondary to clinical condition.     Physical Exam--  Vital Signs: T(F): 98.4 (03-05-20 @ 04:10), Max: 98.4 (03-05-20 @ 04:10)  HR: 66 (03-05-20 @ 04:10)  BP: 194/77 (03-05-20 @ 04:10)  RR: 16 (03-05-20 @ 04:10)  SpO2: 99% (03-05-20 @ 04:10)  Wt(kg): --  General: Nontoxic-appearing Female in no acute distress.  HEENT: AT/NC. Pupils/EOM unable secondary to patient compliance. Oropharynx/dentition unable secondary to patient compliance. Anicteric. Conjunctiva pink and moist.  Neck: Not rigid. No sense of mass.  Nodes: None palpable.  Lungs: Poor effort grossly clear bilaterally without rales, wheezing or rhonchi  Heart: Regular rate and rhythm. 1-2/6 systolic murmur. No rub. No gallop. No palpable thrill.  Abdomen: Bowel sounds present and normoactive. Soft. Nondistended. Nontender. No sense of mass. No organomegaly. Ostomy with bilious output.   Back: unable  Extremities: No cyanosis or clubbing. No edema.   Skin: Warm. Dry. Good turgor. No rash. No vasculitic stigmata.  Psychiatric: unable  Neuro: intermittent jerky movements of torso, extremities spared, head turned to L, grunting sounds intermittently.       Laboratory & Imaging Data--  CBC                        10.6   8.46  )-----------( 311      ( 05 Mar 2020 10:46 )             34.1       Chemistries  03-05    146<H>  |  115<H>  |  38<H>  ----------------------------<  132<H>  4.8   |  17<L>  |  4.10<H>    Ca    7.6<L>      05 Mar 2020 10:46  Phos  4.5     03-05  Mg     1.2     03-05    TPro  7.1  /  Alb  3.0<L>  /  TBili  0.2  /  DBili  x   /  AST  21  /  ALT  18  /  AlkPhos  65  03-05      Culture Data  Culture - Urine (collected 03 Mar 2020 21:30)  Source: .Urine Clean Catch (Midstream)  Preliminary Report (04 Mar 2020 21:35):    >100,000 CFU/ml Gram Negative Rods    Culture - Blood (collected 03 Mar 2020 21:12)  Source: .Blood Blood-Peripheral  Preliminary Report (04 Mar 2020 22:01):    No growth to date.    Culture - Blood (collected 03 Mar 2020 21:10)  Source: .Blood Blood-Peripheral  Preliminary Report (04 Mar 2020 22:01):    No growth to date.    < from: Xray Chest 1 View-PORTABLE IMMEDIATE (03.03.20 @ 13:56) >  EXAM:  XR CHEST PORTABLE IMMED 1V                            PROCEDURE DATE:  03/03/2020          INTERPRETATION:  Chest one view    HISTORY: Sepsis    COMPARISON STUDY: 8/2/2018    Frontal expiratory view of the chest shows the heart to be normal in size. The lungs shows questionable upper lobe infiltrates and there is no evidence of pneumothorax nor pleural effusion.    IMPRESSION:  Questionable infiltrates. Follow-up study is recommended as clinically warranted.    Thank you for the courtesyof this referral.    < end of copied text >

## 2020-03-05 NOTE — PROGRESS NOTE ADULT - PROBLEM SELECTOR PLAN 9
- Continues to be significantly hypertensive  - hydralazine 25 mg TID added  - Continue amlodipine 10 mg daily with hold parameters  - Continue lopressor 25 mg BID with hold parameters - Continues to be significantly hypertensive  - hydralazine 25 mg TID added  - Continue amlodipine 10 mg daily with hold parameters  - Continue lopressor 25 mg BID with hold parameters  - if obtunded and not intubated by ICU, will consider NGT for po medications.

## 2020-03-05 NOTE — PROGRESS NOTE ADULT - PROBLEM SELECTOR PLAN 8
- Anemia likely due to LELO, and anemia of chronic disease in setting of Stage 5 CKD  - Fe panel from 2/2020 showed low total iron, low total iron binding capacity  - hemoglobin approximately 10 today, above baseline  - Continue home iron  - Continue epogen as per nephro

## 2020-03-05 NOTE — PROGRESS NOTE ADULT - PROBLEM SELECTOR PLAN 10
DVT ppx: subq heparin 5000un sq Q8h  Problem 11: Depression: Continue prozac  Problem 12: eczema: Continue clobetasol  Problem 13: hyperlipidemia: continue atorvastatin and choestyramine DVT ppx: heparin 5000un sq Q8h  Problem 11: Depression: Continue prozac  Problem 12: eczema: Continue clobetasol  Problem 13: hyperlipidemia: continue atorvastatin and cholestyramine

## 2020-03-05 NOTE — CONSULT NOTE ADULT - ASSESSMENT
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     Neuro: Lethargic, seizure disorder ?post-ictal, recent Ativan use v metabolic encephalopathy. Continue IV Valproic Acid BID. Check EEG. MRI head negative. Elevated lactate at time of RRT likely from seizure activity  CV: HTN continues Hydralazine, Metoprolol, and Amlodipine. Continue ASA and Lipitor.  Pulm: Acute hypercapnic respiratory failure. Check ABG. CT chest ?aspiration PNA. Continue Symbicort.   Renal: CKD Stage 5 (chronic), oliguric. Strict I&O. Continue to monitor. Replete electrolytes.   GI: NPO. Continue Ostomy care.   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     Neuro: Lethargic, seizure disorder ?post-ictal, recent Ativan use v metabolic encephalopathy v early sepsis. Patient febrile on exam. Continue IV Valproic Acid BID. Check EEG. MRI head negative. Elevated lactate at time of RRT likely from seizure activity  CV: HTN continues Hydralazine, Metoprolol, and Amlodipine. Continue ASA and Lipitor.  Pulm: Acute hypercapnic respiratory failure. Check ABG. CT chest ?aspiration PNA. Continue Symbicort. Patient may need to be intubated for airway protection.  Renal: CKD Stage 5 (chronic), oliguric. Strict I&O. Continue to monitor. Replete electrolytes.   GI: NPO. Continue Ostomy care.   ID: Febrile. No leukocytosis. Continue IV Zosyn. Follow up blood cultures x2.   Heme: on Heparin SQ  Dispo: Transfer to ICU. Discussed with family at bedside, patient is a full code. Would like to be intubated and have CPR. 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     Neuro: Lethargic, seizure disorder ?post-ictal, recent Ativan use v metabolic encephalopathy v early sepsis. Patient febrile on exam. Continue IV Valproic Acid BID. Check EEG. MRI head negative. Elevated lactate at time of RRT likely from seizure activity  CV: HTN continues Hydralazine, Metoprolol, and Amlodipine. Continue ASA and Lipitor.  Pulm: Acute hypoxic respiratory alkalosis with metabolic acidosis. CT chest ?aspiration PNA. Continue Symbicort. Patient will likely need to be intubated for airway protection.  Renal: CKD Stage 5 (chronic), oliguric. Strict I&O. Continue to monitor. Replete electrolytes.   GI: NPO. Continue Ostomy care.   ID: Febrile. No leukocytosis. Continue IV Zosyn. Follow up blood cultures x2.   Heme: on Heparin SQ  Dispo: Transfer to ICU. Discussed with family at bedside, patient is a full code. Would like to be intubated and have CPR.

## 2020-03-05 NOTE — PROGRESS NOTE ADULT - ASSESSMENT
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  Now with unresponsiveness and shaking, likely seizure. 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

## 2020-03-05 NOTE — PROGRESS NOTE ADULT - ASSESSMENT
·	CKD 4, Solitary functioning kidney: Prerenal azotemia, (Atrophic right kidney)  ·	Metabolic acidosis  ·	Hyperkalemia  ·	Diabetes  ·	Hypertension  ·	Anemia    Renal indices likely at baseline. Can d/c IVF. Continue sodium bicarb tabs. ? Compliance with meds. Potassium trending down. Monitor h/h trend.   BP poorly controlled. Add hydralazine. Monitor blood sugar levels. Insulin coverage as needed. Dietary restriction. Monitor UO. Procrit for anemia. IV venofer.   Avoid nephrotoxic meds as possible. Avoid ACEI, ARB, NSAIDs and IV contrast. Will follow electrolytes and renal function trend.

## 2020-03-05 NOTE — PROGRESS NOTE ADULT - SUBJECTIVE AND OBJECTIVE BOX
Patient is a 84y old  Female who presents with a chief complaint of altered mental status (03 Mar 2020 17:47)  Patient seen in follow up for CKD 4, Hyperkalemia. Improving renal indices.     PAST MEDICAL HISTORY:  Wound of sacral region  Depression  Iron deficiency anemia  Eczema  HTN (hypertension)  CKD (chronic kidney disease) stage 5, GFR less than 15 ml/min  Herniated lumbar intervertebral disc  Depression  Tremor  Kidney atrophy  Colostomy status  Chronic UTI (urinary tract infection)  Cervical cancer  Dyslipidemia  Diabetes  Hernia, incisional    MEDICATIONS  (STANDING):  amLODIPine   Tablet 10 milliGRAM(s) Oral daily  aspirin enteric coated 81 milliGRAM(s) Oral daily  atorvastatin 10 milliGRAM(s) Oral at bedtime  budesonide 160 MICROgram(s)/formoterol 4.5 MICROgram(s) Inhaler 2 Puff(s) Inhalation two times a day  calcium carbonate 1250 mG  + Vitamin D (OsCal 500 + D) 1 Tablet(s) Oral daily  cholecalciferol 1000 Unit(s) Oral daily  cholestyramine Powder (Sugar-Free) 4 Gram(s) Oral daily  clobetasol 0.05% Cream 1 Application(s) Topical two times a day  epoetin jean carlos Injectable 97947 Unit(s) SubCutaneous <User Schedule>  ferrous    sulfate 325 milliGRAM(s) Oral daily  FLUoxetine 20 milliGRAM(s) Oral daily  heparin  Injectable 5000 Unit(s) SubCutaneous every 8 hours  iron sucrose Injectable 100 milliGRAM(s) IV Push every 24 hours  lactobacillus acidophilus 1 Tablet(s) Oral three times a day  loratadine 10 milliGRAM(s) Oral daily  metoprolol tartrate 25 milliGRAM(s) Oral two times a day  multivitamin 1 Tablet(s) Oral daily  nystatin Cream 1 Application(s) Topical two times a day  piperacillin/tazobactam IVPB.. 3.375 Gram(s) IV Intermittent every 12 hours  sodium bicarbonate 650 milliGRAM(s) Oral three times a day  sodium chloride 0.45% 1000 milliLiter(s) (75 mL/Hr) IV Continuous <Continuous>    MEDICATIONS  (PRN):  acetaminophen   Tablet .. 650 milliGRAM(s) Oral every 6 hours PRN Mild Pain (1 - 3)  lidocaine   Patch 1 Patch Transdermal daily PRN back pain    T(C): 36.9 (20 @ 04:10), Max: 36.9 (03-05-20 @ 04:10)  HR: 66 (20 @ 04:10) (58 - 71)  BP: 194/77 (20 @ 04:10) (158/73 - 211/95)  RR: 16 (20 @ 04:10)  SpO2: 99% (20 @ 04:10)  Wt(kg): --  I&O's Detail    04 Mar 2020 07:01  -  05 Mar 2020 07:00  --------------------------------------------------------  IN:    sodium chloride 0.45%: 900 mL    Solution: 125 mL  Total IN: 1025 mL    OUT:    Voided: 500 mL  Total OUT: 500 mL    Total NET: 525 mL            PHYSICAL EXAM:  General: NAD  Respiratory: b/l air entry  Cardiovascular: S1 S2  Gastrointestinal: soft  Extremities:  edema, + renae                      LABORATORY:                        9.3    5.34  )-----------( 264      ( 05 Mar 2020 08:33 )             30.2         147<H>  |  116<H>  |  39<H>  ----------------------------<  83  4.6   |  21<L>  |  3.90<H>    Ca    7.3<L>      05 Mar 2020 08:33    TPro  6.7  /  Alb  2.8<L>  /  TBili  0.2  /  DBili  x   /  AST  15  /  ALT  18  /  AlkPhos  62  03-03    Sodium, Serum: 147 mmol/L ( @ 08:33)  Sodium, Serum: 146 mmol/L ( @ 08:47)  Sodium, Serum: 146 mmol/L ( @ 00:35)  Sodium, Serum: 144 mmol/L ( @ 13:44)    Potassium, Serum: 4.6 mmol/L ( @ 08:33)  Potassium, Serum: 5.5 mmol/L ( @ 08:47)  Potassium, Serum: 5.8 mmol/L ( @ 00:35)  Potassium, Serum: 5.3 mmol/L (03-03 @ 16:31)    Hemoglobin: 9.3 g/dL ( @ 08:33)  Hemoglobin: 8.4 g/dL ( @ 08:47)  Hemoglobin: 9.8 g/dL ( @ 13:44)    Creatinine, Serum 3.90 ( @ 08:33)  Creatinine, Serum 3.80 ( @ 08:47)  Creatinine, Serum 3.70 ( @ 00:35)  Creatinine, Serum 4.00 ( @ 13:44)        LIVER FUNCTIONS - ( 03 Mar 2020 13:44 )  Alb: 2.8 g/dL / Pro: 6.7 g/dL / ALK PHOS: 62 U/L / ALT: 18 U/L / AST: 15 U/L / GGT: x           Urinalysis Basic - ( 03 Mar 2020 13:29 )    Color: Pink / Appearance: Turbid / S.020 / pH: x  Gluc: x / Ketone: Negative  / Bili: Negative / Urobili: Negative   Blood: x / Protein: 500 mg/dL / Nitrite: Negative   Leuk Esterase: Moderate / RBC: >50 /HPF / WBC 26-50   Sq Epi: x / Non Sq Epi: Occasional / Bacteria: Moderate      ABG - ( 03 Mar 2020 16:09 )  pH, Arterial: 7.31  pH, Blood: x     /  pCO2: 31    /  pO2: 87    / HCO3: 17    / Base Excess: -9.7  /  SaO2: 96

## 2020-03-05 NOTE — CHART NOTE - NSCHARTNOTEFT_GEN_A_CORE
Rapid Response follow up note    Patient seen and examined at bedside. Not responsive to voice. Received 2 mg IV ativan approximately 2 hours ago. Abnormal movements/shaking resolved.    ROS: unable to obtain, patient sedated    Vital Signs Last 24 Hrs  T(C): 36.9 (05 Mar 2020 04:10), Max: 36.9 (05 Mar 2020 04:10)  T(F): 98.4 (05 Mar 2020 04:10), Max: 98.4 (05 Mar 2020 04:10)  HR: 100 (05 Mar 2020 13:22) (64 - 100)  BP: 175/88 (05 Mar 2020 13:22) (158/73 - 194/77)  BP(mean): --  RR: 16 (05 Mar 2020 04:10) (16 - 16)  SpO2: 99% (05 Mar 2020 04:10) (97% - 99%)    Physical Exam:  General: Sedated  HEENT: Normocephallic Atraumatic, PERRLA,   Neurology: sedated, not responsive to voice, responsive to painful stimuli  Respiratory: Clear To Auscultation B/L, No Wheezes, rhonchi or rales  CV: Regular Rate and Rhythm, +S1/S2, no murmurs, rubs or gallops  Abdominal: Soft, Non-Tender, Non-Distended +ostomy bag with brown stool  Extremities: No Clubbing, cyanosis or edema, + scabs on legs      A/P  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 Noted to have an expressive aphasia, but it was believed to be longstanding. Today patient became unresponsive with shaking of left arm and leg. Dr. Mayo discussed with daughter, aphasia was an acute change that developed this past weekend. Believe that her aphasia and AMS are likely 2/2 to stroke several days ago with possible seizure this AM    CT negative for acute findings  lactate elevated, on IVF, follow up repeat  Follow up MRI  Follow up EEG  Continue Zosyn for now  ID, Dr. Kirk recs appreciated  Neuro, Dr. Quintanilla to see patient. Rapid Response follow up note    Patient seen and examined at bedside. Not responsive to voice. Received 2 mg IV ativan approximately 2 hours ago. Abnormal movements/shaking resolved.    ROS: unable to obtain, patient sedated    Vital Signs Last 24 Hrs  T(C): 36.9 (05 Mar 2020 04:10), Max: 36.9 (05 Mar 2020 04:10)  T(F): 98.4 (05 Mar 2020 04:10), Max: 98.4 (05 Mar 2020 04:10)  HR: 100 (05 Mar 2020 13:22) (64 - 100)  BP: 175/88 (05 Mar 2020 13:22) (158/73 - 194/77)  BP(mean): --  RR: 16 (05 Mar 2020 04:10) (16 - 16)  SpO2: 99% (05 Mar 2020 04:10) (97% - 99%)    Physical Exam:  General: Sedated  HEENT: Normocephallic Atraumatic, PERRLA,   Neurology: sedated, not responsive to voice, responsive to painful stimuli  Respiratory: tachypneic, coarse breath sounds bilaterally  CV: Regular Rate and Rhythm, +S1/S2, no murmurs, rubs or gallops  Abdominal: Soft, Non-Tender, Non-Distended +ostomy bag with brown stool  Extremities: No Clubbing, cyanosis or edema, + scabs on legs      A/P  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 Noted to have an expressive aphasia, but it was believed to be longstanding. Today patient became unresponsive with shaking of left arm and leg. Dr. Mayo discussed with daughter, aphasia was an acute change that developed this past weekend. Believe that her aphasia and AMS are likely 2/2 to stroke several days ago with possible seizure this AM    CT negative for acute findings  lactate elevated, on IVF, follow up repeat  Follow up MRI  Follow up EEG  Continue Zosyn for now  ID, Dr. Kirk recs appreciated  Neuro, Dr. Quintanilla to see patient.

## 2020-03-05 NOTE — PROGRESS NOTE ADULT - PROBLEM SELECTOR PLAN 7
- Tylenol PRN  - Wound care RN, recs appreciated   - Aquacel dressing every other day  - Wound care, Dr. Blsa, recs appreciated - Tylenol PRN  - does not appear actively infected currently  - Wound care RN, recs appreciated   - Aquacel dressing every other day  - Wound care, Dr. Blas, recs appreciated

## 2020-03-05 NOTE — PROGRESS NOTE ADULT - PROBLEM SELECTOR PLAN 4
- Patient with metabolic acidosis in setting of UTI and suspected pre-renal MEHDI  - BUN/ Cr 38/4.1today  - bicarb drip dc'd  - started on sodium bicarb 650 mg po TID overnight, too  - discussed with nephro Dr. You, patient not very compliant, finds it difficult to come to office for follow up appointments. Not a candidate for dialysis at this time  - pt had expressed wishes to avoid HD if at all possible in the past.   - monitor BMP and fluid status - Patient with metabolic acidosis in setting of UTI and suspected pre-renal MEHDI  - BUN/ Cr 38/4.1 today  - bicarb drip to be dc'd when pt can tolerate po  - discussed with nephro Dr. You, patient not very compliant, finds it difficult to come to office for follow up appointments. Not a candidate for dialysis at this time  - pt had expressed wishes to avoid HD if at all possible in the past.   - monitor BMP and fluid status

## 2020-03-05 NOTE — CONSULT NOTE ADULT - ASSESSMENT
Question whether altered mental status at home could have been postictal phenomena given what appears to be seizure activity here.  Per d/w Dr. Mayo, patient's mental status waxed/waned. In the absence of fever, skeptical that he has encephalitis (e.g. HSV).  No clear or convincing evidence of infection on exam.  In setting of chronic renae catheter the urine isolate is of questionable significance.   CXR with questionable infiltrates in BUL, particularly MELL (personally reviewed), but oxygenation does not appear to be an issue and lung exam unimpressive  Sacral sore not infected appearing  No concern of potential other SSTI  Benign abdomen    Suggestions--  Neurology evaluation  Role of antibiotics TBD  F/U Cx    D/W Dr. Henry.  D/W Dr. Quintanilla    Thank you for the courtesy of this referral.  Acosta Kirk MD  [phone]

## 2020-03-05 NOTE — PROGRESS NOTE ADULT - SUBJECTIVE AND OBJECTIVE BOX
Patient is a 84y old  Female who presents with a chief complaint of altered mental status (05 Mar 2020 09:40)      INTERVAL HPI/OVERNIGHT EVENTS: Patient seen and examined at bedside. Earlier in the morning, complaining of not feeling well, pain in lower abdomen. RR called for nonresponsiveness with shaking. Patient underwent head CT, MRI, getting EEG. Labs significant for lactate >5 and low magnesium. Discussed with daughter, who states mother's expressive aphasia developed acutely over the weekend. Patient has no known history of seizure. Currently sedated, not responding to questions.    MEDICATIONS  (STANDING):  amLODIPine   Tablet 10 milliGRAM(s) Oral daily  aspirin enteric coated 81 milliGRAM(s) Oral daily  atorvastatin 10 milliGRAM(s) Oral at bedtime  budesonide 160 MICROgram(s)/formoterol 4.5 MICROgram(s) Inhaler 2 Puff(s) Inhalation two times a day  calcium carbonate 1250 mG  + Vitamin D (OsCal 500 + D) 1 Tablet(s) Oral daily  cholecalciferol 1000 Unit(s) Oral daily  cholestyramine Powder (Sugar-Free) 4 Gram(s) Oral daily  clobetasol 0.05% Cream 1 Application(s) Topical two times a day  epoetin jean carlos Injectable 47285 Unit(s) SubCutaneous <User Schedule>  ferrous    sulfate 325 milliGRAM(s) Oral daily  FLUoxetine 20 milliGRAM(s) Oral daily  heparin  Injectable 5000 Unit(s) SubCutaneous every 8 hours  hydrALAZINE 25 milliGRAM(s) Oral three times a day  iron sucrose Injectable 100 milliGRAM(s) IV Push every 24 hours  lactobacillus acidophilus 1 Tablet(s) Oral three times a day  loratadine 10 milliGRAM(s) Oral daily  metoprolol tartrate 25 milliGRAM(s) Oral two times a day  multivitamin 1 Tablet(s) Oral daily  nystatin Cream 1 Application(s) Topical two times a day  piperacillin/tazobactam IVPB.. 3.375 Gram(s) IV Intermittent every 12 hours  sodium bicarbonate 650 milliGRAM(s) Oral three times a day  sodium chloride 0.45% 1000 milliLiter(s) (75 mL/Hr) IV Continuous <Continuous>    MEDICATIONS  (PRN):  acetaminophen   Tablet .. 650 milliGRAM(s) Oral every 6 hours PRN Mild Pain (1 - 3)  lidocaine   Patch 1 Patch Transdermal daily PRN back pain      Allergies    Levaquin (Pruritus)  mercury (Pruritus; Rash)  sulfa drugs (Pruritus)    Intolerances        REVIEW OF SYSTEMS:  Unable to obtain, patient sedated, unresponsive to voice    Vital Signs Last 24 Hrs  T(C): 36.9 (05 Mar 2020 04:10), Max: 36.9 (05 Mar 2020 04:10)  T(F): 98.4 (05 Mar 2020 04:10), Max: 98.4 (05 Mar 2020 04:10)  HR: 100 (05 Mar 2020 13:22) (64 - 100)  BP: 175/88 (05 Mar 2020 13:22) (158/73 - 194/77)  BP(mean): --  RR: 16 (05 Mar 2020 04:10) (16 - 16)  SpO2: 99% (05 Mar 2020 04:10) (97% - 99%)    PHYSICAL EXAM:  GENERAL: NAD, non responsive  HEENT:  anicteric, moist mucous membranes  CHEST/LUNG:  CTA b/l, no rales, wheezes, or rhonchi  HEART:  RRR, S1, S2  ABDOMEN:  BS+, soft, nontender, nondistended +ostomy bag with brown stool  EXTREMITIES: no edema, cyanosis, or calf tenderness. + scabs on her legs bilaterally  NERVOUS SYSTEM: Sedated, unresponsive. Shaking has resolved.    LABS:                        10.6   8.46  )-----------( 311      ( 05 Mar 2020 10:46 )             34.1     CBC Full  -  ( 05 Mar 2020 10:46 )  WBC Count : 8.46 K/uL  Hemoglobin : 10.6 g/dL  Hematocrit : 34.1 %  Platelet Count - Automated : 311 K/uL  Mean Cell Volume : 92.4 fl  Mean Cell Hemoglobin : 28.7 pg  Mean Cell Hemoglobin Concentration : 31.1 gm/dL  Auto Neutrophil # : x  Auto Lymphocyte # : x  Auto Monocyte # : x  Auto Eosinophil # : x  Auto Basophil # : x  Auto Neutrophil % : x  Auto Lymphocyte % : x  Auto Monocyte % : x  Auto Eosinophil % : x  Auto Basophil % : x    05 Mar 2020 10:46    146    |  115    |  38     ----------------------------<  132    4.8     |  17     |  4.10     Ca    7.6        05 Mar 2020 10:46  Phos  4.5       05 Mar 2020 10:46  Mg     1.2       05 Mar 2020 10:46    TPro  7.1    /  Alb  3.0    /  TBili  0.2    /  DBili  x      /  AST  21     /  ALT  18     /  AlkPhos  65     05 Mar 2020 10:46        CAPILLARY BLOOD GLUCOSE      POCT Blood Glucose.: 129 mg/dL (05 Mar 2020 10:23)        Culture - Urine (collected 03-03-20 @ 21:30)  Source: .Urine Clean Catch (Midstream)  Preliminary Report (03-04-20 @ 21:35):    >100,000 CFU/ml Gram Negative Rods    Culture - Blood (collected 03-03-20 @ 21:12)  Source: .Blood Blood-Peripheral  Preliminary Report (03-04-20 @ 22:01):    No growth to date.    Culture - Blood (collected 03-03-20 @ 21:10)  Source: .Blood Blood-Peripheral  Preliminary Report (03-04-20 @ 22:01):    No growth to date.        RADIOLOGY & ADDITIONAL TESTS:    EXAM:  CT BRAIN                            PROCEDURE DATE:  03/05/2020          INTERPRETATION:  INDICATION:  Altered mental status  TECHNIQUE:  A non contrast 2.5mm axial CT study of the brain was performed from skull base to vertex. Coronal and sagittal reformations were generated from the axial data.  COMPARISON EXAMINATION:  CT 3/3/2020    FINDINGS:      HEMISPHERES:  This study is degraded by motion. Chronic ischemic changes are again noted in both hemispheres with volume loss. No acute territorial infarct or hemorrhage is noted. Similar findings were noted previously.  VENTRICLES:  Midline and normal in size.  POSTERIOR FOSSA:  The brain stem and cerebellum are unremarkable.  No CP angle lesion noted.  EXTRACEREBRAL SPACES:  No subdural or epidural collections are noted.  SKULL BASE AND CALVARIUM:  Appears intact.  No fracture or destructive lesion is identified.  SINUSES AND MASTOIDS:  Gadolinium thickening noted in the sinuses.  MISCELLANEOUS:  No orbital or suprasellar abnormality noted.      IMPRESSION:    1)  scattered chronic ischemic changes again noted with volume loss. No acute abnormality suggested.  2)  mild thickening in the sinuses without fluid levels.      LYDIA FUNEZ M.D., ATTENDING RADIOLOGIST  This document has been electronically signed. Mar  5 2020 11:07AM      EXAM:  MR ANGIO BRAIN                            PROCEDURE DATE:  03/05/2020          INTERPRETATION:  INDICATION:  Stroke.    TECHNIQUE:  MR angiography of the brain was performed using three dimensional time-of-flight (3D-TOF) technique.  Imaging was performed on a 1.5 Tamera magnet.    FINDINGS:      INTERNAL CAROTID ARTERIES:  Bilaterally patent.  No intraluminal filling defect or dissection seen.    [place] OF [place]AKER:  Tortuosity is noted of both cavernous carotid arteries, right greater than left. There are atherosclerotic changes.    CEREBRAL ARTERIES:  Bilaterally patent.  No segmental narrowing or stenosis.  No changes of vasculopathy.    VERTEBROBASILAR SYSTEM:  No stenosis or occlusion depicted.      IMPRESSION:     Tortuosity of the intracerebral vasculature predominantly the cavernous carotid arteries. No significant stenosis or occlusion.        LYDIA FUNEZ M.D., ATTENDING RADIOLOGIST  This document has been electronically signed. Mar  5 2020  1:15PM      EXAM:  MR BRAIN                            PROCEDURE DATE:  03/05/2020          INTERPRETATION:  CLINICAL STATEMENT: Pain.    TECHNIQUE: MRI of the brain was performed without gadolinium.    COMPARISON: CT head 3/5/2020    FINDINGS:  There is mild diffuse parenchymal volume loss. There are T2 prolongation signal abnormalities in the brainstem, periventricular subcortical white matter likely related to mild chronic microvascular ischemic changes.    There is no acute parenchymal hemorrhage, parenchymal mass, mass effect or midline shift. There is no extra-axial fluid collection.  There is no hydrocephalus.  There is no acute infarct.    Mucosal thickening paranasal sinuses. Partial opacification mastoid air cells    IMPRESSION:  No acute intracranial hemorrhage or acute infarct.        JACKIE HICKS M.D., ATTENDING RADIOLOGIST  This document has been electronically signed. Mar  5 2020  1:20PM      Personally reviewed. y    Consultant(s) Notes Reviewed:  [x] YES  [ ] NO Patient is a 84y old  Female who presents with a chief complaint of altered mental status (05 Mar 2020 09:40)      INTERVAL HPI/OVERNIGHT EVENTS: Patient seen and examined at bedside. Earlier in the morning, complaining of not feeling well, pain in lower abdomen. RR called for nonresponsiveness with shaking. Patient underwent head CT, MRI, getting EEG. Labs significant for lactate >5 and low magnesium. Discussed with daughter, who states mother's expressive aphasia developed acutely over the weekend. Patient has no known history of seizure. Currently sedated, not responding to questions.    MEDICATIONS  (STANDING):  amLODIPine   Tablet 10 milliGRAM(s) Oral daily  aspirin enteric coated 81 milliGRAM(s) Oral daily  atorvastatin 10 milliGRAM(s) Oral at bedtime  budesonide 160 MICROgram(s)/formoterol 4.5 MICROgram(s) Inhaler 2 Puff(s) Inhalation two times a day  calcium carbonate 1250 mG  + Vitamin D (OsCal 500 + D) 1 Tablet(s) Oral daily  cholecalciferol 1000 Unit(s) Oral daily  cholestyramine Powder (Sugar-Free) 4 Gram(s) Oral daily  clobetasol 0.05% Cream 1 Application(s) Topical two times a day  epoetin jean carlos Injectable 97451 Unit(s) SubCutaneous <User Schedule>  ferrous    sulfate 325 milliGRAM(s) Oral daily  FLUoxetine 20 milliGRAM(s) Oral daily  heparin  Injectable 5000 Unit(s) SubCutaneous every 8 hours  hydrALAZINE 25 milliGRAM(s) Oral three times a day  iron sucrose Injectable 100 milliGRAM(s) IV Push every 24 hours  lactobacillus acidophilus 1 Tablet(s) Oral three times a day  loratadine 10 milliGRAM(s) Oral daily  metoprolol tartrate 25 milliGRAM(s) Oral two times a day  multivitamin 1 Tablet(s) Oral daily  nystatin Cream 1 Application(s) Topical two times a day  piperacillin/tazobactam IVPB.. 3.375 Gram(s) IV Intermittent every 12 hours  sodium bicarbonate 650 milliGRAM(s) Oral three times a day  sodium chloride 0.45% 1000 milliLiter(s) (75 mL/Hr) IV Continuous <Continuous>    MEDICATIONS  (PRN):  acetaminophen   Tablet .. 650 milliGRAM(s) Oral every 6 hours PRN Mild Pain (1 - 3)  lidocaine   Patch 1 Patch Transdermal daily PRN back pain      Allergies    Levaquin (Pruritus)  mercury (Pruritus; Rash)  sulfa drugs (Pruritus)    Intolerances        REVIEW OF SYSTEMS:  Unable to obtain, patient sedated, unresponsive to voice    Vital Signs Last 24 Hrs  T(C): 36.9 (05 Mar 2020 04:10), Max: 36.9 (05 Mar 2020 04:10)  T(F): 98.4 (05 Mar 2020 04:10), Max: 98.4 (05 Mar 2020 04:10)  HR: 100 (05 Mar 2020 13:22) (64 - 100)  BP: 175/88 (05 Mar 2020 13:22) (158/73 - 194/77)  BP(mean): --  RR: 16 (05 Mar 2020 04:10) (16 - 16)  SpO2: 99% (05 Mar 2020 04:10) (97% - 99%)    PHYSICAL EXAM:  GENERAL: NAD, non responsive  HEENT:  anicteric, moist mucous membranes  CHEST/LUNG:  tachypneic, coarse breath sounds bilaterally  HEART:  RRR, S1, S2  ABDOMEN:  BS+, soft, nontender, nondistended +ostomy bag with brown stool  EXTREMITIES: no edema, cyanosis, or calf tenderness. + scabs on her legs bilaterally  NERVOUS SYSTEM: Sedated, unresponsive. Shaking has resolved.    LABS:                        10.6   8.46  )-----------( 311      ( 05 Mar 2020 10:46 )             34.1     CBC Full  -  ( 05 Mar 2020 10:46 )  WBC Count : 8.46 K/uL  Hemoglobin : 10.6 g/dL  Hematocrit : 34.1 %  Platelet Count - Automated : 311 K/uL  Mean Cell Volume : 92.4 fl  Mean Cell Hemoglobin : 28.7 pg  Mean Cell Hemoglobin Concentration : 31.1 gm/dL  Auto Neutrophil # : x  Auto Lymphocyte # : x  Auto Monocyte # : x  Auto Eosinophil # : x  Auto Basophil # : x  Auto Neutrophil % : x  Auto Lymphocyte % : x  Auto Monocyte % : x  Auto Eosinophil % : x  Auto Basophil % : x    05 Mar 2020 10:46    146    |  115    |  38     ----------------------------<  132    4.8     |  17     |  4.10     Ca    7.6        05 Mar 2020 10:46  Phos  4.5       05 Mar 2020 10:46  Mg     1.2       05 Mar 2020 10:46    TPro  7.1    /  Alb  3.0    /  TBili  0.2    /  DBili  x      /  AST  21     /  ALT  18     /  AlkPhos  65     05 Mar 2020 10:46        CAPILLARY BLOOD GLUCOSE      POCT Blood Glucose.: 129 mg/dL (05 Mar 2020 10:23)        Culture - Urine (collected 03-03-20 @ 21:30)  Source: .Urine Clean Catch (Midstream)  Preliminary Report (03-04-20 @ 21:35):    >100,000 CFU/ml Gram Negative Rods    Culture - Blood (collected 03-03-20 @ 21:12)  Source: .Blood Blood-Peripheral  Preliminary Report (03-04-20 @ 22:01):    No growth to date.    Culture - Blood (collected 03-03-20 @ 21:10)  Source: .Blood Blood-Peripheral  Preliminary Report (03-04-20 @ 22:01):    No growth to date.        RADIOLOGY & ADDITIONAL TESTS:    EXAM:  CT BRAIN                            PROCEDURE DATE:  03/05/2020          INTERPRETATION:  INDICATION:  Altered mental status  TECHNIQUE:  A non contrast 2.5mm axial CT study of the brain was performed from skull base to vertex. Coronal and sagittal reformations were generated from the axial data.  COMPARISON EXAMINATION:  CT 3/3/2020    FINDINGS:      HEMISPHERES:  This study is degraded by motion. Chronic ischemic changes are again noted in both hemispheres with volume loss. No acute territorial infarct or hemorrhage is noted. Similar findings were noted previously.  VENTRICLES:  Midline and normal in size.  POSTERIOR FOSSA:  The brain stem and cerebellum are unremarkable.  No CP angle lesion noted.  EXTRACEREBRAL SPACES:  No subdural or epidural collections are noted.  SKULL BASE AND CALVARIUM:  Appears intact.  No fracture or destructive lesion is identified.  SINUSES AND MASTOIDS:  Gadolinium thickening noted in the sinuses.  MISCELLANEOUS:  No orbital or suprasellar abnormality noted.      IMPRESSION:    1)  scattered chronic ischemic changes again noted with volume loss. No acute abnormality suggested.  2)  mild thickening in the sinuses without fluid levels.      LYDIA FUNEZ M.D., ATTENDING RADIOLOGIST  This document has been electronically signed. Mar  5 2020 11:07AM      EXAM:  MR ANGIO BRAIN                            PROCEDURE DATE:  03/05/2020          INTERPRETATION:  INDICATION:  Stroke.    TECHNIQUE:  MR angiography of the brain was performed using three dimensional time-of-flight (3D-TOF) technique.  Imaging was performed on a 1.5 Tamera magnet.    FINDINGS:      INTERNAL CAROTID ARTERIES:  Bilaterally patent.  No intraluminal filling defect or dissection seen.    [place] OF [place]:  Tortuosity is noted of both cavernous carotid arteries, right greater than left. There are atherosclerotic changes.    CEREBRAL ARTERIES:  Bilaterally patent.  No segmental narrowing or stenosis.  No changes of vasculopathy.    VERTEBROBASILAR SYSTEM:  No stenosis or occlusion depicted.      IMPRESSION:     Tortuosity of the intracerebral vasculature predominantly the cavernous carotid arteries. No significant stenosis or occlusion.        LYDIA FUNEZ M.D., ATTENDING RADIOLOGIST  This document has been electronically signed. Mar  5 2020  1:15PM      EXAM:  MR BRAIN                            PROCEDURE DATE:  03/05/2020          INTERPRETATION:  CLINICAL STATEMENT: Pain.    TECHNIQUE: MRI of the brain was performed without gadolinium.    COMPARISON: CT head 3/5/2020    FINDINGS:  There is mild diffuse parenchymal volume loss. There are T2 prolongation signal abnormalities in the brainstem, periventricular subcortical white matter likely related to mild chronic microvascular ischemic changes.    There is no acute parenchymal hemorrhage, parenchymal mass, mass effect or midline shift. There is no extra-axial fluid collection.  There is no hydrocephalus.  There is no acute infarct.    Mucosal thickening paranasal sinuses. Partial opacification mastoid air cells    IMPRESSION:  No acute intracranial hemorrhage or acute infarct.        JACKIE HICKS M.D., ATTENDING RADIOLOGIST  This document has been electronically signed. Mar  5 2020  1:20PM      Personally reviewed. y    Consultant(s) Notes Reviewed:  [x] YES  [ ] NO Patient is a 84y old  Female who presents with a chief complaint of altered mental status.      INTERVAL HPI/OVERNIGHT EVENTS: Patient seen and examined at bedside. Earlier in the morning, complaining of not feeling well, pain in suprapubic region of abdomen. RR called for acute LOC with nonresponsiveness and shaking. Pt briefly improved and was responsive to painful stimuli but then had another episode of seizure and was given ativan 2mg IV x1. Patient underwent head CT, MRI, getting EEG. Labs significant for lactate of 5.3 and low magnesium. Discussed with daughter, who states mother's expressive aphasia developed acutely over the weekend as opposed to being more chronic. Patient has no known history of seizure per family. Currently obtunded, not responding to questions.    MEDICATIONS  (STANDING):  amLODIPine   Tablet 10 milliGRAM(s) Oral daily  aspirin enteric coated 81 milliGRAM(s) Oral daily  atorvastatin 10 milliGRAM(s) Oral at bedtime  budesonide 160 MICROgram(s)/formoterol 4.5 MICROgram(s) Inhaler 2 Puff(s) Inhalation two times a day  calcium carbonate 1250 mG  + Vitamin D (OsCal 500 + D) 1 Tablet(s) Oral daily  cholecalciferol 1000 Unit(s) Oral daily  cholestyramine Powder (Sugar-Free) 4 Gram(s) Oral daily  clobetasol 0.05% Cream 1 Application(s) Topical two times a day  epoetin jean carlos Injectable 88104 Unit(s) SubCutaneous <User Schedule>  ferrous    sulfate 325 milliGRAM(s) Oral daily  FLUoxetine 20 milliGRAM(s) Oral daily  heparin  Injectable 5000 Unit(s) SubCutaneous every 8 hours  hydrALAZINE 25 milliGRAM(s) Oral three times a day  iron sucrose Injectable 100 milliGRAM(s) IV Push every 24 hours  lactobacillus acidophilus 1 Tablet(s) Oral three times a day  loratadine 10 milliGRAM(s) Oral daily  metoprolol tartrate 25 milliGRAM(s) Oral two times a day  multivitamin 1 Tablet(s) Oral daily  nystatin Cream 1 Application(s) Topical two times a day  piperacillin/tazobactam IVPB.. 3.375 Gram(s) IV Intermittent every 12 hours  sodium bicarbonate 650 milliGRAM(s) Oral three times a day  sodium chloride 0.45% 1000 milliLiter(s) (75 mL/Hr) IV Continuous <Continuous>    MEDICATIONS  (PRN):  acetaminophen   Tablet .. 650 milliGRAM(s) Oral every 6 hours PRN Mild Pain (1 - 3)  lidocaine   Patch 1 Patch Transdermal daily PRN back pain      Allergies    Levaquin (Pruritus)  mercury (Pruritus; Rash)  sulfa drugs (Pruritus)    Intolerances        REVIEW OF SYSTEMS:  Unable to obtain, patient sedated/obtunded, unresponsive to voice, mildly responsive to painful stimuli    Vital Signs Last 24 Hrs  T(C): 36.9 (05 Mar 2020 04:10), Max: 36.9 (05 Mar 2020 04:10)  T(F): 98.4 (05 Mar 2020 04:10), Max: 98.4 (05 Mar 2020 04:10)  HR: 100 (05 Mar 2020 13:22) (64 - 100)  BP: 175/88 (05 Mar 2020 13:22) (158/73 - 194/77)  BP(mean): --  RR: 16 (05 Mar 2020 04:10) (16 - 16)  SpO2: 99% (05 Mar 2020 04:10) (97% - 99%)    PHYSICAL EXAM:  GENERAL: ill-appearing; respiratory distress; sedated/obtunded, unresponsive to voice, mildly responsive to painful stimuli  HEENT:  anicteric, moist mucous membranes  CHEST/LUNG:  tachypneic, coarse breath sounds bilaterally  HEART:  tachycardic, S1, S2  ABDOMEN:  BS+, soft, no apparent tenderness, nondistended; +ostomy bag with brown stool  EXTREMITIES: no edema, cyanosis, or calf tenderness. + healing scabs on her legs bilaterally  NERVOUS SYSTEM: sedated/obtunded, unresponsive to voice, mildly responsive to painful stimuli    LABS:                        10.6   8.46  )-----------( 311      ( 05 Mar 2020 10:46 )             34.1     CBC Full  -  ( 05 Mar 2020 10:46 )  WBC Count : 8.46 K/uL  Hemoglobin : 10.6 g/dL  Hematocrit : 34.1 %  Platelet Count - Automated : 311 K/uL  Mean Cell Volume : 92.4 fl  Mean Cell Hemoglobin : 28.7 pg  Mean Cell Hemoglobin Concentration : 31.1 gm/dL  Auto Neutrophil # : x  Auto Lymphocyte # : x  Auto Monocyte # : x  Auto Eosinophil # : x  Auto Basophil # : x  Auto Neutrophil % : x  Auto Lymphocyte % : x  Auto Monocyte % : x  Auto Eosinophil % : x  Auto Basophil % : x    05 Mar 2020 10:46    146    |  115    |  38     ----------------------------<  132    4.8     |  17     |  4.10     Ca    7.6        05 Mar 2020 10:46  Phos  4.5       05 Mar 2020 10:46  Mg     1.2       05 Mar 2020 10:46    TPro  7.1    /  Alb  3.0    /  TBili  0.2    /  DBili  x      /  AST  21     /  ALT  18     /  AlkPhos  65     05 Mar 2020 10:46        CAPILLARY BLOOD GLUCOSE      POCT Blood Glucose.: 129 mg/dL (05 Mar 2020 10:23)        Culture - Urine (collected 03-03-20 @ 21:30)  Source: .Urine Clean Catch (Midstream)  Preliminary Report (03-04-20 @ 21:35):    >100,000 CFU/ml Gram Negative Rods    Culture - Blood (collected 03-03-20 @ 21:12)  Source: .Blood Blood-Peripheral  Preliminary Report (03-04-20 @ 22:01):    No growth to date.    Culture - Blood (collected 03-03-20 @ 21:10)  Source: .Blood Blood-Peripheral  Preliminary Report (03-04-20 @ 22:01):    No growth to date.        RADIOLOGY & ADDITIONAL TESTS:    EXAM:  CT BRAIN                            PROCEDURE DATE:  03/05/2020          INTERPRETATION:  INDICATION:  Altered mental status  TECHNIQUE:  A non contrast 2.5mm axial CT study of the brain was performed from skull base to vertex. Coronal and sagittal reformations were generated from the axial data.  COMPARISON EXAMINATION:  CT 3/3/2020    FINDINGS:      HEMISPHERES:  This study is degraded by motion. Chronic ischemic changes are again noted in both hemispheres with volume loss. No acute territorial infarct or hemorrhage is noted. Similar findings were noted previously.  VENTRICLES:  Midline and normal in size.  POSTERIOR FOSSA:  The brain stem and cerebellum are unremarkable.  No CP angle lesion noted.  EXTRACEREBRAL SPACES:  No subdural or epidural collections are noted.  SKULL BASE AND CALVARIUM:  Appears intact.  No fracture or destructive lesion is identified.  SINUSES AND MASTOIDS:  Gadolinium thickening noted in the sinuses.  MISCELLANEOUS:  No orbital or suprasellar abnormality noted.      IMPRESSION:    1)  scattered chronic ischemic changes again noted with volume loss. No acute abnormality suggested.  2)  mild thickening in the sinuses without fluid levels.      LYDIA FUNEZ M.D., ATTENDING RADIOLOGIST  This document has been electronically signed. Mar  5 2020 11:07AM      EXAM:  MR ANGIO BRAIN                            PROCEDURE DATE:  03/05/2020          INTERPRETATION:  INDICATION:  Stroke.    TECHNIQUE:  MR angiography of the brain was performed using three dimensional time-of-flight (3D-TOF) technique.  Imaging was performed on a 1.5 Tamera magnet.    FINDINGS:      INTERNAL CAROTID ARTERIES:  Bilaterally patent.  No intraluminal filling defect or dissection seen.    [place] OF BAKER:  Tortuosity is noted of both cavernous carotid arteries, right greater than left. There are atherosclerotic changes.    CEREBRAL ARTERIES:  Bilaterally patent.  No segmental narrowing or stenosis.  No changes of vasculopathy.    VERTEBROBASILAR SYSTEM:  No stenosis or occlusion depicted.      IMPRESSION:     Tortuosity of the intracerebral vasculature predominantly the cavernous carotid arteries. No significant stenosis or occlusion.        LYDIA FUNEZ M.D., ATTENDING RADIOLOGIST  This document has been electronically signed. Mar  5 2020  1:15PM      EXAM:  MR BRAIN                            PROCEDURE DATE:  03/05/2020          INTERPRETATION:  CLINICAL STATEMENT: Pain.    TECHNIQUE: MRI of the brain was performed without gadolinium.    COMPARISON: CT head 3/5/2020    FINDINGS:  There is mild diffuse parenchymal volume loss. There are T2 prolongation signal abnormalities in the brainstem, periventricular subcortical white matter likely related to mild chronic microvascular ischemic changes.    There is no acute parenchymal hemorrhage, parenchymal mass, mass effect or midline shift. There is no extra-axial fluid collection.  There is no hydrocephalus.  There is no acute infarct.    Mucosal thickening paranasal sinuses. Partial opacification mastoid air cells    IMPRESSION:  No acute intracranial hemorrhage or acute infarct.        JACKIE HICKS M.D., ATTENDING RADIOLOGIST  This document has been electronically signed. Mar  5 2020  1:20PM      Personally reviewed. y    Consultant(s) Notes Reviewed:  [x] YES  [ ] NO

## 2020-03-05 NOTE — CHART NOTE - NSCHARTNOTEFT_GEN_A_CORE
Resident Rapid Response Note    Patient is a 84y old with complicated past medical history admitted for acute metabolic encephalopathy due to UTI and hyperkalemia in setting of MEHDI on CKD 5. Female who presents with a chief complaint of altered mental status (05 Mar 2020 09:40)      Rapid response was called at on this 84y Female patient for change in mental status.  Patient was seen and examined at the bedside by the rapid response team and primary team. ICU [facility] Meehan and Dr. Mayo at bedside.    Rapid Response Vital Signs:  BP: 164/74   HR:   RR:   SpO2:98 % on RA  Temp: 98.7  FS: 129    Vital Signs Last 24 Hrs  T(C): 36.9 (05 Mar 2020 04:10), Max: 36.9 (05 Mar 2020 04:10)  T(F): 98.4 (05 Mar 2020 04:10), Max: 98.4 (05 Mar 2020 04:10)  HR: 66 (05 Mar 2020 04:10) (64 - 68)  BP: 194/77 (05 Mar 2020 04:10) (158/73 - 194/77)  BP(mean): --  RR: 16 (05 Mar 2020 04:10) (16 - 16)  SpO2: 99% (05 Mar 2020 04:10) (97% - 99%)    Physical Exam:  General: Well developed, well nourished, in no acute distress  HEENT: NCAT, PERRLA, EOMI bl, moist mucous membranes   Neck: Supple, nontender, no mass  Neurology: A&Ox3, nonfocal, CN II-XII grossly intact, sensation intact, no gait abnormalities   Respiratory: CTA B/L, No wheezing, rales, or rhonchi  CV: RRR, S1/S2 present, no murmurs, rubs, or gallops  Abdominal: Soft, nontender, non-distended, normoactive bowel sounds  Extremities: No C/C/E, peripheral pulses present  MSK: Normal ROM, no joint erythema or warmth, no joint swelling   Skin: warm, dry, normal color, no obvious rash or abnormal lesions    LABS:                        9.3    5.34  )-----------( 264      ( 05 Mar 2020 08:33 )             30.2     05 Mar 2020 08:33    147    |  116    |  39     ----------------------------<  83     4.6     |  21     |  3.90     Ca    7.3        05 Mar 2020 08:33      PT/INR - ( 03 Mar 2020 13:44 )   PT: 12.7 sec;   INR: 1.13 ratio         PTT - ( 03 Mar 2020 13:44 )  PTT:26.7 sec  Urinalysis Basic - ( 03 Mar 2020 13:29 )    Color: Pink / Appearance: Turbid / S.020 / pH: x  Gluc: x / Ketone: Negative  / Bili: Negative / Urobili: Negative   Blood: x / Protein: 500 mg/dL / Nitrite: Negative   Leuk Esterase: Moderate / RBC: >50 /HPF / WBC 26-50   Sq Epi: x / Non Sq Epi: Occasional / Bacteria: Moderate      CAPILLARY BLOOD GLUCOSE        ABG - ( 03 Mar 2020 16:09 )  pH, Arterial: 7.31  pH, Blood: x     /  pCO2: 31    /  pO2: 87    / HCO3: 17    / Base Excess: -9.7  /  SaO2: 96                  RADIOLOGY & ADDITIONAL TESTS:      Assessment/Plan:  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    Altered mental status  Stat labs orderd: CBC, CMP, lactate, ABG, Mg and Phos  Stat EKG  Stat CT head  Advanced directives reviewed from chart, patient is full code.   -Will continue to follow, RN to call if any changes. Resident Rapid Response Note    Patient is a 84y old with complicated past medical history admitted for acute metabolic encephalopathy due to UTI and hyperkalemia in setting of MEHDI on CKD 5. Female who presents with a chief complaint of altered mental status (05 Mar 2020 09:40)      Rapid response was called at on this 84y Female patient for change in mental status.  Patient was seen and examined at the bedside by the rapid response team and primary team. ICU [facility] and Dr. Mayo at bedside.    Rapid Response Vital Signs:  BP: 164/74   HR:   RR:   SpO2:98 % on RA  Temp: 98.7  FS: 129    Vital Signs Last 24 Hrs  T(C): 36.9 (05 Mar 2020 04:10), Max: 36.9 (05 Mar 2020 04:10)  T(F): 98.4 (05 Mar 2020 04:10), Max: 98.4 (05 Mar 2020 04:10)  HR: 66 (05 Mar 2020 04:10) (64 - 68)  BP: 194/77 (05 Mar 2020 04:10) (158/73 - 194/77)  BP(mean): --  RR: 16 (05 Mar 2020 04:10) (16 - 16)  SpO2: 99% (05 Mar 2020 04:10) (97% - 99%)    Physical Exam:      LABS:                        9.3    5.34  )-----------( 264      ( 05 Mar 2020 08:33 )             30.2     05 Mar 2020 08:33    147    |  116    |  39     ----------------------------<  83     4.6     |  21     |  3.90     Ca    7.3        05 Mar 2020 08:33      PT/INR - ( 03 Mar 2020 13:44 )   PT: 12.7 sec;   INR: 1.13 ratio         PTT - ( 03 Mar 2020 13:44 )  PTT:26.7 sec  Urinalysis Basic - ( 03 Mar 2020 13:29 )    Color: Pink / Appearance: Turbid / S.020 / pH: x  Gluc: x / Ketone: Negative  / Bili: Negative / Urobili: Negative   Blood: x / Protein: 500 mg/dL / Nitrite: Negative   Leuk Esterase: Moderate / RBC: >50 /HPF / WBC 26-50   Sq Epi: x / Non Sq Epi: Occasional / Bacteria: Moderate      CAPILLARY BLOOD GLUCOSE        ABG - ( 03 Mar 2020 16:09 )  pH, Arterial: 7.31  pH, Blood: x     /  pCO2: 31    /  pO2: 87    / HCO3: 17    / Base Excess: -9.7  /  SaO2: 96                  RADIOLOGY & ADDITIONAL TESTS:      Assessment/Plan:  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    Altered mental status  Stat labs orderd: CBC, CMP, lactate, ABG, Mg and Phos  Stat EKG  Stat CT head  Advanced directives reviewed from chart, patient is full code.   -Will continue to follow, RN to call if any changes. Resident Rapid Response Note    Patient is a 84y old with complicated past medical history admitted for acute metabolic encephalopathy due to UTI and hyperkalemia in setting of MEHDI on CKD 5. Female who presents with a chief complaint of altered mental status (05 Mar 2020 09:40)      Rapid response was called at on this 84y Female patient for unresponsiveness.  Patient was seen and examined at the bedside by the rapid response team and primary team. ICU [facility]ee[facility] and Dr. Mayo at bedside. Patient not responding to name. Withdrawing from painful stimuli. Patient has no known history of seizures. RN stated that patient had had some jerky movements overnight, but no changes in mental status at that time.    Rapid Response Vital Signs:  BP: 164/74   HR: 95  RR: 12  SpO2:98 % on RA  Temp: 98.7 rectal  FS: 129    Vital Signs Last 24 Hrs  T(C): 36.9 (05 Mar 2020 04:10), Max: 36.9 (05 Mar 2020 04:10)  T(F): 98.4 (05 Mar 2020 04:10), Max: 98.4 (05 Mar 2020 04:10)  HR: 66 (05 Mar 2020 04:10) (64 - 68)  BP: 194/77 (05 Mar 2020 04:10) (158/73 - 194/77)  BP(mean): --  RR: 16 (05 Mar 2020 04:10) (16 - 16)  SpO2: 99% (05 Mar 2020 04:10) (97% - 99%)    Physical Exam:  Physical Exam:  General: Not responding to voice, left hand/leg shaking.   HEENT: Normocephallic Atraumatic, nystagmus of eyes noted  Neurology: nonresponsive to verbal stimuli, withdraws from pain, nystagmus, shaking left hand and leg  Respiratory: Coarse breath sounds diffusely  CV: Regular Rate and Rhythm, +S1/S2, no murmurs, rubs or gallops  Abdominal: Soft, Non-Distended   Extremities: scabs on bilateral legs      LABS:                        9.3    5.34  )-----------( 264      ( 05 Mar 2020 08:33 )             30.2     05 Mar 2020 08:33    147    |  116    |  39     ----------------------------<  83     4.6     |  21     |  3.90     Ca    7.3        05 Mar 2020 08:33      PT/INR - ( 03 Mar 2020 13:44 )   PT: 12.7 sec;   INR: 1.13 ratio         PTT - ( 03 Mar 2020 13:44 )  PTT:26.7 sec  Urinalysis Basic - ( 03 Mar 2020 13:29 )    Color: Pink / Appearance: Turbid / S.020 / pH: x  Gluc: x / Ketone: Negative  / Bili: Negative / Urobili: Negative   Blood: x / Protein: 500 mg/dL / Nitrite: Negative   Leuk Esterase: Moderate / RBC: >50 /HPF / WBC 26-50   Sq Epi: x / Non Sq Epi: Occasional / Bacteria: Moderate      CAPILLARY BLOOD GLUCOSE        ABG - ( 03 Mar 2020 16:09 )  pH, Arterial: 7.31  pH, Blood: x     /  pCO2: 31    /  pO2: 87    / HCO3: 17    / Base Excess: -9.7  /  SaO2: 96                  RADIOLOGY & ADDITIONAL TESTS:  EXAM:  CT BRAIN                            PROCEDURE DATE:  2020          INTERPRETATION:  INDICATION:  Altered mental status  TECHNIQUE:  A non contrast 2.5mm axial CT study of the brain was performed from skull base to vertex. Coronal and sagittal reformations were generated from the axial data.  COMPARISON EXAMINATION:  CT 3/3/2020    FINDINGS:      HEMISPHERES:  This study is degraded by motion. Chronic ischemic changes are again noted in both hemispheres with volume loss. No acute territorial infarct or hemorrhage is noted. Similar findings were noted previously.  VENTRICLES:  Midline and normal in size.  POSTERIOR FOSSA:  The brain stem and cerebellum are unremarkable.  No CP angle lesion noted.  EXTRACEREBRAL SPACES:  No subdural or epidural collections are noted.  SKULL BASE AND CALVARIUM:  Appears intact.  No fracture or destructive lesion is identified.  SINUSES AND MASTOIDS:  Gadolinium thickening noted in the sinuses.  MISCELLANEOUS:  No orbital or suprasellar abnormality noted.      IMPRESSION:    1)  scattered chronic ischemic changes again noted with volume loss. No acute abnormality suggested.  2)  mild thickening in the sinuses without fluid levels.                  LYDIA FUNEZ M.D., ATTENDING RADIOLOGIST  This document has been electronically signed. 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 Rapid called for nonresponsiveness likely 2/2 seizure vs CVA.    - Stat labs orderd: CBC, CMP, lactate, ABG, Mg and Phos:  - significant for slightly worsened creatinine of 4.1  - Magnesium 1.2, phos 4.5  - Stat EKG showed SR with no significant ST elevations, depressions or T wave changes from EKG from admission  - Lactate -  5.3  - Stat CT head showed chronic ischemic changes, no acute events    - Jerky movements resumed while coming back from CT, 2 mg IV   - Dr. Mayo aware  - patient's daughter called history reviewed  Advanced directives reviewed from chart, patient is full code. Resident Rapid Response Note    Patient is a 84y old with complicated past medical history admitted for acute metabolic encephalopathy due to UTI and hyperkalemia in setting of MEHDI on CKD 5. Female who presents with a chief complaint of altered mental status (05 Mar 2020 09:40)      Rapid response was called at on this 84y Female patient for unresponsiveness.  Patient was seen and examined at the bedside by the rapid response team and primary team. ICU [facility] and Dr. Mayo at bedside. Patient not responding to name. Withdrawing from painful stimuli. Patient has no known history of seizures. RN stated that patient had had some jerky movements overnight, but no changes in mental status at that time. Patient has had expressive aphasia for duration of the hospital stay, but is able to produce some speech.    Rapid Response Vital Signs:  BP: 164/74   HR: 95  RR: 12  SpO2:98 % on RA  Temp: 98.7 rectal  FS: 129    Vital Signs Last 24 Hrs  T(C): 36.9 (05 Mar 2020 04:10), Max: 36.9 (05 Mar 2020 04:10)  T(F): 98.4 (05 Mar 2020 04:10), Max: 98.4 (05 Mar 2020 04:10)  HR: 66 (05 Mar 2020 04:10) (64 - 68)  BP: 194/77 (05 Mar 2020 04:10) (158/73 - 194/77)  BP(mean): --  RR: 16 (05 Mar 2020 04:10) (16 - 16)  SpO2: 99% (05 Mar 2020 04:10) (97% - 99%)    Physical Exam:  Physical Exam:  General: Not responding to voice, left hand/leg shaking.   HEENT: Normocephallic Atraumatic, nystagmus of eyes noted  Neurology: nonresponsive to verbal stimuli, withdraws from pain, nystagmus, shaking left hand and leg  Respiratory: Coarse breath sounds diffusely  CV: Regular Rate and Rhythm, +S1/S2, no murmurs, rubs or gallops  Abdominal: Soft, Non-Distended   Extremities: scabs on bilateral legs      LABS:                        9.3    5.34  )-----------( 264      ( 05 Mar 2020 08:33 )             30.2     05 Mar 2020 08:33    147    |  116    |  39     ----------------------------<  83     4.6     |  21     |  3.90     Ca    7.3        05 Mar 2020 08:33      PT/INR - ( 03 Mar 2020 13:44 )   PT: 12.7 sec;   INR: 1.13 ratio         PTT - ( 03 Mar 2020 13:44 )  PTT:26.7 sec  Urinalysis Basic - ( 03 Mar 2020 13:29 )    Color: Pink / Appearance: Turbid / S.020 / pH: x  Gluc: x / Ketone: Negative  / Bili: Negative / Urobili: Negative   Blood: x / Protein: 500 mg/dL / Nitrite: Negative   Leuk Esterase: Moderate / RBC: >50 /HPF / WBC 26-50   Sq Epi: x / Non Sq Epi: Occasional / Bacteria: Moderate      CAPILLARY BLOOD GLUCOSE        ABG - ( 03 Mar 2020 16:09 )  pH, Arterial: 7.31  pH, Blood: x     /  pCO2: 31    /  pO2: 87    / HCO3: 17    / Base Excess: -9.7  /  SaO2: 96                  RADIOLOGY & ADDITIONAL TESTS:  EXAM:  CT BRAIN                            PROCEDURE DATE:  2020          INTERPRETATION:  INDICATION:  Altered mental status  TECHNIQUE:  A non contrast 2.5mm axial CT study of the brain was performed from skull base to vertex. Coronal and sagittal reformations were generated from the axial data.  COMPARISON EXAMINATION:  CT 3/3/2020    FINDINGS:      HEMISPHERES:  This study is degraded by motion. Chronic ischemic changes are again noted in both hemispheres with volume loss. No acute territorial infarct or hemorrhage is noted. Similar findings were noted previously.  VENTRICLES:  Midline and normal in size.  POSTERIOR FOSSA:  The brain stem and cerebellum are unremarkable.  No CP angle lesion noted.  EXTRACEREBRAL SPACES:  No subdural or epidural collections are noted.  SKULL BASE AND CALVARIUM:  Appears intact.  No fracture or destructive lesion is identified.  SINUSES AND MASTOIDS:  Gadolinium thickening noted in the sinuses.  MISCELLANEOUS:  No orbital or suprasellar abnormality noted.      IMPRESSION:    1)  scattered chronic ischemic changes again noted with volume loss. No acute abnormality suggested.  2)  mild thickening in the sinuses without fluid levels.                  LYDIA FUNEZ M.D., ATTENDING RADIOLOGIST  This document has been electronically signed. 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 Patient has been hypertensive throughout hospitalization. Rapid called for nonresponsiveness likely 2/2 seizure vs CVA.    Daughter called for further history. States that her mother's word finding difficulty began several days ago, which prompted her to bring her to the hospital. Prior to this, she was talking normally. Denies any known history of stroke or seizures.     - Stat labs orderd: CBC, CMP, lactate, ABG, Mg and Phos:  - significant for slightly worsened creatinine of 4.1  - Magnesium 1.2, phos 4.5  - Stat EKG showed SR with no significant ST elevations, depressions or T wave changes from EKG from admission  - Lactate -  5.3  - Stat CT head showed chronic ischemic changes, no acute events    - Jerky movements resumed while coming back from CT, 2 mg IV ativan given  - Presentation likely 2/2 to seizure 2/2 to CVA that occurred several days ago. This would explain her persistent hypertension and new onset aphasia.  - MRI brain ordered  - neuro, Dr. Quintanilla, to see  Advanced directives reviewed from chart, patient is full code.

## 2020-03-05 NOTE — PROGRESS NOTE ADULT - PROBLEM SELECTOR PLAN 1
- with shaking  - CT head, MRI brain showed no acute infarct or hemorrhage  - follow up EEG results  - lactate elevated, continue IVF, follow up repeat lactate  - Ativan PRN for status epilepticus  - neuro, Dr. Quintanilla, to see - with shaking  - CT head, MRI brain showed no acute infarct or hemorrhage  - follow up EEG results  - lactate elevated, continue IVF, follow up repeat lactate  - Ativan PRN for status epilepticus  - neuro, Dr. Quintanilla, to see  - ICU consult for signs of increased WOB in setting of worsening AMS - unresponsive episode with shaking and spike in lactic acidosis this morning likely a generalized seizure  - CT head, MRI brain showed no acute infarct or hemorrhage  - EEG no sign of seizure, but performed after pt received ativan, so that can mask findings  - lactate of 5.2, continue IVF, follow up repeat lactate  - neuro, Dr. Quintanilla, consulted, will start pt on depakote  - ICU consult for increased work of breathing and worsening encephalopathy making it contraindicated to use BiPAP / inability to protect airway

## 2020-03-05 NOTE — PROGRESS NOTE ADULT - PROBLEM SELECTOR PLAN 6
- CXR showing questionable infiltrate of upper lobes  - possible upper lobe infiltrates on CXR  - Patient afebrile, no leukocytosis, no respiratory symptoms, procalcitonin low  - Continue zosyn, renal dosing as above  - f/u CXR in 4-6 weeks

## 2020-03-05 NOTE — PROGRESS NOTE ADULT - ATTENDING COMMENTS
Pt seen + examined. Case discussed with resident. Agree with assessment and plan above (edited) with following addendum:  Time spent: 75min of critical care provided. More than 50% of the visit was spent counseling the patient's family on medical condition -- acute generalized seizure, post-ictal state, sepsis, suspected aspiration PNA, acute hypoxic resp failure, intubation.    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) and chronic diarrhea, cervical cancer (s/p radical hysterectomy and radiation), tremors, eczema, depression, HTN, HLD, history of frequent UTIs with chronic indwelling renae, anemia, stage 4 sacral decubitus ulcer, and recent hospitalization for MEHDI on CKD thought to be 2/2 dehydration and urinary retention from malfunctioning chronic renae, now brought in by EMS to ED for altered mental status admitted for acute metabolic encephalopathy due to suspected UTI and hyperkalemia in setting of MEHDI on CKD 5, course c/b likely generalized seizure on 3/5/20, and acute hypoxic respiratory failure and sepsis due to suspected aspiration PNA, now intubated.  - unresponsive episode with shaking and spike in lactic acidosis this morning likely a generalized seizure  - CT head, MRI brain showed no acute infarct or hemorrhage  - EEG no sign of seizure, but performed after pt received ativan, so that can mask findings  - lactate of 5.2, continue IVF, repeat lactate normalized   - neuro, Dr. Quintanilla, consulted, started pt on depakote  - ICU consult for increased work of breathing and worsening encephalopathy making it contraindicated to use BiPAP / inability to protect airway  - pt spiked fever, suspect aspiration PNA (CXR read as clear by radiology, but on personal review, feel there is some opacification of diaphragms L>R compared to 3/3 CXR), will continue with zosyn for now, draw BCx and follow up ID recs  - pt now transferred to the ICU and intubated.  - UCx growing >100K E coli sensitive to zosyn (though unclear if this urine is just colonized from chronic renae).  - metabolic acidosis had been improving with bicarb supplementation as per nephro recs  - monitor BMP

## 2020-03-06 ENCOUNTER — TRANSCRIPTION ENCOUNTER (OUTPATIENT)
Age: 85
End: 2020-03-06

## 2020-03-06 DIAGNOSIS — R41.82 ALTERED MENTAL STATUS, UNSPECIFIED: ICD-10-CM

## 2020-03-06 DIAGNOSIS — A41.9 SEPSIS, UNSPECIFIED ORGANISM: ICD-10-CM

## 2020-03-06 DIAGNOSIS — R50.9 FEVER, UNSPECIFIED: ICD-10-CM

## 2020-03-06 LAB
ALBUMIN SERPL ELPH-MCNC: 2.9 G/DL — LOW (ref 3.3–5)
ALP SERPL-CCNC: 64 U/L — SIGNIFICANT CHANGE UP (ref 40–120)
ALT FLD-CCNC: 23 U/L — SIGNIFICANT CHANGE UP (ref 12–78)
ANION GAP SERPL CALC-SCNC: 14 MMOL/L — SIGNIFICANT CHANGE UP (ref 5–17)
AST SERPL-CCNC: 28 U/L — SIGNIFICANT CHANGE UP (ref 15–37)
BASOPHILS # BLD AUTO: 0.02 K/UL — SIGNIFICANT CHANGE UP (ref 0–0.2)
BASOPHILS NFR BLD AUTO: 0.2 % — SIGNIFICANT CHANGE UP (ref 0–2)
BILIRUB SERPL-MCNC: 0.4 MG/DL — SIGNIFICANT CHANGE UP (ref 0.2–1.2)
BUN SERPL-MCNC: 45 MG/DL — HIGH (ref 7–23)
CALCIUM SERPL-MCNC: 7.4 MG/DL — LOW (ref 8.5–10.1)
CHLORIDE SERPL-SCNC: 113 MMOL/L — HIGH (ref 96–108)
CO2 SERPL-SCNC: 16 MMOL/L — LOW (ref 22–31)
CREAT SERPL-MCNC: 4.5 MG/DL — HIGH (ref 0.5–1.3)
EOSINOPHIL # BLD AUTO: 0 K/UL — SIGNIFICANT CHANGE UP (ref 0–0.5)
EOSINOPHIL NFR BLD AUTO: 0 % — SIGNIFICANT CHANGE UP (ref 0–6)
GLUCOSE SERPL-MCNC: 156 MG/DL — HIGH (ref 70–99)
HCT VFR BLD CALC: 33.2 % — LOW (ref 34.5–45)
HGB BLD-MCNC: 10.3 G/DL — LOW (ref 11.5–15.5)
IMM GRANULOCYTES NFR BLD AUTO: 0.4 % — SIGNIFICANT CHANGE UP (ref 0–1.5)
LYMPHOCYTES # BLD AUTO: 0.91 K/UL — LOW (ref 1–3.3)
LYMPHOCYTES # BLD AUTO: 8.5 % — LOW (ref 13–44)
MAGNESIUM SERPL-MCNC: 2 MG/DL — SIGNIFICANT CHANGE UP (ref 1.6–2.6)
MCHC RBC-ENTMCNC: 28.4 PG — SIGNIFICANT CHANGE UP (ref 27–34)
MCHC RBC-ENTMCNC: 31 GM/DL — LOW (ref 32–36)
MCV RBC AUTO: 91.5 FL — SIGNIFICANT CHANGE UP (ref 80–100)
MONOCYTES # BLD AUTO: 0.23 K/UL — SIGNIFICANT CHANGE UP (ref 0–0.9)
MONOCYTES NFR BLD AUTO: 2.2 % — SIGNIFICANT CHANGE UP (ref 2–14)
NEUTROPHILS # BLD AUTO: 9.46 K/UL — HIGH (ref 1.8–7.4)
NEUTROPHILS NFR BLD AUTO: 88.7 % — HIGH (ref 43–77)
NRBC # BLD: 0 /100 WBCS — SIGNIFICANT CHANGE UP (ref 0–0)
PHOSPHATE SERPL-MCNC: 4.4 MG/DL — SIGNIFICANT CHANGE UP (ref 2.5–4.5)
PLATELET # BLD AUTO: 242 K/UL — SIGNIFICANT CHANGE UP (ref 150–400)
POTASSIUM SERPL-MCNC: 5.4 MMOL/L — HIGH (ref 3.5–5.3)
POTASSIUM SERPL-SCNC: 5.4 MMOL/L — HIGH (ref 3.5–5.3)
PROT SERPL-MCNC: 7 G/DL — SIGNIFICANT CHANGE UP (ref 6–8.3)
RBC # BLD: 3.63 M/UL — LOW (ref 3.8–5.2)
RBC # FLD: 14.9 % — HIGH (ref 10.3–14.5)
SODIUM SERPL-SCNC: 143 MMOL/L — SIGNIFICANT CHANGE UP (ref 135–145)
WBC # BLD: 10.66 K/UL — HIGH (ref 3.8–10.5)
WBC # FLD AUTO: 10.66 K/UL — HIGH (ref 3.8–10.5)

## 2020-03-06 PROCEDURE — 99233 SBSQ HOSP IP/OBS HIGH 50: CPT | Mod: GC

## 2020-03-06 PROCEDURE — 74176 CT ABD & PELVIS W/O CONTRAST: CPT | Mod: 26

## 2020-03-06 PROCEDURE — 99291 CRITICAL CARE FIRST HOUR: CPT

## 2020-03-06 RX ORDER — ACETAMINOPHEN 500 MG
650 TABLET ORAL EVERY 6 HOURS
Refills: 0 | Status: DISCONTINUED | OUTPATIENT
Start: 2020-03-06 | End: 2020-03-21

## 2020-03-06 RX ORDER — MEROPENEM 1 G/30ML
500 INJECTION INTRAVENOUS EVERY 24 HOURS
Refills: 0 | Status: DISCONTINUED | OUTPATIENT
Start: 2020-03-07 | End: 2020-03-10

## 2020-03-06 RX ORDER — MEROPENEM 1 G/30ML
500 INJECTION INTRAVENOUS EVERY 12 HOURS
Refills: 0 | Status: DISCONTINUED | OUTPATIENT
Start: 2020-03-06 | End: 2020-03-06

## 2020-03-06 RX ORDER — IOHEXOL 300 MG/ML
15 INJECTION, SOLUTION INTRAVENOUS
Refills: 0 | Status: COMPLETED | OUTPATIENT
Start: 2020-03-06 | End: 2020-03-06

## 2020-03-06 RX ORDER — SODIUM CHLORIDE 9 MG/ML
1000 INJECTION, SOLUTION INTRAVENOUS ONCE
Refills: 0 | Status: DISCONTINUED | OUTPATIENT
Start: 2020-03-06 | End: 2020-03-06

## 2020-03-06 RX ORDER — SODIUM POLYSTYRENE SULFONATE 4.1 MEQ/G
30 POWDER, FOR SUSPENSION ORAL ONCE
Refills: 0 | Status: COMPLETED | OUTPATIENT
Start: 2020-03-06 | End: 2020-03-06

## 2020-03-06 RX ADMIN — IOHEXOL 15 MILLILITER(S): 300 INJECTION, SOLUTION INTRAVENOUS at 16:50

## 2020-03-06 RX ADMIN — Medication 25 MILLIGRAM(S): at 05:24

## 2020-03-06 RX ADMIN — NYSTATIN CREAM 1 APPLICATION(S): 100000 CREAM TOPICAL at 17:04

## 2020-03-06 RX ADMIN — IRON SUCROSE 100 MILLIGRAM(S): 20 INJECTION, SOLUTION INTRAVENOUS at 17:01

## 2020-03-06 RX ADMIN — HEPARIN SODIUM 5000 UNIT(S): 5000 INJECTION INTRAVENOUS; SUBCUTANEOUS at 05:24

## 2020-03-06 RX ADMIN — PROPOFOL 3.26 MICROGRAM(S)/KG/MIN: 10 INJECTION, EMULSION INTRAVENOUS at 04:17

## 2020-03-06 RX ADMIN — Medication 50 MILLIGRAM(S): at 13:01

## 2020-03-06 RX ADMIN — Medication 650 MILLIGRAM(S): at 13:01

## 2020-03-06 RX ADMIN — Medication 1 TABLET(S): at 22:39

## 2020-03-06 RX ADMIN — Medication 650 MILLIGRAM(S): at 04:59

## 2020-03-06 RX ADMIN — AMLODIPINE BESYLATE 10 MILLIGRAM(S): 2.5 TABLET ORAL at 05:24

## 2020-03-06 RX ADMIN — Medication 50 MILLIGRAM(S): at 05:24

## 2020-03-06 RX ADMIN — Medication 650 MILLIGRAM(S): at 05:24

## 2020-03-06 RX ADMIN — DEXMEDETOMIDINE HYDROCHLORIDE IN 0.9% SODIUM CHLORIDE 6.8 MICROGRAM(S)/KG/HR: 4 INJECTION INTRAVENOUS at 12:38

## 2020-03-06 RX ADMIN — CHLORHEXIDINE GLUCONATE 15 MILLILITER(S): 213 SOLUTION TOPICAL at 17:01

## 2020-03-06 RX ADMIN — ATORVASTATIN CALCIUM 10 MILLIGRAM(S): 80 TABLET, FILM COATED ORAL at 22:39

## 2020-03-06 RX ADMIN — Medication 27.5 MILLIGRAM(S): at 06:39

## 2020-03-06 RX ADMIN — Medication 1000 UNIT(S): at 12:52

## 2020-03-06 RX ADMIN — LORATADINE 10 MILLIGRAM(S): 10 TABLET ORAL at 12:52

## 2020-03-06 RX ADMIN — Medication 20 MILLIGRAM(S): at 12:52

## 2020-03-06 RX ADMIN — Medication 650 MILLIGRAM(S): at 22:39

## 2020-03-06 RX ADMIN — MEROPENEM 100 MILLIGRAM(S): 1 INJECTION INTRAVENOUS at 02:28

## 2020-03-06 RX ADMIN — Medication 1 TABLET(S): at 12:51

## 2020-03-06 RX ADMIN — Medication 27.5 MILLIGRAM(S): at 17:02

## 2020-03-06 RX ADMIN — HEPARIN SODIUM 5000 UNIT(S): 5000 INJECTION INTRAVENOUS; SUBCUTANEOUS at 13:01

## 2020-03-06 RX ADMIN — Medication 325 MILLIGRAM(S): at 12:52

## 2020-03-06 RX ADMIN — Medication 1 TABLET(S): at 13:01

## 2020-03-06 RX ADMIN — IOHEXOL 15 MILLILITER(S): 300 INJECTION, SOLUTION INTRAVENOUS at 16:01

## 2020-03-06 RX ADMIN — ERYTHROPOIETIN 10000 UNIT(S): 10000 INJECTION, SOLUTION INTRAVENOUS; SUBCUTANEOUS at 12:50

## 2020-03-06 RX ADMIN — Medication 25 MILLIGRAM(S): at 17:04

## 2020-03-06 RX ADMIN — HEPARIN SODIUM 5000 UNIT(S): 5000 INJECTION INTRAVENOUS; SUBCUTANEOUS at 22:39

## 2020-03-06 RX ADMIN — Medication 81 MILLIGRAM(S): at 12:51

## 2020-03-06 RX ADMIN — DEXMEDETOMIDINE HYDROCHLORIDE IN 0.9% SODIUM CHLORIDE 6.8 MICROGRAM(S)/KG/HR: 4 INJECTION INTRAVENOUS at 05:36

## 2020-03-06 RX ADMIN — FENTANYL CITRATE 50 MICROGRAM(S): 50 INJECTION INTRAVENOUS at 00:01

## 2020-03-06 RX ADMIN — Medication 1 APPLICATION(S): at 17:04

## 2020-03-06 RX ADMIN — NYSTATIN CREAM 1 APPLICATION(S): 100000 CREAM TOPICAL at 05:25

## 2020-03-06 RX ADMIN — MEROPENEM 100 MILLIGRAM(S): 1 INJECTION INTRAVENOUS at 13:02

## 2020-03-06 RX ADMIN — CHLORHEXIDINE GLUCONATE 15 MILLILITER(S): 213 SOLUTION TOPICAL at 05:24

## 2020-03-06 RX ADMIN — DEXMEDETOMIDINE HYDROCHLORIDE IN 0.9% SODIUM CHLORIDE 6.8 MICROGRAM(S)/KG/HR: 4 INJECTION INTRAVENOUS at 20:18

## 2020-03-06 RX ADMIN — PROPOFOL 3.26 MICROGRAM(S)/KG/MIN: 10 INJECTION, EMULSION INTRAVENOUS at 11:07

## 2020-03-06 RX ADMIN — PANTOPRAZOLE SODIUM 40 MILLIGRAM(S): 20 TABLET, DELAYED RELEASE ORAL at 12:52

## 2020-03-06 RX ADMIN — CHOLESTYRAMINE 4 GRAM(S): 4 POWDER, FOR SUSPENSION ORAL at 11:09

## 2020-03-06 RX ADMIN — Medication 1 TABLET(S): at 05:24

## 2020-03-06 RX ADMIN — Medication 650 MILLIGRAM(S): at 04:29

## 2020-03-06 RX ADMIN — DEXMEDETOMIDINE HYDROCHLORIDE IN 0.9% SODIUM CHLORIDE 6.8 MICROGRAM(S)/KG/HR: 4 INJECTION INTRAVENOUS at 00:29

## 2020-03-06 RX ADMIN — SODIUM POLYSTYRENE SULFONATE 30 GRAM(S): 4.1 POWDER, FOR SUSPENSION ORAL at 09:24

## 2020-03-06 RX ADMIN — Medication 1 APPLICATION(S): at 05:25

## 2020-03-06 NOTE — DISCHARGE NOTE PROVIDER - NSDCCPCAREPLAN_GEN_ALL_CORE_FT
PRINCIPAL DISCHARGE DIAGNOSIS  Diagnosis: UTI (urinary tract infection)  Assessment and Plan of Treatment: Completed course of antibiotics  F/u with Urologist as needd      SECONDARY DISCHARGE DIAGNOSES  Diagnosis: Seizure disorder  Assessment and Plan of Treatment: Continue Depakene as prescribed  F/u with Neurologist    Diagnosis: AMS (altered mental status)  Assessment and Plan of Treatment: Suspect Encephalopathy, multifactorial , ICU delirium, meds ( Dilantin stopped), dehydration due to poor oral intake , causing hypernatremia.  Mental status improved, awake , alert but confused at times, likely has dementia.    Diagnosis: Hyperkalemia  Assessment and Plan of Treatment: Resolved  Monitor level PRINCIPAL DISCHARGE DIAGNOSIS  Diagnosis: UTI (urinary tract infection)  Assessment and Plan of Treatment: Completed course of antibiotics  Patient dischared on home hospice      SECONDARY DISCHARGE DIAGNOSES  Diagnosis: Seizure disorder  Assessment and Plan of Treatment: Continue Depakene as prescribed  Patient dischared on home hospice    Diagnosis: AMS (altered mental status)  Assessment and Plan of Treatment: Suspect Encephalopathy, multifactorial , ICU delirium, meds ( Dilantin stopped), dehydration due to poor oral intake , causing hypernatremia.  Patient dischared on home hospice

## 2020-03-06 NOTE — PROGRESS NOTE ADULT - PROBLEM SELECTOR PLAN 7
- Tylenol PRN  - does not appear actively infected currently  - Wound care RN, recs appreciated   - Aquacel dressing every other day  - Wound care, Dr. Blas, recs appreciated

## 2020-03-06 NOTE — PROGRESS NOTE ADULT - ATTENDING COMMENTS
Pt seen + examined. Case discussed with resident. Agree with assessment and plan above (edited) with following addendum:  Time spent: 45min. More than 50% of the visit was spent counseling the patient's family on medical condition -- acute generalized seizure, post-ictal state, sepsis, acute hypoxic resp failure, intubation, infectious work-up.    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) and chronic diarrhea, cervical cancer (s/p radical hysterectomy and radiation), tremors, eczema, depression, HTN, HLD, history of frequent UTIs with chronic indwelling renae, anemia, stage 4 sacral decubitus ulcer, and recent hospitalization for MEHDI on CKD thought to be 2/2 dehydration and urinary retention from malfunctioning chronic renae, now brought in by EMS to ED for altered mental status admitted for acute metabolic encephalopathy due to suspected UTI and hyperkalemia in setting of MEHDI on CKD 5, course c/b likely generalized seizure on 3/5/20, and acute hypoxic respiratory failure and suspected sepsis, now intubated.  - unresponsive episode with shaking and spike in lactic acidosis yesterday, consistent with seizure  - CT head, MRI brain showed no acute infarct or hemorrhage  - EEG no sign of seizure, but performed after pt received ativan, so that can mask findings.   - Per neuro, will repeat EEG this weekend if signs/symptoms of seizure activity present  - continue depakote  - neuro, Dr. Quintanilla, recs appreciated  - seizure precautions  - Patient has chronic renae, hx of reported frequent UTI  - persistently febrile overnight (had not been febrile for several days prior to seizures)  - antibiotic coverage broadened to meropenem, with urine culture now growing E. coli and enterobact aerogenes, the latter of which has only intermediate sensitivity to zosyn  -albeit small (<1%) chance, meropenem can lower the seizure threshold; discuss with ID if alternatives may be given   - UA positive for leuk esterase, bacteria, but unclear significance in setting of chronic renae  - had previously been afebrile, with no leukocytosis  - Renae replaced in ED  - on admission it was suspected that AMS was due to UTI, though now feel higher likelihood it was related to seizure activity (possibly in partial seizures prior to the generalized one pt had on 3/5)  - Further information from daughter reveals that patient's AMS was actually expressive aphasia that began this weekend  - procalcitonin was low at .12  - admission blood culture NGTD; f/up repeat BCx from 3/5  - ID consult, Dr. Kirk group, recs appreciated  - CT Abd/P ordered by ICU to eval for other possibilities of acute infection given pt had persistent fever overnight  - discuss with neuro, if possible fevers were related to the seizure activity or if concern for other central process

## 2020-03-06 NOTE — PROGRESS NOTE ADULT - PROBLEM SELECTOR PLAN 6
- CXR showing questionable infiltrate of upper lobes  - possible upper lobe infiltrates on CXR  - Patient afebrile, no leukocytosis, no respiratory symptoms, procalcitonin low  - Continue zosyn, renal dosing as above  - f/u CXR in 4-6 weeks - CXR showing questionable infiltrate of upper lobes  - CXR from time of RR for AMS showed increasing haziness of LLL. Concern for aspiration in the setting of seizure  - Would consider repeating CXR   - continue meropenem - CXR showing questionable infiltrate of upper lobes  - CXR from time of RR for AMS showed increasing haziness of left base. Concern for aspiration in the setting of seizure  - Would consider repeating CXR or check a CT  - continue meropenem

## 2020-03-06 NOTE — PROGRESS NOTE ADULT - ATTENDING COMMENTS
83 yo F with Hx CDK5, chronic renae and frequent UTIs, stage 4 sacral wound with recent osteomyelitis, dementia admitted 3/3 with confusion and suspected sepsis from UTI.  On 3/5 she had an episode of sz activity, followed by progressive respiratory failure.      --sedated with propofol and precedex  fentanyl prn for pain  continue depakote for sz activity, unclear trigger for the new sz  --htn, continue current lopressor, norvasc, hydralazine  continue ASA and statin  check echo  --acute respiratory failure in the setting of seizure/postictal state/ativan  full vent support for now  --CKD stage 5 is stable  hyperkalemia, treat with kayexelate  continue bicarb and epo  --start TF  --unclear what source of infection may be, UCx with E. coli and Enterobacter but unclear if true infection in setting of chronic renae  check CT a/p with PO contrast   --plan discussed with pt's daughter Rosy.  We discussed current diagnosis, prognosis.  Also discussed DNR.  She will consider it with her family  --pt critically ill.  CC time 50min

## 2020-03-06 NOTE — PROGRESS NOTE ADULT - SUBJECTIVE AND OBJECTIVE BOX
Neurology follow up note    GURJIT [id number]      Interval History:    Patient events noted intubated now on sedation     MEDICATIONS    acetaminophen    Suspension .. 650 milliGRAM(s) Oral every 6 hours PRN  amLODIPine   Tablet 10 milliGRAM(s) Oral daily  aspirin enteric coated 81 milliGRAM(s) Oral daily  atorvastatin 10 milliGRAM(s) Oral at bedtime  budesonide 160 MICROgram(s)/formoterol 4.5 MICROgram(s) Inhaler 2 Puff(s) Inhalation two times a day  calcium carbonate 1250 mG  + Vitamin D (OsCal 500 + D) 1 Tablet(s) Oral daily  chlorhexidine 0.12% Liquid 15 milliLiter(s) Oral Mucosa every 12 hours  cholecalciferol 1000 Unit(s) Oral daily  cholestyramine Powder (Sugar-Free) 4 Gram(s) Oral daily  clobetasol 0.05% Cream 1 Application(s) Topical two times a day  dexMEDEtomidine Infusion 0.5 MICROgram(s)/kG/Hr IV Continuous <Continuous>  epoetin jean carlos Injectable 03019 Unit(s) SubCutaneous <User Schedule>  fentaNYL    Injectable 50 MICROGram(s) IV Push every 6 hours PRN  ferrous    sulfate 325 milliGRAM(s) Oral daily  FLUoxetine 20 milliGRAM(s) Oral daily  heparin  Injectable 5000 Unit(s) SubCutaneous every 8 hours  hydrALAZINE 50 milliGRAM(s) Oral every 8 hours  iron sucrose Injectable 100 milliGRAM(s) IV Push every 24 hours  lactated ringers. 1000 milliLiter(s) IV Continuous <Continuous>  lactobacillus acidophilus 1 Tablet(s) Oral three times a day  lidocaine   Patch 1 Patch Transdermal daily PRN  loratadine 10 milliGRAM(s) Oral daily  meropenem  IVPB 500 milliGRAM(s) IV Intermittent every 12 hours  metoprolol tartrate 25 milliGRAM(s) Oral two times a day  multivitamin 1 Tablet(s) Oral daily  nystatin Cream 1 Application(s) Topical two times a day  pantoprazole  Injectable 40 milliGRAM(s) IV Push daily  propofol Infusion 10 MICROgram(s)/kG/Min IV Continuous <Continuous>  sodium bicarbonate 650 milliGRAM(s) Oral three times a day  valproate sodium IVPB 500 milliGRAM(s) IV Intermittent every 12 hours      Allergies    Levaquin (Pruritus)  mercury (Pruritus; Rash)  sulfa drugs (Pruritus)    Intolerances        Height (cm): 162.6 (03-05 @ 19:40)    Vital Signs Last 24 Hrs  T(C): 36.8 (06 Mar 2020 08:00), Max: 39.2 (05 Mar 2020 20:00)  T(F): 98.3 (06 Mar 2020 08:00), Max: 102.6 (05 Mar 2020 20:00)  HR: 80 (06 Mar 2020 08:00) (74 - 118)  BP: 100/77 (06 Mar 2020 08:00) (92/50 - 196/94)  BP(mean): 85 (06 Mar 2020 08:00) (62 - 135)  RR: 24 (06 Mar 2020 08:00) (16 - 95)  SpO2: 99% (06 Mar 2020 08:00) (97% - 100%)      REVIEW OF SYSTEMS: intubated--sedation         On Neurological Examination:    Mental Status - Patient is sedated Unresponsive  .     Does not follow commands    Speech -   intubated                   Cranial Nerves - Pupils pinpoint   extraocular eye movements intact.   smile symmetric  intact bilateral NLF    Motor Exam -     With stimuli positive movement of all 4 extremities    Muscle tone - is normal all over.  No asymmetry is seen.      Sensory    Bilateral intact to light touch    GENERAL Exam: Nontoxic , No Acute Distress   	  HEENT:  normocephalic, atraumatic  		  LUNGS: intubated  	  HEART: Normal S1S2   No murmur RRR        	  GI/ ABDOMEN:  Soft  Non tender    EXTREMITIES:   No Edema  No Clubbing  No Cyanosis   	   SKIN: Normal  No Ecchymosis               LABS:  CBC Full  -  ( 06 Mar 2020 06:06 )  WBC Count : 10.66 K/uL  RBC Count : 3.63 M/uL  Hemoglobin : 10.3 g/dL  Hematocrit : 33.2 %  Platelet Count - Automated : 242 K/uL  Mean Cell Volume : 91.5 fl  Mean Cell Hemoglobin : 28.4 pg  Mean Cell Hemoglobin Concentration : 31.0 gm/dL  Auto Neutrophil # : 9.46 K/uL  Auto Lymphocyte # : 0.91 K/uL  Auto Monocyte # : 0.23 K/uL  Auto Eosinophil # : 0.00 K/uL  Auto Basophil # : 0.02 K/uL  Auto Neutrophil % : 88.7 %  Auto Lymphocyte % : 8.5 %  Auto Monocyte % : 2.2 %  Auto Eosinophil % : 0.0 %  Auto Basophil % : 0.2 %      03-06    143  |  113<H>  |  45<H>  ----------------------------<  156<H>  5.4<H>   |  16<L>  |  4.50<H>    Ca    7.4<L>      06 Mar 2020 06:06  Phos  4.4     03-06  Mg     2.0     03-06    TPro  7.0  /  Alb  2.9<L>  /  TBili  0.4  /  DBili  x   /  AST  28  /  ALT  23  /  AlkPhos  64  03-06    Hemoglobin A1C:     LIVER FUNCTIONS - ( 06 Mar 2020 06:06 )  Alb: 2.9 g/dL / Pro: 7.0 g/dL / ALK PHOS: 64 U/L / ALT: 23 U/L / AST: 28 U/L / GGT: x           Vitamin B12         RADIOLOGY      ANALYSIS AND PLAN:  An 84-year-old with an episode of seizure and change in mental status.  1.	For episode of seizure, I will start the patient on Depakote 500 mg twice a day.  2.	limited EEG plan to repeat over weekend based on clinical status   3.	Ativan 1 mg IV push q.6h. p.r.n. for any type of breakthrough seizure.  4.	Seizure precautions.  5.	For history of underlying depression, at present hard to evaluate the patient's psychiatric status secondary to the patient being lethargic, continue home medication when possible.  6.	For hyperlipidemia, continue the patient on her cholesterol medication.  7.	For change in mental status, questionable this could be metabolic encephalopathy from partial complex seizures versus any type of underlying infectious type process episodes of temperatures   8.	Antibiotics as needed.  9.	Fall precaution.  10.	For chronic kidney disease, monitor renal function.  11.	Spoke with multiple family members at the bedside.  Advance directives were discussed with them.  Primary  will be daughter, Ju, her cell phone number is [phone], home number is [phone] spoke to her today   12.	Greater than 55 minutes of time was spent with the patient, plan of care, reviewing data, speaking to the family and multidisciplinary healthcare team.

## 2020-03-06 NOTE — CHART NOTE - NSCHARTNOTEFT_GEN_A_CORE
Assessment:  Pt seen for nutrition follow up.  Chart reviewed, hospital course noted.    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 RRT on 3/5 for ?seizure activity, received Ativan. MRI and EEG performed. Following seizure patient remained minimally responsive and developed respiratory distress."  "Taken to the ICU last night for increased work of breathing in the setting of encephalopathy, requiring intubation. Now sedated, on mechanical ventilation in the ICU."   "Sedation increased to Precedex, Propofol, Fentanyl PRN. Febrile, antibiotics broadened to Meropenem."       Factors impacting intake: [ ] none [ ] nausea  [ ] vomiting [ ] diarrhea [ ] constipation  [ ]chewing problems [ ] swallowing issues  [x] other: respiratory failure/ vent    Diet Presciption: Diet, NPO (03-05-20 @ 15:20)    Intake: n/a    Current Weight: 3/4 117.9#, 3/5 120.3#, 129.1/3    Pertinent Medications: MEDICATIONS  (STANDING):  amLODIPine   Tablet 10 milliGRAM(s) Oral daily  aspirin enteric coated 81 milliGRAM(s) Oral daily  atorvastatin 10 milliGRAM(s) Oral at bedtime  budesonide 160 MICROgram(s)/formoterol 4.5 MICROgram(s) Inhaler 2 Puff(s) Inhalation two times a day  calcium carbonate 1250 mG  + Vitamin D (OsCal 500 + D) 1 Tablet(s) Oral daily  chlorhexidine 0.12% Liquid 15 milliLiter(s) Oral Mucosa every 12 hours  cholecalciferol 1000 Unit(s) Oral daily  cholestyramine Powder (Sugar-Free) 4 Gram(s) Oral daily  clobetasol 0.05% Cream 1 Application(s) Topical two times a day  dexMEDEtomidine Infusion 0.5 MICROgram(s)/kG/Hr (6.8 mL/Hr) IV Continuous <Continuous>  epoetin jean carlos Injectable 75085 Unit(s) SubCutaneous <User Schedule>  ferrous    sulfate 325 milliGRAM(s) Oral daily  FLUoxetine 20 milliGRAM(s) Oral daily  heparin  Injectable 5000 Unit(s) SubCutaneous every 8 hours  hydrALAZINE 50 milliGRAM(s) Oral every 8 hours  iron sucrose Injectable 100 milliGRAM(s) IV Push every 24 hours  lactated ringers. 1000 milliLiter(s) (75 mL/Hr) IV Continuous <Continuous>  lactobacillus acidophilus 1 Tablet(s) Oral three times a day  loratadine 10 milliGRAM(s) Oral daily  meropenem  IVPB 500 milliGRAM(s) IV Intermittent every 12 hours  metoprolol tartrate 25 milliGRAM(s) Oral two times a day  multivitamin 1 Tablet(s) Oral daily  nystatin Cream 1 Application(s) Topical two times a day  pantoprazole  Injectable 40 milliGRAM(s) IV Push daily  propofol Infusion 10 MICROgram(s)/kG/Min (3.264 mL/Hr) IV Continuous <Continuous>  sodium bicarbonate 650 milliGRAM(s) Oral three times a day  sodium polystyrene sulfonate Suspension 30 Gram(s) Enteral Tube once  valproate sodium IVPB 500 milliGRAM(s) IV Intermittent every 12 hours    MEDICATIONS  (PRN):  acetaminophen    Suspension .. 650 milliGRAM(s) Oral every 6 hours PRN Temp greater or equal to 38C (100.4F), Moderate Pain (4 - 6)  fentaNYL    Injectable 50 MICROGram(s) IV Push every 6 hours PRN dyssynchrony and sedation  lidocaine   Patch 1 Patch Transdermal daily PRN back pain    Pertinent Labs: 03-06 Na143 mmol/L Glu 156 mg/dL<H> K+ 5.4 mmol/L<H> Cr  4.50 mg/dL<H> BUN 45 mg/dL<H> 03-06 Phos 4.4 mg/dL 03-06 Alb 2.9 g/dL<L>     CAPILLARY BLOOD GLUCOSE  /    POCT Blood Glucose.: 167 mg/dL (05 Mar 2020 20:09)  POCT Blood Glucose.: 129 mg/dL (05 Mar 2020 10:23)    Skin: sacral stage IV  Edema: none noted    Estimated Needs:   [x] no change since previous assessment  [ ] recalculated:     Previous Nutrition Diagnosis:   [x]  Altered Nutrition Related Labs (renal)     Nutrition Diagnosis is [x] ongoing  [ ] resolved [ ] not applicable     New Nutrition Diagnosis: [x] not applicable       Interventions:   Recommend  [ ] Change Diet To:  [ ] Nutrition Supplement  [x] Nutrition Support- If pt remains intubated, recommend initiate NGT feeds of Suplena 30cc/hr x 20 hrs, advance by 10cc Q8h until goal rate of 45cc/hr x 20 hrs to provide 1620 reinaldo, 40.5 gm protein.   [ ] Other:     Monitoring and Evaluation:   [ ] PO intake [ x ] Tolerance to diet prescription [ x ] weights [ x ] labs[ x ] follow up per protocol  [x] other: bowel function (via ostomy), s/s GI distress, skin integrity.

## 2020-03-06 NOTE — PROGRESS NOTE ADULT - ASSESSMENT
ASSESSMENT   1.   2.   3.   4.   5.     PLAN     NEURO:   -Pt requiring increased sedation ON  -Now propofol gtt and precedex gtt   -Fentanyl IVP     PULM:    -Pt very dyssynchronous ON, requiring increased sedation   -Patient currently on full vent support  -Titrate settings to maintain SaO2 >90%, or pH >7.25  -Goal Vt 6-8 cc/kg of ideal body weight  -plateau pressure goal <30  -Peridex oral care and VAP prophylaxis with HOB 30 degrees   -aggressive chest PT and suctioning   -daily sedation vacation with spontaneous breathing trial if clinical condition warrants, discuss with respiratory therapy    CV:     GI:      :   -Check repeat labs and monitor renal function trend  -Avoid hypotension and optimize BP with IVF and pressor support if needed.   -Avoid nephrotoxic meds as possible. Avoid ACEI, ARB and NSAIDS  -Monitor input and output  -Monitor and replace electrolytes as needed      ENDO:   -JOVANNI    ID:   -Pt UCx (+) for Ecoli and Enterbacter   -ABX - broadened from zosyn to meropenum     HEME/DVT PPX:   -Heparin SQ     ETHICS        CODE STATUS: Full Code       Care discussed with bedside provider and eICU attending.     Time Spent: 30 min ASSESSMENT   1.   2.   3.   4.   5.     PLAN     NEURO:   -Pt requiring increased sedation ON  -Now propofol gtt and precedex gtt   -Fentanyl IVP   -Continue valproate for seizure ppx     PULM:    -Pt very dyssynchronous ON, requiring increased sedation   -Patient currently on full vent support  -Titrate settings to maintain SaO2 >90%, or pH >7.25  -Goal Vt 6-8 cc/kg of ideal body weight  -plateau pressure goal <30  -Peridex oral care and VAP prophylaxis with HOB 30 degrees   -aggressive chest PT and suctioning   -daily sedation vacation with spontaneous breathing trial if clinical condition warrants, discuss with respiratory therapy    CV:   -antihypertensive with lopressor and hydralazine     GI:    -Pt NPO at this time   -pt with colostomy   -NGT to intermittent suction for decompression of stomach     :   -Check repeat labs and monitor renal function trend  -Avoid hypotension and optimize BP with IVF and pressor support if needed.   -Avoid nephrotoxic meds as possible. Avoid ACEI, ARB and NSAIDS  -Monitor input and output  -Monitor and replace electrolytes as needed      ENDO:   -Dose of decadron given for tongue swelling, pt bit tongue post seizure     ID:   -Pt UCx (+) for Ecoli and Enterbacter   -ABX - broadened from zosyn to meropenum     HEME/DVT PPX:   -Heparin SQ     ETHICS        CODE STATUS: Full Code       Care discussed with bedside provider and eICU attending.     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     1. AMS - metabolic encephalopathy   2. Acute hypercapnic RF   3. seizure   4. sepsis UTI       PLAN     NEURO:   -Pt requiring increased sedation ON  -Now propofol gtt and precedex gtt   -Fentanyl IVP   -Continue valproate for seizure ppx     PULM:    -Pt very dyssynchronous ON, requiring increased sedation   -Patient currently on full vent support  -Titrate settings to maintain SaO2 >90%, or pH >7.25  -Goal Vt 6-8 cc/kg of ideal body weight  -plateau pressure goal <30  -Peridex oral care and VAP prophylaxis with HOB 30 degrees   -aggressive chest PT and suctioning   -daily sedation vacation with spontaneous breathing trial if clinical condition warrants, discuss with respiratory therapy    CV:   -antihypertensive with lopressor and hydralazine     GI:    -Pt NPO at this time   -pt with colostomy   -NGT to intermittent suction for decompression of stomach     :   -Check repeat labs and monitor renal function trend  -Avoid hypotension and optimize BP with IVF and pressor support if needed.   -Avoid nephrotoxic meds as possible. Avoid ACEI, ARB and NSAIDS  -Monitor input and output  -Monitor and replace electrolytes as needed      ENDO:   -Dose of decadron given for tongue swelling, pt bit tongue post seizure     ID:   -Pt UCx (+) for Ecoli and Enterbacter   -ABX - broadened from zosyn to meropenum     HEME/DVT PPX:   -Heparin SQ     ETHICS        CODE STATUS: Full Code       Care discussed with bedside provider and eICU attending.     Time Spent: 30 min

## 2020-03-06 NOTE — PROGRESS NOTE ADULT - SUBJECTIVE AND OBJECTIVE BOX
Patient is a 84y old  Female who presents with a chief complaint of altered mental status (06 Mar 2020 07:20)      INTERVAL HPI/OVERNIGHT EVENTS: Patient seen and examined at bedside. Taken to the ICU last night for increased work of breathing in the setting of encephalopathy, requiring intubation. Now sedated, on mechanical ventilation in the ICU.    MEDICATIONS  (STANDING):  amLODIPine   Tablet 10 milliGRAM(s) Oral daily  aspirin enteric coated 81 milliGRAM(s) Oral daily  atorvastatin 10 milliGRAM(s) Oral at bedtime  budesonide 160 MICROgram(s)/formoterol 4.5 MICROgram(s) Inhaler 2 Puff(s) Inhalation two times a day  calcium carbonate 1250 mG  + Vitamin D (OsCal 500 + D) 1 Tablet(s) Oral daily  chlorhexidine 0.12% Liquid 15 milliLiter(s) Oral Mucosa every 12 hours  cholecalciferol 1000 Unit(s) Oral daily  cholestyramine Powder (Sugar-Free) 4 Gram(s) Oral daily  clobetasol 0.05% Cream 1 Application(s) Topical two times a day  dexMEDEtomidine Infusion 0.5 MICROgram(s)/kG/Hr (6.8 mL/Hr) IV Continuous <Continuous>  epoetin jean carlos Injectable 41106 Unit(s) SubCutaneous <User Schedule>  ferrous    sulfate 325 milliGRAM(s) Oral daily  FLUoxetine 20 milliGRAM(s) Oral daily  heparin  Injectable 5000 Unit(s) SubCutaneous every 8 hours  hydrALAZINE 50 milliGRAM(s) Oral every 8 hours  iron sucrose Injectable 100 milliGRAM(s) IV Push every 24 hours  lactated ringers. 1000 milliLiter(s) (75 mL/Hr) IV Continuous <Continuous>  lactobacillus acidophilus 1 Tablet(s) Oral three times a day  loratadine 10 milliGRAM(s) Oral daily  meropenem  IVPB 500 milliGRAM(s) IV Intermittent every 12 hours  metoprolol tartrate 25 milliGRAM(s) Oral two times a day  multivitamin 1 Tablet(s) Oral daily  nystatin Cream 1 Application(s) Topical two times a day  pantoprazole  Injectable 40 milliGRAM(s) IV Push daily  propofol Infusion 10 MICROgram(s)/kG/Min (3.264 mL/Hr) IV Continuous <Continuous>  sodium bicarbonate 650 milliGRAM(s) Oral three times a day  sodium polystyrene sulfonate Suspension 30 Gram(s) Enteral Tube once  valproate sodium IVPB 500 milliGRAM(s) IV Intermittent every 12 hours    MEDICATIONS  (PRN):  acetaminophen    Suspension .. 650 milliGRAM(s) Oral every 6 hours PRN Temp greater or equal to 38C (100.4F), Moderate Pain (4 - 6)  fentaNYL    Injectable 50 MICROGram(s) IV Push every 6 hours PRN dyssynchrony and sedation  lidocaine   Patch 1 Patch Transdermal daily PRN back pain      Allergies    Levaquin (Pruritus)  mercury (Pruritus; Rash)  sulfa drugs (Pruritus)    Intolerances        REVIEW OF SYSTEMS:  Unable to obtain, sedated, intubated in ICU    Vital Signs Last 24 Hrs  T(C): 36.8 (06 Mar 2020 08:00), Max: 39.2 (05 Mar 2020 20:00)  T(F): 98.3 (06 Mar 2020 08:00), Max: 102.6 (05 Mar 2020 20:00)  HR: 80 (06 Mar 2020 08:00) (74 - 118)  BP: 100/77 (06 Mar 2020 08:00) (92/50 - 196/94)  BP(mean): 85 (06 Mar 2020 08:00) (62 - 135)  RR: 24 (06 Mar 2020 08:00) (16 - 95)  SpO2: 99% (06 Mar 2020 08:00) (97% - 100%)    PHYSICAL EXAM:  GENERAL: NAD  HEENT:  anicteric, moist mucous membranes  CHEST/LUNG:  CTA b/l, no rales, wheezes, or rhonchi  HEART:  RRR, S1, S2  ABDOMEN:  BS+, soft, nontender, nondistended  EXTREMITIES: no edema, cyanosis, or calf tenderness  NERVOUS SYSTEM: answers questions and follows commands appropriately    LABS:                        10.3   10.66 )-----------( 242      ( 06 Mar 2020 06:06 )             33.2     CBC Full  -  ( 06 Mar 2020 06:06 )  WBC Count : 10.66 K/uL  Hemoglobin : 10.3 g/dL  Hematocrit : 33.2 %  Platelet Count - Automated : 242 K/uL  Mean Cell Volume : 91.5 fl  Mean Cell Hemoglobin : 28.4 pg  Mean Cell Hemoglobin Concentration : 31.0 gm/dL  Auto Neutrophil # : 9.46 K/uL  Auto Lymphocyte # : 0.91 K/uL  Auto Monocyte # : 0.23 K/uL  Auto Eosinophil # : 0.00 K/uL  Auto Basophil # : 0.02 K/uL  Auto Neutrophil % : 88.7 %  Auto Lymphocyte % : 8.5 %  Auto Monocyte % : 2.2 %  Auto Eosinophil % : 0.0 %  Auto Basophil % : 0.2 %    06 Mar 2020 06:06    143    |  113    |  45     ----------------------------<  156    5.4     |  16     |  4.50     Ca    7.4        06 Mar 2020 06:06  Phos  4.4       06 Mar 2020 06:06  Mg     2.0       06 Mar 2020 06:06    TPro  7.0    /  Alb  2.9    /  TBili  0.4    /  DBili  x      /  AST  28     /  ALT  23     /  AlkPhos  64     06 Mar 2020 06:06        CAPILLARY BLOOD GLUCOSE      POCT Blood Glucose.: 167 mg/dL (05 Mar 2020 20:09)  POCT Blood Glucose.: 129 mg/dL (05 Mar 2020 10:23)        Culture - Urine (collected 03-03-20 @ 21:30)  Source: .Urine Clean Catch (Midstream)  Final Report (03-05-20 @ 22:43):    >100,000 CFU/ml Escherichia coli    >100,000 CFU/ml Enterobacter aerogenes  Organism: Escherichia coli  Enterobacter aerogenes (03-05-20 @ 22:43)  Organism: Enterobacter aerogenes (03-05-20 @ 22:43)      -  Amikacin: S <=16      -  Ampicillin: R >16 These ampicillin results predict results for amoxicillin      -  Ampicillin/Sulbactam: R >16/8 Enterobacter, Citrobacter, and Serratia may develop resistance during prolonged therapy (3-4 days)      -  Aztreonam: R >16      -  Cefazolin: R >16      -  Cefepime: S <=4      -  Cefoxitin: R >16      -  Ceftriaxone: R >32 Enterobacter, Citrobacter, and Serratia may develop resistance during prolonged therapy      -  Ciprofloxacin: S <=1      -  Ertapenem: S <=1      -  Gentamicin: S <=4      -  Imipenem: S <=1      -  Levofloxacin: S <=2      -  Meropenem: S <=1      -  Nitrofurantoin: I 64 Should not be used to treat pyelonephritis      -  Piperacillin/Tazobactam: I 64      -  Tigecycline: S <=2      -  Tobramycin: S <=4      -  Trimethoprim/Sulfamethoxazole: R >2/38      Method Type: SUBHASH  Organism: Escherichia coli (03-05-20 @ 22:43)      -  Amikacin: S <=16      -  Ampicillin: R >16 These ampicillin results predict results for amoxicillin      -  Ampicillin/Sulbactam: R >16/8 Enterobacter, Citrobacter, and Serratia may develop resistance during prolonged therapy (3-4 days)      -  Aztreonam: S <=4      -  Cefazolin: S <=8 (MIC_CL_COM_ENTERIC_CEFAZU) For uncomplicated UTI with K. pneumoniae, E. coli, or P. mirablis: SUBHASH <=16 is sensitive and SUBHASH >=32 is resistant. This also predicts results for oral agents cefaclor, cefdinir, cefpodoxime, cefprozil, cefuroxime axetil, cephalexin and locarbef for uncomplicated UTI. Note that some isolates may be susceptible to these agents while testing resistant to cefazolin.      -  Cefepime: S <=4      -  Cefoxitin: S <=8      -  Ceftriaxone: S <=1 Enterobacter, Citrobacter, and Serratia may develop resistance during prolonged therapy      -  Ciprofloxacin: S <=1      -  Gentamicin: S <=4      -  Imipenem: S <=1      -  Levofloxacin: S <=2      -  Meropenem: S <=1      -  Nitrofurantoin: S <=32 Should not be used to treat pyelonephritis      -  Piperacillin/Tazobactam: S <=16      -  Tigecycline: S <=2      -  Tobramycin: S <=4      -  Trimethoprim/Sulfamethoxazole: R >2/38      Method Type: SUBHASH    Culture - Blood (collected 03-03-20 @ 21:12)  Source: .Blood Blood-Peripheral  Preliminary Report (03-04-20 @ 22:01):    No growth to date.    Culture - Blood (collected 03-03-20 @ 21:10)  Source: .Blood Blood-Peripheral  Preliminary Report (03-04-20 @ 22:01):    No growth to date.        RADIOLOGY & ADDITIONAL TESTS:    Personally reviewed.     Consultant(s) Notes Reviewed:  [x] YES  [ ] NO Patient is a 84y old  Female who presents with a chief complaint of altered mental status (06 Mar 2020 07:20)      INTERVAL HPI/OVERNIGHT EVENTS: Patient seen and examined at bedside. Taken to the ICU last night for increased work of breathing in the setting of encephalopathy, requiring intubation. Now sedated, on mechanical ventilation in the ICU.    MEDICATIONS  (STANDING):  amLODIPine   Tablet 10 milliGRAM(s) Oral daily  aspirin enteric coated 81 milliGRAM(s) Oral daily  atorvastatin 10 milliGRAM(s) Oral at bedtime  budesonide 160 MICROgram(s)/formoterol 4.5 MICROgram(s) Inhaler 2 Puff(s) Inhalation two times a day  calcium carbonate 1250 mG  + Vitamin D (OsCal 500 + D) 1 Tablet(s) Oral daily  chlorhexidine 0.12% Liquid 15 milliLiter(s) Oral Mucosa every 12 hours  cholecalciferol 1000 Unit(s) Oral daily  cholestyramine Powder (Sugar-Free) 4 Gram(s) Oral daily  clobetasol 0.05% Cream 1 Application(s) Topical two times a day  dexMEDEtomidine Infusion 0.5 MICROgram(s)/kG/Hr (6.8 mL/Hr) IV Continuous <Continuous>  epoetin jean carlos Injectable 91530 Unit(s) SubCutaneous <User Schedule>  ferrous    sulfate 325 milliGRAM(s) Oral daily  FLUoxetine 20 milliGRAM(s) Oral daily  heparin  Injectable 5000 Unit(s) SubCutaneous every 8 hours  hydrALAZINE 50 milliGRAM(s) Oral every 8 hours  iron sucrose Injectable 100 milliGRAM(s) IV Push every 24 hours  lactated ringers. 1000 milliLiter(s) (75 mL/Hr) IV Continuous <Continuous>  lactobacillus acidophilus 1 Tablet(s) Oral three times a day  loratadine 10 milliGRAM(s) Oral daily  meropenem  IVPB 500 milliGRAM(s) IV Intermittent every 12 hours  metoprolol tartrate 25 milliGRAM(s) Oral two times a day  multivitamin 1 Tablet(s) Oral daily  nystatin Cream 1 Application(s) Topical two times a day  pantoprazole  Injectable 40 milliGRAM(s) IV Push daily  propofol Infusion 10 MICROgram(s)/kG/Min (3.264 mL/Hr) IV Continuous <Continuous>  sodium bicarbonate 650 milliGRAM(s) Oral three times a day  sodium polystyrene sulfonate Suspension 30 Gram(s) Enteral Tube once  valproate sodium IVPB 500 milliGRAM(s) IV Intermittent every 12 hours    MEDICATIONS  (PRN):  acetaminophen    Suspension .. 650 milliGRAM(s) Oral every 6 hours PRN Temp greater or equal to 38C (100.4F), Moderate Pain (4 - 6)  fentaNYL    Injectable 50 MICROGram(s) IV Push every 6 hours PRN dyssynchrony and sedation  lidocaine   Patch 1 Patch Transdermal daily PRN back pain      Allergies    Levaquin (Pruritus)  mercury (Pruritus; Rash)  sulfa drugs (Pruritus)    Intolerances        REVIEW OF SYSTEMS:  Unable to obtain, sedated, intubated in ICU    Vital Signs Last 24 Hrs  T(C): 36.8 (06 Mar 2020 08:00), Max: 39.2 (05 Mar 2020 20:00)  T(F): 98.3 (06 Mar 2020 08:00), Max: 102.6 (05 Mar 2020 20:00)  HR: 80 (06 Mar 2020 08:00) (74 - 118)  BP: 100/77 (06 Mar 2020 08:00) (92/50 - 196/94)  BP(mean): 85 (06 Mar 2020 08:00) (62 - 135)  RR: 24 (06 Mar 2020 08:00) (16 - 95)  SpO2: 99% (06 Mar 2020 08:00) (97% - 100%)    PHYSICAL EXAM:  GENERAL: sedated on mechanical ventilation  HEENT:  anicteric, NGT in place, PERRLA  CHEST/LUNG:  Coarse breath sounds bilaterally  HEART:  RRR, S1, S2  ABDOMEN:  BS+, soft, nontender, nondistended, +colostomy  EXTREMITIES: scabs scattered on bilateral LE  NERVOUS SYSTEM: sedated, no tremors or shaking    LABS:                        10.3   10.66 )-----------( 242      ( 06 Mar 2020 06:06 )             33.2     CBC Full  -  ( 06 Mar 2020 06:06 )  WBC Count : 10.66 K/uL  Hemoglobin : 10.3 g/dL  Hematocrit : 33.2 %  Platelet Count - Automated : 242 K/uL  Mean Cell Volume : 91.5 fl  Mean Cell Hemoglobin : 28.4 pg  Mean Cell Hemoglobin Concentration : 31.0 gm/dL  Auto Neutrophil # : 9.46 K/uL  Auto Lymphocyte # : 0.91 K/uL  Auto Monocyte # : 0.23 K/uL  Auto Eosinophil # : 0.00 K/uL  Auto Basophil # : 0.02 K/uL  Auto Neutrophil % : 88.7 %  Auto Lymphocyte % : 8.5 %  Auto Monocyte % : 2.2 %  Auto Eosinophil % : 0.0 %  Auto Basophil % : 0.2 %    06 Mar 2020 06:06    143    |  113    |  45     ----------------------------<  156    5.4     |  16     |  4.50     Ca    7.4        06 Mar 2020 06:06  Phos  4.4       06 Mar 2020 06:06  Mg     2.0       06 Mar 2020 06:06    TPro  7.0    /  Alb  2.9    /  TBili  0.4    /  DBili  x      /  AST  28     /  ALT  23     /  AlkPhos  64     06 Mar 2020 06:06        CAPILLARY BLOOD GLUCOSE      POCT Blood Glucose.: 167 mg/dL (05 Mar 2020 20:09)  POCT Blood Glucose.: 129 mg/dL (05 Mar 2020 10:23)        Culture - Urine (collected 03-03-20 @ 21:30)  Source: .Urine Clean Catch (Midstream)  Final Report (03-05-20 @ 22:43):    >100,000 CFU/ml Escherichia coli    >100,000 CFU/ml Enterobacter aerogenes  Organism: Escherichia coli  Enterobacter aerogenes (03-05-20 @ 22:43)  Organism: Enterobacter aerogenes (03-05-20 @ 22:43)      -  Amikacin: S <=16      -  Ampicillin: R >16 These ampicillin results predict results for amoxicillin      -  Ampicillin/Sulbactam: R >16/8 Enterobacter, Citrobacter, and Serratia may develop resistance during prolonged therapy (3-4 days)      -  Aztreonam: R >16      -  Cefazolin: R >16      -  Cefepime: S <=4      -  Cefoxitin: R >16      -  Ceftriaxone: R >32 Enterobacter, Citrobacter, and Serratia may develop resistance during prolonged therapy      -  Ciprofloxacin: S <=1      -  Ertapenem: S <=1      -  Gentamicin: S <=4      -  Imipenem: S <=1      -  Levofloxacin: S <=2      -  Meropenem: S <=1      -  Nitrofurantoin: I 64 Should not be used to treat pyelonephritis      -  Piperacillin/Tazobactam: I 64      -  Tigecycline: S <=2      -  Tobramycin: S <=4      -  Trimethoprim/Sulfamethoxazole: R >2/38      Method Type: SUBHASH  Organism: Escherichia coli (03-05-20 @ 22:43)      -  Amikacin: S <=16      -  Ampicillin: R >16 These ampicillin results predict results for amoxicillin      -  Ampicillin/Sulbactam: R >16/8 Enterobacter, Citrobacter, and Serratia may develop resistance during prolonged therapy (3-4 days)      -  Aztreonam: S <=4      -  Cefazolin: S <=8 (MIC_CL_COM_ENTERIC_CEFAZU) For uncomplicated UTI with K. pneumoniae, E. coli, or P. mirablis: SUBHASH <=16 is sensitive and SUBHASH >=32 is resistant. This also predicts results for oral agents cefaclor, cefdinir, cefpodoxime, cefprozil, cefuroxime axetil, cephalexin and locarbef for uncomplicated UTI. Note that some isolates may be susceptible to these agents while testing resistant to cefazolin.      -  Cefepime: S <=4      -  Cefoxitin: S <=8      -  Ceftriaxone: S <=1 Enterobacter, Citrobacter, and Serratia may develop resistance during prolonged therapy      -  Ciprofloxacin: S <=1      -  Gentamicin: S <=4      -  Imipenem: S <=1      -  Levofloxacin: S <=2      -  Meropenem: S <=1      -  Nitrofurantoin: S <=32 Should not be used to treat pyelonephritis      -  Piperacillin/Tazobactam: S <=16      -  Tigecycline: S <=2      -  Tobramycin: S <=4      -  Trimethoprim/Sulfamethoxazole: R >2/38      Method Type: SUBHASH    Culture - Blood (collected 03-03-20 @ 21:12)  Source: .Blood Blood-Peripheral  Preliminary Report (03-04-20 @ 22:01):    No growth to date.    Culture - Blood (collected 03-03-20 @ 21:10)  Source: .Blood Blood-Peripheral  Preliminary Report (03-04-20 @ 22:01):    No growth to date.        RADIOLOGY & ADDITIONAL TESTS:    EXAM:  XR CHEST PORTABLE URGENT 1V                            PROCEDURE DATE:  03/05/2020          INTERPRETATION:  Clinical information: Endotracheal tube, nasogastric tube, urinary tract infection    Portable exam, 7:41 PM    Comparison study dated 3/5/2020, 3:00 PM    Cardiac monitor leads present. Endotracheal tube present with its tip 3.5 cm above the josue. NG tube present, tip in stomach, left upper quadrant.    Elevated right hemidiaphragm.    Trace airspace disease at the left lung base. Heart size within normal limits. Hilar regions, mediastinal contours intact. No acute osseous abnormalities.        IMPRESSION: See above report        FRANCISCO J SY M.D.,ATTENDING RADIOLOGIST  This document has been electronically signed. Mar  6 2020 11:02AM  Personally reviewed.     Consultant(s) Notes Reviewed:  [x] YES  [ ] NO Patient is a 84y old  Female who presents with a chief complaint of altered mental status (06 Mar 2020 07:20)      INTERVAL HPI/OVERNIGHT EVENTS: Patient seen and examined at bedside. Taken to the ICU last night for increased work of breathing in the setting of encephalopathy, requiring intubation. Now sedated, on mechanical ventilation in the ICU. Pt had persistent fevers overnight.    MEDICATIONS  (STANDING):  amLODIPine   Tablet 10 milliGRAM(s) Oral daily  aspirin enteric coated 81 milliGRAM(s) Oral daily  atorvastatin 10 milliGRAM(s) Oral at bedtime  budesonide 160 MICROgram(s)/formoterol 4.5 MICROgram(s) Inhaler 2 Puff(s) Inhalation two times a day  calcium carbonate 1250 mG  + Vitamin D (OsCal 500 + D) 1 Tablet(s) Oral daily  chlorhexidine 0.12% Liquid 15 milliLiter(s) Oral Mucosa every 12 hours  cholecalciferol 1000 Unit(s) Oral daily  cholestyramine Powder (Sugar-Free) 4 Gram(s) Oral daily  clobetasol 0.05% Cream 1 Application(s) Topical two times a day  dexMEDEtomidine Infusion 0.5 MICROgram(s)/kG/Hr (6.8 mL/Hr) IV Continuous <Continuous>  epoetin jean carlos Injectable 55327 Unit(s) SubCutaneous <User Schedule>  ferrous    sulfate 325 milliGRAM(s) Oral daily  FLUoxetine 20 milliGRAM(s) Oral daily  heparin  Injectable 5000 Unit(s) SubCutaneous every 8 hours  hydrALAZINE 50 milliGRAM(s) Oral every 8 hours  iron sucrose Injectable 100 milliGRAM(s) IV Push every 24 hours  lactated ringers. 1000 milliLiter(s) (75 mL/Hr) IV Continuous <Continuous>  lactobacillus acidophilus 1 Tablet(s) Oral three times a day  loratadine 10 milliGRAM(s) Oral daily  meropenem  IVPB 500 milliGRAM(s) IV Intermittent every 12 hours  metoprolol tartrate 25 milliGRAM(s) Oral two times a day  multivitamin 1 Tablet(s) Oral daily  nystatin Cream 1 Application(s) Topical two times a day  pantoprazole  Injectable 40 milliGRAM(s) IV Push daily  propofol Infusion 10 MICROgram(s)/kG/Min (3.264 mL/Hr) IV Continuous <Continuous>  sodium bicarbonate 650 milliGRAM(s) Oral three times a day  sodium polystyrene sulfonate Suspension 30 Gram(s) Enteral Tube once  valproate sodium IVPB 500 milliGRAM(s) IV Intermittent every 12 hours    MEDICATIONS  (PRN):  acetaminophen    Suspension .. 650 milliGRAM(s) Oral every 6 hours PRN Temp greater or equal to 38C (100.4F), Moderate Pain (4 - 6)  fentaNYL    Injectable 50 MICROGram(s) IV Push every 6 hours PRN dyssynchrony and sedation  lidocaine   Patch 1 Patch Transdermal daily PRN back pain      Allergies    Levaquin (Pruritus)  mercury (Pruritus; Rash)  sulfa drugs (Pruritus)    Intolerances        REVIEW OF SYSTEMS:  Unable to obtain, sedated, intubated in ICU    Vital Signs Last 24 Hrs  T(C): 36.8 (06 Mar 2020 08:00), Max: 39.2 (05 Mar 2020 20:00)  T(F): 98.3 (06 Mar 2020 08:00), Max: 102.6 (05 Mar 2020 20:00)  HR: 80 (06 Mar 2020 08:00) (74 - 118)  BP: 100/77 (06 Mar 2020 08:00) (92/50 - 196/94)  BP(mean): 85 (06 Mar 2020 08:00) (62 - 135)  RR: 24 (06 Mar 2020 08:00) (16 - 95)  SpO2: 99% (06 Mar 2020 08:00) (97% - 100%)    PHYSICAL EXAM:  GENERAL: sedated on mechanical ventilation  HEENT:  anicteric, NGT in place  CHEST/LUNG:  diminished breath sounds bibasilarly, rest CTA b/l  HEART:  RRR, S1, S2  ABDOMEN:  BS+, soft, no apparent TTP, nondistended, +ostomy with brown stool  EXTREMITIES: healed scabs scattered on bilateral LE  NERVOUS SYSTEM: sedated, no tremors or shaking    LABS:                        10.3   10.66 )-----------( 242      ( 06 Mar 2020 06:06 )             33.2     CBC Full  -  ( 06 Mar 2020 06:06 )  WBC Count : 10.66 K/uL  Hemoglobin : 10.3 g/dL  Hematocrit : 33.2 %  Platelet Count - Automated : 242 K/uL  Mean Cell Volume : 91.5 fl  Mean Cell Hemoglobin : 28.4 pg  Mean Cell Hemoglobin Concentration : 31.0 gm/dL  Auto Neutrophil # : 9.46 K/uL  Auto Lymphocyte # : 0.91 K/uL  Auto Monocyte # : 0.23 K/uL  Auto Eosinophil # : 0.00 K/uL  Auto Basophil # : 0.02 K/uL  Auto Neutrophil % : 88.7 %  Auto Lymphocyte % : 8.5 %  Auto Monocyte % : 2.2 %  Auto Eosinophil % : 0.0 %  Auto Basophil % : 0.2 %    06 Mar 2020 06:06    143    |  113    |  45     ----------------------------<  156    5.4     |  16     |  4.50     Ca    7.4        06 Mar 2020 06:06  Phos  4.4       06 Mar 2020 06:06  Mg     2.0       06 Mar 2020 06:06    TPro  7.0    /  Alb  2.9    /  TBili  0.4    /  DBili  x      /  AST  28     /  ALT  23     /  AlkPhos  64     06 Mar 2020 06:06        CAPILLARY BLOOD GLUCOSE      POCT Blood Glucose.: 167 mg/dL (05 Mar 2020 20:09)  POCT Blood Glucose.: 129 mg/dL (05 Mar 2020 10:23)        Culture - Urine (collected 03-03-20 @ 21:30)  Source: .Urine Clean Catch (Midstream)  Final Report (03-05-20 @ 22:43):    >100,000 CFU/ml Escherichia coli    >100,000 CFU/ml Enterobacter aerogenes  Organism: Escherichia coli  Enterobacter aerogenes (03-05-20 @ 22:43)  Organism: Enterobacter aerogenes (03-05-20 @ 22:43)      -  Amikacin: S <=16      -  Ampicillin: R >16 These ampicillin results predict results for amoxicillin      -  Ampicillin/Sulbactam: R >16/8 Enterobacter, Citrobacter, and Serratia may develop resistance during prolonged therapy (3-4 days)      -  Aztreonam: R >16      -  Cefazolin: R >16      -  Cefepime: S <=4      -  Cefoxitin: R >16      -  Ceftriaxone: R >32 Enterobacter, Citrobacter, and Serratia may develop resistance during prolonged therapy      -  Ciprofloxacin: S <=1      -  Ertapenem: S <=1      -  Gentamicin: S <=4      -  Imipenem: S <=1      -  Levofloxacin: S <=2      -  Meropenem: S <=1      -  Nitrofurantoin: I 64 Should not be used to treat pyelonephritis      -  Piperacillin/Tazobactam: I 64      -  Tigecycline: S <=2      -  Tobramycin: S <=4      -  Trimethoprim/Sulfamethoxazole: R >2/38      Method Type: SUBHASH  Organism: Escherichia coli (03-05-20 @ 22:43)      -  Amikacin: S <=16      -  Ampicillin: R >16 These ampicillin results predict results for amoxicillin      -  Ampicillin/Sulbactam: R >16/8 Enterobacter, Citrobacter, and Serratia may develop resistance during prolonged therapy (3-4 days)      -  Aztreonam: S <=4      -  Cefazolin: S <=8 (MIC_CL_COM_ENTERIC_CEFAZU) For uncomplicated UTI with K. pneumoniae, E. coli, or P. mirablis: SUBHASH <=16 is sensitive and SUBHASH >=32 is resistant. This also predicts results for oral agents cefaclor, cefdinir, cefpodoxime, cefprozil, cefuroxime axetil, cephalexin and locarbef for uncomplicated UTI. Note that some isolates may be susceptible to these agents while testing resistant to cefazolin.      -  Cefepime: S <=4      -  Cefoxitin: S <=8      -  Ceftriaxone: S <=1 Enterobacter, Citrobacter, and Serratia may develop resistance during prolonged therapy      -  Ciprofloxacin: S <=1      -  Gentamicin: S <=4      -  Imipenem: S <=1      -  Levofloxacin: S <=2      -  Meropenem: S <=1      -  Nitrofurantoin: S <=32 Should not be used to treat pyelonephritis      -  Piperacillin/Tazobactam: S <=16      -  Tigecycline: S <=2      -  Tobramycin: S <=4      -  Trimethoprim/Sulfamethoxazole: R >2/38      Method Type: SUBHASH    Culture - Blood (collected 03-03-20 @ 21:12)  Source: .Blood Blood-Peripheral  Preliminary Report (03-04-20 @ 22:01):    No growth to date.    Culture - Blood (collected 03-03-20 @ 21:10)  Source: .Blood Blood-Peripheral  Preliminary Report (03-04-20 @ 22:01):    No growth to date.        RADIOLOGY & ADDITIONAL TESTS:    EXAM:  XR CHEST PORTABLE URGENT 1V                            PROCEDURE DATE:  03/05/2020          INTERPRETATION:  Clinical information: Endotracheal tube, nasogastric tube, urinary tract infection    Portable exam, 7:41 PM    Comparison study dated 3/5/2020, 3:00 PM    Cardiac monitor leads present. Endotracheal tube present with its tip 3.5 cm above the josue. NG tube present, tip in stomach, left upper quadrant.    Elevated right hemidiaphragm.    Trace airspace disease at the left lung base. Heart size within normal limits. Hilar regions, mediastinal contours intact. No acute osseous abnormalities.        IMPRESSION: See above report        FRANCISCO J SY M.D.,ATTENDING RADIOLOGIST  This document has been electronically signed. Mar  6 2020 11:02AM  Personally reviewed.     Consultant(s) Notes Reviewed:  [x] YES  [ ] NO

## 2020-03-06 NOTE — PROGRESS NOTE ADULT - PROBLEM SELECTOR PLAN 8
- Anemia likely due to LELO, and anemia of chronic disease in setting of Stage 5 CKD  - Fe panel from 2/2020 showed low total iron, low total iron binding capacity  - hemoglobin approximately 10 today, above baseline  - Continue home iron  - Continue epogen as per nephro - Anemia likely due to LELO, and anemia of chronic disease in setting of Stage 5 CKD  - Fe panel from 2/2020 showed low total iron, low total iron binding capacity  - hemoglobin approximately 10 today  - Continue home iron  - Continue epogen as per nephro

## 2020-03-06 NOTE — PROGRESS NOTE ADULT - PROBLEM SELECTOR PLAN 5
- K 6.3 on admission, decreased to <5  - likely due to MEHDI on CKD Stage 5  - S/p insulin, albuterol, kayexalate and bicarb  - Continue to trend BMP  - pt had expressed wishes to avoid HD if at all possible in the past.   - nephro, Dr. You, recs appreciated - K 6.3 on admission, decreased tos/p insulin, albuterol, bicarb and kayexalate  - now elevated again to 5.4  - kayexalate given  - likely due to MEHDI on CKD Stage 5  - Continue to trend BMP  - pt had expressed wishes to avoid HD  - nephro, Dr. You, recs appreciated - K 6.3 on admission, decreased tos/p insulin, albuterol, bicarb and kayexalate  - now elevated again to 5.4  - kayexalate given  - likely due to MEHDI on CKD Stage 5  - Continue to trend BMP  - pt had expressed wishes to avoid HD if at all possible in the past. If pt's family / HCP agrees to it, nephro may offer initiation of HD soon.  - nephro, Dr. You, recs appreciated

## 2020-03-06 NOTE — PROGRESS NOTE ADULT - SUBJECTIVE AND OBJECTIVE BOX
Patient is a 84y old  Female who presents with a chief complaint of altered mental status (06 Mar 2020 01:53)    24 hour events: ***    REVIEW OF SYSTEMS  Constitutional: No fever, chills, fatigue  Neuro: No headache, numbness, weakness  Resp: No cough, wheezing, shortness of breath  CVS: No chest pain, palpitations, leg swelling  GI: No abdominal pain, nausea, vomiting, diarrhea   : No dysuria, frequency, incontinence  Skin: No itching, burning, rashes, or lesions   Msk: No joint pain or swelling  Psych: No depression, anxiety, mood swings  Heme: No bleeding    T(F): 101.6 (03-06-20 @ 05:00), Max: 102.6 (03-05-20 @ 20:00)  HR: 74 (03-06-20 @ 07:00) (74 - 118)  BP: 117/54 (03-06-20 @ 07:00) (92/50 - 196/94)  RR: 31 (03-06-20 @ 07:00) (16 - 95)  SpO2: 99% (03-06-20 @ 07:00) (97% - 100%)  Wt(kg): --    Mode: AC/ CMV (Assist Control/ Continuous Mandatory Ventilation), RR (machine): 12, TV (machine): 400, FiO2: 3, PEEP: 5        I&O's Summary    03-05 @ 07:01  -  03-06 @ 07:00  --------------------------------------------------------  IN: 840.6 mL / OUT: 1950 mL / NET: -1109.4 mL      PHYSICAL EXAM  General:   CNS:   HEENT:   Resp:   CVS:   Abd:   Ext:   Skin:     MEDICATIONS  meropenem  IVPB IV Intermittent    amLODIPine   Tablet Oral  hydrALAZINE Oral  metoprolol tartrate Oral    atorvastatin Oral  cholestyramine Powder (Sugar-Free) Oral    budesonide 160 MICROgram(s)/formoterol 4.5 MICROgram(s) Inhaler Inhalation  loratadine Oral    acetaminophen    Suspension .. Oral PRN  dexMEDEtomidine Infusion IV Continuous  fentaNYL    Injectable IV Push PRN  FLUoxetine Oral  propofol Infusion IV Continuous  valproate sodium IVPB IV Intermittent      aspirin enteric coated Oral  heparin  Injectable SubCutaneous    pantoprazole  Injectable IV Push      calcium carbonate 1250 mG  + Vitamin D (OsCal 500 + D) Oral  cholecalciferol Oral  ferrous    sulfate Oral  iron sucrose Injectable IV Push  lactated ringers. IV Continuous  multivitamin Oral  sodium bicarbonate Oral    epoetin jean carlos Injectable SubCutaneous    chlorhexidine 0.12% Liquid Oral Mucosa  clobetasol 0.05% Cream Topical  lidocaine   Patch Transdermal PRN  nystatin Cream Topical    lactobacillus acidophilus Oral                          10.3   10.66 )-----------( 242      ( 06 Mar 2020 06:06 )             33.2       03-06    143  |  113<H>  |  45<H>  ----------------------------<  156<H>  5.4<H>   |  16<L>  |  4.50<H>    Ca    7.4<L>      06 Mar 2020 06:06  Phos  4.4     03-06  Mg     2.0     03-06    TPro  7.0  /  Alb  2.9<L>  /  TBili  0.4  /  DBili  x   /  AST  28  /  ALT  23  /  AlkPhos  64  03-06    Lactate 1.1           03-05 @ 14:50    Lactate 5.3           03-05 @ 10:46              .Urine Clean Catch (Midstream)   >100,000 CFU/ml Escherichia coli  >100,000 CFU/ml Enterobacter aerogenes -- 03-03 @ 21:30  .Blood Blood-Peripheral   No growth to date. -- 03-03 @ 21:12  .Blood Blood-Peripheral   No growth to date. -- 03-03 @ 21:10        Radiology: ***  Bedside lung ultrasound: ***  Bedside ECHO: ***    CENTRAL LINE: Y/N          DATE INSERTED:              REMOVE: Y/N  JACQUES: Y/N                        DATE INSERTED:              REMOVE: Y/N  A-LINE: Y/N                       DATE INSERTED:              REMOVE: Y/N    GLOBAL ISSUE/BEST PRACTICE  Analgesia:   Sedation:   CAM-ICU:   HOB elevation: yes  Stress ulcer prophylaxis:   VTE prophylaxis:   Glycemic control:   Nutrition:     CODE STATUS: ***  [facility] discussion: Y Patient is a 84y old  Female who presents with a chief complaint of altered mental status (06 Mar 2020 01:53)    24 hour events: Intubated overnight. Desynchronized on the vent, started on Precedex, Propofol, Fentanyl PRN.     REVIEW OF SYSTEMS - unable to obtain     T(F): 101.6 (03-06-20 @ 05:00), Max: 102.6 (03-05-20 @ 20:00)  HR: 74 (03-06-20 @ 07:00) (74 - 118)  BP: 117/54 (03-06-20 @ 07:00) (92/50 - 196/94)  RR: 31 (03-06-20 @ 07:00) (16 - 95)  SpO2: 99% (03-06-20 @ 07:00) (97% - 100%)  Wt(kg): --    Mode: AC/ CMV (Assist Control/ Continuous Mandatory Ventilation), RR (machine): 12, TV (machine): 400, FiO2: 3, PEEP: 5        I&O's Summary    03-05 @ 07:01  -  03-06 @ 07:00  --------------------------------------------------------  IN: 840.6 mL / OUT: 1950 mL / NET: -1109.4 mL      PHYSICAL EXAM  General: intubated, sedated   CNS: Sedation, retracts to painful stimuli  HEENT: PERRL  Resp: coarse breath sounds at bases  CVS: RRR S1 S2  Abd: soft, nontender, nondistended +ostomy   Ext: no lower extremity edema  Skin: warm, dry    MEDICATIONS  meropenem  IVPB IV Intermittent    amLODIPine   Tablet Oral  hydrALAZINE Oral  metoprolol tartrate Oral    atorvastatin Oral  cholestyramine Powder (Sugar-Free) Oral    budesonide 160 MICROgram(s)/formoterol 4.5 MICROgram(s) Inhaler Inhalation  loratadine Oral    acetaminophen    Suspension .. Oral PRN  dexMEDEtomidine Infusion IV Continuous  fentaNYL    Injectable IV Push PRN  FLUoxetine Oral  propofol Infusion IV Continuous  valproate sodium IVPB IV Intermittent      aspirin enteric coated Oral  heparin  Injectable SubCutaneous    pantoprazole  Injectable IV Push      calcium carbonate 1250 mG  + Vitamin D (OsCal 500 + D) Oral  cholecalciferol Oral  ferrous    sulfate Oral  iron sucrose Injectable IV Push  lactated ringers. IV Continuous  multivitamin Oral  sodium bicarbonate Oral    epoetin jean carlos Injectable SubCutaneous    chlorhexidine 0.12% Liquid Oral Mucosa  clobetasol 0.05% Cream Topical  lidocaine   Patch Transdermal PRN  nystatin Cream Topical    lactobacillus acidophilus Oral                          10.3   10.66 )-----------( 242      ( 06 Mar 2020 06:06 )             33.2       03-06    143  |  113<H>  |  45<H>  ----------------------------<  156<H>  5.4<H>   |  16<L>  |  4.50<H>    Ca    7.4<L>      06 Mar 2020 06:06  Phos  4.4     03-06  Mg     2.0     03-06    TPro  7.0  /  Alb  2.9<L>  /  TBili  0.4  /  DBili  x   /  AST  28  /  ALT  23  /  AlkPhos  64  03-06    Lactate 1.1           03-05 @ 14:50    Lactate 5.3           03-05 @ 10:46              .Urine Clean Catch (Midstream)   >100,000 CFU/ml Escherichia coli  >100,000 CFU/ml Enterobacter aerogenes -- 03-03 @ 21:30  .Blood Blood-Peripheral   No growth to date. -- 03-03 @ 21:12  .Blood Blood-Peripheral   No growth to date. -- 03-03 @ 21:10        Radiology: ***  Bedside lung ultrasound: ***  Bedside ECHO: ***    CENTRAL LINE: N  JACQUES: Y  A-LINE: N    GLOBAL ISSUE/BEST PRACTICE  Analgesia: Fentanyl PRN  Sedation: Propofol, Precedex  CAM-ICU:   HOB elevation: yes  Stress ulcer prophylaxis: Y   VTE prophylaxis: Y  Glycemic control: Y  Nutrition: NPO    CODE STATUS: Full Patient is a 84y old  Female who presents with a chief complaint of altered mental status (06 Mar 2020 01:53)    24 hour events: Intubated overnight. dyssynchronous on the vent, sedation increased to Precedex, Propofol, Fentanyl PRN. Febrile, antibiotics broadened to Meropenem.     REVIEW OF SYSTEMS - unable to obtain     T(F): 101.6 (03-06-20 @ 05:00), Max: 102.6 (03-05-20 @ 20:00)  HR: 74 (03-06-20 @ 07:00) (74 - 118)  BP: 117/54 (03-06-20 @ 07:00) (92/50 - 196/94)  RR: 31 (03-06-20 @ 07:00) (16 - 95)  SpO2: 99% (03-06-20 @ 07:00) (97% - 100%)  Wt(kg): --    Mode: AC/ CMV (Assist Control/ Continuous Mandatory Ventilation), RR (machine): 12, TV (machine): 400, FiO2: 3, PEEP: 5        I&O's Summary    03-05 @ 07:01  -  03-06 @ 07:00  --------------------------------------------------------  IN: 840.6 mL / OUT: 1950 mL / NET: -1109.4 mL      PHYSICAL EXAM  General: intubated, sedated   CNS: Sedation, retracts to painful stimuli  HEENT: PERRL  Resp: coarse breath sounds at bases  CVS: RRR S1 S2  Abd: soft, nontender, nondistended +ostomy   Ext: no lower extremity edema  Skin: warm, dry    MEDICATIONS  meropenem  IVPB IV Intermittent    amLODIPine   Tablet Oral  hydrALAZINE Oral  metoprolol tartrate Oral    atorvastatin Oral  cholestyramine Powder (Sugar-Free) Oral    budesonide 160 MICROgram(s)/formoterol 4.5 MICROgram(s) Inhaler Inhalation  loratadine Oral    acetaminophen    Suspension .. Oral PRN  dexMEDEtomidine Infusion IV Continuous  fentaNYL    Injectable IV Push PRN  FLUoxetine Oral  propofol Infusion IV Continuous  valproate sodium IVPB IV Intermittent      aspirin enteric coated Oral  heparin  Injectable SubCutaneous    pantoprazole  Injectable IV Push      calcium carbonate 1250 mG  + Vitamin D (OsCal 500 + D) Oral  cholecalciferol Oral  ferrous    sulfate Oral  iron sucrose Injectable IV Push  lactated ringers. IV Continuous  multivitamin Oral  sodium bicarbonate Oral    epoetin jean carlos Injectable SubCutaneous    chlorhexidine 0.12% Liquid Oral Mucosa  clobetasol 0.05% Cream Topical  lidocaine   Patch Transdermal PRN  nystatin Cream Topical    lactobacillus acidophilus Oral                          10.3   10.66 )-----------( 242      ( 06 Mar 2020 06:06 )             33.2       03-06    143  |  113<H>  |  45<H>  ----------------------------<  156<H>  5.4<H>   |  16<L>  |  4.50<H>    Ca    7.4<L>      06 Mar 2020 06:06  Phos  4.4     03-06  Mg     2.0     03-06    TPro  7.0  /  Alb  2.9<L>  /  TBili  0.4  /  DBili  x   /  AST  28  /  ALT  23  /  AlkPhos  64  03-06    Lactate 1.1           03-05 @ 14:50    Lactate 5.3           03-05 @ 10:46              .Urine Clean Catch (Midstream)   >100,000 CFU/ml Escherichia coli  >100,000 CFU/ml Enterobacter aerogenes -- 03-03 @ 21:30  .Blood Blood-Peripheral   No growth to date. -- 03-03 @ 21:12  .Blood Blood-Peripheral   No growth to date. -- 03-03 @ 21:10        Radiology: ***  Bedside lung ultrasound: ***  Bedside ECHO: ***    CENTRAL LINE: N  JACQUES: Y  A-LINE: N    GLOBAL ISSUE/BEST PRACTICE  Analgesia: Fentanyl PRN  Sedation: Propofol, Precedex  CAM-ICU:   HOB elevation: yes  Stress ulcer prophylaxis: Y   VTE prophylaxis: Y  Glycemic control: Y  Nutrition: NPO    CODE STATUS: Full Patient is a 84y old  Female who presents with a chief complaint of altered mental status (06 Mar 2020 01:53)    24 hour events: Intubated overnight. dyssynchronous on the vent, sedation increased to Precedex, Propofol, Fentanyl PRN. Febrile for several hours Tmax 102.6, antibiotics broadened to Meropenem.     REVIEW OF SYSTEMS - unable to obtain     T(F): 101.6 (03-06-20 @ 05:00), Max: 102.6 (03-05-20 @ 20:00)  HR: 74 (03-06-20 @ 07:00) (74 - 118)  BP: 117/54 (03-06-20 @ 07:00) (92/50 - 196/94)  RR: 31 (03-06-20 @ 07:00) (16 - 95)  SpO2: 99% (03-06-20 @ 07:00) (97% - 100%)  Wt(kg): --    Mode: AC/ CMV (Assist Control/ Continuous Mandatory Ventilation), RR (machine): 12, TV (machine): 400, FiO2: 3, PEEP: 5        I&O's Summary    03-05 @ 07:01  -  03-06 @ 07:00  --------------------------------------------------------  IN: 840.6 mL / OUT: 1950 mL / NET: -1109.4 mL      PHYSICAL EXAM  General: intubated, sedated   CNS: Sedation, retracts to painful stimuli  HEENT: PERRL  Resp: CTA in bilateral lobes, rhonchi in anterior upper lobe  CVS: RRR S1 S2  Abd: soft, nontender, nondistended +ostomy   Ext: no lower extremity edema  Skin: warm, dry    MEDICATIONS  meropenem  IVPB IV Intermittent    amLODIPine   Tablet Oral  hydrALAZINE Oral  metoprolol tartrate Oral    atorvastatin Oral  cholestyramine Powder (Sugar-Free) Oral    budesonide 160 MICROgram(s)/formoterol 4.5 MICROgram(s) Inhaler Inhalation  loratadine Oral    acetaminophen    Suspension .. Oral PRN  dexMEDEtomidine Infusion IV Continuous  fentaNYL    Injectable IV Push PRN  FLUoxetine Oral  propofol Infusion IV Continuous  valproate sodium IVPB IV Intermittent      aspirin enteric coated Oral  heparin  Injectable SubCutaneous    pantoprazole  Injectable IV Push      calcium carbonate 1250 mG  + Vitamin D (OsCal 500 + D) Oral  cholecalciferol Oral  ferrous    sulfate Oral  iron sucrose Injectable IV Push  lactated ringers. IV Continuous  multivitamin Oral  sodium bicarbonate Oral    epoetin jean carlos Injectable SubCutaneous    chlorhexidine 0.12% Liquid Oral Mucosa  clobetasol 0.05% Cream Topical  lidocaine   Patch Transdermal PRN  nystatin Cream Topical    lactobacillus acidophilus Oral                          10.3   10.66 )-----------( 242      ( 06 Mar 2020 06:06 )             33.2       03-06    143  |  113<H>  |  45<H>  ----------------------------<  156<H>  5.4<H>   |  16<L>  |  4.50<H>    Ca    7.4<L>      06 Mar 2020 06:06  Phos  4.4     03-06  Mg     2.0     03-06    TPro  7.0  /  Alb  2.9<L>  /  TBili  0.4  /  DBili  x   /  AST  28  /  ALT  23  /  AlkPhos  64  03-06    Lactate 1.1           03-05 @ 14:50    Lactate 5.3           03-05 @ 10:46              .Urine Clean Catch (Midstream)   >100,000 CFU/ml Escherichia coli  >100,000 CFU/ml Enterobacter aerogenes -- 03-03 @ 21:30  .Blood Blood-Peripheral   No growth to date. -- 03-03 @ 21:12  .Blood Blood-Peripheral   No growth to date. -- 03-03 @ 21:10        Radiology: ***  Bedside lung ultrasound: ***  Bedside ECHO: ***    CENTRAL LINE: N  JACQUES: Y  A-LINE: N    GLOBAL ISSUE/BEST PRACTICE  Analgesia: Fentanyl PRN  Sedation: Propofol, Precedex  CAM-ICU:   HOB elevation: yes  Stress ulcer prophylaxis: Y   VTE prophylaxis: Y  Glycemic control: Y  Nutrition: NPO    CODE STATUS: Full Patient is a 84y old  Female who presents with a chief complaint of altered mental status (06 Mar 2020 01:53)    24 hour events: Intubated overnight. dyssynchronous on the vent, sedation increased to Precedex, Propofol, Fentanyl PRN. Febrile for several hours Tmax 102.6, antibiotics broadened to Meropenem.     REVIEW OF SYSTEMS - unable to obtain     T(F): 101.6 (03-06-20 @ 05:00), Max: 102.6 (03-05-20 @ 20:00)  HR: 74 (03-06-20 @ 07:00) (74 - 118)  BP: 117/54 (03-06-20 @ 07:00) (92/50 - 196/94)  RR: 31 (03-06-20 @ 07:00) (16 - 95)  SpO2: 99% (03-06-20 @ 07:00) (97% - 100%)  Wt(kg): --    Mode: AC/ CMV (Assist Control/ Continuous Mandatory Ventilation), RR (machine): 12, TV (machine): 400, FiO2: 3, PEEP: 5        I&O's Summary    03-05 @ 07:01  -  03-06 @ 07:00  --------------------------------------------------------  IN: 840.6 mL / OUT: 1950 mL / NET: -1109.4 mL      PHYSICAL EXAM  General: intubated, sedated   CNS: Sedation, retracts to painful stimuli  HEENT: PERRL  Resp: CTA in bilateral lobes, rhonchi in anterior upper lobe  CVS: RRR S1 S2  Abd: soft, nontender, nondistended +ostomy without erythema, liquid yellow/green stool  Ext: no lower extremity edema  Skin: warm, dry; healing scabs noted on lower extremities    MEDICATIONS  meropenem  IVPB IV Intermittent    amLODIPine   Tablet Oral  hydrALAZINE Oral  metoprolol tartrate Oral    atorvastatin Oral  cholestyramine Powder (Sugar-Free) Oral    budesonide 160 MICROgram(s)/formoterol 4.5 MICROgram(s) Inhaler Inhalation  loratadine Oral    acetaminophen    Suspension .. Oral PRN  dexMEDEtomidine Infusion IV Continuous  fentaNYL    Injectable IV Push PRN  FLUoxetine Oral  propofol Infusion IV Continuous  valproate sodium IVPB IV Intermittent      aspirin enteric coated Oral  heparin  Injectable SubCutaneous    pantoprazole  Injectable IV Push      calcium carbonate 1250 mG  + Vitamin D (OsCal 500 + D) Oral  cholecalciferol Oral  ferrous    sulfate Oral  iron sucrose Injectable IV Push  lactated ringers. IV Continuous  multivitamin Oral  sodium bicarbonate Oral    epoetin jean carlos Injectable SubCutaneous    chlorhexidine 0.12% Liquid Oral Mucosa  clobetasol 0.05% Cream Topical  lidocaine   Patch Transdermal PRN  nystatin Cream Topical    lactobacillus acidophilus Oral                          10.3   10.66 )-----------( 242      ( 06 Mar 2020 06:06 )             33.2       03-06    143  |  113<H>  |  45<H>  ----------------------------<  156<H>  5.4<H>   |  16<L>  |  4.50<H>    Ca    7.4<L>      06 Mar 2020 06:06  Phos  4.4     03-06  Mg     2.0     03-06    TPro  7.0  /  Alb  2.9<L>  /  TBili  0.4  /  DBili  x   /  AST  28  /  ALT  23  /  AlkPhos  64  03-06    Lactate 1.1           03-05 @ 14:50    Lactate 5.3           03-05 @ 10:46              .Urine Clean Catch (Midstream)   >100,000 CFU/ml Escherichia coli  >100,000 CFU/ml Enterobacter aerogenes -- 03-03 @ 21:30  .Blood Blood-Peripheral   No growth to date. -- 03-03 @ 21:12  .Blood Blood-Peripheral   No growth to date. -- 03-03 @ 21:10        Radiology: ***  Bedside lung ultrasound: ***  Bedside ECHO: ***    CENTRAL LINE: N  JACQUES: Y  A-LINE: N    GLOBAL ISSUE/BEST PRACTICE  Analgesia: Fentanyl PRN  Sedation: Propofol, Precedex  CAM-ICU:   HOB elevation: yes  Stress ulcer prophylaxis: Y   VTE prophylaxis: Y  Glycemic control: Y  Nutrition: NPO    CODE STATUS: Full Patient is a 84y old  Female who presents with a chief complaint of altered mental status (06 Mar 2020 01:53)    24 hour events: Intubated overnight. dyssynchronous on the vent, sedation increased to Precedex, Propofol, Fentanyl PRN. Febrile for about 12 hours Tmax 102.6, antibiotics broadened to Meropenem.     REVIEW OF SYSTEMS - unable to obtain     T(F): 101.6 (03-06-20 @ 05:00), Max: 102.6 (03-05-20 @ 20:00)  HR: 74 (03-06-20 @ 07:00) (74 - 118)  BP: 117/54 (03-06-20 @ 07:00) (92/50 - 196/94)  RR: 31 (03-06-20 @ 07:00) (16 - 95)  SpO2: 99% (03-06-20 @ 07:00) (97% - 100%)  Wt(kg): --    Mode: AC/ CMV (Assist Control/ Continuous Mandatory Ventilation), RR (machine): 12, TV (machine): 400, FiO2: 3, PEEP: 5        I&O's Summary    03-05 @ 07:01  -  03-06 @ 07:00  --------------------------------------------------------  IN: 840.6 mL / OUT: 1950 mL / NET: -1109.4 mL      PHYSICAL EXAM  General: intubated, sedated   CNS: Sedation, retracts to painful stimuli  HEENT: PERRL  Resp: CTA in bilateral lobes, rhonchi in anterior upper lobe  CVS: RRR S1 S2  Abd: soft, nontender, nondistended +ostomy without erythema, liquid yellow/green stool  Ext: no lower extremity edema  Skin: warm, dry; healing scabs noted on lower extremities    MEDICATIONS  meropenem  IVPB IV Intermittent    amLODIPine   Tablet Oral  hydrALAZINE Oral  metoprolol tartrate Oral    atorvastatin Oral  cholestyramine Powder (Sugar-Free) Oral    budesonide 160 MICROgram(s)/formoterol 4.5 MICROgram(s) Inhaler Inhalation  loratadine Oral    acetaminophen    Suspension .. Oral PRN  dexMEDEtomidine Infusion IV Continuous  fentaNYL    Injectable IV Push PRN  FLUoxetine Oral  propofol Infusion IV Continuous  valproate sodium IVPB IV Intermittent      aspirin enteric coated Oral  heparin  Injectable SubCutaneous    pantoprazole  Injectable IV Push      calcium carbonate 1250 mG  + Vitamin D (OsCal 500 + D) Oral  cholecalciferol Oral  ferrous    sulfate Oral  iron sucrose Injectable IV Push  lactated ringers. IV Continuous  multivitamin Oral  sodium bicarbonate Oral    epoetin jean carlos Injectable SubCutaneous    chlorhexidine 0.12% Liquid Oral Mucosa  clobetasol 0.05% Cream Topical  lidocaine   Patch Transdermal PRN  nystatin Cream Topical    lactobacillus acidophilus Oral                          10.3   10.66 )-----------( 242      ( 06 Mar 2020 06:06 )             33.2       03-06    143  |  113<H>  |  45<H>  ----------------------------<  156<H>  5.4<H>   |  16<L>  |  4.50<H>    Ca    7.4<L>      06 Mar 2020 06:06  Phos  4.4     03-06  Mg     2.0     03-06    TPro  7.0  /  Alb  2.9<L>  /  TBili  0.4  /  DBili  x   /  AST  28  /  ALT  23  /  AlkPhos  64  03-06    Lactate 1.1           03-05 @ 14:50    Lactate 5.3           03-05 @ 10:46              .Urine Clean Catch (Midstream)   >100,000 CFU/ml Escherichia coli  >100,000 CFU/ml Enterobacter aerogenes -- 03-03 @ 21:30  .Blood Blood-Peripheral   No growth to date. -- 03-03 @ 21:12  .Blood Blood-Peripheral   No growth to date. -- 03-03 @ 21:10        Radiology: ***  < from: CT Abdomen and Pelvis w/ Oral Cont (03.06.20 @ 18:12) >  FINDINGS:    LOWER CHEST: Small bilateral pleural effusions and bibasilar atelectasis.    LIVER: Normal.  BILE DUCTS: Nondilated.  GALLBLADDER: Gallstones.  SPLEEN: Normal.  PANCREAS: Normal.  ADRENALS: Normal.  KIDNEYS/URETERS: No hydronephrosis or urinary tract calculi. Bilateral pelvic and ureteral fullness. Bilateral renal vascular calcification. Asymmetric atrophy of the right kidney.    BLADDER: Collapsed around a Jacques catheter balloon.  REPRODUCTIVE ORGANS: Status post hysterectomy.    BOWEL: The NG tube is in the stomach.  No bowel-related abnormality. Specifically, no bowel obstruction. Status post colonic resection. 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  PERITONEUM: No free air or ascites. No collection.  VESSELS:  Aortoiliac atherosclerosis without aneurysm.  RETROPERITONEUM/LYMPH NODES: No adenopathy.    ABDOMINAL WALL: Postsurgical changes.  BONES: Right knee replacement. Stable sacral decubitus ulcer with underlying sacral bone erosion. Soft tissue swelling and edema in the medial left arm with suggestion of intravenous line in this area. There is deep soft tissue air in this region as well.    IMPRESSION:     No acute intra-abdominal pathology or collection.    Stable sacral decubitus ulcer with underlying sacral bone erosion. Active osteomyelitis is not excluded.    Soft tissue swelling and edema in the visualized medial left arm with suggestion of an intravenous line in this area. There is deep soft tissue air in this region as well. Correlate for IV infiltration versus soft tissue infection.    < end of copied text >    < from: Xray Chest 1 View- PORTABLE-Urgent (03.05.20 @ 19:53) >  INTERPRETATION:  Clinical information: Endotracheal tube, nasogastric tube, urinary tract infection    Portable exam, 7:41 PM    Comparison study dated 3/5/2020, 3:00 PM    Cardiac monitor leads present. Endotracheal tube present with its tip 3.5 cm above the josue. NG tube present, tip in stomach, left upper quadrant.    Elevated right hemidiaphragm.    Trace airspace disease at the left lung base. Heart size within normal limits. Hilar regions, mediastinal contours intact. No acute osseous abnormalities.        IMPRESSION: See above report    < end of copied text >    < from: Xray Chest 1 View AP/PA (03.05.20 @ 15:24) >  INTERPRETATION:  History: Edema versus aspiration    Portable radiograph of the chest is performed. comparison is made to 3/3/2020.    The heart is not enlarged. There is no focal infiltrate or pleural effusion. Mediastinal and hilar contours are intact. There is osteopenia of the bony structures.    Impression: No active pulmonary disease.    < end of copied text >        CENTRAL LINE: N  JACQUES: Y  A-LINE: N    GLOBAL ISSUE/BEST PRACTICE  Analgesia: Fentanyl PRN  Sedation: Propofol, Precedex  HOB elevation: yes  Stress ulcer prophylaxis: Y   VTE prophylaxis: Y  Glycemic control: Y  Nutrition: NPO    CODE STATUS: Full

## 2020-03-06 NOTE — PROGRESS NOTE ADULT - PROBLEM SELECTOR PLAN 4
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 You, patient not very compliant, finds it difficult to come to office for follow up appointments. Not a candidate for dialysis at this time  - pt had expressed wishes to avoid HD if at all possible in the past.   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 You, patient not very compliant, finds it difficult to come to office for follow up appointments. Not a candidate for dialysis at this time  - pt had expressed wishes to avoid HD if at all possible in the past.   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 You, patient not very compliant, finds it difficult to come to office for follow up appointments.   - pt had expressed wishes to avoid HD if at all possible in the past. If pt's family / HCP agrees to it, nephro may offer initiation of HD soon.  - monitor BMP and fluid status

## 2020-03-06 NOTE — DISCHARGE NOTE PROVIDER - NSDCMRMEDTOKEN_GEN_ALL_CORE_FT
Acidophilus oral capsule: 1 cap(s) orally 3 times a day  aspirin 81 mg oral tablet: 1 tab(s) orally once a day  Bystolic 5 mg oral tablet: 1 tab(s) orally once a day  calcium (as carbonate) 600 mg oral tablet: 1 tab(s) orally once a day  cholestyramine 4 g/5 g oral powder for reconstitution: 4 gram(s) orally once a day  clobetasol 0.05% topical foam: Apply topically to affected area 2 times a day  epoetin jean carlos: coordinated with nephrology  ferrous sulfate 325 mg (65 mg elemental iron) oral delayed release tablet: 1 tab(s) orally once a day  hydrOXYzine pamoate 25 mg oral capsule: 1 cap(s) orally every 8 hours (2 caps in the evening)  Imodium 2 mg oral capsule: 1 tab(s) orally 3 times a day  levocetirizine 5 mg oral tablet: 1 tab(s) orally once a day (in the evening)  lidocaine 5% topical film: Apply topically to affected area once a day  Norvasc 5 mg oral tablet: 1 tab(s) orally once a day  nystatin 100,000 units/g topical powder: Apply topically to affected area 2 times a day  Pravachol 20 mg oral tablet: 1 tab(s) orally once a day  PROzac 20 mg oral capsule: 1 cap(s) orally once a day  Nelly-Ami oral tablet: 1 tab(s) orally once a day  sodium bicarbonate 650 mg oral tablet: 1 tab(s) orally 3 times a day  Symbicort 160 mcg-4.5 mcg/inh inhalation aerosol: 2 puff(s) inhaled 2 times a day  traMADol 50 mg oral tablet: 1 tab(s) orally every 6 hours, As Needed MDD:4  Tylenol 325 mg oral tablet: 2 tab(s) orally every 6 hours, As Needed  Vitamin D3 5000 intl units oral capsule: 1 cap(s) orally once a day cholestyramine 4 g/5 g oral powder for reconstitution: 4 gram(s) orally once a day  clobetasol 0.05% topical foam: Apply topically to affected area 2 times a day  Depakene 250 mg/5 mL oral liquid: 15 milliliter(s) orally 2 times a day   Melatonin 3 mg oral tablet: 1 tab(s) orally once a day (at bedtime)  midodrine 5 mg oral tablet: 1 tab(s) orally 3 times a day  nystatin 100,000 units/g topical powder: Apply topically to affected area 2 times a day  PROzac 20 mg oral capsule: 1 cap(s) orally once a day  Symbicort 160 mcg-4.5 mcg/inh inhalation aerosol: 2 puff(s) inhaled 2 times a day  Tylenol 325 mg oral tablet: 2 tab(s) orally every 6 hours, As Needed cholestyramine 4 g/5 g oral powder for reconstitution: 4 gram(s) orally once a day  clobetasol 0.05% topical foam: Apply topically to affected area 2 times a day  Depakene 250 mg/5 mL oral liquid: 15 milliliter(s) orally 2 times a day   Melatonin 3 mg oral tablet: 1 tab(s) orally once a day (at bedtime)  midodrine 5 mg oral tablet: 1 tab(s) orally 3 times a day  nystatin 100,000 units/g topical powder: Apply topically to affected area 2 times a day  PROzac 20 mg oral capsule: 1 cap(s) orally once a day  Roxicodone 5 mg oral tablet: 1 tab(s) orally every 6 hours, As needed, Severe Pain (7 - 10) MDD:4  Symbicort 160 mcg-4.5 mcg/inh inhalation aerosol: 2 puff(s) inhaled 2 times a day  traMADol 50 mg oral tablet: 1 tab(s) orally every 6 hours, As Needed MDD:4  Tylenol 325 mg oral tablet: 2 tab(s) orally every 6 hours, As Needed

## 2020-03-06 NOTE — PROGRESS NOTE ADULT - PROBLEM SELECTOR PLAN 1
no obvious explanation at this point but of note is fever, no sig rise to wbc, multiple organisms growing in urine in setting of chronic cath, clear CXR and lung exam. Will discuss further with ICU team but suggest further investigations to clarify clinical picture and continue IV abx pending clarification

## 2020-03-06 NOTE — DISCHARGE NOTE PROVIDER - PROVIDER TOKENS
PROVIDER:[TOKEN:[[id number]]] FREE:[LAST:[hospice team],PHONE:[(   )    -],FAX:[(   )    -]],PROVIDER:[TOKEN:[[id number]]]

## 2020-03-06 NOTE — DISCHARGE NOTE PROVIDER - HOSPITAL COURSE
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 RRT on 3/5 for seizure activity, received Ativan. MRI and EEG negative. Started on Valproic Acid. Following seizure patient remained minimally responsive and developed respiratory distress. Transferred to the ICU for intubation for impending respiratory failure. Dyssynchronous on the vent, started on Precedex, Propofol, Fentanyl PRN with improvement. Patient febrile and leukocytosis, abx broadened to Meropenem. 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 RRT on 3/5 for seizure activity, received Ativan. MRI and EEG negative. Started on Valproic Acid. Following seizure patient remained minimally responsive and developed respiratory distress. Transferred to the ICU for intubation for impending respiratory failure. Dyssynchronous on the vent, started on Precedex, Propofol, Fentanyl PRN with improvement. Patient febrile and leukocytosis, likely due to UTI, completed course of antibiotics. Sodium level also improved. Patient has ESRD but family decided not to proceed with HD. Son Scott is HCP, and rst of the family want patient to be discharged home with home care, refused [facility] discharge, family aware of overall poor prognosis. 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 RRT on 3/5 for seizure activity, received Ativan. MRI and EEG negative. Started on Valproic Acid. Following seizure patient remained minimally responsive and developed respiratory distress. Transferred to the ICU for intubation for impending respiratory failure. Dyssynchronous on the vent, started on Precedex, Propofol, Fentanyl PRN with improvement. Patient febrile and leukocytosis, likely due to UTI, completed course of antibiotics. Sodium level also improved. Patient has ESRD, was trialed on HD in setting of worsening renal function but was unable to tolerate it due to hypotension. Decision was made by family to pursue palliative eval in setting of worsening medical condition and multiple comorbidities. 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 Patient's daughter present at bedside providing history due to patient AMS.  Patient daughter reports that over the past few days, patient seems weaker than usual (not able to empty ostomy, not able to assist with sacral wound cleaning, etc.) Last night she seemed confused, and was answering questions inappropriately, and having difficulty communicating. She has not had decreased urine output, and has not had any recent complaints. Daughter reports that a similar episode occurred over the summer, when the patient had a UTI. Patient currently does not remember the events of last night/this morning. She reports feeling cold, irritation of the skin around her ostomy site, sacral pain, and suprapubic discomfort (baseline). Patient denies fevers, chest pain, palpitations, LE edema.        ED Course:    Vitals: Temp 97.9, HR 71, /77 ->211/95->194/94, RR 18,  O2 sat 100 on RA.    Labs:  WBC 6.77, H/h 9.8/31.7, RDW 15.1, glucose 180, lactate .6, K 6.3 ->5.3, Cl 118, bicarb 14, anion gap 12, BUN/Cr 45/4 (was ~6 last admission, baseline <5), albumin 2.8, GFR 10    UA: color: pink, appearance: turbid, protein 500, LE moderate, blood large, WBC 26-50, RBC>50, bacteria moderate.    CT head: Negative for intracranial hemorrhage or acute territorial hemorrhage    CXR: Questionable upper lobe infiltrates    EKG: NSR without peaked t waves and no ischemic changes    ABG - ( 03 Mar 2020 16:09 )    pH, Arterial: 7.31  pH, Blood: x     /  pCO2: 31    /  pO2: 87    / HCO3: 17    / Base Excess: -9.7  /  SaO2: 96        Patient s/p zosyn, 1.7L NS blous, 1.7 LR bolus, insulin, albuterol, sodium bicarb 50 meq IV, kayexalate with improvement.    Renae removed and replaced with 50 cc urine output upon insertion. 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 RRT on 3/5 for seizure activity, received Ativan. MRI and EEG negative. Started on Valproic Acid. Following seizure patient remained minimally responsive and developed respiratory distress. Transferred to the ICU for intubation for impending respiratory failure. Dyssynchronous on the vent, started on Precedex, Propofol, Fentanyl PRN with improvement. Patient febrile and leukocytosis, likely due to UTI, completed course of antibiotics. Sodium level also improved. Patient has ESRD, was trialed on HD in setting of worsening renal function but was unable to tolerate it due to hypotension. Decision was made by family to pursue palliative eval in setting of worsening medical condition and multiple comorbidities.         Patient was medically optimized and improved clinically throughout hospital course. Patient seen and examined on day of discharge.        Vital Signs    T(C): 36.3 (27 Mar 2020 05:00), Max: 36.8 (26 Mar 2020 14:26)    T(F): 97.3 (27 Mar 2020 05:00), Max: 98.3 (26 Mar 2020 14:26)    HR: 86 (27 Mar 2020 05:00) (86 - 95)    BP: 132/84 (27 Mar 2020 05:00) (109/64 - 132/84)    RR: 18 (27 Mar 2020 05:00) (16 - 18)    SpO2: 98% (27 Mar 2020 05:00) (94% - 98%)        Physical Exam:    General: well-developed, well-nourished, NAD    HEENT: normocephalic, atraumatic, EOMI, moist mucous membranes     Neck: supple, non-tender, no masses    Neurology: AAOx3, sensation intact    Respiratory: clear to auscultation bilaterally; no wheezes, rhonchi, or rales    CV: regular rate and rhythm, soft S1/S2, no murmurs, rubs, or gallops    Abdominal: soft, non-tender, non-distended, bowel sounds present    Extremities: no clubbing, cyanosis, or edema; palpable peripheral pulses    Musculoskeletal: no joint erythema or warmth, no joint swelling     Skin: warm, dry, normal color        Patient is medically stable for discharge to ____ with outpatient follow up.    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 Patient's daughter present at bedside providing history due to patient AMS.  Patient daughter reports that over the past few days, patient seems weaker than usual (not able to empty ostomy, not able to assist with sacral wound cleaning, etc.) Last night she seemed confused, and was answering questions inappropriately, and having difficulty communicating. She has not had decreased urine output, and has not had any recent complaints. Daughter reports that a similar episode occurred over the summer, when the patient had a UTI. Patient currently does not remember the events of last night/this morning. She reports feeling cold, irritation of the skin around her ostomy site, sacral pain, and suprapubic discomfort (baseline). Patient denies fevers, chest pain, palpitations, LE edema.        ED Course:    Vitals: Temp 97.9, HR 71, /77 ->211/95->194/94, RR 18,  O2 sat 100 on RA.    Labs:  WBC 6.77, H/h 9.8/31.7, RDW 15.1, glucose 180, lactate .6, K 6.3 ->5.3, Cl 118, bicarb 14, anion gap 12, BUN/Cr 45/4 (was ~6 last admission, baseline <5), albumin 2.8, GFR 10    UA: color: pink, appearance: turbid, protein 500, LE moderate, blood large, WBC 26-50, RBC>50, bacteria moderate.    CT head: Negative for intracranial hemorrhage or acute territorial hemorrhage    CXR: Questionable upper lobe infiltrates    EKG: NSR without peaked t waves and no ischemic changes    ABG - ( 03 Mar 2020 16:09 )    pH, Arterial: 7.31  pH, Blood: x     /  pCO2: 31    /  pO2: 87    / HCO3: 17    / Base Excess: -9.7  /  SaO2: 96        Patient s/p zosyn, 1.7L NS blous, 1.7 LR bolus, insulin, albuterol, sodium bicarb 50 meq IV, kayexalate with improvement.    Renae removed and replaced with 50 cc urine output upon insertion. 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 RRT on 3/5 for seizure activity, received Ativan. MRI and EEG negative. Started on Valproic Acid. Following seizure patient remained minimally responsive and developed respiratory distress. Transferred to the ICU for intubation for impending respiratory failure. Dyssynchronous on the vent, started on Precedex, Propofol, Fentanyl PRN with improvement. Patient febrile and leukocytosis, likely due to UTI, completed course of antibiotics. Sodium level also improved. Patient has ESRD, was trialed on HD in setting of worsening renal function but was unable to tolerate it due to hypotension. Decision was made by family to pursue palliative eval in setting of worsening medical condition and multiple comorbidities.         Patient was medically optimized and improved clinically throughout hospital course. Patient seen and examined on day of discharge.        Vital Signs    T(C): 36.3 (27 Mar 2020 05:00), Max: 36.8 (26 Mar 2020 14:26)    T(F): 97.3 (27 Mar 2020 05:00), Max: 98.3 (26 Mar 2020 14:26)    HR: 86 (27 Mar 2020 05:00) (86 - 95)    BP: 132/84 (27 Mar 2020 05:00) (109/64 - 132/84)    RR: 18 (27 Mar 2020 05:00) (16 - 18)    SpO2: 98% (27 Mar 2020 05:00) (94% - 98%)        Physical Exam:    General: well-developed, well-nourished, NAD    HEENT: normocephalic, atraumatic, EOMI, moist mucous membranes     Neck: supple, non-tender, no masses    Neurology: AAOx3, sensation intact    Respiratory: clear to auscultation bilaterally; no wheezes, rhonchi, or rales    CV: regular rate and rhythm, soft S1/S2, no murmurs, rubs, or gallops    Abdominal: soft, non-tender, non-distended, bowel sounds present    Extremities: no clubbing, cyanosis, or edema; palpable peripheral pulses    Musculoskeletal: no joint erythema or warmth, no joint swelling     Skin: warm, dry, normal color        Patient is medically stable for discharge to home with Hospice with outpatient follow up.    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 Patient's daughter present at bedside providing history due to patient AMS.  Patient daughter reports that over the past few days, patient seems weaker than usual (not able to empty ostomy, not able to assist with sacral wound cleaning, etc.) Last night she seemed confused, and was answering questions inappropriately, and having difficulty communicating. She has not had decreased urine output, and has not had any recent complaints. Daughter reports that a similar episode occurred over the summer, when the patient had a UTI. Patient currently does not remember the events of last night/this morning. She reports feeling cold, irritation of the skin around her ostomy site, sacral pain, and suprapubic discomfort (baseline). Patient denies fevers, chest pain, palpitations, LE edema.        ED Course:    Vitals: Temp 97.9, HR 71, /77 ->211/95->194/94, RR 18,  O2 sat 100 on RA.    Labs:  WBC 6.77, H/h 9.8/31.7, RDW 15.1, glucose 180, lactate .6, K 6.3 ->5.3, Cl 118, bicarb 14, anion gap 12, BUN/Cr 45/4 (was ~6 last admission, baseline <5), albumin 2.8, GFR 10    UA: color: pink, appearance: turbid, protein 500, LE moderate, blood large, WBC 26-50, RBC>50, bacteria moderate.    CT head: Negative for intracranial hemorrhage or acute territorial hemorrhage    CXR: Questionable upper lobe infiltrates    EKG: NSR without peaked t waves and no ischemic changes    ABG - ( 03 Mar 2020 16:09 )    pH, Arterial: 7.31  pH, Blood: x     /  pCO2: 31    /  pO2: 87    / HCO3: 17    / Base Excess: -9.7  /  SaO2: 96        Patient s/p zosyn, 1.7L NS blous, 1.7 LR bolus, insulin, albuterol, sodium bicarb 50 meq IV, kayexalate with improvement.    Renae removed and replaced with 50 cc urine output upon insertion. 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 RRT on 3/5 for seizure activity, received Ativan. MRI and EEG negative. Started on Valproic Acid. Following seizure patient remained minimally responsive and developed respiratory distress. Transferred to the ICU for intubation for impending respiratory failure. Dyssynchronous on the vent, started on Precedex, Propofol, Fentanyl PRN with improvement. Patient febrile and leukocytosis, likely due to UTI, completed course of antibiotics. Sodium level also improved. Patient has ESRD, was trialed on HD in setting of worsening renal function but was unable to tolerate it due to hypotension. Decision was made by family to pursue palliative eval in setting of worsening medical condition and multiple comorbidities.         Patient was medically optimized and improved clinically throughout hospital course. Patient seen and examined on day of discharge.        Vital Signs    T(C): 36.3 (27 Mar 2020 05:00), Max: 36.8 (26 Mar 2020 14:26)    T(F): 97.3 (27 Mar 2020 05:00), Max: 98.3 (26 Mar 2020 14:26)    HR: 86 (27 Mar 2020 05:00) (86 - 95)    BP: 132/84 (27 Mar 2020 05:00) (109/64 - 132/84)    RR: 18 (27 Mar 2020 05:00) (16 - 18)    SpO2: 98% (27 Mar 2020 05:00) (94% - 98%)        Physical Exam:    General: well-developed, well-nourished, NAD    HEENT: normocephalic, atraumatic, EOMI, moist mucous membranes     Neck: supple, non-tender, no masses    Neurology: AAOx2, sensation intact    Respiratory: clear to auscultation bilaterally; no wheezes, rhonchi, or rales    CV: regular rate and rhythm, soft S1/S2, no murmurs, rubs, or gallops    Abdominal: soft, non-tender, non-distended, bowel sounds present    Extremities: no clubbing, cyanosis, or edema; palpable peripheral pulses    Musculoskeletal: no joint erythema or warmth, no joint swelling     Skin: warm, dry, normal color        Patient is medically stable for discharge to home with Hospice with outpatient follow up.    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 Patient's daughter present at bedside providing history due to patient AMS.  Patient daughter reports that over the past few days, patient seems weaker than usual (not able to empty ostomy, not able to assist with sacral wound cleaning, etc.) Last night she seemed confused, and was answering questions inappropriately, and having difficulty communicating. She has not had decreased urine output, and has not had any recent complaints. Daughter reports that a similar episode occurred over the summer, when the patient had a UTI. Patient currently does not remember the events of last night/this morning. She reports feeling cold, irritation of the skin around her ostomy site, sacral pain, and suprapubic discomfort (baseline). Patient denies fevers, chest pain, palpitations, LE edema.        ED Course:    Vitals: Temp 97.9, HR 71, /77 ->211/95->194/94, RR 18,  O2 sat 100 on RA.    Labs:  WBC 6.77, H/h 9.8/31.7, RDW 15.1, glucose 180, lactate .6, K 6.3 ->5.3, Cl 118, bicarb 14, anion gap 12, BUN/Cr 45/4 (was ~6 last admission, baseline <5), albumin 2.8, GFR 10    UA: color: pink, appearance: turbid, protein 500, LE moderate, blood large, WBC 26-50, RBC>50, bacteria moderate.    CT head: Negative for intracranial hemorrhage or acute territorial hemorrhage    CXR: Questionable upper lobe infiltrates    EKG: NSR without peaked t waves and no ischemic changes    ABG - ( 03 Mar 2020 16:09 )    pH, Arterial: 7.31  pH, Blood: x     /  pCO2: 31    /  pO2: 87    / HCO3: 17    / Base Excess: -9.7  /  SaO2: 96        Patient s/p zosyn, 1.7L NS blous, 1.7 LR bolus, insulin, albuterol, sodium bicarb 50 meq IV, kayexalate with improvement.    Renae removed and replaced with 50 cc urine output upon insertion. 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 RRT on 3/5 for seizure activity, received Ativan. MRI and EEG negative. Started on Valproic Acid. Following seizure patient remained minimally responsive and developed respiratory distress. Transferred to the ICU for intubation for impending respiratory failure. Dyssynchronous on the vent, started on Precedex, Propofol, Fentanyl PRN with improvement. Patient febrile and leukocytosis, likely due to UTI, completed course of antibiotics. Sodium level also improved. Patient has ESRD, was trialed on HD in setting of worsening renal function but was unable to tolerate it due to hypotension. Decision was made by family to pursue palliative eval in setting of worsening medical condition and multiple comorbidities.         Patient was medically optimized and improved clinically throughout hospital course. Patient seen and examined on day of discharge.        Vital Signs    T(C): 36.3 (27 Mar 2020 05:00), Max: 36.8 (26 Mar 2020 14:26)    T(F): 97.3 (27 Mar 2020 05:00), Max: 98.3 (26 Mar 2020 14:26)    HR: 86 (27 Mar 2020 05:00) (86 - 95)    BP: 132/84 (27 Mar 2020 05:00) (109/64 - 132/84)    RR: 18 (27 Mar 2020 05:00) (16 - 18)    SpO2: 98% (27 Mar 2020 05:00) (94% - 98%)        Physical Exam:    General: well-developed, well-nourished, NAD    HEENT: normocephalic, atraumatic, EOMI, moist mucous membranes     Neck: supple, non-tender, no masses    Neurology: AAOx2, sensation intact    Respiratory: clear to auscultation bilaterally; no wheezes, rhonchi, or rales    CV: regular rate and rhythm, soft S1/S2, no murmurs, rubs, or gallops    Abdominal: soft, non-tender, non-distended, bowel sounds present    Extremities: no clubbing, cyanosis, or edema; palpable peripheral pulses    Musculoskeletal: no joint erythema or warmth, no joint swelling     Skin: warm, dry, normal color        Patient is medically stable for discharge to home with Hospice with outpatient follow up.    ---    ---    FINAL DISCHARGE DIAGNOSIS LIST:    Please see last daily progress note for final discharge diagnoses    Spent 40 min and notified pcp             Addendum:        Patient had Sepsis due to UTI due to chronic Renae POA.

## 2020-03-06 NOTE — DISCHARGE NOTE PROVIDER - NSDCHHASSISTOTHER_GEN_ALL_CORE_FT
woun care instructions: Please cleanse with NS, apply aquacel, cover with 4x4, secure with tegaderm QOD

## 2020-03-06 NOTE — PROGRESS NOTE ADULT - PROBLEM SELECTOR PLAN 3
- likely was due to acute neuro event such as seizure vs acute metabolic encephalopathy (due to UTI or due to worsening renal failure / uremia) or a combination  - repeat CT head and MRI negative for acute intracranial hemorrhage or infarct  - Hold centrally acting meds: hydroxyzine, tramadol - likely was due to acute neuro event such as seizure vs acute metabolic encephalopathy (due to UTI or due to worsening renal failure / uremia) or a combination  - repeat CT head and MRI negative for acute intracranial hemorrhage or infarct  - Sedation while intubated with propofol, precedex and fentanyl PRN

## 2020-03-06 NOTE — PROGRESS NOTE ADULT - SUBJECTIVE AND OBJECTIVE BOX
CC:  Patient is a 84y old  Female who presents with a chief complaint of altered mental status (05 Mar 2020 18:05)      HPI/BRIEF HOSPITAL COURSE: ***    Events last 24 hours: ***    PAST MEDICAL & SURGICAL HISTORY:  Wound of sacral region  Depression  Iron deficiency anemia  Eczema  HTN (hypertension)  CKD (chronic kidney disease) stage 5, GFR less than 15 ml/min: not on HD  Herniated lumbar intervertebral disc  Tremor  Kidney atrophy: right  Colostomy status: 2006  Chronic UTI (urinary tract infection): chronic renae  Cervical cancer: 1970&#x27;s  Dyslipidemia  Hernia, incisional  S/P hip replacement: right 2013  S/P colon resection: 2006  S/P hysterectomy: 1977    Allergies    Levaquin (Pruritus)  mercury (Pruritus; Rash)  sulfa drugs (Pruritus)    Intolerances      FAMILY HISTORY:  Family history of ovarian cancer (Sibling): mother      Review of Systems:  CONSTITUTIONAL: No fever, chills, or fatigue  EYES: No eye pain, visual disturbances, or discharge  ENMT:  No difficulty hearing, tinnitus, vertigo; No sinus or throat pain  NECK: No pain or stiffness  RESPIRATORY: No cough, wheezing, chills or hemoptysis; No shortness of breath  CARDIOVASCULAR: No chest pain, palpitations, dizziness, or leg swelling  GASTROINTESTINAL: No abdominal or epigastric pain. No nausea, vomiting, or hematemesis; No diarrhea or constipation. No melena or hematochezia.  GENITOURINARY: No dysuria, frequency, hematuria, or incontinence  NEUROLOGICAL: No headaches, memory loss, loss of strength, numbness, or tremors  SKIN: No itching, burning, rashes, or lesions   MUSCULOSKELETAL: No joint pain or swelling; No muscle, back, or extremity pain  PSYCHIATRIC: No depression, anxiety, mood swings, or difficulty sleeping      Medications:  meropenem  IVPB 500 milliGRAM(s) IV Intermittent every 12 hours  piperacillin/tazobactam IVPB.. 3.375 Gram(s) IV Intermittent every 12 hours    amLODIPine   Tablet 10 milliGRAM(s) Oral daily  hydrALAZINE 50 milliGRAM(s) Oral every 8 hours  metoprolol tartrate 25 milliGRAM(s) Oral two times a day    budesonide 160 MICROgram(s)/formoterol 4.5 MICROgram(s) Inhaler 2 Puff(s) Inhalation two times a day  loratadine 10 milliGRAM(s) Oral daily    acetaminophen   Tablet .. 650 milliGRAM(s) Oral every 6 hours PRN  dexMEDEtomidine Infusion 0.5 MICROgram(s)/kG/Hr IV Continuous <Continuous>  fentaNYL    Injectable 50 MICROGram(s) IV Push every 6 hours PRN  FLUoxetine 20 milliGRAM(s) Oral daily  propofol Infusion 10 MICROgram(s)/kG/Min IV Continuous <Continuous>  valproate sodium IVPB 500 milliGRAM(s) IV Intermittent every 12 hours      aspirin enteric coated 81 milliGRAM(s) Oral daily  heparin  Injectable 5000 Unit(s) SubCutaneous every 8 hours    pantoprazole  Injectable 40 milliGRAM(s) IV Push daily      atorvastatin 10 milliGRAM(s) Oral at bedtime  cholestyramine Powder (Sugar-Free) 4 Gram(s) Oral daily    calcium carbonate 1250 mG  + Vitamin D (OsCal 500 + D) 1 Tablet(s) Oral daily  cholecalciferol 1000 Unit(s) Oral daily  ferrous    sulfate 325 milliGRAM(s) Oral daily  iron sucrose Injectable 100 milliGRAM(s) IV Push every 24 hours  lactated ringers. 1000 milliLiter(s) IV Continuous <Continuous>  multivitamin 1 Tablet(s) Oral daily  sodium bicarbonate 650 milliGRAM(s) Oral three times a day    epoetin jean carlos Injectable 87339 Unit(s) SubCutaneous <User Schedule>    chlorhexidine 0.12% Liquid 15 milliLiter(s) Oral Mucosa every 12 hours  clobetasol 0.05% Cream 1 Application(s) Topical two times a day  lidocaine   Patch 1 Patch Transdermal daily PRN  nystatin Cream 1 Application(s) Topical two times a day    lactobacillus acidophilus 1 Tablet(s) Oral three times a day      Mode: AC/ CMV (Assist Control/ Continuous Mandatory Ventilation)  RR (machine): 12  TV (machine): 400  FiO2: 30  PEEP: 5  ITime: 1  MAP: 12  PIP: 24      ICU Vital Signs Last 24 Hrs  T(C): 38.9 (06 Mar 2020 01:14), Max: 39.2 (05 Mar 2020 20:00)  T(F): 102 (06 Mar 2020 01:14), Max: 102.6 (05 Mar 2020 20:00)  HR: 113 (06 Mar 2020 00:00) (66 - 118)  BP: 177/70 (06 Mar 2020 00:00) (172/81 - 196/94)  BP(mean): 101 (06 Mar 2020 00:00) (101 - 135)  ABP: --  ABP(mean): --  RR: 95 (06 Mar 2020 00:00) (16 - 95)  SpO2: 100% (06 Mar 2020 00:00) (97% - 100%)    Vital Signs Last 24 Hrs  T(C): 38.9 (06 Mar 2020 01:14), Max: 39.2 (05 Mar 2020 20:00)  T(F): 102 (06 Mar 2020 01:14), Max: 102.6 (05 Mar 2020 20:00)  HR: 113 (06 Mar 2020 00:00) (66 - 118)  BP: 177/70 (06 Mar 2020 00:00) (172/81 - 196/94)  BP(mean): 101 (06 Mar 2020 00:00) (101 - 135)  RR: 95 (06 Mar 2020 00:00) (16 - 95)  SpO2: 100% (06 Mar 2020 00:00) (97% - 100%)    ABG - ( 05 Mar 2020 20:37 )  pH, Arterial: 7.34  pH, Blood: x     /  pCO2: 33    /  pO2: 190   / HCO3: 18    / Base Excess: -7.8  /  SaO2: 100                 I&O's Detail    04 Mar 2020 07:01  -  05 Mar 2020 07:00  --------------------------------------------------------  IN:    sodium chloride 0.45%: 900 mL    Solution: 125 mL  Total IN: 1025 mL    OUT:    Voided: 500 mL  Total OUT: 500 mL    Total NET: 525 mL      05 Mar 2020 07:01  -  06 Mar 2020 01:53  --------------------------------------------------------  IN:    lactated ringers.: 225 mL    propofol Infusion: 25.5 mL    Solution: 25 mL  Total IN: 275.5 mL    OUT:    Colostomy: 400 mL    Indwelling Catheter - Urethral: 550 mL  Total OUT: 950 mL    Total NET: -674.5 mL            LABS:                        10.6   8.46  )-----------( 311      ( 05 Mar 2020 10:46 )             34.1     03-05    143  |  115<H>  |  41<H>  ----------------------------<  189<H>  4.9   |  17<L>  |  4.10<H>    Ca    7.4<L>      05 Mar 2020 14:49  Phos  4.5     03-05  Mg     2.0     03-05    TPro  7.1  /  Alb  3.0<L>  /  TBili  0.2  /  DBili  x   /  AST  21  /  ALT  18  /  AlkPhos  65  03-05          CAPILLARY BLOOD GLUCOSE      POCT Blood Glucose.: 167 mg/dL (05 Mar 2020 20:09)        CULTURES:  Culture Results:   >100,000 CFU/ml Escherichia coli  >100,000 CFU/ml Enterobacter aerogenes (03-03 @ 21:30)  Culture Results:   No growth to date. (03-03 @ 21:12)  Culture Results:   No growth to date. (03-03 @ 21:10)    meropenem  IVPB 500 milliGRAM(s) IV Intermittent every 12 hours  piperacillin/tazobactam IVPB.. 3.375 Gram(s) IV Intermittent every 12 hours      Physical Examination:  General: No acute distress.  Alert, oriented, interactive, nonfocal  NEURO: A&O X3, motor function 5/5 BL UE/LE  HEENT: Pupils equal, reactive to light.  Symmetric.  PULM: CTA BL, no significant sputum production, no wheezes, rales, rhonchi  CVS: Regular rate and rhythm, no murmurs, rubs, or gallops  ABD: Soft, nondistended, nontender, normoactive bowel sounds, no masses  EXT: No edema, nontender  SKIN: Warm and well perfused, no rashes noted      EKG: ***    RADIOLOGY: ***      CENTRAL LINE: N          DATE INSERTED:                  RENAE: N                        DATE INSERTED:                  A-LINE: N                       DATE INSERTED:                  GLOBAL ISSUE/BEST PRACTICE:  Analgesia: N/A  Sedation: N/A  HOB elevation: yes  Stress ulcer prophylaxis: protonix   VTE prophylaxis: SCD/Heparin SQ/Lovenox SQ  Glycemic control: N/A  Nutrition: NPO/Diet/TF CC:  Patient is a 84y old  Female who presents with a chief complaint of altered mental status (05 Mar 2020 18:05)      HPI/BRIEF HOSPITAL COURSE: ***    Events last 24 hours: ***    PAST MEDICAL & SURGICAL HISTORY:  Wound of sacral region  Depression  Iron deficiency anemia  Eczema  HTN (hypertension)  CKD (chronic kidney disease) stage 5, GFR less than 15 ml/min: not on HD  Herniated lumbar intervertebral disc  Tremor  Kidney atrophy: right  Colostomy status: 2006  Chronic UTI (urinary tract infection): chronic renae  Cervical cancer: 1970&#x27;s  Dyslipidemia  Hernia, incisional  S/P hip replacement: right 2013  S/P colon resection: 2006  S/P hysterectomy: 1977    Allergies    Levaquin (Pruritus)  mercury (Pruritus; Rash)  sulfa drugs (Pruritus)    Intolerances      FAMILY HISTORY:  Family history of ovarian cancer (Sibling): mother      Review of Systems:  ROS unable to be obtained as pt is intubated and sedated     Medications:  meropenem  IVPB 500 milliGRAM(s) IV Intermittent every 12 hours  piperacillin/tazobactam IVPB.. 3.375 Gram(s) IV Intermittent every 12 hours  amLODIPine   Tablet 10 milliGRAM(s) Oral daily  hydrALAZINE 50 milliGRAM(s) Oral every 8 hours  metoprolol tartrate 25 milliGRAM(s) Oral two times a day  budesonide 160 MICROgram(s)/formoterol 4.5 MICROgram(s) Inhaler 2 Puff(s) Inhalation two times a day  loratadine 10 milliGRAM(s) Oral daily  acetaminophen   Tablet .. 650 milliGRAM(s) Oral every 6 hours PRN  dexMEDEtomidine Infusion 0.5 MICROgram(s)/kG/Hr IV Continuous <Continuous>  fentaNYL    Injectable 50 MICROGram(s) IV Push every 6 hours PRN  FLUoxetine 20 milliGRAM(s) Oral daily  propofol Infusion 10 MICROgram(s)/kG/Min IV Continuous <Continuous>  valproate sodium IVPB 500 milliGRAM(s) IV Intermittent every 12 hours  aspirin enteric coated 81 milliGRAM(s) Oral daily  heparin  Injectable 5000 Unit(s) SubCutaneous every 8 hours  pantoprazole  Injectable 40 milliGRAM(s) IV Push daily  atorvastatin 10 milliGRAM(s) Oral at bedtime  cholestyramine Powder (Sugar-Free) 4 Gram(s) Oral daily  calcium carbonate 1250 mG  + Vitamin D (OsCal 500 + D) 1 Tablet(s) Oral daily  cholecalciferol 1000 Unit(s) Oral daily  ferrous    sulfate 325 milliGRAM(s) Oral daily  iron sucrose Injectable 100 milliGRAM(s) IV Push every 24 hours  lactated ringers. 1000 milliLiter(s) IV Continuous <Continuous>  multivitamin 1 Tablet(s) Oral daily  sodium bicarbonate 650 milliGRAM(s) Oral three times a day  epoetin jean carlos Injectable 18634 Unit(s) SubCutaneous <User Schedule>  chlorhexidine 0.12% Liquid 15 milliLiter(s) Oral Mucosa every 12 hours  clobetasol 0.05% Cream 1 Application(s) Topical two times a day  lidocaine   Patch 1 Patch Transdermal daily PRN  nystatin Cream 1 Application(s) Topical two times a day  lactobacillus acidophilus 1 Tablet(s) Oral three times a day    Mode: AC/ CMV (Assist Control/ Continuous Mandatory Ventilation)  RR (machine): 12  TV (machine): 400  FiO2: 30  PEEP: 5  ITime: 1  MAP: 12  PIP: 24      ICU Vital Signs Last 24 Hrs  T(C): 38.9 (06 Mar 2020 01:14), Max: 39.2 (05 Mar 2020 20:00)  T(F): 102 (06 Mar 2020 01:14), Max: 102.6 (05 Mar 2020 20:00)  HR: 113 (06 Mar 2020 00:00) (66 - 118)  BP: 177/70 (06 Mar 2020 00:00) (172/81 - 196/94)  BP(mean): 101 (06 Mar 2020 00:00) (101 - 135)  ABP: --  ABP(mean): --  RR: 95 (06 Mar 2020 00:00) (16 - 95)  SpO2: 100% (06 Mar 2020 00:00) (97% - 100%)    Vital Signs Last 24 Hrs  T(C): 38.9 (06 Mar 2020 01:14), Max: 39.2 (05 Mar 2020 20:00)  T(F): 102 (06 Mar 2020 01:14), Max: 102.6 (05 Mar 2020 20:00)  HR: 113 (06 Mar 2020 00:00) (66 - 118)  BP: 177/70 (06 Mar 2020 00:00) (172/81 - 196/94)  BP(mean): 101 (06 Mar 2020 00:00) (101 - 135)  RR: 95 (06 Mar 2020 00:00) (16 - 95)  SpO2: 100% (06 Mar 2020 00:00) (97% - 100%)    ABG - ( 05 Mar 2020 20:37 )  pH, Arterial: 7.34  pH, Blood: x     /  pCO2: 33    /  pO2: 190   / HCO3: 18    / Base Excess: -7.8  /  SaO2: 100                 I&O's Detail    04 Mar 2020 07:01  -  05 Mar 2020 07:00  --------------------------------------------------------  IN:    sodium chloride 0.45%: 900 mL    Solution: 125 mL  Total IN: 1025 mL    OUT:    Voided: 500 mL  Total OUT: 500 mL    Total NET: 525 mL      05 Mar 2020 07:01  -  06 Mar 2020 01:53  --------------------------------------------------------  IN:    lactated ringers.: 225 mL    propofol Infusion: 25.5 mL    Solution: 25 mL  Total IN: 275.5 mL    OUT:    Colostomy: 400 mL    Indwelling Catheter - Urethral: 550 mL  Total OUT: 950 mL    Total NET: -674.5 mL            LABS:                        10.6   8.46  )-----------( 311      ( 05 Mar 2020 10:46 )             34.1     03-05    143  |  115<H>  |  41<H>  ----------------------------<  189<H>  4.9   |  17<L>  |  4.10<H>    Ca    7.4<L>      05 Mar 2020 14:49  Phos  4.5     03-05  Mg     2.0     03-05    TPro  7.1  /  Alb  3.0<L>  /  TBili  0.2  /  DBili  x   /  AST  21  /  ALT  18  /  AlkPhos  65  03-05          CAPILLARY BLOOD GLUCOSE      POCT Blood Glucose.: 167 mg/dL (05 Mar 2020 20:09)        CULTURES:  Culture Results:   >100,000 CFU/ml Escherichia coli  >100,000 CFU/ml Enterobacter aerogenes (03-03 @ 21:30)  Culture Results:   No growth to date. (03-03 @ 21:12)  Culture Results:   No growth to date. (03-03 @ 21:10)    meropenem  IVPB 500 milliGRAM(s) IV Intermittent every 12 hours  piperacillin/tazobactam IVPB.. 3.375 Gram(s) IV Intermittent every 12 hours      Physical Examination:  General: No acute distress.  Alert, intubated and sedated to a RASS 0 to -1, nonfocal  NEURO: Alert, intubated and sedated to a RASS 0 to -1, does not follow purposeful sedation  motor function 5/5 BL UE/LE  HEENT: Pupils equal, reactive to light.  Symmetric.  PULM: coarse BS anteriorly, distant BS at bases   CVS: Regular rate and rhythm, no murmurs, rubs, or gallops  ABD: Soft, nondistended, nontender, normoactive bowel sounds, no masses  EXT: No edema, excoriations and abrasions on BL legs  SKIN: Warm and well perfused, sacral decubitus, wound non-purulent       EKG: Sinus tachycardia     RADIOLOGY: < from: Xray Chest 1 View AP/PA (03.05.20 @ 15:24) >  The heart is not enlarged. There is no focal infiltrate or pleural effusion. Mediastinal and hilar contours are intact. There is osteopenia of the bony structures.    Impression: No active pulmonary disease.    < end of copied text >        CENTRAL LINE: N          DATE INSERTED:                  RENAE: Y                        DATE INSERTED:                  A-LINE: N                       DATE INSERTED:                  GLOBAL ISSUE/BEST PRACTICE:  Analgesia: Fentanyl IVP  Sedation: propofol gtt and precedex gtt  HOB elevation: yes  Stress ulcer prophylaxis: protonix   VTE prophylaxis: Heparin SQ  Glycemic control: N/A  Nutrition: NPO CC:  Patient is a 84y old  Female who presents with a chief complaint of altered mental status (05 Mar 2020 18:05)      HPI/BRIEF HOSPITAL COURSE:   84F with PMH of CKD5 (baseline creatinine <5), 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), cervical cancer (s/p radical hysterectomy and radiation), depression, HTN, HLD, hx of frequent UTIs with chronic indwelling Renae, stage 4 sacral wound (recent Osteomyelitis), admitted for metabolic encephalopathy 2/2 to acute hypercapnic respiratory failure v ?new onset seizure disorder v UTI.     Events last 24 hours:   ON pt very dyssynchronous on vent, with tachypnea  In addition to propofol gtt was started on precedex gtt and fentanyl pushes. Pt also HTN ON likely from difficulty sedating on vent. Once pt was adequately sedated to a RASS -1 pt tachypnea and HTN resolved   Dose of decadron given for tongue swelling, pt bit tongue post seizure. tongue swelling improved. Pt UCx (+) for Ecoli and Enterbacter, -ABX - broadened from zosyn to meropenum       PAST MEDICAL & SURGICAL HISTORY:  Wound of sacral region  Depression  Iron deficiency anemia  Eczema  HTN (hypertension)  CKD (chronic kidney disease) stage 5, GFR less than 15 ml/min: not on HD  Herniated lumbar intervertebral disc  Tremor  Kidney atrophy: right  Colostomy status: 2006  Chronic UTI (urinary tract infection): chronic renae  Cervical cancer: 1970&#x27;s  Dyslipidemia  Hernia, incisional  S/P hip replacement: right 2013  S/P colon resection: 2006  S/P hysterectomy: 1977    Allergies    Levaquin (Pruritus)  mercury (Pruritus; Rash)  sulfa drugs (Pruritus)    Intolerances      FAMILY HISTORY:  Family history of ovarian cancer (Sibling): mother      Review of Systems:  ROS unable to be obtained as pt is intubated and sedated     Medications:  meropenem  IVPB 500 milliGRAM(s) IV Intermittent every 12 hours  piperacillin/tazobactam IVPB.. 3.375 Gram(s) IV Intermittent every 12 hours  amLODIPine   Tablet 10 milliGRAM(s) Oral daily  hydrALAZINE 50 milliGRAM(s) Oral every 8 hours  metoprolol tartrate 25 milliGRAM(s) Oral two times a day  budesonide 160 MICROgram(s)/formoterol 4.5 MICROgram(s) Inhaler 2 Puff(s) Inhalation two times a day  loratadine 10 milliGRAM(s) Oral daily  acetaminophen   Tablet .. 650 milliGRAM(s) Oral every 6 hours PRN  dexMEDEtomidine Infusion 0.5 MICROgram(s)/kG/Hr IV Continuous <Continuous>  fentaNYL    Injectable 50 MICROGram(s) IV Push every 6 hours PRN  FLUoxetine 20 milliGRAM(s) Oral daily  propofol Infusion 10 MICROgram(s)/kG/Min IV Continuous <Continuous>  valproate sodium IVPB 500 milliGRAM(s) IV Intermittent every 12 hours  aspirin enteric coated 81 milliGRAM(s) Oral daily  heparin  Injectable 5000 Unit(s) SubCutaneous every 8 hours  pantoprazole  Injectable 40 milliGRAM(s) IV Push daily  atorvastatin 10 milliGRAM(s) Oral at bedtime  cholestyramine Powder (Sugar-Free) 4 Gram(s) Oral daily  calcium carbonate 1250 mG  + Vitamin D (OsCal 500 + D) 1 Tablet(s) Oral daily  cholecalciferol 1000 Unit(s) Oral daily  ferrous    sulfate 325 milliGRAM(s) Oral daily  iron sucrose Injectable 100 milliGRAM(s) IV Push every 24 hours  lactated ringers. 1000 milliLiter(s) IV Continuous <Continuous>  multivitamin 1 Tablet(s) Oral daily  sodium bicarbonate 650 milliGRAM(s) Oral three times a day  epoetin jean carlos Injectable 60316 Unit(s) SubCutaneous <User Schedule>  chlorhexidine 0.12% Liquid 15 milliLiter(s) Oral Mucosa every 12 hours  clobetasol 0.05% Cream 1 Application(s) Topical two times a day  lidocaine   Patch 1 Patch Transdermal daily PRN  nystatin Cream 1 Application(s) Topical two times a day  lactobacillus acidophilus 1 Tablet(s) Oral three times a day    Mode: AC/ CMV (Assist Control/ Continuous Mandatory Ventilation)  RR (machine): 12  TV (machine): 400  FiO2: 30  PEEP: 5  ITime: 1  MAP: 12  PIP: 24      ICU Vital Signs Last 24 Hrs  T(C): 38.9 (06 Mar 2020 01:14), Max: 39.2 (05 Mar 2020 20:00)  T(F): 102 (06 Mar 2020 01:14), Max: 102.6 (05 Mar 2020 20:00)  HR: 113 (06 Mar 2020 00:00) (66 - 118)  BP: 177/70 (06 Mar 2020 00:00) (172/81 - 196/94)  BP(mean): 101 (06 Mar 2020 00:00) (101 - 135)  ABP: --  ABP(mean): --  RR: 95 (06 Mar 2020 00:00) (16 - 95)  SpO2: 100% (06 Mar 2020 00:00) (97% - 100%)    Vital Signs Last 24 Hrs  T(C): 38.9 (06 Mar 2020 01:14), Max: 39.2 (05 Mar 2020 20:00)  T(F): 102 (06 Mar 2020 01:14), Max: 102.6 (05 Mar 2020 20:00)  HR: 113 (06 Mar 2020 00:00) (66 - 118)  BP: 177/70 (06 Mar 2020 00:00) (172/81 - 196/94)  BP(mean): 101 (06 Mar 2020 00:00) (101 - 135)  RR: 95 (06 Mar 2020 00:00) (16 - 95)  SpO2: 100% (06 Mar 2020 00:00) (97% - 100%)    ABG - ( 05 Mar 2020 20:37 )  pH, Arterial: 7.34  pH, Blood: x     /  pCO2: 33    /  pO2: 190   / HCO3: 18    / Base Excess: -7.8  /  SaO2: 100                 I&O's Detail    04 Mar 2020 07:01  -  05 Mar 2020 07:00  --------------------------------------------------------  IN:    sodium chloride 0.45%: 900 mL    Solution: 125 mL  Total IN: 1025 mL    OUT:    Voided: 500 mL  Total OUT: 500 mL    Total NET: 525 mL      05 Mar 2020 07:01  -  06 Mar 2020 01:53  --------------------------------------------------------  IN:    lactated ringers.: 225 mL    propofol Infusion: 25.5 mL    Solution: 25 mL  Total IN: 275.5 mL    OUT:    Colostomy: 400 mL    Indwelling Catheter - Urethral: 550 mL  Total OUT: 950 mL    Total NET: -674.5 mL            LABS:                        10.6   8.46  )-----------( 311      ( 05 Mar 2020 10:46 )             34.1     03-05    143  |  115<H>  |  41<H>  ----------------------------<  189<H>  4.9   |  17<L>  |  4.10<H>    Ca    7.4<L>      05 Mar 2020 14:49  Phos  4.5     03-05  Mg     2.0     03-05    TPro  7.1  /  Alb  3.0<L>  /  TBili  0.2  /  DBili  x   /  AST  21  /  ALT  18  /  AlkPhos  65  03-05          CAPILLARY BLOOD GLUCOSE      POCT Blood Glucose.: 167 mg/dL (05 Mar 2020 20:09)        CULTURES:  Culture Results:   >100,000 CFU/ml Escherichia coli  >100,000 CFU/ml Enterobacter aerogenes (03-03 @ 21:30)  Culture Results:   No growth to date. (03-03 @ 21:12)  Culture Results:   No growth to date. (03-03 @ 21:10)    meropenem  IVPB 500 milliGRAM(s) IV Intermittent every 12 hours  piperacillin/tazobactam IVPB.. 3.375 Gram(s) IV Intermittent every 12 hours      Physical Examination:  General: No acute distress.  Alert, intubated and sedated to a RASS 0 to -1, nonfocal  NEURO: Alert, intubated and sedated to a RASS 0 to -1, does not follow purposeful sedation  motor function 5/5 BL UE/LE  HEENT: Pupils equal, reactive to light.  Symmetric.  PULM: coarse BS anteriorly, distant BS at bases   CVS: Regular rate and rhythm, no murmurs, rubs, or gallops  ABD: Soft, nondistended, nontender, normoactive bowel sounds, no masses  EXT: No edema, excoriations and abrasions on BL legs  SKIN: Warm and well perfused, sacral decubitus, wound non-purulent       EKG: Sinus tachycardia     RADIOLOGY: < from: Xray Chest 1 View AP/PA (03.05.20 @ 15:24) >  The heart is not enlarged. There is no focal infiltrate or pleural effusion. Mediastinal and hilar contours are intact. There is osteopenia of the bony structures.    Impression: No active pulmonary disease.    < end of copied text >        CENTRAL LINE: N          DATE INSERTED:                  RENAE: Y                        DATE INSERTED:                  A-LINE: N                       DATE INSERTED:                  GLOBAL ISSUE/BEST PRACTICE:  Analgesia: Fentanyl IVP  Sedation: propofol gtt and precedex gtt  HOB elevation: yes  Stress ulcer prophylaxis: protonix   VTE prophylaxis: Heparin SQ  Glycemic control: N/A  Nutrition: NPO

## 2020-03-06 NOTE — PROGRESS NOTE ADULT - SUBJECTIVE AND OBJECTIVE BOX
infectious diseases progress note:    GURJIT HERRERA is a 84y y. o. Female patient    Patient remains nonverbal and intubated    Allergies    Levaquin (Pruritus)  mercury (Pruritus; Rash)  sulfa drugs (Pruritus)    Intolerances        ANTIBIOTICS/RELEVANT:  antimicrobials  meropenem  IVPB 500 milliGRAM(s) IV Intermittent every 12 hours    immunologic:  epoetin jean carlos Injectable 20041 Unit(s) SubCutaneous <User Schedule>    OTHER:  acetaminophen    Suspension .. 650 milliGRAM(s) Oral every 6 hours PRN  amLODIPine   Tablet 10 milliGRAM(s) Oral daily  aspirin enteric coated 81 milliGRAM(s) Oral daily  atorvastatin 10 milliGRAM(s) Oral at bedtime  budesonide 160 MICROgram(s)/formoterol 4.5 MICROgram(s) Inhaler 2 Puff(s) Inhalation two times a day  calcium carbonate 1250 mG  + Vitamin D (OsCal 500 + D) 1 Tablet(s) Oral daily  chlorhexidine 0.12% Liquid 15 milliLiter(s) Oral Mucosa every 12 hours  cholecalciferol 1000 Unit(s) Oral daily  cholestyramine Powder (Sugar-Free) 4 Gram(s) Oral daily  clobetasol 0.05% Cream 1 Application(s) Topical two times a day  dexMEDEtomidine Infusion 0.5 MICROgram(s)/kG/Hr IV Continuous <Continuous>  fentaNYL    Injectable 50 MICROGram(s) IV Push every 6 hours PRN  ferrous    sulfate 325 milliGRAM(s) Oral daily  FLUoxetine 20 milliGRAM(s) Oral daily  heparin  Injectable 5000 Unit(s) SubCutaneous every 8 hours  hydrALAZINE 50 milliGRAM(s) Oral every 8 hours  iron sucrose Injectable 100 milliGRAM(s) IV Push every 24 hours  lactated ringers. 1000 milliLiter(s) IV Continuous <Continuous>  lactobacillus acidophilus 1 Tablet(s) Oral three times a day  lidocaine   Patch 1 Patch Transdermal daily PRN  loratadine 10 milliGRAM(s) Oral daily  metoprolol tartrate 25 milliGRAM(s) Oral two times a day  multivitamin 1 Tablet(s) Oral daily  nystatin Cream 1 Application(s) Topical two times a day  pantoprazole  Injectable 40 milliGRAM(s) IV Push daily  propofol Infusion 10 MICROgram(s)/kG/Min IV Continuous <Continuous>  sodium bicarbonate 650 milliGRAM(s) Oral three times a day  valproate sodium IVPB 500 milliGRAM(s) IV Intermittent every 12 hours      Objective:  Last 24-Vital Signs Last 24 Hrs  T(C): 36.8 (06 Mar 2020 11:47), Max: 39.2 (05 Mar 2020 20:00)  T(F): 98.2 (06 Mar 2020 11:47), Max: 102.6 (05 Mar 2020 20:00)  HR: 69 (06 Mar 2020 13:00) (69 - 118)  BP: 115/59 (06 Mar 2020 13:00) (92/50 - 196/94)  BP(mean): 81 (06 Mar 2020 13:00) (62 - 135)  RR: 20 (06 Mar 2020 13:00) (20 - 95)  SpO2: 99% (06 Mar 2020 13:00) (97% - 100%)    T(C): 36.8 (03-06-20 @ 11:47), Max: 39.2 (03-05-20 @ 20:00)  T(F): 98.2 (03-06-20 @ 11:47), Max: 102.6 (03-05-20 @ 20:00)  T(C): 36.8 (03-06-20 @ 11:47), Max: 39.2 (03-05-20 @ 20:00)  T(F): 98.2 (03-06-20 @ 11:47), Max: 102.6 (03-05-20 @ 20:00)  T(C): 36.8 (03-06-20 @ 11:47), Max: 39.2 (03-05-20 @ 20:00)  T(F): 98.2 (03-06-20 @ 11:47), Max: 102.6 (03-05-20 @ 20:00)    PHYSICAL EXAM:  Constitutional: Well-developed, well nourished  Eyes: PERRLA, EOMI  Ear/Nose/Throat: intubated  Neck: no JVD, no lymphadenopathy, supple  Respiratory: no accessory muscle use, lung fields bilaterally clear  Cardiovascular: distant  Gastrointestinal: soft, NT, no HSM, BS-normal, appears abn as if prior surgeries, with colostomy  Extremities: no clubbing, no cyanosis,       LABS:                        10.3   10.66 )-----------( 242      ( 06 Mar 2020 06:06 )             33.2       WBC 10.66  03-06 @ 06:06  WBC 8.46  03-05 @ 10:46  WBC 5.34  03-05 @ 08:33  WBC 5.43  03-04 @ 08:47  WBC 6.77  03-03 @ 13:44      03-06    143  |  113<H>  |  45<H>  ----------------------------<  156<H>  5.4<H>   |  16<L>  |  4.50<H>    Ca    7.4<L>      06 Mar 2020 06:06  Phos  4.4     03-06  Mg     2.0     03-06    TPro  7.0  /  Alb  2.9<L>  /  TBili  0.4  /  DBili  x   /  AST  28  /  ALT  23  /  AlkPhos  64  03-06      Creatinine, Serum: 4.50 mg/dL (03-06-20 @ 06:06)  Creatinine, Serum: 4.10 mg/dL (03-05-20 @ 14:49)  Creatinine, Serum: 4.10 mg/dL (03-05-20 @ 10:46)  Creatinine, Serum: 3.90 mg/dL (03-05-20 @ 08:33)  Creatinine, Serum: 3.80 mg/dL (03-04-20 @ 08:47)  Creatinine, Serum: 3.70 mg/dL (03-04-20 @ 00:35)  Creatinine, Serum: 4.00 mg/dL (03-03-20 @ 13:44)    MICROBIOLOGY:    Culture - Urine (03.03.20 @ 21:30)    -  Trimethoprim/Sulfamethoxazole: R >2/38    -  Trimethoprim/Sulfamethoxazole: R >2/38    -  Nitrofurantoin: S <=32 Should not be used to treat pyelonephritis    -  Nitrofurantoin: I 64 Should not be used to treat pyelonephritis    -  Tobramycin: S <=4    -  Tobramycin: S <=4    -  Piperacillin/Tazobactam: S <=16    -  Piperacillin/Tazobactam: I 64    -  Tigecycline: S <=2    -  Tigecycline: S <=2    -  Meropenem: S <=1    -  Meropenem: S <=1    -  Levofloxacin: S <=2    -  Levofloxacin: S <=2    -  Imipenem: S <=1    -  Imipenem: S <=1    -  Gentamicin: S <=4    -  Gentamicin: S <=4    -  Ertapenem: S <=1    -  Ciprofloxacin: S <=1    -  Ciprofloxacin: S <=1    -  Ceftriaxone: S <=1 Enterobacter, Citrobacter, and Serratia may develop resistance during prolonged therapy    -  Ceftriaxone: R >32 Enterobacter, Citrobacter, and Serratia may develop resistance during prolonged therapy    -  Cefoxitin: S <=8    -  Cefoxitin: R >16    -  Cefepime: S <=4    -  Cefepime: S <=4    -  Cefazolin: S <=8 (MIC_CL_COM_ENTERIC_CEFAZU) For uncomplicated UTI with K. pneumoniae, E. coli, or P. mirablis: SUBHASH <=16 is sensitive and SUBHASH >=32 is resistant. This also predicts results for oral agents cefaclor, cefdinir, cefpodoxime, cefprozil, cefuroxime axetil, cephalexin and locarbef for uncomplicated UTI. Note that some isolates may be susceptible to these agents while testing resistant to cefazolin.    -  Cefazolin: R >16    -  Aztreonam: S <=4    -  Aztreonam: R >16    -  Ampicillin/Sulbactam: R >16/8 Enterobacter, Citrobacter, and Serratia may develop resistance during prolonged therapy (3-4 days)    -  Ampicillin/Sulbactam: R >16/8 Enterobacter, Citrobacter, and Serratia may develop resistance during prolonged therapy (3-4 days)    -  Ampicillin: R >16 These ampicillin results predict results for amoxicillin    -  Ampicillin: R >16 These ampicillin results predict results for amoxicillin    -  Amikacin: S <=16    -  Amikacin: S <=16    Specimen Source: .Urine Clean Catch (Midstream)    Culture Results:   >100,000 CFU/ml Escherichia coli  >100,000 CFU/ml Enterobacter aerogenes    Organism Identification: Escherichia coli  Enterobacter aerogenes    Organism: Escherichia coli    Organism: Enterobacter aerogenes    Method Type: SUBHASH    Method Type: SUBHASH    RADIOLOGY & ADDITIONAL STUDIES:  < from: Xray Chest 1 View- PORTABLE-Urgent (03.05.20 @ 19:53) >  EXAM:  XR CHEST PORTABLE URGENT 1V                            PROCEDURE DATE:  03/05/2020          INTERPRETATION:  Clinical information: Endotracheal tube, nasogastric tube, urinary tract infection    Portable exam, 7:41 PM    Comparison study dated 3/5/2020, 3:00 PM    Cardiac monitor leads present. Endotracheal tube present with its tip 3.5 cm above the josue. NG tube present, tip in stomach, left upper quadrant.    Elevated right hemidiaphragm.    Trace airspace disease at the left lung base. Heart size within normal limits. Hilar regions, mediastinal contours intact. No acute osseous abnormalities.

## 2020-03-06 NOTE — PROGRESS NOTE ADULT - PROBLEM SELECTOR PLAN 9
- Continues to be significantly hypertensive  - hydralazine 25 mg TID added  - Continue amlodipine 10 mg daily with hold parameters  - Continue lopressor 25 mg BID with hold parameters  - if obtunded and not intubated by ICU, will consider NGT for po medications. - Continues to be significantly hypertensive  - continue hydralazine with hold parameters  - Continue amlodipine 10 mg daily with hold parameters  - Continue lopressor 25 mg BID with hold parameters  - if obtunded and not intubated by ICU, will consider NGT for po medications. - continue hydralazine with hold parameters  - Continue amlodipine 10 mg daily with hold parameters  - Continue lopressor 25 mg BID with hold parameters  - if obtunded and not intubated by ICU, will consider NGT for po medications.

## 2020-03-06 NOTE — PROGRESS NOTE ADULT - SUBJECTIVE AND OBJECTIVE BOX
GURJIT HERRERA  84y  Female    Patient is a 84y old  Female who presents with a chief complaint of altered mental status (06 Mar 2020 10:34)    vented and sedated.   urine out put was good.    PAST MEDICAL & SURGICAL HISTORY:  Wound of sacral region  Depression  Iron deficiency anemia  Eczema  HTN (hypertension)  CKD (chronic kidney disease) stage 5, GFR less than 15 ml/min: not on HD  Herniated lumbar intervertebral disc  Tremor  Kidney atrophy: right  Colostomy status: 2006  Chronic UTI (urinary tract infection): chronic renae  Cervical cancer: 1970&#x27;s  Dyslipidemia  Hernia, incisional  S/P hip replacement: right 2013  S/P colon resection: 2006  S/P hysterectomy: 1977          PHYSICAL EXAM:    T(C): 36.8 (03-06-20 @ 11:47), Max: 39.2 (03-05-20 @ 20:00)  HR: 71 (03-06-20 @ 12:00) (69 - 118)  BP: 119/53 (03-06-20 @ 12:00) (92/50 - 196/94)  RR: 28 (03-06-20 @ 12:00) (16 - 95)  SpO2: 99% (03-06-20 @ 12:00) (97% - 100%)  Wt(kg): --    I&O's Detail    05 Mar 2020 07:01  -  06 Mar 2020 07:00  --------------------------------------------------------  IN:    dexmedetomidine Infusion: 47.6 mL    lactated ringers.: 600 mL    propofol Infusion: 118 mL    Solution: 25 mL    Solution: 50 mL  Total IN: 840.6 mL    OUT:    Colostomy: 1000 mL    Indwelling Catheter - Urethral: 950 mL  Total OUT: 1950 mL    Total NET: -1109.4 mL      06 Mar 2020 07:01  -  06 Mar 2020 13:06  --------------------------------------------------------  IN:  Total IN: 0 mL    OUT:    Indwelling Catheter - Urethral: 60 mL  Total OUT: 60 mL    Total NET: -60 mL          Respiratory: clear anteriorly, decreased BS at bases  Cardiovascular: S1 S2  Gastrointestinal: soft NT ND +BS  Extremities: one plus edema   Neuro: Awake and alert    MEDICATIONS  (STANDING):  amLODIPine   Tablet 10 milliGRAM(s) Oral daily  aspirin enteric coated 81 milliGRAM(s) Oral daily  atorvastatin 10 milliGRAM(s) Oral at bedtime  budesonide 160 MICROgram(s)/formoterol 4.5 MICROgram(s) Inhaler 2 Puff(s) Inhalation two times a day  calcium carbonate 1250 mG  + Vitamin D (OsCal 500 + D) 1 Tablet(s) Oral daily  chlorhexidine 0.12% Liquid 15 milliLiter(s) Oral Mucosa every 12 hours  cholecalciferol 1000 Unit(s) Oral daily  cholestyramine Powder (Sugar-Free) 4 Gram(s) Oral daily  clobetasol 0.05% Cream 1 Application(s) Topical two times a day  dexMEDEtomidine Infusion 0.5 MICROgram(s)/kG/Hr (6.8 mL/Hr) IV Continuous <Continuous>  epoetin jean carlos Injectable 12224 Unit(s) SubCutaneous <User Schedule>  ferrous    sulfate 325 milliGRAM(s) Oral daily  FLUoxetine 20 milliGRAM(s) Oral daily  heparin  Injectable 5000 Unit(s) SubCutaneous every 8 hours  hydrALAZINE 50 milliGRAM(s) Oral every 8 hours  iron sucrose Injectable 100 milliGRAM(s) IV Push every 24 hours  lactated ringers. 1000 milliLiter(s) (75 mL/Hr) IV Continuous <Continuous>  lactobacillus acidophilus 1 Tablet(s) Oral three times a day  loratadine 10 milliGRAM(s) Oral daily  meropenem  IVPB 500 milliGRAM(s) IV Intermittent every 12 hours  metoprolol tartrate 25 milliGRAM(s) Oral two times a day  multivitamin 1 Tablet(s) Oral daily  nystatin Cream 1 Application(s) Topical two times a day  pantoprazole  Injectable 40 milliGRAM(s) IV Push daily  propofol Infusion 10 MICROgram(s)/kG/Min (3.264 mL/Hr) IV Continuous <Continuous>  sodium bicarbonate 650 milliGRAM(s) Oral three times a day  valproate sodium IVPB 500 milliGRAM(s) IV Intermittent every 12 hours    MEDICATIONS  (PRN):  acetaminophen    Suspension .. 650 milliGRAM(s) Oral every 6 hours PRN Temp greater or equal to 38C (100.4F), Moderate Pain (4 - 6)  fentaNYL    Injectable 50 MICROGram(s) IV Push every 6 hours PRN dyssynchrony and sedation  lidocaine   Patch 1 Patch Transdermal daily PRN back pain                            10.3   10.66 )-----------( 242      ( 06 Mar 2020 06:06 )             33.2       03-06    143  |  113<H>  |  45<H>  ----------------------------<  156<H>  5.4<H>   |  16<L>  |  4.50<H>    Ca    7.4<L>      06 Mar 2020 06:06  Phos  4.4     03-06  Mg     2.0     03-06    TPro  7.0  /  Alb  2.9<L>  /  TBili  0.4  /  DBili  x   /  AST  28  /  ALT  23  /  AlkPhos  64  03-06      Creatinine Trend: Creatinine Trend: 4.50<--, 4.10<--, 4.10<--, 3.90<--, 3.80<--, 3.70<--

## 2020-03-06 NOTE — PROGRESS NOTE ADULT - PROBLEM SELECTOR PLAN 10
DVT ppx: heparin 5000un sq Q8h  Problem 11: Depression: Continue prozac  Problem 12: eczema: Continue clobetasol  Problem 13: hyperlipidemia: continue atorvastatin and cholestyramine

## 2020-03-06 NOTE — DISCHARGE NOTE PROVIDER - CARE PROVIDER_API CALL
Scott Galvan (DO)  Family Medicine  [address] [facility], 2nd floor  [place], [place] [id number]  Phone: [phone]  Fax: [phone]  Follow Up Time: hospice team,   Phone: (   )    -  Fax: (   )    -  Follow Up Time:     Scott Galvan (DO)  Family Medicine  [address] [facility], 2nd floor  [place], [place] [address]  Phone: [phone]  Fax: [phone]  Follow Up Time:

## 2020-03-06 NOTE — PROGRESS NOTE ADULT - ASSESSMENT
84 female with a history of HTN, CAD, CHF, Anemia, osteomyelitis, HLD and atrophic right kidney and uretero ureteral anastomosis of the right to left and CKD stage 5 with anemia now admitted with mental status changes. she is now intubated and sedated. New onset seizures.   spoke to patient's dtr at bed side and spoke to her on the poor prognosis.  Will continue the IV abx and IVF at the present rate. Will order epogen.  will follow closely. May need to start on RRT in the near future unless the family decides otherwise. Palliative and hospice evaluation is probably ideal.

## 2020-03-06 NOTE — PROGRESS NOTE ADULT - PROBLEM SELECTOR PLAN 2
- Patient has chronic renae, frequent UTI  - UA positive for leuk esterase, bacteria, but unclear significance in setting of chronic renae  - has been afebrile, with no leukocytosis  - Renae replaced in ED  - on Zosyn for a suspected UTI with suspicion that pt's AMS on admission was due to infection, though now feel higher likelihood it was related to seizure activity (possibly in partial seizures prior to the generalized one pt had today)  - Further information from daughter reveals that patient's AMS was actually expressive aphasia that began this weekend  - procalcitonin low at .12  - Urine culture from Feb 2020 with >3 organisms, suspect will have positive urine culture as pt likely has at least colonization   - it's possible patient's AMS was related to worsening renal failure (uremia / metabolic acidosis) vs seizure  - blood culture NGTD  - urine culture >100,000 GNR  - Continue Zosyn for now  - ID consult, Dr. Kirk, recs appreciated - Patient has chronic renae, frequent UTI  - febrile overnight  - antibiotic coverage broaded to meropenem, with urine culture now growing E. coli and enterobact aerogenes, which has only intermedite sensitivity to zosyn  - UA positive for leuk esterase, bacteria, but unclear significance in setting of chronic renae  - has been afebrile, with no leukocytosis  - Renae replaced in ED  - on admission it was suspected that AMS was due to UTI, though now feel higher likelihood it was related to seizure activity (possibly in partial seizures prior to the generalized one pt had today)  - Further information from daughter reveals that patient's AMS was actually expressive aphasia that began this weekend  - procalcitonin low at .12  - Urine culture from Feb 2020 with >3 organisms, suspect will have positive urine culture as pt likely has at least colonization   - blood culture NGTD  - urine culture >100,000 GNR  - ID consult, Dr. Kirk, recs appreciated - Patient has chronic renae, hx of reported frequent UTI  - persistently febrile overnight (had not been febrile for several days prior to seizures)  - antibiotic coverage broadened to meropenem, with urine culture now growing E. coli and enterobact aerogenes, the latter of which has only intermediate sensitivity to zosyn  -albeit small (<1%) chance, meropenem can lower the seizure threshold; discuss with ID if alternatives may be given   - UA positive for leuk esterase, bacteria, but unclear significance in setting of chronic renae  - had previously been afebrile, with no leukocytosis  - Renae replaced in ED  - on admission it was suspected that AMS was due to UTI, though now feel higher likelihood it was related to seizure activity (possibly in partial seizures prior to the generalized one pt had on 3/5)  - Further information from daughter reveals that patient's AMS was actually expressive aphasia that began this weekend  - procalcitonin was low at .12  - admission blood culture NGTD; f/up repeat BCx from 3/5  - ID consult, Dr. Kirk group, recs appreciated  - CT Abd/P ordered by ICU to eval for other possibilities of acute infection given pt had persistent fever overnight  - discuss with neuro, if possible fevers were related to the seizure activity or if concern for other central process

## 2020-03-06 NOTE — PROCEDURE NOTE - NSFINDINGS_GEN_A_CORE
[facility] Patient Status:  Outpatient    1927 MRN [id number]   Location [address] [place] [address] Attending Tucker Pacheco MD   Hosp Day # 0 MAN Frazier DO       Date of Procedure: 19      Pre-operative Diana Postprocedure chest ultrasound of the right hemithorax revealed small residual pleural effusion and the presence of sliding lung. The patient tolerated the procedure well and without any complications.       Condition:  Stable     Josefina Ovalle MD  Chic air/positive return of gastric contents

## 2020-03-06 NOTE — PROGRESS NOTE ADULT - ASSESSMENT
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) and chronic diarrhea, cervical cancer (s/p radical hysterectomy and radiation), tremors, eczema, depression, HTN, HLD, history of frequent UTIs with chronic indwelling renae, anemia, stage 4 sacral decubitus ulcer, and recent hospitalization for MEHDI on CKD thought to be 2/2 dehydration and urinary retention from malfunctioning chronic renae, now brought in by EMS to ED for altered mental status admitted for acute metabolic encephalopathy due to suspected UTI and hyperkalemia in setting of MEHDI on CKD 5, course c/b likely generalized seizure on 3/5/20, and acute hypoxic respiratory failure and suspected sepsis, now intubated.

## 2020-03-06 NOTE — CHART NOTE - NSCHARTNOTEFT_GEN_A_CORE
Time of evaluation: 20:16    Called to evaluate patient for restraints        Other interventions attempted: de-escalation, orientation check, environment modification, medication assessment    REVIEW OF SYSTEMS:  CONSTITUTIONAL: [  ] fever   [  ] fatigue  EYES: [  ] visual disturbances  ENMT:  [  ]difficulty hearing  [  ] throat pain  RESPIRATORY:  [  ]cough  [   ] wheezing  [   ] hemoptysis [  ] shortness of breath  CARDIOVASCULAR: [  ]chest pain  [  ] palpitations   GASTROINTESTINAL: [  ]  abdominal pain [  ] nausea [  ] vomiting  [  ] diarrhea  [  ] constipation  GENITOURINARY: [  ] dysuria [  ] frequency  [  ] hematuria [  ] incontinence  NEUROLOGICAL: [  ] headaches [  ] new numbness  SKIN: [  ]itching [  ] new rash   MUSCULOSKELETAL: [  ] back pain  [  ] extremity pain  PSYCHIATRIC: [  ] depression  [  ] anxiety  [  ] mood swings [  ] difficulty sleeping  ALLERGY AND IMMUNOLOGIC: [  ] hives    [ x ]Unable to perfrom ROS due to: mechanical vent  [  ] ROS reviewed and all negative    PAST MEDICAL & SURGICAL HISTORY:  Wound of sacral region  Depression  Iron deficiency anemia  Eczema  HTN (hypertension)  CKD (chronic kidney disease) stage 5, GFR less than 15 ml/min: not on HD  Herniated lumbar intervertebral disc  Tremor  Kidney atrophy: right  Colostomy status: 2006  Chronic UTI (urinary tract infection): chronic renae  Cervical cancer: 1970&#x27;s  Dyslipidemia  Hernia, incisional  S/P hip replacement: right 2013  S/P colon resection: 2006  S/P hysterectomy: 1977    MEDICATIONS  (STANDING):  amLODIPine   Tablet 10 milliGRAM(s) Oral daily  aspirin enteric coated 81 milliGRAM(s) Oral daily  atorvastatin 10 milliGRAM(s) Oral at bedtime  budesonide 160 MICROgram(s)/formoterol 4.5 MICROgram(s) Inhaler 2 Puff(s) Inhalation two times a day  calcium carbonate 1250 mG  + Vitamin D (OsCal 500 + D) 1 Tablet(s) Oral daily  chlorhexidine 0.12% Liquid 15 milliLiter(s) Oral Mucosa every 12 hours  cholecalciferol 1000 Unit(s) Oral daily  cholestyramine Powder (Sugar-Free) 4 Gram(s) Oral daily  clobetasol 0.05% Cream 1 Application(s) Topical two times a day  dexMEDEtomidine Infusion 0.5 MICROgram(s)/kG/Hr (6.8 mL/Hr) IV Continuous <Continuous>  epoetin jean carlos Injectable 78808 Unit(s) SubCutaneous <User Schedule>  ferrous    sulfate 325 milliGRAM(s) Oral daily  FLUoxetine 20 milliGRAM(s) Oral daily  heparin  Injectable 5000 Unit(s) SubCutaneous every 8 hours  hydrALAZINE 50 milliGRAM(s) Oral every 8 hours  lactated ringers. 1000 milliLiter(s) (75 mL/Hr) IV Continuous <Continuous>  lactobacillus acidophilus 1 Tablet(s) Oral three times a day  loratadine 10 milliGRAM(s) Oral daily  metoprolol tartrate 25 milliGRAM(s) Oral two times a day  multivitamin 1 Tablet(s) Oral daily  nystatin Cream 1 Application(s) Topical two times a day  pantoprazole  Injectable 40 milliGRAM(s) IV Push daily  propofol Infusion 10 MICROgram(s)/kG/Min (3.264 mL/Hr) IV Continuous <Continuous>  sodium bicarbonate 650 milliGRAM(s) Oral three times a day  valproate sodium IVPB 500 milliGRAM(s) IV Intermittent every 12 hours    MEDICATIONS  (PRN):  acetaminophen    Suspension .. 650 milliGRAM(s) Oral every 6 hours PRN Temp greater or equal to 38C (100.4F), Moderate Pain (4 - 6)                          10.3   10.66 )-----------( 242      ( 06 Mar 2020 06:06 )             33.2     03-06    143  |  113<H>  |  45<H>  ----------------------------<  156<H>  5.4<H>   |  16<L>  |  4.50<H>    Ca    7.4<L>      06 Mar 2020 06:06  Phos  4.4     03-06  Mg     2.0     03-06    TPro  7.0  /  Alb  2.9<L>  /  TBili  0.4  /  DBili  x   /  AST  28  /  ALT  23  /  AlkPhos  64  03-06      Vital Signs Last 24 Hrs  T(C): 37.2 (06 Mar 2020 20:00), Max: 38.9 (05 Mar 2020 23:30)  T(F): 99 (06 Mar 2020 20:00), Max: 102 (05 Mar 2020 23:30)  HR: 64 (06 Mar 2020 21:00) (64 - 116)  BP: 110/55 (06 Mar 2020 21:00) (92/50 - 195/77)  BP(mean): 79 (06 Mar 2020 21:00) (62 - 111)  RR: 26 (06 Mar 2020 21:00) (20 - 95)  SpO2: 98% (06 Mar 2020 21:00) (98% - 100%)    Physical Examination:  General: No acute distress.    HEENT: Pupils equal, round, reactive to light. Symmetric.  PULM: Clear to auscultation bilaterally, no significant sputum production  CVS: Regular rate and rhythm, no murmurs, rubs, or gallops  ABD: Soft, nondistended, nontender, normoactive bowel sounds, no masses  EXT: No edema, nontender  SKIN: Warm and well perfused.      [  ] Unable to perform physical exam due to    [X ] I have evaluated this patient and have determined that restraints are warranted to optimize medical care. Patient was assessed for current physical and psychological risk factors as well as special needs. There are no medical conditions or limitations that would place this patient at risk while in restraints.    Type of restraint: Bilateral soft wrist restraints    Behavioral criteria for discontinuation of restraint: [ X ] See order    Attending MD Aware

## 2020-03-06 NOTE — PROGRESS NOTE ADULT - ASSESSMENT
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     Neuro: Lethargic, seizure disorder ?post-ictal, recent Ativan use v metabolic encephalopathy v early sepsis. Patient febrile on exam. Continue IV Valproic Acid BID. Check EEG. MRI head negative. Elevated lactate at time of RRT likely from seizure activity  CV: HTN continues Hydralazine, Metoprolol, and Amlodipine. Continue ASA and Lipitor.  Pulm: Acute hypoxic respiratory alkalosis with metabolic acidosis. CT chest ?aspiration PNA. Continue Symbicort. Patient will likely need to be intubated for airway protection.  Renal: CKD Stage 5 (chronic), oliguric. Strict I&O. Continue to monitor. Replete electrolytes.   GI: NPO. Continue Ostomy care.   ID: Febrile. No leukocytosis. Continue IV Zosyn. Follow up blood cultures x2.   Heme: on Heparin SQ  Dispo: Transfer to ICU. Discussed with family at bedside, patient is a full code. Would like to be intubated and have CPR. 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 RRT on 3/5 for ?seizure activity, received Ativan. MRI and EEG performed. Following seizure patient remained minimally responsive and developed respiratory distress. Transferred to the ICU for intubation for impending respiratory failure.     Neuro: Intubated, sedated on Propofol, Precedex, Fentanyl PRN. Seizure activity, unknown trigger. Continue IV Valproic Acid BID. EEG: normal, very limited. MRI head negative.   CV: HTN continues Hydralazine, Metoprolol, and Amlodipine. Continue ASA and Lipitor.  Pulm: Intubated for respiratory failure. ?aspiration PNA. On broad spectrum abx  Renal: CKD Stage 5 (chronic), oliguric. Strict I&O. Hyperkalemic, given Kayexalate   GI: NPO with tube feeds. Continue Ostomy care.   ID: Febrile. Leukocytosis. Broaden abx to Meropenum. Follow up blood cultures x2.   Heme: on Heparin SQ  Dispo: Patient critically ill. Prognosis poor/guarded. 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 RRT on 3/5 for ?seizure activity, received Ativan. MRI and EEG performed. Following seizure patient remained minimally responsive and developed respiratory distress. Transferred to the ICU for intubation for impending respiratory failure.     Neuro: Intubated, sedated on Propofol, Precedex, Fentanyl PRN. Seizure activity, unknown trigger. Continue IV Valproic Acid BID. EEG: normal, very limited. MRI head negative. Continue Prozac for depression.   CV: HTN continues Hydralazine, Metoprolol, and Amlodipine. Continue ASA and Lipitor.  Pulm: Intubated for respiratory failure. ?aspiration PNA. On broad spectrum abx  Renal: CKD Stage 5 (chronic), appropriate urine output. Strict I&O. Hyperkalemic, given Kayexalate. Continue sodium bicarb, ferrous sulfate, Vitamin D, OsCal. Completed IV Venofer. On Procrit.   GI: Start NPO with tube feeds (nepro). Nutrition consult. Continue Ostomy care. On Prevalite    ID: Febrile. Leukocytosis. Broaden abx to Meropenum. Follow up blood cultures x2. ID consult. Tylenol PRN for fever.  Heme: on Heparin SQ  Dispo: Patient critically ill. Prognosis poor/guarded. 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 RRT on 3/5 for ?seizure activity, received Ativan. MRI and EEG performed. Following seizure patient remained minimally responsive and developed respiratory distress. Transferred to the ICU for intubation for impending respiratory failure.     Neuro: Intubated, sedated on Propofol, Precedex, Fentanyl PRN. Seizure activity, unknown trigger. Continue IV Valproic Acid BID. EEG: normal, very limited. MRI head negative. Continue Prozac for depression.   CV: HTN continues Hydralazine, Metoprolol, and Amlodipine. Continue ASA and Lipitor.  Pulm: Intubated for respiratory failure. ?aspiration PNA. On broad spectrum abx  Renal: CKD Stage 5 (chronic), appropriate urine output. Strict I&O. Hyperkalemic, given Kayexalate. Continue sodium bicarb, ferrous sulfate, Vitamin D, OsCal. Completed IV Venofer. On Procrit.   GI: Start NPO with tube feeds (nepro). Nutrition consult. Continue Ostomy care. On Prevalite    ID: Febrile. Leukocytosis. Broaden abx to Meropenum. Follow up blood cultures x2. Urine positive for Ecoli and Enterobacter. ID consult. Tylenol PRN for fever.  Heme: on Heparin SQ  Dispo: Patient critically ill. Prognosis poor/guarded. Full code

## 2020-03-07 LAB
ALBUMIN SERPL ELPH-MCNC: 2.5 G/DL — LOW (ref 3.3–5)
ALP SERPL-CCNC: 45 U/L — SIGNIFICANT CHANGE UP (ref 40–120)
ALT FLD-CCNC: 18 U/L — SIGNIFICANT CHANGE UP (ref 12–78)
ANION GAP SERPL CALC-SCNC: 14 MMOL/L — SIGNIFICANT CHANGE UP (ref 5–17)
AST SERPL-CCNC: 21 U/L — SIGNIFICANT CHANGE UP (ref 15–37)
BASOPHILS # BLD AUTO: 0.03 K/UL — SIGNIFICANT CHANGE UP (ref 0–0.2)
BASOPHILS NFR BLD AUTO: 0.3 % — SIGNIFICANT CHANGE UP (ref 0–2)
BILIRUB SERPL-MCNC: 0.2 MG/DL — SIGNIFICANT CHANGE UP (ref 0.2–1.2)
BUN SERPL-MCNC: 58 MG/DL — HIGH (ref 7–23)
CALCIUM SERPL-MCNC: 6.8 MG/DL — LOW (ref 8.5–10.1)
CHLORIDE SERPL-SCNC: 111 MMOL/L — HIGH (ref 96–108)
CO2 SERPL-SCNC: 15 MMOL/L — LOW (ref 22–31)
CREAT SERPL-MCNC: 4.9 MG/DL — HIGH (ref 0.5–1.3)
EOSINOPHIL # BLD AUTO: 0 K/UL — SIGNIFICANT CHANGE UP (ref 0–0.5)
EOSINOPHIL NFR BLD AUTO: 0 % — SIGNIFICANT CHANGE UP (ref 0–6)
GLUCOSE SERPL-MCNC: 97 MG/DL — SIGNIFICANT CHANGE UP (ref 70–99)
HCT VFR BLD CALC: 27.3 % — LOW (ref 34.5–45)
HGB BLD-MCNC: 8.5 G/DL — LOW (ref 11.5–15.5)
IMM GRANULOCYTES NFR BLD AUTO: 0.5 % — SIGNIFICANT CHANGE UP (ref 0–1.5)
LYMPHOCYTES # BLD AUTO: 0.64 K/UL — LOW (ref 1–3.3)
LYMPHOCYTES # BLD AUTO: 5.5 % — LOW (ref 13–44)
MAGNESIUM SERPL-MCNC: 1.9 MG/DL — SIGNIFICANT CHANGE UP (ref 1.6–2.6)
MCHC RBC-ENTMCNC: 29.3 PG — SIGNIFICANT CHANGE UP (ref 27–34)
MCHC RBC-ENTMCNC: 31.1 GM/DL — LOW (ref 32–36)
MCV RBC AUTO: 94.1 FL — SIGNIFICANT CHANGE UP (ref 80–100)
MONOCYTES # BLD AUTO: 0.68 K/UL — SIGNIFICANT CHANGE UP (ref 0–0.9)
MONOCYTES NFR BLD AUTO: 5.8 % — SIGNIFICANT CHANGE UP (ref 2–14)
NEUTROPHILS # BLD AUTO: 10.25 K/UL — HIGH (ref 1.8–7.4)
NEUTROPHILS NFR BLD AUTO: 87.9 % — HIGH (ref 43–77)
NRBC # BLD: 0 /100 WBCS — SIGNIFICANT CHANGE UP (ref 0–0)
PHOSPHATE SERPL-MCNC: 6.1 MG/DL — HIGH (ref 2.5–4.5)
PLATELET # BLD AUTO: 186 K/UL — SIGNIFICANT CHANGE UP (ref 150–400)
POTASSIUM SERPL-MCNC: 4.7 MMOL/L — SIGNIFICANT CHANGE UP (ref 3.5–5.3)
POTASSIUM SERPL-SCNC: 4.7 MMOL/L — SIGNIFICANT CHANGE UP (ref 3.5–5.3)
PROT SERPL-MCNC: 5.9 G/DL — LOW (ref 6–8.3)
RBC # BLD: 2.9 M/UL — LOW (ref 3.8–5.2)
RBC # FLD: 15.2 % — HIGH (ref 10.3–14.5)
SODIUM SERPL-SCNC: 140 MMOL/L — SIGNIFICANT CHANGE UP (ref 135–145)
WBC # BLD: 11.66 K/UL — HIGH (ref 3.8–10.5)
WBC # FLD AUTO: 11.66 K/UL — HIGH (ref 3.8–10.5)

## 2020-03-07 PROCEDURE — 99291 CRITICAL CARE FIRST HOUR: CPT

## 2020-03-07 PROCEDURE — 99233 SBSQ HOSP IP/OBS HIGH 50: CPT

## 2020-03-07 PROCEDURE — 71045 X-RAY EXAM CHEST 1 VIEW: CPT | Mod: 26

## 2020-03-07 RX ORDER — ALBUMIN HUMAN 25 %
50 VIAL (ML) INTRAVENOUS ONCE
Refills: 0 | Status: DISCONTINUED | OUTPATIENT
Start: 2020-03-07 | End: 2020-03-07

## 2020-03-07 RX ORDER — SODIUM CHLORIDE 9 MG/ML
10 INJECTION INTRAMUSCULAR; INTRAVENOUS; SUBCUTANEOUS
Refills: 0 | Status: DISCONTINUED | OUTPATIENT
Start: 2020-03-07 | End: 2020-03-10

## 2020-03-07 RX ORDER — VALPROIC ACID (AS SODIUM SALT) 250 MG/5ML
500 SOLUTION, ORAL ORAL EVERY 12 HOURS
Refills: 0 | Status: DISCONTINUED | OUTPATIENT
Start: 2020-03-07 | End: 2020-03-08

## 2020-03-07 RX ORDER — CHLORHEXIDINE GLUCONATE 213 G/1000ML
1 SOLUTION TOPICAL
Refills: 0 | Status: DISCONTINUED | OUTPATIENT
Start: 2020-03-07 | End: 2020-03-16

## 2020-03-07 RX ORDER — MIDODRINE HYDROCHLORIDE 2.5 MG/1
10 TABLET ORAL EVERY 8 HOURS
Refills: 0 | Status: DISCONTINUED | OUTPATIENT
Start: 2020-03-07 | End: 2020-03-09

## 2020-03-07 RX ORDER — ALBUMIN HUMAN 25 %
50 VIAL (ML) INTRAVENOUS
Refills: 0 | Status: COMPLETED | OUTPATIENT
Start: 2020-03-07 | End: 2020-03-07

## 2020-03-07 RX ADMIN — Medication 1 APPLICATION(S): at 05:27

## 2020-03-07 RX ADMIN — Medication 81 MILLIGRAM(S): at 11:17

## 2020-03-07 RX ADMIN — PANTOPRAZOLE SODIUM 40 MILLIGRAM(S): 20 TABLET, DELAYED RELEASE ORAL at 11:17

## 2020-03-07 RX ADMIN — Medication 650 MILLIGRAM(S): at 22:14

## 2020-03-07 RX ADMIN — Medication 20 MILLIGRAM(S): at 11:17

## 2020-03-07 RX ADMIN — Medication 1 TABLET(S): at 13:40

## 2020-03-07 RX ADMIN — Medication 650 MILLIGRAM(S): at 05:27

## 2020-03-07 RX ADMIN — Medication 1000 UNIT(S): at 13:40

## 2020-03-07 RX ADMIN — HEPARIN SODIUM 5000 UNIT(S): 5000 INJECTION INTRAVENOUS; SUBCUTANEOUS at 22:15

## 2020-03-07 RX ADMIN — Medication 500 MILLIGRAM(S): at 19:26

## 2020-03-07 RX ADMIN — Medication 1 TABLET(S): at 11:17

## 2020-03-07 RX ADMIN — MIDODRINE HYDROCHLORIDE 10 MILLIGRAM(S): 2.5 TABLET ORAL at 11:17

## 2020-03-07 RX ADMIN — CHLORHEXIDINE GLUCONATE 1 APPLICATION(S): 213 SOLUTION TOPICAL at 11:00

## 2020-03-07 RX ADMIN — LORATADINE 10 MILLIGRAM(S): 10 TABLET ORAL at 11:17

## 2020-03-07 RX ADMIN — HEPARIN SODIUM 5000 UNIT(S): 5000 INJECTION INTRAVENOUS; SUBCUTANEOUS at 06:42

## 2020-03-07 RX ADMIN — MIDODRINE HYDROCHLORIDE 10 MILLIGRAM(S): 2.5 TABLET ORAL at 19:26

## 2020-03-07 RX ADMIN — Medication 1 TABLET(S): at 22:15

## 2020-03-07 RX ADMIN — CHOLESTYRAMINE 4 GRAM(S): 4 POWDER, FOR SUSPENSION ORAL at 11:17

## 2020-03-07 RX ADMIN — BUDESONIDE AND FORMOTEROL FUMARATE DIHYDRATE 2 PUFF(S): 160; 4.5 AEROSOL RESPIRATORY (INHALATION) at 19:33

## 2020-03-07 RX ADMIN — HEPARIN SODIUM 5000 UNIT(S): 5000 INJECTION INTRAVENOUS; SUBCUTANEOUS at 13:40

## 2020-03-07 RX ADMIN — Medication 1 TABLET(S): at 05:27

## 2020-03-07 RX ADMIN — NYSTATIN CREAM 1 APPLICATION(S): 100000 CREAM TOPICAL at 05:28

## 2020-03-07 RX ADMIN — MIDODRINE HYDROCHLORIDE 10 MILLIGRAM(S): 2.5 TABLET ORAL at 03:42

## 2020-03-07 RX ADMIN — Medication 650 MILLIGRAM(S): at 13:39

## 2020-03-07 RX ADMIN — Medication 50 MILLILITER(S): at 02:48

## 2020-03-07 RX ADMIN — ATORVASTATIN CALCIUM 10 MILLIGRAM(S): 80 TABLET, FILM COATED ORAL at 22:14

## 2020-03-07 RX ADMIN — Medication 325 MILLIGRAM(S): at 11:17

## 2020-03-07 RX ADMIN — CHLORHEXIDINE GLUCONATE 15 MILLILITER(S): 213 SOLUTION TOPICAL at 05:27

## 2020-03-07 RX ADMIN — Medication 50 MILLILITER(S): at 03:59

## 2020-03-07 RX ADMIN — MEROPENEM 100 MILLIGRAM(S): 1 INJECTION INTRAVENOUS at 13:39

## 2020-03-07 NOTE — PROGRESS NOTE ADULT - ASSESSMENT
83 yo female admitted with altered mental status with no clear explanation  Question whether altered mental status at home could have been postictal phenomena given what appears to be seizure activity here.  Per d/w Dr. Mayo, patient's mental status waxed/waned. In the absence of fever, skeptical that he has encephalitis (e.g. HSV).  Limited EEG here without seizure activity.  Depakote not useful as an AED in the setting ot carbapenem use.   No clear or convincing evidence of infection on exam.  In setting of chronic renae catheter the urine isolates is questionable significance.   CXR with questionable infiltrates in BUL, particularly MELL (personally reviewed), but oxygenation does not appear to be an issue and lung exam unimpressive. Repeat  3/7 not impressive either.   Sacral sore not infected appearing. While patient almost certainly has sacral osteomyelitis based on the CT scan I am skeptical that its responsible for her presentation.  No concern of potential other SSTI  Benign abdomen

## 2020-03-07 NOTE — PROGRESS NOTE ADULT - PROBLEM SELECTOR PLAN 10
DVT ppx: heparin 5000un sq Q8h  Problem 11: Depression: on prozac  Problem 12: eczema: on clobetasol

## 2020-03-07 NOTE — PROGRESS NOTE ADULT - ASSESSMENT
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 RRT on 3/5 for suspected seizure activity. Following seizure patient remained minimally responsive and developed respiratory distress. S/p intubation for impending respiratory failure. Doing poorly, met acidosis, worsening azotemia, febrile on antibiotics. May need to start on RRT in a few days unless the family decides otherwise. Palliative and hospice evaluation is probably ideal. Would check CVP to assist with volume management.  D/w Dr. Monet.

## 2020-03-07 NOTE — PROGRESS NOTE ADULT - PROBLEM SELECTOR PLAN 1
Appears improved per D/W ICU  No definitive seizure activity on EEG though what I witnessed was strongly suggestive of such.  Would alter AED Rx to something other than Depakote in the setting of meropenem use.

## 2020-03-07 NOTE — PROGRESS NOTE ADULT - SUBJECTIVE AND OBJECTIVE BOX
HISTORY  84y Female    24 HOUR EVENTS: Patient was very lethargic, borderline obtunded this AM. Sedation was held and later in the AM patient was more awake and nodding intermittently to questioning. She was hypotensive overnight. On Midodrine. As she began to awaken this AM she was transitioned to PSV. Central line placed due to limited access as well as hypotension and possible vasopressor initiation.    SUBJECTIVE/ROS:  [ ] A ten-point review of systems was otherwise negative except as noted.  [x ] Due to altered mental status/intubation, subjective information were not able to be obtained from the patient. History was obtained, to the extent possible, from review of the chart and collateral sources of information.    NEURO  RASS:  0   GCS:  10T   CAM ICU: neg  Exam: Opens eyes. Nods heads to some questioning. Weak grasp  Meds: acetaminophen    Suspension .. 650 milliGRAM(s) Oral every 6 hours PRN Temp greater or equal to 38C (100.4F), Moderate Pain (4 - 6)  dexMEDEtomidine Infusion 0.5 MICROgram(s)/kG/Hr IV Continuous <Continuous>  FLUoxetine 20 milliGRAM(s) Oral daily  propofol Infusion 10 MICROgram(s)/kG/Min IV Continuous <Continuous>  valproic  acid Syrup 500 milliGRAM(s) Oral every 12 hours    [x] Adequacy of sedation and pain control has been assessed and adjusted      RESPIRATORY  RR: 26 (03-07-20 @ 12:10) (17 - 33)  SpO2: 100% (03-07-20 @ 12:10) (98% - 100%)  Wt(kg): --  Exam: coarse bs to auscultation bilaterally  Mechanical Ventilation: Mode: CPAP with PS, RR (patient): 15, FiO2: 30, PEEP: 5, PS: 8, ITime: 1, MAP: 8.6  ABG - ( 05 Mar 2020 20:37 )  pH: 7.34  /  pCO2: 33    /  pO2: 190   / HCO3: 18    / Base Excess: -7.8  /  SaO2: 100     Lactate: x          [x ] Extubation Readiness Assessed  Meds: budesonide 160 MICROgram(s)/formoterol 4.5 MICROgram(s) Inhaler 2 Puff(s) Inhalation two times a day  loratadine 10 milliGRAM(s) Oral daily    CARDIOVASCULAR  HR: 59 (03-07-20 @ 12:10) (53 - 85)  BP: 105/49 (03-07-20 @ 12:10) (76/37 - 142/59)  BP(mean): 71 (03-07-20 @ 12:00) (50 - 101)  ABP: --  ABP(mean): --  Wt(kg): --  CVP(cm H2O): --      Exam:  Cardiac Rhythm: S1 S2 50s  Perfusion     [x ]Adequate   [ ]Inadequate  Mentation   [ x]Normal       [ ]Reduced  Extremities  [ x]Warm         [ ]Cool  Volume Status [ ]Hypervolemic [x ]Euvolemic [ ]Hypovolemic  Meds: midodrine 10 milliGRAM(s) Oral every 8 hours        GI/NUTRITION  Diet: Enteral feeds at goal rate  Exam: soft nt nd, no guarding or rebound  Meds: pantoprazole  Injectable 40 milliGRAM(s) IV Push daily      GENITOURINARY  I&O's Detail    03-06 @ 07:01  -  03-07 @ 07:00  --------------------------------------------------------  IN:    dexmedetomidine Infusion: 90 mL    Nepro with Carb Steady: 120 mL    Oral Fluid: 1000 mL    propofol Infusion: 135 mL    Solution: 100 mL  Total IN: 1445 mL    OUT:    Colostomy: 1350 mL    Indwelling Catheter - Urethral: 515 mL  Total OUT: 1865 mL    Total NET: -420 mL      03-07 @ 06:01  -  03-07 @ 12:53  --------------------------------------------------------  IN:    Nepro with Carb Steady: 100 mL  Total IN: 100 mL    OUT:    Colostomy: 300 mL    Indwelling Catheter - Urethral: 190 mL  Total OUT: 490 mL    Total NET: -390 mL          03-07    140  |  111<H>  |  58<H>  ----------------------------<  97  4.7   |  15<L>  |  4.90<H>    Ca    6.8<L>      07 Mar 2020 09:08  Phos  6.1     03-07  Mg     1.9     03-07    TPro  5.9<L>  /  Alb  2.5<L>  /  TBili  0.2  /  DBili  x   /  AST  21  /  ALT  18  /  AlkPhos  45  03-07    [ x] Perez catheter, indication: Critical Care illness  Meds: calcium carbonate 1250 mG  + Vitamin D (OsCal 500 + D) 1 Tablet(s) Oral daily  cholecalciferol 1000 Unit(s) Oral daily  ferrous    sulfate 325 milliGRAM(s) Oral daily  multivitamin 1 Tablet(s) Oral daily  sodium bicarbonate 650 milliGRAM(s) Oral three times a day  sodium chloride 0.9% lock flush 10 milliLiter(s) IV Push every 1 hour PRN Pre/post blood products, medications, blood draw, and to maintain line patency      HEMATOLOGIC  Meds: aspirin enteric coated 81 milliGRAM(s) Oral daily  heparin  Injectable 5000 Unit(s) SubCutaneous every 8 hours    [x] VTE Prophylaxis                        8.5    11.66 )-----------( 186      ( 07 Mar 2020 09:08 )             27.3     PT/INR - ( 07 Mar 2020 10:39 )   PT: 15.1 sec;   INR: 1.34 ratio         PTT - ( 07 Mar 2020 10:39 )  PTT:26.4 sec  Transfusion     [ ] PRBC   [ ] Platelets   [ ] FFP   [ ] Cryoprecipitate      INFECTIOUS DISEASES  T(C): 36.1 (03-07-20 @ 07:32), Max: 37.2 (03-06-20 @ 20:00)  Wt(kg): --  WBC Count: 11.66 K/uL (03-07 @ 09:08)    Recent Cultures:  Specimen Source: .Blood Blood, 03-06 @ 01:23; Results   No growth to date.; Gram Stain: --; Organism: --  Specimen Source: .Urine Clean Catch (Midstream), 03-03 @ 21:30; Results   >100,000 CFU/ml Escherichia coli  >100,000 CFU/ml Enterobacter aerogenes; Gram Stain: --; Organism: Escherichia coli  Enterobacter aerogenes  Specimen Source: .Blood Blood-Peripheral, 03-03 @ 21:12; Results   No growth to date.; Gram Stain: --; Organism: --  Specimen Source: .Blood Blood-Peripheral, 03-03 @ 21:10; Results   No growth to date.; Gram Stain: --; Organism: --    Meds: epoetin jean carlos Injectable 20893 Unit(s) SubCutaneous <User Schedule>  meropenem  IVPB 500 milliGRAM(s) IV Intermittent every 24 hours        ENDOCRINE  Capillary Blood Glucose  None    Meds: atorvastatin 10 milliGRAM(s) Oral at bedtime  cholestyramine Powder (Sugar-Free) 4 Gram(s) Oral daily      ACCESS DEVICES:  [ ] Peripheral IV  [x ] Central Venous Line	[ x] R	[ ] L	[ x] IJ	[ ] Fem	[ ] SC	Placed:   [ ] Arterial Line		[ ] R	[ ] L	[ ] Fem	[ ] Rad	[ ] Ax	Placed:   [ ] PICC:					[ ] Mediport  [ ] Urinary Catheter, Date Placed:   [ ] Necessity of urinary, arterial, and venous catheters discussed    OTHER MEDICATIONS:  chlorhexidine 0.12% Liquid 15 milliLiter(s) Oral Mucosa every 12 hours  chlorhexidine 2% Cloths 1 Application(s) Topical <User Schedule>  clobetasol 0.05% Cream 1 Application(s) Topical two times a day  lactobacillus acidophilus 1 Tablet(s) Oral three times a day  nystatin Cream 1 Application(s) Topical two times a day      CODE STATUS: Full     IMAGING: CXR: lines and tubes appropriately positioned. 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 Patient's daughter present at bedside providing history due to patient AMS.  Patient daughter reports that over the past few days, patient seems weaker than usual (not able to empty ostomy, not able to assist with sacral wound cleaning, etc.) Last night she seemed confused, and was answering questions inappropriately, and having difficulty communicating. She has not had decreased urine output, and has not had any recent complaints. Daughter reports that a similar episode occurred over the summer, when the patient had a UTI. Patient currently does not remember the events of last night/this morning. She reports feeling cold, irritation of the skin around her ostomy site, sacral pain, and suprapubic discomfort (baseline). Patient denies fevers, chest pain, palpitations, LE edema.    ED Course:  Vitals: Temp 97.9, HR 71, /77 ->211/95->194/94, RR 18,  O2 sat 100 on RA.  Labs:  WBC 6.77, H/h 9.8/31.7, RDW 15.1, glucose 180, lactate .6, K 6.3 ->5.3, Cl 118, bicarb 14, anion gap 12, BUN/Cr 45/4 (was ~6 last admission, baseline <5), albumin 2.8, GFR 10  UA: color: pink, appearance: turbid, protein 500, LE moderate, blood large, WBC 26-50, RBC>50, bacteria moderate.  CT head: Negative for intracranial hemorrhage or acute territorial hemorrhage  CXR: Questionable upper lobe infiltrates  EKG: NSR without peaked t waves and no ischemic changes  ABG - ( 03 Mar 2020 16:09 )  pH, Arterial: 7.31  pH, Blood: x     /  pCO2: 31    /  pO2: 87    / HCO3: 17    / Base Excess: -9.7  /  SaO2: 96      Patient s/p zosyn, 1.7L NS blous, 1.7 LR bolus, insulin, albuterol, sodium bicarb 50 meq IV, kayexalate with improvement.  Renae removed and replaced with 50 cc urine output upon insertion. (03 Mar 2020 16:51)      24 HOUR EVENTS: Patient was very lethargic, borderline obtunded this AM. Sedation was held and later in the AM patient was more awake and nodding intermittently to questioning. She was hypotensive overnight. On Midodrine. As she began to awaken this AM she was transitioned to PSV. Central line placed due to limited access as well as hypotension and possible vasopressor initiation.    SUBJECTIVE/ROS:  [ ] A ten-point review of systems was otherwise negative except as noted.  [x ] Due to altered mental status/intubation, subjective information were not able to be obtained from the patient. History was obtained, to the extent possible, from review of the chart and collateral sources of information.    NEURO  RASS:  0   GCS:  10T   CAM ICU: neg  Exam: Opens eyes. Nods heads to some questioning. Weak grasp  Meds: acetaminophen    Suspension .. 650 milliGRAM(s) Oral every 6 hours PRN Temp greater or equal to 38C (100.4F), Moderate Pain (4 - 6)  dexMEDEtomidine Infusion 0.5 MICROgram(s)/kG/Hr IV Continuous <Continuous>  FLUoxetine 20 milliGRAM(s) Oral daily  propofol Infusion 10 MICROgram(s)/kG/Min IV Continuous <Continuous>  valproic  acid Syrup 500 milliGRAM(s) Oral every 12 hours    [x] Adequacy of sedation and pain control has been assessed and adjusted      RESPIRATORY  RR: 26 (03-07-20 @ 12:10) (17 - 33)  SpO2: 100% (03-07-20 @ 12:10) (98% - 100%)  Wt(kg): --  Exam: coarse bs to auscultation bilaterally  Mechanical Ventilation: Mode: CPAP with PS, RR (patient): 15, FiO2: 30, PEEP: 5, PS: 8, ITime: 1, MAP: 8.6  ABG - ( 05 Mar 2020 20:37 )  pH: 7.34  /  pCO2: 33    /  pO2: 190   / HCO3: 18    / Base Excess: -7.8  /  SaO2: 100     Lactate: x          [x ] Extubation Readiness Assessed  Meds: budesonide 160 MICROgram(s)/formoterol 4.5 MICROgram(s) Inhaler 2 Puff(s) Inhalation two times a day  loratadine 10 milliGRAM(s) Oral daily    CARDIOVASCULAR  HR: 59 (03-07-20 @ 12:10) (53 - 85)  BP: 105/49 (03-07-20 @ 12:10) (76/37 - 142/59)  BP(mean): 71 (03-07-20 @ 12:00) (50 - 101)  ABP: --  ABP(mean): --  Wt(kg): --  CVP(cm H2O): --      Exam:  Cardiac Rhythm: S1 S2 50s  Perfusion     [x ]Adequate   [ ]Inadequate  Mentation   [ x]Normal       [ ]Reduced  Extremities  [ x]Warm         [ ]Cool  Volume Status [ ]Hypervolemic [x ]Euvolemic [ ]Hypovolemic  Meds: midodrine 10 milliGRAM(s) Oral every 8 hours        GI/NUTRITION  Diet: Enteral feeds at goal rate  Exam: soft nt nd, no guarding or rebound  Meds: pantoprazole  Injectable 40 milliGRAM(s) IV Push daily      GENITOURINARY  I&O's Detail    03-06 @ 07:01  -  03-07 @ 07:00  --------------------------------------------------------  IN:    dexmedetomidine Infusion: 90 mL    Nepro with Carb Steady: 120 mL    Oral Fluid: 1000 mL    propofol Infusion: 135 mL    Solution: 100 mL  Total IN: 1445 mL    OUT:    Colostomy: 1350 mL    Indwelling Catheter - Urethral: 515 mL  Total OUT: 1865 mL    Total NET: -420 mL      03-07 @ 06:01  -  03-07 @ 12:53  --------------------------------------------------------  IN:    Nepro with Carb Steady: 100 mL  Total IN: 100 mL    OUT:    Colostomy: 300 mL    Indwelling Catheter - Urethral: 190 mL  Total OUT: 490 mL    Total NET: -390 mL          03-07    140  |  111<H>  |  58<H>  ----------------------------<  97  4.7   |  15<L>  |  4.90<H>    Ca    6.8<L>      07 Mar 2020 09:08  Phos  6.1     03-07  Mg     1.9     03-07    TPro  5.9<L>  /  Alb  2.5<L>  /  TBili  0.2  /  DBili  x   /  AST  21  /  ALT  18  /  AlkPhos  45  03-07    [ x] Renae catheter, indication: Critical Care illness  Meds: calcium carbonate 1250 mG  + Vitamin D (OsCal 500 + D) 1 Tablet(s) Oral daily  cholecalciferol 1000 Unit(s) Oral daily  ferrous    sulfate 325 milliGRAM(s) Oral daily  multivitamin 1 Tablet(s) Oral daily  sodium bicarbonate 650 milliGRAM(s) Oral three times a day  sodium chloride 0.9% lock flush 10 milliLiter(s) IV Push every 1 hour PRN Pre/post blood products, medications, blood draw, and to maintain line patency      HEMATOLOGIC  Meds: aspirin enteric coated 81 milliGRAM(s) Oral daily  heparin  Injectable 5000 Unit(s) SubCutaneous every 8 hours    [x] VTE Prophylaxis                        8.5    11.66 )-----------( 186      ( 07 Mar 2020 09:08 )             27.3     PT/INR - ( 07 Mar 2020 10:39 )   PT: 15.1 sec;   INR: 1.34 ratio         PTT - ( 07 Mar 2020 10:39 )  PTT:26.4 sec  Transfusion     [ ] PRBC   [ ] Platelets   [ ] FFP   [ ] Cryoprecipitate      INFECTIOUS DISEASES  T(C): 36.1 (03-07-20 @ 07:32), Max: 37.2 (03-06-20 @ 20:00)  Wt(kg): --  WBC Count: 11.66 K/uL (03-07 @ 09:08)    Recent Cultures:  Specimen Source: .Blood Blood, 03-06 @ 01:23; Results   No growth to date.; Gram Stain: --; Organism: --  Specimen Source: .Urine Clean Catch (Midstream), 03-03 @ 21:30; Results   >100,000 CFU/ml Escherichia coli  >100,000 CFU/ml Enterobacter aerogenes; Gram Stain: --; Organism: Escherichia coli  Enterobacter aerogenes  Specimen Source: .Blood Blood-Peripheral, 03-03 @ 21:12; Results   No growth to date.; Gram Stain: --; Organism: --  Specimen Source: .Blood Blood-Peripheral, 03-03 @ 21:10; Results   No growth to date.; Gram Stain: --; Organism: --    Meds: epoetin jean carlos Injectable 03812 Unit(s) SubCutaneous <User Schedule>  meropenem  IVPB 500 milliGRAM(s) IV Intermittent every 24 hours        ENDOCRINE  Capillary Blood Glucose  None    Meds: atorvastatin 10 milliGRAM(s) Oral at bedtime  cholestyramine Powder (Sugar-Free) 4 Gram(s) Oral daily      ACCESS DEVICES:  [ ] Peripheral IV  [x ] Central Venous Line	[ x] R	[ ] L	[ x] IJ	[ ] Fem	[ ] SC	Placed:   [ ] Arterial Line		[ ] R	[ ] L	[ ] Fem	[ ] Rad	[ ] Ax	Placed:   [ ] PICC:					[ ] Mediport  [ ] Urinary Catheter, Date Placed:   [ ] Necessity of urinary, arterial, and venous catheters discussed    OTHER MEDICATIONS:  chlorhexidine 0.12% Liquid 15 milliLiter(s) Oral Mucosa every 12 hours  chlorhexidine 2% Cloths 1 Application(s) Topical <User Schedule>  clobetasol 0.05% Cream 1 Application(s) Topical two times a day  lactobacillus acidophilus 1 Tablet(s) Oral three times a day  nystatin Cream 1 Application(s) Topical two times a day      CODE STATUS: Full     IMAGING: CXR: lines and tubes appropriately positioned.

## 2020-03-07 NOTE — PROGRESS NOTE ADULT - PROBLEM SELECTOR PLAN 2
- unresponsive episode with shaking and spike in lactic acidosis yesterday, consistent with seizure  - CT head, MRI brain showed no acute infarct or hemorrhage  - EEG no sign of seizure, but performed after pt received ativan, so that can mask findings.   - Per neuro, will repeat EEG this weekend if signs/symptoms of seizure activity present  - neuro, Dr. Quintanilla, recs appreciated  - started on depakote  - seizure precautions  - as mentioned by ID, Dr. Kirk, meropenem can cause decreased concentration of depakote, thus if pt needs to continue meropenem, would likely need change in AED

## 2020-03-07 NOTE — PROGRESS NOTE ADULT - SUBJECTIVE AND OBJECTIVE BOX
Patient seen in follow up for MEHDI on CKD. Vented. Right I.J. triple lumen just placed. Midodrine started with marginal BP. Good urine output. Intermittent fever spikes.    Wound of sacral region  Depression  Iron deficiency anemia  Eczema  HTN (hypertension)  CKD (chronic kidney disease) stage 5, GFR less than 15 ml/min  Herniated lumbar intervertebral disc  Depression  Tremor  Kidney atrophy  Colostomy status  Chronic UTI (urinary tract infection)  Cervical cancer  Dyslipidemia  Diabetes  Hernia, incisional    MEDICATIONS  (STANDING):  aspirin enteric coated 81 milliGRAM(s) Oral daily  atorvastatin 10 milliGRAM(s) Oral at bedtime  budesonide 160 MICROgram(s)/formoterol 4.5 MICROgram(s) Inhaler 2 Puff(s) Inhalation two times a day  calcium carbonate 1250 mG  + Vitamin D (OsCal 500 + D) 1 Tablet(s) Oral daily  chlorhexidine 0.12% Liquid 15 milliLiter(s) Oral Mucosa every 12 hours  chlorhexidine 2% Cloths 1 Application(s) Topical <User Schedule>  cholecalciferol 1000 Unit(s) Oral daily  cholestyramine Powder (Sugar-Free) 4 Gram(s) Oral daily  clobetasol 0.05% Cream 1 Application(s) Topical two times a day  dexMEDEtomidine Infusion 0.5 MICROgram(s)/kG/Hr (6.8 mL/Hr) IV Continuous <Continuous>  epoetin jean carlos Injectable 03906 Unit(s) SubCutaneous <User Schedule>  ferrous    sulfate 325 milliGRAM(s) Oral daily  FLUoxetine 20 milliGRAM(s) Oral daily  heparin  Injectable 5000 Unit(s) SubCutaneous every 8 hours  lactobacillus acidophilus 1 Tablet(s) Oral three times a day  loratadine 10 milliGRAM(s) Oral daily  meropenem  IVPB 500 milliGRAM(s) IV Intermittent every 24 hours  midodrine 10 milliGRAM(s) Oral every 8 hours  multivitamin 1 Tablet(s) Oral daily  nystatin Cream 1 Application(s) Topical two times a day  pantoprazole  Injectable 40 milliGRAM(s) IV Push daily  propofol Infusion 10 MICROgram(s)/kG/Min (3.264 mL/Hr) IV Continuous <Continuous>  sodium bicarbonate 650 milliGRAM(s) Oral three times a day  valproic  acid Syrup 500 milliGRAM(s) Oral every 12 hours    MEDICATIONS  (PRN):  acetaminophen    Suspension .. 650 milliGRAM(s) Oral every 6 hours PRN Temp greater or equal to 38C (100.4F), Moderate Pain (4 - 6)  sodium chloride 0.9% lock flush 10 milliLiter(s) IV Push every 1 hour PRN Pre/post blood products, medications, blood draw, and to maintain line patency    T(C): 36.1 (03-07-20 @ 07:32), Max: 38.9 (03-06-20 @ 01:14)  HR: 59 (03-07-20 @ 12:10) (53 - 106)  BP: 105/49 (03-07-20 @ 12:10) (76/37 - 175/77)  RR: 26 (03-07-20 @ 12:10) (17 - 34)  SpO2: 100% (03-07-20 @ 12:10) (98% - 100%)  Wt(kg): --  I&O's Detail    06 Mar 2020 07:01  -  07 Mar 2020 07:00  --------------------------------------------------------  IN:    dexmedetomidine Infusion: 90 mL    Nepro with Carb Steady: 120 mL    Oral Fluid: 1000 mL    propofol Infusion: 135 mL    Solution: 100 mL  Total IN: 1445 mL    OUT:    Colostomy: 1350 mL    Indwelling Catheter - Urethral: 515 mL  Total OUT: 1865 mL    Total NET: -420 mL      07 Mar 2020 06:01  -  07 Mar 2020 12:35  --------------------------------------------------------  IN:    Nepro with Carb Steady: 100 mL  Total IN: 100 mL    OUT:    Colostomy: 300 mL    Indwelling Catheter - Urethral: 190 mL  Total OUT: 490 mL    Total NET: -390 mL      PHYSICAL EXAM:  General: NAD, vented  Respiratory: b/l air entry  Cardiovascular: S1 S2  Gastrointestinal: soft, NT  Extremities:  no edema    CBC Full  -  ( 07 Mar 2020 09:08 )  WBC Count : 11.66 K/uL  RBC Count : 2.90 M/uL  Hemoglobin : 8.5 g/dL  Hematocrit : 27.3 %  Platelet Count - Automated : 186 K/uL  Mean Cell Volume : 94.1 fl  Mean Cell Hemoglobin : 29.3 pg  Mean Cell Hemoglobin Concentration : 31.1 gm/dL  Auto Neutrophil # : 10.25 K/uL  Auto Lymphocyte # : 0.64 K/uL  Auto Monocyte # : 0.68 K/uL  Auto Eosinophil # : 0.00 K/uL  Auto Basophil # : 0.03 K/uL  Auto Neutrophil % : 87.9 %  Auto Lymphocyte % : 5.5 %  Auto Monocyte % : 5.8 %  Auto Eosinophil % : 0.0 %  Auto Basophil % : 0.3 %    03-07    140  |  111<H>  |  58<H>  ----------------------------<  97  4.7   |  15<L>  |  4.90<H>    Ca    6.8<L>      07 Mar 2020 09:08  Phos  6.1     03-07  Mg     1.9     03-07    TPro  5.9<L>  /  Alb  2.5<L>  /  TBili  0.2  /  DBili  x   /  AST  21  /  ALT  18  /  AlkPhos  45  03-07    ABG - ( 05 Mar 2020 20:37 )  pH, Arterial: 7.34  pH, Blood: x     /  pCO2: 33    /  pO2: 190   / HCO3: 18    / Base Excess: -7.8  /  SaO2: 100           Sodium, Serum: 140 (03-07 @ 09:08)  Sodium, Serum: 143 (03-06 @ 06:06)  Sodium, Serum: 143 (03-05 @ 14:49)  Sodium, Serum: 146 (03-05 @ 10:46)  Sodium, Serum: 147 (03-05 @ 08:33)    Creatinine, Serum: 4.90 (03-07 @ 09:08)  Creatinine, Serum: 4.50 (03-06 @ 06:06)  Creatinine, Serum: 4.10 (03-05 @ 14:49)  Creatinine, Serum: 4.10 (03-05 @ 10:46)  Creatinine, Serum: 3.90 (03-05 @ 08:33)    Potassium, Serum: 4.7 (03-07 @ 09:08)  Potassium, Serum: 5.4 (03-06 @ 06:06)  Potassium, Serum: 4.9 (03-05 @ 14:49)  Potassium, Serum: 4.8 (03-05 @ 10:46)  Potassium, Serum: 4.6 (03-05 @ 08:33)    Hemoglobin: 8.5 (03-07 @ 09:08)  Hemoglobin: 10.3 (03-06 @ 06:06)  Hemoglobin: 10.6 (03-05 @ 10:46)  Hemoglobin: 9.3 (03-05 @ 08:33)

## 2020-03-07 NOTE — PROGRESS NOTE ADULT - ATTENDING COMMENTS
Pt seen + examined. Case discussed with resident. Agree with assessment and plan above (edited) with following addendum:  Time spent: 45min. More than 50% of the visit was spent counseling the patient's family on medical condition -- acute generalized seizure, post-ictal state, sepsis, acute hypoxic resp failure, intubation, infectious work-up.    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) and chronic diarrhea, cervical cancer (s/p radical hysterectomy and radiation), tremors, eczema, depression, HTN, HLD, history of frequent UTIs with chronic indwelling renae, anemia, stage 4 sacral decubitus ulcer, and recent hospitalization for MEHDI on CKD thought to be 2/2 dehydration and urinary retention from malfunctioning chronic renae, now brought in by EMS to ED for altered mental status admitted for acute metabolic encephalopathy due to suspected UTI and hyperkalemia in setting of MEHDI on CKD 5, course c/b likely generalized seizure on 3/5/20, and acute hypoxic respiratory failure and suspected sepsis, now intubated.  - unresponsive episode with shaking and spike in lactic acidosis yesterday, consistent with seizure  - CT head, MRI brain showed no acute infarct or hemorrhage  - EEG no sign of seizure, but performed after pt received ativan, so that can mask findings.   - Per neuro, will repeat EEG this weekend if signs/symptoms of seizure activity present  - continue depakote  - neuro, Dr. Quintanilla, recs appreciated  - seizure precautions  - Patient has chronic renae, hx of reported frequent UTI  - persistently febrile overnight (had not been febrile for several days prior to seizures)  - antibiotic coverage broadened to meropenem, with urine culture now growing E. coli and enterobact aerogenes, the latter of which has only intermediate sensitivity to zosyn  -albeit small (<1%) chance, meropenem can lower the seizure threshold; discuss with ID if alternatives may be given   - UA positive for leuk esterase, bacteria, but unclear significance in setting of chronic renae  - had previously been afebrile, with no leukocytosis  - Renae replaced in ED  - on admission it was suspected that AMS was due to UTI, though now feel higher likelihood it was related to seizure activity (possibly in partial seizures prior to the generalized one pt had on 3/5)  - Further information from daughter reveals that patient's AMS was actually expressive aphasia that began this weekend  - procalcitonin was low at .12  - admission blood culture NGTD; f/up repeat BCx from 3/5  - ID consult, Dr. Kirk group, recs appreciated  - CT Abd/P ordered by ICU to eval for other possibilities of acute infection given pt had persistent fever overnight  - discuss with neuro, if possible fevers were related to the seizure activity or if concern for other central process Note written by attending, see above.  Time spent: 60min.

## 2020-03-07 NOTE — PROGRESS NOTE ADULT - SUBJECTIVE AND OBJECTIVE BOX
Neurology follow up note    GURJIT [id number]      Interval History:    Patient events noted intubated now off sedation     MEDICATIONS    acetaminophen    Suspension .. 650 milliGRAM(s) Oral every 6 hours PRN  aspirin enteric coated 81 milliGRAM(s) Oral daily  atorvastatin 10 milliGRAM(s) Oral at bedtime  budesonide 160 MICROgram(s)/formoterol 4.5 MICROgram(s) Inhaler 2 Puff(s) Inhalation two times a day  calcium carbonate 1250 mG  + Vitamin D (OsCal 500 + D) 1 Tablet(s) Oral daily  chlorhexidine 0.12% Liquid 15 milliLiter(s) Oral Mucosa every 12 hours  cholecalciferol 1000 Unit(s) Oral daily  cholestyramine Powder (Sugar-Free) 4 Gram(s) Oral daily  clobetasol 0.05% Cream 1 Application(s) Topical two times a day  dexMEDEtomidine Infusion 0.5 MICROgram(s)/kG/Hr IV Continuous <Continuous>  epoetin jean carlos Injectable 07463 Unit(s) SubCutaneous <User Schedule>  ferrous    sulfate 325 milliGRAM(s) Oral daily  FLUoxetine 20 milliGRAM(s) Oral daily  heparin  Injectable 5000 Unit(s) SubCutaneous every 8 hours  lactobacillus acidophilus 1 Tablet(s) Oral three times a day  loratadine 10 milliGRAM(s) Oral daily  meropenem  IVPB 500 milliGRAM(s) IV Intermittent every 24 hours  midodrine 10 milliGRAM(s) Oral every 8 hours  multivitamin 1 Tablet(s) Oral daily  nystatin Cream 1 Application(s) Topical two times a day  pantoprazole  Injectable 40 milliGRAM(s) IV Push daily  propofol Infusion 10 MICROgram(s)/kG/Min IV Continuous <Continuous>  sodium bicarbonate 650 milliGRAM(s) Oral three times a day  valproic  acid Syrup 500 milliGRAM(s) Oral every 12 hours      Allergies    Levaquin (Pruritus)  mercury (Pruritus; Rash)  sulfa drugs (Pruritus)    Intolerances            Vital Signs Last 24 Hrs  T(C): 36.1 (07 Mar 2020 07:32), Max: 37.2 (06 Mar 2020 20:00)  T(F): 97 (07 Mar 2020 07:32), Max: 99 (06 Mar 2020 20:00)  HR: 53 (07 Mar 2020 07:21) (53 - 85)  BP: 118/49 (07 Mar 2020 06:15) (76/37 - 142/59)  BP(mean): 71 (07 Mar 2020 06:15) (50 - 101)  RR: 23 (07 Mar 2020 06:15) (17 - 33)  SpO2: 100% (07 Mar 2020 07:21) (98% - 100%)        REVIEW OF SYSTEMS: intubated--        On Neurological Examination:    Mental Status - Patient is sUnresponsive  .     Does not follow commands    Speech -   intubated                   Cranial Nerves - Pupils 3 mm reactive to 2mm  positive eye opening to painful stimuli   extraocular eye movements intact.   smile symmetric  intact bilateral NLF    Motor Exam -     With stimuli positive movement of all 4 extremities    Muscle tone - is normal all over.  No asymmetry is seen.      Sensory    Bilateral intact to light touch    GENERAL Exam: Nontoxic , No Acute Distress   	  HEENT:  normocephalic, atraumatic  		  LUNGS: intubated  	  HEART: Normal S1S2   No murmur RRR        	  GI/ ABDOMEN:  Soft  Non tender    EXTREMITIES:   No Edema  No Clubbing  No Cyanosis   	   SKIN: Normal  No Ecchymosis           LABS:  CBC Full  -  ( 06 Mar 2020 06:06 )  WBC Count : 10.66 K/uL  RBC Count : 3.63 M/uL  Hemoglobin : 10.3 g/dL  Hematocrit : 33.2 %  Platelet Count - Automated : 242 K/uL  Mean Cell Volume : 91.5 fl  Mean Cell Hemoglobin : 28.4 pg  Mean Cell Hemoglobin Concentration : 31.0 gm/dL  Auto Neutrophil # : 9.46 K/uL  Auto Lymphocyte # : 0.91 K/uL  Auto Monocyte # : 0.23 K/uL  Auto Eosinophil # : 0.00 K/uL  Auto Basophil # : 0.02 K/uL  Auto Neutrophil % : 88.7 %  Auto Lymphocyte % : 8.5 %  Auto Monocyte % : 2.2 %  Auto Eosinophil % : 0.0 %  Auto Basophil % : 0.2 %      03-06    143  |  113<H>  |  45<H>  ----------------------------<  156<H>  5.4<H>   |  16<L>  |  4.50<H>    Ca    7.4<L>      06 Mar 2020 06:06  Phos  4.4     03-06  Mg     2.0     03-06    TPro  7.0  /  Alb  2.9<L>  /  TBili  0.4  /  DBili  x   /  AST  28  /  ALT  23  /  AlkPhos  64  03-06    Hemoglobin A1C:     LIVER FUNCTIONS - ( 06 Mar 2020 06:06 )  Alb: 2.9 g/dL / Pro: 7.0 g/dL / ALK PHOS: 64 U/L / ALT: 23 U/L / AST: 28 U/L / GGT: x           Vitamin B12         RADIOLOGY    ANALYSIS AND PLAN:  An 84-year-old with an episode of seizure and change in mental status.  1.	For episode of seizure, I will start the patient on Depakote 500 mg twice a day.  2.	limited EEG plan to repeat over weekend based on clinical status tomorrow    3.	Ativan 1 mg IV push q.6h. p.r.n. for any type of breakthrough seizure.  4.	Seizure precautions.  5.	For history of underlying depression, at present hard to evaluate the patient's psychiatric status secondary to the patient being lethargic, continue home medication when possible.  6.	For hyperlipidemia, continue the patient on her cholesterol medication.  7.	For change in mental status, questionable this could be metabolic encephalopathy from partial complex seizures versus any type of underlying infectious type process episodes of temperatures and hypotension possible shock   8.	Antibiotics as needed.  9.	Fall precaution.  10.	overall prognosis at present is poor   11.	For chronic kidney disease, monitor renal function.  12.	Spoke with multiple family members at the bedside.  Advance directives were discussed with them.  Primary  will be daughter, Ju, her cell phone number is [phone], home number is [phone] spoke to her today in regards to at present poor prognosis 3/7/2020   13.	Greater than 40 minutes of time was spent with the patient, plan of care, reviewing data, speaking to the family and multidisciplinary healthcare team.

## 2020-03-07 NOTE — PROGRESS NOTE ADULT - PROBLEM SELECTOR PLAN 2
Role of antibiotics TBD  F/U Cx  Will need to explore what treatment pathway best re: sacrum once cute issues stabilize. All have drawbacks.

## 2020-03-07 NOTE — PROGRESS NOTE ADULT - PROBLEM SELECTOR PLAN 1
- after pt's seizures on 3/5, pt developed persistent fever for ~12 hours which subsided on 3/6 at 6am and pt has been afebrile since.   - antibiotic coverage broadened to meropenem, as urine culture started to grow 2nd organism in addition to the E. coli -- an enterobacter aerogenes, which has only intermediate sensitivity to zosyn  -albeit small (<1%) chance, meropenem can lower the seizure threshold; consider alternative Abx regimen ; if pt needs to continue on carbapenem, as mentioned by Dr. Kirk, would likely need an alternative AED  - potential infection still unclear -- pt has chronic renae, thus making significance of bacteriuria unclear and may be colonizer  - prior to the generalized seizure, pt had previously been afebrile, with no leukocytosis for several days and was quite lucid with only true alteration in her mental status seeming to be an expressive aphasia. Pt was not delirious and seemed to have no receptive deficits and no difficulties following all commands appropriately on 3/4/20 when I first saw the pt. Only had difficulty word-finding and could not use full sentences because of stumbling in word choice -- this did not seem consistent with an encephalopathy caused by infection (peripheral or central)  - pt now afebrile, but is now significantly hypotensive, thus cannot discount potential infection at this time.  - MRI showed no evidence for CVA to have explained the isolated expressive aphasia pt had prior to her generalized seizure on 3/5  - admission BCx neg; repeat BCx from 3/5 are neg 24hrs  - CT Abd/P non-con from 3/5 showed no clear source of infection, and lung bases were not particularly consistent with an aspiration PNA.  - discuss with neuro, if possible fevers were related to the seizure activity or if concern for other central process  - pt's "AMS" as described above not really consistent with an encephalitis, but if pt has further fever or persistent hypotension, may need to consider LP (if feasible given large sacral decub)

## 2020-03-07 NOTE — PROGRESS NOTE ADULT - SUBJECTIVE AND OBJECTIVE BOX
Patient is a 84y old  Female who presents with a chief complaint of altered mental status (07 Mar 2020 14:36)      INTERVAL HPI/OVERNIGHT EVENTS: Pt's fevers have resolved with last documented fever on 3/6/20 at 5AM, but pt now had hypotension overnight. All anti-hypertensives were held and pt started on midodrine with improvement in MAP to above 65, thus pt was not initiated on IV pressors as of now. Pt on vent weaning trial and will be extubated.     MEDICATIONS  (STANDING):  aspirin enteric coated 81 milliGRAM(s) Oral daily  atorvastatin 10 milliGRAM(s) Oral at bedtime  budesonide 160 MICROgram(s)/formoterol 4.5 MICROgram(s) Inhaler 2 Puff(s) Inhalation two times a day  calcium carbonate 1250 mG  + Vitamin D (OsCal 500 + D) 1 Tablet(s) Oral daily  chlorhexidine 0.12% Liquid 15 milliLiter(s) Oral Mucosa every 12 hours  chlorhexidine 2% Cloths 1 Application(s) Topical <User Schedule>  cholecalciferol 1000 Unit(s) Oral daily  cholestyramine Powder (Sugar-Free) 4 Gram(s) Oral daily  clobetasol 0.05% Cream 1 Application(s) Topical two times a day  dexMEDEtomidine Infusion 0.5 MICROgram(s)/kG/Hr (6.8 mL/Hr) IV Continuous <Continuous>  epoetin jean carlos Injectable 61348 Unit(s) SubCutaneous <User Schedule>  ferrous    sulfate 325 milliGRAM(s) Oral daily  FLUoxetine 20 milliGRAM(s) Oral daily  heparin  Injectable 5000 Unit(s) SubCutaneous every 8 hours  lactobacillus acidophilus 1 Tablet(s) Oral three times a day  loratadine 10 milliGRAM(s) Oral daily  meropenem  IVPB 500 milliGRAM(s) IV Intermittent every 24 hours  midodrine 10 milliGRAM(s) Oral every 8 hours  multivitamin 1 Tablet(s) Oral daily  nystatin Cream 1 Application(s) Topical two times a day  pantoprazole  Injectable 40 milliGRAM(s) IV Push daily  propofol Infusion 10 MICROgram(s)/kG/Min (3.264 mL/Hr) IV Continuous <Continuous>  sodium bicarbonate 650 milliGRAM(s) Oral three times a day  valproic  acid Syrup 500 milliGRAM(s) Oral every 12 hours    MEDICATIONS  (PRN):  acetaminophen    Suspension .. 650 milliGRAM(s) Oral every 6 hours PRN Temp greater or equal to 38C (100.4F), Moderate Pain (4 - 6)  sodium chloride 0.9% lock flush 10 milliLiter(s) IV Push every 1 hour PRN Pre/post blood products, medications, blood draw, and to maintain line patency      Allergies    Levaquin (Pruritus)  mercury (Pruritus; Rash)  sulfa drugs (Pruritus)    Intolerances        REVIEW OF SYSTEMS:  Intubated, cannot provide ROS.     Vital Signs  T(C): 36.1 (03-07-20 @ 07:32), Max: 38.9 (03-06-20 @ 01:14)  HR: 59 (03-07-20 @ 12:10) (53 - 106)  BP: 105/49 (03-07-20 @ 12:10) (76/37 - 175/77)  RR: 26 (03-07-20 @ 12:10) (17 - 34)  SpO2: 100% (03-07-20 @ 12:10) (98% - 100%)      PHYSICAL EXAM:  GENERAL: intubated, frail  HEENT:  anicteric, NGT in place  CHEST/LUNG:  diminished breath sounds bibasilarly, rest CTA b/l  HEART:  RRR, S1, S2  ABDOMEN:  BS+, soft, no apparent TTP, nondistended, +ostomy with brown stool  EXTREMITIES: healed scabs scattered on bilateral LEs  NERVOUS SYSTEM: no tremors or shaking    LABS:                        8.5    11.66 )-----------( 186      ( 07 Mar 2020 09:08 )             27.3     CBC Full  -  ( 07 Mar 2020 09:08 )  WBC Count : 11.66 K/uL  Hemoglobin : 8.5 g/dL  Hematocrit : 27.3 %  Platelet Count - Automated : 186 K/uL  Mean Cell Volume : 94.1 fl  Mean Cell Hemoglobin : 29.3 pg  Mean Cell Hemoglobin Concentration : 31.1 gm/dL  Auto Neutrophil # : 10.25 K/uL  Auto Lymphocyte # : 0.64 K/uL  Auto Monocyte # : 0.68 K/uL  Auto Eosinophil # : 0.00 K/uL  Auto Basophil # : 0.03 K/uL  Auto Neutrophil % : 87.9 %  Auto Lymphocyte % : 5.5 %  Auto Monocyte % : 5.8 %  Auto Eosinophil % : 0.0 %  Auto Basophil % : 0.3 %    07 Mar 2020 09:08    140    |  111    |  58     ----------------------------<  97     4.7     |  15     |  4.90     Ca    6.8        07 Mar 2020 09:08  Phos  6.1       07 Mar 2020 09:08  Mg     1.9       07 Mar 2020 09:08    TPro  5.9    /  Alb  2.5    /  TBili  0.2    /  DBili  x      /  AST  21     /  ALT  18     /  AlkPhos  45     07 Mar 2020 09:08    PT/INR - ( 07 Mar 2020 10:39 )   PT: 15.1 sec;   INR: 1.34 ratio         PTT - ( 07 Mar 2020 10:39 )  PTT:26.4 sec    CAPILLARY BLOOD GLUCOSE            Culture - Blood (collected 03-06-20 @ 01:23)  Source: .Blood Blood-Peripheral  Preliminary Report (03-07-20 @ 02:03):    No growth to date.    Culture - Blood (collected 03-06-20 @ 01:23)  Source: .Blood Blood  Preliminary Report (03-07-20 @ 02:03):    No growth to date.    Culture - Urine (collected 03-03-20 @ 21:30)  Source: .Urine Clean Catch (Midstream)  Final Report (03-05-20 @ 22:43):    >100,000 CFU/ml Escherichia coli    >100,000 CFU/ml Enterobacter aerogenes  Organism: Escherichia coli  Enterobacter aerogenes (03-05-20 @ 22:43)  Organism: Enterobacter aerogenes (03-05-20 @ 22:43)      -  Amikacin: S <=16      -  Ampicillin: R >16 These ampicillin results predict results for amoxicillin      -  Ampicillin/Sulbactam: R >16/8 Enterobacter, Citrobacter, and Serratia may develop resistance during prolonged therapy (3-4 days)      -  Aztreonam: R >16      -  Cefazolin: R >16      -  Cefepime: S <=4      -  Cefoxitin: R >16      -  Ceftriaxone: R >32 Enterobacter, Citrobacter, and Serratia may develop resistance during prolonged therapy      -  Ciprofloxacin: S <=1      -  Ertapenem: S <=1      -  Gentamicin: S <=4      -  Imipenem: S <=1      -  Levofloxacin: S <=2      -  Meropenem: S <=1      -  Nitrofurantoin: I 64 Should not be used to treat pyelonephritis      -  Piperacillin/Tazobactam: I 64      -  Tigecycline: S <=2      -  Tobramycin: S <=4      -  Trimethoprim/Sulfamethoxazole: R >2/38      Method Type: SUBHASH  Organism: Escherichia coli (03-05-20 @ 22:43)      -  Amikacin: S <=16      -  Ampicillin: R >16 These ampicillin results predict results for amoxicillin      -  Ampicillin/Sulbactam: R >16/8 Enterobacter, Citrobacter, and Serratia may develop resistance during prolonged therapy (3-4 days)      -  Aztreonam: S <=4      -  Cefazolin: S <=8 (MIC_CL_COM_ENTERIC_CEFAZU) For uncomplicated UTI with K. pneumoniae, E. coli, or P. mirablis: SUBHASH <=16 is sensitive and SUBHASH >=32 is resistant. This also predicts results for oral agents cefaclor, cefdinir, cefpodoxime, cefprozil, cefuroxime axetil, cephalexin and locarbef for uncomplicated UTI. Note that some isolates may be susceptible to these agents while testing resistant to cefazolin.      -  Cefepime: S <=4      -  Cefoxitin: S <=8      -  Ceftriaxone: S <=1 Enterobacter, Citrobacter, and Serratia may develop resistance during prolonged therapy      -  Ciprofloxacin: S <=1      -  Gentamicin: S <=4      -  Imipenem: S <=1      -  Levofloxacin: S <=2      -  Meropenem: S <=1      -  Nitrofurantoin: S <=32 Should not be used to treat pyelonephritis      -  Piperacillin/Tazobactam: S <=16      -  Tigecycline: S <=2      -  Tobramycin: S <=4      -  Trimethoprim/Sulfamethoxazole: R >2/38      Method Type: SUBHASH    Culture - Blood (collected 03-03-20 @ 21:12)  Source: .Blood Blood-Peripheral  Preliminary Report (03-04-20 @ 22:01):    No growth to date.    Culture - Blood (collected 03-03-20 @ 21:10)  Source: .Blood Blood-Peripheral  Preliminary Report (03-04-20 @ 22:01):    No growth to date.        RADIOLOGY & ADDITIONAL TESTS:    Personally reviewed.     Consultant(s) Notes Reviewed:  [x] YES  [ ] NO Patient is a 84y old  Female who presents with a chief complaint of altered mental status.    INTERVAL HPI/OVERNIGHT EVENTS: Pt's fevers have resolved with last documented fever on 3/6/20 at 5AM, but pt now had hypotension overnight. All anti-hypertensives were held and pt started on midodrine with improvement in MAP to above 65, thus pt was not initiated on IV pressors as of now. Pt on vent weaning trial and will be extubated.     MEDICATIONS  (STANDING):  aspirin enteric coated 81 milliGRAM(s) Oral daily  atorvastatin 10 milliGRAM(s) Oral at bedtime  budesonide 160 MICROgram(s)/formoterol 4.5 MICROgram(s) Inhaler 2 Puff(s) Inhalation two times a day  calcium carbonate 1250 mG  + Vitamin D (OsCal 500 + D) 1 Tablet(s) Oral daily  chlorhexidine 0.12% Liquid 15 milliLiter(s) Oral Mucosa every 12 hours  chlorhexidine 2% Cloths 1 Application(s) Topical <User Schedule>  cholecalciferol 1000 Unit(s) Oral daily  cholestyramine Powder (Sugar-Free) 4 Gram(s) Oral daily  clobetasol 0.05% Cream 1 Application(s) Topical two times a day  dexMEDEtomidine Infusion 0.5 MICROgram(s)/kG/Hr (6.8 mL/Hr) IV Continuous <Continuous>  epoetin jean carlos Injectable 18711 Unit(s) SubCutaneous <User Schedule>  ferrous    sulfate 325 milliGRAM(s) Oral daily  FLUoxetine 20 milliGRAM(s) Oral daily  heparin  Injectable 5000 Unit(s) SubCutaneous every 8 hours  lactobacillus acidophilus 1 Tablet(s) Oral three times a day  loratadine 10 milliGRAM(s) Oral daily  meropenem  IVPB 500 milliGRAM(s) IV Intermittent every 24 hours  midodrine 10 milliGRAM(s) Oral every 8 hours  multivitamin 1 Tablet(s) Oral daily  nystatin Cream 1 Application(s) Topical two times a day  pantoprazole  Injectable 40 milliGRAM(s) IV Push daily  propofol Infusion 10 MICROgram(s)/kG/Min (3.264 mL/Hr) IV Continuous <Continuous>  sodium bicarbonate 650 milliGRAM(s) Oral three times a day  valproic  acid Syrup 500 milliGRAM(s) Oral every 12 hours    MEDICATIONS  (PRN):  acetaminophen    Suspension .. 650 milliGRAM(s) Oral every 6 hours PRN Temp greater or equal to 38C (100.4F), Moderate Pain (4 - 6)  sodium chloride 0.9% lock flush 10 milliLiter(s) IV Push every 1 hour PRN Pre/post blood products, medications, blood draw, and to maintain line patency      Allergies    Levaquin (Pruritus)  mercury (Pruritus; Rash)  sulfa drugs (Pruritus)    Intolerances        REVIEW OF SYSTEMS:  Intubated, cannot provide ROS.     Vital Signs  T(C): 36.1 (03-07-20 @ 07:32), Max: 38.9 (03-06-20 @ 01:14)  HR: 59 (03-07-20 @ 12:10) (53 - 106)  BP: 105/49 (03-07-20 @ 12:10) (76/37 - 175/77)  RR: 26 (03-07-20 @ 12:10) (17 - 34)  SpO2: 100% (03-07-20 @ 12:10) (98% - 100%)      PHYSICAL EXAM:  GENERAL: intubated, frail  HEENT:  anicteric, NGT in place  CHEST/LUNG:  diminished breath sounds bibasilarly, rest CTA b/l  HEART:  RRR, S1, S2  ABDOMEN:  BS+, soft, no apparent TTP, nondistended, +ostomy with brown stool  EXTREMITIES: healed scabs scattered on bilateral LEs  NERVOUS SYSTEM: no tremors or shaking    LABS:                        8.5    11.66 )-----------( 186      ( 07 Mar 2020 09:08 )             27.3     CBC Full  -  ( 07 Mar 2020 09:08 )  WBC Count : 11.66 K/uL  Hemoglobin : 8.5 g/dL  Hematocrit : 27.3 %  Platelet Count - Automated : 186 K/uL  Mean Cell Volume : 94.1 fl  Mean Cell Hemoglobin : 29.3 pg  Mean Cell Hemoglobin Concentration : 31.1 gm/dL  Auto Neutrophil # : 10.25 K/uL  Auto Lymphocyte # : 0.64 K/uL  Auto Monocyte # : 0.68 K/uL  Auto Eosinophil # : 0.00 K/uL  Auto Basophil # : 0.03 K/uL  Auto Neutrophil % : 87.9 %  Auto Lymphocyte % : 5.5 %  Auto Monocyte % : 5.8 %  Auto Eosinophil % : 0.0 %  Auto Basophil % : 0.3 %    07 Mar 2020 09:08    140    |  111    |  58     ----------------------------<  97     4.7     |  15     |  4.90     Ca    6.8        07 Mar 2020 09:08  Phos  6.1       07 Mar 2020 09:08  Mg     1.9       07 Mar 2020 09:08    TPro  5.9    /  Alb  2.5    /  TBili  0.2    /  DBili  x      /  AST  21     /  ALT  18     /  AlkPhos  45     07 Mar 2020 09:08    PT/INR - ( 07 Mar 2020 10:39 )   PT: 15.1 sec;   INR: 1.34 ratio         PTT - ( 07 Mar 2020 10:39 )  PTT:26.4 sec    CAPILLARY BLOOD GLUCOSE            Culture - Blood (collected 03-06-20 @ 01:23)  Source: .Blood Blood-Peripheral  Preliminary Report (03-07-20 @ 02:03):    No growth to date.    Culture - Blood (collected 03-06-20 @ 01:23)  Source: .Blood Blood  Preliminary Report (03-07-20 @ 02:03):    No growth to date.    Culture - Urine (collected 03-03-20 @ 21:30)  Source: .Urine Clean Catch (Midstream)  Final Report (03-05-20 @ 22:43):    >100,000 CFU/ml Escherichia coli    >100,000 CFU/ml Enterobacter aerogenes  Organism: Escherichia coli  Enterobacter aerogenes (03-05-20 @ 22:43)  Organism: Enterobacter aerogenes (03-05-20 @ 22:43)      -  Amikacin: S <=16      -  Ampicillin: R >16 These ampicillin results predict results for amoxicillin      -  Ampicillin/Sulbactam: R >16/8 Enterobacter, Citrobacter, and Serratia may develop resistance during prolonged therapy (3-4 days)      -  Aztreonam: R >16      -  Cefazolin: R >16      -  Cefepime: S <=4      -  Cefoxitin: R >16      -  Ceftriaxone: R >32 Enterobacter, Citrobacter, and Serratia may develop resistance during prolonged therapy      -  Ciprofloxacin: S <=1      -  Ertapenem: S <=1      -  Gentamicin: S <=4      -  Imipenem: S <=1      -  Levofloxacin: S <=2      -  Meropenem: S <=1      -  Nitrofurantoin: I 64 Should not be used to treat pyelonephritis      -  Piperacillin/Tazobactam: I 64      -  Tigecycline: S <=2      -  Tobramycin: S <=4      -  Trimethoprim/Sulfamethoxazole: R >2/38      Method Type: SUBHASH  Organism: Escherichia coli (03-05-20 @ 22:43)      -  Amikacin: S <=16      -  Ampicillin: R >16 These ampicillin results predict results for amoxicillin      -  Ampicillin/Sulbactam: R >16/8 Enterobacter, Citrobacter, and Serratia may develop resistance during prolonged therapy (3-4 days)      -  Aztreonam: S <=4      -  Cefazolin: S <=8 (MIC_CL_COM_ENTERIC_CEFAZU) For uncomplicated UTI with K. pneumoniae, E. coli, or P. mirablis: SUBHASH <=16 is sensitive and SUBHASH >=32 is resistant. This also predicts results for oral agents cefaclor, cefdinir, cefpodoxime, cefprozil, cefuroxime axetil, cephalexin and locarbef for uncomplicated UTI. Note that some isolates may be susceptible to these agents while testing resistant to cefazolin.      -  Cefepime: S <=4      -  Cefoxitin: S <=8      -  Ceftriaxone: S <=1 Enterobacter, Citrobacter, and Serratia may develop resistance during prolonged therapy      -  Ciprofloxacin: S <=1      -  Gentamicin: S <=4      -  Imipenem: S <=1      -  Levofloxacin: S <=2      -  Meropenem: S <=1      -  Nitrofurantoin: S <=32 Should not be used to treat pyelonephritis      -  Piperacillin/Tazobactam: S <=16      -  Tigecycline: S <=2      -  Tobramycin: S <=4      -  Trimethoprim/Sulfamethoxazole: R >2/38      Method Type: SUBHASH    Culture - Blood (collected 03-03-20 @ 21:12)  Source: .Blood Blood-Peripheral  Preliminary Report (03-04-20 @ 22:01):    No growth to date.    Culture - Blood (collected 03-03-20 @ 21:10)  Source: .Blood Blood-Peripheral  Preliminary Report (03-04-20 @ 22:01):    No growth to date.        RADIOLOGY & ADDITIONAL TESTS:    Personally reviewed.     Consultant(s) Notes Reviewed:  [x] YES  [ ] NO Patient is a 84y old  Female who presents with a chief complaint of altered mental status.     INTERVAL HPI/OVERNIGHT EVENTS: Pt's fevers have resolved with last documented fever on 3/6/20 at 5AM, but pt now had hypotension overnight. All anti-hypertensives were held and pt started on midodrine with improvement in MAP to above 65, thus pt was not initiated on IV pressors as of now. Pt on vent weaning trial and will be extubated.     MEDICATIONS  (STANDING):  aspirin enteric coated 81 milliGRAM(s) Oral daily  atorvastatin 10 milliGRAM(s) Oral at bedtime  budesonide 160 MICROgram(s)/formoterol 4.5 MICROgram(s) Inhaler 2 Puff(s) Inhalation two times a day  calcium carbonate 1250 mG  + Vitamin D (OsCal 500 + D) 1 Tablet(s) Oral daily  chlorhexidine 0.12% Liquid 15 milliLiter(s) Oral Mucosa every 12 hours  chlorhexidine 2% Cloths 1 Application(s) Topical <User Schedule>  cholecalciferol 1000 Unit(s) Oral daily  cholestyramine Powder (Sugar-Free) 4 Gram(s) Oral daily  clobetasol 0.05% Cream 1 Application(s) Topical two times a day  dexMEDEtomidine Infusion 0.5 MICROgram(s)/kG/Hr (6.8 mL/Hr) IV Continuous <Continuous>  epoetin jean carlos Injectable 07325 Unit(s) SubCutaneous <User Schedule>  ferrous    sulfate 325 milliGRAM(s) Oral daily  FLUoxetine 20 milliGRAM(s) Oral daily  heparin  Injectable 5000 Unit(s) SubCutaneous every 8 hours  lactobacillus acidophilus 1 Tablet(s) Oral three times a day  loratadine 10 milliGRAM(s) Oral daily  meropenem  IVPB 500 milliGRAM(s) IV Intermittent every 24 hours  midodrine 10 milliGRAM(s) Oral every 8 hours  multivitamin 1 Tablet(s) Oral daily  nystatin Cream 1 Application(s) Topical two times a day  pantoprazole  Injectable 40 milliGRAM(s) IV Push daily  propofol Infusion 10 MICROgram(s)/kG/Min (3.264 mL/Hr) IV Continuous <Continuous>  sodium bicarbonate 650 milliGRAM(s) Oral three times a day  valproic  acid Syrup 500 milliGRAM(s) Oral every 12 hours    MEDICATIONS  (PRN):  acetaminophen    Suspension .. 650 milliGRAM(s) Oral every 6 hours PRN Temp greater or equal to 38C (100.4F), Moderate Pain (4 - 6)  sodium chloride 0.9% lock flush 10 milliLiter(s) IV Push every 1 hour PRN Pre/post blood products, medications, blood draw, and to maintain line patency      Allergies    Levaquin (Pruritus)  mercury (Pruritus; Rash)  sulfa drugs (Pruritus)    Intolerances        REVIEW OF SYSTEMS:  Intubated, cannot provide ROS.     Vital Signs  T(C): 36.1 (03-07-20 @ 07:32), Max: 38.9 (03-06-20 @ 01:14)  HR: 59 (03-07-20 @ 12:10) (53 - 106)  BP: 105/49 (03-07-20 @ 12:10) (76/37 - 175/77)  RR: 26 (03-07-20 @ 12:10) (17 - 34)  SpO2: 100% (03-07-20 @ 12:10) (98% - 100%)      PHYSICAL EXAM:  GENERAL: intubated, frail  HEENT:  anicteric, NGT in place  CHEST/LUNG:  diminished breath sounds bibasilarly, rest CTA b/l  HEART:  RRR, S1, S2  ABDOMEN:  BS+, soft, no apparent TTP, nondistended, +ostomy with brown stool  EXTREMITIES: healed scabs scattered on bilateral LEs  NERVOUS SYSTEM: no tremors or shaking    LABS:                        8.5    11.66 )-----------( 186      ( 07 Mar 2020 09:08 )             27.3     CBC Full  -  ( 07 Mar 2020 09:08 )  WBC Count : 11.66 K/uL  Hemoglobin : 8.5 g/dL  Hematocrit : 27.3 %  Platelet Count - Automated : 186 K/uL  Mean Cell Volume : 94.1 fl  Mean Cell Hemoglobin : 29.3 pg  Mean Cell Hemoglobin Concentration : 31.1 gm/dL  Auto Neutrophil # : 10.25 K/uL  Auto Lymphocyte # : 0.64 K/uL  Auto Monocyte # : 0.68 K/uL  Auto Eosinophil # : 0.00 K/uL  Auto Basophil # : 0.03 K/uL  Auto Neutrophil % : 87.9 %  Auto Lymphocyte % : 5.5 %  Auto Monocyte % : 5.8 %  Auto Eosinophil % : 0.0 %  Auto Basophil % : 0.3 %    07 Mar 2020 09:08    140    |  111    |  58     ----------------------------<  97     4.7     |  15     |  4.90     Ca    6.8        07 Mar 2020 09:08  Phos  6.1       07 Mar 2020 09:08  Mg     1.9       07 Mar 2020 09:08    TPro  5.9    /  Alb  2.5    /  TBili  0.2    /  DBili  x      /  AST  21     /  ALT  18     /  AlkPhos  45     07 Mar 2020 09:08    PT/INR - ( 07 Mar 2020 10:39 )   PT: 15.1 sec;   INR: 1.34 ratio         PTT - ( 07 Mar 2020 10:39 )  PTT:26.4 sec    CAPILLARY BLOOD GLUCOSE            Culture - Blood (collected 03-06-20 @ 01:23)  Source: .Blood Blood-Peripheral  Preliminary Report (03-07-20 @ 02:03):    No growth to date.    Culture - Blood (collected 03-06-20 @ 01:23)  Source: .Blood Blood  Preliminary Report (03-07-20 @ 02:03):    No growth to date.    Culture - Urine (collected 03-03-20 @ 21:30)  Source: .Urine Clean Catch (Midstream)  Final Report (03-05-20 @ 22:43):    >100,000 CFU/ml Escherichia coli    >100,000 CFU/ml Enterobacter aerogenes  Organism: Escherichia coli  Enterobacter aerogenes (03-05-20 @ 22:43)  Organism: Enterobacter aerogenes (03-05-20 @ 22:43)      -  Amikacin: S <=16      -  Ampicillin: R >16 These ampicillin results predict results for amoxicillin      -  Ampicillin/Sulbactam: R >16/8 Enterobacter, Citrobacter, and Serratia may develop resistance during prolonged therapy (3-4 days)      -  Aztreonam: R >16      -  Cefazolin: R >16      -  Cefepime: S <=4      -  Cefoxitin: R >16      -  Ceftriaxone: R >32 Enterobacter, Citrobacter, and Serratia may develop resistance during prolonged therapy      -  Ciprofloxacin: S <=1      -  Ertapenem: S <=1      -  Gentamicin: S <=4      -  Imipenem: S <=1      -  Levofloxacin: S <=2      -  Meropenem: S <=1      -  Nitrofurantoin: I 64 Should not be used to treat pyelonephritis      -  Piperacillin/Tazobactam: I 64      -  Tigecycline: S <=2      -  Tobramycin: S <=4      -  Trimethoprim/Sulfamethoxazole: R >2/38      Method Type: SUBHASH  Organism: Escherichia coli (03-05-20 @ 22:43)      -  Amikacin: S <=16      -  Ampicillin: R >16 These ampicillin results predict results for amoxicillin      -  Ampicillin/Sulbactam: R >16/8 Enterobacter, Citrobacter, and Serratia may develop resistance during prolonged therapy (3-4 days)      -  Aztreonam: S <=4      -  Cefazolin: S <=8 (MIC_CL_COM_ENTERIC_CEFAZU) For uncomplicated UTI with K. pneumoniae, E. coli, or P. mirablis: SUBHASH <=16 is sensitive and SUBHASH >=32 is resistant. This also predicts results for oral agents cefaclor, cefdinir, cefpodoxime, cefprozil, cefuroxime axetil, cephalexin and locarbef for uncomplicated UTI. Note that some isolates may be susceptible to these agents while testing resistant to cefazolin.      -  Cefepime: S <=4      -  Cefoxitin: S <=8      -  Ceftriaxone: S <=1 Enterobacter, Citrobacter, and Serratia may develop resistance during prolonged therapy      -  Ciprofloxacin: S <=1      -  Gentamicin: S <=4      -  Imipenem: S <=1      -  Levofloxacin: S <=2      -  Meropenem: S <=1      -  Nitrofurantoin: S <=32 Should not be used to treat pyelonephritis      -  Piperacillin/Tazobactam: S <=16      -  Tigecycline: S <=2      -  Tobramycin: S <=4      -  Trimethoprim/Sulfamethoxazole: R >2/38      Method Type: SUBHASH    Culture - Blood (collected 03-03-20 @ 21:12)  Source: .Blood Blood-Peripheral  Preliminary Report (03-04-20 @ 22:01):    No growth to date.    Culture - Blood (collected 03-03-20 @ 21:10)  Source: .Blood Blood-Peripheral  Preliminary Report (03-04-20 @ 22:01):    No growth to date.        RADIOLOGY & ADDITIONAL TESTS:    Personally reviewed.     Consultant(s) Notes Reviewed:  [x] YES  [ ] NO

## 2020-03-07 NOTE — PROGRESS NOTE ADULT - ATTENDING COMMENTS
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  On 3/5 she had an episode of sz activity, followed by progressive respiratory failure.      --today is mentating and off sedation  fentanyl prn for pain  continue depakote for sz activity, unclear trigger for the new sz  --hypotension, improved with midodrine  all anti-htn meds off  continue ASA and statin  check echo  --acute respiratory failure in the setting of seizure/postictal state/ativan  passed PS 5/5 today and extubated  about 1-2hrs post-extubation developed hypoxemia and placed on high flow NC  may need BiPAP overnight  --CKD stage 5 is stable  continue bicarb and epo  --NPO post extubation  --unclear what source of infection may be, UCx with E. coli and Enterobacter but unclear if true infection in setting of chronic renae  CT a/p unrevealing though without IV contrast  continue meropenem  --plan discussed with son and daughter in law at bedside  --pt critically ill.  CC time 40min

## 2020-03-07 NOTE — PROGRESS NOTE ADULT - SUBJECTIVE AND OBJECTIVE BOX
24 hour events:   developed hypotension overnight and midodrine started    T(F): 97 (03-07-20 @ 07:32), Max: 99 (03-06-20 @ 20:00)  HR: 53 (03-07-20 @ 07:21) (53 - 85)  BP: 118/49 (03-07-20 @ 06:15) (76/37 - 142/59)  RR: 23 (03-07-20 @ 06:15) (17 - 33)  SpO2: 100% (03-07-20 @ 07:21) (98% - 100%)  Wt(kg): --    Mode: AC/ CMV (Assist Control/ Continuous Mandatory Ventilation), RR (machine): 12, TV (machine): 400, FiO2: 30, PEEP: 5  03-06-20 @ 07:01  -  03-07-20 @ 07:00  --------------------------------------------------------  IN: 1445 mL / OUT: 1865 mL / NET: -420 mL        CAPILLARY BLOOD GLUCOSE      POCT Blood Glucose.: 167 mg/dL (05 Mar 2020 20:09)      I&O's Summary    06 Mar 2020 07:01  -  07 Mar 2020 07:00  --------------------------------------------------------  IN: 1445 mL / OUT: 1865 mL / NET: -420 mL        Physical Exam:   Gen:  Neuro:  HEENT:  Resp:  CVS:  Abd:  Ext:  Skin:    Meds:  meropenem  IVPB IV Intermittent    midodrine Oral    atorvastatin Oral  cholestyramine Powder (Sugar-Free) Oral    budesonide 160 MICROgram(s)/formoterol 4.5 MICROgram(s) Inhaler Inhalation  loratadine Oral    acetaminophen    Suspension .. Oral PRN  dexMEDEtomidine Infusion IV Continuous  FLUoxetine Oral  propofol Infusion IV Continuous  valproic  acid Syrup Oral      aspirin enteric coated Oral  heparin  Injectable SubCutaneous    pantoprazole  Injectable IV Push      calcium carbonate 1250 mG  + Vitamin D (OsCal 500 + D) Oral  cholecalciferol Oral  ferrous    sulfate Oral  multivitamin Oral  sodium bicarbonate Oral    epoetin jean carlos Injectable SubCutaneous    chlorhexidine 0.12% Liquid Oral Mucosa  clobetasol 0.05% Cream Topical  nystatin Cream Topical    lactobacillus acidophilus Oral                            10.3   10.66 )-----------( 242      ( 06 Mar 2020 06:06 )             33.2       03-06    143  |  113<H>  |  45<H>  ----------------------------<  156<H>  5.4<H>   |  16<L>  |  4.50<H>    Ca    7.4<L>      06 Mar 2020 06:06  Phos  4.4     03-06  Mg     2.0     03-06    TPro  7.0  /  Alb  2.9<L>  /  TBili  0.4  /  DBili  x   /  AST  28  /  ALT  23  /  AlkPhos  64  03-06              .Blood Blood   No growth to date. -- 03-06 @ 01:23  .Urine Clean Catch (Midstream)   >100,000 CFU/ml Escherichia coli  >100,000 CFU/ml Enterobacter aerogenes -- 03-03 @ 21:30  .Blood Blood-Peripheral   No growth to date. -- 03-03 @ 21:12  .Blood Blood-Peripheral   No growth to date. -- 03-03 @ 21:10              Radiology: ***  Bedside ultrasound: ***    CENTRAL LINE: N/Y          DATE INSERTED:              REMOVE: Y/N  JACQUES: N/Y                       DATE INSERTED:              REMOVE: Y/N  A-LINE: N/Y                       DATE INSERTED:              REMOVE: Y/N    GLOBAL ISSUE/BEST PRACTICE:  Analgesia:  Sedation:  CAM-ICU:   HOB elevation: yes  Stress ulcer prophylaxis:  VTE prophylaxis:  Glycemic control:  Nutrition:    CODE STATUS: *** 24 hour events:   developed hypotension overnight and midodrine started    T(F): 97 (03-07-20 @ 07:32), Max: 99 (03-06-20 @ 20:00)  HR: 53 (03-07-20 @ 07:21) (53 - 85)  BP: 118/49 (03-07-20 @ 06:15) (76/37 - 142/59)  RR: 23 (03-07-20 @ 06:15) (17 - 33)  SpO2: 100% (03-07-20 @ 07:21) (98% - 100%)  Wt(kg): --    Mode: AC/ CMV (Assist Control/ Continuous Mandatory Ventilation), RR (machine): 12, TV (machine): 400, FiO2: 30, PEEP: 5  03-06-20 @ 07:01  -  03-07-20 @ 07:00  --------------------------------------------------------  IN: 1445 mL / OUT: 1865 mL / NET: -420 mL        CAPILLARY BLOOD GLUCOSE      POCT Blood Glucose.: 167 mg/dL (05 Mar 2020 20:09)      I&O's Summary    06 Mar 2020 07:01  -  07 Mar 2020 07:00  --------------------------------------------------------  IN: 1445 mL / OUT: 1865 mL / NET: -420 mL        Physical Exam:   Gen: elderly and frail  Neuro: alert, follows commands  Resp: CTABL  CVS: RRR  Abd: soft, ileostomy on L  Ext: no edema  Skin: WWP    Meds:  meropenem  IVPB IV Intermittent    midodrine Oral    atorvastatin Oral  cholestyramine Powder (Sugar-Free) Oral    budesonide 160 MICROgram(s)/formoterol 4.5 MICROgram(s) Inhaler Inhalation  loratadine Oral    acetaminophen    Suspension .. Oral PRN  dexMEDEtomidine Infusion IV Continuous  FLUoxetine Oral  propofol Infusion IV Continuous  valproic  acid Syrup Oral      aspirin enteric coated Oral  heparin  Injectable SubCutaneous    pantoprazole  Injectable IV Push      calcium carbonate 1250 mG  + Vitamin D (OsCal 500 + D) Oral  cholecalciferol Oral  ferrous    sulfate Oral  multivitamin Oral  sodium bicarbonate Oral    epoetin jean carlos Injectable SubCutaneous    chlorhexidine 0.12% Liquid Oral Mucosa  clobetasol 0.05% Cream Topical  nystatin Cream Topical    lactobacillus acidophilus Oral                            10.3   10.66 )-----------( 242      ( 06 Mar 2020 06:06 )             33.2       03-06    143  |  113<H>  |  45<H>  ----------------------------<  156<H>  5.4<H>   |  16<L>  |  4.50<H>    Ca    7.4<L>      06 Mar 2020 06:06  Phos  4.4     03-06  Mg     2.0     03-06    TPro  7.0  /  Alb  2.9<L>  /  TBili  0.4  /  DBili  x   /  AST  28  /  ALT  23  /  AlkPhos  64  03-06              .Blood Blood   No growth to date. -- 03-06 @ 01:23  .Urine Clean Catch (Midstream)   >100,000 CFU/ml Escherichia coli  >100,000 CFU/ml Enterobacter aerogenes -- 03-03 @ 21:30  .Blood Blood-Peripheral   No growth to date. -- 03-03 @ 21:12  .Blood Blood-Peripheral   No growth to date. -- 03-03 @ 21:10      Radiology: ***  < from: Xray Chest 1 View-PORTABLE IMMEDIATE (03.07.20 @ 12:17) >  INTERPRETATION:  Chest portable.    Clinical History: Central line placement.    Comparison: 3/5/2020.    Single AP view submitted.    Right internal jugular central venous catheter is present present with tip at the level of the superior vena cava.    Nasogastric tube is present, tip not visualized but below the level of the hemidiaphragm.    The evaluation of the cardiomediastinal silhouette is limited on portable technique.    No pneumothorax is noted.    There are small bilateral pleural effusions.    Impression:    Findings as discussed above.    < end of copied text >      CENTRAL LINE: MARK TLC  JACQUES: Y  A-LINE: N    GLOBAL ISSUE/BEST PRACTICE:  Analgesia:  N  Sedation:Y  HOB elevation: yes  Stress ulcer prophylaxis: Y  VTE prophylaxis: Y  Glycemic control: Y  Nutrition: Y    CODE STATUS: FULL

## 2020-03-07 NOTE — PROCEDURE NOTE - ADDITIONAL PROCEDURE DETAILS
IV cath confirmed with ultrasound in transverse and longitudinal view with good blood return     needed for access for ABX coverage and sedation while intubated
Pt intubated needed for meds and feeds
Dx: Respiratory failure    Procedure performed independent of CCT.
Dx: Acute hypoxemic respiratory failure, Aspiration Pneumonia

## 2020-03-07 NOTE — PROGRESS NOTE ADULT - PROBLEM SELECTOR PLAN 5
- due to renal failure  - not hyperkalemic today  - pt's renal status worsening, and may need to start HD in near future if it aligns with pt/family's goals.  - f/up nephro ([facility]) recs

## 2020-03-07 NOTE — PROGRESS NOTE ADULT - PROBLEM SELECTOR PLAN 7
- Anemia likely due to LELO, and anemia of chronic disease in setting of Stage 5 CKD  - Fe panel from 2/2020 showed low total iron, low total iron binding capacity  - hemoglobin has been somewhat labile during admission; monitor closely, no sign of acute bleeding  - Continue home ferrous sulfate   - Continue epogen as per nephro

## 2020-03-07 NOTE — PROGRESS NOTE ADULT - PROBLEM SELECTOR PLAN 3
- acute encephalopathy was likely was due to acute neuro event such as seizure vs acute metabolic encephalopathy (due to infection or due to worsening renal failure / uremia) or a combination  - MRI showed no evidence for CVA to have explained the isolated expressive aphasia pt had prior to her generalized seizure on 3/5  - prior to the generalized seizure, pt had previously been afebrile, with no leukocytosis for several days and was quite lucid with only true alteration in her mental status seeming to be an expressive aphasia. Pt was not delirious and seemed to have no receptive deficits and no difficulties following all commands appropriately on 3/4/20 when I first saw the pt. Only had difficulty word-finding and could not use full sentences because of stumbling in word choice -- this did not seem consistent with an encephalopathy caused by infection (peripheral or central)  -feel this may have been due to partial seizure activity preceding the generalized seizure pt had on 3/5  - pt's obtundation after generalized seizure on 3/5, was likely combination of post-ictal state, ativan, and possible infection;  -obtundation improving and pt will likely be extubated soon

## 2020-03-07 NOTE — PROGRESS NOTE ADULT - ASSESSMENT
Assessment:  1. Acute hypoxemic respiratory failure  2. CKD  3. Aspiration PNA  4. Sepsis.  5. Hypotension    Plan:    Neuro:    CV:    Pulm::    GI/Nutrition:    /Renal:    ID:    Endo: No acute issue. Target BS < 140    Proph: Continue with SQH for DVT proph    Lines; Central line placed earlier for access and in setting of hypotension and sepsis    Dispo: Contiunue ICU care. Guarded prognosis.     Critical Care Time Spent exclusive of that time which is bundled and or procedural care = 41 minutes. Assessment:  1. Acute hypoxemic respiratory failure  2. CKD  3. Aspiration PNA  4. Sepsis.  5. Hypotension    Plan:    Neuro: Patient seen and evaluated earlier in shift by Neurology when she was very lethargic. MRI of head was negative. Since this AM she has been much more awake and able to make her needs known. Will continue to hold all sedation at present time, especially in setting of hypotension    CV: Patient maintaining MAP >65. All BP meds have been held at this time due to lower than baseline BP. Central line was placed and will initiate vasopressor therapy should BP continue to drop. Continue Midodrine in interim as ordered.     Pulm: Patient has been placed on PSV this AM with PS 8, PEEP5. She tolerated very well and therefore instruction provided to decrease further with extubation readiness evaluation initiated. Patient continued to do well and at this time we will extubate the patient. Fio2 and PS levels titrated down and tolerated. WIll continue with antibiotic therapy for presumed aspiration PNA/Sepsis    GI/Nutrition: Enteral feeds have been tolerated this day, however will now hold in preparation for extubation. maintain NG Tube for possible enteral feeds post extubation    /Renal: Maintain rneae for strict I&Os. Patient with 15-20cc/hr of urine but CKD 5. WIll not push fluids at present time and continue to observe. BMP in the AM for renal function reevaluation    ID: Patient w > 100K E.coli in Urine plus likely aspiration PNA. Meropenem continues and will deescalate as per ID recommendations. Afebrile x 24 hours    Endo: No acute issue. Target BS < 140 in critically ill patient    Proph: Continue with SQH for DVT proph    Skin: Continue with local wound care for sacral wound. turning and positioning Q 2 hours    Lines/Tubes: Central line placed earlier for access and in setting of hypotension and sepsis    Dispo: Contiunue ICU care. Guarded prognosis.     Critical Care Time Spent exclusive of that time which is bundled and or procedural care = 41 minutes.

## 2020-03-07 NOTE — PROGRESS NOTE ADULT - SUBJECTIVE AND OBJECTIVE BOX
[facility], Division of Infectious Diseases  HILDA Giang A. Lee  [phone]    Name: GURJIT HERRERA  Age: 84y  Gender: Female  MRN: [id number]    Interval History--  Notes reviewed. Seen earlier this PM. To be extubated. Mental status improved.     Past Medical History--  Wound of sacral region  Depression  Iron deficiency anemia  Eczema  HTN (hypertension)  CKD (chronic kidney disease) stage 5, GFR less than 15 ml/min  Herniated lumbar intervertebral disc  Depression  Tremor  Kidney atrophy  Colostomy status  Chronic UTI (urinary tract infection)  Cervical cancer  Dyslipidemia  Diabetes  Hernia, incisional  S/P hip replacement  S/P colon resection  S/P hysterectomy      For details regarding the patient's social history, family history, and other miscellaneous elements, please refer the initial infectious diseases consultation and/or the admitting history and physical examination for this admission.    Allergies    Levaquin (Pruritus)  mercury (Pruritus; Rash)  sulfa drugs (Pruritus)    Intolerances        Medications--  Antibiotics:  meropenem  IVPB 500 milliGRAM(s) IV Intermittent every 24 hours    Immunologic:  epoetin jean carlos Injectable 83436 Unit(s) SubCutaneous <User Schedule>    Other:  acetaminophen    Suspension .. PRN  aspirin enteric coated  atorvastatin  budesonide 160 MICROgram(s)/formoterol 4.5 MICROgram(s) Inhaler  calcium carbonate 1250 mG  + Vitamin D (OsCal 500 + D)  chlorhexidine 2% Cloths  cholecalciferol  cholestyramine Powder (Sugar-Free)  clobetasol 0.05% Cream  dexMEDEtomidine Infusion  ferrous    sulfate  FLUoxetine  heparin  Injectable  lactobacillus acidophilus  loratadine  midodrine  multivitamin  nystatin Cream  pantoprazole  Injectable  propofol Infusion  sodium bicarbonate  sodium chloride 0.9% lock flush PRN  valproic  acid Syrup      Review of Systems--  Review of systems unable secondary to clinical condition.     Physical Examination--  Vital Signs: T(F): 97 (03-07-20 @ 07:32), Max: 99 (03-06-20 @ 20:00)  HR: 66 (03-07-20 @ 14:00)  BP: 100/47 (03-07-20 @ 14:00)  RR: 21 (03-07-20 @ 14:00)  SpO2: 90% (03-07-20 @ 14:00)  Wt(kg): --  General: Nontoxic-appearing Female in no acute distress.  HEENT: AT/NC. Pupils/EOM unable secondary to patient compliance. Oropharynx/dentition unable secondary to patient compliance. Anicteric. Conjunctiva pink and moist. OETT.   Neck: Not rigid. No sense of mass. CLV C/D/I/   Nodes: None palpable.  Lungs: Poor effort grossly clear bilaterally without rales, wheezing or rhonchi  Heart: Regular rate and rhythm. 1-2/6 systolic murmur. No rub. No gallop. No palpable thrill.  Abdomen: Bowel sounds present and normoactive. Soft. Nondistended. Nontender. No sense of mass. No organomegaly. Ostomy with bilious output.   Back: unable  Extremities: No cyanosis or clubbing. 1+ edema.  LUE no concern of deep/nectorizing infection.   Skin: Warm. Dry. Good turgor. No rash. No vasculitic stigmata.  Psychiatric: unable      Laboratory Studies--  CBC                        8.5    11.66 )-----------( 186      ( 07 Mar 2020 09:08 )             27.3       Chemistries  03-07    140  |  111<H>  |  58<H>  ----------------------------<  97  4.7   |  15<L>  |  4.90<H>    Ca    6.8<L>      07 Mar 2020 09:08  Phos  6.1     03-07  Mg     1.9     03-07    TPro  5.9<L>  /  Alb  2.5<L>  /  TBili  0.2  /  DBili  x   /  AST  21  /  ALT  18  /  AlkPhos  45  03-07      Culture Data    Culture - Blood (collected 06 Mar 2020 01:23)  Source: .Blood Blood-Peripheral  Preliminary Report (07 Mar 2020 02:03):    No growth to date.    Culture - Blood (collected 06 Mar 2020 01:23)  Source: .Blood Blood  Preliminary Report (07 Mar 2020 02:03):    No growth to date.    Culture - Urine (collected 03 Mar 2020 21:30)  Source: .Urine Clean Catch (Midstream)  Final Report (05 Mar 2020 22:43):    >100,000 CFU/ml Escherichia coli    >100,000 CFU/ml Enterobacter aerogenes  Organism: Escherichia coli  Enterobacter aerogenes (05 Mar 2020 22:43)  Organism: Enterobacter aerogenes (05 Mar 2020 22:43)  Organism: Escherichia coli (05 Mar 2020 22:43)    Culture - Blood (collected 03 Mar 2020 21:12)  Source: .Blood Blood-Peripheral  Preliminary Report (04 Mar 2020 22:01):    No growth to date.    Culture - Blood (collected 03 Mar 2020 21:10)  Source: .Blood Blood-Peripheral  Preliminary Report (04 Mar 2020 22:01):    No growth to date.    < from: CT Abdomen and Pelvis w/ Oral Cont (03.06.20 @ 18:12) >    EXAM:  CT ABDOMEN AND PELVIS OC                            PROCEDURE DATE:  03/06/2020          INTERPRETATION:  CT ABDOMEN AND PELVIS WITH ORAL CONTRAST    CLINICAL INFORMATION: fevers      COMPARISON: CT abdomen and pelvis 2/16/2020    PROCEDURE:  CT of the Abdomen and Pelvis was performed without intravenous contrast.  Intravenous contrast: None.  Oral contrast: positive contrast was administered.  Sagittal and coronal reformats were performed.    FINDINGS:    LOWER CHEST: Small bilateral pleural effusions and bibasilar atelectasis.    LIVER: Normal.  BILE DUCTS: Nondilated.  GALLBLADDER: Gallstones.  SPLEEN: Normal.  PANCREAS: Normal.  ADRENALS: Normal.  KIDNEYS/URETERS: No hydronephrosis or urinary tract calculi. Bilateral pelvic and ureteral fullness. Bilateral renal vascular calcification. Asymmetric atrophy of the right kidney.    BLADDER: Collapsed around a Perez catheter balloon.  REPRODUCTIVE ORGANS: Status post hysterectomy.    BOWEL: The NG tube is in the stomach.  No bowel-related abnormality. Specifically, no bowel obstruction. Status post colonic resection. 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  PERITONEUM: No free air or ascites. No collection.  VESSELS:  Aortoiliac atherosclerosis without aneurysm.  RETROPERITONEUM/LYMPH NODES: No adenopathy.    ABDOMINAL WALL: Postsurgical changes.  BONES: Right knee replacement. Stable sacral decubitus ulcer with underlying sacral bone erosion. Soft tissue swelling and edema in the medial left arm with suggestion of intravenous line in this area. There is deep soft tissue air in this region as well.    IMPRESSION:     No acute intra-abdominal pathology or collection.    Stable sacral decubitus ulcer with underlying sacral bone erosion. Active osteomyelitis is not excluded.    Soft tissue swelling and edema in the visualized medial left arm with suggestion of an intravenous line in this area. There is deep soft tissue air in this region as well. Correlate for IV infiltration versus soft tissue infection.    OCTAVIANO UPTON M.D., ATTENDING RADIOLOGIST  This document has been electronically signed. Mar  6 2020  7:39PM                < end of copied text >

## 2020-03-07 NOTE — PROGRESS NOTE ADULT - PROBLEM SELECTOR PLAN 4
- on admission, patient with metabolic acidosis in setting of UTI and suspected pre-renal MEHDI  - BUN/ Cr rising  - bicarb drip was dc'd and pt now on po sodium bicarb via NGT  - pt's renal status worsening, and may need to start HD in near future if it aligns with pt/family's goals.  - try to maintain MAP > 65 to decrease chance of ischemic ATN

## 2020-03-07 NOTE — PROGRESS NOTE ADULT - ASSESSMENT
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) and chronic diarrhea, cervical cancer (s/p radical hysterectomy and radiation), tremors, eczema, depression, HTN, HLD, history of frequent UTIs with chronic indwelling renae, anemia, stage 4 sacral decubitus ulcer, and recent hospitalization for MEHDI on CKD thought to be 2/2 dehydration and urinary retention from malfunctioning chronic renae, now brought in by EMS to ED for altered mental status admitted for acute metabolic encephalopathy due to suspected UTI and hyperkalemia in setting of MEHDI on CKD 5, course c/b likely generalized seizure on 3/5/20, and acute hypoxic respiratory failure and suspected sepsis, now intubated.

## 2020-03-07 NOTE — PROGRESS NOTE ADULT - ATTENDING COMMENTS
I'm available for issues over the remainder of the weekend, please call if ID input needed.     Acosta Kirk MD  [phone]

## 2020-03-08 LAB
ANION GAP SERPL CALC-SCNC: 15 MMOL/L — SIGNIFICANT CHANGE UP (ref 5–17)
BASOPHILS # BLD AUTO: 0.02 K/UL — SIGNIFICANT CHANGE UP (ref 0–0.2)
BASOPHILS NFR BLD AUTO: 0.1 % — SIGNIFICANT CHANGE UP (ref 0–2)
BUN SERPL-MCNC: 70 MG/DL — HIGH (ref 7–23)
CALCIUM SERPL-MCNC: 7.4 MG/DL — LOW (ref 8.5–10.1)
CHLORIDE SERPL-SCNC: 111 MMOL/L — HIGH (ref 96–108)
CO2 SERPL-SCNC: 15 MMOL/L — LOW (ref 22–31)
CREAT SERPL-MCNC: 5.3 MG/DL — HIGH (ref 0.5–1.3)
CULTURE RESULTS: SIGNIFICANT CHANGE UP
CULTURE RESULTS: SIGNIFICANT CHANGE UP
EOSINOPHIL # BLD AUTO: 0 K/UL — SIGNIFICANT CHANGE UP (ref 0–0.5)
EOSINOPHIL NFR BLD AUTO: 0 % — SIGNIFICANT CHANGE UP (ref 0–6)
GLUCOSE SERPL-MCNC: 163 MG/DL — HIGH (ref 70–99)
HCT VFR BLD CALC: 30.5 % — LOW (ref 34.5–45)
HGB BLD-MCNC: 9.6 G/DL — LOW (ref 11.5–15.5)
IMM GRANULOCYTES NFR BLD AUTO: 0.5 % — SIGNIFICANT CHANGE UP (ref 0–1.5)
LYMPHOCYTES # BLD AUTO: 0.45 K/UL — LOW (ref 1–3.3)
LYMPHOCYTES # BLD AUTO: 2.7 % — LOW (ref 13–44)
MAGNESIUM SERPL-MCNC: 1.8 MG/DL — SIGNIFICANT CHANGE UP (ref 1.6–2.6)
MCHC RBC-ENTMCNC: 28.8 PG — SIGNIFICANT CHANGE UP (ref 27–34)
MCHC RBC-ENTMCNC: 31.5 GM/DL — LOW (ref 32–36)
MCV RBC AUTO: 91.6 FL — SIGNIFICANT CHANGE UP (ref 80–100)
MONOCYTES # BLD AUTO: 0.53 K/UL — SIGNIFICANT CHANGE UP (ref 0–0.9)
MONOCYTES NFR BLD AUTO: 3.2 % — SIGNIFICANT CHANGE UP (ref 2–14)
NEUTROPHILS # BLD AUTO: 15.66 K/UL — HIGH (ref 1.8–7.4)
NEUTROPHILS NFR BLD AUTO: 93.5 % — HIGH (ref 43–77)
NRBC # BLD: 0 /100 WBCS — SIGNIFICANT CHANGE UP (ref 0–0)
PHOSPHATE SERPL-MCNC: 5.6 MG/DL — HIGH (ref 2.5–4.5)
PLATELET # BLD AUTO: 267 K/UL — SIGNIFICANT CHANGE UP (ref 150–400)
POTASSIUM SERPL-MCNC: 4 MMOL/L — SIGNIFICANT CHANGE UP (ref 3.5–5.3)
POTASSIUM SERPL-SCNC: 4 MMOL/L — SIGNIFICANT CHANGE UP (ref 3.5–5.3)
RBC # BLD: 3.33 M/UL — LOW (ref 3.8–5.2)
RBC # FLD: 15.1 % — HIGH (ref 10.3–14.5)
SODIUM SERPL-SCNC: 141 MMOL/L — SIGNIFICANT CHANGE UP (ref 135–145)
SPECIMEN SOURCE: SIGNIFICANT CHANGE UP
SPECIMEN SOURCE: SIGNIFICANT CHANGE UP
VALPROATE SERPL-MCNC: 44 UG/ML — LOW (ref 50–100)
WBC # BLD: 16.75 K/UL — HIGH (ref 3.8–10.5)
WBC # FLD AUTO: 16.75 K/UL — HIGH (ref 3.8–10.5)

## 2020-03-08 PROCEDURE — 71045 X-RAY EXAM CHEST 1 VIEW: CPT | Mod: 26

## 2020-03-08 PROCEDURE — 93306 TTE W/DOPPLER COMPLETE: CPT | Mod: 26

## 2020-03-08 PROCEDURE — 99233 SBSQ HOSP IP/OBS HIGH 50: CPT

## 2020-03-08 RX ORDER — SODIUM CHLORIDE 9 MG/ML
500 INJECTION INTRAMUSCULAR; INTRAVENOUS; SUBCUTANEOUS ONCE
Refills: 0 | Status: COMPLETED | OUTPATIENT
Start: 2020-03-08 | End: 2020-03-08

## 2020-03-08 RX ORDER — VALPROIC ACID (AS SODIUM SALT) 250 MG/5ML
750 SOLUTION, ORAL ORAL
Refills: 0 | Status: DISCONTINUED | OUTPATIENT
Start: 2020-03-08 | End: 2020-03-27

## 2020-03-08 RX ORDER — VALPROIC ACID (AS SODIUM SALT) 250 MG/5ML
500 SOLUTION, ORAL ORAL ONCE
Refills: 0 | Status: COMPLETED | OUTPATIENT
Start: 2020-03-08 | End: 2020-03-08

## 2020-03-08 RX ADMIN — Medication 1 TABLET(S): at 11:59

## 2020-03-08 RX ADMIN — LORATADINE 10 MILLIGRAM(S): 10 TABLET ORAL at 11:59

## 2020-03-08 RX ADMIN — CHLORHEXIDINE GLUCONATE 1 APPLICATION(S): 213 SOLUTION TOPICAL at 05:10

## 2020-03-08 RX ADMIN — Medication 1 TABLET(S): at 11:58

## 2020-03-08 RX ADMIN — NYSTATIN CREAM 1 APPLICATION(S): 100000 CREAM TOPICAL at 05:11

## 2020-03-08 RX ADMIN — Medication 650 MILLIGRAM(S): at 05:09

## 2020-03-08 RX ADMIN — MIDODRINE HYDROCHLORIDE 10 MILLIGRAM(S): 2.5 TABLET ORAL at 03:07

## 2020-03-08 RX ADMIN — Medication 500 MILLIGRAM(S): at 05:50

## 2020-03-08 RX ADMIN — Medication 650 MILLIGRAM(S): at 12:50

## 2020-03-08 RX ADMIN — BUDESONIDE AND FORMOTEROL FUMARATE DIHYDRATE 2 PUFF(S): 160; 4.5 AEROSOL RESPIRATORY (INHALATION) at 07:42

## 2020-03-08 RX ADMIN — MEROPENEM 100 MILLIGRAM(S): 1 INJECTION INTRAVENOUS at 14:35

## 2020-03-08 RX ADMIN — Medication 1 TABLET(S): at 05:09

## 2020-03-08 RX ADMIN — SODIUM CHLORIDE 1000 MILLILITER(S): 9 INJECTION INTRAMUSCULAR; INTRAVENOUS; SUBCUTANEOUS at 09:52

## 2020-03-08 RX ADMIN — Medication 1000 UNIT(S): at 11:58

## 2020-03-08 RX ADMIN — Medication 1 TABLET(S): at 14:36

## 2020-03-08 RX ADMIN — Medication 27.5 MILLIGRAM(S): at 11:30

## 2020-03-08 RX ADMIN — Medication 650 MILLIGRAM(S): at 18:46

## 2020-03-08 RX ADMIN — Medication 750 MILLIGRAM(S): at 18:02

## 2020-03-08 RX ADMIN — Medication 325 MILLIGRAM(S): at 11:59

## 2020-03-08 RX ADMIN — NYSTATIN CREAM 1 APPLICATION(S): 100000 CREAM TOPICAL at 18:01

## 2020-03-08 RX ADMIN — HEPARIN SODIUM 5000 UNIT(S): 5000 INJECTION INTRAVENOUS; SUBCUTANEOUS at 05:09

## 2020-03-08 RX ADMIN — MIDODRINE HYDROCHLORIDE 10 MILLIGRAM(S): 2.5 TABLET ORAL at 19:02

## 2020-03-08 RX ADMIN — Medication 1 APPLICATION(S): at 18:18

## 2020-03-08 RX ADMIN — PANTOPRAZOLE SODIUM 40 MILLIGRAM(S): 20 TABLET, DELAYED RELEASE ORAL at 11:58

## 2020-03-08 RX ADMIN — ATORVASTATIN CALCIUM 10 MILLIGRAM(S): 80 TABLET, FILM COATED ORAL at 22:05

## 2020-03-08 RX ADMIN — HEPARIN SODIUM 5000 UNIT(S): 5000 INJECTION INTRAVENOUS; SUBCUTANEOUS at 14:36

## 2020-03-08 RX ADMIN — HEPARIN SODIUM 5000 UNIT(S): 5000 INJECTION INTRAVENOUS; SUBCUTANEOUS at 22:05

## 2020-03-08 RX ADMIN — Medication 20 MILLIGRAM(S): at 11:58

## 2020-03-08 RX ADMIN — Medication 650 MILLIGRAM(S): at 22:05

## 2020-03-08 RX ADMIN — CHOLESTYRAMINE 4 GRAM(S): 4 POWDER, FOR SUSPENSION ORAL at 08:43

## 2020-03-08 RX ADMIN — Medication 650 MILLIGRAM(S): at 14:36

## 2020-03-08 RX ADMIN — Medication 650 MILLIGRAM(S): at 18:01

## 2020-03-08 RX ADMIN — MIDODRINE HYDROCHLORIDE 10 MILLIGRAM(S): 2.5 TABLET ORAL at 11:59

## 2020-03-08 RX ADMIN — Medication 650 MILLIGRAM(S): at 11:58

## 2020-03-08 RX ADMIN — Medication 1 TABLET(S): at 22:05

## 2020-03-08 RX ADMIN — Medication 1 APPLICATION(S): at 05:11

## 2020-03-08 RX ADMIN — BUDESONIDE AND FORMOTEROL FUMARATE DIHYDRATE 2 PUFF(S): 160; 4.5 AEROSOL RESPIRATORY (INHALATION) at 19:02

## 2020-03-08 RX ADMIN — Medication 81 MILLIGRAM(S): at 11:59

## 2020-03-08 NOTE — PROGRESS NOTE ADULT - PROBLEM SELECTOR PLAN 2
- unresponsive episode with shaking and spike in lactic acidosis yesterday, consistent with seizure  - CT head, MRI brain showed no acute infarct or hemorrhage  - EEG no sign of seizure, but performed after pt received ativan, so that can mask findings.   - Per neuro, will repeat EEG this weekend if signs/symptoms of seizure activity present  - neuro, Dr. Quintanilla, recs appreciated  - started on depakote  - seizure precautions  - as mentioned by ID, Dr. Kirk, meropenem can cause decreased concentration of depakote, thus if pt needs to continue meropenem, would likely need change in AED - unresponsive episode with shaking and spike in lactic acidosis on sat, consistent with seizure althought EEG neg  - CT head, MRI brain showed no acute infarct or hemorrhage  - EEG no sign of seizure, but performed after pt received ativan, so that can mask findings.   - Per neuro, will repeat EEG this weekend if signs/symptoms of seizure activity present  - neuro, Dr. Quintanilla, recs appreciated  - started on depakote  - seizure precautions  - as mentioned by ID, Dr. Kirk, meropenem can cause decreased concentration of depakote, thus if pt needs to continue meropenem, as per neuro increase dose of depakote with bolus, ativan prn, trend depakote levels

## 2020-03-08 NOTE — PROGRESS NOTE ADULT - ASSESSMENT
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 RRT on 3/5 for suspected seizure activity. Following seizure patient remained minimally responsive and developed respiratory distress. S/p intubation for impending respiratory failure. Extubated, met acidosis, worsening azotemia. May need to start on RRT in a couple of days unless the family decides otherwise. Palliative and hospice evaluation is probably ideal.   D/w Dr. Monet.

## 2020-03-08 NOTE — PROGRESS NOTE ADULT - SUBJECTIVE AND OBJECTIVE BOX
24 hour events:   extubated yesterday afternoon  became hypoxemic and required high flow NC  placed on BiPAP overnight    T(F): 97.9 (03-08-20 @ 07:40), Max: 98.3 (03-08-20 @ 03:22)  HR: 72 (03-08-20 @ 07:00) (54 - 80)  BP: 90/54 (03-08-20 @ 07:00) (76/51 - 109/52)  RR: 22 (03-08-20 @ 07:00) (18 - 37)  SpO2: 97% (03-08-20 @ 07:00) (87% - 100%)  Wt(kg): --    Mode: CPAP with PS, FiO2: 30, PEEP: 5, PS: 8  03-07-20 @ 06:01  -  03-08-20 @ 07:00  --------------------------------------------------------  IN: 960 mL / OUT: 1620 mL / NET: -660 mL        CAPILLARY BLOOD GLUCOSE          I&O's Summary    07 Mar 2020 06:01  -  08 Mar 2020 07:00  --------------------------------------------------------  IN: 960 mL / OUT: 1620 mL / NET: -660 mL        Physical Exam:   Gen:  Neuro:  HEENT:  Resp:  CVS:  Abd:  Ext:  Skin:    Meds:  meropenem  IVPB IV Intermittent    midodrine Oral    atorvastatin Oral  cholestyramine Powder (Sugar-Free) Oral    budesonide 160 MICROgram(s)/formoterol 4.5 MICROgram(s) Inhaler Inhalation  loratadine Oral    acetaminophen    Suspension .. Oral PRN  dexMEDEtomidine Infusion IV Continuous  FLUoxetine Oral  propofol Infusion IV Continuous  valproic  acid Syrup Oral      aspirin enteric coated Oral  heparin  Injectable SubCutaneous    pantoprazole  Injectable IV Push      calcium carbonate 1250 mG  + Vitamin D (OsCal 500 + D) Oral  cholecalciferol Oral  ferrous    sulfate Oral  multivitamin Oral  sodium bicarbonate Oral  sodium chloride 0.9% lock flush IV Push PRN    epoetin jean carlos Injectable SubCutaneous    chlorhexidine 2% Cloths Topical  clobetasol 0.05% Cream Topical  nystatin Cream Topical    lactobacillus acidophilus Oral                            9.6    16.75 )-----------( x        ( 08 Mar 2020 06:59 )             30.5       03-08    x   |  x   |  70<H>  ----------------------------<  163<H>  x    |  15<L>  |  5.30<H>    Ca    6.8<L>      07 Mar 2020 09:08  Phos  6.1     03-07  Mg     1.9     03-07    TPro  5.9<L>  /  Alb  2.5<L>  /  TBili  0.2  /  DBili  x   /  AST  21  /  ALT  18  /  AlkPhos  45  03-07          PT/INR - ( 07 Mar 2020 10:39 )   PT: 15.1 sec;   INR: 1.34 ratio         PTT - ( 07 Mar 2020 10:39 )  PTT:26.4 sec    .Blood Blood   No growth to date. -- 03-06 @ 01:23  .Urine Clean Catch (Midstream)   >100,000 CFU/ml Escherichia coli  >100,000 CFU/ml Enterobacter aerogenes -- 03-03 @ 21:30  .Blood Blood-Peripheral   No growth to date. -- 03-03 @ 21:12  .Blood Blood-Peripheral   No growth to date. -- 03-03 @ 21:10              Radiology: ***  Bedside ultrasound: ***    CENTRAL LINE: N/Y          DATE INSERTED:              REMOVE: Y/N  JACQUES: N/Y                       DATE INSERTED:              REMOVE: Y/N  A-LINE: N/Y                       DATE INSERTED:              REMOVE: Y/N    GLOBAL ISSUE/BEST PRACTICE:  Analgesia:  Sedation:  CAM-ICU:   HOB elevation: yes  Stress ulcer prophylaxis:  VTE prophylaxis:  Glycemic control:  Nutrition:    CODE STATUS: *** 24 hour events:   extubated yesterday afternoon  became hypoxemic and required high flow NC  placed on BiPAP overnight    T(F): 97.9 (03-08-20 @ 07:40), Max: 98.3 (03-08-20 @ 03:22)  HR: 72 (03-08-20 @ 07:00) (54 - 80)  BP: 90/54 (03-08-20 @ 07:00) (76/51 - 109/52)  RR: 22 (03-08-20 @ 07:00) (18 - 37)  SpO2: 97% (03-08-20 @ 07:00) (87% - 100%)  Wt(kg): --    Mode: CPAP with PS, FiO2: 30, PEEP: 5, PS: 8  03-07-20 @ 06:01  -  03-08-20 @ 07:00  --------------------------------------------------------  IN: 960 mL / OUT: 1620 mL / NET: -660 mL        CAPILLARY BLOOD GLUCOSE          I&O's Summary    07 Mar 2020 06:01  -  08 Mar 2020 07:00  --------------------------------------------------------  IN: 960 mL / OUT: 1620 mL / NET: -660 mL        Physical Exam:   Gen: elderly and frail, NAD  Neuro: awake, alert, but minimally verbal  HEENT: PERRL  Resp: decreased BS at R base  CVS: RRR  Abd: soft, NTND, L ileostomy  Ext: no edema   Skin: WWP    Meds:  meropenem  IVPB IV Intermittent    midodrine Oral    atorvastatin Oral  cholestyramine Powder (Sugar-Free) Oral    budesonide 160 MICROgram(s)/formoterol 4.5 MICROgram(s) Inhaler Inhalation  loratadine Oral    acetaminophen    Suspension .. Oral PRN  dexMEDEtomidine Infusion IV Continuous  FLUoxetine Oral  propofol Infusion IV Continuous  valproic  acid Syrup Oral      aspirin enteric coated Oral  heparin  Injectable SubCutaneous    pantoprazole  Injectable IV Push      calcium carbonate 1250 mG  + Vitamin D (OsCal 500 + D) Oral  cholecalciferol Oral  ferrous    sulfate Oral  multivitamin Oral  sodium bicarbonate Oral  sodium chloride 0.9% lock flush IV Push PRN    epoetin jean carlos Injectable SubCutaneous    chlorhexidine 2% Cloths Topical  clobetasol 0.05% Cream Topical  nystatin Cream Topical    lactobacillus acidophilus Oral                            9.6    16.75 )-----------( x        ( 08 Mar 2020 06:59 )             30.5       03-08    x   |  x   |  70<H>  ----------------------------<  163<H>  x    |  15<L>  |  5.30<H>    Ca    6.8<L>      07 Mar 2020 09:08  Phos  6.1     03-07  Mg     1.9     03-07    TPro  5.9<L>  /  Alb  2.5<L>  /  TBili  0.2  /  DBili  x   /  AST  21  /  ALT  18  /  AlkPhos  45  03-07          PT/INR - ( 07 Mar 2020 10:39 )   PT: 15.1 sec;   INR: 1.34 ratio         PTT - ( 07 Mar 2020 10:39 )  PTT:26.4 sec    .Blood Blood   No growth to date. -- 03-06 @ 01:23  .Urine Clean Catch (Midstream)   >100,000 CFU/ml Escherichia coli  >100,000 CFU/ml Enterobacter aerogenes -- 03-03 @ 21:30  .Blood Blood-Peripheral   No growth to date. -- 03-03 @ 21:12  .Blood Blood-Peripheral   No growth to date. -- 03-03 @ 21:10      Radiology: ***  < from: Xray Chest 1 View-PORTABLE IMMEDIATE (03.07.20 @ 12:17) >    Clinical History: Central line placement.    Comparison: 3/5/2020.    Single AP view submitted.    Right internal jugular central venous catheter is present present with tip at the level of the superior vena cava.    Nasogastric tube is present, tip not visualized but below the level of the hemidiaphragm.    The evaluation of the cardiomediastinal silhouette is limited on portable technique.    No pneumothorax is noted.    There are small bilateral pleural effusions.    Impression:    Findings as discussed above.    < end of copied text >      CENTRAL LINE: MARK GUZMANEY: Y  A-LINE: N    GLOBAL ISSUE/BEST PRACTICE:  Analgesia: N  Sedation: N  HOB elevation: yes  Stress ulcer prophylaxis: Y  VTE prophylaxis: Y  Glycemic control: Y  Nutrition: NPO    CODE STATUS: FULL

## 2020-03-08 NOTE — PROGRESS NOTE ADULT - PROBLEM SELECTOR PLAN 1
- after pt's seizures on 3/5, pt developed persistent fever for ~12 hours which subsided on 3/6 at 6am and pt has been afebrile since.   - antibiotic coverage broadened to meropenem, as urine culture started to grow 2nd organism in addition to the E. coli -- an enterobacter aerogenes, which has only intermediate sensitivity to zosyn  -albeit small (<1%) chance, meropenem can lower the seizure threshold; consider alternative Abx regimen ; if pt needs to continue on carbapenem, as mentioned by Dr. Kirk, would likely need an alternative AED  - potential infection still unclear -- pt has chronic renae, thus making significance of bacteriuria unclear and may be colonizer  - prior to the generalized seizure, pt had previously been afebrile, with no leukocytosis for several days and was quite lucid with only true alteration in her mental status seeming to be an expressive aphasia. Pt was not delirious and seemed to have no receptive deficits and no difficulties following all commands appropriately on 3/4/20 when I first saw the pt. Only had difficulty word-finding and could not use full sentences because of stumbling in word choice -- this did not seem consistent with an encephalopathy caused by infection (peripheral or central)  - pt now afebrile, but is now significantly hypotensive, thus cannot discount potential infection at this time.  - MRI showed no evidence for CVA to have explained the isolated expressive aphasia pt had prior to her generalized seizure on 3/5  - admission BCx neg; repeat BCx from 3/5 are neg 24hrs  - CT Abd/P non-con from 3/5 showed no clear source of infection, and lung bases were not particularly consistent with an aspiration PNA.  - discuss with neuro, if possible fevers were related to the seizure activity or if concern for other central process  - pt's "AMS" as described above not really consistent with an encephalitis, but if pt has further fever or persistent hypotension, may need to consider LP (if feasible given large sacral decub) -   -after pt's seizures on 3/5, pt developed persistent fever for ~12 hours which subsided on 3/6 at 6am and pt has been afebrile since.   - antibiotic coverage broadened to meropenem, as urine culture started to grow 2nd organism in addition to the E. coli -- an enterobacter aerogenes, which has only intermediate sensitivity to zosyn  -albeit small (<1%) chance, meropenem can lower the seizure threshold; consider alternative Abx regimen ; if pt needs to continue on carbapenem, as mentioned by Dr. Kirk, would likely need an alternative AED  - potential infection still unclear -- pt has chronic renae, thus making significance of bacteriuria unclear and may be colonizer  - prior to the generalized seizure, pt had previously been afebrile, with no leukocytosis for several days and was quite lucid with only true alteration in her mental status seeming to be an expressive aphasia. Pt was not delirious and seemed to have no receptive deficits and no difficulties following all commands appropriately on 3/4/20 when I first saw the pt. Only had difficulty word-finding and could not use full sentences because of stumbling in word choice -- this did not seem consistent with an encephalopathy caused by infection (peripheral or central)  - pt now afebrile, but is now significantly hypotensive, thus cannot discount potential infection at this time.  - MRI showed no evidence for CVA to have explained the isolated expressive aphasia pt had prior to her generalized seizure on 3/5  - admission BCx neg; repeat BCx from 3/5 are neg 24hrs  - CT Abd/P non-con from 3/5 showed no clear source of infection, and lung bases were not particularly consistent with an aspiration PNA.  - discuss with neuro, if possible fevers were related to the seizure activity or if concern for other central process  - pt's "AMS" as described above not really consistent with an encephalitis, but if pt has further fever or persistent hypotension, may need to consider LP (if feasible given large sacral decub) - improving  - apprec ID recs  - cont meropenem, may need alternate aed, apprec neuro recs  - plan of care as per ICU

## 2020-03-08 NOTE — PROGRESS NOTE ADULT - ATTENDING COMMENTS
85 yo F with Hx CKD5, chronic renae and frequent UTIs, stage 4 sacral wound with recent osteomyelitis, dementia admitted with confusion and suspected sepsis from UTI, complicated by episode of sz activity, resulting in acute respiratory failure.  Now improving.    --encephalopathy improving  new sz, unclear trigger  repeat EEG today  depakote level low likely from meropenem, increase dose and give extra 500mg  --hypotension, continue midodrine  continue ASA and statin  check echo  --hypoxemia improved, tolerating NC today  may benefit from nightly BiPAP  --CKD stage 5 stable, continue bicarb and epo  may require HD in near future  --dysphagia, NPO for now pending further eval  --unclear if component of infection.  UCx with E. coli and Enterobacter but unclear if true infection in setting of chronic renae  continue niko, plan for total of 7d course of Abx  --plan discussed with son and daughter in law at bedside  --PT eval

## 2020-03-08 NOTE — PROGRESS NOTE ADULT - ASSESSMENT
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) and chronic diarrhea, cervical cancer (s/p radical hysterectomy and radiation), tremors, eczema, depression, HTN, HLD, history of frequent UTIs with chronic indwelling renae, anemia, stage 4 sacral decubitus ulcer, and recent hospitalization for MEHDI on CKD thought to be 2/2 dehydration and urinary retention from malfunctioning chronic renae, now brought in by EMS to ED for altered mental status admitted for acute metabolic encephalopathy due to suspected UTI and hyperkalemia in setting of MEHDI on CKD 5, course c/b likely generalized seizure on 3/5/20, and acute hypoxic respiratory failure and suspected sepsis, now intubated. 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) and chronic diarrhea, cervical cancer (s/p radical hysterectomy and radiation), tremors, eczema, depression, HTN, HLD, history of frequent UTIs with chronic indwelling renae, anemia, stage 4 sacral decubitus ulcer, and recent hospitalization for MEHDI on CKD thought to be 2/2 dehydration and urinary retention from malfunctioning chronic renae, now brought in by EMS to ED for altered mental status admitted for acute metabolic encephalopathy due to suspected UTI and hyperkalemia in setting of MEHDI on CKD 5, course c/b likely generalized seizure on 3/5/20, and acute hypoxic respiratory failure and suspected sepsis, now extubated and remains afebrile

## 2020-03-08 NOTE — PROGRESS NOTE ADULT - PROBLEM SELECTOR PLAN 3
- acute encephalopathy was likely was due to acute neuro event such as seizure vs acute metabolic encephalopathy (due to infection or due to worsening renal failure / uremia) or a combination  - MRI showed no evidence for CVA to have explained the isolated expressive aphasia pt had prior to her generalized seizure on 3/5  - prior to the generalized seizure, pt had previously been afebrile, with no leukocytosis for several days and was quite lucid with only true alteration in her mental status seeming to be an expressive aphasia. Pt was not delirious and seemed to have no receptive deficits and no difficulties following all commands appropriately on 3/4/20 when I first saw the pt. Only had difficulty word-finding and could not use full sentences because of stumbling in word choice -- this did not seem consistent with an encephalopathy caused by infection (peripheral or central)  -feel this may have been due to partial seizure activity preceding the generalized seizure pt had on 3/5  - pt's obtundation after generalized seizure on 3/5, was likely combination of post-ictal state, ativan, and possible infection;  -obtundation improving and pt will likely be extubated soon - acute metabolic encephalopathy was likely was due to acute neuro event such as seizure, due to infection or due to worsening renal failure / uremia) or a combination  - MRI showed no evidence for CVA to have explained the isolated expressive aphasia pt had prior to her generalized seizure on 3/5  - prior to the generalized seizure, pt had previously been afebrile, with no leukocytosis for several days and was quite lucid with only true alteration in her mental status seeming to be an expressive aphasia. Pt was not delirious and seemed to have no receptive deficits and no difficulties following all commands appropriately on 3/4/20 when I first saw the pt. Only had difficulty word-finding and could not use full sentences because of stumbling in word choice -- this did not seem consistent with an encephalopathy caused by infection (peripheral or central)  -feel this may have been due to partial seizure activity preceding the generalized seizure pt had on 3/5  - pt's obtundation after generalized seizure on 3/5, was likely combination of post-ictal state, ativan, and possible infection;  -obtundation improving and pt will likely be extubated soon

## 2020-03-08 NOTE — PROGRESS NOTE ADULT - SUBJECTIVE AND OBJECTIVE BOX
Neurology follow up note    GURIJT [id number]      Interval History:    Patient feels ok  seen with family     MEDICATIONS    acetaminophen    Suspension .. 650 milliGRAM(s) Oral every 6 hours PRN  aspirin enteric coated 81 milliGRAM(s) Oral daily  atorvastatin 10 milliGRAM(s) Oral at bedtime  budesonide 160 MICROgram(s)/formoterol 4.5 MICROgram(s) Inhaler 2 Puff(s) Inhalation two times a day  calcium carbonate 1250 mG  + Vitamin D (OsCal 500 + D) 1 Tablet(s) Oral daily  chlorhexidine 2% Cloths 1 Application(s) Topical <User Schedule>  cholecalciferol 1000 Unit(s) Oral daily  cholestyramine Powder (Sugar-Free) 4 Gram(s) Oral daily  clobetasol 0.05% Cream 1 Application(s) Topical two times a day  dexMEDEtomidine Infusion 0.5 MICROgram(s)/kG/Hr IV Continuous <Continuous>  epoetin jean carlos Injectable 22377 Unit(s) SubCutaneous <User Schedule>  ferrous    sulfate 325 milliGRAM(s) Oral daily  FLUoxetine 20 milliGRAM(s) Oral daily  heparin  Injectable 5000 Unit(s) SubCutaneous every 8 hours  lactobacillus acidophilus 1 Tablet(s) Oral three times a day  loratadine 10 milliGRAM(s) Oral daily  meropenem  IVPB 500 milliGRAM(s) IV Intermittent every 24 hours  midodrine 10 milliGRAM(s) Oral every 8 hours  multivitamin 1 Tablet(s) Oral daily  nystatin Cream 1 Application(s) Topical two times a day  pantoprazole  Injectable 40 milliGRAM(s) IV Push daily  propofol Infusion 10 MICROgram(s)/kG/Min IV Continuous <Continuous>  sodium bicarbonate 650 milliGRAM(s) Oral three times a day  sodium chloride 0.9% lock flush 10 milliLiter(s) IV Push every 1 hour PRN  valproate sodium IVPB 500 milliGRAM(s) IV Intermittent once  valproic  acid Syrup 750 milliGRAM(s) Oral two times a day      Allergies    Levaquin (Pruritus)  mercury (Pruritus; Rash)  sulfa drugs (Pruritus)    Intolerances            Vital Signs Last 24 Hrs  T(C): 36.6 (08 Mar 2020 11:51), Max: 36.8 (08 Mar 2020 03:22)  T(F): 97.8 (08 Mar 2020 11:51), Max: 98.3 (08 Mar 2020 03:22)  HR: 74 (08 Mar 2020 11:00) (57 - 80)  BP: 170/72 (08 Mar 2020 11:00) (76/51 - 170/72)  BP(mean): 103 (08 Mar 2020 11:00) (52 - 103)  RR: 21 (08 Mar 2020 11:00) (19 - 102)  SpO2: 95% (08 Mar 2020 11:00) (87% - 100%)      REVIEW OF SYSTEMS: Limited or unable to obtain secondary to patient's poor mental status.    Constitutional: No fever, chills, fatigue, generalized  weakness  Eyes: no eye pain, visual disturbances, or discharge  ENT:  No difficulty hearing, tinnitus, vertigo; No sinus or throat pain  Neck: No pain or stiffness  Respiratory: No cough, dyspnea, wheezing   Cardiovascular: No chest pain, palpitations,   Gastrointestinal: No abdominal or epigastric pain. No nausea, vomiting  No diarrhea or constipation.   Genitourinary: No dysuria, frequency, hematuria or incontinence  Neurological: No headaches, lightheadedness, vertigo, numbness or tremors  Psychiatric: No depression, anxiety, mood swings or difficulty sleeping  Musculoskeletal: No joint pain or swelling; No muscle, back or extremity pain      On Neurological Examination:    Mental Status - Patient is alert, awake,    Follow simple commands    Speech -   Fluent                 Cranial Nerves - Pupils 3 mm equal and reactive to light,   extraocular eye movements intact.   smile symmetric  intact bilateral NLF    Motor Exam -   Right upper 4/5   Left upper  3/5  Right lower 2/5  Left lower 2/5    Muscle tone - is normal all over.  No asymmetry is seen.      Sensory    Bilateral intact to light touch    GENERAL Exam: Nontoxic , No Acute Distress   	  HEENT:  normocephalic, atraumatic  		  LUNGS:  Decreased bilaterally  	  HEART: Normal S1S2   No murmur RRR        	  GI/ ABDOMEN:  Soft  Non tender    EXTREMITIES:   No Edema  No Clubbing  No Cyanosis   	   SKIN: Normal  No Ecchymosis               LABS:  CBC Full  -  ( 08 Mar 2020 06:59 )  WBC Count : 16.75 K/uL  RBC Count : 3.33 M/uL  Hemoglobin : 9.6 g/dL  Hematocrit : 30.5 %  Platelet Count - Automated : 267 K/uL  Mean Cell Volume : 91.6 fl  Mean Cell Hemoglobin : 28.8 pg  Mean Cell Hemoglobin Concentration : 31.5 gm/dL  Auto Neutrophil # : 15.66 K/uL  Auto Lymphocyte # : 0.45 K/uL  Auto Monocyte # : 0.53 K/uL  Auto Eosinophil # : 0.00 K/uL  Auto Basophil # : 0.02 K/uL  Auto Neutrophil % : 93.5 %  Auto Lymphocyte % : 2.7 %  Auto Monocyte % : 3.2 %  Auto Eosinophil % : 0.0 %  Auto Basophil % : 0.1 %      03-08    141  |  111<H>  |  70<H>  ----------------------------<  163<H>  4.0   |  15<L>  |  5.30<H>    Ca    7.4<L>      08 Mar 2020 06:59  Phos  5.6     03-08  Mg     1.8     03-08    TPro  5.9<L>  /  Alb  2.5<L>  /  TBili  0.2  /  DBili  x   /  AST  21  /  ALT  18  /  AlkPhos  45  03-07    Hemoglobin A1C:     LIVER FUNCTIONS - ( 07 Mar 2020 09:08 )  Alb: 2.5 g/dL / Pro: 5.9 g/dL / ALK PHOS: 45 U/L / ALT: 18 U/L / AST: 21 U/L / GGT: x           Vitamin B12   PT/INR - ( 07 Mar 2020 10:39 )   PT: 15.1 sec;   INR: 1.34 ratio         PTT - ( 07 Mar 2020 10:39 )  PTT:26.4 sec      RADIOLOGY    ANALYSIS AND PLAN:  An 84-year-old with an episode of seizure and change in mental status.  1.	For episode of seizure, will increase Depakote 750 mg twice a day bolus 500 once  2.	EEG intermittent generalized spike and waves   3.	Ativan 1 mg IV push q.6h. p.r.n. for any type of breakthrough seizure.  4.	Seizure precautions.  5.	For history of underlying depression, at present hard to evaluate the patient's psychiatric status secondary to the patient being lethargic, continue home medication when possible.  6.	For hyperlipidemia, continue the patient on her cholesterol medication.  7.	For change in mental status, questionable this could be metabolic encephalopathy from partial complex seizures versus any type of underlying infectious type process episodes of temperatures and hypotension possible shock   8.	Antibiotics as needed.  9.	Fall precaution.  10.	overall more awake and interactive today   11.	For chronic kidney disease, monitor renal function.  12.	Spoke with multiple family members at the bedside.  Advance directives were discussed with them.  Primary  will be daughter, Ju, her cell phone number is [phone], home number is [phone] spoke 3/8/2020  13.	for now will continue current AED and adjust as needed   14.	Greater than 40 minutes of time was spent with the patient, plan of care, reviewing data, speaking to the family and multidisciplinary healthcare team.

## 2020-03-08 NOTE — PROGRESS NOTE ADULT - SUBJECTIVE AND OBJECTIVE BOX
Patient is a 84y old  Female who presents with a chief complaint of altered mental status (08 Mar 2020 13:48)      INTERVAL HPI:  OVERNIGHT EVENTS:  T(F): 98 (03-08-20 @ 19:37), Max: 98.3 (03-08-20 @ 03:22)  HR: 79 (03-08-20 @ 21:00) (69 - 80)  BP: 100/49 (03-08-20 @ 21:00) (76/51 - 170/72)  RR: 23 (03-08-20 @ 21:00) (21 - 102)  SpO2: 95% (03-08-20 @ 21:00) (92% - 100%)  I&O's Summary    07 Mar 2020 06:01  -  08 Mar 2020 07:00  --------------------------------------------------------  IN: 960 mL / OUT: 1620 mL / NET: -660 mL    08 Mar 2020 07:01  -  08 Mar 2020 21:44  --------------------------------------------------------  IN: 1400 mL / OUT: 1567 mL / NET: -167 mL        REVIEW OF SYSTEMS:  CONSTITUTIONAL: No fever, weight loss, or fatigue  EYES: No eye pain, visual disturbances, or discharge  ENMT:  No difficulty hearing, tinnitus, vertigo; No sinus or throat pain  NECK: No pain or stiffness  BREASTS: No pain, masses, or nipple discharge  RESPIRATORY: No cough, wheezing, chills or hemoptysis; No shortness of breath  CARDIOVASCULAR: No chest pain, palpitations, dizziness, or leg swelling  GASTROINTESTINAL: No abdominal or epigastric pain. No nausea, vomiting, or hematemesis; No diarrhea or constipation. No melena or hematochezia.  GENITOURINARY: No dysuria, frequency, hematuria, or incontinence  NEUROLOGICAL: No headaches, memory loss, loss of strength, numbness, or tremors  SKIN: No itching, burning, rashes, or lesions   LYMPH NODES: No enlarged glands  ENDOCRINE: No heat or cold intolerance; No hair loss  MUSCULOSKELETAL: No joint pain or swelling; No muscle, back, or extremity pain  PSYCHIATRIC: No depression, anxiety, mood swings, or difficulty sleeping  HEME/LYMPH: No easy bruising, or bleeding gums  ALLERY AND IMMUNOLOGIC: No hives or eczema    PHYSICAL EXAM:  GENERAL: NAD, well-groomed, well-developed  HEAD:  Atraumatic, Normocephalic  EYES: EOMI, PERRLA, conjunctiva and sclera clear  ENMT: No tonsillar erythema, exudates, or enlargement; Moist mucous membranes, Good dentition, No lesions  NECK: Supple, No JVD, Normal thyroid  NERVOUS SYSTEM:  Alert & Oriented X3, Good concentration; Motor Strength 5/5 B/L upper and lower extremities; DTRs 2+ intact and symmetric  CHEST/LUNG: Clear to percussion bilaterally; No rales, rhonchi, wheezing, or rubs  HEART: Regular rate and rhythm; No murmurs, rubs, or gallops  ABDOMEN: Soft, Nontender, Nondistended; Bowel sounds present  EXTREMITIES:  2+ Peripheral Pulses, No clubbing, cyanosis, or edema  LYMPH: No lymphadenopathy noted  SKIN: No rashes or lesions    LABS:                        9.6    16.75 )-----------( 267      ( 08 Mar 2020 06:59 )             30.5     03-08    141  |  111<H>  |  70<H>  ----------------------------<  163<H>  4.0   |  15<L>  |  5.30<H>    Ca    7.4<L>      08 Mar 2020 06:59  Phos  5.6     03-08  Mg     1.8     03-08    TPro  5.9<L>  /  Alb  2.5<L>  /  TBili  0.2  /  DBili  x   /  AST  21  /  ALT  18  /  AlkPhos  45  03-07    PT/INR - ( 07 Mar 2020 10:39 )   PT: 15.1 sec;   INR: 1.34 ratio         PTT - ( 07 Mar 2020 10:39 )  PTT:26.4 sec    CAPILLARY BLOOD GLUCOSE        ABG - ( 07 Mar 2020 15:24 )  pH, Arterial: 7.30  pH, Blood: x     /  pCO2: 34    /  pO2: 64    / HCO3: 17    / Base Excess: -8.9  /  SaO2: 89                03-06 @ 01:23   No growth to date.  --  --          MEDICATIONS  (STANDING):  aspirin enteric coated 81 milliGRAM(s) Oral daily  atorvastatin 10 milliGRAM(s) Oral at bedtime  budesonide 160 MICROgram(s)/formoterol 4.5 MICROgram(s) Inhaler 2 Puff(s) Inhalation two times a day  calcium carbonate 1250 mG  + Vitamin D (OsCal 500 + D) 1 Tablet(s) Oral daily  chlorhexidine 2% Cloths 1 Application(s) Topical <User Schedule>  cholecalciferol 1000 Unit(s) Oral daily  cholestyramine Powder (Sugar-Free) 4 Gram(s) Oral daily  clobetasol 0.05% Cream 1 Application(s) Topical two times a day  epoetin jean carlos Injectable 71540 Unit(s) SubCutaneous <User Schedule>  ferrous    sulfate 325 milliGRAM(s) Oral daily  FLUoxetine 20 milliGRAM(s) Oral daily  heparin  Injectable 5000 Unit(s) SubCutaneous every 8 hours  lactobacillus acidophilus 1 Tablet(s) Oral three times a day  loratadine 10 milliGRAM(s) Oral daily  meropenem  IVPB 500 milliGRAM(s) IV Intermittent every 24 hours  midodrine 10 milliGRAM(s) Oral every 8 hours  multivitamin 1 Tablet(s) Oral daily  nystatin Cream 1 Application(s) Topical two times a day  pantoprazole  Injectable 40 milliGRAM(s) IV Push daily  sodium bicarbonate 650 milliGRAM(s) Oral three times a day  valproic  acid Syrup 750 milliGRAM(s) Oral two times a day    MEDICATIONS  (PRN):  acetaminophen    Suspension .. 650 milliGRAM(s) Oral every 6 hours PRN Temp greater or equal to 38C (100.4F), Moderate Pain (4 - 6)  sodium chloride 0.9% lock flush 10 milliLiter(s) IV Push every 1 hour PRN Pre/post blood products, medications, blood draw, and to maintain line patency Patient is a 84y old  Female who presents with a chief complaint of altered mental status (08 Mar 2020 13:48)      INTERVAL HPI: Pt seen and examined. Unable to obtain history due to confusion. Pt continues afebrile and extubated.     OVERNIGHT EVENTS: none noted  T(F): 98 (03-08-20 @ 19:37), Max: 98.3 (03-08-20 @ 03:22)  HR: 79 (03-08-20 @ 21:00) (69 - 80)  BP: 100/49 (03-08-20 @ 21:00) (76/51 - 170/72)  RR: 23 (03-08-20 @ 21:00) (21 - 102)  SpO2: 95% (03-08-20 @ 21:00) (92% - 100%)  I&O's Summary    07 Mar 2020 06:01  -  08 Mar 2020 07:00  --------------------------------------------------------  IN: 960 mL / OUT: 1620 mL / NET: -660 mL    08 Mar 2020 07:01  -  08 Mar 2020 21:44  --------------------------------------------------------  IN: 1400 mL / OUT: 1567 mL / NET: -167 mL        REVIEW OF SYSTEMS: unable due to confusion      PHYSICAL EXAM:  GENERAL: NAD, elder, chronically ill appearing  HEENT:  anicteric, NGT in place  NERVOUS SYSTEM:  Alert & Oriented X1,  Motor Strength 3/5 B/L upper and lower extremities  CHEST/LUNG: slightly diminshed b/l bases  HEART: Regular rate and rhythm; No murmurs, rubs, or gallops  ABDOMEN: Soft, Nontender, Nondistended; Bowel sounds present, +ostomy with brown stool  EXTREMITIES:  2+ Peripheral Pulses, No clubbing, cyanosis, or edema  SKIN: healed scabs scattered on bilateral LEs        LABS:                        9.6    16.75 )-----------( 267      ( 08 Mar 2020 06:59 )             30.5     03-08    141  |  111<H>  |  70<H>  ----------------------------<  163<H>  4.0   |  15<L>  |  5.30<H>    Ca    7.4<L>      08 Mar 2020 06:59  Phos  5.6     03-08  Mg     1.8     03-08    TPro  5.9<L>  /  Alb  2.5<L>  /  TBili  0.2  /  DBili  x   /  AST  21  /  ALT  18  /  AlkPhos  45  03-07    PT/INR - ( 07 Mar 2020 10:39 )   PT: 15.1 sec;   INR: 1.34 ratio         PTT - ( 07 Mar 2020 10:39 )  PTT:26.4 sec    CAPILLARY BLOOD GLUCOSE        ABG - ( 07 Mar 2020 15:24 )  pH, Arterial: 7.30  pH, Blood: x     /  pCO2: 34    /  pO2: 64    / HCO3: 17    / Base Excess: -8.9  /  SaO2: 89                03-06 @ 01:23   No growth to date.  --  --          MEDICATIONS  (STANDING):  aspirin enteric coated 81 milliGRAM(s) Oral daily  atorvastatin 10 milliGRAM(s) Oral at bedtime  budesonide 160 MICROgram(s)/formoterol 4.5 MICROgram(s) Inhaler 2 Puff(s) Inhalation two times a day  calcium carbonate 1250 mG  + Vitamin D (OsCal 500 + D) 1 Tablet(s) Oral daily  chlorhexidine 2% Cloths 1 Application(s) Topical <User Schedule>  cholecalciferol 1000 Unit(s) Oral daily  cholestyramine Powder (Sugar-Free) 4 Gram(s) Oral daily  clobetasol 0.05% Cream 1 Application(s) Topical two times a day  epoetin jean carlos Injectable 55337 Unit(s) SubCutaneous <User Schedule>  ferrous    sulfate 325 milliGRAM(s) Oral daily  FLUoxetine 20 milliGRAM(s) Oral daily  heparin  Injectable 5000 Unit(s) SubCutaneous every 8 hours  lactobacillus acidophilus 1 Tablet(s) Oral three times a day  loratadine 10 milliGRAM(s) Oral daily  meropenem  IVPB 500 milliGRAM(s) IV Intermittent every 24 hours  midodrine 10 milliGRAM(s) Oral every 8 hours  multivitamin 1 Tablet(s) Oral daily  nystatin Cream 1 Application(s) Topical two times a day  pantoprazole  Injectable 40 milliGRAM(s) IV Push daily  sodium bicarbonate 650 milliGRAM(s) Oral three times a day  valproic  acid Syrup 750 milliGRAM(s) Oral two times a day    MEDICATIONS  (PRN):  acetaminophen    Suspension .. 650 milliGRAM(s) Oral every 6 hours PRN Temp greater or equal to 38C (100.4F), Moderate Pain (4 - 6)  sodium chloride 0.9% lock flush 10 milliLiter(s) IV Push every 1 hour PRN Pre/post blood products, medications, blood draw, and to maintain line patency

## 2020-03-08 NOTE — PROGRESS NOTE ADULT - PROBLEM SELECTOR PLAN 2
Role of antibiotics plan to limit to 7 days  F/U Cx  Will need to explore what treatment pathway best re: sacrum once acute issues stabilize. All have drawbacks.

## 2020-03-08 NOTE — PROGRESS NOTE ADULT - ASSESSMENT
83 yo female admitted with altered mental status with no clear explanation  Question whether altered mental status at home could have been postictal phenomena given what appears to be seizure activity here.  Per d/w Dr. Mayo, patient's mental status waxed/waned. In the absence of fever, skeptical that he has encephalitis (e.g. HSV).  Limited EEG here without seizure activity.  Depakote not useful as an AED in the setting ot carbapenem use.   No clear or convincing evidence of infection on exam.  In setting of chronic renae catheter the urine isolates is questionable significance.   CXR with questionable infiltrates in BUL, particularly MELL (personally reviewed), but oxygenation does not appear to be an issue and lung exam unimpressive. Repeat  3/7 not impressive either.   Sacral sore not infected appearing. While patient almost certainly has sacral osteomyelitis based on the CT scan I am skeptical that its responsible for her presentation.  No concern of potential other SSTI  Benign abdomen  Increasing WBC but exam not worse than prior

## 2020-03-08 NOTE — PROGRESS NOTE ADULT - PROBLEM SELECTOR PLAN 1
Unclear if she is at baseline  No definitive seizure activity on EEG though what I witnessed was strongly suggestive of such.  Would alter AED Rx to something other than Depakote in the setting of meropenem use.

## 2020-03-08 NOTE — PROGRESS NOTE ADULT - SUBJECTIVE AND OBJECTIVE BOX
[facility], Division of Infectious Diseases  HILDA Giang A. Lee  [phone]    Name: GURJIT HERRERA  Age: 84y  Gender: Female  MRN: [id number]    Interval History--  Notes reviewed. Lummi. Does not answer questions appropriately.  Extubated, comfortable on NC.     Past Medical History--  Wound of sacral region  Depression  Iron deficiency anemia  Eczema  HTN (hypertension)  CKD (chronic kidney disease) stage 5, GFR less than 15 ml/min  Herniated lumbar intervertebral disc  Depression  Tremor  Kidney atrophy  Colostomy status  Chronic UTI (urinary tract infection)  Cervical cancer  Dyslipidemia  Diabetes  Hernia, incisional  S/P hip replacement  S/P colon resection  S/P hysterectomy      For details regarding the patient's social history, family history, and other miscellaneous elements, please refer the initial infectious diseases consultation and/or the admitting history and physical examination for this admission.    Allergies    Levaquin (Pruritus)  mercury (Pruritus; Rash)  sulfa drugs (Pruritus)    Intolerances      Medications--  Antibiotics:  meropenem  IVPB 500 milliGRAM(s) IV Intermittent every 24 hours    Immunologic:  epoetin jean carlos Injectable 02484 Unit(s) SubCutaneous <User Schedule>    Other:  acetaminophen    Suspension .. PRN  aspirin enteric coated  atorvastatin  budesonide 160 MICROgram(s)/formoterol 4.5 MICROgram(s) Inhaler  calcium carbonate 1250 mG  + Vitamin D (OsCal 500 + D)  chlorhexidine 2% Cloths  cholecalciferol  cholestyramine Powder (Sugar-Free)  clobetasol 0.05% Cream  dexMEDEtomidine Infusion  ferrous    sulfate  FLUoxetine  heparin  Injectable  lactobacillus acidophilus  loratadine  midodrine  multivitamin  nystatin Cream  pantoprazole  Injectable  propofol Infusion  sodium bicarbonate  sodium chloride 0.9% lock flush PRN  valproic  acid Syrup      Review of Systems--  A 10-point review of systems was obtained.   Review of systems otherwise negative except as previously noted.    Physical Examination--  Vital Signs: T(F): 97.6 (03-08-20 @ 16:04), Max: 98.3 (03-08-20 @ 03:22)  HR: 78 (03-08-20 @ 16:00)  BP: 121/91 (03-08-20 @ 16:00)  RR: 29 (03-08-20 @ 16:00)  SpO2: 94% (03-08-20 @ 16:00)  Wt(kg): --  General: Nontoxic-appearing Female in no acute distress.  HEENT: AT/NC. Anicteric. Conjunctiva pink and moist.  Neck: Not rigid. No sense of mass. CVL C/D/I  Nodes: None palpable.  Lungs: Poor effort grossly clear bilaterally without rales, wheezing or rhonchi  Heart: Regular rate and rhythm. 2/6 systolic murmur. No rub. No gallop. No palpable thrill.  Abdomen: Bowel sounds present and normoactive. Soft. Nondistended. Nontender. No sense of mass. No organomegaly.   Back: unable  Extremities: No cyanosis or clubbing. 1+ edema.   Skin: Warm. Dry. Good turgor. No rash. No vasculitic stigmata.  Psychiatric: largely unable    Laboratory Studies--  CBC                        9.6    16.75 )-----------( 267      ( 08 Mar 2020 06:59 )             30.5     WBC Count: 11.66 K/uL (03-07-20 @ 09:08)  WBC Count: 10.66 K/uL (03-06-20 @ 06:06)  WBC Count: 8.46 K/uL (03-05-20 @ 10:46)  WBC Count: 5.34 K/uL (03-05-20 @ 08:33)  WBC Count: 5.43 K/uL (03-04-20 @ 08:47)      Chemistries  03-08    141  |  111<H>  |  70<H>  ----------------------------<  163<H>  4.0   |  15<L>  |  5.30<H>    Creatinine, Serum: 4.90 mg/dL (03.07.20 @ 09:08)  Creatinine, Serum: 4.50 mg/dL (03.06.20 @ 06:06)  Creatinine, Serum: 4.10 mg/dL (03.05.20 @ 10:46)  Creatinine, Serum: 3.90 mg/dL (03.05.20 @ 08:33)      Ca    7.4<L>      08 Mar 2020 06:59  Phos  5.6     03-08  Mg     1.8     03-08    TPro  5.9<L>  /  Alb  2.5<L>  /  TBili  0.2  /  DBili  x   /  AST  21  /  ALT  18  /  AlkPhos  45  03-07      Culture Data  Culture - Blood (collected 06 Mar 2020 01:23)  Source: .Blood Blood-Peripheral  Preliminary Report (07 Mar 2020 02:03):    No growth to date.    Culture - Blood (collected 06 Mar 2020 01:23)  Source: .Blood Blood  Preliminary Report (07 Mar 2020 02:03):    No growth to date.    Culture - Urine (collected 03 Mar 2020 21:30)  Source: .Urine Clean Catch (Midstream)  Final Report (05 Mar 2020 22:43):    >100,000 CFU/ml Escherichia coli    >100,000 CFU/ml Enterobacter aerogenes  Organism: Escherichia coli  Enterobacter aerogenes (05 Mar 2020 22:43)  Organism: Enterobacter aerogenes (05 Mar 2020 22:43)  Organism: Escherichia coli (05 Mar 2020 22:43)    Culture - Blood (collected 03 Mar 2020 21:12)  Source: .Blood Blood-Peripheral  Preliminary Report (04 Mar 2020 22:01):    No growth to date.    Culture - Blood (collected 03 Mar 2020 21:10)  Source: .Blood Blood-Peripheral  Preliminary Report (04 Mar 2020 22:01):    No growth to date.

## 2020-03-08 NOTE — PROGRESS NOTE ADULT - SUBJECTIVE AND OBJECTIVE BOX
Patient seen in follow up for MEHDI on CKD. Events noted, extubated, on nasal O2, awake, follows commands, on tube feeds.    Wound of sacral region  Depression  Iron deficiency anemia  Eczema  HTN (hypertension)  CKD (chronic kidney disease) stage 5, GFR less than 15 ml/min  Herniated lumbar intervertebral disc  Depression  Tremor  Kidney atrophy  Colostomy status  Chronic UTI (urinary tract infection)  Cervical cancer  Dyslipidemia  Diabetes  Hernia, incisional    MEDICATIONS  (STANDING):  aspirin enteric coated 81 milliGRAM(s) Oral daily  atorvastatin 10 milliGRAM(s) Oral at bedtime  budesonide 160 MICROgram(s)/formoterol 4.5 MICROgram(s) Inhaler 2 Puff(s) Inhalation two times a day  calcium carbonate 1250 mG  + Vitamin D (OsCal 500 + D) 1 Tablet(s) Oral daily  chlorhexidine 2% Cloths 1 Application(s) Topical <User Schedule>  cholecalciferol 1000 Unit(s) Oral daily  cholestyramine Powder (Sugar-Free) 4 Gram(s) Oral daily  clobetasol 0.05% Cream 1 Application(s) Topical two times a day  dexMEDEtomidine Infusion 0.5 MICROgram(s)/kG/Hr (6.8 mL/Hr) IV Continuous <Continuous>  epoetin jean carlos Injectable 89138 Unit(s) SubCutaneous <User Schedule>  ferrous    sulfate 325 milliGRAM(s) Oral daily  FLUoxetine 20 milliGRAM(s) Oral daily  heparin  Injectable 5000 Unit(s) SubCutaneous every 8 hours  lactobacillus acidophilus 1 Tablet(s) Oral three times a day  loratadine 10 milliGRAM(s) Oral daily  meropenem  IVPB 500 milliGRAM(s) IV Intermittent every 24 hours  midodrine 10 milliGRAM(s) Oral every 8 hours  multivitamin 1 Tablet(s) Oral daily  nystatin Cream 1 Application(s) Topical two times a day  pantoprazole  Injectable 40 milliGRAM(s) IV Push daily  propofol Infusion 10 MICROgram(s)/kG/Min (3.264 mL/Hr) IV Continuous <Continuous>  sodium bicarbonate 650 milliGRAM(s) Oral three times a day  valproate sodium IVPB 500 milliGRAM(s) IV Intermittent once  valproic  acid Syrup 750 milliGRAM(s) Oral two times a day    MEDICATIONS  (PRN):  acetaminophen    Suspension .. 650 milliGRAM(s) Oral every 6 hours PRN Temp greater or equal to 38C (100.4F), Moderate Pain (4 - 6)  sodium chloride 0.9% lock flush 10 milliLiter(s) IV Push every 1 hour PRN Pre/post blood products, medications, blood draw, and to maintain line patency    T(C): 36.6 (03-08-20 @ 11:51), Max: 36.8 (03-08-20 @ 03:22)  HR: 73 (03-08-20 @ 12:00) (53 - 85)  BP: 165/81 (03-08-20 @ 12:00) (76/49 - 170/72)  RR: 25 (03-08-20 @ 12:00) (17 - 102)  SpO2: 98% (03-08-20 @ 12:00) (87% - 100%)  Wt(kg): --  I&O's Detail    07 Mar 2020 06:01  -  08 Mar 2020 07:00  --------------------------------------------------------  IN:    Enteral Tube Flush: 450 mL    Nepro with Carb Steady: 460 mL    Solution: 50 mL  Total IN: 960 mL    OUT:    Colostomy: 1085 mL    Indwelling Catheter - Urethral: 535 mL  Total OUT: 1620 mL    Total NET: -660 mL      08 Mar 2020 07:01  -  08 Mar 2020 13:53  --------------------------------------------------------  IN:    Enteral Tube Flush: 110 mL    Nepro with Carb Steady: 200 mL    Sodium Chloride 0.9% IV Bolus: 500 mL  Total IN: 810 mL    OUT:    Colostomy: 375 mL    Indwelling Catheter - Urethral: 105 mL  Total OUT: 480 mL    Total NET: 330 mL      PHYSICAL EXAM:  General: NAD, follows commands  No JVD  Respiratory: b/l air entry, clear anteriorly  Cardiovascular: S1 S2 reg  Gastrointestinal: soft, NT  Extremities:  no edema     CBC Full  -  ( 08 Mar 2020 06:59 )  WBC Count : 16.75 K/uL  RBC Count : 3.33 M/uL  Hemoglobin : 9.6 g/dL  Hematocrit : 30.5 %  Platelet Count - Automated : 267 K/uL  Mean Cell Volume : 91.6 fl  Mean Cell Hemoglobin : 28.8 pg  Mean Cell Hemoglobin Concentration : 31.5 gm/dL  Auto Neutrophil # : 15.66 K/uL  Auto Lymphocyte # : 0.45 K/uL  Auto Monocyte # : 0.53 K/uL  Auto Eosinophil # : 0.00 K/uL  Auto Basophil # : 0.02 K/uL  Auto Neutrophil % : 93.5 %  Auto Lymphocyte % : 2.7 %  Auto Monocyte % : 3.2 %  Auto Eosinophil % : 0.0 %  Auto Basophil % : 0.1 %    03-08    141  |  111<H>  |  70<H>  ----------------------------<  163<H>  4.0   |  15<L>  |  5.30<H>    Ca    7.4<L>      08 Mar 2020 06:59  Phos  5.6     03-08  Mg     1.8     03-08    TPro  5.9<L>  /  Alb  2.5<L>  /  TBili  0.2  /  DBili  x   /  AST  21  /  ALT  18  /  AlkPhos  45  03-07    ABG - ( 07 Mar 2020 15:24 )  pH, Arterial: 7.30  pH, Blood: x     /  pCO2: 34    /  pO2: 64    / HCO3: 17    / Base Excess: -8.9  /  SaO2: 89            Sodium, Serum: 141 (03-08 @ 06:59)  Sodium, Serum: 140 (03-07 @ 09:08)  Sodium, Serum: 143 (03-06 @ 06:06)  Sodium, Serum: 143 (03-05 @ 14:49)    Creatinine, Serum: 5.30 (03-08 @ 06:59)  Creatinine, Serum: 4.90 (03-07 @ 09:08)  Creatinine, Serum: 4.50 (03-06 @ 06:06)  Creatinine, Serum: 4.10 (03-05 @ 14:49)    Potassium, Serum: 4.0 (03-08 @ 06:59)  Potassium, Serum: 4.7 (03-07 @ 09:08)  Potassium, Serum: 5.4 (03-06 @ 06:06)  Potassium, Serum: 4.9 (03-05 @ 14:49)    Hemoglobin: 9.6 (03-08 @ 06:59)  Hemoglobin: 8.5 (03-07 @ 09:08)  Hemoglobin: 10.3 (03-06 @ 06:06)

## 2020-03-09 LAB
ANION GAP SERPL CALC-SCNC: 14 MMOL/L — SIGNIFICANT CHANGE UP (ref 5–17)
ANION GAP SERPL CALC-SCNC: 15 MMOL/L — SIGNIFICANT CHANGE UP (ref 5–17)
BASOPHILS # BLD AUTO: 0.03 K/UL — SIGNIFICANT CHANGE UP (ref 0–0.2)
BASOPHILS NFR BLD AUTO: 0.2 % — SIGNIFICANT CHANGE UP (ref 0–2)
BUN SERPL-MCNC: 64 MG/DL — HIGH (ref 7–23)
BUN SERPL-MCNC: 71 MG/DL — HIGH (ref 7–23)
CALCIUM SERPL-MCNC: 8.3 MG/DL — LOW (ref 8.5–10.1)
CALCIUM SERPL-MCNC: 9.1 MG/DL — SIGNIFICANT CHANGE UP (ref 8.5–10.1)
CHLORIDE SERPL-SCNC: 115 MMOL/L — HIGH (ref 96–108)
CHLORIDE SERPL-SCNC: 116 MMOL/L — HIGH (ref 96–108)
CO2 SERPL-SCNC: 14 MMOL/L — LOW (ref 22–31)
CO2 SERPL-SCNC: 16 MMOL/L — LOW (ref 22–31)
CREAT SERPL-MCNC: 4.8 MG/DL — HIGH (ref 0.5–1.3)
CREAT SERPL-MCNC: 5.1 MG/DL — HIGH (ref 0.5–1.3)
EOSINOPHIL # BLD AUTO: 0.12 K/UL — SIGNIFICANT CHANGE UP (ref 0–0.5)
EOSINOPHIL NFR BLD AUTO: 0.9 % — SIGNIFICANT CHANGE UP (ref 0–6)
GLUCOSE SERPL-MCNC: 120 MG/DL — HIGH (ref 70–99)
GLUCOSE SERPL-MCNC: 129 MG/DL — HIGH (ref 70–99)
HCT VFR BLD CALC: 28.8 % — LOW (ref 34.5–45)
HGB BLD-MCNC: 9.2 G/DL — LOW (ref 11.5–15.5)
IMM GRANULOCYTES NFR BLD AUTO: 0.8 % — SIGNIFICANT CHANGE UP (ref 0–1.5)
LYMPHOCYTES # BLD AUTO: 0.52 K/UL — LOW (ref 1–3.3)
LYMPHOCYTES # BLD AUTO: 3.7 % — LOW (ref 13–44)
MAGNESIUM SERPL-MCNC: 1.8 MG/DL — SIGNIFICANT CHANGE UP (ref 1.6–2.6)
MCHC RBC-ENTMCNC: 28.9 PG — SIGNIFICANT CHANGE UP (ref 27–34)
MCHC RBC-ENTMCNC: 31.9 GM/DL — LOW (ref 32–36)
MCV RBC AUTO: 90.6 FL — SIGNIFICANT CHANGE UP (ref 80–100)
MONOCYTES # BLD AUTO: 0.52 K/UL — SIGNIFICANT CHANGE UP (ref 0–0.9)
MONOCYTES NFR BLD AUTO: 3.7 % — SIGNIFICANT CHANGE UP (ref 2–14)
NEUTROPHILS # BLD AUTO: 12.73 K/UL — HIGH (ref 1.8–7.4)
NEUTROPHILS NFR BLD AUTO: 90.7 % — HIGH (ref 43–77)
NRBC # BLD: 0 /100 WBCS — SIGNIFICANT CHANGE UP (ref 0–0)
PHOSPHATE SERPL-MCNC: 4.2 MG/DL — SIGNIFICANT CHANGE UP (ref 2.5–4.5)
PLATELET # BLD AUTO: 250 K/UL — SIGNIFICANT CHANGE UP (ref 150–400)
POTASSIUM SERPL-MCNC: 3.2 MMOL/L — LOW (ref 3.5–5.3)
POTASSIUM SERPL-MCNC: 3.9 MMOL/L — SIGNIFICANT CHANGE UP (ref 3.5–5.3)
POTASSIUM SERPL-SCNC: 3.2 MMOL/L — LOW (ref 3.5–5.3)
POTASSIUM SERPL-SCNC: 3.9 MMOL/L — SIGNIFICANT CHANGE UP (ref 3.5–5.3)
RBC # BLD: 3.18 M/UL — LOW (ref 3.8–5.2)
RBC # FLD: 15 % — HIGH (ref 10.3–14.5)
SODIUM SERPL-SCNC: 144 MMOL/L — SIGNIFICANT CHANGE UP (ref 135–145)
SODIUM SERPL-SCNC: 146 MMOL/L — HIGH (ref 135–145)
WBC # BLD: 14.03 K/UL — HIGH (ref 3.8–10.5)
WBC # FLD AUTO: 14.03 K/UL — HIGH (ref 3.8–10.5)

## 2020-03-09 PROCEDURE — 99232 SBSQ HOSP IP/OBS MODERATE 35: CPT

## 2020-03-09 PROCEDURE — 71045 X-RAY EXAM CHEST 1 VIEW: CPT | Mod: 26

## 2020-03-09 PROCEDURE — 99233 SBSQ HOSP IP/OBS HIGH 50: CPT | Mod: GC

## 2020-03-09 RX ORDER — QUETIAPINE FUMARATE 200 MG/1
12.5 TABLET, FILM COATED ORAL AT BEDTIME
Refills: 0 | Status: DISCONTINUED | OUTPATIENT
Start: 2020-03-09 | End: 2020-03-10

## 2020-03-09 RX ORDER — LEVETIRACETAM 250 MG/1
500 TABLET, FILM COATED ORAL ONCE
Refills: 0 | Status: COMPLETED | OUTPATIENT
Start: 2020-03-09 | End: 2020-03-09

## 2020-03-09 RX ORDER — LANOLIN ALCOHOL/MO/W.PET/CERES
3 CREAM (GRAM) TOPICAL AT BEDTIME
Refills: 0 | Status: DISCONTINUED | OUTPATIENT
Start: 2020-03-09 | End: 2020-03-27

## 2020-03-09 RX ORDER — SODIUM CHLORIDE 9 MG/ML
1000 INJECTION, SOLUTION INTRAVENOUS
Refills: 0 | Status: DISCONTINUED | OUTPATIENT
Start: 2020-03-09 | End: 2020-03-09

## 2020-03-09 RX ORDER — QUETIAPINE FUMARATE 200 MG/1
25 TABLET, FILM COATED ORAL AT BEDTIME
Refills: 0 | Status: DISCONTINUED | OUTPATIENT
Start: 2020-03-09 | End: 2020-03-09

## 2020-03-09 RX ORDER — METOPROLOL TARTRATE 50 MG
25 TABLET ORAL
Refills: 0 | Status: DISCONTINUED | OUTPATIENT
Start: 2020-03-10 | End: 2020-03-10

## 2020-03-09 RX ORDER — POTASSIUM CHLORIDE 20 MEQ
10 PACKET (EA) ORAL
Refills: 0 | Status: COMPLETED | OUTPATIENT
Start: 2020-03-09 | End: 2020-03-09

## 2020-03-09 RX ORDER — LABETALOL HCL 100 MG
10 TABLET ORAL ONCE
Refills: 0 | Status: COMPLETED | OUTPATIENT
Start: 2020-03-09 | End: 2020-03-09

## 2020-03-09 RX ORDER — HYDROMORPHONE HYDROCHLORIDE 2 MG/ML
0.5 INJECTION INTRAMUSCULAR; INTRAVENOUS; SUBCUTANEOUS ONCE
Refills: 0 | Status: DISCONTINUED | OUTPATIENT
Start: 2020-03-09 | End: 2020-03-09

## 2020-03-09 RX ORDER — AMLODIPINE BESYLATE 2.5 MG/1
5 TABLET ORAL DAILY
Refills: 0 | Status: DISCONTINUED | OUTPATIENT
Start: 2020-03-10 | End: 2020-03-19

## 2020-03-09 RX ADMIN — Medication 1 TABLET(S): at 21:16

## 2020-03-09 RX ADMIN — HEPARIN SODIUM 5000 UNIT(S): 5000 INJECTION INTRAVENOUS; SUBCUTANEOUS at 13:05

## 2020-03-09 RX ADMIN — LEVETIRACETAM 420 MILLIGRAM(S): 250 TABLET, FILM COATED ORAL at 11:44

## 2020-03-09 RX ADMIN — BUDESONIDE AND FORMOTEROL FUMARATE DIHYDRATE 2 PUFF(S): 160; 4.5 AEROSOL RESPIRATORY (INHALATION) at 17:12

## 2020-03-09 RX ADMIN — LORATADINE 10 MILLIGRAM(S): 10 TABLET ORAL at 11:05

## 2020-03-09 RX ADMIN — NYSTATIN CREAM 1 APPLICATION(S): 100000 CREAM TOPICAL at 17:10

## 2020-03-09 RX ADMIN — Medication 1 TABLET(S): at 11:05

## 2020-03-09 RX ADMIN — BUDESONIDE AND FORMOTEROL FUMARATE DIHYDRATE 2 PUFF(S): 160; 4.5 AEROSOL RESPIRATORY (INHALATION) at 06:42

## 2020-03-09 RX ADMIN — HEPARIN SODIUM 5000 UNIT(S): 5000 INJECTION INTRAVENOUS; SUBCUTANEOUS at 06:39

## 2020-03-09 RX ADMIN — MIDODRINE HYDROCHLORIDE 10 MILLIGRAM(S): 2.5 TABLET ORAL at 02:31

## 2020-03-09 RX ADMIN — ATORVASTATIN CALCIUM 10 MILLIGRAM(S): 80 TABLET, FILM COATED ORAL at 21:07

## 2020-03-09 RX ADMIN — Medication 750 MILLIGRAM(S): at 06:53

## 2020-03-09 RX ADMIN — QUETIAPINE FUMARATE 12.5 MILLIGRAM(S): 200 TABLET, FILM COATED ORAL at 21:08

## 2020-03-09 RX ADMIN — Medication 100 MILLIEQUIVALENT(S): at 08:55

## 2020-03-09 RX ADMIN — Medication 1 APPLICATION(S): at 06:40

## 2020-03-09 RX ADMIN — Medication 20 MILLIGRAM(S): at 11:05

## 2020-03-09 RX ADMIN — Medication 100 MILLIEQUIVALENT(S): at 08:01

## 2020-03-09 RX ADMIN — Medication 81 MILLIGRAM(S): at 11:05

## 2020-03-09 RX ADMIN — MEROPENEM 100 MILLIGRAM(S): 1 INJECTION INTRAVENOUS at 13:05

## 2020-03-09 RX ADMIN — CHLORHEXIDINE GLUCONATE 1 APPLICATION(S): 213 SOLUTION TOPICAL at 06:41

## 2020-03-09 RX ADMIN — Medication 1000 UNIT(S): at 11:05

## 2020-03-09 RX ADMIN — Medication 750 MILLIGRAM(S): at 17:09

## 2020-03-09 RX ADMIN — Medication 100 MILLIEQUIVALENT(S): at 06:40

## 2020-03-09 RX ADMIN — Medication 650 MILLIGRAM(S): at 06:52

## 2020-03-09 RX ADMIN — Medication 3 MILLIGRAM(S): at 21:07

## 2020-03-09 RX ADMIN — Medication 1 TABLET(S): at 13:04

## 2020-03-09 RX ADMIN — Medication 1 TABLET(S): at 06:52

## 2020-03-09 RX ADMIN — CHOLESTYRAMINE 4 GRAM(S): 4 POWDER, FOR SUSPENSION ORAL at 08:01

## 2020-03-09 RX ADMIN — Medication 1 APPLICATION(S): at 17:10

## 2020-03-09 RX ADMIN — Medication 650 MILLIGRAM(S): at 13:04

## 2020-03-09 RX ADMIN — Medication 10 MILLIGRAM(S): at 23:43

## 2020-03-09 RX ADMIN — NYSTATIN CREAM 1 APPLICATION(S): 100000 CREAM TOPICAL at 06:40

## 2020-03-09 RX ADMIN — HEPARIN SODIUM 5000 UNIT(S): 5000 INJECTION INTRAVENOUS; SUBCUTANEOUS at 21:16

## 2020-03-09 RX ADMIN — BUDESONIDE AND FORMOTEROL FUMARATE DIHYDRATE 2 PUFF(S): 160; 4.5 AEROSOL RESPIRATORY (INHALATION) at 20:00

## 2020-03-09 RX ADMIN — Medication 325 MILLIGRAM(S): at 11:04

## 2020-03-09 RX ADMIN — Medication 650 MILLIGRAM(S): at 23:05

## 2020-03-09 NOTE — CHART NOTE - NSCHARTNOTEFT_GEN_A_CORE
Time of evaluation: 20:32    Called to evaluate patient for restraints        Other interventions attempted: de-escalation, orientation check, environment modification, medication assessment    REVIEW OF SYSTEMS:  CONSTITUTIONAL: [  ] fever   [  ] fatigue  EYES: [  ] visual disturbances  ENMT:  [  ]difficulty hearing  [  ] throat pain  RESPIRATORY:  [  ]cough  [   ] wheezing  [   ] hemoptysis [  ] shortness of breath  CARDIOVASCULAR: [  ]chest pain  [  ] palpitations   GASTROINTESTINAL: [  ]  abdominal pain [  ] nausea [  ] vomiting  [  ] diarrhea  [  ] constipation  GENITOURINARY: [  ] dysuria [  ] frequency  [  ] hematuria [  ] incontinence  NEUROLOGICAL: [  ] headaches [  ] new numbness  SKIN: [  ]itching [  ] new rash   MUSCULOSKELETAL: [  ] back pain  [  ] extremity pain  PSYCHIATRIC: [  ] depression  [  ] anxiety  [  ] mood swings [  ] difficulty sleeping  ALLERGY AND IMMUNOLOGIC: [  ] hives    [ x ]Unable to perfrom ROS due to: AMS  [  ] ROS reviewed and all negative    PAST MEDICAL & SURGICAL HISTORY:  Wound of sacral region  Depression  Iron deficiency anemia  Eczema  HTN (hypertension)  CKD (chronic kidney disease) stage 5, GFR less than 15 ml/min: not on HD  Herniated lumbar intervertebral disc  Tremor  Kidney atrophy: right  Colostomy status: 2006  Chronic UTI (urinary tract infection): chronic renae  Cervical cancer: 1970&#x27;s  Dyslipidemia  Hernia, incisional  S/P hip replacement: right 2013  S/P colon resection: 2006  S/P hysterectomy: 1977    MEDICATIONS  (STANDING):  aspirin enteric coated 81 milliGRAM(s) Oral daily  atorvastatin 10 milliGRAM(s) Oral at bedtime  budesonide 160 MICROgram(s)/formoterol 4.5 MICROgram(s) Inhaler 2 Puff(s) Inhalation two times a day  calcium carbonate 1250 mG  + Vitamin D (OsCal 500 + D) 1 Tablet(s) Oral daily  chlorhexidine 2% Cloths 1 Application(s) Topical <User Schedule>  cholecalciferol 1000 Unit(s) Oral daily  cholestyramine Powder (Sugar-Free) 4 Gram(s) Oral daily  clobetasol 0.05% Cream 1 Application(s) Topical two times a day  epoetin jean carlos Injectable 42744 Unit(s) SubCutaneous <User Schedule>  ferrous    sulfate 325 milliGRAM(s) Oral daily  FLUoxetine 20 milliGRAM(s) Oral daily  heparin  Injectable 5000 Unit(s) SubCutaneous every 8 hours  lactobacillus acidophilus 1 Tablet(s) Oral three times a day  loratadine 10 milliGRAM(s) Oral daily  melatonin 3 milliGRAM(s) Oral at bedtime  meropenem  IVPB 500 milliGRAM(s) IV Intermittent every 24 hours  multivitamin 1 Tablet(s) Oral daily  nystatin Cream 1 Application(s) Topical two times a day  QUEtiapine 12.5 milliGRAM(s) Oral at bedtime  sodium bicarbonate 650 milliGRAM(s) Oral three times a day  valproic  acid Syrup 750 milliGRAM(s) Oral two times a day    MEDICATIONS  (PRN):  acetaminophen    Suspension .. 650 milliGRAM(s) Oral every 6 hours PRN Temp greater or equal to 38C (100.4F), Moderate Pain (4 - 6)  sodium chloride 0.9% lock flush 10 milliLiter(s) IV Push every 1 hour PRN Pre/post blood products, medications, blood draw, and to maintain line patency                          9.2    14.03 )-----------( 250      ( 09 Mar 2020 05:14 )             28.8     03-09    144  |  116<H>  |  64<H>  ----------------------------<  129<H>  3.9   |  14<L>  |  4.80<H>    Ca    9.1      09 Mar 2020 19:10  Phos  4.2     03-09  Mg     1.8     03-09        Vital Signs Last 24 Hrs  T(C): 36.7 (09 Mar 2020 19:45), Max: 36.8 (09 Mar 2020 07:27)  T(F): 98 (09 Mar 2020 19:45), Max: 98.2 (09 Mar 2020 07:27)  HR: 90 (09 Mar 2020 21:15) (73 - 119)  BP: 179/77 (09 Mar 2020 21:05) (98/49 - 224/89)  BP(mean): 111 (09 Mar 2020 21:05) (68 - 156)  RR: 35 (09 Mar 2020 21:15) (23 - 45)  SpO2: 98% (09 Mar 2020 21:15) (90% - 100%)    Physical Examination:  GENERAL: NAD, elder, chronically ill appearing  HEENT:  no JVD, PEARRLA  NERVOUS SYSTEM:  Alert & Oriented X1,  Motor Strength 3/5 B/L upper and lower extremities  CHEST/LUNG: breath sounds diminished b/l in lung bases   HEART: Regular rate and rhythm; No murmurs, rubs, or gallops  ABDOMEN: +ostomy with stool Soft, Nontender, Nondistended; Bowel sounds presen      [  ] Unable to perform physical exam due to    [X ] I have evaluated this patient and have determined that restraints are warranted to optimize medical care. Patient was assessed for current physical and psychological risk factors as well as special needs. There are no medical conditions or limitations that would place this patient at risk while in restraints.    Type of restraint: Bilateral soft wrist restraints    Behavioral criteria for discontinuation of restraint: [ X ] See order    Attending MD Aware

## 2020-03-09 NOTE — PROGRESS NOTE ADULT - ATTENDING COMMENTS
84F h/o CKD5, chronic renae and frequent UTIs, stage 4 sacral wound with recent osteomyelitis, dementia admitted with confusion and suspected sepsis from UTI, complicated by new onset seizure on the floor with acute hypoxic respiratory failure requiring intubation, extubated 3/7 and now improving.    Neuro: encephalopathy improving; unclear trigger of new onset seizures, with continued areas of generalized spike and waves on [place] 3/8 - can continue valproic acid at 750mg bid, but given interaction with meorpenum, will also give one time keppra 500mg for renally dosed loading until remaining 3 days or niko completed  CV: pt hypertensive today, will dc midodrine, if remains hypertensive can also begin to restart home BP meds; continue ASA and statin; TTE pending  Pulm: hypoxemia improved, tolerating NC today, placed on BiPAP overnight due to concern for wob overnight but now doing well on minimal O2  Renal: CKD5 stable, continue bicarb and epo, not ideal candidate for HD should her renal function start to worsen; chronic renae in place with ~775cc uop in previous 24hrs; started free water 250cc q6hr for hypernatremia, small IVC of POCUS  GI: dysphagia diet per s/s eval today, can remove KO tube if tolerating; ostomy with >1.5L outpt yesterday, likely multifactorial 2/2 tube feeds and abx though low suspicion for c.dif at this time  ID: UCx with E. coli and Enterobacter but unclear if true infection in setting of chronic renae; continue meropenum #4/7   Heme: dvt ppx with hsq  --PT eval, to remain in ICU at this time

## 2020-03-09 NOTE — CHART NOTE - NSCHARTNOTEFT_GEN_A_CORE
Assessment: Pt confused at times, garbled/confused speech. Receiving Nepro NG feeds at this time(pulled out NG tube, replaced this am). Cr 5.1, Na+ 146, Cl 115. Sacral pressure injury noted. Family has not yet decided on HD, per nephrology may need HD soon.Colostomy functioning.    Factors impacting intake: [ ] none [ ] nausea  [ ] vomiting [ ] diarrhea [ ] constipation  [ ]chewing problems [ ] swallowing issues  [ ] other:     Diet Presciption: Diet, NPO:   Tube Feeding Modality: Gastrostomy  Nepro with Carb Steady  Total Volume for 24 Hours (mL): 960  Continuous  Starting Tube Feed Rate {mL per Hour}: 10     Every 6 hours  Until Goal Tube Feed Rate (mL per Hour): 40  Tube Feed Duration (in Hours): 24  Tube Feed Start Time: 13:00 (03-06-20 @ 12:02)    Intake:     Current Weight: Weight (kg): 54.4 (03-03 @ 12:45)  % Weight Change wt ~130 kusum, admit ~119#, could be related to fluid changes    Pertinent Medications: MEDICATIONS  (STANDING):  aspirin enteric coated 81 milliGRAM(s) Oral daily  atorvastatin 10 milliGRAM(s) Oral at bedtime  budesonide 160 MICROgram(s)/formoterol 4.5 MICROgram(s) Inhaler 2 Puff(s) Inhalation two times a day  calcium carbonate 1250 mG  + Vitamin D (OsCal 500 + D) 1 Tablet(s) Oral daily  chlorhexidine 2% Cloths 1 Application(s) Topical <User Schedule>  cholecalciferol 1000 Unit(s) Oral daily  cholestyramine Powder (Sugar-Free) 4 Gram(s) Oral daily  clobetasol 0.05% Cream 1 Application(s) Topical two times a day  epoetin jean carlos Injectable 95619 Unit(s) SubCutaneous <User Schedule>  ferrous    sulfate 325 milliGRAM(s) Oral daily  FLUoxetine 20 milliGRAM(s) Oral daily  heparin  Injectable 5000 Unit(s) SubCutaneous every 8 hours  lactobacillus acidophilus 1 Tablet(s) Oral three times a day  loratadine 10 milliGRAM(s) Oral daily  meropenem  IVPB 500 milliGRAM(s) IV Intermittent every 24 hours  midodrine 10 milliGRAM(s) Oral every 8 hours  multivitamin 1 Tablet(s) Oral daily  nystatin Cream 1 Application(s) Topical two times a day  pantoprazole  Injectable 40 milliGRAM(s) IV Push daily  sodium bicarbonate 650 milliGRAM(s) Oral three times a day  valproic  acid Syrup 750 milliGRAM(s) Oral two times a day    MEDICATIONS  (PRN):  acetaminophen    Suspension .. 650 milliGRAM(s) Oral every 6 hours PRN Temp greater or equal to 38C (100.4F), Moderate Pain (4 - 6)  sodium chloride 0.9% lock flush 10 milliLiter(s) IV Push every 1 hour PRN Pre/post blood products, medications, blood draw, and to maintain line patency    Pertinent Labs: 03-09 Na146 mmol/L<H> Glu 120 mg/dL<H> K+ 3.2 mmol/L<L> Cr  5.10 mg/dL<H> BUN 71 mg/dL<H> 03-09 Phos 4.2 mg/dL 03-07 Alb 2.5 g/dL<L>     CAPILLARY BLOOD GLUCOSE        Skin: see above    Estimated Needs:   [ ] no change since previous assessment  [x ] recalculated:  using upper end of protein needs due to severity of pressure injury    Previous Nutrition Diagnosis: (X) altered nutrition related labs  [ ] Inadequate Energy Intake [ ]Inadequate Oral Intake [ ] Excessive Energy Intake   [ ] Underweight [ ] Increased Nutrient Needs [ ] Overweight/Obesity   [ ] Altered GI Function [ ] Unintended Weight Loss [ ] Food & Nutrition Related Knowledge Deficit [ ] Malnutrition     Nutrition Diagnosis is [x ] ongoing  [ ] resolved [ ] not applicable     New Nutrition Diagnosis: [ ] not applicable       Interventions: Consider decreasing TF to 20hr/day to meet/not exceed  energy/protein needs as HD not started. Recommend SLP eval to see if po intake feasible(pt asking for coffee, more alert than yesterday per EMR)  Recommend  [ ] Change Diet To:  [ ] Nutrition Supplement  [ ] Nutrition Support  [x ] Other:  consider free water flush/nephrology input for same with elevated Cr/Cl    Monitoring and Evaluation:   [ ] PO intake [ x ] Tolerance to diet prescription [ x ] weights [ x ] labs[ x ] follow up per protocol  [ ] other:

## 2020-03-09 NOTE — CHART NOTE - NSCHARTNOTEFT_GEN_A_CORE
Time of evaluation:     Called to evaluate patient for restraints        Other interventions attempted: de-escalation, orientation check, environment modification, medication assessment    REVIEW OF SYSTEMS:  CONSTITUTIONAL: [  ] fever   [  ] fatigue  EYES: [  ] visual disturbances  ENMT:  [  ]difficulty hearing  [  ] throat pain  RESPIRATORY:  [  ]cough  [   ] wheezing  [   ] hemoptysis [  ] shortness of breath  CARDIOVASCULAR: [  ]chest pain  [  ] palpitations   GASTROINTESTINAL: [  ]  abdominal pain [  ] nausea [  ] vomiting  [  ] diarrhea  [  ] constipation  GENITOURINARY: [  ] dysuria [  ] frequency  [  ] hematuria [  ] incontinence  NEUROLOGICAL: [  ] headaches [  ] new numbness  SKIN: [  ]itching [  ] new rash   MUSCULOSKELETAL: [  ] back pain  [  ] extremity pain  PSYCHIATRIC: [  ] depression  [  ] anxiety  [  ] mood swings [  ] difficulty sleeping  ALLERGY AND IMMUNOLOGIC: [  ] hives    [  ]Unable to perfrom ROS due to:  [  ] ROS reviewed and all negative    PAST MEDICAL & SURGICAL HISTORY:  Wound of sacral region  Depression  Iron deficiency anemia  Eczema  HTN (hypertension)  CKD (chronic kidney disease) stage 5, GFR less than 15 ml/min: not on HD  Herniated lumbar intervertebral disc  Tremor  Kidney atrophy: right  Colostomy status: 2006  Chronic UTI (urinary tract infection): chronic renae  Cervical cancer: 1970&#x27;s  Dyslipidemia  Hernia, incisional  S/P hip replacement: right 2013  S/P colon resection: 2006  S/P hysterectomy: 1977    MEDICATIONS  (STANDING):  aspirin enteric coated 81 milliGRAM(s) Oral daily  atorvastatin 10 milliGRAM(s) Oral at bedtime  budesonide 160 MICROgram(s)/formoterol 4.5 MICROgram(s) Inhaler 2 Puff(s) Inhalation two times a day  calcium carbonate 1250 mG  + Vitamin D (OsCal 500 + D) 1 Tablet(s) Oral daily  chlorhexidine 2% Cloths 1 Application(s) Topical <User Schedule>  cholecalciferol 1000 Unit(s) Oral daily  cholestyramine Powder (Sugar-Free) 4 Gram(s) Oral daily  clobetasol 0.05% Cream 1 Application(s) Topical two times a day  epoetin jean carlos Injectable 88085 Unit(s) SubCutaneous <User Schedule>  ferrous    sulfate 325 milliGRAM(s) Oral daily  FLUoxetine 20 milliGRAM(s) Oral daily  heparin  Injectable 5000 Unit(s) SubCutaneous every 8 hours  lactobacillus acidophilus 1 Tablet(s) Oral three times a day  loratadine 10 milliGRAM(s) Oral daily  meropenem  IVPB 500 milliGRAM(s) IV Intermittent every 24 hours  midodrine 10 milliGRAM(s) Oral every 8 hours  multivitamin 1 Tablet(s) Oral daily  nystatin Cream 1 Application(s) Topical two times a day  pantoprazole  Injectable 40 milliGRAM(s) IV Push daily  sodium bicarbonate 650 milliGRAM(s) Oral three times a day  valproic  acid Syrup 750 milliGRAM(s) Oral two times a day    MEDICATIONS  (PRN):  acetaminophen    Suspension .. 650 milliGRAM(s) Oral every 6 hours PRN Temp greater or equal to 38C (100.4F), Moderate Pain (4 - 6)  sodium chloride 0.9% lock flush 10 milliLiter(s) IV Push every 1 hour PRN Pre/post blood products, medications, blood draw, and to maintain line patency                          9.6    16.75 )-----------( 267      ( 08 Mar 2020 06:59 )             30.5     03-08    141  |  111<H>  |  70<H>  ----------------------------<  163<H>  4.0   |  15<L>  |  5.30<H>    Ca    7.4<L>      08 Mar 2020 06:59  Phos  5.6     03-08  Mg     1.8     03-08    TPro  5.9<L>  /  Alb  2.5<L>  /  TBili  0.2  /  DBili  x   /  AST  21  /  ALT  18  /  AlkPhos  45  03-07      Vital Signs Last 24 Hrs  T(C): 36.7 (09 Mar 2020 03:22), Max: 36.7 (08 Mar 2020 19:37)  T(F): 98 (09 Mar 2020 03:22), Max: 98 (08 Mar 2020 19:37)  HR: 85 (09 Mar 2020 04:00) (69 - 101)  BP: 116/53 (09 Mar 2020 04:00) (78/53 - 170/72)  BP(mean): 77 (09 Mar 2020 04:00) (58 - 117)  RR: 26 (09 Mar 2020 04:00) (21 - 102)  SpO2: 96% (09 Mar 2020 04:00) (90% - 100%)    Physical Examination:  awake alert  s1s2 Reg rate, irregular  b/l breath sounds  soft abdmen. ostomy withoutput  non focal exam      [  ] Unable to perform physical exam due to    [X ] I have evaluated this patient and have determined that restraints are warranted to optimize medical care. Patient was assessed for current physical and psychological risk factors as well as special needs. There are no medical conditions or limitations that would place this patient at risk while in restraints.    Type of restraint: Bilateral soft wrist restraints    Behavioral criteria for discontinuation of restraint: [ X ] See order    Attending MD Aware

## 2020-03-09 NOTE — PROGRESS NOTE ADULT - ASSESSMENT
85 yo F with Hx CKD5, chronic renae and frequent UTIs, stage 4 sacral wound with recent osteomyelitis, dementia admitted with confusion and suspected sepsis from UTI, complicated by episode of sz activity, resulting in acute respiratory failure.  Now improving.    Neuro: Extubated(3/7/2020), Acetaminophen suspension PRN. Seizure activity currently treated with Depakene Syrup 750mg BID. Abnormal EEG due to the presence of mild bilateral background slowing. This is consistent with bilateral or multifocal cerebral dysfunction of numerous etiologies and are nonspecific. Along with generalized spike and waves at 1 Hz intermittent. Ativan 1 mg IV push q.6h. p.r.n. for any type of breakthrough seizure. Seizure precautions. Continue Prozac for depression.   CV: BP medication held due to hypotension. Continue midodrine. Continue ASA and statin.    Pulm: Extubated(3/7/2020). Continue symbicort. BIPAP overnight   Renal: CKD Stage 5 (chronic), appropriate urine output. Strict I&O. Continue sodium bicarb, ferrous sulfate, Vitamin D, OsCal. Continue Procrit.   GI: Start NPO with tube feeds (nepro). Nutrition consult. Continue Ostomy care. Continue Prevalite    ID: Afebrile. Leukocytosis trending down from 16.75 to 14.03(today). Continue abx to Meropenum, plan for total of 7d course. Blood cultures show no growth to date. ID consult. Continue Tylenol PRN for fever.  Heme: on Heparin SQ  Prophylaxis: Aquacel dressing every other day, Protonix injectable  Dispo: Patient critically ill. Prognosis poor/guarded. Full code 85 yo F with Hx CKD5, chronic renae and frequent UTIs, stage 4 sacral wound with recent osteomyelitis, dementia admitted with confusion and suspected sepsis from UTI, complicated by episode of sz activity, resulting in acute respiratory failure.  Now improving.    Neuro: Extubated(3/7/2020), Acetaminophen suspension PRN. Seizure activity currently treated with Depakene Syrup 750mg BID. Abnormal EEG due to the presence of mild bilateral background slowing. This is consistent with bilateral or multifocal cerebral dysfunction of numerous etiologies and are nonspecific. Along with generalized spike and waves at 1 Hz intermittent. Ativan 1 mg IV push q.6h. p.r.n. for any type of breakthrough seizure. Seizure precautions. Continue Prozac for depression. Given 1 dose of 500mg IV keppra.   CV: BP medication held due to hypotension. Hold midodrine due to blood pressure elevation and monitor off midodrine. Continue ASA and statin.    Pulm: Extubated(3/7/2020). Continue symbicort. BIPAP overnight   Renal: CKD Stage 5 (chronic), appropriate urine output. Hypernatremic on todays lab will start 250cc free water and stop on the 11th. Strict I&O. Continue sodium bicarb, ferrous sulfate, Vitamin D, OsCal. Continue Procrit.   GI: Speech and swallow recommendations to start Dysphagia 1 honey diet. Continue Ostomy care. Continue Prevalite    ID: Afebrile. Leukocytosis trending down from 16.75 to 14.03(today). Continue abx to Meropenum, plan for total of 7d course currently on Day 4. Blood cultures show no growth to date. ID consult. Continue Tylenol PRN for fever.  Heme: on Heparin SQ  Prophylaxis: Aquacel dressing every other day, Protonix injectable  Dispo: Patient critically ill. Prognosis poor/guarded. Full code

## 2020-03-09 NOTE — PROGRESS NOTE ADULT - PROBLEM SELECTOR PLAN 2
- unresponsive episode with shaking and spike in lactic acidosis on sat, consistent with seizure although EEG x2 neg (first EEG done after IV ativan given, which can mask seizure activity)  - CT head, MRI brain showed no acute infarct or hemorrhage  - neuro, Dr. Quintanilla, recs appreciated  - depakote increased, level low, in the setting of meropenem use  - seizure precautions  - continue to monitor valproic acid levels  - IV ativan 1 mg q6 for breakthrough seizures  - neuro, Dr. Quintanilla, recs appreciated - unresponsive episode with shaking and spike in lactic acidosis on 3/5, consistent with seizure (first EEG on 3/5 done after IV ativan given, which can mask seizure activity)  -2nd EEG on 3/8 showing some generalized spike/wave patterns concerning for increased seizure risk  - CT head, MRI brain showed no acute infarct or hemorrhage  - neuro, Dr. Quintanilla, recs appreciated  - depakote increased dose, as level low, in the setting of meropenem use; also given dose of Keppra for load in pt with advanced CKD, while pt completing meropenem course  - seizure precautions  - continue to monitor valproic acid levels  - IV ativan 1 mg q6 for breakthrough seizures  - neuro, Dr. Quintanilla, recs appreciated

## 2020-03-09 NOTE — PROGRESS NOTE ADULT - SUBJECTIVE AND OBJECTIVE BOX
Patient is a 84y old  Female who presents with a chief complaint of altered mental status (08 Mar 2020 19:10)    24 hour events: Patient put on bilateral soft wrist restraints.     REVIEW OF SYSTEMS  Constitutional: No fever, chills, fatigue  Neuro: No headache, numbness, weakness  Resp: No cough, wheezing, shortness of breath  CVS: No chest pain, palpitations, leg swelling  GI: No abdominal pain, nausea, vomiting, diarrhea   : No dysuria, frequency, incontinence  Skin: No itching, burning, rashes, or lesions   Msk: No joint pain or swelling  Psych: No depression, anxiety, mood swings  Heme: No bleeding    T(F): 98.2 (03-09-20 @ 07:27), Max: 98.2 (03-09-20 @ 07:27)  HR: 87 (03-09-20 @ 07:00) (69 - 101)  BP: 133/60 (03-09-20 @ 07:00) (78/53 - 170/72)  RR: 23 (03-09-20 @ 07:00) (21 - 102)  SpO2: 95% (03-09-20 @ 07:00) (90% - 100%)  Wt(kg): --            I&O's Summary    03-08 @ 07:01  -  03-09 @ 07:00  --------------------------------------------------------  IN: 1940 mL / OUT: 2302 mL / NET: -362 mL      PHYSICAL EXAM  General:   CNS:   HEENT:   Resp:   CVS:   Abd:   Ext:   Skin:     MEDICATIONS  meropenem  IVPB IV Intermittent    midodrine Oral    atorvastatin Oral  cholestyramine Powder (Sugar-Free) Oral    budesonide 160 MICROgram(s)/formoterol 4.5 MICROgram(s) Inhaler Inhalation  loratadine Oral    acetaminophen    Suspension .. Oral PRN  FLUoxetine Oral  valproic  acid Syrup Oral      aspirin enteric coated Oral  heparin  Injectable SubCutaneous    pantoprazole  Injectable IV Push      calcium carbonate 1250 mG  + Vitamin D (OsCal 500 + D) Oral  cholecalciferol Oral  ferrous    sulfate Oral  multivitamin Oral  potassium chloride  10 mEq/100 mL IVPB IV Intermittent  sodium bicarbonate Oral  sodium chloride 0.9% lock flush IV Push PRN    epoetin jean carlos Injectable SubCutaneous    chlorhexidine 2% Cloths Topical  clobetasol 0.05% Cream Topical  nystatin Cream Topical    lactobacillus acidophilus Oral                          9.2    14.03 )-----------( 250      ( 09 Mar 2020 05:14 )             28.8       03-09    146<H>  |  115<H>  |  71<H>  ----------------------------<  120<H>  3.2<L>   |  16<L>  |  5.10<H>    Ca    8.3<L>      09 Mar 2020 05:14  Phos  4.2     03-09  Mg     1.8     03-09    TPro  5.9<L>  /  Alb  2.5<L>  /  TBili  0.2  /  DBili  x   /  AST  21  /  ALT  18  /  AlkPhos  45  03-07          PT/INR - ( 07 Mar 2020 10:39 )   PT: 15.1 sec;   INR: 1.34 ratio         PTT - ( 07 Mar 2020 10:39 )  PTT:26.4 sec    .Blood Blood   No growth to date. -- 03-06 @ 01:23        Radiology: ***  Bedside lung ultrasound: ***  Bedside ECHO: ***    CENTRAL LINE: Y/N          DATE INSERTED:              REMOVE: Y/N  JACQUES: Y/N                        DATE INSERTED:              REMOVE: Y/N  A-LINE: Y/N                       DATE INSERTED:              REMOVE: Y/N    GLOBAL ISSUE/BEST PRACTICE  Analgesia:   Sedation:   CAM-ICU:   HOB elevation: yes  Stress ulcer prophylaxis:   VTE prophylaxis:   Glycemic control:   Nutrition:     CODE STATUS: ***  [facility] discussion: Y Patient is a 84y old  Female who presents with a chief complaint of altered mental status (08 Mar 2020 19:10)    24 hour events: Patient seen and examined at bedside. Patient in no acute distress. Patient was put on bilateral soft wrist restraints overnight.      REVIEW OF SYSTEMS  Constitutional: No fever, chills, fatigue  Neuro: No headache, numbness, weakness  Resp: No cough, wheezing, shortness of breath  CVS: No chest pain, palpitations, leg swelling  GI: No abdominal pain, nausea, vomiting, diarrhea   : No dysuria, frequency, incontinence  Skin: No itching, burning, rashes, or lesions   Msk: No joint pain or swelling  Psych: No depression, anxiety, mood swings  Heme: No bleeding    T(F): 98.2 (03-09-20 @ 07:27), Max: 98.2 (03-09-20 @ 07:27)  HR: 87 (03-09-20 @ 07:00) (69 - 101)  BP: 133/60 (03-09-20 @ 07:00) (78/53 - 170/72)  RR: 23 (03-09-20 @ 07:00) (21 - 102)  SpO2: 95% (03-09-20 @ 07:00) (90% - 100%)  Wt(kg): --            I&O's Summary    03-08 @ 07:01  -  03-09 @ 07:00  --------------------------------------------------------  IN: 1940 mL / OUT: 2302 mL / NET: -362 mL      PHYSICAL EXAM  General:   CNS:   HEENT:   Resp:   CVS:   Abd:   Ext:   Skin:     MEDICATIONS  meropenem  IVPB IV Intermittent    midodrine Oral    atorvastatin Oral  cholestyramine Powder (Sugar-Free) Oral    budesonide 160 MICROgram(s)/formoterol 4.5 MICROgram(s) Inhaler Inhalation  loratadine Oral    acetaminophen    Suspension .. Oral PRN  FLUoxetine Oral  valproic  acid Syrup Oral      aspirin enteric coated Oral  heparin  Injectable SubCutaneous    pantoprazole  Injectable IV Push      calcium carbonate 1250 mG  + Vitamin D (OsCal 500 + D) Oral  cholecalciferol Oral  ferrous    sulfate Oral  multivitamin Oral  potassium chloride  10 mEq/100 mL IVPB IV Intermittent  sodium bicarbonate Oral  sodium chloride 0.9% lock flush IV Push PRN    epoetin jean carlos Injectable SubCutaneous    chlorhexidine 2% Cloths Topical  clobetasol 0.05% Cream Topical  nystatin Cream Topical    lactobacillus acidophilus Oral                          9.2    14.03 )-----------( 250      ( 09 Mar 2020 05:14 )             28.8       03-09    146<H>  |  115<H>  |  71<H>  ----------------------------<  120<H>  3.2<L>   |  16<L>  |  5.10<H>    Ca    8.3<L>      09 Mar 2020 05:14  Phos  4.2     03-09  Mg     1.8     03-09    TPro  5.9<L>  /  Alb  2.5<L>  /  TBili  0.2  /  DBili  x   /  AST  21  /  ALT  18  /  AlkPhos  45  03-07          PT/INR - ( 07 Mar 2020 10:39 )   PT: 15.1 sec;   INR: 1.34 ratio         PTT - ( 07 Mar 2020 10:39 )  PTT:26.4 sec    .Blood Blood   No growth to date. -- 03-06 @ 01:23            CENTRAL LINE: Y            JACQUES: Y                          A-LINE: N                         GLOBAL ISSUE/BEST PRACTICE  Analgesia: Tylenol   Sedation: N/A  HOB elevation: yes  Stress ulcer prophylaxis: N/A  VTE prophylaxis: Heparin Subq	  Glycemic control: N/A  Nutrition: Dysphagia diet Pureed honey     CODE STATUS: Full Patient is a 84y old  Female who presents with a chief complaint of altered mental status (08 Mar 2020 19:10)    24 hour events: Patient seen and examined at bedside. Patient in no acute distress. Patient was put on bilateral soft wrist restraints overnight.      REVIEW OF SYSTEMS  Limited or unable to obtain secondary to patient's poor mental status.    T(F): 98.2 (03-09-20 @ 07:27), Max: 98.2 (03-09-20 @ 07:27)  HR: 87 (03-09-20 @ 07:00) (69 - 101)  BP: 133/60 (03-09-20 @ 07:00) (78/53 - 170/72)  RR: 23 (03-09-20 @ 07:00) (21 - 102)  SpO2: 95% (03-09-20 @ 07:00) (90% - 100%)  Wt(kg): --            I&O's Summary    03-08 @ 07:01  -  03-09 @ 07:00  --------------------------------------------------------  IN: 1940 mL / OUT: 2302 mL / NET: -362 mL      PHYSICAL EXAM  GENERAL: NAD, elder, chronically ill appearing  HEENT:  anicteric, currently has NGT in place  NERVOUS SYSTEM:  Alert & Oriented X1,  Motor Strength 3/5 B/L upper and lower extremities  CHEST/LUNG: breath sounds diminished b/l in lung bases   HEART: Regular rate and rhythm; No murmurs, rubs, or gallops  ABDOMEN: +ostomy with stool Soft, Nontender, Nondistended; Bowel sounds presen    MEDICATIONS  meropenem  IVPB IV Intermittent    midodrine Oral    atorvastatin Oral  cholestyramine Powder (Sugar-Free) Oral    budesonide 160 MICROgram(s)/formoterol 4.5 MICROgram(s) Inhaler Inhalation  loratadine Oral    acetaminophen    Suspension .. Oral PRN  FLUoxetine Oral  valproic  acid Syrup Oral      aspirin enteric coated Oral  heparin  Injectable SubCutaneous    pantoprazole  Injectable IV Push      calcium carbonate 1250 mG  + Vitamin D (OsCal 500 + D) Oral  cholecalciferol Oral  ferrous    sulfate Oral  multivitamin Oral  potassium chloride  10 mEq/100 mL IVPB IV Intermittent  sodium bicarbonate Oral  sodium chloride 0.9% lock flush IV Push PRN    epoetin jean carlos Injectable SubCutaneous    chlorhexidine 2% Cloths Topical  clobetasol 0.05% Cream Topical  nystatin Cream Topical    lactobacillus acidophilus Oral                          9.2    14.03 )-----------( 250      ( 09 Mar 2020 05:14 )             28.8       03-09    146<H>  |  115<H>  |  71<H>  ----------------------------<  120<H>  3.2<L>   |  16<L>  |  5.10<H>    Ca    8.3<L>      09 Mar 2020 05:14  Phos  4.2     03-09  Mg     1.8     03-09    TPro  5.9<L>  /  Alb  2.5<L>  /  TBili  0.2  /  DBili  x   /  AST  21  /  ALT  18  /  AlkPhos  45  03-07          PT/INR - ( 07 Mar 2020 10:39 )   PT: 15.1 sec;   INR: 1.34 ratio         PTT - ( 07 Mar 2020 10:39 )  PTT:26.4 sec    .Blood Blood   No growth to date. -- 03-06 @ 01:23            CENTRAL LINE: Y            SAWYER: Y                          A-LINE: N                         GLOBAL ISSUE/BEST PRACTICE  Analgesia: Tylenol   Sedation: N/A  HOB elevation: yes  Stress ulcer prophylaxis: N/A  VTE prophylaxis: Heparin Subq	  Glycemic control: N/A  Nutrition: Dysphagia diet Pureed honey     CODE STATUS: Full

## 2020-03-09 NOTE — PROGRESS NOTE ADULT - SUBJECTIVE AND OBJECTIVE BOX
Patient is a 84y old  Female who presents with a chief complaint of altered mental status (09 Mar 2020 13:16)      INTERVAL HPI/OVERNIGHT EVENTS: Patient seen and examined at bedside. Extubated, with wrist restrains put on overnight. Awake, but not fully able to answer questions.     MEDICATIONS  (STANDING):  aspirin enteric coated 81 milliGRAM(s) Oral daily  atorvastatin 10 milliGRAM(s) Oral at bedtime  budesonide 160 MICROgram(s)/formoterol 4.5 MICROgram(s) Inhaler 2 Puff(s) Inhalation two times a day  calcium carbonate 1250 mG  + Vitamin D (OsCal 500 + D) 1 Tablet(s) Oral daily  chlorhexidine 2% Cloths 1 Application(s) Topical <User Schedule>  cholecalciferol 1000 Unit(s) Oral daily  cholestyramine Powder (Sugar-Free) 4 Gram(s) Oral daily  clobetasol 0.05% Cream 1 Application(s) Topical two times a day  epoetin jean carlos Injectable 64323 Unit(s) SubCutaneous <User Schedule>  ferrous    sulfate 325 milliGRAM(s) Oral daily  FLUoxetine 20 milliGRAM(s) Oral daily  heparin  Injectable 5000 Unit(s) SubCutaneous every 8 hours  lactobacillus acidophilus 1 Tablet(s) Oral three times a day  loratadine 10 milliGRAM(s) Oral daily  meropenem  IVPB 500 milliGRAM(s) IV Intermittent every 24 hours  multivitamin 1 Tablet(s) Oral daily  nystatin Cream 1 Application(s) Topical two times a day  sodium bicarbonate 650 milliGRAM(s) Oral three times a day  valproic  acid Syrup 750 milliGRAM(s) Oral two times a day    MEDICATIONS  (PRN):  acetaminophen    Suspension .. 650 milliGRAM(s) Oral every 6 hours PRN Temp greater or equal to 38C (100.4F), Moderate Pain (4 - 6)  sodium chloride 0.9% lock flush 10 milliLiter(s) IV Push every 1 hour PRN Pre/post blood products, medications, blood draw, and to maintain line patency      Allergies    Levaquin (Pruritus)  mercury (Pruritus; Rash)  sulfa drugs (Pruritus)    Intolerances        REVIEW OF SYSTEMS:  CONSTITUTIONAL: No fever or chills  HEENT:  No headache, no sore throat  RESPIRATORY: No cough, wheezing, or shortness of breath  CARDIOVASCULAR: No chest pain, palpitations  GASTROINTESTINAL: No abd pain, nausea, vomiting, or diarrhea  GENITOURINARY: No dysuria, frequency, or hematuria  NEUROLOGICAL: no focal weakness or dizziness  MUSCULOSKELETAL: no myalgias     Vital Signs Last 24 Hrs  T(C): 36.5 (09 Mar 2020 12:00), Max: 36.8 (09 Mar 2020 07:27)  T(F): 97.7 (09 Mar 2020 12:00), Max: 98.2 (09 Mar 2020 07:27)  HR: 97 (09 Mar 2020 14:00) (73 - 101)  BP: 140/63 (09 Mar 2020 14:00) (98/49 - 199/96)  BP(mean): 90 (09 Mar 2020 14:00) (58 - 138)  RR: 34 (09 Mar 2020 14:00) (22 - 37)  SpO2: 96% (09 Mar 2020 14:00) (90% - 100%)    PHYSICAL EXAM:  GENERAL: NAD  HEENT:  anicteric, moist mucous membranes  CHEST/LUNG:  Diminished breath sounds bilaterally  HEART:  Grade II/VI systolic murmur. RRR  ABDOMEN:  BS+, soft, nontender, nondistended  EXTREMITIES: no edema, cyanosis, or calf tenderness. multiple scabs on lower extremities  NERVOUS SYSTEM: poor concentration, no focal deficits    LABS:                        9.2    14.03 )-----------( 250      ( 09 Mar 2020 05:14 )             28.8     CBC Full  -  ( 09 Mar 2020 05:14 )  WBC Count : 14.03 K/uL  Hemoglobin : 9.2 g/dL  Hematocrit : 28.8 %  Platelet Count - Automated : 250 K/uL  Mean Cell Volume : 90.6 fl  Mean Cell Hemoglobin : 28.9 pg  Mean Cell Hemoglobin Concentration : 31.9 gm/dL  Auto Neutrophil # : 12.73 K/uL  Auto Lymphocyte # : 0.52 K/uL  Auto Monocyte # : 0.52 K/uL  Auto Eosinophil # : 0.12 K/uL  Auto Basophil # : 0.03 K/uL  Auto Neutrophil % : 90.7 %  Auto Lymphocyte % : 3.7 %  Auto Monocyte % : 3.7 %  Auto Eosinophil % : 0.9 %  Auto Basophil % : 0.2 %    09 Mar 2020 05:14    146    |  115    |  71     ----------------------------<  120    3.2     |  16     |  5.10     Ca    8.3        09 Mar 2020 05:14  Phos  4.2       09 Mar 2020 05:14  Mg     1.8       09 Mar 2020 05:14          CAPILLARY BLOOD GLUCOSE            Culture - Blood (collected 03-06-20 @ 01:23)  Source: .Blood Blood-Peripheral  Preliminary Report (03-07-20 @ 02:03):    No growth to date.    Culture - Blood (collected 03-06-20 @ 01:23)  Source: .Blood Blood  Preliminary Report (03-07-20 @ 02:03):    No growth to date.    Culture - Urine (collected 03-03-20 @ 21:30)  Source: .Urine Clean Catch (Midstream)  Final Report (03-05-20 @ 22:43):    >100,000 CFU/ml Escherichia coli    >100,000 CFU/ml Enterobacter aerogenes  Organism: Escherichia coli  Enterobacter aerogenes (03-05-20 @ 22:43)  Organism: Enterobacter aerogenes (03-05-20 @ 22:43)      -  Amikacin: S <=16      -  Ampicillin: R >16 These ampicillin results predict results for amoxicillin      -  Ampicillin/Sulbactam: R >16/8 Enterobacter, Citrobacter, and Serratia may develop resistance during prolonged therapy (3-4 days)      -  Aztreonam: R >16      -  Cefazolin: R >16      -  Cefepime: S <=4      -  Cefoxitin: R >16      -  Ceftriaxone: R >32 Enterobacter, Citrobacter, and Serratia may develop resistance during prolonged therapy      -  Ciprofloxacin: S <=1      -  Ertapenem: S <=1      -  Gentamicin: S <=4      -  Imipenem: S <=1      -  Levofloxacin: S <=2      -  Meropenem: S <=1      -  Nitrofurantoin: I 64 Should not be used to treat pyelonephritis      -  Piperacillin/Tazobactam: I 64      -  Tigecycline: S <=2      -  Tobramycin: S <=4      -  Trimethoprim/Sulfamethoxazole: R >2/38      Method Type: SUBHASH  Organism: Escherichia coli (03-05-20 @ 22:43)      -  Amikacin: S <=16      -  Ampicillin: R >16 These ampicillin results predict results for amoxicillin      -  Ampicillin/Sulbactam: R >16/8 Enterobacter, Citrobacter, and Serratia may develop resistance during prolonged therapy (3-4 days)      -  Aztreonam: S <=4      -  Cefazolin: S <=8 (MIC_CL_COM_ENTERIC_CEFAZU) For uncomplicated UTI with K. pneumoniae, E. coli, or P. mirablis: SUBHASH <=16 is sensitive and SUBHASH >=32 is resistant. This also predicts results for oral agents cefaclor, cefdinir, cefpodoxime, cefprozil, cefuroxime axetil, cephalexin and locarbef for uncomplicated UTI. Note that some isolates may be susceptible to these agents while testing resistant to cefazolin.      -  Cefepime: S <=4      -  Cefoxitin: S <=8      -  Ceftriaxone: S <=1 Enterobacter, Citrobacter, and Serratia may develop resistance during prolonged therapy      -  Ciprofloxacin: S <=1      -  Gentamicin: S <=4      -  Imipenem: S <=1      -  Levofloxacin: S <=2      -  Meropenem: S <=1      -  Nitrofurantoin: S <=32 Should not be used to treat pyelonephritis      -  Piperacillin/Tazobactam: S <=16      -  Tigecycline: S <=2      -  Tobramycin: S <=4      -  Trimethoprim/Sulfamethoxazole: R >2/38      Method Type: SUBHASH    Culture - Blood (collected 03-03-20 @ 21:12)  Source: .Blood Blood-Peripheral  Final Report (03-08-20 @ 23:00):    No growth at 5 days.    Culture - Blood (collected 03-03-20 @ 21:10)  Source: .Blood Blood-Peripheral  Final Report (03-08-20 @ 23:00):    No growth at 5 days.        RADIOLOGY & ADDITIONAL TESTS:    EXAM:  XR CHEST PORTABLE IMMED 1V                            PROCEDURE DATE:  03/08/2020          INTERPRETATION:  AP semi-erect chest on 3/8/20 at 10:48 AM.      Heart is magnified by technique.    Right jugular line remains.    NG tube again seen.  Present film shows tube is well inserted into stomach with tip off the inferior edge of the film.    Presently there ore significant lung and pleural finding off right hilum and at right base significantly increased from 3/7.    IMPRESSION:  NG tube in good position.  Significantly increased right lung findings.            GABRIELA BARAJAS M.D., ATTENDING RADIOLOGIST  This document has been electronically signed. Mar  9 2020  9:03AM  Personally reviewed.     Consultant(s) Notes Reviewed:  [x] YES  [ ] NO Patient is a 84y old  Female who presents with a chief complaint of altered mental status (09 Mar 2020 13:16)      INTERVAL HPI/OVERNIGHT EVENTS:  Patient was put on bilateral soft wrist restraints overnight as she was confused and attempting to pull out lines. Now awake, answering only some questions appropriately, following simple commands. Denies chest pain, fever, SOB, abd pain, flank pain.     MEDICATIONS  (STANDING):  aspirin enteric coated 81 milliGRAM(s) Oral daily  atorvastatin 10 milliGRAM(s) Oral at bedtime  budesonide 160 MICROgram(s)/formoterol 4.5 MICROgram(s) Inhaler 2 Puff(s) Inhalation two times a day  calcium carbonate 1250 mG  + Vitamin D (OsCal 500 + D) 1 Tablet(s) Oral daily  chlorhexidine 2% Cloths 1 Application(s) Topical <User Schedule>  cholecalciferol 1000 Unit(s) Oral daily  cholestyramine Powder (Sugar-Free) 4 Gram(s) Oral daily  clobetasol 0.05% Cream 1 Application(s) Topical two times a day  epoetin jean carlos Injectable 54660 Unit(s) SubCutaneous <User Schedule>  ferrous    sulfate 325 milliGRAM(s) Oral daily  FLUoxetine 20 milliGRAM(s) Oral daily  heparin  Injectable 5000 Unit(s) SubCutaneous every 8 hours  lactobacillus acidophilus 1 Tablet(s) Oral three times a day  loratadine 10 milliGRAM(s) Oral daily  meropenem  IVPB 500 milliGRAM(s) IV Intermittent every 24 hours  multivitamin 1 Tablet(s) Oral daily  nystatin Cream 1 Application(s) Topical two times a day  sodium bicarbonate 650 milliGRAM(s) Oral three times a day  valproic  acid Syrup 750 milliGRAM(s) Oral two times a day    MEDICATIONS  (PRN):  acetaminophen    Suspension .. 650 milliGRAM(s) Oral every 6 hours PRN Temp greater or equal to 38C (100.4F), Moderate Pain (4 - 6)  sodium chloride 0.9% lock flush 10 milliLiter(s) IV Push every 1 hour PRN Pre/post blood products, medications, blood draw, and to maintain line patency      Allergies    Levaquin (Pruritus)  mercury (Pruritus; Rash)  sulfa drugs (Pruritus)    Intolerances        REVIEW OF SYSTEMS: limited due to patient's mentation  CONSTITUTIONAL: No fever   HEENT:  No headache  RESPIRATORY: No shortness of breath  CARDIOVASCULAR: No chest pain  GASTROINTESTINAL: No abd pain  GENITOURINARY: No flank pain  MUSCULOSKELETAL: no myalgias     Vital Signs Last 24 Hrs  T(C): 36.5 (09 Mar 2020 12:00), Max: 36.8 (09 Mar 2020 07:27)  T(F): 97.7 (09 Mar 2020 12:00), Max: 98.2 (09 Mar 2020 07:27)  HR: 97 (09 Mar 2020 14:00) (73 - 101)  BP: 140/63 (09 Mar 2020 14:00) (98/49 - 199/96)  BP(mean): 90 (09 Mar 2020 14:00) (58 - 138)  RR: 34 (09 Mar 2020 14:00) (22 - 37)  SpO2: 96% (09 Mar 2020 14:00) (90% - 100%)    PHYSICAL EXAM:  GENERAL: NAD  HEENT:  anicteric, moist mucous membranes  CHEST/LUNG:  Diminished breath sounds bibasilarly, crackles in right base  HEART:  Grade II/VI systolic murmur. RRR, S1, S2  ABDOMEN:  BS+, soft, nontender, nondistended  EXTREMITIES: no edema, cyanosis, or calf tenderness. multiple healing scabs on lower extremities  NERVOUS SYSTEM: awake, answering only some questions appropriately, following simple commands    LABS:                        9.2    14.03 )-----------( 250      ( 09 Mar 2020 05:14 )             28.8     CBC Full  -  ( 09 Mar 2020 05:14 )  WBC Count : 14.03 K/uL  Hemoglobin : 9.2 g/dL  Hematocrit : 28.8 %  Platelet Count - Automated : 250 K/uL  Mean Cell Volume : 90.6 fl  Mean Cell Hemoglobin : 28.9 pg  Mean Cell Hemoglobin Concentration : 31.9 gm/dL  Auto Neutrophil # : 12.73 K/uL  Auto Lymphocyte # : 0.52 K/uL  Auto Monocyte # : 0.52 K/uL  Auto Eosinophil # : 0.12 K/uL  Auto Basophil # : 0.03 K/uL  Auto Neutrophil % : 90.7 %  Auto Lymphocyte % : 3.7 %  Auto Monocyte % : 3.7 %  Auto Eosinophil % : 0.9 %  Auto Basophil % : 0.2 %    09 Mar 2020 05:14    146    |  115    |  71     ----------------------------<  120    3.2     |  16     |  5.10     Ca    8.3        09 Mar 2020 05:14  Phos  4.2       09 Mar 2020 05:14  Mg     1.8       09 Mar 2020 05:14          CAPILLARY BLOOD GLUCOSE            Culture - Blood (collected 03-06-20 @ 01:23)  Source: .Blood Blood-Peripheral  Preliminary Report (03-07-20 @ 02:03):    No growth to date.    Culture - Blood (collected 03-06-20 @ 01:23)  Source: .Blood Blood  Preliminary Report (03-07-20 @ 02:03):    No growth to date.    Culture - Urine (collected 03-03-20 @ 21:30)  Source: .Urine Clean Catch (Midstream)  Final Report (03-05-20 @ 22:43):    >100,000 CFU/ml Escherichia coli    >100,000 CFU/ml Enterobacter aerogenes  Organism: Escherichia coli  Enterobacter aerogenes (03-05-20 @ 22:43)  Organism: Enterobacter aerogenes (03-05-20 @ 22:43)      -  Amikacin: S <=16      -  Ampicillin: R >16 These ampicillin results predict results for amoxicillin      -  Ampicillin/Sulbactam: R >16/8 Enterobacter, Citrobacter, and Serratia may develop resistance during prolonged therapy (3-4 days)      -  Aztreonam: R >16      -  Cefazolin: R >16      -  Cefepime: S <=4      -  Cefoxitin: R >16      -  Ceftriaxone: R >32 Enterobacter, Citrobacter, and Serratia may develop resistance during prolonged therapy      -  Ciprofloxacin: S <=1      -  Ertapenem: S <=1      -  Gentamicin: S <=4      -  Imipenem: S <=1      -  Levofloxacin: S <=2      -  Meropenem: S <=1      -  Nitrofurantoin: I 64 Should not be used to treat pyelonephritis      -  Piperacillin/Tazobactam: I 64      -  Tigecycline: S <=2      -  Tobramycin: S <=4      -  Trimethoprim/Sulfamethoxazole: R >2/38      Method Type: SUBHASH  Organism: Escherichia coli (03-05-20 @ 22:43)      -  Amikacin: S <=16      -  Ampicillin: R >16 These ampicillin results predict results for amoxicillin      -  Ampicillin/Sulbactam: R >16/8 Enterobacter, Citrobacter, and Serratia may develop resistance during prolonged therapy (3-4 days)      -  Aztreonam: S <=4      -  Cefazolin: S <=8 (MIC_CL_COM_ENTERIC_CEFAZU) For uncomplicated UTI with K. pneumoniae, E. coli, or P. mirablis: SUBHASH <=16 is sensitive and SUBHASH >=32 is resistant. This also predicts results for oral agents cefaclor, cefdinir, cefpodoxime, cefprozil, cefuroxime axetil, cephalexin and locarbef for uncomplicated UTI. Note that some isolates may be susceptible to these agents while testing resistant to cefazolin.      -  Cefepime: S <=4      -  Cefoxitin: S <=8      -  Ceftriaxone: S <=1 Enterobacter, Citrobacter, and Serratia may develop resistance during prolonged therapy      -  Ciprofloxacin: S <=1      -  Gentamicin: S <=4      -  Imipenem: S <=1      -  Levofloxacin: S <=2      -  Meropenem: S <=1      -  Nitrofurantoin: S <=32 Should not be used to treat pyelonephritis      -  Piperacillin/Tazobactam: S <=16      -  Tigecycline: S <=2      -  Tobramycin: S <=4      -  Trimethoprim/Sulfamethoxazole: R >2/38      Method Type: SUBHASH    Culture - Blood (collected 03-03-20 @ 21:12)  Source: .Blood Blood-Peripheral  Final Report (03-08-20 @ 23:00):    No growth at 5 days.    Culture - Blood (collected 03-03-20 @ 21:10)  Source: .Blood Blood-Peripheral  Final Report (03-08-20 @ 23:00):    No growth at 5 days.        RADIOLOGY & ADDITIONAL TESTS:    EXAM:  XR CHEST PORTABLE IMMED 1V                            PROCEDURE DATE:  03/08/2020          INTERPRETATION:  AP semi-erect chest on 3/8/20 at 10:48 AM.      Heart is magnified by technique.    Right jugular line remains.    NG tube again seen.  Present film shows tube is well inserted into stomach with tip off the inferior edge of the film.    Presently there ore significant lung and pleural finding off right hilum and at right base significantly increased from 3/7.    IMPRESSION:  NG tube in good position.  Significantly increased right lung findings.            GABRIELA BARAJAS M.D., ATTENDING RADIOLOGIST  This document has been electronically signed. Mar  9 2020  9:03AM  Personally reviewed.     Consultant(s) Notes Reviewed:  [x] YES  [ ] NO

## 2020-03-09 NOTE — PROGRESS NOTE ADULT - SUBJECTIVE AND OBJECTIVE BOX
[facility], Division of Infectious Diseases  HILDA Giang A. Lee  [phone]    Name: GURJIT HERRERA  Age: 84y  Gender: Female  MRN: [id number]    Interval History--  Notes reviewed. Seen earlier this AM. More awake/alert but still not a reliable historian. No new issues.       Past Medical History--  Wound of sacral region  Depression  Iron deficiency anemia  Eczema  HTN (hypertension)  CKD (chronic kidney disease) stage 5, GFR less than 15 ml/min  Herniated lumbar intervertebral disc  Depression  Tremor  Kidney atrophy  Colostomy status  Chronic UTI (urinary tract infection)  Cervical cancer  Dyslipidemia  Diabetes  Hernia, incisional  S/P hip replacement  S/P colon resection  S/P hysterectomy      For details regarding the patient's social history, family history, and other miscellaneous elements, please refer the initial infectious diseases consultation and/or the admitting history and physical examination for this admission.    Allergies    Levaquin (Pruritus)  mercury (Pruritus; Rash)  sulfa drugs (Pruritus)    Intolerances        Medications--  Antibiotics:  meropenem  IVPB 500 milliGRAM(s) IV Intermittent every 24 hours    Immunologic:  epoetin jean carlos Injectable 39832 Unit(s) SubCutaneous <User Schedule>    Other:  acetaminophen    Suspension .. PRN  aspirin enteric coated  atorvastatin  budesonide 160 MICROgram(s)/formoterol 4.5 MICROgram(s) Inhaler  calcium carbonate 1250 mG  + Vitamin D (OsCal 500 + D)  chlorhexidine 2% Cloths  cholecalciferol  cholestyramine Powder (Sugar-Free)  clobetasol 0.05% Cream  ferrous    sulfate  FLUoxetine  heparin  Injectable  lactobacillus acidophilus  loratadine  multivitamin  nystatin Cream  sodium bicarbonate  sodium chloride 0.9% lock flush PRN  valproic  acid Syrup      Review of Systems--  Review of systems unable secondary to clinical condition.     Physical Examination--  Vital Signs: T(F): 97.7 (03-09-20 @ 12:00), Max: 98.2 (03-09-20 @ 07:27)  HR: 86 (03-09-20 @ 12:00)  BP: 189/84 (03-09-20 @ 12:00)  RR: 31 (03-09-20 @ 12:00)  SpO2: 98% (03-09-20 @ 12:00)  Wt(kg): --  General: Nontoxic-appearing Female in no acute distress.  HEENT: AT/NC. Anicteric. Conjunctiva pink and moist.  Neck: Not rigid. No sense of mass. CVL C/D/I  Nodes: None palpable.  Lungs: Poor effort grossly clear bilaterally without rales, wheezing or rhonchi  Heart: Regular rate and rhythm. 2/6 systolic murmur. No rub. No gallop. No palpable thrill.  Abdomen: Bowel sounds present and normoactive. Soft. Nondistended. Nontender. No sense of mass. No organomegaly.   Back: unable  Extremities: No cyanosis or clubbing. 1+ edema.   Skin: Warm. Dry. Good turgor. No rash. No vasculitic stigmata.  Psychiatric: largely unable      Laboratory Studies--  CBC                        9.2    14.03 )-----------( 250      ( 09 Mar 2020 05:14 )             28.8       Chemistries  03-09    146<H>  |  115<H>  |  71<H>  ----------------------------<  120<H>  3.2<L>   |  16<L>  |  5.10<H>    Ca    8.3<L>      09 Mar 2020 05:14  Phos  4.2     03-09  Mg     1.8     03-09        Culture Data    Culture - Blood (collected 06 Mar 2020 01:23)  Source: .Blood Blood-Peripheral  Preliminary Report (07 Mar 2020 02:03):    No growth to date.    Culture - Blood (collected 06 Mar 2020 01:23)  Source: .Blood Blood  Preliminary Report (07 Mar 2020 02:03):    No growth to date.    Culture - Urine (collected 03 Mar 2020 21:30)  Source: .Urine Clean Catch (Midstream)  Final Report (05 Mar 2020 22:43):    >100,000 CFU/ml Escherichia coli    >100,000 CFU/ml Enterobacter aerogenes  Organism: Escherichia coli  Enterobacter aerogenes (05 Mar 2020 22:43)  Organism: Enterobacter aerogenes (05 Mar 2020 22:43)  Organism: Escherichia coli (05 Mar 2020 22:43)    Culture - Blood (collected 03 Mar 2020 21:12)  Source: .Blood Blood-Peripheral  Final Report (08 Mar 2020 23:00):    No growth at 5 days.    Culture - Blood (collected 03 Mar 2020 21:10)  Source: .Blood Blood-Peripheral  Final Report (08 Mar 2020 23:00):    No growth at 5 days.    < from: EEG Awake or Drowsy (03.08.20 @ 11:04) >  IMPRESSION: Abnormal EEG due to the presence of mild bilateral background slowing. This is consistent with bilateral or multifocal cerebral dysfunction of numerous etiologies and are nonspecific. Along with generalized spike and waves at 1 Hz intermittent.    COMMENT: Patient is at increased risk of generalized epilepsy.    < end of copied text >

## 2020-03-09 NOTE — SWALLOW BEDSIDE ASSESSMENT ADULT - PHARYNGEAL PHASE
Decreased laryngeal elevation/Delayed pharyngeal swallow Delayed pharyngeal swallow/Decreased laryngeal elevation Delayed pharyngeal swallow/Decreased laryngeal elevation/Multiple swallows/Wet vocal quality post oral intake/Throat clear post oral intake

## 2020-03-09 NOTE — PROGRESS NOTE ADULT - PROBLEM SELECTOR PLAN 1
Unclear if she is at baseline  Would alter AED Rx to something other than Depakote in the setting of meropenem use.

## 2020-03-09 NOTE — PROGRESS NOTE ADULT - PROBLEM SELECTOR PLAN 4
- on admission, patient with metabolic acidosis in setting of UTI and suspected pre-renal MEHDI  - Creatinine greater than 5  - po sodium bicarb 650 mg TID  - pt's renal status worsening, and may need to start HD in near future if it aligns with pt/family's goals.  - try to maintain MAP > 65 to decrease chance of ischemic ATN - on admission, patient with metabolic acidosis in setting of UTI and suspected pre-renal MEHDI  - Creatinine now greater than 5  - po sodium bicarb 650 mg TID  - pt's renal status worsening, and may need to start HD in near future if it aligns with pt/family's goals.  - try to maintain MAP > 65 to decrease chance of ischemic ATN

## 2020-03-09 NOTE — PROGRESS NOTE ADULT - PROBLEM SELECTOR PLAN 3
- improved  - acute metabolic encephalopathy was likely was due to acute neuro event such as seizure, due to infection or due to worsening renal failure / uremia) or a combination  - MRI showed no evidence for CVA to have explained the isolated expressive aphasia pt had prior to her generalized seizure on 3/5  - prior to the generalized seizure, pt had previously been afebrile, with no leukocytosis for several days and was quite lucid with only true alteration in her mental status seeming to be an expressive aphasia. Pt was not delirious and seemed to have no receptive deficits and no difficulties following all commands appropriately on 3/4/20 when I first saw the pt. Only had difficulty word-finding and could not use full sentences because of stumbling in word choice -- this did not seem consistent with an encephalopathy caused by infection (peripheral or central)  -feel this may have been due to partial seizure activity preceding the generalized seizure pt had on 3/5  - pt's obtundation after generalized seizure on 3/5, was likely combination of post-ictal state, ativan, and possible infection; - acute metabolic encephalopathy was likely was due to acute neuro event such as seizure, and due to infection with worsening renal failure / uremia) possibly contributing   - MRI showed no evidence for CVA to have explained the isolated expressive aphasia pt had prior to her generalized seizure on 3/5  - prior to the generalized seizure, pt had previously been afebrile, with no leukocytosis for several days and was quite lucid with only true alteration in her mental status seeming to be an expressive aphasia. Pt was not delirious and seemed to have no receptive deficits and no difficulties following all commands appropriately on 3/4/20 when I first saw the pt. Only had difficulty word-finding and could not use full sentences because of stumbling in word choice -- this did not seem consistent with an encephalopathy caused by infection (peripheral or central)  -feel this may have been due to partial seizure activity preceding the generalized seizure pt had on 3/5  - pt's obtundation after generalized seizure on 3/5, was likely combination of post-ictal state, ativan, and infection  -neuro recs appreciated

## 2020-03-09 NOTE — PROGRESS NOTE ADULT - ASSESSMENT
85 yo female admitted with altered mental status with no clear explanation  Question whether altered mental status at home could have been postictal phenomena given what appears to be seizure activity here.  Per d/w Dr. Mayo, patient's mental status waxed/waned. In the absence of fever, skeptical that he has encephalitis (e.g. HSV).  Limited EEG here without seizure activity, f/u study with no active seizure but increased risk of such.  Depakote not useful as an AED in the setting ot carbapenem use.   No clear or convincing evidence of infection on exam.  In setting of chronic renae catheter the urine isolates is questionable significance.   CXR with questionable infiltrates in BUL, particularly MELL (personally reviewed), but oxygenation does not appear to be an issue and lung exam unimpressive. Repeat  3/7 not impressive either.   Sacral sore not infected appearing. While patient almost certainly has sacral osteomyelitis based on the CT scan I am skeptical that its responsible for her presentation.  No concern of potential other SSTI  Benign abdomen  WBC ~ same range

## 2020-03-09 NOTE — PROGRESS NOTE ADULT - PROBLEM SELECTOR PLAN 1
- has not been febrile in several days  - Continue meropenem, which covers pseudomonas and aspiration julio as well.  - Consider nares swab for MRSA. Patient has worsening RLL opacity on CXR. If swab positive, would add agent that covers MRSA  - plan of care as per ICU  - Dr. Kirk, ID, recs appreciated - now afebrile  - there was suspicion for UTI on admission though unclear significance of the UCx isolates in the presence of chronic renae   - now suspect, pt's fever and hypoxia was due to aspiration PNA in setting of seizures on 3/5, with CXRs in past few days showing a worsening RLL infiltrate   -Continue meropenem, which covers the UCx organisms that had been treated since start of admission, as well as, potential bacteria causing aspiration PNA  - Consider nares swab for MRSA if not improving  - Dr. Kirk, ID, recs appreciated  - f/up ICU care

## 2020-03-09 NOTE — PROGRESS NOTE ADULT - ATTENDING COMMENTS
Pt seen + examined. Case discussed with resident. Agree with assessment and plan above (edited) with following addendum:  Time spent: 50min. More than 50% of the visit was spent counseling the patient on medical condition -- seizures, sepsis, suspected aspiration PNA, MEHDI on CKD 5.  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) and chronic diarrhea, cervical cancer (s/p radical hysterectomy and radiation), tremors, eczema, depression, HTN, HLD, history of frequent UTIs with chronic indwelling renae, anemia, stage 4 sacral decubitus ulcer, and recent hospitalization for MEHDI on CKD thought to be 2/2 dehydration and urinary retention from malfunctioning chronic renae, now brought in by EMS to ED for altered mental status admitted for acute metabolic encephalopathy due to suspected UTI and hyperkalemia in setting of MEHDI on CKD 5, course c/b likely generalized seizure on 3/5/20, and acute hypoxic respiratory failure and suspected sepsis due to suspected aspiration PNA, now extubated.  - now afebrile  - there was suspicion for UTI on admission though unclear significance of the UCx isolates in the presence of chronic renae   - now suspect, pt's fever and hypoxia was due to aspiration PNA in setting of seizures on 3/5, with CXRs in past few days showing a worsening RLL infiltrate   -Continue meropenem, which covers the UCx organisms that had been treated since start of admission, as well as, potential bacteria causing aspiration PNA  - Consider nares swab for MRSA if not improving  - Dr. Kirk, ID, recs appreciated  - f/up ICU care  - unresponsive episode with shaking and spike in lactic acidosis on 3/5, consistent with seizure (first EEG on 3/5 done after IV ativan given, which can mask seizure activity)  -2nd EEG on 3/8 showing some generalized spike/wave patterns concerning for increased seizure risk  - CT head, MRI brain showed no acute infarct or hemorrhage  - neuro, Dr. Quintanilla, recs appreciated  - depakote increased dose, as level low, in the setting of meropenem use; also given dose of Keppra for load in pt with advanced CKD, while pt completing meropenem course  - seizure precautions  - continue to monitor valproic acid levels  - IV ativan 1 mg q6 for breakthrough seizures  - neuro, Dr. Quintanilla, recs appreciated  - acute metabolic encephalopathy was likely was due to acute neuro event such as seizure, and due to infection with worsening renal failure / uremia) possibly contributing   - MRI showed no evidence for CVA to have explained the isolated expressive aphasia pt had prior to her generalized seizure on 3/5  - prior to the generalized seizure, pt had previously been afebrile, with no leukocytosis for several days and was quite lucid with only true alteration in her mental status seeming to be an expressive aphasia. Pt was not delirious and seemed to have no receptive deficits and no difficulties following all commands appropriately on 3/4/20 when I first saw the pt. Only had difficulty word-finding and could not use full sentences because of stumbling in word choice -- this did not seem consistent with an encephalopathy caused by infection (peripheral or central)  -feel this may have been due to partial seizure activity preceding the generalized seizure pt had on 3/5  - pt's obtundation after generalized seizure on 3/5, was likely combination of post-ictal state, ativan, and infection  - pt's hypotension was likely related to sepsis and all anti-hypertensives held  - pt's BP now rising and home regimen is starting to be restarted

## 2020-03-09 NOTE — SWALLOW BEDSIDE ASSESSMENT ADULT - COMMENTS
Consult received and chart reviewed. The patient was seen at bedside this AM for an initial assessment of swallow function, at which time she was awake and cooperative. Patient currently with NGT in place and supplemental O2 via NC. The patient denied any pain at this time and was agreeable to participate in today's evaluation.    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  Significantly increased right lung findings." Discussed results and recommendations from this evaluation with the patient, RN, and medical team on unit.

## 2020-03-09 NOTE — PROGRESS NOTE ADULT - PROBLEM SELECTOR PLAN 5
- due to renal failure  - Now hypokalemic today  - replete K  - pt's renal status worsening, but no plan to start emergent HD  - Dr. You, nephro, recs appreciated

## 2020-03-09 NOTE — PROGRESS NOTE ADULT - ASSESSMENT
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) and chronic diarrhea, cervical cancer (s/p radical hysterectomy and radiation), tremors, eczema, depression, HTN, HLD, history of frequent UTIs with chronic indwelling renae, anemia, stage 4 sacral decubitus ulcer, and recent hospitalization for MEHDI on CKD thought to be 2/2 dehydration and urinary retention from malfunctioning chronic renae, now brought in by EMS to ED for altered mental status admitted for acute metabolic encephalopathy due to suspected UTI and hyperkalemia in setting of MEHDI on CKD 5, course c/b likely generalized seizure on 3/5/20, and acute hypoxic respiratory failure and suspected sepsis, now extubated and remains afebrile 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) and chronic diarrhea, cervical cancer (s/p radical hysterectomy and radiation), tremors, eczema, depression, HTN, HLD, history of frequent UTIs with chronic indwelling renae, anemia, stage 4 sacral decubitus ulcer, and recent hospitalization for MEHDI on CKD thought to be 2/2 dehydration and urinary retention from malfunctioning chronic renae, now brought in by EMS to ED for altered mental status admitted for acute metabolic encephalopathy due to suspected UTI and hyperkalemia in setting of MEHDI on CKD 5, course c/b likely generalized seizure on 3/5/20, and acute hypoxic respiratory failure and suspected sepsis due to suspected aspiration PNA, now extubated.

## 2020-03-09 NOTE — PROGRESS NOTE ADULT - SUBJECTIVE AND OBJECTIVE BOX
Neurology follow up note    GURJIT [id number]      Interval History:    Patient feels ok no new complaints.    MEDICATIONS    acetaminophen    Suspension .. 650 milliGRAM(s) Oral every 6 hours PRN  aspirin enteric coated 81 milliGRAM(s) Oral daily  atorvastatin 10 milliGRAM(s) Oral at bedtime  budesonide 160 MICROgram(s)/formoterol 4.5 MICROgram(s) Inhaler 2 Puff(s) Inhalation two times a day  calcium carbonate 1250 mG  + Vitamin D (OsCal 500 + D) 1 Tablet(s) Oral daily  chlorhexidine 2% Cloths 1 Application(s) Topical <User Schedule>  cholecalciferol 1000 Unit(s) Oral daily  cholestyramine Powder (Sugar-Free) 4 Gram(s) Oral daily  clobetasol 0.05% Cream 1 Application(s) Topical two times a day  epoetin jean carlos Injectable 42343 Unit(s) SubCutaneous <User Schedule>  ferrous    sulfate 325 milliGRAM(s) Oral daily  FLUoxetine 20 milliGRAM(s) Oral daily  heparin  Injectable 5000 Unit(s) SubCutaneous every 8 hours  lactobacillus acidophilus 1 Tablet(s) Oral three times a day  loratadine 10 milliGRAM(s) Oral daily  meropenem  IVPB 500 milliGRAM(s) IV Intermittent every 24 hours  multivitamin 1 Tablet(s) Oral daily  nystatin Cream 1 Application(s) Topical two times a day  sodium bicarbonate 650 milliGRAM(s) Oral three times a day  sodium chloride 0.9% lock flush 10 milliLiter(s) IV Push every 1 hour PRN  valproic  acid Syrup 750 milliGRAM(s) Oral two times a day      Allergies    Levaquin (Pruritus)  mercury (Pruritus; Rash)  sulfa drugs (Pruritus)    Intolerances            Vital Signs Last 24 Hrs  T(C): 36.8 (09 Mar 2020 07:27), Max: 36.8 (09 Mar 2020 07:27)  T(F): 98.2 (09 Mar 2020 07:27), Max: 98.2 (09 Mar 2020 07:27)  HR: 84 (09 Mar 2020 11:00) (72 - 101)  BP: 170/84 (09 Mar 2020 11:00) (98/49 - 199/96)  BP(mean): 116 (09 Mar 2020 11:00) (58 - 138)  RR: 28 (09 Mar 2020 11:00) (22 - 37)  SpO2: 98% (09 Mar 2020 11:00) (90% - 100%)      REVIEW OF SYSTEMS: Limited or unable to obtain secondary to patient's poor mental status.    Constitutional: No fever, chills, fatigue, generalized  weakness  Eyes: no eye pain, visual disturbances, or discharge  ENT:  No difficulty hearing, tinnitus, vertigo; No sinus or throat pain  Neck: No pain or stiffness  Respiratory: No cough, dyspnea, wheezing   Cardiovascular: No chest pain, palpitations,   Gastrointestinal: No abdominal or epigastric pain. No nausea, vomiting  No diarrhea or constipation.   Genitourinary: No dysuria, frequency, hematuria or incontinence  Neurological: No headaches, lightheadedness, vertigo, numbness or tremors  Psychiatric: No depression, anxiety, mood swings or difficulty sleeping  Musculoskeletal: No joint pain or swelling; No muscle, back or extremity pain      On Neurological Examination:    Mental Status - Patient is alert, awake,    Follow simple commands    Speech -   Fluent                 Cranial Nerves - Pupils 3 mm equal and reactive to light,   extraocular eye movements intact.   smile symmetric  intact bilateral NLF    Motor Exam -   Right upper 4/5   Left upper  3/5  Right lower 2/5  Left lower 2/5    Muscle tone - is normal all over.  No asymmetry is seen.      Sensory    Bilateral intact to light touch    GENERAL Exam: Nontoxic , No Acute Distress   	  HEENT:  normocephalic, atraumatic  		  LUNGS:  Decreased bilaterally  	  HEART: Normal S1S2   No murmur RRR        	  GI/ ABDOMEN:  Soft  Non tender    EXTREMITIES:   No Edema  No Clubbing  No Cyanosis   	   SKIN: Normal  No Ecchymosis               LABS:  CBC Full  -  ( 09 Mar 2020 05:14 )  WBC Count : 14.03 K/uL  RBC Count : 3.18 M/uL  Hemoglobin : 9.2 g/dL  Hematocrit : 28.8 %  Platelet Count - Automated : 250 K/uL  Mean Cell Volume : 90.6 fl  Mean Cell Hemoglobin : 28.9 pg  Mean Cell Hemoglobin Concentration : 31.9 gm/dL  Auto Neutrophil # : 12.73 K/uL  Auto Lymphocyte # : 0.52 K/uL  Auto Monocyte # : 0.52 K/uL  Auto Eosinophil # : 0.12 K/uL  Auto Basophil # : 0.03 K/uL  Auto Neutrophil % : 90.7 %  Auto Lymphocyte % : 3.7 %  Auto Monocyte % : 3.7 %  Auto Eosinophil % : 0.9 %  Auto Basophil % : 0.2 %      03-09    146<H>  |  115<H>  |  71<H>  ----------------------------<  120<H>  3.2<L>   |  16<L>  |  5.10<H>    Ca    8.3<L>      09 Mar 2020 05:14  Phos  4.2     03-09  Mg     1.8     03-09      Hemoglobin A1C:       Vitamin B12         RADIOLOGY    ANALYSIS AND PLAN:  An 84-year-old with an episode of seizure and change in mental status.  1.	For episode of seizure, will increase Depakote 750 mg twice a day bolus 500 once  2.	EEG intermittent generalized spike and waves   3.	Ativan 1 mg IV push q.6h. p.r.n. for any type of breakthrough seizure.  4.	Seizure precautions.  5.	For history of underlying depression, at present hard to evaluate the patient's psychiatric status secondary to the patient being lethargic, continue home medication when possible.  6.	For hyperlipidemia, continue the patient on her cholesterol medication.  7.	For change in mental status, questionable this could be metabolic encephalopathy from partial complex seizures versus any type of underlying infectious type process episodes of temperatures and hypotension possible shock   8.	Antibiotics as needed.  9.	Fall precaution.  10.	overall more awake and interactive today   11.	For chronic kidney disease, monitor renal function.  12.	Spoke with multiple family members at the bedside.  Advance directives were discussed with them.  Primary  will be daughter, Ju, her cell phone number is [phone], home number is [phone] spoke 3/8/2020  13.	for now will continue current AED and adjust as needed---- overall appears to be doing better   14.	Greater than 40 minutes of time was spent with the patient, plan of care, reviewing data, speaking to the family and multidisciplinary healthcare team.

## 2020-03-09 NOTE — PROGRESS NOTE ADULT - ASSESSMENT
·	CKD 4, Solitary functioning kidney: Prerenal azotemia, (Atrophic right kidney)  ·	Metabolic acidosis  ·	Hyperkalemia  ·	Diabetes  ·	Hypertension  ·	Anemia    Renal indices slowly worsening. Monitor urine out put. Continue sodium bicarb tabs. Potassium supps. Monitor h/h trend.   BP poorly controlled. Monitor BP trend. Titrate BP meds as needed. Monitor blood sugar levels. Insulin coverage as needed. Procrit for anemia.   Avoid nephrotoxic meds as possible. Avoid ACEI, ARB, NSAIDs and IV contrast. Will follow electrolytes and renal function trend. D/w pt's daughter over the phone.   No decision about RRT at this time. No emergent need for HD at this time.

## 2020-03-09 NOTE — PROGRESS NOTE ADULT - PROBLEM SELECTOR PLAN 8
- pt is now acutely hypotensive and all anti-hypertensives held  - on midodrine for BP support - pt's hypotension was likely related to sepsis and all anti-hypertensives held  - pt's BP now rising and home regimen is starting to be restarted

## 2020-03-09 NOTE — PHARMACOTHERAPY INTERVENTION NOTE - COMMENTS
Patient's eGFR 9 mL/min, adjusted meropenem to 500 mg [id number] for eGFR < 10 mL/min. Spoke to ID (Cliff) and MD (Tierney)
Stop date added to meropenem (7 days total), spoke with MD (Kurt) and ID (Reagan)

## 2020-03-09 NOTE — PROGRESS NOTE ADULT - SUBJECTIVE AND OBJECTIVE BOX
Patient is a 84y old  Female who presents with a chief complaint of altered mental status (03 Mar 2020 17:47)  Patient seen in follow up for CKD 4, Hyperkalemia.     Pt resting in bed.     PAST MEDICAL HISTORY:  Wound of sacral region  Depression  Iron deficiency anemia  Eczema  HTN (hypertension)  CKD (chronic kidney disease) stage 5, GFR less than 15 ml/min  Herniated lumbar intervertebral disc  Depression  Tremor  Kidney atrophy  Colostomy status  Chronic UTI (urinary tract infection)  Cervical cancer  Dyslipidemia  Diabetes  Hernia, incisional    MEDICATIONS  (STANDING):  aspirin enteric coated 81 milliGRAM(s) Oral daily  atorvastatin 10 milliGRAM(s) Oral at bedtime  budesonide 160 MICROgram(s)/formoterol 4.5 MICROgram(s) Inhaler 2 Puff(s) Inhalation two times a day  calcium carbonate 1250 mG  + Vitamin D (OsCal 500 + D) 1 Tablet(s) Oral daily  chlorhexidine 2% Cloths 1 Application(s) Topical <User Schedule>  cholecalciferol 1000 Unit(s) Oral daily  cholestyramine Powder (Sugar-Free) 4 Gram(s) Oral daily  clobetasol 0.05% Cream 1 Application(s) Topical two times a day  epoetin jean carlos Injectable 89307 Unit(s) SubCutaneous <User Schedule>  ferrous    sulfate 325 milliGRAM(s) Oral daily  FLUoxetine 20 milliGRAM(s) Oral daily  heparin  Injectable 5000 Unit(s) SubCutaneous every 8 hours  lactobacillus acidophilus 1 Tablet(s) Oral three times a day  loratadine 10 milliGRAM(s) Oral daily  meropenem  IVPB 500 milliGRAM(s) IV Intermittent every 24 hours  multivitamin 1 Tablet(s) Oral daily  nystatin Cream 1 Application(s) Topical two times a day  sodium bicarbonate 650 milliGRAM(s) Oral three times a day  valproic  acid Syrup 750 milliGRAM(s) Oral two times a day    MEDICATIONS  (PRN):  acetaminophen    Suspension .. 650 milliGRAM(s) Oral every 6 hours PRN Temp greater or equal to 38C (100.4F), Moderate Pain (4 - 6)  sodium chloride 0.9% lock flush 10 milliLiter(s) IV Push every 1 hour PRN Pre/post blood products, medications, blood draw, and to maintain line patency    T(C): 36.5 (03-09-20 @ 12:00), Max: 36.8 (03-08-20 @ 03:22)  HR: 86 (03-09-20 @ 12:00) (61 - 101)  BP: 189/84 (03-09-20 @ 12:00) (76/51 - 199/96)  RR: 31 (03-09-20 @ 12:00)  SpO2: 98% (03-09-20 @ 12:00)  Wt(kg): --  I&O's Detail    08 Mar 2020 07:01  -  09 Mar 2020 07:00  --------------------------------------------------------  IN:    Enteral Tube Flush: 350 mL    Nepro with Carb Steady: 840 mL    Sodium Chloride 0.9% IV Bolus: 500 mL    Solution: 50 mL    Solution: 100 mL    Solution: 100 mL  Total IN: 1940 mL    OUT:    Colostomy: 1575 mL    Indwelling Catheter - Urethral: 727 mL  Total OUT: 2302 mL    Total NET: -362 mL      09 Mar 2020 07:01  -  09 Mar 2020 13:18  --------------------------------------------------------  IN:    Enteral Tube Flush: 340 mL    Nepro with Carb Steady: 120 mL    Solution: 200 mL    Solution: 100 mL  Total IN: 760 mL    OUT:    Indwelling Catheter - Urethral: 225 mL  Total OUT: 225 mL    Total NET: 535 mL      PHYSICAL EXAM:  General: No distress  Respiratory: b/l air entry  Cardiovascular: S1 S2  Gastrointestinal: soft  Extremities:  edema, + renae               LABORATORY:                        9.2    14.03 )-----------( 250      ( 09 Mar 2020 05:14 )             28.8     03-09    146<H>  |  115<H>  |  71<H>  ----------------------------<  120<H>  3.2<L>   |  16<L>  |  5.10<H>    Ca    8.3<L>      09 Mar 2020 05:14  Phos  4.2     03-09  Mg     1.8     03-09      Sodium, Serum: 146 mmol/L (03-09 @ 05:14)  Sodium, Serum: 141 mmol/L (03-08 @ 06:59)    Potassium, Serum: 3.2 mmol/L (03-09 @ 05:14)  Potassium, Serum: 4.0 mmol/L (03-08 @ 06:59)    Hemoglobin: 9.2 g/dL (03-09 @ 05:14)  Hemoglobin: 9.6 g/dL (03-08 @ 06:59)  Hemoglobin: 8.5 g/dL (03-07 @ 09:08)    Creatinine, Serum 5.10 (03-09 @ 05:14)  Creatinine, Serum 5.30 (03-08 @ 06:59)  Creatinine, Serum 4.90 (03-07 @ 09:08)            ABG - ( 07 Mar 2020 15:24 )  pH, Arterial: 7.30  pH, Blood: x     /  pCO2: 34    /  pO2: 64    / HCO3: 17    / Base Excess: -8.9  /  SaO2: 89

## 2020-03-10 LAB
ALBUMIN SERPL ELPH-MCNC: 2.4 G/DL — LOW (ref 3.3–5)
ALP SERPL-CCNC: 89 U/L — SIGNIFICANT CHANGE UP (ref 40–120)
ALT FLD-CCNC: 21 U/L — SIGNIFICANT CHANGE UP (ref 12–78)
ANION GAP SERPL CALC-SCNC: 16 MMOL/L — SIGNIFICANT CHANGE UP (ref 5–17)
AST SERPL-CCNC: 18 U/L — SIGNIFICANT CHANGE UP (ref 15–37)
BASOPHILS # BLD AUTO: 0.03 K/UL — SIGNIFICANT CHANGE UP (ref 0–0.2)
BASOPHILS NFR BLD AUTO: 0.3 % — SIGNIFICANT CHANGE UP (ref 0–2)
BILIRUB SERPL-MCNC: 0.4 MG/DL — SIGNIFICANT CHANGE UP (ref 0.2–1.2)
BUN SERPL-MCNC: 64 MG/DL — HIGH (ref 7–23)
CALCIUM SERPL-MCNC: 9 MG/DL — SIGNIFICANT CHANGE UP (ref 8.5–10.1)
CHLORIDE SERPL-SCNC: 119 MMOL/L — HIGH (ref 96–108)
CO2 SERPL-SCNC: 15 MMOL/L — LOW (ref 22–31)
CREAT SERPL-MCNC: 4.7 MG/DL — HIGH (ref 0.5–1.3)
EOSINOPHIL # BLD AUTO: 0.12 K/UL — SIGNIFICANT CHANGE UP (ref 0–0.5)
EOSINOPHIL NFR BLD AUTO: 1.1 % — SIGNIFICANT CHANGE UP (ref 0–6)
GLUCOSE SERPL-MCNC: 100 MG/DL — HIGH (ref 70–99)
HCT VFR BLD CALC: 26.8 % — LOW (ref 34.5–45)
HGB BLD-MCNC: 8.6 G/DL — LOW (ref 11.5–15.5)
IMM GRANULOCYTES NFR BLD AUTO: 0.8 % — SIGNIFICANT CHANGE UP (ref 0–1.5)
LYMPHOCYTES # BLD AUTO: 0.56 K/UL — LOW (ref 1–3.3)
LYMPHOCYTES # BLD AUTO: 5.1 % — LOW (ref 13–44)
MAGNESIUM SERPL-MCNC: 1.7 MG/DL — SIGNIFICANT CHANGE UP (ref 1.6–2.6)
MCHC RBC-ENTMCNC: 29.3 PG — SIGNIFICANT CHANGE UP (ref 27–34)
MCHC RBC-ENTMCNC: 32.1 GM/DL — SIGNIFICANT CHANGE UP (ref 32–36)
MCV RBC AUTO: 91.2 FL — SIGNIFICANT CHANGE UP (ref 80–100)
MONOCYTES # BLD AUTO: 0.54 K/UL — SIGNIFICANT CHANGE UP (ref 0–0.9)
MONOCYTES NFR BLD AUTO: 4.9 % — SIGNIFICANT CHANGE UP (ref 2–14)
NEUTROPHILS # BLD AUTO: 9.6 K/UL — HIGH (ref 1.8–7.4)
NEUTROPHILS NFR BLD AUTO: 87.8 % — HIGH (ref 43–77)
NRBC # BLD: 0 /100 WBCS — SIGNIFICANT CHANGE UP (ref 0–0)
PHOSPHATE SERPL-MCNC: 3.8 MG/DL — SIGNIFICANT CHANGE UP (ref 2.5–4.5)
PLATELET # BLD AUTO: 242 K/UL — SIGNIFICANT CHANGE UP (ref 150–400)
POTASSIUM SERPL-MCNC: 3.8 MMOL/L — SIGNIFICANT CHANGE UP (ref 3.5–5.3)
POTASSIUM SERPL-SCNC: 3.8 MMOL/L — SIGNIFICANT CHANGE UP (ref 3.5–5.3)
PROT SERPL-MCNC: 5.9 G/DL — LOW (ref 6–8.3)
RBC # BLD: 2.94 M/UL — LOW (ref 3.8–5.2)
RBC # FLD: 15.4 % — HIGH (ref 10.3–14.5)
SODIUM SERPL-SCNC: 150 MMOL/L — HIGH (ref 135–145)
VALPROATE SERPL-MCNC: 13 UG/ML — LOW (ref 50–100)
WBC # BLD: 10.94 K/UL — HIGH (ref 3.8–10.5)
WBC # FLD AUTO: 10.94 K/UL — HIGH (ref 3.8–10.5)

## 2020-03-10 PROCEDURE — 99233 SBSQ HOSP IP/OBS HIGH 50: CPT | Mod: GC

## 2020-03-10 PROCEDURE — 76937 US GUIDE VASCULAR ACCESS: CPT | Mod: 26

## 2020-03-10 PROCEDURE — 99232 SBSQ HOSP IP/OBS MODERATE 35: CPT

## 2020-03-10 PROCEDURE — 76604 US EXAM CHEST: CPT | Mod: 26

## 2020-03-10 RX ORDER — LABETALOL HCL 100 MG
20 TABLET ORAL ONCE
Refills: 0 | Status: COMPLETED | OUTPATIENT
Start: 2020-03-10 | End: 2020-03-10

## 2020-03-10 RX ORDER — SODIUM CHLORIDE 9 MG/ML
1000 INJECTION, SOLUTION INTRAVENOUS
Refills: 0 | Status: DISCONTINUED | OUTPATIENT
Start: 2020-03-10 | End: 2020-03-11

## 2020-03-10 RX ORDER — QUETIAPINE FUMARATE 200 MG/1
25 TABLET, FILM COATED ORAL AT BEDTIME
Refills: 0 | Status: DISCONTINUED | OUTPATIENT
Start: 2020-03-10 | End: 2020-03-12

## 2020-03-10 RX ORDER — LABETALOL HCL 100 MG
5 TABLET ORAL ONCE
Refills: 0 | Status: COMPLETED | OUTPATIENT
Start: 2020-03-10 | End: 2020-03-10

## 2020-03-10 RX ORDER — LABETALOL HCL 100 MG
100 TABLET ORAL EVERY 8 HOURS
Refills: 0 | Status: DISCONTINUED | OUTPATIENT
Start: 2020-03-10 | End: 2020-03-10

## 2020-03-10 RX ORDER — MAGNESIUM SULFATE 500 MG/ML
2 VIAL (ML) INJECTION ONCE
Refills: 0 | Status: COMPLETED | OUTPATIENT
Start: 2020-03-10 | End: 2020-03-10

## 2020-03-10 RX ORDER — HYDRALAZINE HCL 50 MG
10 TABLET ORAL EVERY 6 HOURS
Refills: 0 | Status: DISCONTINUED | OUTPATIENT
Start: 2020-03-10 | End: 2020-03-19

## 2020-03-10 RX ORDER — LABETALOL HCL 100 MG
200 TABLET ORAL EVERY 8 HOURS
Refills: 0 | Status: DISCONTINUED | OUTPATIENT
Start: 2020-03-10 | End: 2020-03-19

## 2020-03-10 RX ADMIN — Medication 1 APPLICATION(S): at 05:26

## 2020-03-10 RX ADMIN — AMLODIPINE BESYLATE 5 MILLIGRAM(S): 2.5 TABLET ORAL at 05:27

## 2020-03-10 RX ADMIN — Medication 1 APPLICATION(S): at 17:18

## 2020-03-10 RX ADMIN — Medication 25 MILLIGRAM(S): at 05:27

## 2020-03-10 RX ADMIN — Medication 100 MILLIGRAM(S): at 21:30

## 2020-03-10 RX ADMIN — Medication 650 MILLIGRAM(S): at 21:31

## 2020-03-10 RX ADMIN — Medication 20 MILLIGRAM(S): at 00:49

## 2020-03-10 RX ADMIN — NYSTATIN CREAM 1 APPLICATION(S): 100000 CREAM TOPICAL at 05:26

## 2020-03-10 RX ADMIN — NYSTATIN CREAM 1 APPLICATION(S): 100000 CREAM TOPICAL at 17:18

## 2020-03-10 RX ADMIN — Medication 20 MILLIGRAM(S): at 11:44

## 2020-03-10 RX ADMIN — Medication 10 MILLIGRAM(S): at 20:18

## 2020-03-10 RX ADMIN — Medication 200 MILLIGRAM(S): at 21:31

## 2020-03-10 RX ADMIN — HEPARIN SODIUM 5000 UNIT(S): 5000 INJECTION INTRAVENOUS; SUBCUTANEOUS at 14:51

## 2020-03-10 RX ADMIN — QUETIAPINE FUMARATE 25 MILLIGRAM(S): 200 TABLET, FILM COATED ORAL at 23:00

## 2020-03-10 RX ADMIN — CHLORHEXIDINE GLUCONATE 1 APPLICATION(S): 213 SOLUTION TOPICAL at 05:28

## 2020-03-10 RX ADMIN — Medication 20 MILLIGRAM(S): at 02:45

## 2020-03-10 RX ADMIN — Medication 81 MILLIGRAM(S): at 11:44

## 2020-03-10 RX ADMIN — Medication 100 MILLIGRAM(S): at 14:51

## 2020-03-10 RX ADMIN — MEROPENEM 100 MILLIGRAM(S): 1 INJECTION INTRAVENOUS at 14:50

## 2020-03-10 RX ADMIN — Medication 650 MILLIGRAM(S): at 05:27

## 2020-03-10 RX ADMIN — Medication 5 MILLIGRAM(S): at 12:14

## 2020-03-10 RX ADMIN — Medication 50 GRAM(S): at 11:44

## 2020-03-10 RX ADMIN — ATORVASTATIN CALCIUM 10 MILLIGRAM(S): 80 TABLET, FILM COATED ORAL at 21:31

## 2020-03-10 RX ADMIN — SODIUM CHLORIDE 50 MILLILITER(S): 9 INJECTION, SOLUTION INTRAVENOUS at 15:32

## 2020-03-10 RX ADMIN — CHOLESTYRAMINE 4 GRAM(S): 4 POWDER, FOR SUSPENSION ORAL at 11:44

## 2020-03-10 RX ADMIN — Medication 1 TABLET(S): at 05:28

## 2020-03-10 RX ADMIN — HEPARIN SODIUM 5000 UNIT(S): 5000 INJECTION INTRAVENOUS; SUBCUTANEOUS at 05:28

## 2020-03-10 RX ADMIN — Medication 750 MILLIGRAM(S): at 17:18

## 2020-03-10 RX ADMIN — Medication 650 MILLIGRAM(S): at 11:46

## 2020-03-10 RX ADMIN — BUDESONIDE AND FORMOTEROL FUMARATE DIHYDRATE 2 PUFF(S): 160; 4.5 AEROSOL RESPIRATORY (INHALATION) at 20:21

## 2020-03-10 RX ADMIN — Medication 750 MILLIGRAM(S): at 05:27

## 2020-03-10 RX ADMIN — HEPARIN SODIUM 5000 UNIT(S): 5000 INJECTION INTRAVENOUS; SUBCUTANEOUS at 21:31

## 2020-03-10 RX ADMIN — Medication 3 MILLIGRAM(S): at 21:31

## 2020-03-10 NOTE — PHYSICAL THERAPY INITIAL EVALUATION ADULT - ADDITIONAL COMMENTS
Patient poor historian. Per medical records, pt lives with her dtr in a house with steps to enter/exit. Ambulates short distances at home with RW.

## 2020-03-10 NOTE — PROGRESS NOTE ADULT - PROBLEM SELECTOR PLAN 2
- unresponsive episode with shaking and spike in lactic acidosis on 3/5, consistent with seizure (first EEG on 3/5 done after IV ativan given, which can mask seizure activity)  -2nd EEG on 3/8 showing some generalized spike/wave patterns concerning for increased seizure risk  - CT head, MRI brain showed no acute infarct or hemorrhage  - depakote increased dose, as level low, in the setting of meropenem use; also given dose of Keppra x2 for load in pt with advanced CKD, while pt completing meropenem course  - dilantin added  - seizure precautions  - continue to monitor valproic acid levels  - IV ativan 1 mg q6 for breakthrough seizures  - neuro, Dr. Quintanilla, recs appreciated - unresponsive episode with shaking and spike in lactic acidosis on 3/5, consistent with seizure (first EEG on 3/5 done after IV ativan given, which can mask seizure activity)  -2nd EEG on 3/8 showing some generalized spike/wave patterns concerning for increased seizure risk  - CT head, MRI brain showed no acute infarct or hemorrhage  - depakote increased dose, but level low, in the setting of meropenem use; also given dose of Keppra x2 for load in pt with advanced CKD, while pt completing meropenem course  - dilantin added  - seizure precautions  - continue to monitor valproic acid levels  - IV ativan 1 mg q6 for breakthrough seizures  - neuro, Dr. Quintanilla, recs appreciated

## 2020-03-10 NOTE — PROGRESS NOTE ADULT - PROBLEM SELECTOR PLAN 1
- now afebrile  - there was suspicion for UTI on admission though unclear significance of the UCx isolates in the presence of chronic renae   - now suspect, pt's fever and hypoxia was due to aspiration PNA in setting of seizures on 3/5, with CXRs in past few days showing a worsening RLL infiltrate   -Continue meropenem, which covers the UCx organisms that had been treated since start of admission, as well as, potential bacteria causing aspiration PNA  - Consider nares swab for MRSA if not improving  - Dr. Kirk, ID, recs appreciated  - f/up ICU care - now afebrile  - there was suspicion for UTI on admission though unclear significance of the UCx isolates in the presence of chronic renae   - now suspect, pt's fever and hypoxia was due to aspiration PNA in setting of seizures on 3/5, with CXRs in past few days showing a worsening RLL infiltrate (difficult to gauge how much of that finding is effusion on xray)  -Continue meropenem, which covers the UCx organisms that had been treated since start of admission, as well as, potential bacteria causing aspiration PNA  - Dr. Kirk, ID, recs appreciated  - f/up ICU care

## 2020-03-10 NOTE — PROGRESS NOTE ADULT - PROBLEM SELECTOR PLAN 3
- acute metabolic encephalopathy was likely was due to acute neuro event such as seizure, and due to infection with worsening renal failure / uremia) possibly contributing   - MRI showed no evidence for CVA to have explained the isolated expressive aphasia pt had prior to her generalized seizure on 3/5  - prior to the generalized seizure, pt had previously been afebrile, with no leukocytosis for several days and was quite lucid with only true alteration in her mental status seeming to be an expressive aphasia. Pt was not delirious and seemed to have no receptive deficits and no difficulties following all commands appropriately on 3/4/20 when I first saw the pt. Only had difficulty word-finding and could not use full sentences because of stumbling in word choice -- this did not seem consistent with an encephalopathy caused by infection (peripheral or central)  -feel this may have been due to partial seizure activity preceding the generalized seizure pt had on 3/5  - pt's obtundation after generalized seizure on 3/5, was likely combination of post-ictal state, ativan, and infection  - follow up ammonia level, if able to be drawn  -neuro recs appreciated - acute metabolic encephalopathy is likely multifactorial   - admission, suspect pt had an acute neuro event such as seizure, but infection and worsening renal failure with uremia are likely all contributing   - pt may also have some element of ICU/hospital delirium   - MRI showed no evidence for CVA to have explained the isolated expressive aphasia pt had prior to her generalized seizure on 3/5  - prior to the generalized seizure, pt had previously been afebrile, with no leukocytosis for several days and was quite lucid with only true alteration in her mental status seeming to be an expressive aphasia. Pt was not delirious and seemed to have no receptive deficits and no difficulties following all commands appropriately on 3/4/20 when I first saw the pt. Only had difficulty word-finding and could not use full sentences because of stumbling in word choice -- this did not seem consistent with an encephalopathy caused by infection (peripheral or central)  -feel this may have been due to partial seizure activity preceding the generalized seizure pt had on 3/5  - follow up ammonia level, if able to be drawn  -neuro recs appreciated

## 2020-03-10 NOTE — PROGRESS NOTE ADULT - ATTENDING COMMENTS
Pt seen + examined. Case discussed with resident. Agree with assessment and plan above (edited) with following addendum:  Time spent: 40min. More than 50% of the visit was spent counseling the patient on medical condition -- seizures, sepsis, suspected aspiration PNA, MEHDI on CKD 5, delirium.   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) and chronic diarrhea, cervical cancer (s/p radical hysterectomy and radiation), tremors, eczema, depression, HTN, HLD, history of frequent UTIs with chronic indwelling renae, anemia, stage 4 sacral decubitus ulcer, and recent hospitalization for MEHDI on CKD thought to be 2/2 dehydration and urinary retention from malfunctioning chronic renae, now brought in by EMS to ED for altered mental status admitted for acute metabolic encephalopathy due to suspected UTI and hyperkalemia in setting of MEHDI on CKD 5, course c/b likely generalized seizure on 3/5/20, and acute hypoxic respiratory failure and suspected sepsis due to suspected aspiration PNA, now extubated, but with persistent delirium.  - now afebrile  - there was suspicion for UTI on admission though unclear significance of the UCx isolates in the presence of chronic renae   - now suspect, pt's fever and hypoxia was due to aspiration PNA in setting of seizures on 3/5, with CXRs in past few days showing a worsening RLL infiltrate (difficult to gauge how much of that finding is effusion on xray)  -Continue meropenem, which covers the UCx organisms that had been treated since start of admission, as well as, potential bacteria causing aspiration PNA  - Dr. Kirk, ID, recs appreciated  - f/up ICU care  - unresponsive episode with shaking and spike in lactic acidosis on 3/5, consistent with seizure (first EEG on 3/5 done after IV ativan given, which can mask seizure activity)  -2nd EEG on 3/8 showing some generalized spike/wave patterns concerning for increased seizure risk  - CT head, MRI brain showed no acute infarct or hemorrhage  - neuro, Dr. Quintanilla, recs appreciated  - depakote increased dose, as level low, in the setting of meropenem use; also given dose of Keppra for load in pt with advanced CKD, while pt completing meropenem course  - seizure precautions  - continue to monitor valproic acid levels  - IV ativan 1 mg q6 for breakthrough seizures  - neuro, Dr. Quintanilla, recs appreciated  - acute metabolic encephalopathy was likely was due to acute neuro event such as seizure, and due to infection with worsening renal failure / uremia) possibly contributing   - MRI showed no evidence for CVA to have explained the isolated expressive aphasia pt had prior to her generalized seizure on 3/5  - prior to the generalized seizure, pt had previously been afebrile, with no leukocytosis for several days and was quite lucid with only true alteration in her mental status seeming to be an expressive aphasia. Pt was not delirious and seemed to have no receptive deficits and no difficulties following all commands appropriately on 3/4/20 when I first saw the pt. Only had difficulty word-finding and could not use full sentences because of stumbling in word choice -- this did not seem consistent with an encephalopathy caused by infection (peripheral or central)  -feel this may have been due to partial seizure activity preceding the generalized seizure pt had on 3/5  - pt's obtundation after generalized seizure on 3/5, was likely combination of post-ictal state, ativan, and infection  - pt's hypotension was likely related to sepsis and all anti-hypertensives held  - pt's BP now rising and home regimen is starting to be restarted  - unresponsive episode with shaking and spike in lactic acidosis on 3/5, consistent with seizure (first EEG on 3/5 done after IV ativan given, which can mask seizure activity)  -2nd EEG on 3/8 showing some generalized spike/wave patterns concerning for increased seizure risk  - CT head, MRI brain showed no acute infarct or hemorrhage  - depakote increased dose, but level low, in the setting of meropenem use; also given dose of Keppra x2 for load in pt with advanced CKD, while pt completing meropenem course  - dilantin added  - seizure precautions  - continue to monitor valproic acid levels  - IV ativan 1 mg q6 for breakthrough seizures  - neuro, Dr. Quintanilla, recs appreciated

## 2020-03-10 NOTE — PROGRESS NOTE ADULT - PROBLEM SELECTOR PLAN 4
- on admission, patient with metabolic acidosis in setting of UTI and suspected pre-renal MEHDI  - Creatinine4.7  - po sodium bicarb 650 mg TID  - pt's renal status still very poor. If no plan to pursue dialysis, recommend palliative care, hospice planning  - try to maintain MAP > 65 to decrease chance of ischemic ATN - on admission, patient with metabolic acidosis in setting of UTI and suspected pre-renal MEHDI  - Creatinine 4.7  - po sodium bicarb 650 mg TID  - pt's renal status still very poor. If no plan to pursue dialysis, may need to further discuss palliative care

## 2020-03-10 NOTE — PROGRESS NOTE ADULT - PROBLEM SELECTOR PLAN 8
- pt's hypotension was likely related to sepsis and all anti-hypertensives held  - pt's BP now rising and home regimen is starting to be restarted  - continue amlodipine 5 mg, labetaolol 100 mg TID with hold parameters - pt's hypotension was likely related to sepsis and all anti-hypertensives held  - pt's BP now rising and home regimen is being restarted  - continue amlodipine 5 mg, labetalol 100 mg TID with hold parameters

## 2020-03-10 NOTE — PROGRESS NOTE ADULT - PROBLEM SELECTOR PLAN 1
Baseline?  Would alter AED Rx to something other than Depakote in the setting of meropenem use.  Would limit meropenem to 7 days

## 2020-03-10 NOTE — PROGRESS NOTE ADULT - SUBJECTIVE AND OBJECTIVE BOX
Neurology follow up note    GURJIT [id number]      Interval History:    Patient feels ok     MEDICATIONS    acetaminophen    Suspension .. 650 milliGRAM(s) Oral every 6 hours PRN  amLODIPine   Tablet 5 milliGRAM(s) Oral daily  aspirin enteric coated 81 milliGRAM(s) Oral daily  atorvastatin 10 milliGRAM(s) Oral at bedtime  budesonide 160 MICROgram(s)/formoterol 4.5 MICROgram(s) Inhaler 2 Puff(s) Inhalation two times a day  chlorhexidine 2% Cloths 1 Application(s) Topical <User Schedule>  cholestyramine Powder (Sugar-Free) 4 Gram(s) Oral daily  clobetasol 0.05% Cream 1 Application(s) Topical two times a day  epoetin jean carlos Injectable 05958 Unit(s) SubCutaneous <User Schedule>  ferrous    sulfate 325 milliGRAM(s) Oral daily  FLUoxetine 20 milliGRAM(s) Oral daily  heparin  Injectable 5000 Unit(s) SubCutaneous every 8 hours  melatonin 3 milliGRAM(s) Oral at bedtime  meropenem  IVPB 500 milliGRAM(s) IV Intermittent every 24 hours  metoprolol tartrate 25 milliGRAM(s) Oral two times a day  nystatin Cream 1 Application(s) Topical two times a day  phenytoin   Capsule 100 milliGRAM(s) Oral three times a day  QUEtiapine 12.5 milliGRAM(s) Oral at bedtime  sodium bicarbonate 650 milliGRAM(s) Oral three times a day  valproic  acid Syrup 750 milliGRAM(s) Oral two times a day      Allergies    Levaquin (Pruritus)  mercury (Pruritus; Rash)  sulfa drugs (Pruritus)    Intolerances            Vital Signs Last 24 Hrs  T(C): 36.6 (10 Mar 2020 07:50), Max: 36.8 (09 Mar 2020 15:18)  T(F): 97.9 (10 Mar 2020 07:50), Max: 98.2 (09 Mar 2020 15:18)  HR: 76 (10 Mar 2020 11:00) (71 - 119)  BP: 132/74 (10 Mar 2020 11:00) (132/74 - 224/89)  BP(mean): 98 (10 Mar 2020 11:00) (73 - 156)  RR: 33 (10 Mar 2020 11:00) (20 - 55)  SpO2: 86% (10 Mar 2020 11:00) (86% - 100%)    REVIEW OF SYSTEMS: Limited or unable to obtain secondary to patient's poor mental status.    Constitutional: No fever, chills, fatigue, generalized  weakness  Eyes: no eye pain, visual disturbances, or discharge  ENT:  No difficulty hearing, tinnitus, vertigo; No sinus or throat pain  Neck: No pain or stiffness  Respiratory: No cough, dyspnea, wheezing   Cardiovascular: No chest pain, palpitations,   Gastrointestinal: No abdominal or epigastric pain. No nausea, vomiting  No diarrhea or constipation.   Genitourinary: No dysuria, frequency, hematuria or incontinence  Neurological: No headaches, lightheadedness, vertigo, numbness or tremors  Psychiatric: No depression, anxiety, mood swings or difficulty sleeping  Musculoskeletal: No joint pain or swelling; No muscle, back or extremity pain      On Neurological Examination:    Mental Status - Patient is alert, awake,  confused    Follow simple commands    Speech -   Fluent                 Cranial Nerves - Pupils 3 mm equal and reactive to light,   extraocular eye movements intact.   smile symmetric  intact bilateral NLF    Motor Exam -   Right upper 4/5   Left upper  3/5  Right lower 2/5  Left lower 2/5    Muscle tone - is normal all over.  No asymmetry is seen.      Sensory    Bilateral intact to light touch    GENERAL Exam: Nontoxic , No Acute Distress   	  HEENT:  normocephalic, atraumatic  		  LUNGS:  Decreased bilaterally  	  HEART: Normal S1S2   No murmur RRR        	  GI/ ABDOMEN:  Soft  Non tender    EXTREMITIES:   No Edema  No Clubbing  No Cyanosis   	   SKIN: Normal  No Ecchymosis               LABS:  CBC Full  -  ( 10 Mar 2020 05:09 )  WBC Count : 10.94 K/uL  RBC Count : 2.94 M/uL  Hemoglobin : 8.6 g/dL  Hematocrit : 26.8 %  Platelet Count - Automated : 242 K/uL  Mean Cell Volume : 91.2 fl  Mean Cell Hemoglobin : 29.3 pg  Mean Cell Hemoglobin Concentration : 32.1 gm/dL  Auto Neutrophil # : 9.60 K/uL  Auto Lymphocyte # : 0.56 K/uL  Auto Monocyte # : 0.54 K/uL  Auto Eosinophil # : 0.12 K/uL  Auto Basophil # : 0.03 K/uL  Auto Neutrophil % : 87.8 %  Auto Lymphocyte % : 5.1 %  Auto Monocyte % : 4.9 %  Auto Eosinophil % : 1.1 %  Auto Basophil % : 0.3 %      03-10    150<H>  |  119<H>  |  64<H>  ----------------------------<  100<H>  3.8   |  15<L>  |  4.70<H>    Ca    9.0      10 Mar 2020 05:09  Phos  3.8     03-10  Mg     1.7     03-10    TPro  5.9<L>  /  Alb  2.4<L>  /  TBili  0.4  /  DBili  x   /  AST  18  /  ALT  21  /  AlkPhos  89  03-10    Hemoglobin A1C:     LIVER FUNCTIONS - ( 10 Mar 2020 05:09 )  Alb: 2.4 g/dL / Pro: 5.9 g/dL / ALK PHOS: 89 U/L / ALT: 21 U/L / AST: 18 U/L / GGT: x           Vitamin B12         RADIOLOGY      ANALYSIS AND PLAN:  An 84-year-old with an episode of seizure and change in mental status.  1.	For episode of seizure, will increase Depakote 750 mg twice  will add dilantin and plan to wean off Depakote in few days   2.	EEG intermittent generalized spike and waves   3.	Ativan 1 mg IV push q.6h. p.r.n. for any type of breakthrough seizure.  4.	Seizure precautions.  5.	For history of underlying depression, at present hard to evaluate the patient's psychiatric status secondary to the patient being lethargic, continue home medication when possible.  6.	For hyperlipidemia, continue the patient on her cholesterol medication.  7.	For change in mental status, questionable this could be metabolic encephalopathy from partial complex seizures versus any type of underlying infectious type process episodes of temperatures and hypotension possible shock   8.	Antibiotics as needed.  9.	Fall precaution.  10.	overall more awake and interactive today   11.	For chronic kidney disease, monitor renal function.  12.	Spoke with multiple family members at the bedside.  Advance directives were discussed with them.  Primary  will be daughter, Ju, her cell phone number is [phone], home number is [phone] spoke 3/10/2020  13.	 appears more confused today possible development of hospital delirium sundowning   14.	Greater than 40 minutes of time was spent with the patient, plan of care, reviewing data, speaking to the family and multidisciplinary healthcare team.

## 2020-03-10 NOTE — PROGRESS NOTE ADULT - ATTENDING COMMENTS
84F h/o CKD5, chronic renae and frequent UTIs, stage 4 sacral wound with recent osteomyelitis, dementia admitted with confusion and suspected sepsis from UTI, complicated by new onset seizure on the floor with acute hypoxic respiratory failure requiring intubation, extubated 3/7 and now improving though still with episodes of delirium.    Neuro: waxing and waning delirium, somewhat redirectable, will continue prozac, if needed can give low dose seroquel for delirium; unclear trigger of new onset seizures, with continued areas of generalized spike and waves on [place] 3/8 - can continue valproic acid at 750mg bid, but given interaction with meorpenum, will also give one time keppra 500mg for renally dosed loading until remaining 3 days or niko completed  CV: remains hypertensive off midodrine - restarted home amlodipine and added labetalol for better control; continue ASA and statin; TTE pending  Pulm: remains stable on NC at this time, continue to monitor on supplemental O2   Renal: CKD5 stable, continue bicarb and epo, not ideal candidate for HD should her renal function start to worsen; chronic renae in place and continues to make adequate urine, will give D5@50cc/hr for 6 hrs for hypernatremia as no longer getting free water via KO   GI: dysphagia diet per s/s eval today; ostomy output improved from >1.5L --> 900cc outpt yesterday, likely multifactorial 2/2 tube feeds and abx though low suspicion for c.dif at this time  ID: UCx with E. coli and Enterobacter but unclear if true infection in setting of chronic renae; continue meropenum #5/7   Heme: dvt ppx with hsq  --PT eval, to remain in ICU at this time.

## 2020-03-10 NOTE — PROGRESS NOTE ADULT - ASSESSMENT
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) and chronic diarrhea, cervical cancer (s/p radical hysterectomy and radiation), tremors, eczema, depression, HTN, HLD, history of frequent UTIs with chronic indwelling renae, anemia, stage 4 sacral decubitus ulcer, and recent hospitalization for MEHDI on CKD thought to be 2/2 dehydration and urinary retention from malfunctioning chronic renae, now brought in by EMS to ED for altered mental status admitted for acute metabolic encephalopathy due to suspected UTI and hyperkalemia in setting of MEHDI on CKD 5, course c/b likely generalized seizure on 3/5/20, and acute hypoxic respiratory failure and suspected sepsis due to suspected aspiration PNA, now extubated. Agitated with worsening renal function. 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) and chronic diarrhea, cervical cancer (s/p radical hysterectomy and radiation), tremors, eczema, depression, HTN, HLD, history of frequent UTIs with chronic indwelling renae, anemia, stage 4 sacral decubitus ulcer, and recent hospitalization for MEHDI on CKD thought to be 2/2 dehydration and urinary retention from malfunctioning chronic renae, now brought in by EMS to ED for altered mental status admitted for acute metabolic encephalopathy due to suspected UTI and hyperkalemia in setting of MEHDI on CKD 5, course c/b likely generalized seizure on 3/5/20, and acute hypoxic respiratory failure and suspected sepsis due to suspected aspiration PNA, now extubated, but with persistent delirium.

## 2020-03-10 NOTE — CHART NOTE - NSCHARTNOTEFT_GEN_A_CORE
Time of evaluation: 8:20    Called to evaluate patient for restraints        Other interventions attempted: de-escalation, orientation check, environment modification, medication assessment    REVIEW OF SYSTEMS:  CONSTITUTIONAL: [  ] fever   [  ] fatigue  EYES: [  ] visual disturbances  ENMT:  [  ]difficulty hearing  [  ] throat pain  RESPIRATORY:  [  ]cough  [   ] wheezing  [   ] hemoptysis [  ] shortness of breath  CARDIOVASCULAR: [  ]chest pain  [  ] palpitations   GASTROINTESTINAL: [  ]  abdominal pain [  ] nausea [  ] vomiting  [  ] diarrhea  [  ] constipation  GENITOURINARY: [  ] dysuria [  ] frequency  [  ] hematuria [  ] incontinence  NEUROLOGICAL: [  ] headaches [  ] new numbness  SKIN: [  ]itching [  ] new rash   MUSCULOSKELETAL: [  ] back pain  [  ] extremity pain  PSYCHIATRIC: [  ] depression  [  ] anxiety  [  ] mood swings [  ] difficulty sleeping  ALLERGY AND IMMUNOLOGIC: [  ] hives    [ x ]Unable to perfrom ROS due to: AMS  [  ] ROS reviewed and all negative    PAST MEDICAL & SURGICAL HISTORY:  Wound of sacral region  Depression  Iron deficiency anemia  Eczema  HTN (hypertension)  CKD (chronic kidney disease) stage 5, GFR less than 15 ml/min: not on HD  Herniated lumbar intervertebral disc  Tremor  Kidney atrophy: right  Colostomy status: 2006  Chronic UTI (urinary tract infection): chronic renae  Cervical cancer: 1970&#x27;s  Dyslipidemia  Hernia, incisional  S/P hip replacement: right 2013  S/P colon resection: 2006  S/P hysterectomy: 1977    MEDICATIONS  (STANDING):  amLODIPine   Tablet 5 milliGRAM(s) Oral daily  aspirin enteric coated 81 milliGRAM(s) Oral daily  atorvastatin 10 milliGRAM(s) Oral at bedtime  budesonide 160 MICROgram(s)/formoterol 4.5 MICROgram(s) Inhaler 2 Puff(s) Inhalation two times a day  chlorhexidine 2% Cloths 1 Application(s) Topical <User Schedule>  cholestyramine Powder (Sugar-Free) 4 Gram(s) Oral daily  clobetasol 0.05% Cream 1 Application(s) Topical two times a day  dextrose 5% + sodium chloride 0.45%. 1000 milliLiter(s) (50 mL/Hr) IV Continuous <Continuous>  epoetin jean carlos Injectable 95778 Unit(s) SubCutaneous <User Schedule>  ferrous    sulfate 325 milliGRAM(s) Oral daily  FLUoxetine 20 milliGRAM(s) Oral daily  heparin  Injectable 5000 Unit(s) SubCutaneous every 8 hours  labetalol 100 milliGRAM(s) Oral every 8 hours  melatonin 3 milliGRAM(s) Oral at bedtime  nystatin Cream 1 Application(s) Topical two times a day  phenytoin   Suspension 100 milliGRAM(s) Oral three times a day  sodium bicarbonate 650 milliGRAM(s) Oral three times a day  valproic  acid Syrup 750 milliGRAM(s) Oral two times a day    MEDICATIONS  (PRN):  acetaminophen    Suspension .. 650 milliGRAM(s) Oral every 6 hours PRN Temp greater or equal to 38C (100.4F), Moderate Pain (4 - 6)  hydrALAZINE Injectable 10 milliGRAM(s) IV Push every 6 hours PRN SBP over 180  QUEtiapine 25 milliGRAM(s) Oral at bedtime PRN Delerium                          8.6    10.94 )-----------( 242      ( 10 Mar 2020 05:09 )             26.8     03-10    150<H>  |  119<H>  |  64<H>  ----------------------------<  100<H>  3.8   |  15<L>  |  4.70<H>    Ca    9.0      10 Mar 2020 05:09  Phos  3.8     03-10  Mg     1.7     03-10    TPro  5.9<L>  /  Alb  2.4<L>  /  TBili  0.4  /  DBili  x   /  AST  18  /  ALT  21  /  AlkPhos  89  03-10      Vital Signs Last 24 Hrs  T(C): 36.7 (10 Mar 2020 16:00), Max: 36.8 (10 Mar 2020 12:00)  T(F): 98 (10 Mar 2020 16:00), Max: 98.2 (10 Mar 2020 12:00)  HR: 84 (10 Mar 2020 19:00) (71 - 98)  BP: 158/83 (10 Mar 2020 19:00) (132/74 - 224/89)  BP(mean): 115 (10 Mar 2020 19:00) (80 - 150)  RR: 37 (10 Mar 2020 19:00) (20 - 55)  SpO2: 83% (10 Mar 2020 19:00) (83% - 100%)    Physical Examination:  GENERAL: NAD, elder, chronically ill appearing  HEENT:  no JVD, PEARRLA  NERVOUS SYSTEM:  Alert & Oriented X1,  Motor Strength 3/5 B/L upper and lower extremities  CHEST/LUNG: breath sounds diminished b/l in lung bases   HEART: Regular rate and rhythm; No murmurs, rubs, or gallops  ABDOMEN: +ostomy with stool Soft, Nontender, Nondistended; Bowel sounds presen      [  ] Unable to perform physical exam due to    [X ] I have evaluated this patient and have determined that restraints are warranted to optimize medical care. Patient was assessed for current physical and psychological risk factors as well as special needs. There are no medical conditions or limitations that would place this patient at risk while in restraints.    Type of restraint: Bilateral soft wrist restraints    Behavioral criteria for discontinuation of restraint: [ X ] See order    Attending MD Aware

## 2020-03-10 NOTE — PROGRESS NOTE ADULT - SUBJECTIVE AND OBJECTIVE BOX
JOSE GUADALUPE HERRERACE  84y  Female    Patient is a 84y old  Female who presents with a chief complaint of altered mental status (10 Mar 2020 11:50)    extubated and is confused.   denies any chest pain or shortness of breath.     PAST MEDICAL & SURGICAL HISTORY:  Wound of sacral region  Depression  Iron deficiency anemia  Eczema  HTN (hypertension)  CKD (chronic kidney disease) stage 5, GFR less than 15 ml/min: not on HD  Herniated lumbar intervertebral disc  Tremor  Kidney atrophy: right  Colostomy status: 2006  Chronic UTI (urinary tract infection): chronic renae  Cervical cancer: 1970&#x27;s  Dyslipidemia  Hernia, incisional  S/P hip replacement: right 2013  S/P colon resection: 2006  S/P hysterectomy: 1977          PHYSICAL EXAM:    T(C): 36.8 (03-10-20 @ 12:00), Max: 36.8 (03-09-20 @ 15:18)  HR: 74 (03-10-20 @ 13:00) (71 - 119)  BP: 167/73 (03-10-20 @ 13:00) (132/74 - 224/89)  RR: 27 (03-10-20 @ 13:00) (20 - 55)  SpO2: 93% (03-10-20 @ 13:00) (86% - 100%)  Wt(kg): --    I&O's Detail    09 Mar 2020 07:01  -  10 Mar 2020 07:00  --------------------------------------------------------  IN:    Enteral Tube Flush: 340 mL    Nepro with Carb Steady: 120 mL    Oral Fluid: 360 mL    Solution: 200 mL    Solution: 50 mL    Solution: 100 mL  Total IN: 1170 mL    OUT:    Colostomy: 900 mL    Indwelling Catheter - Urethral: 875 mL  Total OUT: 1775 mL    Total NET: -605 mL      10 Mar 2020 07:01  -  10 Mar 2020 14:15  --------------------------------------------------------  IN:    Solution: 50 mL  Total IN: 50 mL    OUT:    Colostomy: 200 mL    Indwelling Catheter - Urethral: 270 mL  Total OUT: 470 mL    Total NET: -420 mL          Respiratory: clear anteriorly, decreased BS at bases  Cardiovascular: S1 S2  Gastrointestinal: soft NT ND +BS  Extremities: one plus edema   Neuro: Awake and alert    MEDICATIONS  (STANDING):  amLODIPine   Tablet 5 milliGRAM(s) Oral daily  aspirin enteric coated 81 milliGRAM(s) Oral daily  atorvastatin 10 milliGRAM(s) Oral at bedtime  budesonide 160 MICROgram(s)/formoterol 4.5 MICROgram(s) Inhaler 2 Puff(s) Inhalation two times a day  chlorhexidine 2% Cloths 1 Application(s) Topical <User Schedule>  cholestyramine Powder (Sugar-Free) 4 Gram(s) Oral daily  clobetasol 0.05% Cream 1 Application(s) Topical two times a day  dextrose 5% + sodium chloride 0.45%. 1000 milliLiter(s) (50 mL/Hr) IV Continuous <Continuous>  epoetin jean carlos Injectable 05905 Unit(s) SubCutaneous <User Schedule>  ferrous    sulfate 325 milliGRAM(s) Oral daily  FLUoxetine 20 milliGRAM(s) Oral daily  heparin  Injectable 5000 Unit(s) SubCutaneous every 8 hours  labetalol 100 milliGRAM(s) Oral every 8 hours  melatonin 3 milliGRAM(s) Oral at bedtime  meropenem  IVPB 500 milliGRAM(s) IV Intermittent every 24 hours  nystatin Cream 1 Application(s) Topical two times a day  phenytoin   Suspension 100 milliGRAM(s) Oral three times a day  QUEtiapine 12.5 milliGRAM(s) Oral at bedtime  sodium bicarbonate 650 milliGRAM(s) Oral three times a day  valproic  acid Syrup 750 milliGRAM(s) Oral two times a day    MEDICATIONS  (PRN):  acetaminophen    Suspension .. 650 milliGRAM(s) Oral every 6 hours PRN Temp greater or equal to 38C (100.4F), Moderate Pain (4 - 6)                            8.6    10.94 )-----------( 242      ( 10 Mar 2020 05:09 )             26.8       03-10    150<H>  |  119<H>  |  64<H>  ----------------------------<  100<H>  3.8   |  15<L>  |  4.70<H>    Ca    9.0      10 Mar 2020 05:09  Phos  3.8     03-10  Mg     1.7     03-10    TPro  5.9<L>  /  Alb  2.4<L>  /  TBili  0.4  /  DBili  x   /  AST  18  /  ALT  21  /  AlkPhos  89  03-10      Creatinine Trend: Creatinine Trend: 4.70<--, 4.80<--, 5.10<--, 5.30<--, 4.90<--, 4.50<--

## 2020-03-10 NOTE — PROGRESS NOTE ADULT - SUBJECTIVE AND OBJECTIVE BOX
Patient is a 84y old  Female who presents with a chief complaint of altered mental status (09 Mar 2020 16:03)    24 hour events: CHARTING IN PROGRESS     REVIEW OF SYSTEMS  Constitutional: No fever, chills, fatigue  Neuro: No headache, numbness, weakness  Resp: No cough, wheezing, shortness of breath  CVS: No chest pain, palpitations, leg swelling  GI: No abdominal pain, nausea, vomiting, diarrhea   : No dysuria, frequency, incontinence  Skin: No itching, burning, rashes, or lesions   Msk: No joint pain or swelling  Psych: No depression, anxiety, mood swings  Heme: No bleeding    T(F): 97.9 (03-10-20 @ 07:50), Max: 98.2 (03-09-20 @ 15:18)  HR: 71 (03-10-20 @ 07:00) (71 - 119)  BP: 186/79 (03-10-20 @ 07:00) (140/63 - 224/89)  RR: 39 (03-10-20 @ 07:00) (24 - 55)  SpO2: 95% (03-10-20 @ 07:00) (91% - 99%)  Wt(kg): --            I&O's Summary    03-09 @ 07:01  -  03-10 @ 07:00  --------------------------------------------------------  IN: 1170 mL / OUT: 1775 mL / NET: -605 mL      PHYSICAL EXAM  General:   CNS:   HEENT:   Resp:   CVS:   Abd:   Ext:   Skin:     MEDICATIONS  meropenem  IVPB IV Intermittent    amLODIPine   Tablet Oral  metoprolol tartrate Oral    atorvastatin Oral  cholestyramine Powder (Sugar-Free) Oral    budesonide 160 MICROgram(s)/formoterol 4.5 MICROgram(s) Inhaler Inhalation  loratadine Oral    acetaminophen    Suspension .. Oral PRN  FLUoxetine Oral  melatonin Oral  QUEtiapine Oral  valproic  acid Syrup Oral      aspirin enteric coated Oral  heparin  Injectable SubCutaneous        calcium carbonate 1250 mG  + Vitamin D (OsCal 500 + D) Oral  cholecalciferol Oral  ferrous    sulfate Oral  multivitamin Oral  sodium bicarbonate Oral  sodium chloride 0.9% lock flush IV Push PRN    epoetin jean carlos Injectable SubCutaneous    chlorhexidine 2% Cloths Topical  clobetasol 0.05% Cream Topical  nystatin Cream Topical    lactobacillus acidophilus Oral                          8.6    10.94 )-----------( 242      ( 10 Mar 2020 05:09 )             26.8       03-10    150<H>  |  119<H>  |  64<H>  ----------------------------<  100<H>  3.8   |  15<L>  |  4.70<H>    Ca    9.0      10 Mar 2020 05:09  Phos  3.8     03-10  Mg     1.7     03-10    TPro  5.9<L>  /  Alb  2.4<L>  /  TBili  0.4  /  DBili  x   /  AST  18  /  ALT  21  /  AlkPhos  89  03-10              .Blood Blood   No growth to date. -- 03-06 @ 01:23        Radiology: ***  Bedside lung ultrasound: ***  Bedside ECHO: ***    CENTRAL LINE: Y/N          DATE INSERTED:              REMOVE: Y/N  JACQUES: Y/N                        DATE INSERTED:              REMOVE: Y/N  A-LINE: Y/N                       DATE INSERTED:              REMOVE: Y/N    GLOBAL ISSUE/BEST PRACTICE  Analgesia:   Sedation:   CAM-ICU:   HOB elevation: yes  Stress ulcer prophylaxis:   VTE prophylaxis:   Glycemic control:   Nutrition:     CODE STATUS: ***  [facility] discussion: Y Patient is a 84y old  Female who presents with a chief complaint of altered mental status (09 Mar 2020 16:03)    24 hour events: Patient was seen and examined at bedside. No acute events overnight. Patient this morning experiencing more delirium and agitation than yesterday likely multifactorial, considering her CKD stage 5 and worsening kidney function and dehydration. Patient was refusing medication and blood draw for ammonia level.     REVIEW OF SYSTEMS  Limited or unable to obtain secondary to patient's poor mental status.    T(F): 97.9 (03-10-20 @ 07:50), Max: 98.2 (03-09-20 @ 15:18)  HR: 71 (03-10-20 @ 07:00) (71 - 119)  BP: 186/79 (03-10-20 @ 07:00) (140/63 - 224/89)  RR: 39 (03-10-20 @ 07:00) (24 - 55)  SpO2: 95% (03-10-20 @ 07:00) (91% - 99%)  Wt(kg): --            I&O's Summary    03-09 @ 07:01  -  03-10 @ 07:00  --------------------------------------------------------  IN: 1170 mL / OUT: 1775 mL / NET: -605 mL      PHYSICAL EXAM  GENERAL: NAD, elder, chronically ill appearing, agitated and delirious  HEENT:  anicteric,  NERVOUS SYSTEM:  Alert & Oriented X1, No new neurological focal deficits, Motor Strength 3/5 B/L upper and lower extremities  CHEST/LUNG: breath sounds diminished b/l in lung bases   HEART: Regular rate and rhythm; No murmurs, rubs, or gallops  ABDOMEN: +ostomy with stool, Nontender, Nondistended; Bowel sounds present      MEDICATIONS  meropenem  IVPB IV Intermittent    amLODIPine   Tablet Oral  metoprolol tartrate Oral    atorvastatin Oral  cholestyramine Powder (Sugar-Free) Oral    budesonide 160 MICROgram(s)/formoterol 4.5 MICROgram(s) Inhaler Inhalation  loratadine Oral    acetaminophen    Suspension .. Oral PRN  FLUoxetine Oral  melatonin Oral  QUEtiapine Oral  valproic  acid Syrup Oral      aspirin enteric coated Oral  heparin  Injectable SubCutaneous        calcium carbonate 1250 mG  + Vitamin D (OsCal 500 + D) Oral  cholecalciferol Oral  ferrous    sulfate Oral  multivitamin Oral  sodium bicarbonate Oral  sodium chloride 0.9% lock flush IV Push PRN    epoetin jean carlos Injectable SubCutaneous    chlorhexidine 2% Cloths Topical  clobetasol 0.05% Cream Topical  nystatin Cream Topical    lactobacillus acidophilus Oral                          8.6    10.94 )-----------( 242      ( 10 Mar 2020 05:09 )             26.8       03-10    150<H>  |  119<H>  |  64<H>  ----------------------------<  100<H>  3.8   |  15<L>  |  4.70<H>    Ca    9.0      10 Mar 2020 05:09  Phos  3.8     03-10  Mg     1.7     03-10    TPro  5.9<L>  /  Alb  2.4<L>  /  TBili  0.4  /  DBili  x   /  AST  18  /  ALT  21  /  AlkPhos  89  03-10              .Blood Blood   No growth to date. -- 03-06 @ 01:23              CENTRAL LINE: N            JACQUES: Y                          A-LINE: N                         GLOBAL ISSUE/BEST PRACTICE  Analgesia: Tylenol   Sedation: N/A  HOB elevation: yes  Stress ulcer prophylaxis: N/A  VTE prophylaxis: Heparin Subq	  Glycemic control: N/A  Nutrition: Dysphagia diet Pureed honey     CODE STATUS: Full

## 2020-03-10 NOTE — PROGRESS NOTE ADULT - ASSESSMENT
85 yo F with Hx CKD5, chronic renae and frequent UTIs, stage 4 sacral wound with recent osteomyelitis, dementia admitted with confusion and suspected sepsis from UTI, complicated by episode of sz activity, resulting in acute respiratory failure.  Now improving.    Neuro: Extubated(3/7/2020), Acetaminophen suspension PRN. Seizure activity currently treated with Depakene Syrup 750mg BID. Abnormal EEG due to the presence of mild bilateral background slowing. Depakote at subtherapeutic level. Ativan 1 mg IV push q.6h. PRN for breakthrough seizure. Seizure precautions. Continue Prozac for depression. s/p 1 dose of 500mg IV keppra yesterday   CV: Midodrine d/c'd. BP meds restarted due to hypertension. Norvasc and lopressor started, cont to monitor BP on BP meds. Continue ASA and statin.    Pulm: Extubated(3/7/2020). Continue symbicort. BIPAP overnight   Renal: CKD Stage 5 (chronic), appropriate urine output. Hypernatremic on todays lab will start 250cc free water and stop on the 11th. Strict I&O. Continue sodium bicarb, ferrous sulfate, Vitamin D, OsCal. Continue Procrit.   GI: Continue Dysphagia 1 honey diet. Continue Ostomy care. Continue Prevalite    ID: Afebrile. Leukocytosis trending down from 16.75 to 14.03(today). Continue abx to Meropenum, plan for total of 7d course currently on Day 5. Blood cultures show no growth to date. ID consult. Continue Tylenol PRN for fever.  Heme: on Heparin SQ  Prophylaxis: Aquacel dressing every other day, Protonix injectable  Dispo: Patient critically ill. Prognosis poor/guarded. Full code 83 yo F with Hx CKD5, chronic renae and frequent UTIs, stage 4 sacral wound with recent osteomyelitis, dementia admitted with confusion and suspected sepsis from UTI, complicated by episode of sz activity, resulting in acute respiratory failure.  Now improving.    Neuro: Patient increased agitation and delirium likely 2/2 to worsening renal function and hx of CKD stage 5. Will order ammonia for AM but baseline level unknown. Acetaminophen suspension PRN. Seizure activity currently treated with Depakene Syrup 750mg BID. Abnormal EEG (3/9/2020) due to the presence of mild bilateral background slowing. Depakote continues to be at subtherapeutic level. Ativan 1 mg IV push q6h PRN for breakthrough seizure. Seizure precautions. Fall precautions. Continue Prozac for depression. s/p 1 dose of 500mg IV keppra yesterday. Will add phenytoin and wean off depakote.    CV: BP meds restarted due to hypertension. Norvasc and labetalol started, cont to monitor BP on BP meds. Continue ASA and statin.    Pulm: Respiratory status currently stable. Continue symbicort. BIPAP overnight   Renal: CKD Stage 5 (chronic), appropriate urine output. Hypernatremic on AM labs. Unable to receive 250cc free water due to initial bout of agitation. Will resume later. Cont strict I&O. Continue sodium bicarb and Procrit. Renal following, with recs suggesting no decision about RRT at this time. No emergent need for HD at this time  GI: Continue Dysphagia 1 honey diet. Continue Ostomy care. Continue Prevalite    ID: Afebrile. Leukocytosis trending down from 14.03 to 10.94 (today). Continue abx to Meropenum, day 5 of 7. Blood cultures show no growth to date. ID following. Continue Tylenol PRN for fever.  Heme: on Heparin SQ  Prophylaxis: Aquacel dressing every other day, Protonix injectable  Dispo: Patient critically ill. Prognosis poor/guarded. Full code

## 2020-03-10 NOTE — PROGRESS NOTE ADULT - ASSESSMENT
84 female with a history of HTN, CAD, CHF, Anemia, osteomyelitis, HLD and atrophic right kidney and uretero ureteral anastomosis of the right to left and CKD stage 5 with anemia now admitted with mental status changes. she is now extubated and confused.  New onset seizures. spoke to patient's dtr at bed side and spoke to her on the poor prognosis.    Will continue the IV abx and IVF at the present rate. Will order epogen.  will follow closely.   Palliative and hospice evaluation is probably ideal.

## 2020-03-10 NOTE — PROGRESS NOTE ADULT - SUBJECTIVE AND OBJECTIVE BOX
[facility], Division of Infectious Diseases  HILDA Giang A. Lee  [phone]    Name: GURJIT HERRERA  Age: 84y  Gender: Female  MRN: [id number]    Interval History--  Notes reviewed. Remains confused but awake and alert. Not a reliable historian.     Past Medical History--  Wound of sacral region  Depression  Iron deficiency anemia  Eczema  HTN (hypertension)  CKD (chronic kidney disease) stage 5, GFR less than 15 ml/min  Herniated lumbar intervertebral disc  Depression  Tremor  Kidney atrophy  Colostomy status  Chronic UTI (urinary tract infection)  Cervical cancer  Dyslipidemia  Diabetes  Hernia, incisional  S/P hip replacement  S/P colon resection  S/P hysterectomy      For details regarding the patient's social history, family history, and other miscellaneous elements, please refer the initial infectious diseases consultation and/or the admitting history and physical examination for this admission.    Allergies    Levaquin (Pruritus)  mercury (Pruritus; Rash)  sulfa drugs (Pruritus)    Intolerances        Medications--  Antibiotics:    Immunologic:  epoetin jean carlos Injectable 30696 Unit(s) SubCutaneous <User Schedule>    Other:  acetaminophen    Suspension .. PRN  amLODIPine   Tablet  aspirin enteric coated  atorvastatin  budesonide 160 MICROgram(s)/formoterol 4.5 MICROgram(s) Inhaler  chlorhexidine 2% Cloths  cholestyramine Powder (Sugar-Free)  clobetasol 0.05% Cream  dextrose 5% + sodium chloride 0.45%.  ferrous    sulfate  FLUoxetine  heparin  Injectable  labetalol  melatonin  nystatin Cream  phenytoin   Suspension  QUEtiapine  sodium bicarbonate  valproic  acid Syrup      Review of Systems--  Review of systems unable secondary to clinical condition.     Physical Examination--  Vital Signs: T(F): 98 (03-10-20 @ 16:00), Max: 98.2 (03-10-20 @ 12:00)  HR: 75 (03-10-20 @ 17:00)  BP: 153/94 (03-10-20 @ 17:00)  RR: 26 (03-10-20 @ 17:00)  SpO2: 94% (03-10-20 @ 17:00)  Wt(kg): --  General: Nontoxic-appearing Female in no acute distress.  HEENT: AT/NC. Anicteric. Conjunctiva pink and moist.  Neck: Not rigid. No sense of mass. CVL removed site dressed.  Nodes: None palpable.  Lungs: Poor effort grossly clear bilaterally without rales, wheezing or rhonchi  Heart: Regular rate and rhythm. 2/6 systolic murmur. No rub. No gallop. No palpable thrill.  Abdomen: Bowel sounds present and normoactive. Soft. Nondistended. Nontender. No sense of mass. No organomegaly. Ostomy functional.   Back: unable  Extremities: No cyanosis or clubbing. 1+ edema.   Skin: Warm. Dry. Good turgor. No rash. No vasculitic stigmata.  Psychiatric: Unable      Laboratory Studies--  CBC                        8.6    10.94 )-----------( 242      ( 10 Mar 2020 05:09 )             26.8       Chemistries  03-10    150<H>  |  119<H>  |  64<H>  ----------------------------<  100<H>  3.8   |  15<L>  |  4.70<H>    Ca    9.0      10 Mar 2020 05:09  Phos  3.8     03-10  Mg     1.7     03-10    TPro  5.9<L>  /  Alb  2.4<L>  /  TBili  0.4  /  DBili  x   /  AST  18  /  ALT  21  /  AlkPhos  89  03-10      Culture Data    Culture - Blood (collected 06 Mar 2020 01:23)  Source: .Blood Blood-Peripheral  Preliminary Report (07 Mar 2020 02:03):    No growth to date.    Culture - Blood (collected 06 Mar 2020 01:23)  Source: .Blood Blood  Preliminary Report (07 Mar 2020 02:03):    No growth to date.    Culture - Urine (collected 03 Mar 2020 21:30)  Source: .Urine Clean Catch (Midstream)  Final Report (05 Mar 2020 22:43):    >100,000 CFU/ml Escherichia coli    >100,000 CFU/ml Enterobacter aerogenes  Organism: Escherichia coli  Enterobacter aerogenes (05 Mar 2020 22:43)  Organism: Enterobacter aerogenes (05 Mar 2020 22:43)  Organism: Escherichia coli (05 Mar 2020 22:43)    Culture - Blood (collected 03 Mar 2020 21:12)  Source: .Blood Blood-Peripheral  Final Report (08 Mar 2020 23:00):    No growth at 5 days.    Culture - Blood (collected 03 Mar 2020 21:10)  Source: .Blood Blood-Peripheral  Final Report (08 Mar 2020 23:00):    No growth at 5 days.

## 2020-03-10 NOTE — PROGRESS NOTE ADULT - ASSESSMENT
85 yo female admitted with altered mental status with no clear explanation  Question whether altered mental status at home could have been postictal phenomena given what appears to be seizure activity here.  Limited EEG here without seizure activity, f/u study with no active seizure but increased risk of such.  Depakote not useful as an AED in the setting ot carbapenem use.   No clear or convincing evidence of active/uncontrolled infection on exam.  In setting of chronic renae catheter the urine isolates is questionable significance, but only clue other than her sacral sore which seems unlikely.  CXR with R effusion, doubt pneumonia  Sacral sore not infected appearing. While patient almost certainly has sacral osteomyelitis based on the CT scan I am skeptical that its responsible for her presentation.  No concern of potential other SSTI  Benign abdomen  WBC declined

## 2020-03-11 LAB
ALBUMIN SERPL ELPH-MCNC: 2.5 G/DL — LOW (ref 3.3–5)
ALP SERPL-CCNC: 110 U/L — SIGNIFICANT CHANGE UP (ref 40–120)
ALT FLD-CCNC: 24 U/L — SIGNIFICANT CHANGE UP (ref 12–78)
AMMONIA BLD-MCNC: 35 UMOL/L — HIGH (ref 11–32)
ANION GAP SERPL CALC-SCNC: 14 MMOL/L — SIGNIFICANT CHANGE UP (ref 5–17)
APTT BLD: 27.4 SEC — LOW (ref 28.5–37)
AST SERPL-CCNC: 16 U/L — SIGNIFICANT CHANGE UP (ref 15–37)
BASE EXCESS BLDV CALC-SCNC: -10.8 MMOL/L — LOW (ref -2–2)
BASOPHILS # BLD AUTO: 0.03 K/UL — SIGNIFICANT CHANGE UP (ref 0–0.2)
BASOPHILS NFR BLD AUTO: 0.3 % — SIGNIFICANT CHANGE UP (ref 0–2)
BILIRUB SERPL-MCNC: 0.4 MG/DL — SIGNIFICANT CHANGE UP (ref 0.2–1.2)
BLOOD GAS COMMENTS, VENOUS: SIGNIFICANT CHANGE UP
BLOOD GAS COMMENTS, VENOUS: SIGNIFICANT CHANGE UP
BUN SERPL-MCNC: 63 MG/DL — HIGH (ref 7–23)
CALCIUM SERPL-MCNC: 8.8 MG/DL — SIGNIFICANT CHANGE UP (ref 8.5–10.1)
CHLORIDE SERPL-SCNC: 120 MMOL/L — HIGH (ref 96–108)
CO2 SERPL-SCNC: 17 MMOL/L — LOW (ref 22–31)
CREAT SERPL-MCNC: 4.3 MG/DL — HIGH (ref 0.5–1.3)
CULTURE RESULTS: SIGNIFICANT CHANGE UP
CULTURE RESULTS: SIGNIFICANT CHANGE UP
EOSINOPHIL # BLD AUTO: 0.06 K/UL — SIGNIFICANT CHANGE UP (ref 0–0.5)
EOSINOPHIL NFR BLD AUTO: 0.7 % — SIGNIFICANT CHANGE UP (ref 0–6)
GLUCOSE SERPL-MCNC: 119 MG/DL — HIGH (ref 70–99)
HCO3 BLDV-SCNC: 16 MMOL/L — LOW (ref 21–29)
HCT VFR BLD CALC: 29.8 % — LOW (ref 34.5–45)
HGB BLD-MCNC: 9.1 G/DL — LOW (ref 11.5–15.5)
HOROWITZ INDEX BLDV+IHG-RTO: 21 — SIGNIFICANT CHANGE UP
IMM GRANULOCYTES NFR BLD AUTO: 1.9 % — HIGH (ref 0–1.5)
INR BLD: 1.18 RATIO — HIGH (ref 0.88–1.16)
LYMPHOCYTES # BLD AUTO: 0.52 K/UL — LOW (ref 1–3.3)
LYMPHOCYTES # BLD AUTO: 6 % — LOW (ref 13–44)
MAGNESIUM SERPL-MCNC: 2.3 MG/DL — SIGNIFICANT CHANGE UP (ref 1.6–2.6)
MCHC RBC-ENTMCNC: 28.7 PG — SIGNIFICANT CHANGE UP (ref 27–34)
MCHC RBC-ENTMCNC: 30.5 GM/DL — LOW (ref 32–36)
MCV RBC AUTO: 94 FL — SIGNIFICANT CHANGE UP (ref 80–100)
MONOCYTES # BLD AUTO: 0.33 K/UL — SIGNIFICANT CHANGE UP (ref 0–0.9)
MONOCYTES NFR BLD AUTO: 3.8 % — SIGNIFICANT CHANGE UP (ref 2–14)
NEUTROPHILS # BLD AUTO: 7.52 K/UL — HIGH (ref 1.8–7.4)
NEUTROPHILS NFR BLD AUTO: 87.3 % — HIGH (ref 43–77)
NRBC # BLD: 0 /100 WBCS — SIGNIFICANT CHANGE UP (ref 0–0)
PCO2 BLDV: 40 MMHG — SIGNIFICANT CHANGE UP (ref 35–50)
PH BLDV: 7.22 — LOW (ref 7.35–7.45)
PHENYTOIN FREE SERPL-MCNC: 1.5 UG/ML — LOW (ref 10–20)
PHOSPHATE SERPL-MCNC: 4.3 MG/DL — SIGNIFICANT CHANGE UP (ref 2.5–4.5)
PLATELET # BLD AUTO: 229 K/UL — SIGNIFICANT CHANGE UP (ref 150–400)
PO2 BLDV: 89 MMHG — HIGH (ref 25–45)
POTASSIUM SERPL-MCNC: 3.9 MMOL/L — SIGNIFICANT CHANGE UP (ref 3.5–5.3)
POTASSIUM SERPL-SCNC: 3.9 MMOL/L — SIGNIFICANT CHANGE UP (ref 3.5–5.3)
PROT SERPL-MCNC: 6.3 G/DL — SIGNIFICANT CHANGE UP (ref 6–8.3)
PROTHROM AB SERPL-ACNC: 13.3 SEC — HIGH (ref 10–12.9)
RBC # BLD: 3.17 M/UL — LOW (ref 3.8–5.2)
RBC # FLD: 16.1 % — HIGH (ref 10.3–14.5)
SAO2 % BLDV: 94 % — HIGH (ref 67–88)
SODIUM SERPL-SCNC: 151 MMOL/L — HIGH (ref 135–145)
SPECIMEN SOURCE: SIGNIFICANT CHANGE UP
SPECIMEN SOURCE: SIGNIFICANT CHANGE UP
WBC # BLD: 8.62 K/UL — SIGNIFICANT CHANGE UP (ref 3.8–10.5)
WBC # FLD AUTO: 8.62 K/UL — SIGNIFICANT CHANGE UP (ref 3.8–10.5)

## 2020-03-11 PROCEDURE — 99232 SBSQ HOSP IP/OBS MODERATE 35: CPT

## 2020-03-11 PROCEDURE — 99233 SBSQ HOSP IP/OBS HIGH 50: CPT

## 2020-03-11 RX ORDER — SODIUM CHLORIDE 9 MG/ML
1000 INJECTION, SOLUTION INTRAVENOUS
Refills: 0 | Status: DISCONTINUED | OUTPATIENT
Start: 2020-03-11 | End: 2020-03-11

## 2020-03-11 RX ORDER — SODIUM CHLORIDE 9 MG/ML
1000 INJECTION, SOLUTION INTRAVENOUS
Refills: 0 | Status: DISCONTINUED | OUTPATIENT
Start: 2020-03-11 | End: 2020-03-18

## 2020-03-11 RX ADMIN — CHLORHEXIDINE GLUCONATE 1 APPLICATION(S): 213 SOLUTION TOPICAL at 05:18

## 2020-03-11 RX ADMIN — Medication 100 MILLIGRAM(S): at 05:17

## 2020-03-11 RX ADMIN — Medication 1 APPLICATION(S): at 05:19

## 2020-03-11 RX ADMIN — Medication 750 MILLIGRAM(S): at 05:18

## 2020-03-11 RX ADMIN — NYSTATIN CREAM 1 APPLICATION(S): 100000 CREAM TOPICAL at 05:19

## 2020-03-11 RX ADMIN — ATORVASTATIN CALCIUM 10 MILLIGRAM(S): 80 TABLET, FILM COATED ORAL at 23:02

## 2020-03-11 RX ADMIN — Medication 750 MILLIGRAM(S): at 17:20

## 2020-03-11 RX ADMIN — Medication 200 MILLIGRAM(S): at 23:02

## 2020-03-11 RX ADMIN — Medication 200 MILLIGRAM(S): at 05:16

## 2020-03-11 RX ADMIN — Medication 1 APPLICATION(S): at 17:19

## 2020-03-11 RX ADMIN — Medication 650 MILLIGRAM(S): at 05:16

## 2020-03-11 RX ADMIN — Medication 3 MILLIGRAM(S): at 23:02

## 2020-03-11 RX ADMIN — Medication 81 MILLIGRAM(S): at 11:54

## 2020-03-11 RX ADMIN — Medication 100 MILLIGRAM(S): at 13:48

## 2020-03-11 RX ADMIN — HEPARIN SODIUM 5000 UNIT(S): 5000 INJECTION INTRAVENOUS; SUBCUTANEOUS at 13:48

## 2020-03-11 RX ADMIN — HEPARIN SODIUM 5000 UNIT(S): 5000 INJECTION INTRAVENOUS; SUBCUTANEOUS at 05:17

## 2020-03-11 RX ADMIN — NYSTATIN CREAM 1 APPLICATION(S): 100000 CREAM TOPICAL at 17:20

## 2020-03-11 RX ADMIN — CHOLESTYRAMINE 4 GRAM(S): 4 POWDER, FOR SUSPENSION ORAL at 10:42

## 2020-03-11 RX ADMIN — HEPARIN SODIUM 5000 UNIT(S): 5000 INJECTION INTRAVENOUS; SUBCUTANEOUS at 23:02

## 2020-03-11 RX ADMIN — Medication 200 MILLIGRAM(S): at 13:48

## 2020-03-11 RX ADMIN — Medication 650 MILLIGRAM(S): at 23:02

## 2020-03-11 RX ADMIN — Medication 650 MILLIGRAM(S): at 13:49

## 2020-03-11 RX ADMIN — Medication 325 MILLIGRAM(S): at 11:54

## 2020-03-11 RX ADMIN — Medication 100 MILLIGRAM(S): at 23:03

## 2020-03-11 RX ADMIN — Medication 20 MILLIGRAM(S): at 11:54

## 2020-03-11 RX ADMIN — SODIUM CHLORIDE 50 MILLILITER(S): 9 INJECTION, SOLUTION INTRAVENOUS at 10:42

## 2020-03-11 RX ADMIN — AMLODIPINE BESYLATE 5 MILLIGRAM(S): 2.5 TABLET ORAL at 05:16

## 2020-03-11 RX ADMIN — ERYTHROPOIETIN 10000 UNIT(S): 10000 INJECTION, SOLUTION INTRAVENOUS; SUBCUTANEOUS at 11:01

## 2020-03-11 NOTE — PROGRESS NOTE ADULT - SUBJECTIVE AND OBJECTIVE BOX
[facility], Division of Infectious Diseases  HILDA Giang A. Lee  [phone]    Name: GURJIT HERRERA  Age: 84y  Gender: Female  MRN: [id number]    Interval History--  Notes reviewed. Seen earlier today. Was having an EEG.  D/W Dr. Hicks earlier.  Patient remains confused/nonhistoruan.     Past Medical History--  Wound of sacral region  Depression  Iron deficiency anemia  Eczema  HTN (hypertension)  CKD (chronic kidney disease) stage 5, GFR less than 15 ml/min  Herniated lumbar intervertebral disc  Depression  Tremor  Kidney atrophy  Colostomy status  Chronic UTI (urinary tract infection)  Cervical cancer  Dyslipidemia  Diabetes  Hernia, incisional  S/P hip replacement  S/P colon resection  S/P hysterectomy      For details regarding the patient's social history, family history, and other miscellaneous elements, please refer the initial infectious diseases consultation and/or the admitting history and physical examination for this admission.    Allergies    Levaquin (Pruritus)  mercury (Pruritus; Rash)  sulfa drugs (Pruritus)    Intolerances        Medications--  Antibiotics:    Immunologic:  epoetin jean carlos Injectable 26059 Unit(s) SubCutaneous <User Schedule>    Other:  acetaminophen    Suspension .. PRN  amLODIPine   Tablet  aspirin enteric coated  atorvastatin  budesonide 160 MICROgram(s)/formoterol 4.5 MICROgram(s) Inhaler  chlorhexidine 2% Cloths  cholestyramine Powder (Sugar-Free)  clobetasol 0.05% Cream  dextrose 5% + sodium chloride 0.45%.  ferrous    sulfate  FLUoxetine  heparin  Injectable  hydrALAZINE Injectable PRN  labetalol  melatonin  nystatin Cream  phenytoin   Suspension  QUEtiapine PRN  sodium bicarbonate  valproic  acid Syrup      Review of Systems--  Review of systems unable secondary to clinical condition.     Physical Examination--  Vital Signs: T(F): 97 (03-11-20 @ 15:22), Max: 97.8 (03-11-20 @ 00:00)  HR: 61 (03-11-20 @ 15:00)  BP: 117/74 (03-11-20 @ 15:00)  RR: 25 (03-11-20 @ 15:00)  SpO2: 91% (03-11-20 @ 15:00)  Wt(kg): --  General: Nontoxic-appearing Female in no acute distress.  HEENT: AT/NC. Anicteric. Conjunctiva pink and moist.  Neck: Not rigid. No sense of mass. CVL removed site dressed.  Nodes: None palpable.  Lungs: Poor effort grossly clear bilaterally without rales, wheezing or rhonchi  Heart: Regular rate and rhythm. 2/6 systolic murmur. No rub. No gallop. No palpable thrill.  Abdomen: Bowel sounds present and normoactive. Soft. Nondistended. Nontender. No sense of mass. No organomegaly. Ostomy functional.   Back: unable  Extremities: No cyanosis or clubbing. 1+ edema.   Skin: Warm. Dry. Good turgor. No rash. No vasculitic stigmata.  Psychiatric: Unable.         Laboratory Studies--  CBC                        9.1    8.62  )-----------( 229      ( 11 Mar 2020 05:12 )             29.8       Chemistries  03-11    151<H>  |  120<H>  |  63<H>  ----------------------------<  119<H>  3.9   |  17<L>  |  4.30<H>    Ca    8.8      11 Mar 2020 05:12  Phos  4.3     03-11  Mg     2.3     03-11    TPro  6.3  /  Alb  2.5<L>  /  TBili  0.4  /  DBili  x   /  AST  16  /  ALT  24  /  AlkPhos  110  03-11      Culture Data    Culture - Blood (collected 06 Mar 2020 01:23)  Source: .Blood Blood-Peripheral  Final Report (11 Mar 2020 03:00):    No growth at 5 days.    Culture - Blood (collected 06 Mar 2020 01:23)  Source: .Blood Blood  Final Report (11 Mar 2020 03:00):    No growth at 5 days.

## 2020-03-11 NOTE — PROGRESS NOTE ADULT - ASSESSMENT
85 yo F with Hx CKD5, chronic renae and frequent UTIs, stage 4 sacral wound with recent osteomyelitis, dementia admitted with confusion and suspected sepsis from UTI, complicated by episode of sz activity, resulting in acute respiratory failure.  Now improving.    Neuro: Patient increased agitation and delirium likely 2/2 to worsening renal function and hx of CKD stage 5. Will order ammonia for AM but baseline level unknown. Acetaminophen suspension PRN. Seizure treatment with Dilantin suspension 100mg TID with Depakote 750 BID. Ativan 1 mg IV push q6h PRN for breakthrough seizure. Seizure precautions. Fall precautions. Continue Prozac for depression. Neurology following, f/u recommendations    CV: BP meds restarted due to hypertension. Patient blood pressure elevated overnight peaking at 200 SBP likely 2/2 to worsening renal failure, CKD stage 5. IV hydralazine started PRN. Cont norvasc and labetalol, cont to monitor BP. Continue ASA and statin.    Pulm: Respiratory status currently stable. Continue symbicort. BIPAP overnight   Renal: CKD Stage 5 (chronic). Net negative output.  Pt remains hypernatremic on AM labs. Unable to receive 250cc free water due to initial bout of agitation. Will resume later. Cont strict I&O. Continue sodium bicarb and Procrit. Renal following, with recs to add epogen no decision about RRT at this time. No emergent need for HD at this time.  GI: Continue Dysphagia 1 honey diet. Continue Ostomy care. Continue Prevalite    ID: Afebrile. Leukocytosis resolved. Abx with meropenem discontinued. Blood cultures show no growth to date. ID following. Continue Tylenol PRN for fever.  Heme: on Heparin SQ  Prophylaxis: Aquacel dressing every other day, Protonix injectable  Dispo: Patient critically ill. Prognosis poor/guarded. Full code 83 yo F with Hx CKD5, chronic renae and frequent UTIs, stage 4 sacral wound with recent osteomyelitis, dementia admitted with confusion and suspected sepsis from UTI, complicated by episode of sz activity, resulting in acute respiratory failure.  Now improving.    Neuro: Patient increased agitation and delirium likely 2/2 to worsening renal function and hx of CKD stage 5. Will order ammonia for AM but baseline level unknown. Acetaminophen suspension PRN. Seizure treatment with Dilantin suspension 100mg TID with Depakote 750 BID. Depakote and Dilantin level for tmr AM. Cont ativan 1 mg IV push q6h PRN for breakthrough seizure. Seizure precautions. Fall precautions. Continue Prozac for depression. Neurology following, f/u recommendations    CV: BP meds restarted due to hypertension. Patient blood pressure elevated overnight peaking at 200 SBP likely 2/2 to worsening renal failure, CKD stage 5. IV hydralazine started PRN. Cont norvasc and increase dose of labetalol, cont to monitor BP. Continue ASA and statin.    Pulm: Respiratory status currently stable. Continue symbicort. BIPAP overnight   Renal: CKD Stage 5 (chronic). Net negative output.  Pt remains hypernatremic on AM labs. D5 with NS 0.45% 50ml/Hr run for 12 hrs to help with dehydration and hypernatremia. Cont strict I&O. Continue sodium bicarb and Procrit. Nephrology following, will f/u recs. Family does not want HD at this time.  GI: Continue Dysphagia 1 honey diet. Continue Ostomy care. Continue Prevalite    ID: Afebrile. Leukocytosis resolved. Abx with meropenem discontinued. Blood cultures show no growth to date. ID following. Continue Tylenol PRN for fever.  Heme: on Heparin SQ  Prophylaxis: Aquacel dressing every other day, Protonix injectable  Dispo: Patient critically ill. Prognosis poor/guarded. Full code

## 2020-03-11 NOTE — PROGRESS NOTE ADULT - PROBLEM SELECTOR PLAN 8
- pt's hypotension was likely related to sepsis and all anti-hypertensives held  - pt's BP now rising and home regimen is being restarted  - continue amlodipine 5 mg, labetalol 100 mg TID with hold parameters

## 2020-03-11 NOTE — PROGRESS NOTE ADULT - ASSESSMENT
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) and chronic diarrhea, cervical cancer (s/p radical hysterectomy and radiation), tremors, eczema, depression, HTN, HLD, history of frequent UTIs with chronic indwelling renae, anemia, stage 4 sacral decubitus ulcer, and recent hospitalization for MEHDI on CKD thought to be 2/2 dehydration and urinary retention from malfunctioning chronic renae, now brought in by EMS to ED for altered mental status admitted for acute metabolic encephalopathy due to suspected UTI and hyperkalemia in setting of MEHDI on CKD 5, course c/b likely generalized seizure on 3/5/20, and acute hypoxic respiratory failure and suspected sepsis due to suspected aspiration PNA, now extubated, but with persistent delirium.

## 2020-03-11 NOTE — PROGRESS NOTE ADULT - PROBLEM SELECTOR PLAN 2
- unresponsive episode with shaking and spike in lactic acidosis on 3/5, consistent with seizure (first EEG on 3/5 done after IV ativan given, which can mask seizure activity)  -2nd EEG on 3/8 showing some generalized spike/wave patterns concerning for increased seizure risk  - CT head, MRI brain showed no acute infarct or hemorrhage  - depakote increased dose, but level low, in the setting of meropenem use; also given dose of Keppra x2 for load in pt with advanced CKD, while pt completing meropenem course  - dilantin added  - seizure precautions  - continue to monitor valproic acid levels  - IV ativan 1 mg q6 for breakthrough seizures  - neuro, Dr. Quintanilla, recs appreciated

## 2020-03-11 NOTE — PROGRESS NOTE ADULT - SUBJECTIVE AND OBJECTIVE BOX
Neurology follow up note    GURJIT [id number]      Interval History:    Patient feels ok no new complaints.    MEDICATIONS    acetaminophen    Suspension .. 650 milliGRAM(s) Oral every 6 hours PRN  amLODIPine   Tablet 5 milliGRAM(s) Oral daily  aspirin enteric coated 81 milliGRAM(s) Oral daily  atorvastatin 10 milliGRAM(s) Oral at bedtime  budesonide 160 MICROgram(s)/formoterol 4.5 MICROgram(s) Inhaler 2 Puff(s) Inhalation two times a day  chlorhexidine 2% Cloths 1 Application(s) Topical <User Schedule>  cholestyramine Powder (Sugar-Free) 4 Gram(s) Oral daily  clobetasol 0.05% Cream 1 Application(s) Topical two times a day  dextrose 5% + sodium chloride 0.45%. 1000 milliLiter(s) IV Continuous <Continuous>  epoetin jean carlos Injectable 76451 Unit(s) SubCutaneous <User Schedule>  ferrous    sulfate 325 milliGRAM(s) Oral daily  FLUoxetine 20 milliGRAM(s) Oral daily  heparin  Injectable 5000 Unit(s) SubCutaneous every 8 hours  hydrALAZINE Injectable 10 milliGRAM(s) IV Push every 6 hours PRN  labetalol 200 milliGRAM(s) Oral every 8 hours  melatonin 3 milliGRAM(s) Oral at bedtime  nystatin Cream 1 Application(s) Topical two times a day  phenytoin   Suspension 100 milliGRAM(s) Oral three times a day  QUEtiapine 25 milliGRAM(s) Oral at bedtime PRN  sodium bicarbonate 650 milliGRAM(s) Oral three times a day  valproic  acid Syrup 750 milliGRAM(s) Oral two times a day      Allergies    Levaquin (Pruritus)  mercury (Pruritus; Rash)  sulfa drugs (Pruritus)    Intolerances            Vital Signs Last 24 Hrs  T(C): 36.4 (11 Mar 2020 07:42), Max: 36.8 (10 Mar 2020 12:00)  T(F): 97.5 (11 Mar 2020 07:42), Max: 98.2 (10 Mar 2020 12:00)  HR: 71 (11 Mar 2020 10:00) (65 - 84)  BP: 141/63 (11 Mar 2020 10:00) (121/66 - 190/80)  BP(mean): 90 (11 Mar 2020 10:00) (80 - 136)  RR: 19 (11 Mar 2020 10:00) (17 - 43)  SpO2: 98% (11 Mar 2020 10:00) (83% - 99%)      REVIEW OF SYSTEMS: Limited or unable to obtain secondary to patient's poor mental status.    Constitutional: No fever, chills, fatigue, generalized  weakness  Eyes: no eye pain, visual disturbances, or discharge  ENT:  No difficulty hearing, tinnitus, vertigo; No sinus or throat pain  Neck: No pain or stiffness  Respiratory: No cough, dyspnea, wheezing   Cardiovascular: No chest pain, palpitations,   Gastrointestinal: No abdominal or epigastric pain. No nausea, vomiting  No diarrhea or constipation.   Genitourinary: No dysuria, frequency, hematuria or incontinence  Neurological: No headaches, lightheadedness, vertigo, numbness or tremors  Psychiatric: No depression, anxiety, mood swings or difficulty sleeping  Musculoskeletal: No joint pain or swelling; No muscle, back or extremity pain      On Neurological Examination:    Mental Status - Patient is alert, awake,  [facility]   year 2020     Follow simple commands    Speech -   Fluent                 Cranial Nerves - Pupils 3 mm equal and reactive to light,   extraocular eye movements intact.   smile symmetric  intact bilateral NLF    Motor Exam -   Right upper 4/5   Left upper  3/5  Right lower 2/5  Left lower 2/5    Muscle tone - is normal all over.  No asymmetry is seen.      Sensory    Bilateral intact to light touch    GENERAL Exam: Nontoxic , No Acute Distress   	  HEENT:  normocephalic, atraumatic  		  LUNGS:  Decreased bilaterally  	  HEART: Normal S1S2   No murmur RRR        	  GI/ ABDOMEN:  Soft  Non tender    EXTREMITIES:   No Edema  No Clubbing  No Cyanosis   	   SKIN: Normal  No Ecchymosis             LABS:  CBC Full  -  ( 11 Mar 2020 05:12 )  WBC Count : 8.62 K/uL  RBC Count : 3.17 M/uL  Hemoglobin : 9.1 g/dL  Hematocrit : 29.8 %  Platelet Count - Automated : 229 K/uL  Mean Cell Volume : 94.0 fl  Mean Cell Hemoglobin : 28.7 pg  Mean Cell Hemoglobin Concentration : 30.5 gm/dL  Auto Neutrophil # : 7.52 K/uL  Auto Lymphocyte # : 0.52 K/uL  Auto Monocyte # : 0.33 K/uL  Auto Eosinophil # : 0.06 K/uL  Auto Basophil # : 0.03 K/uL  Auto Neutrophil % : 87.3 %  Auto Lymphocyte % : 6.0 %  Auto Monocyte % : 3.8 %  Auto Eosinophil % : 0.7 %  Auto Basophil % : 0.3 %      03-11    151<H>  |  120<H>  |  63<H>  ----------------------------<  119<H>  3.9   |  17<L>  |  4.30<H>    Ca    8.8      11 Mar 2020 05:12  Phos  4.3     03-11  Mg     2.3     03-11    TPro  6.3  /  Alb  2.5<L>  /  TBili  0.4  /  DBili  x   /  AST  16  /  ALT  24  /  AlkPhos  110  03-11    Hemoglobin A1C:     LIVER FUNCTIONS - ( 11 Mar 2020 05:12 )  Alb: 2.5 g/dL / Pro: 6.3 g/dL / ALK PHOS: 110 U/L / ALT: 24 U/L / AST: 16 U/L / GGT: x           Vitamin B12   PT/INR - ( 11 Mar 2020 05:12 )   PT: 13.3 sec;   INR: 1.18 ratio         PTT - ( 11 Mar 2020 05:12 )  PTT:27.4 sec      RADIOLOGY          ANALYSIS AND PLAN:  An 84-year-old with an episode of seizure and change in mental status.  1.	For episode of seizure, will increase Depakote 750 mg twice  will add dilantin and plan to wean off Depakote in few days   2.	EEG intermittent generalized spike and waves   3.	Ativan 1 mg IV push q.6h. p.r.n. for any type of breakthrough seizure.  4.	Seizure precautions.  5.	For history of underlying depression, at present hard to evaluate the patient's psychiatric status secondary to the patient being lethargic, continue home medication when possible.  6.	For hyperlipidemia, continue the patient on her cholesterol medication.  7.	For change in mental status, questionable this could be metabolic encephalopathy from partial complex seizures versus any type of underlying infectious type process episodes of temperatures and hypotension possible shock   8.	Antibiotics as needed.  9.	Fall precaution.  10.	overall more awake and interactive today   11.	For chronic kidney disease, monitor renal function.  12.	Spoke with multiple family members at the bedside.  Advance directives were discussed with them.  Primary  will be daughterJu, her cell phone number is [phone], home number is [phone] spoke 3/10/2020  13.	 mental status better today  14.	follow up EEG   15.	Greater than 40 minutes of time was spent with the patient, plan of care, reviewing data, speaking to the family and multidisciplinary healthcare team.

## 2020-03-11 NOTE — CHART NOTE - NSCHARTNOTEFT_GEN_A_CORE
Assessment:  Pt seen for nutrition follow up.  Chart reviewed hospital course noted.      Pt remains confused, somewhat agitated.  CNA preparing to feed pt bfst at this time.  Pt seen by SLP 3/9 with recommendation for pureed diet honey thick liquids, no straw.   +BM via ostomy 3/10.      Renal indices remain elevated, NH3 H as well possibly contributing to mental status.  Receiving free water 250cc Q6h for hypernatremia.  No plans for HD at this time, as per renal,  discussion with family for palliative care may be appropriate.     Factors impacting intake: [ ] none [ ] nausea  [ ] vomiting [ ] diarrhea [ ] constipation  [ ]chewing problems [x] swallowing issues  [x] other: confusion    Diet Presciption: Diet, Dysphagia 1 Pureed-Honey Consistency Fluid:   DASH/TLC {Sodium & Cholesterol Restricted}  Low Sodium (03-09-20 @ 10:51)    Intake: not documented at present    Current Weight: 3/8 130#, 3/5 120.9#/129.1#.    Increase in generalized edema noted.  % Weight Change    Pertinent Medications: MEDICATIONS  (STANDING):  amLODIPine   Tablet 5 milliGRAM(s) Oral daily  aspirin enteric coated 81 milliGRAM(s) Oral daily  atorvastatin 10 milliGRAM(s) Oral at bedtime  budesonide 160 MICROgram(s)/formoterol 4.5 MICROgram(s) Inhaler 2 Puff(s) Inhalation two times a day  chlorhexidine 2% Cloths 1 Application(s) Topical <User Schedule>  cholestyramine Powder (Sugar-Free) 4 Gram(s) Oral daily  clobetasol 0.05% Cream 1 Application(s) Topical two times a day  dextrose 5% + sodium chloride 0.45%. 1000 milliLiter(s) (50 mL/Hr) IV Continuous <Continuous>  epoetin jean carlos Injectable 91005 Unit(s) SubCutaneous <User Schedule>  ferrous    sulfate 325 milliGRAM(s) Oral daily  FLUoxetine 20 milliGRAM(s) Oral daily  heparin  Injectable 5000 Unit(s) SubCutaneous every 8 hours  labetalol 200 milliGRAM(s) Oral every 8 hours  melatonin 3 milliGRAM(s) Oral at bedtime  nystatin Cream 1 Application(s) Topical two times a day  phenytoin   Suspension 100 milliGRAM(s) Oral three times a day  sodium bicarbonate 650 milliGRAM(s) Oral three times a day  valproic  acid Syrup 750 milliGRAM(s) Oral two times a day    MEDICATIONS  (PRN):  acetaminophen    Suspension .. 650 milliGRAM(s) Oral every 6 hours PRN Temp greater or equal to 38C (100.4F), Moderate Pain (4 - 6)  hydrALAZINE Injectable 10 milliGRAM(s) IV Push every 6 hours PRN SBP over 180  QUEtiapine 25 milliGRAM(s) Oral at bedtime PRN Delerium    Pertinent Labs: 03-11 Na151 mmol/L<H> Glu 119 mg/dL<H> K+ 3.9 mmol/L Cr  4.30 mg/dL<H> BUN 63 mg/dL<H> 03-11 Phos 4.3 mg/dL 03-11 Alb 2.5 g/dL<L>       Skin: sacral ulcer unstaged  Edema: 3+ generalized    Estimated Needs:   [x] no change since previous assessment  [ ] recalculated:     Previous Nutrition Diagnosis:   [x]  Altered nutrition related labs (renal)     Nutrition Diagnosis is [x] ongoing       New Nutrition Diagnosis: [x] Swallowing Difficulty related to motor causes evidenced by bedside swallow eval 3/9.         Interventions:   Recommend  [ ] Change Diet To:  [ ] Nutrition Supplement  [ ] Nutrition Support  [x] Other:  continue current diet of DASH/TLC pureed honey thick liquids.  Spoon feed all meals, maintain aspiration precautions. Encourage po intake.  Consider Suplena supplement (thickened) if po intake suboptimal.     Monitoring and Evaluation:   [x] PO intake [ x ] Tolerance to diet prescription [ x ] weights [ x ] labs[ x ] follow up per protocol  [x] other: s/s GI distress, skin integrity, bowel function via ostomy

## 2020-03-11 NOTE — PROGRESS NOTE ADULT - SUBJECTIVE AND OBJECTIVE BOX
Patient is a 84y old  Female who presents with a chief complaint of altered mental status (03 Mar 2020 17:47)  Patient seen in follow up for CKD 4, Hyperkalemia.     Pt resting in bed. EEG in progress.     PAST MEDICAL HISTORY:  Wound of sacral region  Depression  Iron deficiency anemia  Eczema  HTN (hypertension)  CKD (chronic kidney disease) stage 5, GFR less than 15 ml/min  Herniated lumbar intervertebral disc  Depression  Tremor  Kidney atrophy  Colostomy status  Chronic UTI (urinary tract infection)  Cervical cancer  Dyslipidemia  Diabetes  Hernia, incisional      MEDICATIONS  (STANDING):  amLODIPine   Tablet 5 milliGRAM(s) Oral daily  aspirin enteric coated 81 milliGRAM(s) Oral daily  atorvastatin 10 milliGRAM(s) Oral at bedtime  budesonide 160 MICROgram(s)/formoterol 4.5 MICROgram(s) Inhaler 2 Puff(s) Inhalation two times a day  chlorhexidine 2% Cloths 1 Application(s) Topical <User Schedule>  cholestyramine Powder (Sugar-Free) 4 Gram(s) Oral daily  clobetasol 0.05% Cream 1 Application(s) Topical two times a day  dextrose 5% + sodium chloride 0.45%. 1000 milliLiter(s) (50 mL/Hr) IV Continuous <Continuous>  epoetin jean carlos Injectable 67241 Unit(s) SubCutaneous <User Schedule>  ferrous    sulfate 325 milliGRAM(s) Oral daily  FLUoxetine 20 milliGRAM(s) Oral daily  heparin  Injectable 5000 Unit(s) SubCutaneous every 8 hours  labetalol 200 milliGRAM(s) Oral every 8 hours  melatonin 3 milliGRAM(s) Oral at bedtime  nystatin Cream 1 Application(s) Topical two times a day  phenytoin   Suspension 100 milliGRAM(s) Oral three times a day  sodium bicarbonate 650 milliGRAM(s) Oral three times a day  valproic  acid Syrup 750 milliGRAM(s) Oral two times a day    MEDICATIONS  (PRN):  acetaminophen    Suspension .. 650 milliGRAM(s) Oral every 6 hours PRN Temp greater or equal to 38C (100.4F), Moderate Pain (4 - 6)  hydrALAZINE Injectable 10 milliGRAM(s) IV Push every 6 hours PRN SBP over 180  QUEtiapine 25 milliGRAM(s) Oral at bedtime PRN Delerium    T(C): 36.4 (03-11-20 @ 07:42), Max: 36.8 (03-09-20 @ 15:18)  HR: 65 (03-11-20 @ 08:00) (65 - 119)  BP: 124/80 (03-11-20 @ 08:00) (121/66 - 224/89)  RR: 22 (03-11-20 @ 08:00)  SpO2: 98% (03-11-20 @ 08:00)  Wt(kg): --  I&O's Detail    10 Mar 2020 07:01  -  11 Mar 2020 07:00  --------------------------------------------------------  IN:    dextrose 5% + sodium chloride 0.45%.: 300 mL    Solution: 150 mL  Total IN: 450 mL    OUT:    Colostomy: 950 mL    Indwelling Catheter - Urethral: 1185 mL  Total OUT: 2135 mL    Total NET: -1685 mL      PHYSICAL EXAM:  General: No distress  Respiratory: b/l air entry  Cardiovascular: S1 S2  Gastrointestinal: soft  Extremities:  edema, + renae               LABORATORY:                        9.1    8.62  )-----------( 229      ( 11 Mar 2020 05:12 )             29.8     03-11    151<H>  |  120<H>  |  63<H>  ----------------------------<  119<H>  3.9   |  17<L>  |  4.30<H>    Ca    8.8      11 Mar 2020 05:12  Phos  4.3     03-11  Mg     2.3     03-11    TPro  6.3  /  Alb  2.5<L>  /  TBili  0.4  /  DBili  x   /  AST  16  /  ALT  24  /  AlkPhos  110  03-11    Sodium, Serum: 151 mmol/L (03-11 @ 05:12)  Sodium, Serum: 150 mmol/L (03-10 @ 05:09)  Sodium, Serum: 144 mmol/L (03-09 @ 19:10)    Potassium, Serum: 3.9 mmol/L (03-11 @ 05:12)  Potassium, Serum: 3.8 mmol/L (03-10 @ 05:09)  Potassium, Serum: 3.9 mmol/L (03-09 @ 19:10)    Hemoglobin: 9.1 g/dL (03-11 @ 05:12)  Hemoglobin: 8.6 g/dL (03-10 @ 05:09)  Hemoglobin: 9.2 g/dL (03-09 @ 05:14)    Creatinine, Serum 4.30 (03-11 @ 05:12)  Creatinine, Serum 4.70 (03-10 @ 05:09)  Creatinine, Serum 4.80 (03-09 @ 19:10)  Creatinine, Serum 5.10 (03-09 @ 05:14)        LIVER FUNCTIONS - ( 11 Mar 2020 05:12 )  Alb: 2.5 g/dL / Pro: 6.3 g/dL / ALK PHOS: 110 U/L / ALT: 24 U/L / AST: 16 U/L / GGT: x

## 2020-03-11 NOTE — PROGRESS NOTE ADULT - PROBLEM SELECTOR PLAN 4
- on admission, patient with metabolic acidosis in setting of UTI and suspected pre-renal MEHDI  - Creatinine 4.7  - po sodium bicarb 650 mg TID  - pt's renal status still very poor. If no plan to pursue dialysis, may need to further discuss palliative care

## 2020-03-11 NOTE — PROGRESS NOTE ADULT - PROBLEM SELECTOR PLAN 1
- now afebrile  - there was suspicion for UTI on admission though unclear significance of the UCx isolates in the presence of chronic renae   - Doubt infection. Monitor off antibiotics per ID   - Plan per ICU

## 2020-03-11 NOTE — PROGRESS NOTE ADULT - SUBJECTIVE AND OBJECTIVE BOX
Patient is a 84y old  Female who presents with a chief complaint of altered mental status (10 Mar 2020 17:53)    24 hour events: Charting in progress     REVIEW OF SYSTEMS  Constitutional: No fever, chills, fatigue  Neuro: No headache, numbness, weakness  Resp: No cough, wheezing, shortness of breath  CVS: No chest pain, palpitations, leg swelling  GI: No abdominal pain, nausea, vomiting, diarrhea   : No dysuria, frequency, incontinence  Skin: No itching, burning, rashes, or lesions   Msk: No joint pain or swelling  Psych: No depression, anxiety, mood swings  Heme: No bleeding    T(F): 97.8 (03-11-20 @ 00:00), Max: 98.2 (03-10-20 @ 12:00)  HR: 67 (03-11-20 @ 06:00) (67 - 84)  BP: 127/61 (03-11-20 @ 06:00) (121/66 - 190/80)  RR: 43 (03-11-20 @ 06:00) (17 - 43)  SpO2: 97% (03-11-20 @ 06:00) (83% - 100%)  Wt(kg): --            I&O's Summary    03-10 @ 07:01  -  03-11 @ 07:00  --------------------------------------------------------  IN: 450 mL / OUT: 2135 mL / NET: -1685 mL      PHYSICAL EXAM  General:   CNS:   HEENT:   Resp:   CVS:   Abd:   Ext:   Skin:     MEDICATIONS    amLODIPine   Tablet Oral  hydrALAZINE Injectable IV Push PRN  labetalol Oral    atorvastatin Oral  cholestyramine Powder (Sugar-Free) Oral    budesonide 160 MICROgram(s)/formoterol 4.5 MICROgram(s) Inhaler Inhalation    acetaminophen    Suspension .. Oral PRN  FLUoxetine Oral  melatonin Oral  phenytoin   Suspension Oral  QUEtiapine Oral PRN  valproic  acid Syrup Oral      aspirin enteric coated Oral  heparin  Injectable SubCutaneous        dextrose 5% + sodium chloride 0.45%. IV Continuous  ferrous    sulfate Oral  sodium bicarbonate Oral    epoetin jean carlos Injectable SubCutaneous    chlorhexidine 2% Cloths Topical  clobetasol 0.05% Cream Topical  nystatin Cream Topical                            9.1    8.62  )-----------( 229      ( 11 Mar 2020 05:12 )             29.8       03-11    151<H>  |  120<H>  |  63<H>  ----------------------------<  119<H>  3.9   |  17<L>  |  4.30<H>    Ca    8.8      11 Mar 2020 05:12  Phos  4.3     03-11  Mg     2.3     03-11    TPro  6.3  /  Alb  2.5<L>  /  TBili  0.4  /  DBili  x   /  AST  16  /  ALT  24  /  AlkPhos  110  03-11          PT/INR - ( 11 Mar 2020 05:12 )   PT: 13.3 sec;   INR: 1.18 ratio         PTT - ( 11 Mar 2020 05:12 )  PTT:27.4 sec          Radiology: ***  Bedside lung ultrasound: ***  Bedside ECHO: ***    CENTRAL LINE: N            JACQUES: Y                          A-LINE: N                         GLOBAL ISSUE/BEST PRACTICE  Analgesia: Tylenol   Sedation: N/A  HOB elevation: yes  Stress ulcer prophylaxis: N/A  VTE prophylaxis: Heparin Subq	  Glycemic control: N/A  Nutrition: Dysphagia diet Pureed honey     CODE STATUS: Full Patient is a 84y old  Female who presents with a chief complaint of altered mental status (10 Mar 2020 17:53)    24 hour events: Patient seen and examined at bedside. Patient in no acute distress. Overnight patient's blood pressure was elevated peaking in the 200's. Patient was given hydralazine PRN and labetalol dose increased to 200. Patient BP currently stable. Patients agitation decreased but delirium present likely in the setting of combination of new onset seizure, worsening renal failure, and dehydration. EEG performed.     REVIEW OF SYSTEMS  Limited or unable to obtain secondary to patient's poor mental status.    T(F): 97.8 (03-11-20 @ 00:00), Max: 98.2 (03-10-20 @ 12:00)  HR: 67 (03-11-20 @ 06:00) (67 - 84)  BP: 127/61 (03-11-20 @ 06:00) (121/66 - 190/80)  RR: 43 (03-11-20 @ 06:00) (17 - 43)  SpO2: 97% (03-11-20 @ 06:00) (83% - 100%)  Wt(kg): --            I&O's Summary    03-10 @ 07:01  -  03-11 @ 07:00  --------------------------------------------------------  IN: 450 mL / OUT: 2135 mL / NET: -1685 mL      PHYSICAL EXAM  GENERAL: NAD, elder, chronically ill appearing, agitation improved on seroquel but with delirium(baseline unknown)  HEENT:  anicteric,  NERVOUS SYSTEM:  Alert & Oriented X1, No new neurological focal deficits, Motor Strength 3/5 B/L upper and lower extremities  CHEST/LUNG: breath sounds diminished b/l in lung bases   HEART: Regular rate and rhythm; No murmurs, rubs, or gallops  ABDOMEN: +ostomy with soft stool, Nontender, Nondistended; Bowel sounds present    MEDICATIONS    amLODIPine   Tablet Oral  hydrALAZINE Injectable IV Push PRN  labetalol Oral    atorvastatin Oral  cholestyramine Powder (Sugar-Free) Oral    budesonide 160 MICROgram(s)/formoterol 4.5 MICROgram(s) Inhaler Inhalation    acetaminophen    Suspension .. Oral PRN  FLUoxetine Oral  melatonin Oral  phenytoin   Suspension Oral  QUEtiapine Oral PRN  valproic  acid Syrup Oral      aspirin enteric coated Oral  heparin  Injectable SubCutaneous        dextrose 5% + sodium chloride 0.45%. IV Continuous  ferrous    sulfate Oral  sodium bicarbonate Oral    epoetin jean carlos Injectable SubCutaneous    chlorhexidine 2% Cloths Topical  clobetasol 0.05% Cream Topical  nystatin Cream Topical                            9.1    8.62  )-----------( 229      ( 11 Mar 2020 05:12 )             29.8       03-11    151<H>  |  120<H>  |  63<H>  ----------------------------<  119<H>  3.9   |  17<L>  |  4.30<H>    Ca    8.8      11 Mar 2020 05:12  Phos  4.3     03-11  Mg     2.3     03-11    TPro  6.3  /  Alb  2.5<L>  /  TBili  0.4  /  DBili  x   /  AST  16  /  ALT  24  /  AlkPhos  110  03-11          PT/INR - ( 11 Mar 2020 05:12 )   PT: 13.3 sec;   INR: 1.18 ratio         PTT - ( 11 Mar 2020 05:12 )  PTT:27.4 sec            CENTRAL LINE: N            JACQUES: Y                          A-LINE: N                         GLOBAL ISSUE/BEST PRACTICE  Analgesia: Tylenol   Sedation: N/A  HOB elevation: yes  Stress ulcer prophylaxis: N/A  VTE prophylaxis: Heparin Subq	  Glycemic control: N/A  Nutrition: Dysphagia diet Pureed honey     CODE STATUS: Full

## 2020-03-11 NOTE — PROGRESS NOTE ADULT - ATTENDING COMMENTS
84F h/o CKD5, chronic renae and frequent UTIs, stage 4 sacral wound with recent osteomyelitis, dementia admitted with confusion and suspected sepsis from UTI, complicated by new onset seizure on the floor with acute hypoxic respiratory failure requiring intubation, extubated 3/7 and now improving though still with episodes of delirium though much improved today.    Neuro: remains delirious though MS much improved today, will continue prozac, seroquel; repeat EEG with no further seizure like activity; continue valproic acid at 750mg bid, also started on dilantin, will check levels in AM  CV: continue amlodipine, uptitrate labetalol for better BP control; continue ASA and statin  Pulm: remains stable on room air at this time, no acute issues   Renal: CKD5 stable, continue bicarb and epo, not ideal candidate for HD should her renal function start to worsen; chronic renae in place and continues to make adequate urine, will give D5@50cc/hr for additional 12 hrs for hypernatremia as no longer getting free water via KO   GI: continue dysphagia diet; ostomy output improved   ID: UCx with E. coli and Enterobacter but unclear if true infection in setting of chronic renae; meropenum dc yesterday   Heme: dvt ppx with hsq  --pt stable for transfer to the floor

## 2020-03-11 NOTE — PROGRESS NOTE ADULT - SUBJECTIVE AND OBJECTIVE BOX
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) and chronic diarrhea, cervical cancer (s/p radical hysterectomy and radiation), tremors, eczema, depression, HTN, HLD, history of frequent UTIs with chronic indwelling renae, anemia, stage 4 sacral decubitus ulcer, and recent hospitalization for MEHDI on CKD thought to be 2/2 dehydration and urinary retention from malfunctioning chronic renae, now brought in by EMS to ED for altered mental status admitted for acute metabolic encephalopathy due to suspected UTI and hyperkalemia in setting of MEHDI on CKD 5, course c/b likely generalized seizure on 3/5/20, and acute hypoxic respiratory failure and suspected sepsis due to suspected aspiration PNA, now extubated, but with persistent delirium. Seen and examined at bedside. Lethargic but awake, drowsy, unable to answer questions     MEDICATIONS  (STANDING):  amLODIPine   Tablet 5 milliGRAM(s) Oral daily  aspirin enteric coated 81 milliGRAM(s) Oral daily  atorvastatin 10 milliGRAM(s) Oral at bedtime  budesonide 160 MICROgram(s)/formoterol 4.5 MICROgram(s) Inhaler 2 Puff(s) Inhalation two times a day  chlorhexidine 2% Cloths 1 Application(s) Topical <User Schedule>  cholestyramine Powder (Sugar-Free) 4 Gram(s) Oral daily  clobetasol 0.05% Cream 1 Application(s) Topical two times a day  dextrose 5% + sodium chloride 0.45%. 1000 milliLiter(s) (50 mL/Hr) IV Continuous <Continuous>  epoetin jean carlos Injectable 83127 Unit(s) SubCutaneous <User Schedule>  ferrous    sulfate 325 milliGRAM(s) Oral daily  FLUoxetine 20 milliGRAM(s) Oral daily  heparin  Injectable 5000 Unit(s) SubCutaneous every 8 hours  labetalol 200 milliGRAM(s) Oral every 8 hours  melatonin 3 milliGRAM(s) Oral at bedtime  nystatin Cream 1 Application(s) Topical two times a day  phenytoin   Suspension 100 milliGRAM(s) Oral three times a day  sodium bicarbonate 650 milliGRAM(s) Oral three times a day  valproic  acid Syrup 750 milliGRAM(s) Oral two times a day    MEDICATIONS  (PRN):  acetaminophen    Suspension .. 650 milliGRAM(s) Oral every 6 hours PRN Temp greater or equal to 38C (100.4F), Moderate Pain (4 - 6)  hydrALAZINE Injectable 10 milliGRAM(s) IV Push every 6 hours PRN SBP over 180  QUEtiapine 25 milliGRAM(s) Oral at bedtime PRN Delerium      Allergies    Levaquin (Pruritus)  mercury (Pruritus; Rash)  sulfa drugs (Pruritus)    Intolerances        REVIEW OF SYSTEMS:  Unable to obtain, due to mental status, confused/ lethargic   Vital Signs Last 24 Hrs  T(C): 36.4 (11 Mar 2020 07:42), Max: 36.8 (10 Mar 2020 12:00)  T(F): 97.5 (11 Mar 2020 07:42), Max: 98.2 (10 Mar 2020 12:00)  HR: 67 (11 Mar 2020 06:00) (67 - 84)  BP: 127/61 (11 Mar 2020 06:00) (121/66 - 190/80)  BP(mean): 88 (11 Mar 2020 06:00) (80 - 136)  RR: 43 (11 Mar 2020 06:00) (17 - 43)  SpO2: 97% (11 Mar 2020 06:00) (83% - 100%)    PHYSICAL EXAM:  GENERAL: NAD, Awake, Lethargic   HEAD:  Atraumatic, Normocephalic  EYES: PERRLA, conjunctiva and sclera clear  ENMT: No tonsillar erythema, exudates, or enlargement; Moist mucous membranes  NECK: Supple, No JVD, Normal thyroid  NERVOUS SYSTEM:  Confused, lethargic but awake   CHEST/LUNG: Clear to auscultation bilaterally; No rales, rhonchi, wheezing, or rubs  HEART: S1S2+, Regular rate and rhythm  ABDOMEN: Soft, Nontender, Nondistended; Bowel sounds present  EXTREMITIES:  2+ Peripheral Pulses, No clubbing, cyanosis, or edema  LYMPH: No lymphadenopathy noted  SKIN: No rashes or lesions, warm, dry     LABS:                        9.1    8.62  )-----------( 229      ( 11 Mar 2020 05:12 )             29.8     11 Mar 2020 05:12    151    |  120    |  63     ----------------------------<  119    3.9     |  17     |  4.30     Ca    8.8        11 Mar 2020 05:12  Phos  4.3       11 Mar 2020 05:12  Mg     2.3       11 Mar 2020 05:12    TPro  6.3    /  Alb  2.5    /  TBili  0.4    /  DBili  x      /  AST  16     /  ALT  24     /  AlkPhos  110    11 Mar 2020 05:12    PT/INR - ( 11 Mar 2020 05:12 )   PT: 13.3 sec;   INR: 1.18 ratio         PTT - ( 11 Mar 2020 05:12 )  PTT:27.4 sec  CAPILLARY BLOOD GLUCOSE        BLOOD CULTURE    RADIOLOGY & ADDITIONAL TESTS:    Imaging Personally Reviewed:  [ ] YES     Consultant(s) Notes Reviewed:      Care Discussed with Consultants/Other Providers:

## 2020-03-11 NOTE — PROGRESS NOTE ADULT - ASSESSMENT
83 yo female admitted with altered mental status with no clear explanation  Question whether altered mental status at home could have been postictal phenomena given what appears to be seizure activity here.  Limited EEG here without seizure activity, f/u study with no active seizure but increased risk of such.  Depakote not useful as an AED in the setting ot carbapenem use.   No clear or convincing evidence of active/uncontrolled infection on exam.  In setting of chronic renae catheter the urine isolates is questionable significance, but only clue other than her sacral sore which seems unlikely.  CXR with R effusion, doubt pneumonia  Sacral sore not infected appearing. While patient almost certainly has sacral osteomyelitis based on the CT scan I am skeptical that its responsible for her presentation.  No concern of potential other SSTI  Benign abdomen  WBC declined  Now with fever, Concur with plans for CT

## 2020-03-11 NOTE — PROGRESS NOTE ADULT - PROBLEM SELECTOR PLAN 3
- acute metabolic encephalopathy is likely multifactorial   - admission, suspect pt had an acute neuro event such as seizure, but infection and worsening renal failure with uremia are likely all contributing   - pt may also have some element of ICU/hospital delirium   - MRI showed no evidence for CVA to have explained the isolated expressive aphasia pt had prior to her generalized seizure on 3/5  - prior to the generalized seizure, pt had previously been afebrile, with no leukocytosis for several days and was quite lucid with only true alteration in her mental status seeming to be an expressive aphasia. Pt was not delirious and seemed to have no receptive deficits and no difficulties following all commands appropriately on 3/4/20 when I first saw the pt. Only had difficulty word-finding and could not use full sentences because of stumbling in word choice -- this did not seem consistent with an encephalopathy caused by infection (peripheral or central)  -feel this may have been due to partial seizure activity preceding the generalized seizure pt had on 3/5  - follow up ammonia level, if able to be drawn  -neuro recs appreciated

## 2020-03-12 DIAGNOSIS — Z71.89 OTHER SPECIFIED COUNSELING: ICD-10-CM

## 2020-03-12 LAB
ANION GAP SERPL CALC-SCNC: 14 MMOL/L — SIGNIFICANT CHANGE UP (ref 5–17)
BASOPHILS # BLD AUTO: 0 K/UL — SIGNIFICANT CHANGE UP (ref 0–0.2)
BASOPHILS NFR BLD AUTO: 0 % — SIGNIFICANT CHANGE UP (ref 0–2)
BUN SERPL-MCNC: 64 MG/DL — HIGH (ref 7–23)
CALCIUM SERPL-MCNC: 8.4 MG/DL — LOW (ref 8.5–10.1)
CHLORIDE SERPL-SCNC: 120 MMOL/L — HIGH (ref 96–108)
CO2 SERPL-SCNC: 18 MMOL/L — LOW (ref 22–31)
CREAT SERPL-MCNC: 4.4 MG/DL — HIGH (ref 0.5–1.3)
EOSINOPHIL # BLD AUTO: 0.19 K/UL — SIGNIFICANT CHANGE UP (ref 0–0.5)
EOSINOPHIL NFR BLD AUTO: 3 % — SIGNIFICANT CHANGE UP (ref 0–6)
GLUCOSE SERPL-MCNC: 116 MG/DL — HIGH (ref 70–99)
HCT VFR BLD CALC: 28.6 % — LOW (ref 34.5–45)
HGB BLD-MCNC: 8.8 G/DL — LOW (ref 11.5–15.5)
LYMPHOCYTES # BLD AUTO: 0.44 K/UL — LOW (ref 1–3.3)
LYMPHOCYTES # BLD AUTO: 7 % — LOW (ref 13–44)
MAGNESIUM SERPL-MCNC: 2.1 MG/DL — SIGNIFICANT CHANGE UP (ref 1.6–2.6)
MCHC RBC-ENTMCNC: 28.9 PG — SIGNIFICANT CHANGE UP (ref 27–34)
MCHC RBC-ENTMCNC: 30.8 GM/DL — LOW (ref 32–36)
MCV RBC AUTO: 93.8 FL — SIGNIFICANT CHANGE UP (ref 80–100)
MONOCYTES # BLD AUTO: 0.25 K/UL — SIGNIFICANT CHANGE UP (ref 0–0.9)
MONOCYTES NFR BLD AUTO: 4 % — SIGNIFICANT CHANGE UP (ref 2–14)
NEUTROPHILS # BLD AUTO: 5.39 K/UL — SIGNIFICANT CHANGE UP (ref 1.8–7.4)
NEUTROPHILS NFR BLD AUTO: 85 % — HIGH (ref 43–77)
NRBC # BLD: SIGNIFICANT CHANGE UP /100 WBCS (ref 0–0)
PHENYTOIN FREE SERPL-MCNC: 2 UG/ML — LOW (ref 10–20)
PHOSPHATE SERPL-MCNC: 4.3 MG/DL — SIGNIFICANT CHANGE UP (ref 2.5–4.5)
PLATELET # BLD AUTO: 253 K/UL — SIGNIFICANT CHANGE UP (ref 150–400)
POTASSIUM SERPL-MCNC: 3.7 MMOL/L — SIGNIFICANT CHANGE UP (ref 3.5–5.3)
POTASSIUM SERPL-SCNC: 3.7 MMOL/L — SIGNIFICANT CHANGE UP (ref 3.5–5.3)
RBC # BLD: 3.05 M/UL — LOW (ref 3.8–5.2)
RBC # FLD: 16.5 % — HIGH (ref 10.3–14.5)
SODIUM SERPL-SCNC: 152 MMOL/L — HIGH (ref 135–145)
VALPROATE SERPL-MCNC: 43 UG/ML — LOW (ref 50–100)
WBC # BLD: 6.34 K/UL — SIGNIFICANT CHANGE UP (ref 3.8–10.5)
WBC # FLD AUTO: 6.34 K/UL — SIGNIFICANT CHANGE UP (ref 3.8–10.5)

## 2020-03-12 PROCEDURE — 99233 SBSQ HOSP IP/OBS HIGH 50: CPT

## 2020-03-12 RX ORDER — SODIUM CHLORIDE 9 MG/ML
1000 INJECTION, SOLUTION INTRAVENOUS
Refills: 0 | Status: DISCONTINUED | OUTPATIENT
Start: 2020-03-12 | End: 2020-03-18

## 2020-03-12 RX ADMIN — HEPARIN SODIUM 5000 UNIT(S): 5000 INJECTION INTRAVENOUS; SUBCUTANEOUS at 21:18

## 2020-03-12 RX ADMIN — BUDESONIDE AND FORMOTEROL FUMARATE DIHYDRATE 2 PUFF(S): 160; 4.5 AEROSOL RESPIRATORY (INHALATION) at 21:18

## 2020-03-12 RX ADMIN — HEPARIN SODIUM 5000 UNIT(S): 5000 INJECTION INTRAVENOUS; SUBCUTANEOUS at 13:35

## 2020-03-12 RX ADMIN — SODIUM CHLORIDE 50 MILLILITER(S): 9 INJECTION, SOLUTION INTRAVENOUS at 10:37

## 2020-03-12 RX ADMIN — NYSTATIN CREAM 1 APPLICATION(S): 100000 CREAM TOPICAL at 07:15

## 2020-03-12 RX ADMIN — Medication 200 MILLIGRAM(S): at 06:07

## 2020-03-12 RX ADMIN — HEPARIN SODIUM 5000 UNIT(S): 5000 INJECTION INTRAVENOUS; SUBCUTANEOUS at 06:07

## 2020-03-12 RX ADMIN — Medication 650 MILLIGRAM(S): at 21:18

## 2020-03-12 RX ADMIN — BUDESONIDE AND FORMOTEROL FUMARATE DIHYDRATE 2 PUFF(S): 160; 4.5 AEROSOL RESPIRATORY (INHALATION) at 08:28

## 2020-03-12 RX ADMIN — AMLODIPINE BESYLATE 5 MILLIGRAM(S): 2.5 TABLET ORAL at 06:07

## 2020-03-12 RX ADMIN — Medication 3 MILLIGRAM(S): at 23:02

## 2020-03-12 RX ADMIN — Medication 750 MILLIGRAM(S): at 06:08

## 2020-03-12 RX ADMIN — ATORVASTATIN CALCIUM 10 MILLIGRAM(S): 80 TABLET, FILM COATED ORAL at 21:18

## 2020-03-12 RX ADMIN — Medication 650 MILLIGRAM(S): at 13:36

## 2020-03-12 RX ADMIN — Medication 200 MILLIGRAM(S): at 13:36

## 2020-03-12 RX ADMIN — Medication 750 MILLIGRAM(S): at 17:56

## 2020-03-12 RX ADMIN — Medication 325 MILLIGRAM(S): at 12:35

## 2020-03-12 RX ADMIN — Medication 81 MILLIGRAM(S): at 12:34

## 2020-03-12 RX ADMIN — CHOLESTYRAMINE 4 GRAM(S): 4 POWDER, FOR SUSPENSION ORAL at 08:09

## 2020-03-12 RX ADMIN — NYSTATIN CREAM 1 APPLICATION(S): 100000 CREAM TOPICAL at 17:56

## 2020-03-12 RX ADMIN — Medication 20 MILLIGRAM(S): at 12:35

## 2020-03-12 RX ADMIN — Medication 1 APPLICATION(S): at 06:07

## 2020-03-12 RX ADMIN — Medication 650 MILLIGRAM(S): at 06:08

## 2020-03-12 RX ADMIN — Medication 100 MILLIGRAM(S): at 06:10

## 2020-03-12 RX ADMIN — Medication 1 APPLICATION(S): at 17:56

## 2020-03-12 NOTE — PROGRESS NOTE ADULT - SUBJECTIVE AND OBJECTIVE BOX
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 Patient's daughter present at bedside providing history due to patient AMS.  Patient daughter reports that over the past few days, patient seems weaker than usual (not able to empty ostomy, not able to assist with sacral wound cleaning, etc.) Last night she seemed confused, and was answering questions inappropriately, and having difficulty communicating. She has not had decreased urine output, and has not had any recent complaints. Daughter reports that a similar episode occurred over the summer, when the patient had a UTI. Patient currently does not remember the events of last night/this morning. She reports feeling cold, irritation of the skin around her ostomy site, sacral pain, and suprapubic discomfort (baseline). Patient denies fevers, chest pain, palpitations, LE edema.    ED Course:  Vitals: Temp 97.9, HR 71, /77 ->211/95->194/94, RR 18,  O2 sat 100 on RA.  Labs:  WBC 6.77, H/h 9.8/31.7, RDW 15.1, glucose 180, lactate .6, K 6.3 ->5.3, Cl 118, bicarb 14, anion gap 12, BUN/Cr 45/4 (was ~6 last admission, baseline <5), albumin 2.8, GFR 10  UA: color: pink, appearance: turbid, protein 500, LE moderate, blood large, WBC 26-50, RBC>50, bacteria moderate.  CT head: Negative for intracranial hemorrhage or acute territorial hemorrhage  CXR: Questionable upper lobe infiltrates  EKG: NSR without peaked t waves and no ischemic changes  ABG - ( 03 Mar 2020 16:09 )  pH, Arterial: 7.31  pH, Blood: x     /  pCO2: 31    /  pO2: 87    / HCO3: 17    / Base Excess: -9.7  /  SaO2: 96      Patient s/p zosyn, 1.7L NS blous, 1.7 LR bolus, insulin, albuterol, sodium bicarb 50 meq IV, kayexalate with improvement.  Renae removed and replaced with 50 cc urine output upon insertion. (03 Mar 2020 16:51)      Interval History - no seizure reported    Patient is seen, chart was reviewed and case was discussed with the treatment team.  Pt is not in any distress.   Lying on bed comfortably.     Vital Signs Last 24 Hrs  T(C): 36.9 (12 Mar 2020 13:31), Max: 36.9 (12 Mar 2020 05:10)  T(F): 98.5 (12 Mar 2020 13:31), Max: 98.5 (12 Mar 2020 05:10)  HR: 73 (12 Mar 2020 13:31) (66 - 73)  BP: 152/65 (12 Mar 2020 13:31) (127/59 - 152/65)  BP(mean): --  RR: 16 (12 Mar 2020 13:31) (16 - 18)  SpO2: 93% (12 Mar 2020 13:31) (93% - 95%)        REVIEW OF SYSTEMS:    limited due to lethargy  not in any distress    On Neurological Examination:    Mental Status - Pt is lethargic but easily  arousible  Follows some sim[ple commands    Speech -  dysarthria.    Cranial Nerves - Pupils 3 mm equal and reactive to light, extraocular eye movements intact.   Pt has no r facial asymmetry.     Muscle tone - is normal     Motor Exam - moving her extremities    Sensory Exam -Pt withdraws all extremities equally on stimulation. No asymmetry seen.  .    Deep tendon Reflexes - 2 plus all over.          Neck Supple -  Yes.     MEDICATIONS    acetaminophen    Suspension .. 650 milliGRAM(s) Oral every 6 hours PRN  amLODIPine   Tablet 5 milliGRAM(s) Oral daily  aspirin enteric coated 81 milliGRAM(s) Oral daily  atorvastatin 10 milliGRAM(s) Oral at bedtime  budesonide 160 MICROgram(s)/formoterol 4.5 MICROgram(s) Inhaler 2 Puff(s) Inhalation two times a day  chlorhexidine 2% Cloths 1 Application(s) Topical <User Schedule>  cholestyramine Powder (Sugar-Free) 4 Gram(s) Oral daily  clobetasol 0.05% Cream 1 Application(s) Topical two times a day  dextrose 5% + sodium chloride 0.45%. 1000 milliLiter(s) IV Continuous <Continuous>  dextrose 5%. 1000 milliLiter(s) IV Continuous <Continuous>  epoetin jean carlos Injectable 87413 Unit(s) SubCutaneous <User Schedule>  ferrous    sulfate 325 milliGRAM(s) Oral daily  FLUoxetine 20 milliGRAM(s) Oral daily  heparin  Injectable 5000 Unit(s) SubCutaneous every 8 hours  hydrALAZINE Injectable 10 milliGRAM(s) IV Push every 6 hours PRN  labetalol 200 milliGRAM(s) Oral every 8 hours  melatonin 3 milliGRAM(s) Oral at bedtime  nystatin Cream 1 Application(s) Topical two times a day  sodium bicarbonate 650 milliGRAM(s) Oral three times a day  valproic  acid Syrup 750 milliGRAM(s) Oral two times a day      Allergies    Levaquin (Pruritus)  mercury (Pruritus; Rash)  sulfa drugs (Pruritus)    Intolerances        LABS:  CBC Full  -  ( 12 Mar 2020 08:10 )  WBC Count : 6.34 K/uL  RBC Count : 3.05 M/uL  Hemoglobin : 8.8 g/dL  Hematocrit : 28.6 %  Platelet Count - Automated : 253 K/uL  Mean Cell Volume : 93.8 fl  Mean Cell Hemoglobin : 28.9 pg  Mean Cell Hemoglobin Concentration : 30.8 gm/dL        03-12    152<H>  |  120<H>  |  64<H>  ----------------------------<  116<H>  3.7   |  18<L>  |  4.40<H>    Ca    8.4<L>      12 Mar 2020 08:10  Phos  4.3     03-12  Mg     2.1     03-12    TPro  6.3  /  Alb  2.5<L>  /  TBili  0.4  /  DBili  x   /  AST  16  /  ALT  24  /  AlkPhos  110  03-11    Hemoglobin A1C:     Vitamin B12     RADIOLOGY    ASSESSMENT AND PLAN:      seizure  ams multifactorial  MEHDI    DC DILANTIN DUE LETHARGY  CONTINUE VA; PATIENT NO LONGER ON ANTIBIOTIC  Physical therapy evaluation.  OOB to chair/ambulation with assistance only.  Advanced care planning was discussed with family.  Pain is accessed and addressed.  Plan of care was discussed with family. Questions answered.  Would continue to follow.

## 2020-03-12 NOTE — PROGRESS NOTE ADULT - PROBLEM SELECTOR PLAN 3
- acute metabolic encephalopathy is likely multifactorial   - more lethargic today 3/12, in the setting of persistent uremia as well as dilantin addition to AED regimen   - admission, suspect pt had an acute neuro event such as seizure, but infection and worsening renal failure with uremia are likely all contributing   - pt may also have some element of ICU/hospital delirium   - MRI showed no evidence for CVA to have explained the isolated expressive aphasia pt had prior to her generalized seizure on 3/5  - prior to the generalized seizure, pt had previously been afebrile, with no leukocytosis for several days and was quite lucid with only true alteration in her mental status seeming to be an expressive aphasia. Pt was not delirious and seemed to have no receptive deficits and no difficulties following all commands appropriately on 3/4/20 when I first saw the pt. Only had difficulty word-finding and could not use full sentences because of stumbling in word choice -- this did not seem consistent with an encephalopathy caused by infection (peripheral or central)  -feel this may have been due to partial seizure activity preceding the generalized seizure pt had on 3/5  - follow up ammonia level, if able to be drawn  - wean depakote per neuro  -neuro recs appreciated

## 2020-03-12 NOTE — PROGRESS NOTE ADULT - PROBLEM SELECTOR PLAN 4
- on admission, patient with metabolic acidosis in setting of UTI and suspected pre-renal MEHDI  - Creatinine 4.4 today  - po sodium bicarb 650 mg TID  - pt's renal status still very poor. No plans for dialysis, patient preference

## 2020-03-12 NOTE — PROGRESS NOTE ADULT - PROBLEM SELECTOR PLAN 5
Reviewed with daughter in law at length.  Family is not all on the same page as of yet with respect to goals of care

## 2020-03-12 NOTE — CONSULT NOTE ADULT - SUBJECTIVE AND OBJECTIVE BOX
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) and chronic diarrhea, cervical cancer (s/p radical hysterectomy and radiation), tremors, eczema, depression, HTN, HLD, history of frequent UTIs with chronic indwelling renae, anemia, stage 4 sacral decubitus ulcer, and recent hospitalization for MEHDI on CKD thought to be 2/2 dehydration and urinary retention from malfunctioning chronic renae, now brought in by EMS to ED for altered mental status admitted for acute metabolic encephalopathy due to suspected UTI and hyperkalemia in setting of MEHDI on CKD 5, course c/b likely generalized seizure on 3/5/20, and acute hypoxic respiratory failure and suspected sepsis due to suspected aspiration PNA, now extubated, but with persistent delirium. Seen and examined at bedside. Lethargic but awake, drowsy, unable to answer questions       non drinker  non smoker  lives with DTR  six children  walks with assist at home  family hx - ashd -   ros - weak - ams -        PAST MEDICAL & SURGICAL HISTORY:  Wound of sacral region  Depression  Iron deficiency anemia  Eczema  HTN (hypertension)  CKD (chronic kidney disease) stage 5, GFR less than 15 ml/min: not on HD  Herniated lumbar intervertebral disc  Depression  Tremor  Kidney atrophy: right  Colostomy status: 2006  Chronic UTI (urinary tract infection): chronic renae  Cervical cancer: 1970&#x27;s  Dyslipidemia  Diabetes: type 2  Hernia, incisional  S/P hip replacement: right 2013  S/P colon resection: 2006  S/P hysterectomy: 1977        Medication list         MEDICATIONS  (STANDING):  amLODIPine   Tablet 5 milliGRAM(s) Oral daily  aspirin enteric coated 81 milliGRAM(s) Oral daily  atorvastatin 10 milliGRAM(s) Oral at bedtime  budesonide 160 MICROgram(s)/formoterol 4.5 MICROgram(s) Inhaler 2 Puff(s) Inhalation two times a day  chlorhexidine 2% Cloths 1 Application(s) Topical <User Schedule>  cholestyramine Powder (Sugar-Free) 4 Gram(s) Oral daily  clobetasol 0.05% Cream 1 Application(s) Topical two times a day  dextrose 5% + sodium chloride 0.45%. 1000 milliLiter(s) (50 mL/Hr) IV Continuous <Continuous>  dextrose 5%. 1000 milliLiter(s) (50 mL/Hr) IV Continuous <Continuous>  epoetin jean carlos Injectable 69024 Unit(s) SubCutaneous <User Schedule>  ferrous    sulfate 325 milliGRAM(s) Oral daily  FLUoxetine 20 milliGRAM(s) Oral daily  heparin  Injectable 5000 Unit(s) SubCutaneous every 8 hours  labetalol 200 milliGRAM(s) Oral every 8 hours  melatonin 3 milliGRAM(s) Oral at bedtime  nystatin Cream 1 Application(s) Topical two times a day  phenytoin   Suspension 100 milliGRAM(s) Oral three times a day  sodium bicarbonate 650 milliGRAM(s) Oral three times a day  valproic  acid Syrup 750 milliGRAM(s) Oral two times a day    MEDICATIONS  (PRN):  acetaminophen    Suspension .. 650 milliGRAM(s) Oral every 6 hours PRN Temp greater or equal to 38C (100.4F), Moderate Pain (4 - 6)  hydrALAZINE Injectable 10 milliGRAM(s) IV Push every 6 hours PRN SBP over 180  QUEtiapine 25 milliGRAM(s) Oral at bedtime PRN Delerium         Vitals log        ICU Vital Signs Last 24 Hrs  T(C): 36.9 (12 Mar 2020 05:10), Max: 36.9 (12 Mar 2020 05:10)  T(F): 98.5 (12 Mar 2020 05:10), Max: 98.5 (12 Mar 2020 05:10)  HR: 73 (12 Mar 2020 05:10) (61 - 73)  BP: 127/59 (12 Mar 2020 05:10) (90/59 - 151/63)  BP(mean): 84 (11 Mar 2020 19:00) (69 - 107)  ABP: --  ABP(mean): --  RR: 18 (12 Mar 2020 05:10) (18 - 25)  SpO2: 94% (12 Mar 2020 05:10) (91% - 98%)           Input and Output:  I&O's Detail    11 Mar 2020 07:01  -  12 Mar 2020 07:00  --------------------------------------------------------  IN:    dextrose 5% + sodium chloride 0.45%.: 300 mL    Oral Fluid: 950 mL  Total IN: 1250 mL    OUT:    Colostomy: 1000 mL    Indwelling Catheter - Urethral: 160 mL  Total OUT: 1160 mL    Total NET: 90 mL      12 Mar 2020 07:01  -  12 Mar 2020 11:43  --------------------------------------------------------  IN:    dextrose 5% + sodium chloride 0.45%.: 25 mL  Total IN: 25 mL    OUT:  Total OUT: 0 mL    Total NET: 25 mL          Lab Data                        8.8    6.34  )-----------( 253      ( 12 Mar 2020 08:10 )             28.6     03-12    152<H>  |  120<H>  |  64<H>  ----------------------------<  116<H>  3.7   |  18<L>  |  4.40<H>    Ca    8.4<L>      12 Mar 2020 08:10  Phos  4.3     03-12  Mg     2.1     03-12    TPro  6.3  /  Alb  2.5<L>  /  TBili  0.4  /  DBili  x   /  AST  16  /  ALT  24  /  AlkPhos  110  03-11            Review of Systems	      Objective     Physical Examination        Pertinent Lab findings & Imaging      Ana:  NO   Adequate UO     I&O's Detail    11 Mar 2020 07:01  -  12 Mar 2020 07:00  --------------------------------------------------------  IN:    dextrose 5% + sodium chloride 0.45%.: 300 mL    Oral Fluid: 950 mL  Total IN: 1250 mL    OUT:    Colostomy: 1000 mL    Indwelling Catheter - Urethral: 160 mL  Total OUT: 1160 mL    Total NET: 90 mL      12 Mar 2020 07:01  -  12 Mar 2020 11:43  --------------------------------------------------------  IN:    dextrose 5% + sodium chloride 0.45%.: 25 mL  Total IN: 25 mL    OUT:  Total OUT: 0 mL    Total NET: 25 mL               Discussed with:     Cultures:	        Radiology           EXAM:  EEG-AWAKE AND DROWSY        PROCEDURE DATE:  03/11/2020        INTERPRETATION:  GENERAL DESCRIPTION:  The EEG was recorded with a 32-channel digital EEG machine. The standard international 10/20 electrode replacements were used.    CONDITIONS OF RECORDING:  The EEG was carried out with the patient in the awake tense state.  Muscle and movement artifacts are present during the waking record at times making EEG unreadable.    DESCRIPTION:  The resting waking  background rhythms consist of moderate voltage, symmetrical activity which is fairly well organized. The frequency spectrum showed a moderate amount of theta and beta activity and a negligible amount of delta activity. No clear posterior dominant rhythm was seen.    Hyperventilation was not performed because of medical reasons. Intermittent photic stimulation from 2-20 flashes per second fails to elicit any abnormality.    There were no areas of focal slowing, epilepticform discharges or electrographic seizures.    IMPRESSION: Normal limited awake tense EEG.    COMMENT:    A video 24-48 hours EEG might be desirable to further evaluate for possible seizure disorder, if clinically necessary. This EEG does not exclude the clinical diagnosis of seizure or epilepsy.                SANTO HARPER M.D., Attending Neurologist  This document has been electronically signed. Mar 11 2020 12:30PM            EXAM:  CT ABDOMEN AND PELVIS OC                            PROCEDURE DATE:  03/06/2020          INTERPRETATION:  CT ABDOMEN AND PELVIS WITH ORAL CONTRAST    CLINICAL INFORMATION: fevers      COMPARISON: CT abdomen and pelvis 2/16/2020    PROCEDURE:   CT of the Abdomen and Pelvis was performed without intravenous contrast.  Intravenous contrast: None.  Oral contrast: positive contrast was administered.  Sagittal and coronal reformats were performed.    FINDINGS:    LOWER CHEST: Small bilateral pleural effusions and bibasilar atelectasis.    LIVER: Normal.  BILE DUCTS: Nondilated.  GALLBLADDER: Gallstones.  SPLEEN: Normal.  PANCREAS: Normal.  ADRENALS: Normal.  KIDNEYS/URETERS: No hydronephrosis or urinary tract calculi. Bilateral pelvic and ureteral fullness. Bilateral renal vascular calcification. Asymmetric atrophy of the right kidney.    BLADDER: Collapsed around a Renae catheter balloon.  REPRODUCTIVE ORGANS: Status post hysterectomy.    BOWEL: The NG tube is in the stomach.  No bowel-related abnormality. Specifically, no bowel obstruction. Status post colonic resection. 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  PERITONEUM: No free air or ascites. No collection.  VESSELS:  Aortoiliac atherosclerosis without aneurysm.  RETROPERITONEUM/LYMPH NODES: No adenopathy.    ABDOMINAL WALL: Postsurgical changes.  BONES: Right knee replacement. Stable sacral decubitus ulcer with underlying sacral bone erosion. Soft tissue swelling and edema in the medial left arm with suggestion of intravenous line in this area. There is deep soft tissue air in this region as well.    IMPRESSION:     No acute intra-abdominal pathology or collection.    Stable sacral decubitus ulcer with underlying sacral bone erosion. Active osteomyelitis is not excluded.    Soft tissue swelling and edema in the visualized medial left arm with suggestion of an intravenous line in this area. There is deep soft tissue air in this region as well. Correlate for IV infiltration versus soft tissue infection.                OCTAVIANO UPTON M.D., ATTENDING RADIOLOGIST  This document has been electronically signed. Mar  6 2020  7:39PM            EXAM:  ECHO TTE WO CON COMP W DOPP         PROCEDURE DATE:  03/08/2020        INTERPRETATION:  INDICATION: Hypertensive heart disease  Sonographer PH    Blood Pressure 156/67    Height 168 cm     Weight 54.4 kg       BSA 1.6 sq m    Dimensions:    LA Normal Values: 2.0 - 4.0 cm    Ao Normal Values: 2.0 - 3.8 cm  SEPTUM Normal Values: 0.6 - 1.2 cm  PWT Normal Values: 0.6 - 1.1 cm  LVIDd Normal Values: 3.0 - 5.6 cm  LVIDs Normal Values: 1.8 - 4.0 cm      OBSERVATIONS:  Technically difficult and limited study  Mitral Valve: Thickened leaflets, trace physiologic MR.  Aortic Valve/Aorta: Sclerotic trileaflet aortic valve.  Tricuspid Valve: normal with mild TR.  Pulmonic Valve: Not well-visualized  Left Atrium: normal  Right Atrium: Not well-visualized  Left Ventricle: Grossly normal LV size and systolic function, estimated LVEF of 55%. Endocardium not well-visualized, cannot rule out segmental dysfunction  Right Ventricle: Grossly normal size and systolic function.  Pericardium/Pleura: normal, no significant pericardial effusion.      Conclusion:   Technically difficult and limited study  Grossly normal LV size and systolic function, estimated LVEF of 55%. Endocardium not well-visualized, cannot rule out segmental dysfunction  Grossly normal RV size and systolic function.   Sclerotic trileaflet aortic valve, without AI.   Trace physiologic MR  Mild TR.    No significant pericardial effusion.                  DAKSHA DE LA CRUZ   This document has been electronically signed. Mar  9 2020 12:48PM

## 2020-03-12 NOTE — PROGRESS NOTE ADULT - PROBLEM SELECTOR PLAN 1
- no new fevers  - there was suspicion for UTI on admission though unclear significance of the UCx isolates in the presence of chronic renae   - Doubt infection. No longer on antibiotics  - patient more lethargic today, likely 2/2 to metabolic encephalopathy vs AEDs vs subclinical seizures with postictal state  - continue to monitor  - Patient remains full code. Daughter, Rosy, HCP, states her mother would want CPR and intubation, but would not want to remain on life support for long.  - Dr. Wiley, palliative, recs appreciated

## 2020-03-12 NOTE — PROGRESS NOTE ADULT - SUBJECTIVE AND OBJECTIVE BOX
[facility], Division of Infectious Diseases  HILDA Giang A. Lee  [phone]    Name: GURJIT HERRERA  Age: 84y  Gender: Female  MRN: [id number]    Interval History--  Notes reviewed. Seen earlier today. Discussed with sister in law at bedside.  More lethargic.  D/W Dr. Thorne, dilantin potentially playing a role. AED's being altered again, now off carbapenem.    Past Medical History--  Wound of sacral region  Depression  Iron deficiency anemia  Eczema  HTN (hypertension)  CKD (chronic kidney disease) stage 5, GFR less than 15 ml/min  Herniated lumbar intervertebral disc  Depression  Tremor  Kidney atrophy  Colostomy status  Chronic UTI (urinary tract infection)  Cervical cancer  Dyslipidemia  Diabetes  Hernia, incisional  S/P hip replacement  S/P colon resection  S/P hysterectomy      For details regarding the patient's social history, family history, and other miscellaneous elements, please refer the initial infectious diseases consultation and/or the admitting history and physical examination for this admission.    Allergies    Levaquin (Pruritus)  mercury (Pruritus; Rash)  sulfa drugs (Pruritus)    Intolerances        Medications--  Antibiotics:    Immunologic:  epoetin jean carlos Injectable 22731 Unit(s) SubCutaneous <User Schedule>    Other:  acetaminophen    Suspension .. PRN  amLODIPine   Tablet  aspirin enteric coated  atorvastatin  budesonide 160 MICROgram(s)/formoterol 4.5 MICROgram(s) Inhaler  chlorhexidine 2% Cloths  cholestyramine Powder (Sugar-Free)  clobetasol 0.05% Cream  dextrose 5% + sodium chloride 0.45%.  dextrose 5%.  ferrous    sulfate  FLUoxetine  heparin  Injectable  hydrALAZINE Injectable PRN  labetalol  melatonin  nystatin Cream  QUEtiapine PRN  sodium bicarbonate  valproic  acid Syrup      Review of Systems--  Review of systems unable secondary to clinical condition.     Physical Examination--  Vital Signs: T(F): 98.5 (03-12-20 @ 05:10), Max: 98.5 (03-12-20 @ 05:10)  HR: 73 (03-12-20 @ 05:10)  BP: 127/59 (03-12-20 @ 05:10)  RR: 18 (03-12-20 @ 05:10)  SpO2: 94% (03-12-20 @ 05:10)  Wt(kg): --  General: Nontoxic-appearing Female in no acute distress.  HEENT: AT/NC. Anicteric. Conjunctiva pink and moist.  Neck: Not rigid. No sense of mass. CVL removed site dressed.  Nodes: None palpable.  Lungs: Poor effort grossly clear bilaterally without rales, wheezing or rhonchi  Heart: Regular rate and rhythm. 2/6 systolic murmur. No rub. No gallop. No palpable thrill.  Abdomen: Bowel sounds present and normoactive. Soft. Nondistended. Nontender. No sense of mass. No organomegaly. Ostomy functional.   Back: unable  Extremities: No cyanosis or clubbing. 1+ edema.   Skin: Warm. Dry. Good turgor. No rash. No vasculitic stigmata.  Psychiatric: Unable.       Laboratory Studies--  CBC                        8.8    6.34  )-----------( 253      ( 12 Mar 2020 08:10 )             28.6       Chemistries  03-12    152<H>  |  120<H>  |  64<H>  ----------------------------<  116<H>  3.7   |  18<L>  |  4.40<H>    Ca    8.4<L>      12 Mar 2020 08:10  Phos  4.3     03-12  Mg     2.1     03-12    TPro  6.3  /  Alb  2.5<L>  /  TBili  0.4  /  DBili  x   /  AST  16  /  ALT  24  /  AlkPhos  110  03-11      Culture Data    Culture - Blood (collected 06 Mar 2020 01:23)  Source: .Blood Blood-Peripheral  Final Report (11 Mar 2020 03:00):    No growth at 5 days.    Culture - Blood (collected 06 Mar 2020 01:23)  Source: .Blood Blood  Final Report (11 Mar 2020 03:00):    No growth at 5 days.

## 2020-03-12 NOTE — PROGRESS NOTE ADULT - SUBJECTIVE AND OBJECTIVE BOX
Patient is a 84y old  Female who presents with a chief complaint of altered mental status (03 Mar 2020 17:47)  Patient seen in follow up for CKD 4, Hyperkalemia.     Pt resting in bed. Lethargic.     PAST MEDICAL HISTORY:  Wound of sacral region  Depression  Iron deficiency anemia  Eczema  HTN (hypertension)  CKD (chronic kidney disease) stage 5, GFR less than 15 ml/min  Herniated lumbar intervertebral disc  Depression  Tremor  Kidney atrophy  Colostomy status  Chronic UTI (urinary tract infection)  Cervical cancer  Dyslipidemia  Diabetes  Hernia, incisional      MEDICATIONS  (STANDING):  amLODIPine   Tablet 5 milliGRAM(s) Oral daily  aspirin enteric coated 81 milliGRAM(s) Oral daily  atorvastatin 10 milliGRAM(s) Oral at bedtime  budesonide 160 MICROgram(s)/formoterol 4.5 MICROgram(s) Inhaler 2 Puff(s) Inhalation two times a day  chlorhexidine 2% Cloths 1 Application(s) Topical <User Schedule>  cholestyramine Powder (Sugar-Free) 4 Gram(s) Oral daily  clobetasol 0.05% Cream 1 Application(s) Topical two times a day  dextrose 5% + sodium chloride 0.45%. 1000 milliLiter(s) (50 mL/Hr) IV Continuous <Continuous>  epoetin jean carlos Injectable 26984 Unit(s) SubCutaneous <User Schedule>  ferrous    sulfate 325 milliGRAM(s) Oral daily  FLUoxetine 20 milliGRAM(s) Oral daily  heparin  Injectable 5000 Unit(s) SubCutaneous every 8 hours  labetalol 200 milliGRAM(s) Oral every 8 hours  melatonin 3 milliGRAM(s) Oral at bedtime  nystatin Cream 1 Application(s) Topical two times a day  phenytoin   Suspension 100 milliGRAM(s) Oral three times a day  sodium bicarbonate 650 milliGRAM(s) Oral three times a day  valproic  acid Syrup 750 milliGRAM(s) Oral two times a day    MEDICATIONS  (PRN):  acetaminophen    Suspension .. 650 milliGRAM(s) Oral every 6 hours PRN Temp greater or equal to 38C (100.4F), Moderate Pain (4 - 6)  hydrALAZINE Injectable 10 milliGRAM(s) IV Push every 6 hours PRN SBP over 180  QUEtiapine 25 milliGRAM(s) Oral at bedtime PRN Delerium    T(C): 36.9 (03-12-20 @ 05:10), Max: 36.9 (03-12-20 @ 05:10)  HR: 73 (03-12-20 @ 05:10) (61 - 84)  BP: 127/59 (03-12-20 @ 05:10) (90/59 - 190/80)  RR: 18 (03-12-20 @ 05:10)  SpO2: 94% (03-12-20 @ 05:10)  Wt(kg): --  I&O's Detail    11 Mar 2020 07:01  -  12 Mar 2020 07:00  --------------------------------------------------------  IN:    dextrose 5% + sodium chloride 0.45%.: 300 mL    Oral Fluid: 950 mL  Total IN: 1250 mL    OUT:    Colostomy: 1000 mL    Indwelling Catheter - Urethral: 160 mL  Total OUT: 1160 mL    Total NET: 90 mL      PHYSICAL EXAM:  General: No distress  Respiratory: b/l air entry  Cardiovascular: S1 S2  Gastrointestinal: soft  Extremities:  edema, + renae               LABORATORY:                        8.8    6.34  )-----------( 253      ( 12 Mar 2020 08:10 )             28.6     03-12    152<H>  |  120<H>  |  64<H>  ----------------------------<  116<H>  3.7   |  18<L>  |  4.40<H>    Ca    8.4<L>      12 Mar 2020 08:10  Phos  4.3     03-12  Mg     2.1     03-12    TPro  6.3  /  Alb  2.5<L>  /  TBili  0.4  /  DBili  x   /  AST  16  /  ALT  24  /  AlkPhos  110  03-11    Sodium, Serum: 152 mmol/L (03-12 @ 08:10)  Sodium, Serum: 151 mmol/L (03-11 @ 05:12)    Potassium, Serum: 3.7 mmol/L (03-12 @ 08:10)  Potassium, Serum: 3.9 mmol/L (03-11 @ 05:12)    Hemoglobin: 8.8 g/dL (03-12 @ 08:10)  Hemoglobin: 9.1 g/dL (03-11 @ 05:12)  Hemoglobin: 8.6 g/dL (03-10 @ 05:09)    Creatinine, Serum 4.40 (03-12 @ 08:10)  Creatinine, Serum 4.30 (03-11 @ 05:12)  Creatinine, Serum 4.70 (03-10 @ 05:09)  Creatinine, Serum 4.80 (03-09 @ 19:10)        LIVER FUNCTIONS - ( 11 Mar 2020 05:12 )  Alb: 2.5 g/dL / Pro: 6.3 g/dL / ALK PHOS: 110 U/L / ALT: 24 U/L / AST: 16 U/L / GGT: x

## 2020-03-12 NOTE — PROGRESS NOTE ADULT - ASSESSMENT
83 yo female admitted with altered mental status with no clear explanation  Question whether altered mental status at home could have been postictal phenomena given what appears to be seizure activity here.  Limited EEG here without seizure activity, f/u study with no active seizure but increased risk of such.  Depakote not useful as an AED in the setting ot carbapenem use.   No clear or convincing evidence of active/uncontrolled infection on exam.  In setting of chronic renae catheter the urine isolates is questionable significance, but only clue other than her sacral sore which seems unlikely.  CXR with R effusion, doubt pneumonia  Sacral sore not infected appearing. While patient almost certainly has sacral osteomyelitis based on the CT scan I am skeptical that its responsible for her presentation.  No concern of potential other SSTI  Benign abdomen  WBC declined

## 2020-03-12 NOTE — PROGRESS NOTE ADULT - ASSESSMENT
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) and chronic diarrhea, cervical cancer (s/p radical hysterectomy and radiation), tremors, eczema, depression, HTN, HLD, history of frequent UTIs with chronic indwelling renae, anemia, stage 4 sacral decubitus ulcer, and recent hospitalization for MEHDI on CKD thought to be 2/2 dehydration and urinary retention from malfunctioning chronic renae, now brought in by EMS to ED for altered mental status admitted for acute metabolic encephalopathy due to suspected UTI and hyperkalemia in setting of MEHDI on CKD 5, course c/b likely generalized seizure on 3/5/20, and acute hypoxic respiratory failure and suspected sepsis due to suspected aspiration PNA, now out of the ICU, but with delirium and lethargy.

## 2020-03-13 LAB
ANION GAP SERPL CALC-SCNC: 11 MMOL/L — SIGNIFICANT CHANGE UP (ref 5–17)
BUN SERPL-MCNC: 61 MG/DL — HIGH (ref 7–23)
CALCIUM SERPL-MCNC: 8.3 MG/DL — LOW (ref 8.5–10.1)
CHLORIDE SERPL-SCNC: 120 MMOL/L — HIGH (ref 96–108)
CO2 SERPL-SCNC: 18 MMOL/L — LOW (ref 22–31)
CREAT SERPL-MCNC: 4.2 MG/DL — HIGH (ref 0.5–1.3)
GLUCOSE SERPL-MCNC: 95 MG/DL — SIGNIFICANT CHANGE UP (ref 70–99)
HCT VFR BLD CALC: 28.9 % — LOW (ref 34.5–45)
HGB BLD-MCNC: 8.8 G/DL — LOW (ref 11.5–15.5)
MCHC RBC-ENTMCNC: 28.9 PG — SIGNIFICANT CHANGE UP (ref 27–34)
MCHC RBC-ENTMCNC: 30.4 GM/DL — LOW (ref 32–36)
MCV RBC AUTO: 94.8 FL — SIGNIFICANT CHANGE UP (ref 80–100)
NRBC # BLD: 1 /100 WBCS — HIGH (ref 0–0)
PHENYTOIN FREE SERPL-MCNC: 1.4 UG/ML — LOW (ref 10–20)
PLATELET # BLD AUTO: 238 K/UL — SIGNIFICANT CHANGE UP (ref 150–400)
POTASSIUM SERPL-MCNC: 4 MMOL/L — SIGNIFICANT CHANGE UP (ref 3.5–5.3)
POTASSIUM SERPL-SCNC: 4 MMOL/L — SIGNIFICANT CHANGE UP (ref 3.5–5.3)
RBC # BLD: 3.05 M/UL — LOW (ref 3.8–5.2)
RBC # FLD: 17.2 % — HIGH (ref 10.3–14.5)
SODIUM SERPL-SCNC: 149 MMOL/L — HIGH (ref 135–145)
WBC # BLD: 8.4 K/UL — SIGNIFICANT CHANGE UP (ref 3.8–10.5)
WBC # FLD AUTO: 8.4 K/UL — SIGNIFICANT CHANGE UP (ref 3.8–10.5)

## 2020-03-13 PROCEDURE — 99233 SBSQ HOSP IP/OBS HIGH 50: CPT

## 2020-03-13 RX ADMIN — Medication 650 MILLIGRAM(S): at 22:18

## 2020-03-13 RX ADMIN — AMLODIPINE BESYLATE 5 MILLIGRAM(S): 2.5 TABLET ORAL at 05:43

## 2020-03-13 RX ADMIN — Medication 650 MILLIGRAM(S): at 16:38

## 2020-03-13 RX ADMIN — HEPARIN SODIUM 5000 UNIT(S): 5000 INJECTION INTRAVENOUS; SUBCUTANEOUS at 12:45

## 2020-03-13 RX ADMIN — CHLORHEXIDINE GLUCONATE 1 APPLICATION(S): 213 SOLUTION TOPICAL at 12:56

## 2020-03-13 RX ADMIN — Medication 1 APPLICATION(S): at 22:20

## 2020-03-13 RX ADMIN — NYSTATIN CREAM 1 APPLICATION(S): 100000 CREAM TOPICAL at 05:47

## 2020-03-13 RX ADMIN — CHOLESTYRAMINE 4 GRAM(S): 4 POWDER, FOR SUSPENSION ORAL at 12:46

## 2020-03-13 RX ADMIN — Medication 750 MILLIGRAM(S): at 05:42

## 2020-03-13 RX ADMIN — Medication 650 MILLIGRAM(S): at 05:43

## 2020-03-13 RX ADMIN — Medication 3 MILLIGRAM(S): at 22:18

## 2020-03-13 RX ADMIN — ERYTHROPOIETIN 10000 UNIT(S): 10000 INJECTION, SOLUTION INTRAVENOUS; SUBCUTANEOUS at 17:01

## 2020-03-13 RX ADMIN — Medication 750 MILLIGRAM(S): at 17:46

## 2020-03-13 RX ADMIN — Medication 325 MILLIGRAM(S): at 12:45

## 2020-03-13 RX ADMIN — Medication 20 MILLIGRAM(S): at 12:45

## 2020-03-13 RX ADMIN — Medication 81 MILLIGRAM(S): at 12:45

## 2020-03-13 RX ADMIN — Medication 200 MILLIGRAM(S): at 05:47

## 2020-03-13 RX ADMIN — Medication 200 MILLIGRAM(S): at 22:18

## 2020-03-13 RX ADMIN — NYSTATIN CREAM 1 APPLICATION(S): 100000 CREAM TOPICAL at 17:48

## 2020-03-13 RX ADMIN — ATORVASTATIN CALCIUM 10 MILLIGRAM(S): 80 TABLET, FILM COATED ORAL at 22:18

## 2020-03-13 RX ADMIN — BUDESONIDE AND FORMOTEROL FUMARATE DIHYDRATE 2 PUFF(S): 160; 4.5 AEROSOL RESPIRATORY (INHALATION) at 22:20

## 2020-03-13 RX ADMIN — HEPARIN SODIUM 5000 UNIT(S): 5000 INJECTION INTRAVENOUS; SUBCUTANEOUS at 22:17

## 2020-03-13 RX ADMIN — Medication 1 APPLICATION(S): at 05:44

## 2020-03-13 RX ADMIN — BUDESONIDE AND FORMOTEROL FUMARATE DIHYDRATE 2 PUFF(S): 160; 4.5 AEROSOL RESPIRATORY (INHALATION) at 05:50

## 2020-03-13 RX ADMIN — HEPARIN SODIUM 5000 UNIT(S): 5000 INJECTION INTRAVENOUS; SUBCUTANEOUS at 05:43

## 2020-03-13 NOTE — PROGRESS NOTE ADULT - SUBJECTIVE AND OBJECTIVE BOX
Patient is a 84y old  Female who presents with a chief complaint of altered mental status (13 Mar 2020 09:15)   SOB    INTERVAL HPI/OVERNIGHT EVENTS: Seen at bedside. Patient much more alert today. Eating breakfast. C/O of being thirsty and having hip pain. Denies headache, dizziness, CP, SOB, abdominal pain, N/V/C/D.    MEDICATIONS  (STANDING):  amLODIPine   Tablet 5 milliGRAM(s) Oral daily  aspirin enteric coated 81 milliGRAM(s) Oral daily  atorvastatin 10 milliGRAM(s) Oral at bedtime  budesonide 160 MICROgram(s)/formoterol 4.5 MICROgram(s) Inhaler 2 Puff(s) Inhalation two times a day  chlorhexidine 2% Cloths 1 Application(s) Topical <User Schedule>  cholestyramine Powder (Sugar-Free) 4 Gram(s) Oral daily  clobetasol 0.05% Cream 1 Application(s) Topical two times a day  dextrose 5% + sodium chloride 0.45%. 1000 milliLiter(s) (50 mL/Hr) IV Continuous <Continuous>  dextrose 5%. 1000 milliLiter(s) (50 mL/Hr) IV Continuous <Continuous>  epoetin jean carlos Injectable 22419 Unit(s) SubCutaneous <User Schedule>  ferrous    sulfate 325 milliGRAM(s) Oral daily  FLUoxetine 20 milliGRAM(s) Oral daily  heparin  Injectable 5000 Unit(s) SubCutaneous every 8 hours  labetalol 200 milliGRAM(s) Oral every 8 hours  melatonin 3 milliGRAM(s) Oral at bedtime  nystatin Cream 1 Application(s) Topical two times a day  sodium bicarbonate 650 milliGRAM(s) Oral three times a day  valproic  acid Syrup 750 milliGRAM(s) Oral two times a day    MEDICATIONS  (PRN):  acetaminophen    Suspension .. 650 milliGRAM(s) Oral every 6 hours PRN Temp greater or equal to 38C (100.4F), Moderate Pain (4 - 6)  hydrALAZINE Injectable 10 milliGRAM(s) IV Push every 6 hours PRN SBP over 180      Allergies    Levaquin (Pruritus)  mercury (Pruritus; Rash)  sulfa drugs (Pruritus)    Intolerances        REVIEW OF SYSTEMS:  CONSTITUTIONAL: No fever, chills  RESPIRATORY: No cough, wheezing, chills or hemoptysis; No shortness of breath  CARDIOVASCULAR: No chest pain, palpitations, dizziness, or leg swelling  GASTROINTESTINAL: No abdominal or epigastric pain. No nausea, vomiting  GENITOURINARY: No dysuria, frequency, hematuria  NEUROLOGICAL: No headaches, loss of strength, numbness  MUSCULOSKELETAL: Pain in bilateral hips      Vital Signs Last 24 Hrs  T(C): 36.8 (13 Mar 2020 04:20), Max: 36.9 (12 Mar 2020 13:31)  T(F): 98.2 (13 Mar 2020 04:20), Max: 98.5 (12 Mar 2020 13:31)  HR: 80 (13 Mar 2020 04:20) (71 - 80)  BP: 161/67 (13 Mar 2020 04:20) (121/66 - 161/67)  BP(mean): --  RR: 18 (13 Mar 2020 04:20) (16 - 18)  SpO2: 97% (13 Mar 2020 04:20) (93% - 98%)    PHYSICAL EXAM:  GENERAL: No acute distress  NERVOUS SYSTEM:  Awake, Alert & Oriented X2, Fair concentration  CHEST/LUNG: Diminished breath sounds BL  HEART: Regular rate and rhythm; No murmurs, rubs, or gallops  ABDOMEN: Soft, Nontender, Nondistended; Bowel sounds present  EXTREMITIES:  2+ Peripheral Pulses, No clubbing, cyanosis, or edema  LYMPH: No lymphadenopathy noted  SKIN: Large sacral ulcer no purulent drainage, no surrounding erythema    LABS:                        8.8    8.40  )-----------( 238      ( 13 Mar 2020 09:17 )             28.9     13 Mar 2020 09:17    149    |  120    |  61     ----------------------------<  95     4.0     |  18     |  4.20     Ca    8.3        13 Mar 2020 09:17            Consultant(s) Notes Reviewed:  Y    Care Discussed with Consultants/Other Providers: Y

## 2020-03-13 NOTE — PROGRESS NOTE ADULT - SUBJECTIVE AND OBJECTIVE BOX
Neurology follow up note    GURJIT [id number]      Interval History:    Patient feels ok no new complaints.    MEDICATIONS    acetaminophen    Suspension .. 650 milliGRAM(s) Oral every 6 hours PRN  amLODIPine   Tablet 5 milliGRAM(s) Oral daily  aspirin enteric coated 81 milliGRAM(s) Oral daily  atorvastatin 10 milliGRAM(s) Oral at bedtime  budesonide 160 MICROgram(s)/formoterol 4.5 MICROgram(s) Inhaler 2 Puff(s) Inhalation two times a day  chlorhexidine 2% Cloths 1 Application(s) Topical <User Schedule>  cholestyramine Powder (Sugar-Free) 4 Gram(s) Oral daily  clobetasol 0.05% Cream 1 Application(s) Topical two times a day  dextrose 5% + sodium chloride 0.45%. 1000 milliLiter(s) IV Continuous <Continuous>  dextrose 5%. 1000 milliLiter(s) IV Continuous <Continuous>  epoetin jean carlos Injectable 98634 Unit(s) SubCutaneous <User Schedule>  ferrous    sulfate 325 milliGRAM(s) Oral daily  FLUoxetine 20 milliGRAM(s) Oral daily  heparin  Injectable 5000 Unit(s) SubCutaneous every 8 hours  hydrALAZINE Injectable 10 milliGRAM(s) IV Push every 6 hours PRN  labetalol 200 milliGRAM(s) Oral every 8 hours  melatonin 3 milliGRAM(s) Oral at bedtime  nystatin Cream 1 Application(s) Topical two times a day  sodium bicarbonate 650 milliGRAM(s) Oral three times a day  valproic  acid Syrup 750 milliGRAM(s) Oral two times a day      Allergies    Levaquin (Pruritus)  mercury (Pruritus; Rash)  sulfa drugs (Pruritus)    Intolerances            Vital Signs Last 24 Hrs  T(C): 36.8 (13 Mar 2020 04:20), Max: 36.9 (12 Mar 2020 13:31)  T(F): 98.2 (13 Mar 2020 04:20), Max: 98.5 (12 Mar 2020 13:31)  HR: 80 (13 Mar 2020 04:20) (71 - 80)  BP: 161/67 (13 Mar 2020 04:20) (121/66 - 161/67)  BP(mean): --  RR: 18 (13 Mar 2020 04:20) (16 - 18)  SpO2: 97% (13 Mar 2020 04:20) (93% - 98%)    REVIEW OF SYSTEMS: Limited or unable to obtain secondary to patient's poor mental status.    Constitutional: No fever, chills, fatigue, generalized  weakness  Eyes: no eye pain, visual disturbances, or discharge  ENT:  No difficulty hearing, tinnitus, vertigo; No sinus or throat pain  Neck: No pain or stiffness  Respiratory: No cough, dyspnea, wheezing   Cardiovascular: No chest pain, palpitations,   Gastrointestinal: No abdominal or epigastric pain. No nausea, vomiting  No diarrhea or constipation.   Genitourinary: No dysuria, frequency, hematuria or incontinence  Neurological: No headaches, lightheadedness, vertigo, numbness or tremors  Psychiatric: No depression, anxiety, mood swings or difficulty sleeping  Musculoskeletal: No joint pain or swelling; No muscle, back or extremity pain      On Neurological Examination:    Mental Status - Patient is alert, awake,  [facility]   year 2020     Follow simple commands    Speech -   Fluent                 Cranial Nerves - Pupils 3 mm equal and reactive to light,   extraocular eye movements intact.   smile symmetric  intact bilateral NLF    Motor Exam -   Right upper 4/5   Left upper  3/5  Right lower 2/5  Left lower 2/5    Muscle tone - is normal all over.  No asymmetry is seen.      Sensory    Bilateral intact to light touch    GENERAL Exam: Nontoxic , No Acute Distress   	  HEENT:  normocephalic, atraumatic  		  LUNGS:  Decreased bilaterally  	  HEART: Normal S1S2   No murmur RRR        	  GI/ ABDOMEN:  Soft  Non tender    EXTREMITIES:   No Edema  No Clubbing  No Cyanosis   	   SKIN: Normal  No Ecchymosis               LABS:  CBC Full  -  ( 12 Mar 2020 08:10 )  WBC Count : 6.34 K/uL  RBC Count : 3.05 M/uL  Hemoglobin : 8.8 g/dL  Hematocrit : 28.6 %  Platelet Count - Automated : 253 K/uL  Mean Cell Volume : 93.8 fl  Mean Cell Hemoglobin : 28.9 pg  Mean Cell Hemoglobin Concentration : 30.8 gm/dL  Auto Neutrophil # : 5.39 K/uL  Auto Lymphocyte # : 0.44 K/uL  Auto Monocyte # : 0.25 K/uL  Auto Eosinophil # : 0.19 K/uL  Auto Basophil # : 0.00 K/uL  Auto Neutrophil % : 85.0 %  Auto Lymphocyte % : 7.0 %  Auto Monocyte % : 4.0 %  Auto Eosinophil % : 3.0 %  Auto Basophil % : 0.0 %      03-12    152<H>  |  120<H>  |  64<H>  ----------------------------<  116<H>  3.7   |  18<L>  |  4.40<H>    Ca    8.4<L>      12 Mar 2020 08:10  Phos  4.3     03-12  Mg     2.1     03-12      Hemoglobin A1C:       Vitamin B12         RADIOLOGY    ANALYSIS AND PLAN:  An 84-year-old with an episode of seizure and change in mental status.  1.	For episode of seizure, will increase Depakote 750 mg twice  off dilantin   2.	EEG intermittent generalized spike and waves   3.	Ativan 1 mg IV push q.6h. p.r.n. for any type of breakthrough seizure.  4.	Seizure precautions.  5.	For history of underlying depression, at present hard to evaluate the patient's psychiatric status secondary to the patient being lethargic, continue home medication when possible.  6.	For hyperlipidemia, continue the patient on her cholesterol medication.  7.	For change in mental status, questionable this could be metabolic encephalopathy from partial complex seizures versus any type of underlying infectious type process episodes of temperatures and hypotension possible shock   8.	Antibiotics as needed.  9.	Fall precaution.  10.	overall more awake and interactive today   11.	For chronic kidney disease, monitor renal function.  12.	Spoke with multiple family members at the bedside.  Advance directives were discussed with them.  Primary  will be daughter, Ju, her cell phone number is [phone], home number is [phone] spoke 3/13/2020  13.	 mental status better today  14.	EEG no new changes   15.	Greater than 34 minutes of time was spent with the patient, plan of care, reviewing data, speaking to the family and multidisciplinary healthcare team.

## 2020-03-13 NOTE — PROGRESS NOTE ADULT - PROBLEM SELECTOR PLAN 2
- unresponsive episode with shaking and spike in lactic acidosis on 3/5, consistent with seizure (first EEG on 3/5 done after IV ativan given, which can mask seizure activity)  -2nd EEG on 3/8 showing some generalized spike/wave patterns concerning for increased seizure risk  - CT head, MRI brain showed no acute infarct or hemorrhage  - patient on depakote while on meropenem in the ICU with of depakote low. Dilantin added, patient became more lethargic, now mental status much improved with removal of dilantin   - continue depakote  - seizure precautions  - continue to monitor valproic acid levels  - IV ativan 1 mg q6 for breakthrough seizures  - neuro, Dr. Quintanilla, recs appreciated

## 2020-03-13 NOTE — PROGRESS NOTE ADULT - PROBLEM SELECTOR PLAN 1
- no new fevers  - there was suspicion for UTI on admission though unclear significance of the UCx isolates in the presence of chronic renae   - Doubt infection. No longer on antibiotics  - patient's lethargy improved today, which removal of dilantin  - continue to monitor  - Patient remains full code. Daughter, Rosy, HCP, states her mother would want CPR and intubation, but would not want to remain on life support for long.  - Dr. Wiley, palliative, recs appreciated

## 2020-03-13 NOTE — PROGRESS NOTE ADULT - SUBJECTIVE AND OBJECTIVE BOX
Date/Time Patient Seen:  		  Referring MD:   Data Reviewed	       Patient is a 84y old  Female who presents with a chief complaint of altered mental status (12 Mar 2020 19:05)      Subjective/HPI     PAST MEDICAL & SURGICAL HISTORY:  Wound of sacral region  Depression  Iron deficiency anemia  Eczema  HTN (hypertension)  CKD (chronic kidney disease) stage 5, GFR less than 15 ml/min: not on HD  Herniated lumbar intervertebral disc  Depression  Tremor  Kidney atrophy: right  Colostomy status: 2006  Chronic UTI (urinary tract infection): chronic renea  Cervical cancer: 1970&#x27;s  Dyslipidemia  Diabetes: type 2  Hernia, incisional  S/P hip replacement: right 2013  S/P colon resection: 2006  S/P hysterectomy: 1977        Medication list         MEDICATIONS  (STANDING):  amLODIPine   Tablet 5 milliGRAM(s) Oral daily  aspirin enteric coated 81 milliGRAM(s) Oral daily  atorvastatin 10 milliGRAM(s) Oral at bedtime  budesonide 160 MICROgram(s)/formoterol 4.5 MICROgram(s) Inhaler 2 Puff(s) Inhalation two times a day  chlorhexidine 2% Cloths 1 Application(s) Topical <User Schedule>  cholestyramine Powder (Sugar-Free) 4 Gram(s) Oral daily  clobetasol 0.05% Cream 1 Application(s) Topical two times a day  dextrose 5% + sodium chloride 0.45%. 1000 milliLiter(s) (50 mL/Hr) IV Continuous <Continuous>  dextrose 5%. 1000 milliLiter(s) (50 mL/Hr) IV Continuous <Continuous>  epoetin jean carlos Injectable 14207 Unit(s) SubCutaneous <User Schedule>  ferrous    sulfate 325 milliGRAM(s) Oral daily  FLUoxetine 20 milliGRAM(s) Oral daily  heparin  Injectable 5000 Unit(s) SubCutaneous every 8 hours  labetalol 200 milliGRAM(s) Oral every 8 hours  melatonin 3 milliGRAM(s) Oral at bedtime  nystatin Cream 1 Application(s) Topical two times a day  sodium bicarbonate 650 milliGRAM(s) Oral three times a day  valproic  acid Syrup 750 milliGRAM(s) Oral two times a day    MEDICATIONS  (PRN):  acetaminophen    Suspension .. 650 milliGRAM(s) Oral every 6 hours PRN Temp greater or equal to 38C (100.4F), Moderate Pain (4 - 6)  hydrALAZINE Injectable 10 milliGRAM(s) IV Push every 6 hours PRN SBP over 180         Vitals log        ICU Vital Signs Last 24 Hrs  T(C): 36.8 (13 Mar 2020 04:20), Max: 36.9 (12 Mar 2020 13:31)  T(F): 98.2 (13 Mar 2020 04:20), Max: 98.5 (12 Mar 2020 13:31)  HR: 80 (13 Mar 2020 04:20) (71 - 80)  BP: 161/67 (13 Mar 2020 04:20) (121/66 - 161/67)  BP(mean): --  ABP: --  ABP(mean): --  RR: 18 (13 Mar 2020 04:20) (16 - 18)  SpO2: 97% (13 Mar 2020 04:20) (93% - 98%)           Input and Output:  I&O's Detail    11 Mar 2020 07:01  -  12 Mar 2020 07:00  --------------------------------------------------------  IN:    dextrose 5% + sodium chloride 0.45%.: 300 mL    Oral Fluid: 950 mL  Total IN: 1250 mL    OUT:    Colostomy: 1000 mL    Indwelling Catheter - Urethral: 160 mL  Total OUT: 1160 mL    Total NET: 90 mL      12 Mar 2020 07:01  -  13 Mar 2020 06:25  --------------------------------------------------------  IN:    dextrose 5% + sodium chloride 0.45%.: 25 mL    dextrose 5%.: 450 mL  Total IN: 475 mL    OUT:    Colostomy: 1100 mL    Indwelling Catheter - Urethral: 550 mL  Total OUT: 1650 mL    Total NET: -1175 mL          Lab Data                        8.8    6.34  )-----------( 253      ( 12 Mar 2020 08:10 )             28.6     03-12    152<H>  |  120<H>  |  64<H>  ----------------------------<  116<H>  3.7   |  18<L>  |  4.40<H>    Ca    8.4<L>      12 Mar 2020 08:10  Phos  4.3     03-12  Mg     2.1     03-12              Review of Systems	      Objective     Physical Examination    heart s1s2  lung dec BS  abd soft  head nc  head at  frail  weak      Pertinent Lab findings & Imaging      Ana:  NO   Adequate UO     I&O's Detail    11 Mar 2020 07:01  -  12 Mar 2020 07:00  --------------------------------------------------------  IN:    dextrose 5% + sodium chloride 0.45%.: 300 mL    Oral Fluid: 950 mL  Total IN: 1250 mL    OUT:    Colostomy: 1000 mL    Indwelling Catheter - Urethral: 160 mL  Total OUT: 1160 mL    Total NET: 90 mL      12 Mar 2020 07:01  -  13 Mar 2020 06:25  --------------------------------------------------------  IN:    dextrose 5% + sodium chloride 0.45%.: 25 mL    dextrose 5%.: 450 mL  Total IN: 475 mL    OUT:    Colostomy: 1100 mL    Indwelling Catheter - Urethral: 550 mL  Total OUT: 1650 mL    Total NET: -1175 mL               Discussed with:     Cultures:	        Radiology

## 2020-03-13 NOTE — PROGRESS NOTE ADULT - SUBJECTIVE AND OBJECTIVE BOX
GURJIT HERRERA  84y  Female    Patient is a 84y old  Female who presents with a chief complaint of altered mental status (13 Mar 2020 09:50)      awake and alert  confused.   denies any chest pain or shortness of breath.       PAST MEDICAL & SURGICAL HISTORY:  Wound of sacral region  Depression  Iron deficiency anemia  Eczema  HTN (hypertension)  CKD (chronic kidney disease) stage 5, GFR less than 15 ml/min: not on HD  Herniated lumbar intervertebral disc  Tremor  Kidney atrophy: right  Colostomy status: 2006  Chronic UTI (urinary tract infection): chronic renae  Cervical cancer: 1970&#x27;s  Dyslipidemia  Hernia, incisional  S/P hip replacement: right 2013  S/P colon resection: 2006  S/P hysterectomy: 1977      PHYSICAL EXAM:    T(C): 36.3 (03-13-20 @ 14:30), Max: 36.8 (03-13-20 @ 04:20)  HR: 61 (03-13-20 @ 14:30) (61 - 80)  BP: 115/60 (03-13-20 @ 14:30) (115/60 - 161/67)  RR: 16 (03-13-20 @ 14:30) (16 - 18)  SpO2: 95% (03-13-20 @ 14:30) (95% - 98%)  Wt(kg): --    I&O's Detail    12 Mar 2020 07:01  -  13 Mar 2020 07:00  --------------------------------------------------------  IN:    dextrose 5% + sodium chloride 0.45%.: 575 mL    dextrose 5%.: 450 mL    Oral Fluid: 480 mL  Total IN: 1505 mL    OUT:    Colostomy: 1100 mL    Indwelling Catheter - Urethral: 550 mL  Total OUT: 1650 mL    Total NET: -145 mL      13 Mar 2020 07:01  -  13 Mar 2020 15:46  --------------------------------------------------------  IN:  Total IN: 0 mL    OUT:    Colostomy: 800 mL    Indwelling Catheter - Urethral: 200 mL  Total OUT: 1000 mL    Total NET: -1000 mL      Respiratory: clear anteriorly, decreased BS at bases  Cardiovascular: S1 S2  Gastrointestinal: soft NT ND +BS  Extremities: trace edema   Neuro: Awake and alert    MEDICATIONS  (STANDING):  amLODIPine   Tablet 5 milliGRAM(s) Oral daily  aspirin enteric coated 81 milliGRAM(s) Oral daily  atorvastatin 10 milliGRAM(s) Oral at bedtime  budesonide 160 MICROgram(s)/formoterol 4.5 MICROgram(s) Inhaler 2 Puff(s) Inhalation two times a day  chlorhexidine 2% Cloths 1 Application(s) Topical <User Schedule>  cholestyramine Powder (Sugar-Free) 4 Gram(s) Oral daily  clobetasol 0.05% Cream 1 Application(s) Topical two times a day  dextrose 5% + sodium chloride 0.45%. 1000 milliLiter(s) (50 mL/Hr) IV Continuous <Continuous>  dextrose 5%. 1000 milliLiter(s) (50 mL/Hr) IV Continuous <Continuous>  epoetin jean carlos Injectable 69420 Unit(s) SubCutaneous <User Schedule>  ferrous    sulfate 325 milliGRAM(s) Oral daily  FLUoxetine 20 milliGRAM(s) Oral daily  heparin  Injectable 5000 Unit(s) SubCutaneous every 8 hours  labetalol 200 milliGRAM(s) Oral every 8 hours  melatonin 3 milliGRAM(s) Oral at bedtime  nystatin Cream 1 Application(s) Topical two times a day  sodium bicarbonate 650 milliGRAM(s) Oral three times a day  valproic  acid Syrup 750 milliGRAM(s) Oral two times a day    MEDICATIONS  (PRN):  acetaminophen    Suspension .. 650 milliGRAM(s) Oral every 6 hours PRN Temp greater or equal to 38C (100.4F), Moderate Pain (4 - 6)  hydrALAZINE Injectable 10 milliGRAM(s) IV Push every 6 hours PRN SBP over 180                            8.8    8.40  )-----------( 238      ( 13 Mar 2020 09:17 )             28.9       03-13    149<H>  |  120<H>  |  61<H>  ----------------------------<  95  4.0   |  18<L>  |  4.20<H>    Ca    8.3<L>      13 Mar 2020 09:17  Phos  4.3     03-12  Mg     2.1     03-12        Creatinine Trend: Creatinine Trend: 4.20<--, 4.40<--, 4.30<--, 4.70<--, 4.80<--, 5.10<--

## 2020-03-13 NOTE — PROGRESS NOTE ADULT - PROBLEM SELECTOR PLAN 3
- acute metabolic encephalopathy is likely multifactorial   - admission, suspect pt had an acute neuro event such as seizure, but infection and worsening renal failure with uremia are likely all contributing   - pt may also have some element of ICU/hospital delirium   - MRI showed no evidence for CVA to have explained the isolated expressive aphasia pt had prior to her generalized seizure on 3/5  - prior to the generalized seizure, pt had previously been afebrile, with no leukocytosis for several days and was quite lucid with only true alteration in her mental status seeming to be an expressive aphasia. Pt was not delirious and seemed to have no receptive deficits and no difficulties following all commands appropriately on 3/4/20 when I first saw the pt. Only had difficulty word-finding and could not use full sentences because of stumbling in word choice -- this did not seem consistent with an encephalopathy caused by infection (peripheral or central)  -feel this may have been due to partial seizure activity preceding the generalized seizure pt had on 3/5  - follow up ammonia level, if able to be drawn  - continue depakote  - ammonia only 35, possibly contributing to encephalopathy  -neuro recs appreciated

## 2020-03-13 NOTE — PROGRESS NOTE ADULT - ASSESSMENT
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) and chronic diarrhea, cervical cancer (s/p radical hysterectomy and radiation), tremors, eczema, depression, HTN, HLD, history of frequent UTIs with chronic indwelling renae, anemia, stage 4 sacral decubitus ulcer, and recent hospitalization for MEHDI on CKD thought to be 2/2 dehydration and urinary retention from malfunctioning chronic renae, now brought in by EMS to ED for altered mental status admitted for acute metabolic encephalopathy due to suspected UTI and hyperkalemia in setting of MEHDI on CKD 5, course c/b likely generalized seizure on 3/5/20, and acute hypoxic respiratory failure and suspected sepsis due to suspected aspiration PNA, now out of the ICU, lethargy much improved.

## 2020-03-13 NOTE — PROGRESS NOTE ADULT - PROBLEM SELECTOR PLAN 1
ICU course reviewed  ID follow up noted  monitored off ABX  vs and meds and labs and imaging reviewed  pt has 6 children, spoke to DTR  for now pt is DNR DNI - as per DTR - she wished for all measures  will need to reach out to HCP - and possibly set up a Multidisciplinary Meeting  will follow  prognosis remains guarded to poor

## 2020-03-13 NOTE — PROGRESS NOTE ADULT - PROBLEM SELECTOR PLAN 4
- on admission, patient with metabolic acidosis in setting of UTI and suspected pre-renal MEHDI  - po sodium bicarb 650 mg TID  - pt's renal status still very poor. No plans for dialysis, patient preference

## 2020-03-13 NOTE — PROGRESS NOTE ADULT - ASSESSMENT
84 female with a history of HTN, CAD, CHF, Anemia, osteomyelitis, HLD and atrophic right kidney and uretero ureteral anastomosis of the right to left and CKD stage 5 with anemia now admitted with mental status changes. she is now extubated and confused.    New onset seizures and is on Valproate. Renal indices are stable and are close to baseline. continue the PO bicarbonate.

## 2020-03-14 DIAGNOSIS — E87.0 HYPEROSMOLALITY AND HYPERNATREMIA: ICD-10-CM

## 2020-03-14 LAB
ANION GAP SERPL CALC-SCNC: 11 MMOL/L — SIGNIFICANT CHANGE UP (ref 5–17)
BUN SERPL-MCNC: 59 MG/DL — HIGH (ref 7–23)
CALCIUM SERPL-MCNC: 8.5 MG/DL — SIGNIFICANT CHANGE UP (ref 8.5–10.1)
CHLORIDE SERPL-SCNC: 117 MMOL/L — HIGH (ref 96–108)
CO2 SERPL-SCNC: 20 MMOL/L — LOW (ref 22–31)
CREAT SERPL-MCNC: 4.2 MG/DL — HIGH (ref 0.5–1.3)
GLUCOSE SERPL-MCNC: 91 MG/DL — SIGNIFICANT CHANGE UP (ref 70–99)
HCT VFR BLD CALC: 31.6 % — LOW (ref 34.5–45)
HGB BLD-MCNC: 9.5 G/DL — LOW (ref 11.5–15.5)
MCHC RBC-ENTMCNC: 28.6 PG — SIGNIFICANT CHANGE UP (ref 27–34)
MCHC RBC-ENTMCNC: 30.1 GM/DL — LOW (ref 32–36)
MCV RBC AUTO: 95.2 FL — SIGNIFICANT CHANGE UP (ref 80–100)
NRBC # BLD: 0 /100 WBCS — SIGNIFICANT CHANGE UP (ref 0–0)
PLATELET # BLD AUTO: 251 K/UL — SIGNIFICANT CHANGE UP (ref 150–400)
POTASSIUM SERPL-MCNC: 4.3 MMOL/L — SIGNIFICANT CHANGE UP (ref 3.5–5.3)
POTASSIUM SERPL-SCNC: 4.3 MMOL/L — SIGNIFICANT CHANGE UP (ref 3.5–5.3)
RBC # BLD: 3.32 M/UL — LOW (ref 3.8–5.2)
RBC # FLD: 17.2 % — HIGH (ref 10.3–14.5)
SODIUM SERPL-SCNC: 148 MMOL/L — HIGH (ref 135–145)
WBC # BLD: 7.18 K/UL — SIGNIFICANT CHANGE UP (ref 3.8–10.5)
WBC # FLD AUTO: 7.18 K/UL — SIGNIFICANT CHANGE UP (ref 3.8–10.5)

## 2020-03-14 PROCEDURE — 99233 SBSQ HOSP IP/OBS HIGH 50: CPT

## 2020-03-14 RX ORDER — ACETAMINOPHEN 500 MG
650 TABLET ORAL EVERY 6 HOURS
Refills: 0 | Status: DISCONTINUED | OUTPATIENT
Start: 2020-03-14 | End: 2020-03-27

## 2020-03-14 RX ADMIN — Medication 3 MILLIGRAM(S): at 21:54

## 2020-03-14 RX ADMIN — HEPARIN SODIUM 5000 UNIT(S): 5000 INJECTION INTRAVENOUS; SUBCUTANEOUS at 21:54

## 2020-03-14 RX ADMIN — Medication 81 MILLIGRAM(S): at 11:50

## 2020-03-14 RX ADMIN — BUDESONIDE AND FORMOTEROL FUMARATE DIHYDRATE 2 PUFF(S): 160; 4.5 AEROSOL RESPIRATORY (INHALATION) at 21:54

## 2020-03-14 RX ADMIN — Medication 650 MILLIGRAM(S): at 18:02

## 2020-03-14 RX ADMIN — Medication 1 APPLICATION(S): at 06:37

## 2020-03-14 RX ADMIN — NYSTATIN CREAM 1 APPLICATION(S): 100000 CREAM TOPICAL at 06:38

## 2020-03-14 RX ADMIN — Medication 750 MILLIGRAM(S): at 06:37

## 2020-03-14 RX ADMIN — CHOLESTYRAMINE 4 GRAM(S): 4 POWDER, FOR SUSPENSION ORAL at 11:50

## 2020-03-14 RX ADMIN — Medication 650 MILLIGRAM(S): at 21:54

## 2020-03-14 RX ADMIN — AMLODIPINE BESYLATE 5 MILLIGRAM(S): 2.5 TABLET ORAL at 06:37

## 2020-03-14 RX ADMIN — Medication 325 MILLIGRAM(S): at 11:50

## 2020-03-14 RX ADMIN — Medication 750 MILLIGRAM(S): at 18:02

## 2020-03-14 RX ADMIN — CHLORHEXIDINE GLUCONATE 1 APPLICATION(S): 213 SOLUTION TOPICAL at 06:37

## 2020-03-14 RX ADMIN — HEPARIN SODIUM 5000 UNIT(S): 5000 INJECTION INTRAVENOUS; SUBCUTANEOUS at 18:02

## 2020-03-14 RX ADMIN — Medication 20 MILLIGRAM(S): at 11:50

## 2020-03-14 RX ADMIN — BUDESONIDE AND FORMOTEROL FUMARATE DIHYDRATE 2 PUFF(S): 160; 4.5 AEROSOL RESPIRATORY (INHALATION) at 11:49

## 2020-03-14 RX ADMIN — Medication 200 MILLIGRAM(S): at 21:54

## 2020-03-14 RX ADMIN — Medication 650 MILLIGRAM(S): at 06:37

## 2020-03-14 RX ADMIN — HEPARIN SODIUM 5000 UNIT(S): 5000 INJECTION INTRAVENOUS; SUBCUTANEOUS at 06:37

## 2020-03-14 RX ADMIN — Medication 650 MILLIGRAM(S): at 19:02

## 2020-03-14 RX ADMIN — Medication 1 APPLICATION(S): at 18:04

## 2020-03-14 RX ADMIN — NYSTATIN CREAM 1 APPLICATION(S): 100000 CREAM TOPICAL at 18:04

## 2020-03-14 RX ADMIN — ATORVASTATIN CALCIUM 10 MILLIGRAM(S): 80 TABLET, FILM COATED ORAL at 21:54

## 2020-03-14 NOTE — PROGRESS NOTE ADULT - PROBLEM SELECTOR PLAN 7
- Tylenol PRN  - does not appear actively infected currently  - Wound care RN, recs appreciated   - Aquacel dressing every other day  - Wound care, Dr. Blas.

## 2020-03-14 NOTE — PROGRESS NOTE ADULT - SUBJECTIVE AND OBJECTIVE BOX
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) and chronic diarrhea, cervical cancer (s/p radical hysterectomy and radiation), tremors, eczema, depression, HTN, HLD, history of frequent UTIs with chronic indwelling renae, anemia, stage 4 sacral decubitus ulcer, and recent hospitalization for MEHDI on CKD thought to be 2/2 dehydration and urinary retention from malfunctioning chronic renae, now brought in by EMS to ED for altered mental status admitted for acute metabolic encephalopathy due to suspected UTI and hyperkalemia in setting of MEHDI on CKD 5, course c/b likely generalized seizure on 3/5/20, and acute hypoxic respiratory failure and suspected sepsis due to suspected aspiration PNA, s/p ICU stay.  Seen and examined. Confused, lethargic.       MEDICATIONS  (STANDING):  amLODIPine   Tablet 5 milliGRAM(s) Oral daily  aspirin enteric coated 81 milliGRAM(s) Oral daily  atorvastatin 10 milliGRAM(s) Oral at bedtime  budesonide 160 MICROgram(s)/formoterol 4.5 MICROgram(s) Inhaler 2 Puff(s) Inhalation two times a day  chlorhexidine 2% Cloths 1 Application(s) Topical <User Schedule>  cholestyramine Powder (Sugar-Free) 4 Gram(s) Oral daily  clobetasol 0.05% Cream 1 Application(s) Topical two times a day  dextrose 5% + sodium chloride 0.45%. 1000 milliLiter(s) (50 mL/Hr) IV Continuous <Continuous>  dextrose 5%. 1000 milliLiter(s) (50 mL/Hr) IV Continuous <Continuous>  epoetin jean carlos Injectable 33293 Unit(s) SubCutaneous <User Schedule>  ferrous    sulfate 325 milliGRAM(s) Oral daily  FLUoxetine 20 milliGRAM(s) Oral daily  heparin  Injectable 5000 Unit(s) SubCutaneous every 8 hours  labetalol 200 milliGRAM(s) Oral every 8 hours  melatonin 3 milliGRAM(s) Oral at bedtime  nystatin Cream 1 Application(s) Topical two times a day  sodium bicarbonate 650 milliGRAM(s) Oral three times a day  valproic  acid Syrup 750 milliGRAM(s) Oral two times a day    MEDICATIONS  (PRN):  acetaminophen    Suspension .. 650 milliGRAM(s) Oral every 6 hours PRN Temp greater or equal to 38C (100.4F), Moderate Pain (4 - 6)  hydrALAZINE Injectable 10 milliGRAM(s) IV Push every 6 hours PRN SBP over 180      Allergies    Levaquin (Pruritus)  mercury (Pruritus; Rash)  sulfa drugs (Pruritus)    Intolerances        REVIEW OF SYSTEMS:  Unable to obtain due to confusion and lethargy   Vital Signs Last 24 Hrs  T(C): 36.8 (14 Mar 2020 05:00), Max: 36.8 (14 Mar 2020 05:00)  T(F): 98.2 (14 Mar 2020 05:00), Max: 98.2 (14 Mar 2020 05:00)  HR: 70 (14 Mar 2020 05:00) (61 - 70)  BP: 136/55 (14 Mar 2020 05:00) (115/60 - 137/64)  BP(mean): --  RR: 17 (14 Mar 2020 05:00) (16 - 17)  SpO2: 95% (14 Mar 2020 05:00) (90% - 95%)    PHYSICAL EXAM:  GENERAL: Confused, lethargic   HEAD:  Atraumatic, Normocephalic  EYES: EOMI, PERRLA, conjunctiva and sclera clear  ENMT: No tonsillar erythema, exudates, or enlargement; Moist mucous membranes  NECK: Supple, No JVD, Normal thyroid  CHEST/LUNG: Clear to auscultation bilaterally; No rales, rhonchi, wheezing, or rubs  HEART: S1S2+,  Regular rate and rhythm  ABDOMEN: Soft, Nontender, Nondistended; Bowel sounds present  EXTREMITIES:  2+ Peripheral Pulses, No clubbing, cyanosis  LYMPH: No lymphadenopathy noted    LABS:      Ca    8.3        13 Mar 2020 09:17        CAPILLARY BLOOD GLUCOSE        BLOOD CULTURE    RADIOLOGY & ADDITIONAL TESTS:    Imaging Personally Reviewed:  [ ] YES     Consultant(s) Notes Reviewed:      Care Discussed with Consultants/Other Providers:

## 2020-03-14 NOTE — PROGRESS NOTE ADULT - PROBLEM SELECTOR PLAN 1
- acute metabolic encephalopathy is likely multifactorial, meds, seizure, suspected infection, underlying dementia, poor oral intake causing dehydration   - admission, suspect pt had an acute neuro event such as seizure, but infection and worsening renal failure with uremia are likely all contributing   - pt may also have some element of ICU/hospital delirium   - MRI showed no evidence for CVA to have explained the isolated expressive aphasia pt had prior to her generalized seizure on 3/5  - prior to the generalized seizure, pt had previously been afebrile, with no leukocytosis for several days and was quite lucid with only true alteration in her mental status seeming to be an expressive aphasia. Pt was not delirious and seemed to have no receptive deficits and no difficulties following all commands appropriately on 3/4/20 when I first saw the pt. Only had difficulty word-finding and could not use full sentences because of stumbling in word choice -- this did not seem consistent with an encephalopathy caused by infection (peripheral or central)  -feel this may have been due to partial seizure activity preceding the generalized seizure pt had on 3/5  - follow up ammonia level, if able to be drawn  - continue depakote  - ammonia only 35, possibly contributing to encephalopathy  -neuro recs appreciated

## 2020-03-14 NOTE — PROGRESS NOTE ADULT - SUBJECTIVE AND OBJECTIVE BOX
Date/Time Patient Seen:  		  Referring MD:   Data Reviewed	       Patient is a 84y old  Female who presents with a chief complaint of altered mental status (13 Mar 2020 15:46)      Subjective/HPI     PAST MEDICAL & SURGICAL HISTORY:  Wound of sacral region  Depression  Iron deficiency anemia  Eczema  HTN (hypertension)  CKD (chronic kidney disease) stage 5, GFR less than 15 ml/min: not on HD  Herniated lumbar intervertebral disc  Depression  Tremor  Kidney atrophy: right  Colostomy status: 2006  Chronic UTI (urinary tract infection): chronic renae  Cervical cancer: 1970&#x27;s  Dyslipidemia  Diabetes: type 2  Hernia, incisional  S/P hip replacement: right 2013  S/P colon resection: 2006  S/P hysterectomy: 1977        Medication list         MEDICATIONS  (STANDING):  amLODIPine   Tablet 5 milliGRAM(s) Oral daily  aspirin enteric coated 81 milliGRAM(s) Oral daily  atorvastatin 10 milliGRAM(s) Oral at bedtime  budesonide 160 MICROgram(s)/formoterol 4.5 MICROgram(s) Inhaler 2 Puff(s) Inhalation two times a day  chlorhexidine 2% Cloths 1 Application(s) Topical <User Schedule>  cholestyramine Powder (Sugar-Free) 4 Gram(s) Oral daily  clobetasol 0.05% Cream 1 Application(s) Topical two times a day  dextrose 5% + sodium chloride 0.45%. 1000 milliLiter(s) (50 mL/Hr) IV Continuous <Continuous>  dextrose 5%. 1000 milliLiter(s) (50 mL/Hr) IV Continuous <Continuous>  epoetin jean carlos Injectable 51434 Unit(s) SubCutaneous <User Schedule>  ferrous    sulfate 325 milliGRAM(s) Oral daily  FLUoxetine 20 milliGRAM(s) Oral daily  heparin  Injectable 5000 Unit(s) SubCutaneous every 8 hours  labetalol 200 milliGRAM(s) Oral every 8 hours  melatonin 3 milliGRAM(s) Oral at bedtime  nystatin Cream 1 Application(s) Topical two times a day  sodium bicarbonate 650 milliGRAM(s) Oral three times a day  valproic  acid Syrup 750 milliGRAM(s) Oral two times a day    MEDICATIONS  (PRN):  acetaminophen    Suspension .. 650 milliGRAM(s) Oral every 6 hours PRN Temp greater or equal to 38C (100.4F), Moderate Pain (4 - 6)  hydrALAZINE Injectable 10 milliGRAM(s) IV Push every 6 hours PRN SBP over 180         Vitals log        ICU Vital Signs Last 24 Hrs  T(C): 36.8 (14 Mar 2020 05:00), Max: 36.8 (14 Mar 2020 05:00)  T(F): 98.2 (14 Mar 2020 05:00), Max: 98.2 (14 Mar 2020 05:00)  HR: 70 (14 Mar 2020 05:00) (61 - 70)  BP: 136/55 (14 Mar 2020 05:00) (115/60 - 137/64)  BP(mean): --  ABP: --  ABP(mean): --  RR: 17 (14 Mar 2020 05:00) (16 - 17)  SpO2: 95% (14 Mar 2020 05:00) (90% - 95%)           Input and Output:  I&O's Detail    12 Mar 2020 07:01  -  13 Mar 2020 07:00  --------------------------------------------------------  IN:    dextrose 5% + sodium chloride 0.45%.: 575 mL    dextrose 5%.: 450 mL    Oral Fluid: 480 mL  Total IN: 1505 mL    OUT:    Colostomy: 1100 mL    Indwelling Catheter - Urethral: 550 mL  Total OUT: 1650 mL    Total NET: -145 mL      13 Mar 2020 07:01  -  14 Mar 2020 06:07  --------------------------------------------------------  IN:  Total IN: 0 mL    OUT:    Colostomy: 1575 mL    Indwelling Catheter - Urethral: 500 mL  Total OUT: 2075 mL    Total NET: -2075 mL          Lab Data                        8.8    8.40  )-----------( 238      ( 13 Mar 2020 09:17 )             28.9     03-13    149<H>  |  120<H>  |  61<H>  ----------------------------<  95  4.0   |  18<L>  |  4.20<H>    Ca    8.3<L>      13 Mar 2020 09:17  Phos  4.3     03-12  Mg     2.1     03-12              Review of Systems	      Objective     Physical Examination    heart s1s2  lung dec BS  abd soft  head nc  head at      Pertinent Lab findings & Imaging      Ana:  NO   Adequate UO     I&O's Detail    12 Mar 2020 07:01  -  13 Mar 2020 07:00  --------------------------------------------------------  IN:    dextrose 5% + sodium chloride 0.45%.: 575 mL    dextrose 5%.: 450 mL    Oral Fluid: 480 mL  Total IN: 1505 mL    OUT:    Colostomy: 1100 mL    Indwelling Catheter - Urethral: 550 mL  Total OUT: 1650 mL    Total NET: -145 mL      13 Mar 2020 07:01  -  14 Mar 2020 06:07  --------------------------------------------------------  IN:  Total IN: 0 mL    OUT:    Colostomy: 1575 mL    Indwelling Catheter - Urethral: 500 mL  Total OUT: 2075 mL    Total NET: -2075 mL               Discussed with:     Cultures:	        Radiology

## 2020-03-14 NOTE — PROGRESS NOTE ADULT - ASSESSMENT
·	CKD 4, Solitary functioning kidney: Prerenal azotemia, (Atrophic right kidney)  ·	Metabolic acidosis  ·	Hyperkalemia  ·	Diabetes  ·	Hypertension  ·	Anemia    Renal indices stable. To continue current meds. Monitor BP trend. Titrate BP meds as needed. Salt restriction.   Sodium levels improving. Monitor blood sugar levels. Insulin coverage as needed. Dietary restriction.   Procrit for anemia. Continue sodium bicarbonate. Avoid nephrotoxic meds as possible. Avoid ACEI, ARB, NSAIDs and IV contrast.   Will follow electrolytes and renal function trend. Overall prognosis poor.

## 2020-03-14 NOTE — PROGRESS NOTE ADULT - ASSESSMENT
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) and chronic diarrhea, cervical cancer (s/p radical hysterectomy and radiation), tremors, eczema, depression, HTN, HLD, history of frequent UTIs with chronic indwelling renae, anemia, stage 4 sacral decubitus ulcer, and recent hospitalization for MEHDI on CKD thought to be 2/2 dehydration and urinary retention from malfunctioning chronic renae, now brought in by EMS to ED for altered mental status admitted for acute metabolic encephalopathy due to suspected UTI and hyperkalemia in setting of MEHDI on CKD 5, course c/b likely generalized seizure on 3/5/20, and acute hypoxic respiratory failure and suspected sepsis due to suspected aspiration PNA, s/p ICU stay, still intermittently lethargic

## 2020-03-14 NOTE — PROGRESS NOTE ADULT - SUBJECTIVE AND OBJECTIVE BOX
Patient is a 84y old  Female who presents with a chief complaint of altered mental status (03 Mar 2020 17:47)  Patient seen in follow up for CKD 4, Hyperkalemia.     PAST MEDICAL HISTORY:  Wound of sacral region  Depression  Iron deficiency anemia  Eczema  HTN (hypertension)  CKD (chronic kidney disease) stage 5, GFR less than 15 ml/min  Herniated lumbar intervertebral disc  Depression  Tremor  Kidney atrophy  Colostomy status  Chronic UTI (urinary tract infection)  Cervical cancer  Dyslipidemia  Diabetes  Hernia, incisional      MEDICATIONS  (STANDING):  amLODIPine   Tablet 5 milliGRAM(s) Oral daily  aspirin enteric coated 81 milliGRAM(s) Oral daily  atorvastatin 10 milliGRAM(s) Oral at bedtime  budesonide 160 MICROgram(s)/formoterol 4.5 MICROgram(s) Inhaler 2 Puff(s) Inhalation two times a day  chlorhexidine 2% Cloths 1 Application(s) Topical <User Schedule>  cholestyramine Powder (Sugar-Free) 4 Gram(s) Oral daily  clobetasol 0.05% Cream 1 Application(s) Topical two times a day  dextrose 5% + sodium chloride 0.45%. 1000 milliLiter(s) (50 mL/Hr) IV Continuous <Continuous>  dextrose 5%. 1000 milliLiter(s) (50 mL/Hr) IV Continuous <Continuous>  epoetin jean carlos Injectable 42596 Unit(s) SubCutaneous <User Schedule>  ferrous    sulfate 325 milliGRAM(s) Oral daily  FLUoxetine 20 milliGRAM(s) Oral daily  heparin  Injectable 5000 Unit(s) SubCutaneous every 8 hours  labetalol 200 milliGRAM(s) Oral every 8 hours  melatonin 3 milliGRAM(s) Oral at bedtime  nystatin Cream 1 Application(s) Topical two times a day  sodium bicarbonate 650 milliGRAM(s) Oral three times a day  valproic  acid Syrup 750 milliGRAM(s) Oral two times a day    MEDICATIONS  (PRN):  acetaminophen    Suspension .. 650 milliGRAM(s) Oral every 6 hours PRN Temp greater or equal to 38C (100.4F), Moderate Pain (4 - 6)  hydrALAZINE Injectable 10 milliGRAM(s) IV Push every 6 hours PRN SBP over 180    T(C): 36.8 (03-14-20 @ 05:00), Max: 36.8 (03-13-20 @ 04:20)  HR: 70 (03-14-20 @ 05:00) (61 - 80)  BP: 136/55 (03-14-20 @ 05:00) (115/60 - 161/67)  RR: 17 (03-14-20 @ 05:00)  SpO2: 95% (03-14-20 @ 05:00)  Wt(kg): --  I&O's Detail    13 Mar 2020 07:01  -  14 Mar 2020 07:00  --------------------------------------------------------  IN:  Total IN: 0 mL    OUT:    Colostomy: 1875 mL    Indwelling Catheter - Urethral: 700 mL  Total OUT: 2575 mL    Total NET: -2575 mL    PHYSICAL EXAM:  General: No distress  Respiratory: b/l air entry  Cardiovascular: S1 S2  Gastrointestinal: soft  Extremities:  edema, + renae               LABORATORY:                        9.5    7.18  )-----------( 251      ( 14 Mar 2020 09:40 )             31.6     03-14    148<H>  |  117<H>  |  59<H>  ----------------------------<  91  4.3   |  20<L>  |  4.20<H>    Ca    8.5      14 Mar 2020 09:40      Sodium, Serum: 148 mmol/L (03-14 @ 09:40)  Sodium, Serum: 149 mmol/L (03-13 @ 09:17)    Potassium, Serum: 4.3 mmol/L (03-14 @ 09:40)  Potassium, Serum: 4.0 mmol/L (03-13 @ 09:17)    Hemoglobin: 9.5 g/dL (03-14 @ 09:40)  Hemoglobin: 8.8 g/dL (03-13 @ 09:17)  Hemoglobin: 8.8 g/dL (03-12 @ 08:10)    Creatinine, Serum 4.20 (03-14 @ 09:40)  Creatinine, Serum 4.20 (03-13 @ 09:17)  Creatinine, Serum 4.40 (03-12 @ 08:10)

## 2020-03-14 NOTE — PROGRESS NOTE ADULT - PROBLEM SELECTOR PLAN 1
VS and Meds reviewed  medical follow up notes reviewed  pt remains full code  oral and skin hygiene - assist with ADL  GOC discussion ongoing - big family -   may benefit from Multidisciplinary Team Meeting - Family meeting -   will follow

## 2020-03-14 NOTE — PROGRESS NOTE ADULT - PROBLEM SELECTOR PLAN 4
- on admission, patient with metabolic acidosis in setting of UTI and suspected pre-renal MEHDI, dehydration   - po sodium bicarb 650 mg TID  - pt's renal status still very poor. No plans for dialysis, patient preference

## 2020-03-14 NOTE — PROGRESS NOTE ADULT - SUBJECTIVE AND OBJECTIVE BOX
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 Patient's daughter present at bedside providing history due to patient AMS.  Patient daughter reports that over the past few days, patient seems weaker than usual (not able to empty ostomy, not able to assist with sacral wound cleaning, etc.) Last night she seemed confused, and was answering questions inappropriately, and having difficulty communicating. She has not had decreased urine output, and has not had any recent complaints. Daughter reports that a similar episode occurred over the summer, when the patient had a UTI. Patient currently does not remember the events of last night/this morning. She reports feeling cold, irritation of the skin around her ostomy site, sacral pain, and suprapubic discomfort (baseline). Patient denies fevers, chest pain, palpitations, LE edema.    ED Course:  Vitals: Temp 97.9, HR 71, /77 ->211/95->194/94, RR 18,  O2 sat 100 on RA.  Labs:  WBC 6.77, H/h 9.8/31.7, RDW 15.1, glucose 180, lactate .6, K 6.3 ->5.3, Cl 118, bicarb 14, anion gap 12, BUN/Cr 45/4 (was ~6 last admission, baseline <5), albumin 2.8, GFR 10  UA: color: pink, appearance: turbid, protein 500, LE moderate, blood large, WBC 26-50, RBC>50, bacteria moderate.  CT head: Negative for intracranial hemorrhage or acute territorial hemorrhage  CXR: Questionable upper lobe infiltrates  EKG: NSR without peaked t waves and no ischemic changes  ABG - ( 03 Mar 2020 16:09 )  pH, Arterial: 7.31  pH, Blood: x     /  pCO2: 31    /  pO2: 87    / HCO3: 17    / Base Excess: -9.7  /  SaO2: 96      Patient s/p zosyn, 1.7L NS blous, 1.7 LR bolus, insulin, albuterol, sodium bicarb 50 meq IV, kayexalate with improvement.  Renae removed and replaced with 50 cc urine output upon insertion. (03 Mar 2020 16:51)      Interval History - mental status improving    Patient is seen, chart was reviewed and case was discussed with the treatment team.  Pt is not in any distress.   Lying on bed comfortably.     Vital Signs Last 24 Hrs  T(C): 36.9 (14 Mar 2020 13:53), Max: 36.9 (14 Mar 2020 13:53)  T(F): 98.5 (14 Mar 2020 13:53), Max: 98.5 (14 Mar 2020 13:53)  HR: 68 (14 Mar 2020 13:53) (68 - 70)  BP: 157/69 (14 Mar 2020 13:53) (136/55 - 157/69)  BP(mean): --  RR: 17 (14 Mar 2020 13:53) (17 - 17)  SpO2: 96% (14 Mar 2020 13:53) (90% - 96%)        REVIEW OF SYSTEMS:      On Neurological Examination:    Mental Status - Pt is awake, alert , oriented X 3.  following simple commands.    Speech -  normal    Cranial Nerves - Pupils 3 mm equal and reactive to light, extraocular eye movements intact.   Pt has no  facial asymmetry.     Muscle tone - is normal     Motor Exam - UE 4/5  LE 3/5    Sensory Exam -Pt withdraws all extremities equally on stimulation. No asymmetry seen.  .    Deep tendon Reflexes - 2 plus all over.          Neck Supple -  Yes.     MEDICATIONS    acetaminophen    Suspension .. 650 milliGRAM(s) Oral every 6 hours PRN  amLODIPine   Tablet 5 milliGRAM(s) Oral daily  aspirin enteric coated 81 milliGRAM(s) Oral daily  atorvastatin 10 milliGRAM(s) Oral at bedtime  budesonide 160 MICROgram(s)/formoterol 4.5 MICROgram(s) Inhaler 2 Puff(s) Inhalation two times a day  chlorhexidine 2% Cloths 1 Application(s) Topical <User Schedule>  cholestyramine Powder (Sugar-Free) 4 Gram(s) Oral daily  clobetasol 0.05% Cream 1 Application(s) Topical two times a day  dextrose 5% + sodium chloride 0.45%. 1000 milliLiter(s) IV Continuous <Continuous>  dextrose 5%. 1000 milliLiter(s) IV Continuous <Continuous>  epoetin jean carlos Injectable 11467 Unit(s) SubCutaneous <User Schedule>  ferrous    sulfate 325 milliGRAM(s) Oral daily  FLUoxetine 20 milliGRAM(s) Oral daily  heparin  Injectable 5000 Unit(s) SubCutaneous every 8 hours  hydrALAZINE Injectable 10 milliGRAM(s) IV Push every 6 hours PRN  labetalol 200 milliGRAM(s) Oral every 8 hours  melatonin 3 milliGRAM(s) Oral at bedtime  nystatin Cream 1 Application(s) Topical two times a day  sodium bicarbonate 650 milliGRAM(s) Oral three times a day  valproic  acid Syrup 750 milliGRAM(s) Oral two times a day      Allergies    Levaquin (Pruritus)  mercury (Pruritus; Rash)  sulfa drugs (Pruritus)    Intolerances                                    9.5    7.18  )-----------( 251      ( 14 Mar 2020 09:40 )             31.6   03-14    148<H>  |  117<H>  |  59<H>  ----------------------------<  91  4.3   |  20<L>  |  4.20<H>    Ca    8.5      14 Mar 2020 09:40        Hemoglobin A1C:     Vitamin B12     RADIOLOGY    ASSESSMENT AND PLAN:      ams multifactorial; mental status improving  seizure  MEHDI    continue valproic acid 750 mg bid  Physical therapy evaluation.  OOB to chair/ambulation with assistance only.  Advanced care planning was discussed with family.  Pain is accessed and addressed.  Plan of care was discussed with family. Questions answered.  Would continue to follow.

## 2020-03-15 LAB
ANION GAP SERPL CALC-SCNC: 14 MMOL/L — SIGNIFICANT CHANGE UP (ref 5–17)
BUN SERPL-MCNC: 57 MG/DL — HIGH (ref 7–23)
CALCIUM SERPL-MCNC: 8.3 MG/DL — LOW (ref 8.5–10.1)
CHLORIDE SERPL-SCNC: 113 MMOL/L — HIGH (ref 96–108)
CO2 SERPL-SCNC: 17 MMOL/L — LOW (ref 22–31)
CREAT SERPL-MCNC: 4.3 MG/DL — HIGH (ref 0.5–1.3)
GLUCOSE SERPL-MCNC: 107 MG/DL — HIGH (ref 70–99)
HCT VFR BLD CALC: 33.4 % — LOW (ref 34.5–45)
HGB BLD-MCNC: 10.2 G/DL — LOW (ref 11.5–15.5)
MCHC RBC-ENTMCNC: 28.7 PG — SIGNIFICANT CHANGE UP (ref 27–34)
MCHC RBC-ENTMCNC: 30.5 GM/DL — LOW (ref 32–36)
MCV RBC AUTO: 93.8 FL — SIGNIFICANT CHANGE UP (ref 80–100)
NRBC # BLD: 0 /100 WBCS — SIGNIFICANT CHANGE UP (ref 0–0)
PLATELET # BLD AUTO: 249 K/UL — SIGNIFICANT CHANGE UP (ref 150–400)
POTASSIUM SERPL-MCNC: 4.6 MMOL/L — SIGNIFICANT CHANGE UP (ref 3.5–5.3)
POTASSIUM SERPL-SCNC: 4.6 MMOL/L — SIGNIFICANT CHANGE UP (ref 3.5–5.3)
RBC # BLD: 3.56 M/UL — LOW (ref 3.8–5.2)
RBC # FLD: 17.1 % — HIGH (ref 10.3–14.5)
SODIUM SERPL-SCNC: 144 MMOL/L — SIGNIFICANT CHANGE UP (ref 135–145)
WBC # BLD: 6.95 K/UL — SIGNIFICANT CHANGE UP (ref 3.8–10.5)
WBC # FLD AUTO: 6.95 K/UL — SIGNIFICANT CHANGE UP (ref 3.8–10.5)

## 2020-03-15 PROCEDURE — 99233 SBSQ HOSP IP/OBS HIGH 50: CPT

## 2020-03-15 RX ORDER — TRAMADOL HYDROCHLORIDE 50 MG/1
50 TABLET ORAL EVERY 6 HOURS
Refills: 0 | Status: DISCONTINUED | OUTPATIENT
Start: 2020-03-15 | End: 2020-03-22

## 2020-03-15 RX ADMIN — Medication 650 MILLIGRAM(S): at 06:01

## 2020-03-15 RX ADMIN — Medication 325 MILLIGRAM(S): at 12:12

## 2020-03-15 RX ADMIN — Medication 650 MILLIGRAM(S): at 14:33

## 2020-03-15 RX ADMIN — TRAMADOL HYDROCHLORIDE 50 MILLIGRAM(S): 50 TABLET ORAL at 17:28

## 2020-03-15 RX ADMIN — HEPARIN SODIUM 5000 UNIT(S): 5000 INJECTION INTRAVENOUS; SUBCUTANEOUS at 14:33

## 2020-03-15 RX ADMIN — TRAMADOL HYDROCHLORIDE 50 MILLIGRAM(S): 50 TABLET ORAL at 18:20

## 2020-03-15 RX ADMIN — BUDESONIDE AND FORMOTEROL FUMARATE DIHYDRATE 2 PUFF(S): 160; 4.5 AEROSOL RESPIRATORY (INHALATION) at 12:12

## 2020-03-15 RX ADMIN — Medication 650 MILLIGRAM(S): at 15:33

## 2020-03-15 RX ADMIN — Medication 3 MILLIGRAM(S): at 22:08

## 2020-03-15 RX ADMIN — Medication 650 MILLIGRAM(S): at 22:08

## 2020-03-15 RX ADMIN — CHOLESTYRAMINE 4 GRAM(S): 4 POWDER, FOR SUSPENSION ORAL at 12:12

## 2020-03-15 RX ADMIN — Medication 20 MILLIGRAM(S): at 12:12

## 2020-03-15 RX ADMIN — BUDESONIDE AND FORMOTEROL FUMARATE DIHYDRATE 2 PUFF(S): 160; 4.5 AEROSOL RESPIRATORY (INHALATION) at 22:08

## 2020-03-15 RX ADMIN — NYSTATIN CREAM 1 APPLICATION(S): 100000 CREAM TOPICAL at 17:23

## 2020-03-15 RX ADMIN — CHLORHEXIDINE GLUCONATE 1 APPLICATION(S): 213 SOLUTION TOPICAL at 05:59

## 2020-03-15 RX ADMIN — Medication 750 MILLIGRAM(S): at 17:24

## 2020-03-15 RX ADMIN — Medication 1 APPLICATION(S): at 06:02

## 2020-03-15 RX ADMIN — Medication 750 MILLIGRAM(S): at 06:02

## 2020-03-15 RX ADMIN — HEPARIN SODIUM 5000 UNIT(S): 5000 INJECTION INTRAVENOUS; SUBCUTANEOUS at 06:02

## 2020-03-15 RX ADMIN — AMLODIPINE BESYLATE 5 MILLIGRAM(S): 2.5 TABLET ORAL at 06:01

## 2020-03-15 RX ADMIN — HEPARIN SODIUM 5000 UNIT(S): 5000 INJECTION INTRAVENOUS; SUBCUTANEOUS at 22:08

## 2020-03-15 RX ADMIN — ATORVASTATIN CALCIUM 10 MILLIGRAM(S): 80 TABLET, FILM COATED ORAL at 22:08

## 2020-03-15 RX ADMIN — NYSTATIN CREAM 1 APPLICATION(S): 100000 CREAM TOPICAL at 06:01

## 2020-03-15 RX ADMIN — Medication 81 MILLIGRAM(S): at 12:12

## 2020-03-15 RX ADMIN — Medication 1 APPLICATION(S): at 17:23

## 2020-03-15 NOTE — PROGRESS NOTE ADULT - PROBLEM SELECTOR PLAN 2
- unresponsive episode with shaking and spike in lactic acidosis on 3/5, consistent with seizure (first EEG on 3/5 done after IV ativan given, which can mask seizure activity)  -2nd EEG on 3/8 showing some generalized spike/wave patterns concerning for increased seizure risk  - CT head, MRI brain showed no acute infarct or hemorrhage  - Continue Depakote.  -Dilantin stopped due to lethargy  -Seizure precautions  -Ativan PRn for breakthrough seizures

## 2020-03-15 NOTE — PROGRESS NOTE ADULT - SUBJECTIVE AND OBJECTIVE BOX
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) and chronic diarrhea, cervical cancer (s/p radical hysterectomy and radiation), tremors, eczema, depression, HTN, HLD, history of frequent UTIs with chronic indwelling renae, anemia, stage 4 sacral decubitus ulcer, and recent hospitalization for MEHDI on CKD thought to be 2/2 dehydration and urinary retention from malfunctioning chronic renae, now brought in by EMS to ED for altered mental status admitted for acute metabolic encephalopathy due to suspected UTI and hyperkalemia in setting of MEHDI on CKD 5, course c/b likely generalized seizure on 3/5/20, and acute hypoxic respiratory failure and suspected sepsis due to suspected aspiration PNA, s/p ICU stay. Mental status hae been improving. No new events. Afebrile. Confused       MEDICATIONS  (STANDING):  amLODIPine   Tablet 5 milliGRAM(s) Oral daily  aspirin enteric coated 81 milliGRAM(s) Oral daily  atorvastatin 10 milliGRAM(s) Oral at bedtime  budesonide 160 MICROgram(s)/formoterol 4.5 MICROgram(s) Inhaler 2 Puff(s) Inhalation two times a day  chlorhexidine 2% Cloths 1 Application(s) Topical <User Schedule>  cholestyramine Powder (Sugar-Free) 4 Gram(s) Oral daily  clobetasol 0.05% Cream 1 Application(s) Topical two times a day  dextrose 5% + sodium chloride 0.45%. 1000 milliLiter(s) (50 mL/Hr) IV Continuous <Continuous>  dextrose 5%. 1000 milliLiter(s) (50 mL/Hr) IV Continuous <Continuous>  epoetin jean carlos Injectable 27716 Unit(s) SubCutaneous <User Schedule>  ferrous    sulfate 325 milliGRAM(s) Oral daily  FLUoxetine 20 milliGRAM(s) Oral daily  heparin  Injectable 5000 Unit(s) SubCutaneous every 8 hours  labetalol 200 milliGRAM(s) Oral every 8 hours  melatonin 3 milliGRAM(s) Oral at bedtime  nystatin Cream 1 Application(s) Topical two times a day  sodium bicarbonate 650 milliGRAM(s) Oral three times a day  valproic  acid Syrup 750 milliGRAM(s) Oral two times a day    MEDICATIONS  (PRN):  acetaminophen    Suspension .. 650 milliGRAM(s) Oral every 6 hours PRN Temp greater or equal to 38C (100.4F), Moderate Pain (4 - 6)  acetaminophen   Tablet .. 650 milliGRAM(s) Oral every 6 hours PRN Mild Pain (1 - 3)  hydrALAZINE Injectable 10 milliGRAM(s) IV Push every 6 hours PRN SBP over 180      Allergies    Levaquin (Pruritus)  mercury (Pruritus; Rash)  sulfa drugs (Pruritus)    Intolerances        REVIEW OF SYSTEMS:  Unable to obtain, patient is confused   Vital Signs Last 24 Hrs  T(C): 36.9 (15 Mar 2020 05:21), Max: 37 (14 Mar 2020 20:17)  T(F): 98.4 (15 Mar 2020 05:21), Max: 98.6 (14 Mar 2020 20:17)  HR: 68 (15 Mar 2020 05:21) (68 - 82)  BP: 125/72 (15 Mar 2020 05:21) (125/72 - 168/71)  BP(mean): --  RR: 17 (15 Mar 2020 05:21) (16 - 17)  SpO2: 97% (15 Mar 2020 05:21) (94% - 97%)    PHYSICAL EXAM:  GENERAL: NAD, Sleepy but responds to verbal stimuli   HEAD:  Atraumatic, Normocephalic  EYES: EOMI, PERRLA, conjunctiva and sclera clear  ENMT: No tonsillar erythema, exudates, or enlargement; Moist mucous membranes  NECK: Supple, No JVD, Normal thyroid  NERVOUS SYSTEM:  Alert & Oriented X3, Good concentration; Motor Strength 5/5 B/L upper and lower extremities  CHEST/LUNG: Clear to auscultation bilaterally; No rales, rhonchi, wheezing, or rubs  HEART: S1S2+,  Regular rate and rhythm  ABDOMEN: Soft, Nontender, Nondistended; Bowel sounds present  EXTREMITIES:  2+ Peripheral Pulses, No clubbing, cyanosis, or edema  LYMPH: No lymphadenopathy noted  SKIN: No rashes or lesions    LABS:                        10.2   6.95  )-----------( 249      ( 15 Mar 2020 08:49 )             33.4     15 Mar 2020 08:49    144    |  113    |  57     ----------------------------<  107    4.6     |  17     |  4.30     Ca    8.3        15 Mar 2020 08:49        CAPILLARY BLOOD GLUCOSE        BLOOD CULTURE    RADIOLOGY & ADDITIONAL TESTS:    Imaging Personally Reviewed:  [ ] YES     Consultant(s) Notes Reviewed:      Care Discussed with Consultants/Other Providers:

## 2020-03-15 NOTE — PROGRESS NOTE ADULT - PROBLEM SELECTOR PLAN 4
- on admission, patient with metabolic acidosis in setting of UTI and suspected pre-renal MEHDI, dehydration   - po sodium bicarb 650 mg TID  - pt's renal status still very poor. No plans for dialysis, patient and family  preference

## 2020-03-15 NOTE — PROGRESS NOTE ADULT - PROBLEM SELECTOR PLAN 1
- acute metabolic encephalopathy is likely multifactorial, meds, seizure, suspected infection, underlying dementia, poor oral intake causing dehydration . Mental status has been improving. More awake, alert now, but remains confused.   - pt may also have some element of ICU/hospital delirium   - MRI showed no evidence for CVA to have explained the isolated expressive aphasia pt had prior to her generalized seizure on 3/5  - Neuro following

## 2020-03-15 NOTE — PROGRESS NOTE ADULT - ATTENDING COMMENTS
Plan as above. D/w son, Scott at bedside yesterday as well as daughter in law. They prefer to bring patient home when she us medically stable for discharge, but will need HHA set up.

## 2020-03-15 NOTE — PROGRESS NOTE ADULT - SUBJECTIVE AND OBJECTIVE BOX
Date/Time Patient Seen:  		  Referring MD:   Data Reviewed	       Patient is a 84y old  Female who presents with a chief complaint of altered mental status (14 Mar 2020 16:27)      Subjective/HPI     PAST MEDICAL & SURGICAL HISTORY:  Wound of sacral region  Depression  Iron deficiency anemia  Eczema  HTN (hypertension)  CKD (chronic kidney disease) stage 5, GFR less than 15 ml/min: not on HD  Herniated lumbar intervertebral disc  Depression  Tremor  Kidney atrophy: right  Colostomy status: 2006  Chronic UTI (urinary tract infection): chronic renae  Cervical cancer: 1970&#x27;s  Dyslipidemia  Diabetes: type 2  Hernia, incisional  S/P hip replacement: right 2013  S/P colon resection: 2006  S/P hysterectomy: 1977        Medication list         MEDICATIONS  (STANDING):  amLODIPine   Tablet 5 milliGRAM(s) Oral daily  aspirin enteric coated 81 milliGRAM(s) Oral daily  atorvastatin 10 milliGRAM(s) Oral at bedtime  budesonide 160 MICROgram(s)/formoterol 4.5 MICROgram(s) Inhaler 2 Puff(s) Inhalation two times a day  chlorhexidine 2% Cloths 1 Application(s) Topical <User Schedule>  cholestyramine Powder (Sugar-Free) 4 Gram(s) Oral daily  clobetasol 0.05% Cream 1 Application(s) Topical two times a day  dextrose 5% + sodium chloride 0.45%. 1000 milliLiter(s) (50 mL/Hr) IV Continuous <Continuous>  dextrose 5%. 1000 milliLiter(s) (50 mL/Hr) IV Continuous <Continuous>  epoetin jean carlos Injectable 66386 Unit(s) SubCutaneous <User Schedule>  ferrous    sulfate 325 milliGRAM(s) Oral daily  FLUoxetine 20 milliGRAM(s) Oral daily  heparin  Injectable 5000 Unit(s) SubCutaneous every 8 hours  labetalol 200 milliGRAM(s) Oral every 8 hours  melatonin 3 milliGRAM(s) Oral at bedtime  nystatin Cream 1 Application(s) Topical two times a day  sodium bicarbonate 650 milliGRAM(s) Oral three times a day  valproic  acid Syrup 750 milliGRAM(s) Oral two times a day    MEDICATIONS  (PRN):  acetaminophen    Suspension .. 650 milliGRAM(s) Oral every 6 hours PRN Temp greater or equal to 38C (100.4F), Moderate Pain (4 - 6)  acetaminophen   Tablet .. 650 milliGRAM(s) Oral every 6 hours PRN Mild Pain (1 - 3)  hydrALAZINE Injectable 10 milliGRAM(s) IV Push every 6 hours PRN SBP over 180         Vitals log        ICU Vital Signs Last 24 Hrs  T(C): 36.9 (15 Mar 2020 05:21), Max: 37 (14 Mar 2020 20:17)  T(F): 98.4 (15 Mar 2020 05:21), Max: 98.6 (14 Mar 2020 20:17)  HR: 68 (15 Mar 2020 05:21) (68 - 82)  BP: 125/72 (15 Mar 2020 05:21) (125/72 - 168/71)  BP(mean): --  ABP: --  ABP(mean): --  RR: 17 (15 Mar 2020 05:21) (16 - 17)  SpO2: 97% (15 Mar 2020 05:21) (94% - 97%)           Input and Output:  I&O's Detail    13 Mar 2020 07:01  -  14 Mar 2020 07:00  --------------------------------------------------------  IN:  Total IN: 0 mL    OUT:    Colostomy: 1875 mL    Indwelling Catheter - Urethral: 700 mL  Total OUT: 2575 mL    Total NET: -2575 mL      14 Mar 2020 07:01  -  15 Mar 2020 06:50  --------------------------------------------------------  IN:    Oral Fluid: 450 mL  Total IN: 450 mL    OUT:    Colostomy: 2975 mL    Indwelling Catheter - Urethral: 350 mL  Total OUT: 3325 mL    Total NET: -2875 mL          Lab Data                        9.5    7.18  )-----------( 251      ( 14 Mar 2020 09:40 )             31.6     03-14    148<H>  |  117<H>  |  59<H>  ----------------------------<  91  4.3   |  20<L>  |  4.20<H>    Ca    8.5      14 Mar 2020 09:40              Review of Systems	      Objective     Physical Examination    heart s1s2  lung dc bs  abd soft      Pertinent Lab findings & Imaging      Ana:  NO   Adequate UO     I&O's Detail    13 Mar 2020 07:01  -  14 Mar 2020 07:00  --------------------------------------------------------  IN:  Total IN: 0 mL    OUT:    Colostomy: 1875 mL    Indwelling Catheter - Urethral: 700 mL  Total OUT: 2575 mL    Total NET: -2575 mL      14 Mar 2020 07:01  -  15 Mar 2020 06:50  --------------------------------------------------------  IN:    Oral Fluid: 450 mL  Total IN: 450 mL    OUT:    Colostomy: 2975 mL    Indwelling Catheter - Urethral: 350 mL  Total OUT: 3325 mL    Total NET: -2875 mL               Discussed with:     Cultures:	        Radiology

## 2020-03-15 NOTE — PROGRESS NOTE ADULT - SUBJECTIVE AND OBJECTIVE BOX
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 Patient's daughter present at bedside providing history due to patient AMS.  Patient daughter reports that over the past few days, patient seems weaker than usual (not able to empty ostomy, not able to assist with sacral wound cleaning, etc.) Last night she seemed confused, and was answering questions inappropriately, and having difficulty communicating. She has not had decreased urine output, and has not had any recent complaints. Daughter reports that a similar episode occurred over the summer, when the patient had a UTI. Patient currently does not remember the events of last night/this morning. She reports feeling cold, irritation of the skin around her ostomy site, sacral pain, and suprapubic discomfort (baseline). Patient denies fevers, chest pain, palpitations, LE edema.    ED Course:  Vitals: Temp 97.9, HR 71, /77 ->211/95->194/94, RR 18,  O2 sat 100 on RA.  Labs:  WBC 6.77, H/h 9.8/31.7, RDW 15.1, glucose 180, lactate .6, K 6.3 ->5.3, Cl 118, bicarb 14, anion gap 12, BUN/Cr 45/4 (was ~6 last admission, baseline <5), albumin 2.8, GFR 10  UA: color: pink, appearance: turbid, protein 500, LE moderate, blood large, WBC 26-50, RBC>50, bacteria moderate.  CT head: Negative for intracranial hemorrhage or acute territorial hemorrhage  CXR: Questionable upper lobe infiltrates  EKG: NSR without peaked t waves and no ischemic changes  ABG - ( 03 Mar 2020 16:09 )  pH, Arterial: 7.31  pH, Blood: x     /  pCO2: 31    /  pO2: 87    / HCO3: 17    / Base Excess: -9.7  /  SaO2: 96      Patient s/p zosyn, 1.7L NS blous, 1.7 LR bolus, insulin, albuterol, sodium bicarb 50 meq IV, kayexalate with improvement.  Renae removed and replaced with 50 cc urine output upon insertion. (03 Mar 2020 16:51)      Interval History - no new events    Patient is seen, chart was reviewed and case was discussed with the treatment team.  Pt is not in any distress.   Lying on bed comfortably.     Vital Signs Last 24 Hrs  T(C): 37.1 (15 Mar 2020 13:53), Max: 37.1 (15 Mar 2020 13:53)  T(F): 98.7 (15 Mar 2020 13:53), Max: 98.7 (15 Mar 2020 13:53)  HR: 76 (15 Mar 2020 14:29) (68 - 82)  BP: 133/75 (15 Mar 2020 14:29) (125/72 - 168/71)  BP(mean): --  RR: 16 (15 Mar 2020 13:53) (16 - 17)  SpO2: 95% (15 Mar 2020 13:53) (94% - 97%)        REVIEW OF SYSTEMS:      On Neurological Examination:    Mental Status - Pt is awake, alert , oriented X 3.  following simple commands.    Speech -  normal    Cranial Nerves - Pupils 3 mm equal and reactive to light, extraocular eye movements intact.   Pt has no  facial asymmetry.     Muscle tone - is normal     Motor Exam - UE 4/5  LE 3/5    Sensory Exam -Pt withdraws all extremities equally on stimulation. No asymmetry seen.  .    Deep tendon Reflexes - 2 plus all over.          Neck Supple -  Yes.     MEDICATIONS    acetaminophen    Suspension .. 650 milliGRAM(s) Oral every 6 hours PRN  amLODIPine   Tablet 5 milliGRAM(s) Oral daily  aspirin enteric coated 81 milliGRAM(s) Oral daily  atorvastatin 10 milliGRAM(s) Oral at bedtime  budesonide 160 MICROgram(s)/formoterol 4.5 MICROgram(s) Inhaler 2 Puff(s) Inhalation two times a day  chlorhexidine 2% Cloths 1 Application(s) Topical <User Schedule>  cholestyramine Powder (Sugar-Free) 4 Gram(s) Oral daily  clobetasol 0.05% Cream 1 Application(s) Topical two times a day  dextrose 5% + sodium chloride 0.45%. 1000 milliLiter(s) IV Continuous <Continuous>  dextrose 5%. 1000 milliLiter(s) IV Continuous <Continuous>  epoetin jean carlos Injectable 04717 Unit(s) SubCutaneous <User Schedule>  ferrous    sulfate 325 milliGRAM(s) Oral daily  FLUoxetine 20 milliGRAM(s) Oral daily  heparin  Injectable 5000 Unit(s) SubCutaneous every 8 hours  hydrALAZINE Injectable 10 milliGRAM(s) IV Push every 6 hours PRN  labetalol 200 milliGRAM(s) Oral every 8 hours  melatonin 3 milliGRAM(s) Oral at bedtime  nystatin Cream 1 Application(s) Topical two times a day  sodium bicarbonate 650 milliGRAM(s) Oral three times a day  valproic  acid Syrup 750 milliGRAM(s) Oral two times a day      Allergies    Levaquin (Pruritus)  mercury (Pruritus; Rash)  sulfa drugs (Pruritus)    Intolerances              03-15    144  |  113<H>  |  57<H>  ----------------------------<  107<H>  4.6   |  17<L>  |  4.30<H>    Ca    8.3<L>      15 Mar 2020 08:49            Hemoglobin A1C:     Vitamin B12     RADIOLOGY    ASSESSMENT AND PLAN:      ams multifactorial; mental status improving  seizure  MEHDI    continue valproic acid 750 mg bid for seizure  Physical therapy evaluation.  OOB to chair/ambulation with assistance only.  Advanced care planning was discussed with family.  Pain is accessed and addressed.  Plan of care was discussed with family. Questions answered.  Would continue to follow.

## 2020-03-15 NOTE — PROGRESS NOTE ADULT - PROBLEM SELECTOR PLAN 1
medical follow up notes reviewed  wound care in progress  vs and meds reviewed  seen and examined  pt remains full code  oral and skin hygiene - assist with ADL  GOC discussion ongoing - big family -   may benefit from Multidisciplinary Team Meeting - Family meeting -   spoke with dtr -

## 2020-03-15 NOTE — PROGRESS NOTE ADULT - ASSESSMENT
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) and chronic diarrhea, cervical cancer (s/p radical hysterectomy and radiation), tremors, eczema, depression, HTN, HLD, history of frequent UTIs with chronic indwelling renae, anemia, stage 4 sacral decubitus ulcer, and recent hospitalization for MEHDI on CKD thought to be 2/2 dehydration and urinary retention from malfunctioning chronic renae, now brought in by EMS to ED for altered mental status admitted for acute metabolic encephalopathy due to suspected UTI and hyperkalemia in setting of MEHDI on CKD 5, course c/b likely generalized seizure on 3/5/20, and acute hypoxic respiratory failure and suspected sepsis due to suspected aspiration PNA, s/p ICU stay. Mental status hae been improving.

## 2020-03-16 LAB
ANION GAP SERPL CALC-SCNC: 14 MMOL/L — SIGNIFICANT CHANGE UP (ref 5–17)
BUN SERPL-MCNC: 62 MG/DL — HIGH (ref 7–23)
CALCIUM SERPL-MCNC: 8.4 MG/DL — LOW (ref 8.5–10.1)
CHLORIDE SERPL-SCNC: 108 MMOL/L — SIGNIFICANT CHANGE UP (ref 96–108)
CO2 SERPL-SCNC: 18 MMOL/L — LOW (ref 22–31)
CREAT SERPL-MCNC: 4.9 MG/DL — HIGH (ref 0.5–1.3)
GLUCOSE SERPL-MCNC: 75 MG/DL — SIGNIFICANT CHANGE UP (ref 70–99)
HCT VFR BLD CALC: 33.3 % — LOW (ref 34.5–45)
HGB BLD-MCNC: 10.3 G/DL — LOW (ref 11.5–15.5)
MCHC RBC-ENTMCNC: 28.6 PG — SIGNIFICANT CHANGE UP (ref 27–34)
MCHC RBC-ENTMCNC: 30.9 GM/DL — LOW (ref 32–36)
MCV RBC AUTO: 92.5 FL — SIGNIFICANT CHANGE UP (ref 80–100)
NRBC # BLD: 0 /100 WBCS — SIGNIFICANT CHANGE UP (ref 0–0)
PLATELET # BLD AUTO: 290 K/UL — SIGNIFICANT CHANGE UP (ref 150–400)
POTASSIUM SERPL-MCNC: 4.8 MMOL/L — SIGNIFICANT CHANGE UP (ref 3.5–5.3)
POTASSIUM SERPL-SCNC: 4.8 MMOL/L — SIGNIFICANT CHANGE UP (ref 3.5–5.3)
RBC # BLD: 3.6 M/UL — LOW (ref 3.8–5.2)
RBC # FLD: 16.9 % — HIGH (ref 10.3–14.5)
SODIUM SERPL-SCNC: 140 MMOL/L — SIGNIFICANT CHANGE UP (ref 135–145)
WBC # BLD: 8.38 K/UL — SIGNIFICANT CHANGE UP (ref 3.8–10.5)
WBC # FLD AUTO: 8.38 K/UL — SIGNIFICANT CHANGE UP (ref 3.8–10.5)

## 2020-03-16 PROCEDURE — 99233 SBSQ HOSP IP/OBS HIGH 50: CPT

## 2020-03-16 RX ADMIN — Medication 20 MILLIGRAM(S): at 12:07

## 2020-03-16 RX ADMIN — Medication 750 MILLIGRAM(S): at 06:02

## 2020-03-16 RX ADMIN — NYSTATIN CREAM 1 APPLICATION(S): 100000 CREAM TOPICAL at 18:45

## 2020-03-16 RX ADMIN — Medication 3 MILLIGRAM(S): at 21:44

## 2020-03-16 RX ADMIN — HEPARIN SODIUM 5000 UNIT(S): 5000 INJECTION INTRAVENOUS; SUBCUTANEOUS at 06:01

## 2020-03-16 RX ADMIN — CHOLESTYRAMINE 4 GRAM(S): 4 POWDER, FOR SUSPENSION ORAL at 10:24

## 2020-03-16 RX ADMIN — Medication 325 MILLIGRAM(S): at 12:07

## 2020-03-16 RX ADMIN — Medication 750 MILLIGRAM(S): at 18:44

## 2020-03-16 RX ADMIN — AMLODIPINE BESYLATE 5 MILLIGRAM(S): 2.5 TABLET ORAL at 06:01

## 2020-03-16 RX ADMIN — Medication 650 MILLIGRAM(S): at 13:10

## 2020-03-16 RX ADMIN — HEPARIN SODIUM 5000 UNIT(S): 5000 INJECTION INTRAVENOUS; SUBCUTANEOUS at 13:10

## 2020-03-16 RX ADMIN — NYSTATIN CREAM 1 APPLICATION(S): 100000 CREAM TOPICAL at 06:01

## 2020-03-16 RX ADMIN — CHOLESTYRAMINE 4 GRAM(S): 4 POWDER, FOR SUSPENSION ORAL at 10:20

## 2020-03-16 RX ADMIN — Medication 650 MILLIGRAM(S): at 10:20

## 2020-03-16 RX ADMIN — Medication 650 MILLIGRAM(S): at 21:44

## 2020-03-16 RX ADMIN — BUDESONIDE AND FORMOTEROL FUMARATE DIHYDRATE 2 PUFF(S): 160; 4.5 AEROSOL RESPIRATORY (INHALATION) at 20:08

## 2020-03-16 RX ADMIN — Medication 81 MILLIGRAM(S): at 12:07

## 2020-03-16 RX ADMIN — Medication 200 MILLIGRAM(S): at 13:10

## 2020-03-16 RX ADMIN — TRAMADOL HYDROCHLORIDE 50 MILLIGRAM(S): 50 TABLET ORAL at 21:41

## 2020-03-16 RX ADMIN — CHLORHEXIDINE GLUCONATE 1 APPLICATION(S): 213 SOLUTION TOPICAL at 06:02

## 2020-03-16 RX ADMIN — Medication 650 MILLIGRAM(S): at 11:20

## 2020-03-16 RX ADMIN — Medication 1 APPLICATION(S): at 06:01

## 2020-03-16 RX ADMIN — HEPARIN SODIUM 5000 UNIT(S): 5000 INJECTION INTRAVENOUS; SUBCUTANEOUS at 21:43

## 2020-03-16 RX ADMIN — BUDESONIDE AND FORMOTEROL FUMARATE DIHYDRATE 2 PUFF(S): 160; 4.5 AEROSOL RESPIRATORY (INHALATION) at 08:09

## 2020-03-16 RX ADMIN — Medication 650 MILLIGRAM(S): at 06:01

## 2020-03-16 RX ADMIN — ATORVASTATIN CALCIUM 10 MILLIGRAM(S): 80 TABLET, FILM COATED ORAL at 21:44

## 2020-03-16 RX ADMIN — Medication 1 APPLICATION(S): at 18:44

## 2020-03-16 RX ADMIN — TRAMADOL HYDROCHLORIDE 50 MILLIGRAM(S): 50 TABLET ORAL at 20:41

## 2020-03-16 NOTE — PROGRESS NOTE ADULT - ASSESSMENT
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) and chronic diarrhea, cervical cancer (s/p radical hysterectomy and radiation), tremors, eczema, depression, HTN, HLD, history of frequent UTIs with chronic indwelling renae, anemia, stage 4 sacral decubitus ulcer, and recent hospitalization for MEHDI on CKD thought to be 2/2 dehydration and urinary retention from malfunctioning chronic renae, now brought in by EMS to ED for altered mental status admitted for acute metabolic encephalopathy due to suspected UTI and hyperkalemia in setting of MEHDI on CKD 5, course c/b likely generalized seizure on 3/5/20, and acute hypoxic respiratory failure and suspected sepsis due to suspected aspiration PNA, s/p ICU stay. Mental status hae been improved.    Disposition: Mental status stable. Sodium, electrolytes improving. No plan for HD per Nephro and patient's family's decision. Per Son ( HCP) wants mom to go home with patient's daughter. PT suggested SHELBIE MAYBERRY to talk to family  about safe d/c planning

## 2020-03-16 NOTE — GOALS OF CARE CONVERSATION - ADVANCED CARE PLANNING - CONVERSATION DETAILS
addendum: 3/12/2020: 1300hrs: met pt, lethargic, sleepy,  with daughter in law Chelsea, daughter to visit later, PC RN contact # given.   Dr Wiley has been speaking to pt daughter, Aby(emergency contact) as hcp.  pt hcp on chart, pt sons are the hcps.  Aby has spoken to her mother in the past of her resuscitation wishes, wants vent.  no dnr at this time.    addendum: 3/12/2020: met pt DAVIDs, Chelsea, with sons Campbell Chavez can be reached on cell phone, discussed plans of care, PT to see pt.  pt more awake at this time.       addendum: 3/13/2020: 1430hrs further discussion with april HERNANDEZ form reviewed, pt son to visit tomorrow and family to discuss plan of care for pt, including resuscitation wishes, trial vent.  contact # for PC RN  & Dr Wiley contact # for questions.  will follow addendum: 3/12/2020: 1300hrs: met pt, lethargic, sleepy,  with daughter in law Chelsea, daughter to visit later, PC RN contact # given.   Dr Wiley has been speaking to pt daughterAby(emergency contact) as hcp.  pt hcp on chart, pt sons are the hcps.  Aby has spoken to her mother in the past of her resuscitation wishes, wants vent.  no dnr at this time.    addendum: 3/12/2020: met pt Chelsea Flores, with sons Campbell Chavez can be reached on cell phone, discussed plans of care, PT to see pt.  pt more awake at this time.       addendum: 3/13/2020: 1430hrs further discussion with DIL,  molst form reviewed, pt son to visit tomorrow and family to discuss plan of care for pt, including resuscitation wishes, trial vent.  contact # for PC RN  & Dr Wiley contact # for questions.  will follow    addends3/20/2020: 1150hrs:  pt daughterRosy present, pt alert, c/o pain,restless, unable to hold conversation at this time, pt son Scott on phone: reviewed molst form, rescinded dnr as of yesterday after speaking with pt/ Dr Titus, pt has indicated she wants all measures, accepts vent for 2 weeks per family. molst reviewed: pt son agrees to cpr, trial vent, no limitation for treatment, wants IV fluids & antibiotics. want HD for pt.Dr Mcgill to complete molst form.  Pt sonEmery contacted PC RN , unable to  act as pt hcp, pt sonScott contacted PC RN, consented unable to act as pt hcp.  placed on hcp form.  PC RN to follow addendum: 3/12/2020: 1300hrs: met pt, lethargic, sleepy,  with daughter in law Chelsea, daughter to visit later, PC RN contact # given.   Dr Wiley has been speaking to pt Aby albrecht(emergency contact) as hcp.  pt hcp on chart, pt sons are the hcps.  Aby has spoken to her mother in the past of her resuscitation wishes, wants vent.  no dnr at this time.    addendum: 3/12/2020: met pt Chelsea Flores, with sons Campbell Chavez can be reached on cell phone, discussed plans of care, PT to see pt.  pt more awake at this time.       addendum: 3/13/2020: 1430hrs further discussion with DIL,  molst form reviewed, pt son to visit tomorrow and family to discuss plan of care for pt, including resuscitation wishes, trial vent.  contact # for PC RN  & Dr Wiley contact # for questions.  will follow    addends3/20/2020: 1150hrs:  pt daughterRosy present, pt alert, c/o pain,restless, unable to hold conversation at this time, pt son Scott on phone: reviewed molst form, rescinded dnr as of yesterday after speaking with pt/ Dr Titus, pt has indicated she wants all measures, accepts vent for 2 weeks per family. molst reviewed: pt son agrees to cpr, trial vent, no limitation for treatment, wants IV fluids & antibiotics. want HD for pt.Dr Mcgill to complete molst form.  Pt sonEmery contacted PC RN , unable to  act as pt hcp, pt sonScott contacted PC RN, consented unable to act as pt hcp.  placed on hcp form.  PC RN to follow    Addenum: 3/25/2020: 1730hrs: spoke to pt Aby albrecht on phone, all the hcp, each contacted the PC RN on phone, unable to be hcp for pt, Pt Aby albrecht is surrogate for medical decisions.  further discussion of pt plan of care, want to take pt home on hospice, pt unable to sustain having HD, pt family all discussed last evening resuscitation for pt, pt continues to be CPR, want to follow pt wishes.  message left for Merari gomes work for hospice to speak to daughter.  support given to daughter.

## 2020-03-16 NOTE — PROGRESS NOTE ADULT - PROBLEM SELECTOR PLAN 2
Defer new antibiotics presently  clinically improving off antibx  wbc normalized  blood cx neg  wound clean  ? underlying osteomyelitis  no plan for further intervention per chart

## 2020-03-16 NOTE — PROGRESS NOTE ADULT - SUBJECTIVE AND OBJECTIVE BOX
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) and chronic diarrhea, cervical cancer (s/p radical hysterectomy and radiation), tremors, eczema, depression, HTN, HLD, history of frequent UTIs with chronic indwelling renae, anemia, stage 4 sacral decubitus ulcer, and recent hospitalization for MEHDI on CKD thought to be 2/2 dehydration and urinary retention from malfunctioning chronic renae, now brought in by EMS to ED for altered mental status admitted for acute metabolic encephalopathy due to suspected UTI and hyperkalemia in setting of MEHDI on CKD 5, course c/b likely generalized seizure on 3/5/20, and acute hypoxic respiratory failure and suspected sepsis due to suspected aspiration PNA, s/p ICU stay. Mental status hae been improved. No new events. Afebrile. Awake, alert but confused and won't answer questions.       MEDICATIONS  (STANDING):  amLODIPine   Tablet 5 milliGRAM(s) Oral daily  aspirin enteric coated 81 milliGRAM(s) Oral daily  atorvastatin 10 milliGRAM(s) Oral at bedtime  budesonide 160 MICROgram(s)/formoterol 4.5 MICROgram(s) Inhaler 2 Puff(s) Inhalation two times a day  chlorhexidine 2% Cloths 1 Application(s) Topical <User Schedule>  cholestyramine Powder (Sugar-Free) 4 Gram(s) Oral daily  clobetasol 0.05% Cream 1 Application(s) Topical two times a day  dextrose 5% + sodium chloride 0.45%. 1000 milliLiter(s) (50 mL/Hr) IV Continuous <Continuous>  dextrose 5%. 1000 milliLiter(s) (50 mL/Hr) IV Continuous <Continuous>  epoetin jean carlos Injectable 77171 Unit(s) SubCutaneous <User Schedule>  ferrous    sulfate 325 milliGRAM(s) Oral daily  FLUoxetine 20 milliGRAM(s) Oral daily  heparin  Injectable 5000 Unit(s) SubCutaneous every 8 hours  labetalol 200 milliGRAM(s) Oral every 8 hours  melatonin 3 milliGRAM(s) Oral at bedtime  nystatin Cream 1 Application(s) Topical two times a day  sodium bicarbonate 650 milliGRAM(s) Oral three times a day  valproic  acid Syrup 750 milliGRAM(s) Oral two times a day    MEDICATIONS  (PRN):  acetaminophen    Suspension .. 650 milliGRAM(s) Oral every 6 hours PRN Temp greater or equal to 38C (100.4F), Moderate Pain (4 - 6)  acetaminophen   Tablet .. 650 milliGRAM(s) Oral every 6 hours PRN Mild Pain (1 - 3)  hydrALAZINE Injectable 10 milliGRAM(s) IV Push every 6 hours PRN SBP over 180  traMADol 50 milliGRAM(s) Oral every 6 hours PRN Moderate Pain (4 - 6)      Allergies    Levaquin (Pruritus)  mercury (Pruritus; Rash)  sulfa drugs (Pruritus)    Intolerances        REVIEW OF SYSTEMS:  Unable to obtain, confused, won't answer. Only smiles   Vital Signs Last 24 Hrs  T(C): 36.9 (16 Mar 2020 04:55), Max: 37.1 (15 Mar 2020 13:53)  T(F): 98.5 (16 Mar 2020 04:55), Max: 98.7 (15 Mar 2020 13:53)  HR: 79 (16 Mar 2020 04:55) (75 - 79)  BP: 136/79 (16 Mar 2020 04:55) (123/81 - 136/79)  BP(mean): --  RR: 17 (16 Mar 2020 04:55) (16 - 17)  SpO2: 97% (16 Mar 2020 04:55) (95% - 97%)    PHYSICAL EXAM:  GENERAL: NAD, Awake, Alert   HEAD:  Atraumatic, Normocephalic  EYES: EOMI, PERRLA, conjunctiva and sclera clear  ENMT: No tonsillar erythema, exudates, or enlargement; Moist mucous membranes  NECK: Supple, No JVD, Normal thyroid  CHEST/LUNG: Clear to auscultation bilaterally; No rales, rhonchi, wheezing, or rubs  HEART: Regular rate and rhythm; No murmurs, rubs, or gallops  ABDOMEN: Soft, Nontender, Nondistended; Bowel sounds present  EXTREMITIES:  2+ Peripheral Pulses, No clubbing, cyanosis  LYMPH: No lymphadenopathy noted      LABS:                        10.3   8.38  )-----------( 290      ( 16 Mar 2020 09:14 )             33.3       Ca    8.3        15 Mar 2020 08:49        CAPILLARY BLOOD GLUCOSE        BLOOD CULTURE    RADIOLOGY & ADDITIONAL TESTS:    Imaging Personally Reviewed:  [ ] YES     Consultant(s) Notes Reviewed:      Care Discussed with Consultants/Other Providers:

## 2020-03-16 NOTE — PROGRESS NOTE ADULT - SUBJECTIVE AND OBJECTIVE BOX
Date/Time Patient Seen:  		  Referring MD:   Data Reviewed	       Patient is a 84y old  Female who presents with a chief complaint of altered mental status (15 Mar 2020 16:57)      Subjective/HPI     PAST MEDICAL & SURGICAL HISTORY:  Wound of sacral region  Depression  Iron deficiency anemia  Eczema  HTN (hypertension)  CKD (chronic kidney disease) stage 5, GFR less than 15 ml/min: not on HD  Herniated lumbar intervertebral disc  Depression  Tremor  Kidney atrophy: right  Colostomy status: 2006  Chronic UTI (urinary tract infection): chronic renae  Cervical cancer: 1970&#x27;s  Dyslipidemia  Diabetes: type 2  Hernia, incisional  S/P hip replacement: right 2013  S/P colon resection: 2006  S/P hysterectomy: 1977        Medication list         MEDICATIONS  (STANDING):  amLODIPine   Tablet 5 milliGRAM(s) Oral daily  aspirin enteric coated 81 milliGRAM(s) Oral daily  atorvastatin 10 milliGRAM(s) Oral at bedtime  budesonide 160 MICROgram(s)/formoterol 4.5 MICROgram(s) Inhaler 2 Puff(s) Inhalation two times a day  chlorhexidine 2% Cloths 1 Application(s) Topical <User Schedule>  cholestyramine Powder (Sugar-Free) 4 Gram(s) Oral daily  clobetasol 0.05% Cream 1 Application(s) Topical two times a day  dextrose 5% + sodium chloride 0.45%. 1000 milliLiter(s) (50 mL/Hr) IV Continuous <Continuous>  dextrose 5%. 1000 milliLiter(s) (50 mL/Hr) IV Continuous <Continuous>  epoetin jean carlos Injectable 79234 Unit(s) SubCutaneous <User Schedule>  ferrous    sulfate 325 milliGRAM(s) Oral daily  FLUoxetine 20 milliGRAM(s) Oral daily  heparin  Injectable 5000 Unit(s) SubCutaneous every 8 hours  labetalol 200 milliGRAM(s) Oral every 8 hours  melatonin 3 milliGRAM(s) Oral at bedtime  nystatin Cream 1 Application(s) Topical two times a day  sodium bicarbonate 650 milliGRAM(s) Oral three times a day  valproic  acid Syrup 750 milliGRAM(s) Oral two times a day    MEDICATIONS  (PRN):  acetaminophen    Suspension .. 650 milliGRAM(s) Oral every 6 hours PRN Temp greater or equal to 38C (100.4F), Moderate Pain (4 - 6)  acetaminophen   Tablet .. 650 milliGRAM(s) Oral every 6 hours PRN Mild Pain (1 - 3)  hydrALAZINE Injectable 10 milliGRAM(s) IV Push every 6 hours PRN SBP over 180  traMADol 50 milliGRAM(s) Oral every 6 hours PRN Moderate Pain (4 - 6)         Vitals log        ICU Vital Signs Last 24 Hrs  T(C): 36.8 (15 Mar 2020 20:28), Max: 37.1 (15 Mar 2020 13:53)  T(F): 98.2 (15 Mar 2020 20:28), Max: 98.7 (15 Mar 2020 13:53)  HR: 75 (15 Mar 2020 20:28) (75 - 76)  BP: 123/81 (15 Mar 2020 20:28) (123/81 - 133/75)  BP(mean): --  ABP: --  ABP(mean): --  RR: 17 (15 Mar 2020 20:28) (16 - 17)  SpO2: 96% (15 Mar 2020 20:28) (95% - 96%)           Input and Output:  I&O's Detail    14 Mar 2020 07:01  -  15 Mar 2020 07:00  --------------------------------------------------------  IN:    Oral Fluid: 450 mL  Total IN: 450 mL    OUT:    Colostomy: 2975 mL    Indwelling Catheter - Urethral: 350 mL  Total OUT: 3325 mL    Total NET: -2875 mL      15 Mar 2020 07:01  -  16 Mar 2020 06:40  --------------------------------------------------------  IN:  Total IN: 0 mL    OUT:    Colostomy: 750 mL    Indwelling Catheter - Urethral: 300 mL  Total OUT: 1050 mL    Total NET: -1050 mL          Lab Data                        10.2   6.95  )-----------( 249      ( 15 Mar 2020 08:49 )             33.4     03-15    144  |  113<H>  |  57<H>  ----------------------------<  107<H>  4.6   |  17<L>  |  4.30<H>    Ca    8.3<L>      15 Mar 2020 08:49              Review of Systems	      Objective     Physical Examination    heart s1s2  lung dec BS  abd soft  head nc  head at      Pertinent Lab findings & Imaging      Ana:  NO   Adequate UO     I&O's Detail    14 Mar 2020 07:01  -  15 Mar 2020 07:00  --------------------------------------------------------  IN:    Oral Fluid: 450 mL  Total IN: 450 mL    OUT:    Colostomy: 2975 mL    Indwelling Catheter - Urethral: 350 mL  Total OUT: 3325 mL    Total NET: -2875 mL      15 Mar 2020 07:01  -  16 Mar 2020 06:40  --------------------------------------------------------  IN:  Total IN: 0 mL    OUT:    Colostomy: 750 mL    Indwelling Catheter - Urethral: 300 mL  Total OUT: 1050 mL    Total NET: -1050 mL               Discussed with:     Cultures:	        Radiology

## 2020-03-16 NOTE — PROGRESS NOTE ADULT - SUBJECTIVE AND OBJECTIVE BOX
Neurology follow up note    GURJIT [id number]      Interval History:    Patient feels ok no new complaints.    MEDICATIONS    acetaminophen    Suspension .. 650 milliGRAM(s) Oral every 6 hours PRN  acetaminophen   Tablet .. 650 milliGRAM(s) Oral every 6 hours PRN  amLODIPine   Tablet 5 milliGRAM(s) Oral daily  aspirin enteric coated 81 milliGRAM(s) Oral daily  atorvastatin 10 milliGRAM(s) Oral at bedtime  budesonide 160 MICROgram(s)/formoterol 4.5 MICROgram(s) Inhaler 2 Puff(s) Inhalation two times a day  chlorhexidine 2% Cloths 1 Application(s) Topical <User Schedule>  cholestyramine Powder (Sugar-Free) 4 Gram(s) Oral daily  clobetasol 0.05% Cream 1 Application(s) Topical two times a day  dextrose 5% + sodium chloride 0.45%. 1000 milliLiter(s) IV Continuous <Continuous>  dextrose 5%. 1000 milliLiter(s) IV Continuous <Continuous>  epoetin jean carlos Injectable 44801 Unit(s) SubCutaneous <User Schedule>  ferrous    sulfate 325 milliGRAM(s) Oral daily  FLUoxetine 20 milliGRAM(s) Oral daily  heparin  Injectable 5000 Unit(s) SubCutaneous every 8 hours  hydrALAZINE Injectable 10 milliGRAM(s) IV Push every 6 hours PRN  labetalol 200 milliGRAM(s) Oral every 8 hours  melatonin 3 milliGRAM(s) Oral at bedtime  nystatin Cream 1 Application(s) Topical two times a day  sodium bicarbonate 650 milliGRAM(s) Oral three times a day  traMADol 50 milliGRAM(s) Oral every 6 hours PRN  valproic  acid Syrup 750 milliGRAM(s) Oral two times a day      Allergies    Levaquin (Pruritus)  mercury (Pruritus; Rash)  sulfa drugs (Pruritus)    Intolerances            Vital Signs Last 24 Hrs  T(C): 36.9 (16 Mar 2020 04:55), Max: 37.1 (15 Mar 2020 13:53)  T(F): 98.5 (16 Mar 2020 04:55), Max: 98.7 (15 Mar 2020 13:53)  HR: 79 (16 Mar 2020 04:55) (75 - 79)  BP: 136/79 (16 Mar 2020 04:55) (123/81 - 136/79)  BP(mean): --  RR: 17 (16 Mar 2020 04:55) (16 - 17)  SpO2: 97% (16 Mar 2020 04:55) (95% - 97%)    REVIEW OF SYSTEMS: Limited or unable to obtain secondary to patient's poor mental status.    Constitutional: No fever, chills, fatigue, generalized  weakness  Eyes: no eye pain, visual disturbances, or discharge  ENT:  No difficulty hearing, tinnitus, vertigo; No sinus or throat pain  Neck: No pain or stiffness  Respiratory: No cough, dyspnea, wheezing   Cardiovascular: No chest pain, palpitations,   Gastrointestinal: No abdominal or epigastric pain. No nausea, vomiting  No diarrhea or constipation.   Genitourinary: No dysuria, frequency, hematuria or incontinence  Neurological: No headaches, lightheadedness, vertigo, numbness or tremors  Psychiatric: No depression, anxiety, mood swings or difficulty sleeping  Musculoskeletal: No joint pain or swelling; No muscle, back or extremity pain      On Neurological Examination:    Mental Status - Patient is alert, awake,  [facility]   year 2020     Follow simple commands    Speech -   Fluent                 Cranial Nerves - Pupils 3 mm equal and reactive to light,   extraocular eye movements intact.   smile symmetric  intact bilateral NLF    Motor Exam -   Right upper 4/5   Left upper  3/5  Right lower 2/5  Left lower 2/5    Muscle tone - is normal all over.  No asymmetry is seen.      Sensory    Bilateral intact to light touch    GENERAL Exam: Nontoxic , No Acute Distress   	  HEENT:  normocephalic, atraumatic  		  LUNGS:  Decreased bilaterally  	  HEART: Normal S1S2   No murmur RRR        	  GI/ ABDOMEN:  Soft  Non tender    EXTREMITIES:   No Edema  No Clubbing  No Cyanosis   	   SKIN: Normal  No Ecchymosis               LABS:  CBC Full  -  ( 16 Mar 2020 09:14 )  WBC Count : 8.38 K/uL  RBC Count : 3.60 M/uL  Hemoglobin : 10.3 g/dL  Hematocrit : 33.3 %  Platelet Count - Automated : 290 K/uL  Mean Cell Volume : 92.5 fl  Mean Cell Hemoglobin : 28.6 pg  Mean Cell Hemoglobin Concentration : 30.9 gm/dL  Auto Neutrophil # : x  Auto Lymphocyte # : x  Auto Monocyte # : x  Auto Eosinophil # : x  Auto Basophil # : x  Auto Neutrophil % : x  Auto Lymphocyte % : x  Auto Monocyte % : x  Auto Eosinophil % : x  Auto Basophil % : x      03-16    140  |  108  |  62<H>  ----------------------------<  75  4.8   |  18<L>  |  4.90<H>    Ca    8.4<L>      16 Mar 2020 09:14      Hemoglobin A1C:       Vitamin B12         RADIOLOGY      ANALYSIS AND PLAN:  An 84-year-old with an episode of seizure and change in mental status.  1.	For episode of seizure, will increase Depakote 750 mg twice  off dilantin   2.	EEG intermittent generalized spike and waves   3.	Ativan 1 mg IV push q.6h. p.r.n. for any type of breakthrough seizure.  4.	Seizure precautions.  5.	For history of underlying depression, at present hard to evaluate the patient's psychiatric status secondary to the patient being lethargic, continue home medication when possible.  6.	For hyperlipidemia, continue the patient on her cholesterol medication.  7.	For change in mental status, questionable this could be metabolic encephalopathy from partial complex seizures versus any type of underlying infectious type process episodes of temperatures and hypotension possible shock   8.	Antibiotics as needed.  9.	Fall precaution.  10.	overall more awake and interactive today   11.	For chronic kidney disease, monitor renal function.  12.	Spoke with multiple family members at the bedside.  Advance directives were discussed with them.  Primary  will be daughterJu, her cell phone number is [phone], home number is [phone] spoke 3/16/2020  13.	 mental status better today  14.	EEG no new changes   15.	Greater than 28 minutes of time was spent with the patient, plan of care, reviewing data, speaking to the family and multidisciplinary healthcare team.

## 2020-03-16 NOTE — PROGRESS NOTE ADULT - SUBJECTIVE AND OBJECTIVE BOX
Patient is a 84y old  Female who presents with a chief complaint of altered mental status (03 Mar 2020 17:47)  Patient seen in follow up for CKD 4, Hyperkalemia.     PAST MEDICAL HISTORY:  Wound of sacral region  Depression  Iron deficiency anemia  Eczema  HTN (hypertension)  CKD (chronic kidney disease) stage 5, GFR less than 15 ml/min  Herniated lumbar intervertebral disc  Depression  Tremor  Kidney atrophy  Colostomy status  Chronic UTI (urinary tract infection)  Cervical cancer  Dyslipidemia  Diabetes  Hernia, incisional      MEDICATIONS  (STANDING):  amLODIPine   Tablet 5 milliGRAM(s) Oral daily  aspirin enteric coated 81 milliGRAM(s) Oral daily  atorvastatin 10 milliGRAM(s) Oral at bedtime  budesonide 160 MICROgram(s)/formoterol 4.5 MICROgram(s) Inhaler 2 Puff(s) Inhalation two times a day  chlorhexidine 2% Cloths 1 Application(s) Topical <User Schedule>  cholestyramine Powder (Sugar-Free) 4 Gram(s) Oral daily  clobetasol 0.05% Cream 1 Application(s) Topical two times a day  dextrose 5% + sodium chloride 0.45%. 1000 milliLiter(s) (50 mL/Hr) IV Continuous <Continuous>  dextrose 5%. 1000 milliLiter(s) (50 mL/Hr) IV Continuous <Continuous>  epoetin jean carlos Injectable 68274 Unit(s) SubCutaneous <User Schedule>  ferrous    sulfate 325 milliGRAM(s) Oral daily  FLUoxetine 20 milliGRAM(s) Oral daily  heparin  Injectable 5000 Unit(s) SubCutaneous every 8 hours  labetalol 200 milliGRAM(s) Oral every 8 hours  melatonin 3 milliGRAM(s) Oral at bedtime  nystatin Cream 1 Application(s) Topical two times a day  sodium bicarbonate 650 milliGRAM(s) Oral three times a day  valproic  acid Syrup 750 milliGRAM(s) Oral two times a day    MEDICATIONS  (PRN):  acetaminophen    Suspension .. 650 milliGRAM(s) Oral every 6 hours PRN Temp greater or equal to 38C (100.4F), Moderate Pain (4 - 6)  acetaminophen   Tablet .. 650 milliGRAM(s) Oral every 6 hours PRN Mild Pain (1 - 3)  hydrALAZINE Injectable 10 milliGRAM(s) IV Push every 6 hours PRN SBP over 180  traMADol 50 milliGRAM(s) Oral every 6 hours PRN Moderate Pain (4 - 6)    T(C): 36.9 (03-16-20 @ 04:55), Max: 37.1 (03-15-20 @ 13:53)  HR: 79 (03-16-20 @ 04:55) (68 - 82)  BP: 136/79 (03-16-20 @ 04:55) (123/81 - 168/71)  RR: 17 (03-16-20 @ 04:55)  SpO2: 97% (03-16-20 @ 04:55)  Wt(kg): --  I&O's Detail    15 Mar 2020 07:01  -  16 Mar 2020 07:00  --------------------------------------------------------  IN:  Total IN: 0 mL    OUT:    Colostomy: 750 mL    Indwelling Catheter - Urethral: 300 mL  Total OUT: 1050 mL    Total NET: -1050 mL          PHYSICAL EXAM:  General: No distress  Respiratory: b/l air entry  Cardiovascular: S1 S2  Gastrointestinal: soft  Extremities:  edema, + batool               LABORATORY:                        10.3   8.38  )-----------( 290      ( 16 Mar 2020 09:14 )             33.3     03-16    140  |  108  |  62<H>  ----------------------------<  75  4.8   |  18<L>  |  4.90<H>    Ca    8.4<L>      16 Mar 2020 09:14      Sodium, Serum: 140 mmol/L (03-16 @ 09:14)  Sodium, Serum: 144 mmol/L (03-15 @ 08:49)    Potassium, Serum: 4.8 mmol/L (03-16 @ 09:14)  Potassium, Serum: 4.6 mmol/L (03-15 @ 08:49)    Hemoglobin: 10.3 g/dL (03-16 @ 09:14)  Hemoglobin: 10.2 g/dL (03-15 @ 08:49)  Hemoglobin: 9.5 g/dL (03-14 @ 09:40)    Creatinine, Serum 4.90 (03-16 @ 09:14)  Creatinine, Serum 4.30 (03-15 @ 08:49)  Creatinine, Serum 4.20 (03-14 @ 09:40)

## 2020-03-16 NOTE — PROGRESS NOTE ADULT - PROBLEM SELECTOR PLAN 1
mental status improving  medical management in progress  neuro follow up noted  GOC discussion ongoing  telephone conversation with family - children - as no visitors allowed due to Covid outbreak  will follow  supportive measures  assist with ADL  oral hygiene  skin care  monitor VS and HD and Needs -

## 2020-03-16 NOTE — PROGRESS NOTE ADULT - PROBLEM SELECTOR PLAN 1
- Resolved   - acute metabolic encephalopathy is likely multifactorial, meds, seizure, suspected infection, underlying dementia, poor oral intake causing dehydration . Mental status has been improving. More awake, alert now, but remains confused.   - pt may also have some element of ICU/hospital delirium   - MRI showed no evidence for CVA to have explained the isolated expressive aphasia pt had prior to her generalized seizure on 3/5  - Neuro following

## 2020-03-16 NOTE — PROGRESS NOTE ADULT - SUBJECTIVE AND OBJECTIVE BOX
[facility], Division of Infectious Diseases  HILDA Giang A. Lee  [phone]    Name: GURJIT HERRERA  Age: 84y  Gender: Female  MRN: [id number]    Interval History--  Notes reviewed  pt with complaints of pain everywhere  awake pleasant       Past Medical History--  Wound of sacral region  Depression  Iron deficiency anemia  Eczema  HTN (hypertension)  CKD (chronic kidney disease) stage 5, GFR less than 15 ml/min  Herniated lumbar intervertebral disc  Depression  Tremor  Kidney atrophy  Colostomy status  Chronic UTI (urinary tract infection)  Cervical cancer  Dyslipidemia  Diabetes  Hernia, incisional  S/P hip replacement  S/P colon resection  S/P hysterectomy      For details regarding the patient's social history, family history, and other miscellaneous elements, please refer the initial infectious diseases consultation and/or the admitting history and physical examination for this admission.    Allergies    Levaquin (Pruritus)  mercury (Pruritus; Rash)  sulfa drugs (Pruritus)    Intolerances        Medications--  Antibiotics:    Immunologic:  epoetin jean carlos Injectable 61487 Unit(s) SubCutaneous <User Schedule>    Other:  acetaminophen    Suspension .. PRN  acetaminophen   Tablet .. PRN  amLODIPine   Tablet  aspirin enteric coated  atorvastatin  budesonide 160 MICROgram(s)/formoterol 4.5 MICROgram(s) Inhaler  chlorhexidine 2% Cloths  cholestyramine Powder (Sugar-Free)  clobetasol 0.05% Cream  dextrose 5% + sodium chloride 0.45%.  dextrose 5%.  ferrous    sulfate  FLUoxetine  heparin  Injectable  hydrALAZINE Injectable PRN  labetalol  melatonin  nystatin Cream  sodium bicarbonate  traMADol PRN  valproic  acid Syrup      Review of Systems--  A 10-point review of systems was obtained.   unable to obtain as pt says yes to everything    Review of systems otherwise negative except as previously noted.    Physical Examination--  Vital Signs: T(F): 98.5 (03-16-20 @ 04:55), Max: 98.7 (03-15-20 @ 13:53)  HR: 79 (03-16-20 @ 04:55)  BP: 136/79 (03-16-20 @ 04:55)  RR: 17 (03-16-20 @ 04:55)  SpO2: 97% (03-16-20 @ 04:55)  Wt(kg): --  General: Nontoxic-appearing Female in no acute distress.  HEENT: AT/NC.  Anicteric.   Neck: Not rigid. No sense of mass.  Nodes: None palpable.  Lungs: Clear bilaterally without rales, wheezing or rhonchi  Heart: Regular rate and rhythm. No Murmur. No rub. No gallop. No palpable thrill.  Abdomen: Bowel sounds present and normoactive. Soft. Nondistended.   Back: No spinal tenderness. No costovertebral angle tenderness.   Extremities: No cyanosis or clubbing. No edema. contracted  Skin: Warm. Dry. Good turgor. No rash. No vasculitic stigmata.  Psychiatric: Appropriate affect and mood for situation.         Laboratory Studies--  CBC                        10.3   8.38  )-----------( 290      ( 16 Mar 2020 09:14 )             33.3       Chemistries  03-16    140  |  108  |  62<H>  ----------------------------<  75  4.8   |  18<L>  |  4.90<H>    Ca    8.4<L>      16 Mar 2020 09:14        Culture Data      < from: Xray Chest 1 View-PORTABLE IMMEDIATE (03.09.20 @ 04:59) >  EXAM:  XR CHEST PORTABLE IMMED 1V                            PROCEDURE DATE:  03/09/2020          INTERPRETATION:  Clinical information: NG tube placement    Portable study of the chest, 4:58 AM    Comparison exam dated 3/8/2020, 10:48 AM    NG tube has been introduced, the tip of the tube is below the bottom of the image in the left upper quadrant presumed in  the stomach. Otherwise the appearance of the chest is not significantly changed since the most recent exam.    IMPRESSION: See above report    < end of copied text >

## 2020-03-16 NOTE — PROGRESS NOTE ADULT - ASSESSMENT
·	CKD 4, Solitary functioning kidney: Prerenal azotemia, (Atrophic right kidney)  ·	Metabolic acidosis  ·	Hyperkalemia  ·	Diabetes  ·	Hypertension  ·	Anemia      Renal indices close to baseline. To continue current meds. Monitor BP trend. Titrate BP meds as needed. Salt restriction.   On sodium bicarb. Monitor blood sugar levels. Insulin coverage as needed. Dietary restriction.   Procrit for anemia. Avoid nephrotoxic meds as possible. Avoid ACEI, ARB, NSAIDs and IV contrast.   Will follow electrolytes and renal function trend. Overall prognosis poor.  D/c planning.

## 2020-03-16 NOTE — CHART NOTE - NSCHARTNOTEFT_GEN_A_CORE
Assessment:   Pt seen as nutrition follow-up. Upon visit, pt alert and able to participate in interview. Denies nausea/vomiting or GI distress. Colostomy output: (3/15) 1000 ml (3/14) 3025 ml. On cholestyramine. Tolerating DASH/TLC, puree with honey-thickened liquids diet with good appetite per pt. Reports she is eating/drinking what she can "because I want to get out of here." Pt enjoys thickened juices. Amenable to chocolate pudding two times per day and Magic Cup two times per day to optimize nutritional needs. Dietary preferences obtained and honored.    Factors impacting intake: [ ] none [ ] nausea  [ ] vomiting [ ] diarrhea [ ] constipation  [ x ]chewing problems [ x ] swallowing issues  [ ] other:     Diet Presciption: Diet, Dysphagia 1 Pureed-Honey Consistency Fluid:   DASH/TLC {Sodium & Cholesterol Restricted}  Low Sodium (03-09-20 @ 10:51)    Intake: Fair to good (50-75% per EMR)    Current Weight: (3/16) 110.2 lb    Pertinent Medications: MEDICATIONS  (STANDING):  amLODIPine   Tablet 5 milliGRAM(s) Oral daily  aspirin enteric coated 81 milliGRAM(s) Oral daily  atorvastatin 10 milliGRAM(s) Oral at bedtime  budesonide 160 MICROgram(s)/formoterol 4.5 MICROgram(s) Inhaler 2 Puff(s) Inhalation two times a day  chlorhexidine 2% Cloths 1 Application(s) Topical <User Schedule>  cholestyramine Powder (Sugar-Free) 4 Gram(s) Oral daily  clobetasol 0.05% Cream 1 Application(s) Topical two times a day  dextrose 5% + sodium chloride 0.45%. 1000 milliLiter(s) (50 mL/Hr) IV Continuous <Continuous>  dextrose 5%. 1000 milliLiter(s) (50 mL/Hr) IV Continuous <Continuous>  epoetin jean carlos Injectable 26153 Unit(s) SubCutaneous <User Schedule>  ferrous    sulfate 325 milliGRAM(s) Oral daily  FLUoxetine 20 milliGRAM(s) Oral daily  heparin  Injectable 5000 Unit(s) SubCutaneous every 8 hours  labetalol 200 milliGRAM(s) Oral every 8 hours  melatonin 3 milliGRAM(s) Oral at bedtime  nystatin Cream 1 Application(s) Topical two times a day  sodium bicarbonate 650 milliGRAM(s) Oral three times a day  valproic  acid Syrup 750 milliGRAM(s) Oral two times a day    MEDICATIONS  (PRN):  acetaminophen    Suspension .. 650 milliGRAM(s) Oral every 6 hours PRN Temp greater or equal to 38C (100.4F), Moderate Pain (4 - 6)  acetaminophen   Tablet .. 650 milliGRAM(s) Oral every 6 hours PRN Mild Pain (1 - 3)  hydrALAZINE Injectable 10 milliGRAM(s) IV Push every 6 hours PRN SBP over 180  traMADol 50 milliGRAM(s) Oral every 6 hours PRN Moderate Pain (4 - 6)    Pertinent Labs: 03-16 Na140 mmol/L Glu 75 mg/dL K+ 4.8 mmol/L Cr  4.90 mg/dL<H> BUN 62 mg/dL<H> 03-12 Phos 4.3 mg/dL 03-11 Alb 2.5 g/dL<L>     CAPILLARY BLOOD GLUCOSE        Skin: 2+ generalized. Unstageable, sacrum.    Estimated Needs:   [ x ] no change since previous assessment  [ ] recalculated:     Previous Nutrition Diagnosis:   [ x ] Altered nutrition related labs (renal)   [ x ] Swallowing difficulty    Nutrition Diagnosis is [ x ] ongoing, being addressed with PO diet + additional snacks     New Nutrition Diagnosis: [ x ] not applicable       Interventions:   Recommend  [ ] Change Diet To:  [ ] Nutrition Supplement  [ ] Nutrition Support  [ x ] Other:   1) Continue DASH/TLC, low Na, pureed diet with nectar-thickened liquids.  2) Provide chocolate pudding two times per day and Magic Cup two times per day per pt request.  3) Encourage adequate intake.    Monitoring and Evaluation:   [ x ] PO intake [ x ] Tolerance to diet prescription [ x ] weights [ x ] labs[ x ] follow up per protocol  [ ] other:

## 2020-03-17 LAB
ANION GAP SERPL CALC-SCNC: 13 MMOL/L — SIGNIFICANT CHANGE UP (ref 5–17)
BUN SERPL-MCNC: 72 MG/DL — HIGH (ref 7–23)
CALCIUM SERPL-MCNC: 8.9 MG/DL — SIGNIFICANT CHANGE UP (ref 8.5–10.1)
CHLORIDE SERPL-SCNC: 106 MMOL/L — SIGNIFICANT CHANGE UP (ref 96–108)
CO2 SERPL-SCNC: 19 MMOL/L — LOW (ref 22–31)
CREAT SERPL-MCNC: 5.7 MG/DL — HIGH (ref 0.5–1.3)
GLUCOSE SERPL-MCNC: 97 MG/DL — SIGNIFICANT CHANGE UP (ref 70–99)
HCT VFR BLD CALC: 36 % — SIGNIFICANT CHANGE UP (ref 34.5–45)
HGB BLD-MCNC: 11 G/DL — LOW (ref 11.5–15.5)
MCHC RBC-ENTMCNC: 28.4 PG — SIGNIFICANT CHANGE UP (ref 27–34)
MCHC RBC-ENTMCNC: 30.6 GM/DL — LOW (ref 32–36)
MCV RBC AUTO: 92.8 FL — SIGNIFICANT CHANGE UP (ref 80–100)
NRBC # BLD: 0 /100 WBCS — SIGNIFICANT CHANGE UP (ref 0–0)
PLATELET # BLD AUTO: 268 K/UL — SIGNIFICANT CHANGE UP (ref 150–400)
POTASSIUM SERPL-MCNC: 5.3 MMOL/L — SIGNIFICANT CHANGE UP (ref 3.5–5.3)
POTASSIUM SERPL-SCNC: 5.3 MMOL/L — SIGNIFICANT CHANGE UP (ref 3.5–5.3)
RBC # BLD: 3.88 M/UL — SIGNIFICANT CHANGE UP (ref 3.8–5.2)
RBC # FLD: 16.9 % — HIGH (ref 10.3–14.5)
SODIUM SERPL-SCNC: 138 MMOL/L — SIGNIFICANT CHANGE UP (ref 135–145)
WBC # BLD: 10.56 K/UL — HIGH (ref 3.8–10.5)
WBC # FLD AUTO: 10.56 K/UL — HIGH (ref 3.8–10.5)

## 2020-03-17 PROCEDURE — 99497 ADVNCD CARE PLAN 30 MIN: CPT | Mod: 25

## 2020-03-17 PROCEDURE — 99233 SBSQ HOSP IP/OBS HIGH 50: CPT

## 2020-03-17 RX ADMIN — BUDESONIDE AND FORMOTEROL FUMARATE DIHYDRATE 2 PUFF(S): 160; 4.5 AEROSOL RESPIRATORY (INHALATION) at 21:18

## 2020-03-17 RX ADMIN — Medication 20 MILLIGRAM(S): at 11:57

## 2020-03-17 RX ADMIN — CHOLESTYRAMINE 4 GRAM(S): 4 POWDER, FOR SUSPENSION ORAL at 08:32

## 2020-03-17 RX ADMIN — HEPARIN SODIUM 5000 UNIT(S): 5000 INJECTION INTRAVENOUS; SUBCUTANEOUS at 11:58

## 2020-03-17 RX ADMIN — NYSTATIN CREAM 1 APPLICATION(S): 100000 CREAM TOPICAL at 06:22

## 2020-03-17 RX ADMIN — TRAMADOL HYDROCHLORIDE 50 MILLIGRAM(S): 50 TABLET ORAL at 21:50

## 2020-03-17 RX ADMIN — Medication 3 MILLIGRAM(S): at 21:21

## 2020-03-17 RX ADMIN — Medication 750 MILLIGRAM(S): at 06:23

## 2020-03-17 RX ADMIN — Medication 650 MILLIGRAM(S): at 21:21

## 2020-03-17 RX ADMIN — BUDESONIDE AND FORMOTEROL FUMARATE DIHYDRATE 2 PUFF(S): 160; 4.5 AEROSOL RESPIRATORY (INHALATION) at 06:23

## 2020-03-17 RX ADMIN — Medication 750 MILLIGRAM(S): at 18:00

## 2020-03-17 RX ADMIN — ATORVASTATIN CALCIUM 10 MILLIGRAM(S): 80 TABLET, FILM COATED ORAL at 21:21

## 2020-03-17 RX ADMIN — Medication 1 APPLICATION(S): at 18:02

## 2020-03-17 RX ADMIN — TRAMADOL HYDROCHLORIDE 50 MILLIGRAM(S): 50 TABLET ORAL at 03:58

## 2020-03-17 RX ADMIN — Medication 650 MILLIGRAM(S): at 06:23

## 2020-03-17 RX ADMIN — HEPARIN SODIUM 5000 UNIT(S): 5000 INJECTION INTRAVENOUS; SUBCUTANEOUS at 21:20

## 2020-03-17 RX ADMIN — NYSTATIN CREAM 1 APPLICATION(S): 100000 CREAM TOPICAL at 18:00

## 2020-03-17 RX ADMIN — TRAMADOL HYDROCHLORIDE 50 MILLIGRAM(S): 50 TABLET ORAL at 21:20

## 2020-03-17 RX ADMIN — Medication 1 APPLICATION(S): at 06:22

## 2020-03-17 RX ADMIN — Medication 325 MILLIGRAM(S): at 11:57

## 2020-03-17 RX ADMIN — Medication 650 MILLIGRAM(S): at 11:57

## 2020-03-17 RX ADMIN — AMLODIPINE BESYLATE 5 MILLIGRAM(S): 2.5 TABLET ORAL at 06:23

## 2020-03-17 RX ADMIN — HEPARIN SODIUM 5000 UNIT(S): 5000 INJECTION INTRAVENOUS; SUBCUTANEOUS at 06:23

## 2020-03-17 RX ADMIN — TRAMADOL HYDROCHLORIDE 50 MILLIGRAM(S): 50 TABLET ORAL at 04:58

## 2020-03-17 RX ADMIN — Medication 81 MILLIGRAM(S): at 11:57

## 2020-03-17 NOTE — SWALLOW BEDSIDE ASSESSMENT ADULT - COMMENTS
Consult received and chart reviewed. The patient was seen at bedside this afternoon for a re-assessment of swallow function, at which time she was alert and cooperative. Patient reporting, "I can't wait to be given different food." The patient reported pain when being repositioned to more upright positioning for feeding and RN made aware. Patient was agreeable to participate in today's evaluation.    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) and chronic diarrhea, cervical cancer (s/p radical hysterectomy and radiation), tremors, eczema, depression, HTN, HLD, history of frequent UTIs with chronic indwelling renae, anemia, stage 4 sacral decubitus ulcer, and recent hospitalization for MEHDI on CKD thought to be 2/2 dehydration and urinary retention from malfunctioning chronic renae, now brought in by EMS to ED for altered mental status admitted for acute metabolic encephalopathy due to suspected UTI and hyperkalemia in setting of MEHDI on CKD 5, course c/b likely generalized seizure on 3/5/20, and acute hypoxic respiratory failure and suspected sepsis due to suspected aspiration PNA, s/p ICU stay. Mental status hae been improved." There are no new CXR available for review. Discussed results and recommendations Consult received and chart reviewed. The patient was seen at bedside this afternoon for a re-assessment of swallow function, at which time she was alert and cooperative. Patient reporting, "I can't wait to be given different food." The patient reported pain when being repositioned to more upright positioning for feeding and RN made aware. Patient was agreeable to participate in today's evaluation.    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) and chronic diarrhea, cervical cancer (s/p radical hysterectomy and radiation), tremors, eczema, depression, HTN, HLD, history of frequent UTIs with chronic indwelling renae, anemia, stage 4 sacral decubitus ulcer, and recent hospitalization for MEHDI on CKD thought to be 2/2 dehydration and urinary retention from malfunctioning chronic renae, now brought in by EMS to ED for altered mental status admitted for acute metabolic encephalopathy due to suspected UTI and hyperkalemia in setting of MEHDI on CKD 5, course c/b likely generalized seizure on 3/5/20, and acute hypoxic respiratory failure and suspected sepsis due to suspected aspiration PNA, s/p ICU stay. Mental status hae been improved." There are no new CXR available for review. Discussed results and recommendations from this evaluation with the patient, RN, and call out to MD. Consult received and chart reviewed. The patient was seen at bedside this afternoon for a re-assessment of swallow function, at which time she was alert and cooperative. Patient reporting, "I can't wait to be given different food." The patient reported pain when being repositioned to more upright positioning for feeding and RN made aware. Patient was agreeable to participate in today's evaluation. She was initially seen on 3/9/20 and then for a f/u on 3/11/20 (please see full report for details), at which time a dysphagia 1 with honey thick liquid diet was recommended.    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) and chronic diarrhea, cervical cancer (s/p radical hysterectomy and radiation), tremors, eczema, depression, HTN, HLD, history of frequent UTIs with chronic indwelling renae, anemia, stage 4 sacral decubitus ulcer, and recent hospitalization for MEHDI on CKD thought to be 2/2 dehydration and urinary retention from malfunctioning chronic renae, now brought in by EMS to ED for altered mental status admitted for acute metabolic encephalopathy due to suspected UTI and hyperkalemia in setting of MEHDI on CKD 5, course c/b likely generalized seizure on 3/5/20, and acute hypoxic respiratory failure and suspected sepsis due to suspected aspiration PNA, s/p ICU stay. Mental status hae been improved." There are no new CXR available for review. Discussed results and recommendations from this evaluation with the patient, RN, and call out to MD.

## 2020-03-17 NOTE — INPATIENT CERTIFICATION FOR MEDICARE PATIENTS - RISKS OF ADVERSE EVENTS
Concern for neurologic deterioration/Concern for worsening infectious process/Other:/Concern for delay in diagnosis and treatment

## 2020-03-17 NOTE — PROGRESS NOTE ADULT - SUBJECTIVE AND OBJECTIVE BOX
Patient is a 84y old  Female who presents with a chief complaint of altered mental status (17 Mar 2020 09:51)      INTERVAL HPI:  OVERNIGHT EVENTS:  T(F): 98.5 (03-17-20 @ 06:02), Max: 98.5 (03-16-20 @ 21:10)  HR: 80 (03-17-20 @ 06:02) (78 - 80)  BP: 118/61 (03-17-20 @ 06:02) (118/61 - 129/67)  RR: 17 (03-17-20 @ 06:02) (17 - 18)  SpO2: 91% (03-17-20 @ 06:02) (91% - 95%)  I&O's Summary    16 Mar 2020 07:01  -  17 Mar 2020 07:00  --------------------------------------------------------  IN: 0 mL / OUT: 850 mL / NET: -850 mL    17 Mar 2020 07:01  -  17 Mar 2020 12:44  --------------------------------------------------------  IN: 360 mL / OUT: 0 mL / NET: 360 mL        REVIEW OF SYSTEMS:  CONSTITUTIONAL: No fever, weight loss, or fatigue  EYES: No eye pain, visual disturbances, or discharge  ENMT:  No difficulty hearing, tinnitus, vertigo; No sinus or throat pain  NECK: No pain or stiffness  BREASTS: No pain, masses, or nipple discharge  RESPIRATORY: No cough, wheezing, chills or hemoptysis; No shortness of breath  CARDIOVASCULAR: No chest pain, palpitations, dizziness, or leg swelling  GASTROINTESTINAL: No abdominal or epigastric pain. No nausea, vomiting, or hematemesis; No diarrhea or constipation. No melena or hematochezia.  GENITOURINARY: No dysuria, frequency, hematuria, or incontinence  NEUROLOGICAL: No headaches, memory loss, loss of strength, numbness, or tremors  SKIN: No itching, burning, rashes, or lesions   LYMPH NODES: No enlarged glands  ENDOCRINE: No heat or cold intolerance; No hair loss  MUSCULOSKELETAL: No joint pain or swelling; No muscle, back, or extremity pain  PSYCHIATRIC: No depression, anxiety, mood swings, or difficulty sleeping  HEME/LYMPH: No easy bruising, or bleeding gums  ALLERY AND IMMUNOLOGIC: No hives or eczema    PHYSICAL EXAM:  GENERAL: NAD, well-groomed, well-developed  HEAD:  Atraumatic, Normocephalic  EYES: EOMI, PERRLA, conjunctiva and sclera clear  ENMT: No tonsillar erythema, exudates, or enlargement; Moist mucous membranes, Good dentition, No lesions  NECK: Supple, No JVD, Normal thyroid  NERVOUS SYSTEM:  Alert & Oriented X3, Good concentration; Motor Strength 5/5 B/L upper and lower extremities; DTRs 2+ intact and symmetric  CHEST/LUNG: Clear to percussion bilaterally; No rales, rhonchi, wheezing, or rubs  HEART: Regular rate and rhythm; No murmurs, rubs, or gallops  ABDOMEN: Soft, Nontender, Nondistended; Bowel sounds present  EXTREMITIES:  2+ Peripheral Pulses, No clubbing, cyanosis, or edema  LYMPH: No lymphadenopathy noted  SKIN: No rashes or lesions    LABS:                        11.0   10.56 )-----------( 268      ( 17 Mar 2020 08:29 )             36.0     03-17    138  |  106  |  72<H>  ----------------------------<  97  5.3   |  19<L>  |  5.70<H>    Ca    8.9      17 Mar 2020 08:29          CAPILLARY BLOOD GLUCOSE                  MEDICATIONS  (STANDING):  amLODIPine   Tablet 5 milliGRAM(s) Oral daily  aspirin enteric coated 81 milliGRAM(s) Oral daily  atorvastatin 10 milliGRAM(s) Oral at bedtime  budesonide 160 MICROgram(s)/formoterol 4.5 MICROgram(s) Inhaler 2 Puff(s) Inhalation two times a day  cholestyramine Powder (Sugar-Free) 4 Gram(s) Oral daily  clobetasol 0.05% Cream 1 Application(s) Topical two times a day  dextrose 5% + sodium chloride 0.45%. 1000 milliLiter(s) (50 mL/Hr) IV Continuous <Continuous>  dextrose 5%. 1000 milliLiter(s) (50 mL/Hr) IV Continuous <Continuous>  epoetin jean carlos Injectable 59852 Unit(s) SubCutaneous <User Schedule>  ferrous    sulfate 325 milliGRAM(s) Oral daily  FLUoxetine 20 milliGRAM(s) Oral daily  heparin  Injectable 5000 Unit(s) SubCutaneous every 8 hours  labetalol 200 milliGRAM(s) Oral every 8 hours  melatonin 3 milliGRAM(s) Oral at bedtime  nystatin Cream 1 Application(s) Topical two times a day  sodium bicarbonate 650 milliGRAM(s) Oral three times a day  valproic  acid Syrup 750 milliGRAM(s) Oral two times a day    MEDICATIONS  (PRN):  acetaminophen    Suspension .. 650 milliGRAM(s) Oral every 6 hours PRN Temp greater or equal to 38C (100.4F), Moderate Pain (4 - 6)  acetaminophen   Tablet .. 650 milliGRAM(s) Oral every 6 hours PRN Mild Pain (1 - 3)  hydrALAZINE Injectable 10 milliGRAM(s) IV Push every 6 hours PRN SBP over 180  traMADol 50 milliGRAM(s) Oral every 6 hours PRN Moderate Pain (4 - 6) Patient is a 84y old  Female who presents with a chief complaint of altered mental status (17 Mar 2020 09:51)      INTERVAL HPI: Pt seen and examined. State she is feeling better and still has some confusion but much improved. States she is somewhat weak as well. Denies any other acute complaints at this time.     OVERNIGHT EVENTS: none noted  T(F): 98.5 (03-17-20 @ 06:02), Max: 98.5 (03-16-20 @ 21:10)  HR: 80 (03-17-20 @ 06:02) (78 - 80)  BP: 118/61 (03-17-20 @ 06:02) (118/61 - 129/67)  RR: 17 (03-17-20 @ 06:02) (17 - 18)  SpO2: 91% (03-17-20 @ 06:02) (91% - 95%)  I&O's Summary    16 Mar 2020 07:01  -  17 Mar 2020 07:00  --------------------------------------------------------  IN: 0 mL / OUT: 850 mL / NET: -850 mL    17 Mar 2020 07:01  -  17 Mar 2020 12:44  --------------------------------------------------------  IN: 360 mL / OUT: 0 mL / NET: 360 mL        REVIEW OF SYSTEMS:  CONSTITUTIONAL: No fever, weight loss, or fatigue  RESPIRATORY: No cough, wheezing, chills or hemoptysis; No shortness of breath  CARDIOVASCULAR: No chest pain, palpitations, dizziness, or leg swelling  GASTROINTESTINAL: No abdominal or epigastric pain. No nausea, vomiting, or hematemesis; No diarrhea or constipation. No melena or hematochezia.  GENITOURINARY: No dysuria, frequency, hematuria, or incontinence  NEUROLOGICAL: No headaches, memory loss, loss of strength, numbness, or tremors  SKIN: No itching, burning, rashes, or lesions   MUSCULOSKELETAL: No joint pain or swelling; No muscle, back, or extremity pain  PSYCHIATRIC: No depression, anxiety, mood swings, or difficulty sleeping    PHYSICAL EXAM:  GENERAL: NAD, well-groomed,  elder  NERVOUS SYSTEM:  Alert & Oriented X2,  Motor Strength 3/5 B/L upper and lower extremities  CHEST/LUNG: Clear to percussion bilaterally; No rales, rhonchi, wheezing, or rubs  HEART: Regular rate and rhythm; No murmurs, rubs, or gallops  ABDOMEN: Soft, Nontender, Nondistended; Bowel sounds present  EXTREMITIES:  2+ Peripheral Pulses, No clubbing, cyanosis, or edema  SKIN: No rashes or lesions    LABS:                        11.0   10.56 )-----------( 268      ( 17 Mar 2020 08:29 )             36.0     03-17    138  |  106  |  72<H>  ----------------------------<  97  5.3   |  19<L>  |  5.70<H>    Ca    8.9      17 Mar 2020 08:29          CAPILLARY BLOOD GLUCOSE                  MEDICATIONS  (STANDING):  amLODIPine   Tablet 5 milliGRAM(s) Oral daily  aspirin enteric coated 81 milliGRAM(s) Oral daily  atorvastatin 10 milliGRAM(s) Oral at bedtime  budesonide 160 MICROgram(s)/formoterol 4.5 MICROgram(s) Inhaler 2 Puff(s) Inhalation two times a day  cholestyramine Powder (Sugar-Free) 4 Gram(s) Oral daily  clobetasol 0.05% Cream 1 Application(s) Topical two times a day  dextrose 5% + sodium chloride 0.45%. 1000 milliLiter(s) (50 mL/Hr) IV Continuous <Continuous>  dextrose 5%. 1000 milliLiter(s) (50 mL/Hr) IV Continuous <Continuous>  epoetin jean carlos Injectable 93934 Unit(s) SubCutaneous <User Schedule>  ferrous    sulfate 325 milliGRAM(s) Oral daily  FLUoxetine 20 milliGRAM(s) Oral daily  heparin  Injectable 5000 Unit(s) SubCutaneous every 8 hours  labetalol 200 milliGRAM(s) Oral every 8 hours  melatonin 3 milliGRAM(s) Oral at bedtime  nystatin Cream 1 Application(s) Topical two times a day  sodium bicarbonate 650 milliGRAM(s) Oral three times a day  valproic  acid Syrup 750 milliGRAM(s) Oral two times a day    MEDICATIONS  (PRN):  acetaminophen    Suspension .. 650 milliGRAM(s) Oral every 6 hours PRN Temp greater or equal to 38C (100.4F), Moderate Pain (4 - 6)  acetaminophen   Tablet .. 650 milliGRAM(s) Oral every 6 hours PRN Mild Pain (1 - 3)  hydrALAZINE Injectable 10 milliGRAM(s) IV Push every 6 hours PRN SBP over 180  traMADol 50 milliGRAM(s) Oral every 6 hours PRN Moderate Pain (4 - 6)

## 2020-03-17 NOTE — SWALLOW BEDSIDE ASSESSMENT ADULT - SWALLOW EVAL: DIAGNOSIS
1. The patient demonstrated a mild oral dysphagia for puree and honey thick liquids marked by delayed bolus collection, transfer, and posterior transport. 2. The patient demonstrated a mild-moderate oral dysphagia for nectar thick liquids marked by delayed bolus collection and transfer with suspected posterior loss over the base of tongue. 3. The patient demonstrated a mild pharyngeal dysphagia for puree and honey thick liquids marked by a delayed pharyngeal swallow trigger with reduced hyolarygneal elevation upon digital palpation w/o evidence of airway penetration. 4. The patient demonstrated a severe pharyngeal dysphagia for nectar thick liquids marked by a delayed pharyngeal swallow trigger with reduced hyolaryngeal elevation upon digital palpation resulting in multiple swallows suggestive of pharyngeal stasis and/or airway penetration, change in vocal quality to a 'wet' tone, and throat clearing suggestive of airway penetration.
1. The patient demonstrated functional oral management of puree, honey thick, nectar thick, and thin liquid textures marked by adequate bolus collection, transfer, and posterior transport. 2. The patient demonstrated a mild oral dysphagia for solids marked by adequate oral acceptance with delayed bolus collection, transfer, and posterior transport. Trace lingual residue noted subsequent to deglutition which cleared with a liquid wash. 3. The patient demonstrated a mild pharyngeal dysphagia for puree, solid, honey thick, nectar thick, and thin liquid textures marked by a suspected delayed pharyngeal swallow trigger with reduced hyolaryngeal elevation upon digital palpation w/o evidence of airway penetration.

## 2020-03-17 NOTE — INPATIENT CERTIFICATION FOR MEDICARE PATIENTS - PHYSICIAN CONCUR
I concur with the Admission Order and I certify that services are provided in accordance with Section 42 CFR § 412.3
Satisfactory

## 2020-03-17 NOTE — PROGRESS NOTE ADULT - PROBLEM SELECTOR PLAN 1
- Resolved   - acute metabolic encephalopathy is likely multifactorial, meds, seizure, suspected infection, underlying dementia, poor oral intake causing dehydration . Mental status has been improving. More awake, alert now, but remains confused.   - pt may also have some element of ICU/hospital delirium   - MRI showed no evidence for CVA to have explained the isolated expressive aphasia pt had prior to her generalized seizure on 3/5  - Neuro following - Resolved   - acute metabolic encephalopathy is likely multifactorial, meds, seizure, suspected infection, underlying dementia, poor oral intake causing dehydration . Mental status has been improving. More awake, alert now, but remains confused.   - pt may also have some element of ICU/hospital delirium   - MRI showed no evidence for CVA to have explained the isolated expressive aphasia pt had prior to her generalized seizure on 3/5  - Neuro recs apprecs

## 2020-03-17 NOTE — PROGRESS NOTE ADULT - SUBJECTIVE AND OBJECTIVE BOX
GURJIT HERRERA  84y  Female    Patient is a 84y old  Female who presents with a chief complaint of altered mental status (17 Mar 2020 09:51)      awake and alert  confused.   urine output is good via the renae.     PAST MEDICAL & SURGICAL HISTORY:  Wound of sacral region  Depression  Iron deficiency anemia  Eczema  HTN (hypertension)  CKD (chronic kidney disease) stage 5, GFR less than 15 ml/min: not on HD  Herniated lumbar intervertebral disc  Tremor  Kidney atrophy: right  Colostomy status: 2006  Chronic UTI (urinary tract infection): chronic renae  Cervical cancer: 1970&#x27;s  Dyslipidemia  Hernia, incisional  S/P hip replacement: right 2013  S/P colon resection: 2006  S/P hysterectomy: 1977          PHYSICAL EXAM:    T(C): 36.4 (03-17-20 @ 13:37), Max: 36.9 (03-16-20 @ 21:10)  HR: 83 (03-17-20 @ 13:37) (78 - 83)  BP: 113/57 (03-17-20 @ 13:37) (113/57 - 129/67)  RR: 16 (03-17-20 @ 13:37) (16 - 18)  SpO2: 96% (03-17-20 @ 13:37) (91% - 96%)  Wt(kg): --    I&O's Detail    16 Mar 2020 07:01  -  17 Mar 2020 07:00  --------------------------------------------------------  IN:  Total IN: 0 mL    OUT:    Colostomy: 500 mL    Indwelling Catheter - Urethral: 350 mL  Total OUT: 850 mL    Total NET: -850 mL      17 Mar 2020 07:01  -  17 Mar 2020 13:52  --------------------------------------------------------  IN:    Oral Fluid: 360 mL  Total IN: 360 mL    OUT:  Total OUT: 0 mL    Total NET: 360 mL          Respiratory: clear anteriorly, decreased BS at bases  Cardiovascular: S1 S2  Gastrointestinal: soft NT ND +BS  Extremities: trace edema   Neuro: Awake and alert    MEDICATIONS  (STANDING):  amLODIPine   Tablet 5 milliGRAM(s) Oral daily  aspirin enteric coated 81 milliGRAM(s) Oral daily  atorvastatin 10 milliGRAM(s) Oral at bedtime  budesonide 160 MICROgram(s)/formoterol 4.5 MICROgram(s) Inhaler 2 Puff(s) Inhalation two times a day  cholestyramine Powder (Sugar-Free) 4 Gram(s) Oral daily  clobetasol 0.05% Cream 1 Application(s) Topical two times a day  dextrose 5% + sodium chloride 0.45%. 1000 milliLiter(s) (50 mL/Hr) IV Continuous <Continuous>  dextrose 5%. 1000 milliLiter(s) (50 mL/Hr) IV Continuous <Continuous>  epoetin jean carlos Injectable 10887 Unit(s) SubCutaneous <User Schedule>  ferrous    sulfate 325 milliGRAM(s) Oral daily  FLUoxetine 20 milliGRAM(s) Oral daily  heparin  Injectable 5000 Unit(s) SubCutaneous every 8 hours  labetalol 200 milliGRAM(s) Oral every 8 hours  melatonin 3 milliGRAM(s) Oral at bedtime  nystatin Cream 1 Application(s) Topical two times a day  sodium bicarbonate 650 milliGRAM(s) Oral three times a day  valproic  acid Syrup 750 milliGRAM(s) Oral two times a day    MEDICATIONS  (PRN):  acetaminophen    Suspension .. 650 milliGRAM(s) Oral every 6 hours PRN Temp greater or equal to 38C (100.4F), Moderate Pain (4 - 6)  acetaminophen   Tablet .. 650 milliGRAM(s) Oral every 6 hours PRN Mild Pain (1 - 3)  hydrALAZINE Injectable 10 milliGRAM(s) IV Push every 6 hours PRN SBP over 180  traMADol 50 milliGRAM(s) Oral every 6 hours PRN Moderate Pain (4 - 6)                            11.0   10.56 )-----------( 268      ( 17 Mar 2020 08:29 )             36.0       03-17    138  |  106  |  72<H>  ----------------------------<  97  5.3   |  19<L>  |  5.70<H>    Ca    8.9      17 Mar 2020 08:29        Creatinine Trend: Creatinine Trend: 5.70<--, 4.90<--, 4.30<--, 4.20<--, 4.20<--, 4.40<--

## 2020-03-17 NOTE — SWALLOW BEDSIDE ASSESSMENT ADULT - SWALLOW EVAL: RECOMMENDED FEEDING/EATING TECHNIQUES
check mouth frequently for oral residue/pocketing/crush medication (when feasible)/position upright (90 degrees)/small sips/bites/allow for swallow between intakes/maintain upright posture during/after eating for 30 mins/oral hygiene/no straws/alternate food with liquid
oral hygiene/allow for swallow between intakes/crush medication (when feasible)/no straws/small sips/bites/alternate food with liquid/position upright (90 degrees)/maintain upright posture during/after eating for 30 mins

## 2020-03-17 NOTE — PROGRESS NOTE ADULT - ASSESSMENT
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) and chronic diarrhea, cervical cancer (s/p radical hysterectomy and radiation), tremors, eczema, depression, HTN, HLD, history of frequent UTIs with chronic indwelling renae, anemia, stage 4 sacral decubitus ulcer, and recent hospitalization for MEHDI on CKD thought to be 2/2 dehydration and urinary retention from malfunctioning chronic renae, now brought in by EMS to ED for altered mental status admitted for acute metabolic encephalopathy due to suspected UTI and hyperkalemia in setting of MEHDI on CKD 5, course c/b likely generalized seizure on 3/5/20, and acute hypoxic respiratory failure and suspected sepsis due to suspected aspiration PNA, s/p ICU stay. Mental status hae been improved.    Disposition: Mental status stable. Sodium, electrolytes improving. No plan for HD per Nephro and patient's family's decision. Per Son ( HCP) wants mom to go home with patient's daughter. PT suggested SHELBIE MAYBERRY to talk to family  about safe d/c planning likely home with home care services, renal function elevated today, apprec nephro recs, dc likely tomorrow

## 2020-03-17 NOTE — PROGRESS NOTE ADULT - PROBLEM SELECTOR PLAN 1
MS better  weakness - frail elderly -   multiple medical issues -   needs assist with ADL  oral and skin hygiene  discussion ongoing about GOC  large family - Daughters in Law and sons and daughters all involved in discussion  pt remains full code  expect visitors from family today - an opportunity to discuss GOC and Code Status  will follow

## 2020-03-17 NOTE — SWALLOW BEDSIDE ASSESSMENT ADULT - ASR SWALLOW ASPIRATION MONITOR
oral hygiene/pneumonia/fever/throat clearing/change of breathing pattern/position upright (90Y)/gurgly voice/cough/upper respiratory infection
fever/pneumonia/throat clearing/upper respiratory infection/oral hygiene/change of breathing pattern/position upright (90Y)/gurgly voice/cough

## 2020-03-17 NOTE — PROGRESS NOTE ADULT - SUBJECTIVE AND OBJECTIVE BOX
Neurology follow up note    GURJIT [id number]      Interval History:    Patient feels ok no new complaints.    MEDICATIONS    acetaminophen    Suspension .. 650 milliGRAM(s) Oral every 6 hours PRN  acetaminophen   Tablet .. 650 milliGRAM(s) Oral every 6 hours PRN  amLODIPine   Tablet 5 milliGRAM(s) Oral daily  aspirin enteric coated 81 milliGRAM(s) Oral daily  atorvastatin 10 milliGRAM(s) Oral at bedtime  budesonide 160 MICROgram(s)/formoterol 4.5 MICROgram(s) Inhaler 2 Puff(s) Inhalation two times a day  cholestyramine Powder (Sugar-Free) 4 Gram(s) Oral daily  clobetasol 0.05% Cream 1 Application(s) Topical two times a day  dextrose 5% + sodium chloride 0.45%. 1000 milliLiter(s) IV Continuous <Continuous>  dextrose 5%. 1000 milliLiter(s) IV Continuous <Continuous>  epoetin jean carlos Injectable 02247 Unit(s) SubCutaneous <User Schedule>  ferrous    sulfate 325 milliGRAM(s) Oral daily  FLUoxetine 20 milliGRAM(s) Oral daily  heparin  Injectable 5000 Unit(s) SubCutaneous every 8 hours  hydrALAZINE Injectable 10 milliGRAM(s) IV Push every 6 hours PRN  labetalol 200 milliGRAM(s) Oral every 8 hours  melatonin 3 milliGRAM(s) Oral at bedtime  nystatin Cream 1 Application(s) Topical two times a day  sodium bicarbonate 650 milliGRAM(s) Oral three times a day  traMADol 50 milliGRAM(s) Oral every 6 hours PRN  valproic  acid Syrup 750 milliGRAM(s) Oral two times a day      Allergies    Levaquin (Pruritus)  mercury (Pruritus; Rash)  sulfa drugs (Pruritus)    Intolerances            Vital Signs Last 24 Hrs  T(C): 36.9 (17 Mar 2020 06:02), Max: 36.9 (16 Mar 2020 21:10)  T(F): 98.5 (17 Mar 2020 06:02), Max: 98.5 (16 Mar 2020 21:10)  HR: 80 (17 Mar 2020 06:02) (78 - 80)  BP: 118/61 (17 Mar 2020 06:02) (118/61 - 129/67)  BP(mean): --  RR: 17 (17 Mar 2020 06:02) (17 - 18)  SpO2: 91% (17 Mar 2020 06:02) (91% - 95%)        REVIEW OF SYSTEMS: Limited or unable to obtain secondary to patient's poor mental status.    Constitutional: No fever, chills, fatigue, generalized  weakness  Eyes: no eye pain, visual disturbances, or discharge  ENT:  No difficulty hearing, tinnitus, vertigo; No sinus or throat pain  Neck: No pain or stiffness  Respiratory: No cough, dyspnea, wheezing   Cardiovascular: No chest pain, palpitations,   Gastrointestinal: No abdominal or epigastric pain. No nausea, vomiting  No diarrhea or constipation.   Genitourinary: No dysuria, frequency, hematuria or incontinence  Neurological: No headaches, lightheadedness, vertigo, numbness or tremors  Psychiatric: No depression, anxiety, mood swings or difficulty sleeping  Musculoskeletal: No joint pain or swelling; No muscle, back or extremity pain      On Neurological Examination:    Mental Status - Patient is alert, awake,  [facility]   year 2020     Follow simple commands    Speech -   Fluent                 Cranial Nerves - Pupils 3 mm equal and reactive to light,   extraocular eye movements intact.   smile symmetric  intact bilateral NLF    Motor Exam -   Right upper 4/5   Left upper  3/5  Right lower 2/5  Left lower 2/5    Muscle tone - is normal all over.  No asymmetry is seen.      Sensory    Bilateral intact to light touch    GENERAL Exam: Nontoxic , No Acute Distress   	  HEENT:  normocephalic, atraumatic  		  LUNGS:  Decreased bilaterally  	  HEART: Normal S1S2   No murmur RRR        	  GI/ ABDOMEN:  Soft  Non tender    EXTREMITIES:   No Edema  No Clubbing  No Cyanosis   	   SKIN: Normal  No Ecchymosis           LABS:  CBC Full  -  ( 17 Mar 2020 08:29 )  WBC Count : 10.56 K/uL  RBC Count : 3.88 M/uL  Hemoglobin : 11.0 g/dL  Hematocrit : 36.0 %  Platelet Count - Automated : 268 K/uL  Mean Cell Volume : 92.8 fl  Mean Cell Hemoglobin : 28.4 pg  Mean Cell Hemoglobin Concentration : 30.6 gm/dL  Auto Neutrophil # : x  Auto Lymphocyte # : x  Auto Monocyte # : x  Auto Eosinophil # : x  Auto Basophil # : x  Auto Neutrophil % : x  Auto Lymphocyte % : x  Auto Monocyte % : x  Auto Eosinophil % : x  Auto Basophil % : x      03-17    138  |  106  |  72<H>  ----------------------------<  97  5.3   |  19<L>  |  5.70<H>    Ca    8.9      17 Mar 2020 08:29      Hemoglobin A1C:       Vitamin B12         RADIOLOGY    ANALYSIS AND PLAN:  An 84-year-old with an episode of seizure and change in mental status.  1.	For episode of seizure, will increase Depakote 750 mg twice  off dilantin   2.	EEG intermittent generalized spike and waves   3.	Ativan 1 mg IV push q.6h. p.r.n. for any type of breakthrough seizure.  4.	Seizure precautions.  5.	For history of underlying depression, at present hard to evaluate the patient's psychiatric status secondary to the patient being lethargic, continue home medication when possible.  6.	For hyperlipidemia, continue the patient on her cholesterol medication.  7.	For change in mental status, questionable this could be metabolic encephalopathy from partial complex seizures versus any type of underlying infectious type process episodes of temperatures and hypotension possible shock   8.	Antibiotics as needed.  9.	Fall precaution.  10.	overall more awake and interactive today   11.	For chronic kidney disease, monitor renal function.  12.	Spoke with multiple family members at the bedside.  Advance directives were discussed with them.  Primary  will be daughterJu, her cell phone number is [phone], home number is [phone] spoke 3/16/2020  13.	 mental status better today  14.	EEG no new changes   15.	Greater than 22 minutes of time was spent with the patient, plan of care, reviewing data, speaking to the family and multidisciplinary healthcare team.

## 2020-03-17 NOTE — PROGRESS NOTE ADULT - SUBJECTIVE AND OBJECTIVE BOX
Date/Time Patient Seen:  		  Referring MD:   Data Reviewed	       Patient is a 84y old  Female who presents with a chief complaint of altered mental status (16 Mar 2020 11:52)      Subjective/HPI     PAST MEDICAL & SURGICAL HISTORY:  Wound of sacral region  Depression  Iron deficiency anemia  Eczema  HTN (hypertension)  CKD (chronic kidney disease) stage 5, GFR less than 15 ml/min: not on HD  Herniated lumbar intervertebral disc  Depression  Tremor  Kidney atrophy: right  Colostomy status: 2006  Chronic UTI (urinary tract infection): chronic renae  Cervical cancer: 1970&#x27;s  Dyslipidemia  Diabetes: type 2  Hernia, incisional  S/P hip replacement: right 2013  S/P colon resection: 2006  S/P hysterectomy: 1977        Medication list         MEDICATIONS  (STANDING):  amLODIPine   Tablet 5 milliGRAM(s) Oral daily  aspirin enteric coated 81 milliGRAM(s) Oral daily  atorvastatin 10 milliGRAM(s) Oral at bedtime  budesonide 160 MICROgram(s)/formoterol 4.5 MICROgram(s) Inhaler 2 Puff(s) Inhalation two times a day  cholestyramine Powder (Sugar-Free) 4 Gram(s) Oral daily  clobetasol 0.05% Cream 1 Application(s) Topical two times a day  dextrose 5% + sodium chloride 0.45%. 1000 milliLiter(s) (50 mL/Hr) IV Continuous <Continuous>  dextrose 5%. 1000 milliLiter(s) (50 mL/Hr) IV Continuous <Continuous>  epoetin jean carlos Injectable 27930 Unit(s) SubCutaneous <User Schedule>  ferrous    sulfate 325 milliGRAM(s) Oral daily  FLUoxetine 20 milliGRAM(s) Oral daily  heparin  Injectable 5000 Unit(s) SubCutaneous every 8 hours  labetalol 200 milliGRAM(s) Oral every 8 hours  melatonin 3 milliGRAM(s) Oral at bedtime  nystatin Cream 1 Application(s) Topical two times a day  sodium bicarbonate 650 milliGRAM(s) Oral three times a day  valproic  acid Syrup 750 milliGRAM(s) Oral two times a day    MEDICATIONS  (PRN):  acetaminophen    Suspension .. 650 milliGRAM(s) Oral every 6 hours PRN Temp greater or equal to 38C (100.4F), Moderate Pain (4 - 6)  acetaminophen   Tablet .. 650 milliGRAM(s) Oral every 6 hours PRN Mild Pain (1 - 3)  hydrALAZINE Injectable 10 milliGRAM(s) IV Push every 6 hours PRN SBP over 180  traMADol 50 milliGRAM(s) Oral every 6 hours PRN Moderate Pain (4 - 6)         Vitals log        ICU Vital Signs Last 24 Hrs  T(C): 36.9 (16 Mar 2020 21:10), Max: 36.9 (16 Mar 2020 21:10)  T(F): 98.5 (16 Mar 2020 21:10), Max: 98.5 (16 Mar 2020 21:10)  HR: 78 (16 Mar 2020 21:10) (78 - 79)  BP: 126/69 (16 Mar 2020 21:10) (126/69 - 129/67)  BP(mean): --  ABP: --  ABP(mean): --  RR: 18 (16 Mar 2020 21:10) (17 - 18)  SpO2: 95% (16 Mar 2020 21:10) (93% - 95%)           Input and Output:  I&O's Detail    15 Mar 2020 07:01  -  16 Mar 2020 07:00  --------------------------------------------------------  IN:  Total IN: 0 mL    OUT:    Colostomy: 750 mL    Indwelling Catheter - Urethral: 300 mL  Total OUT: 1050 mL    Total NET: -1050 mL      16 Mar 2020 07:01  -  17 Mar 2020 06:07  --------------------------------------------------------  IN:  Total IN: 0 mL    OUT:    Colostomy: 500 mL    Indwelling Catheter - Urethral: 250 mL  Total OUT: 750 mL    Total NET: -750 mL          Lab Data                        10.3   8.38  )-----------( 290      ( 16 Mar 2020 09:14 )             33.3     03-16    140  |  108  |  62<H>  ----------------------------<  75  4.8   |  18<L>  |  4.90<H>    Ca    8.4<L>      16 Mar 2020 09:14              Review of Systems	      Objective     Physical Examination    heart s1s2  lung dec BS  abd soft      Pertinent Lab findings & Imaging      Ana:  NO   Adequate UO     I&O's Detail    15 Mar 2020 07:01  -  16 Mar 2020 07:00  --------------------------------------------------------  IN:  Total IN: 0 mL    OUT:    Colostomy: 750 mL    Indwelling Catheter - Urethral: 300 mL  Total OUT: 1050 mL    Total NET: -1050 mL      16 Mar 2020 07:01  -  17 Mar 2020 06:07  --------------------------------------------------------  IN:  Total IN: 0 mL    OUT:    Colostomy: 500 mL    Indwelling Catheter - Urethral: 250 mL  Total OUT: 750 mL    Total NET: -750 mL               Discussed with:     Cultures:	        Radiology

## 2020-03-17 NOTE — PROGRESS NOTE ADULT - PROBLEM SELECTOR PLAN 4
- worsening, 5.7 todaym,  patient with metabolic acidosis in setting of UTI and suspected pre-renal MEHDI, dehydration   - po sodium bicarb 650 mg TID  - pt's renal status still very poor. No plans for dialysis, patient and family  preference - worsening, 5.7 today,  patient with metabolic acidosis in setting of UTI and suspected pre-renal MEHDI, dehydration   - po sodium bicarb 650 mg TID  - pt's renal status still very poor. now patient and family may be amendable to dialysis  -penidng renal function tomorrow, dc plan likely home

## 2020-03-17 NOTE — PROGRESS NOTE ADULT - ASSESSMENT
84 female with a history of HTN, CAD, CHF, Anemia, osteomyelitis, HLD and atrophic right kidney and uretero ureteral anastomosis of the right to left and CKD stage 5 with anemia now admitted with mental status changes.   New onset seizures and is on Valproate. Renal indices are worsening, when i presented the scenario to patient and need for dialysis, she wants everything done. will monitor labs tomorrow and will speak to the dtr.   continue the PO bicarbonate.

## 2020-03-18 DIAGNOSIS — Z51.5 ENCOUNTER FOR PALLIATIVE CARE: ICD-10-CM

## 2020-03-18 LAB
ALBUMIN SERPL ELPH-MCNC: 2.8 G/DL — LOW (ref 3.3–5)
ALP SERPL-CCNC: 133 U/L — HIGH (ref 40–120)
ALT FLD-CCNC: 14 U/L — SIGNIFICANT CHANGE UP (ref 12–78)
ANION GAP SERPL CALC-SCNC: 22 MMOL/L — HIGH (ref 5–17)
AST SERPL-CCNC: 9 U/L — LOW (ref 15–37)
BASOPHILS # BLD AUTO: 0.04 K/UL — SIGNIFICANT CHANGE UP (ref 0–0.2)
BASOPHILS NFR BLD AUTO: 0.3 % — SIGNIFICANT CHANGE UP (ref 0–2)
BILIRUB SERPL-MCNC: 0.3 MG/DL — SIGNIFICANT CHANGE UP (ref 0.2–1.2)
BUN SERPL-MCNC: 81 MG/DL — HIGH (ref 7–23)
CALCIUM SERPL-MCNC: 9.1 MG/DL — SIGNIFICANT CHANGE UP (ref 8.5–10.1)
CHLORIDE SERPL-SCNC: 97 MMOL/L — SIGNIFICANT CHANGE UP (ref 96–108)
CO2 SERPL-SCNC: 16 MMOL/L — LOW (ref 22–31)
CREAT SERPL-MCNC: 7.2 MG/DL — HIGH (ref 0.5–1.3)
EOSINOPHIL # BLD AUTO: 0.04 K/UL — SIGNIFICANT CHANGE UP (ref 0–0.5)
EOSINOPHIL NFR BLD AUTO: 0.3 % — SIGNIFICANT CHANGE UP (ref 0–6)
GLUCOSE SERPL-MCNC: 92 MG/DL — SIGNIFICANT CHANGE UP (ref 70–99)
HCT VFR BLD CALC: 40 % — SIGNIFICANT CHANGE UP (ref 34.5–45)
HGB BLD-MCNC: 12.6 G/DL — SIGNIFICANT CHANGE UP (ref 11.5–15.5)
IMM GRANULOCYTES NFR BLD AUTO: 1.1 % — SIGNIFICANT CHANGE UP (ref 0–1.5)
LYMPHOCYTES # BLD AUTO: 1.41 K/UL — SIGNIFICANT CHANGE UP (ref 1–3.3)
LYMPHOCYTES # BLD AUTO: 11.8 % — LOW (ref 13–44)
MCHC RBC-ENTMCNC: 29 PG — SIGNIFICANT CHANGE UP (ref 27–34)
MCHC RBC-ENTMCNC: 31.5 GM/DL — LOW (ref 32–36)
MCV RBC AUTO: 92 FL — SIGNIFICANT CHANGE UP (ref 80–100)
MONOCYTES # BLD AUTO: 0.79 K/UL — SIGNIFICANT CHANGE UP (ref 0–0.9)
MONOCYTES NFR BLD AUTO: 6.6 % — SIGNIFICANT CHANGE UP (ref 2–14)
NEUTROPHILS # BLD AUTO: 9.57 K/UL — HIGH (ref 1.8–7.4)
NEUTROPHILS NFR BLD AUTO: 79.9 % — HIGH (ref 43–77)
NRBC # BLD: 0 /100 WBCS — SIGNIFICANT CHANGE UP (ref 0–0)
PLATELET # BLD AUTO: 333 K/UL — SIGNIFICANT CHANGE UP (ref 150–400)
POTASSIUM SERPL-MCNC: 5.9 MMOL/L — HIGH (ref 3.5–5.3)
POTASSIUM SERPL-SCNC: 5.9 MMOL/L — HIGH (ref 3.5–5.3)
PROT SERPL-MCNC: 8.2 G/DL — SIGNIFICANT CHANGE UP (ref 6–8.3)
RBC # BLD: 4.35 M/UL — SIGNIFICANT CHANGE UP (ref 3.8–5.2)
RBC # FLD: 17.2 % — HIGH (ref 10.3–14.5)
SODIUM SERPL-SCNC: 135 MMOL/L — SIGNIFICANT CHANGE UP (ref 135–145)
WBC # BLD: 11.98 K/UL — HIGH (ref 3.8–10.5)
WBC # FLD AUTO: 11.98 K/UL — HIGH (ref 3.8–10.5)

## 2020-03-18 PROCEDURE — 76937 US GUIDE VASCULAR ACCESS: CPT | Mod: 26

## 2020-03-18 PROCEDURE — 36556 INSERT NON-TUNNEL CV CATH: CPT

## 2020-03-18 PROCEDURE — 77001 FLUOROGUIDE FOR VEIN DEVICE: CPT | Mod: 26

## 2020-03-18 PROCEDURE — 99233 SBSQ HOSP IP/OBS HIGH 50: CPT

## 2020-03-18 RX ORDER — SODIUM ZIRCONIUM CYCLOSILICATE 10 G/10G
5 POWDER, FOR SUSPENSION ORAL ONCE
Refills: 0 | Status: COMPLETED | OUTPATIENT
Start: 2020-03-18 | End: 2020-03-18

## 2020-03-18 RX ADMIN — NYSTATIN CREAM 1 APPLICATION(S): 100000 CREAM TOPICAL at 06:08

## 2020-03-18 RX ADMIN — Medication 325 MILLIGRAM(S): at 11:41

## 2020-03-18 RX ADMIN — Medication 650 MILLIGRAM(S): at 11:44

## 2020-03-18 RX ADMIN — HEPARIN SODIUM 5000 UNIT(S): 5000 INJECTION INTRAVENOUS; SUBCUTANEOUS at 22:02

## 2020-03-18 RX ADMIN — HEPARIN SODIUM 5000 UNIT(S): 5000 INJECTION INTRAVENOUS; SUBCUTANEOUS at 15:30

## 2020-03-18 RX ADMIN — TRAMADOL HYDROCHLORIDE 50 MILLIGRAM(S): 50 TABLET ORAL at 23:16

## 2020-03-18 RX ADMIN — Medication 750 MILLIGRAM(S): at 06:06

## 2020-03-18 RX ADMIN — Medication 650 MILLIGRAM(S): at 22:01

## 2020-03-18 RX ADMIN — BUDESONIDE AND FORMOTEROL FUMARATE DIHYDRATE 2 PUFF(S): 160; 4.5 AEROSOL RESPIRATORY (INHALATION) at 22:02

## 2020-03-18 RX ADMIN — Medication 1 APPLICATION(S): at 06:06

## 2020-03-18 RX ADMIN — AMLODIPINE BESYLATE 5 MILLIGRAM(S): 2.5 TABLET ORAL at 06:05

## 2020-03-18 RX ADMIN — Medication 81 MILLIGRAM(S): at 11:41

## 2020-03-18 RX ADMIN — TRAMADOL HYDROCHLORIDE 50 MILLIGRAM(S): 50 TABLET ORAL at 22:16

## 2020-03-18 RX ADMIN — BUDESONIDE AND FORMOTEROL FUMARATE DIHYDRATE 2 PUFF(S): 160; 4.5 AEROSOL RESPIRATORY (INHALATION) at 06:08

## 2020-03-18 RX ADMIN — Medication 750 MILLIGRAM(S): at 19:06

## 2020-03-18 RX ADMIN — CHOLESTYRAMINE 4 GRAM(S): 4 POWDER, FOR SUSPENSION ORAL at 11:41

## 2020-03-18 RX ADMIN — Medication 3 MILLIGRAM(S): at 22:01

## 2020-03-18 RX ADMIN — ATORVASTATIN CALCIUM 10 MILLIGRAM(S): 80 TABLET, FILM COATED ORAL at 22:01

## 2020-03-18 RX ADMIN — HEPARIN SODIUM 5000 UNIT(S): 5000 INJECTION INTRAVENOUS; SUBCUTANEOUS at 06:05

## 2020-03-18 RX ADMIN — Medication 650 MILLIGRAM(S): at 06:05

## 2020-03-18 RX ADMIN — Medication 20 MILLIGRAM(S): at 11:41

## 2020-03-18 RX ADMIN — SODIUM ZIRCONIUM CYCLOSILICATE 5 GRAM(S): 10 POWDER, FOR SUSPENSION ORAL at 15:28

## 2020-03-18 NOTE — PROCEDURE NOTE - NSPROCDETAILS_GEN_ALL_CORE
nasogastric/connected to suction/audible air bolus/placement confirmed by auscultation/gastric secretions aspirated, placement confirmed
ultrasound utilization/blood seen on insertion/location identified, draped/prepped, sterile technique used/flushes easily/dressing applied/secured in place
sterile dressing applied/guidewire recovered/lumen(s) aspirated and flushed/ultrasound guidance/sterile technique, catheter placed
sterile dressing applied/sterile technique, catheter placed/ultrasound guidance/lumen(s) aspirated and flushed/guidewire recovered

## 2020-03-18 NOTE — OCCUPATIONAL THERAPY INITIAL EVALUATION ADULT - GENERAL OBSERVATIONS, REHAB EVAL
Pt found supine in bed with +IV lock, +Perez catheter and +colostomy bag. Pt reports that she is feeling "very tired" today. Pt is scheduled for a hemodialysis catheter placement today as per EMR.

## 2020-03-18 NOTE — PROGRESS NOTE ADULT - PROBLEM SELECTOR PLAN 9
- pt's hypotension was likely related to sepsis and all anti-hypertensives held  - pt's BP now rising and home regimen is being restarted  - continue amlodipine 5 mg, labetalol 100 mg TID with hold parameters - pt's hypotension was likely related to sepsis and all anti-hypertensives held  - pt's BP now rising and home regimen is being restarted  - bp low during dialysis will hold for now

## 2020-03-18 NOTE — PROGRESS NOTE ADULT - SUBJECTIVE AND OBJECTIVE BOX
Date/Time Patient Seen:  		  Referring MD:   Data Reviewed	       Patient is a 84y old  Female who presents with a chief complaint of altered mental status (17 Mar 2020 13:52)      Subjective/HPI     PAST MEDICAL & SURGICAL HISTORY:  Wound of sacral region  Depression  Iron deficiency anemia  Eczema  HTN (hypertension)  CKD (chronic kidney disease) stage 5, GFR less than 15 ml/min: not on HD  Herniated lumbar intervertebral disc  Depression  Tremor  Kidney atrophy: right  Colostomy status: 2006  Chronic UTI (urinary tract infection): chronic renae  Cervical cancer: 1970&#x27;s  Dyslipidemia  Diabetes: type 2  Hernia, incisional  S/P hip replacement: right 2013  S/P colon resection: 2006  S/P hysterectomy: 1977        Medication list         MEDICATIONS  (STANDING):  amLODIPine   Tablet 5 milliGRAM(s) Oral daily  aspirin enteric coated 81 milliGRAM(s) Oral daily  atorvastatin 10 milliGRAM(s) Oral at bedtime  budesonide 160 MICROgram(s)/formoterol 4.5 MICROgram(s) Inhaler 2 Puff(s) Inhalation two times a day  cholestyramine Powder (Sugar-Free) 4 Gram(s) Oral daily  clobetasol 0.05% Cream 1 Application(s) Topical two times a day  dextrose 5% + sodium chloride 0.45%. 1000 milliLiter(s) (50 mL/Hr) IV Continuous <Continuous>  dextrose 5%. 1000 milliLiter(s) (50 mL/Hr) IV Continuous <Continuous>  epoetin jean carlos Injectable 47002 Unit(s) SubCutaneous <User Schedule>  ferrous    sulfate 325 milliGRAM(s) Oral daily  FLUoxetine 20 milliGRAM(s) Oral daily  heparin  Injectable 5000 Unit(s) SubCutaneous every 8 hours  labetalol 200 milliGRAM(s) Oral every 8 hours  melatonin 3 milliGRAM(s) Oral at bedtime  nystatin Cream 1 Application(s) Topical two times a day  sodium bicarbonate 650 milliGRAM(s) Oral three times a day  valproic  acid Syrup 750 milliGRAM(s) Oral two times a day    MEDICATIONS  (PRN):  acetaminophen    Suspension .. 650 milliGRAM(s) Oral every 6 hours PRN Temp greater or equal to 38C (100.4F), Moderate Pain (4 - 6)  acetaminophen   Tablet .. 650 milliGRAM(s) Oral every 6 hours PRN Mild Pain (1 - 3)  hydrALAZINE Injectable 10 milliGRAM(s) IV Push every 6 hours PRN SBP over 180  traMADol 50 milliGRAM(s) Oral every 6 hours PRN Moderate Pain (4 - 6)         Vitals log        ICU Vital Signs Last 24 Hrs  T(C): 36.4 (18 Mar 2020 05:25), Max: 36.6 (17 Mar 2020 21:00)  T(F): 97.6 (18 Mar 2020 05:25), Max: 97.9 (17 Mar 2020 21:00)  HR: 94 (18 Mar 2020 05:25) (70 - 94)  BP: 99/63 (18 Mar 2020 05:25) (99/63 - 113/57)  BP(mean): --  ABP: --  ABP(mean): --  RR: 17 (18 Mar 2020 05:25) (16 - 17)  SpO2: 93% (18 Mar 2020 05:25) (93% - 96%)           Input and Output:  I&O's Detail    16 Mar 2020 07:01  -  17 Mar 2020 07:00  --------------------------------------------------------  IN:  Total IN: 0 mL    OUT:    Colostomy: 500 mL    Indwelling Catheter - Urethral: 350 mL  Total OUT: 850 mL    Total NET: -850 mL      17 Mar 2020 07:01  -  18 Mar 2020 06:07  --------------------------------------------------------  IN:    Oral Fluid: 360 mL  Total IN: 360 mL    OUT:    Ileostomy: 600 mL    Indwelling Catheter - Urethral: 150 mL  Total OUT: 750 mL    Total NET: -390 mL          Lab Data                        11.0   10.56 )-----------( 268      ( 17 Mar 2020 08:29 )             36.0     03-17    138  |  106  |  72<H>  ----------------------------<  97  5.3   |  19<L>  |  5.70<H>    Ca    8.9      17 Mar 2020 08:29              Review of Systems	      Objective     Physical Examination    heart s1s2  lung dec BS      Pertinent Lab findings & Imaging      Ana:  NO   Adequate UO     I&O's Detail    16 Mar 2020 07:01  -  17 Mar 2020 07:00  --------------------------------------------------------  IN:  Total IN: 0 mL    OUT:    Colostomy: 500 mL    Indwelling Catheter - Urethral: 350 mL  Total OUT: 850 mL    Total NET: -850 mL      17 Mar 2020 07:01  -  18 Mar 2020 06:07  --------------------------------------------------------  IN:    Oral Fluid: 360 mL  Total IN: 360 mL    OUT:    Ileostomy: 600 mL    Indwelling Catheter - Urethral: 150 mL  Total OUT: 750 mL    Total NET: -390 mL               Discussed with:     Cultures:	        Radiology

## 2020-03-18 NOTE — PROGRESS NOTE ADULT - PROBLEM SELECTOR PLAN 4
- worsening, 5.7 today,  patient with metabolic acidosis in setting of UTI and suspected pre-renal MEHDI, dehydration   - po sodium bicarb 650 mg TID  - pt's renal status still very poor. now patient and family may be amendable to dialysis  -penidng renal function tomorrow, dc plan likely home

## 2020-03-18 NOTE — PROCEDURE NOTE - NSPOSTPRCRAD_GEN_A_CORE
post-procedure radiography performed
central line located in the superior vena cava/no pneumothorax
central line located in the superior vena cava/post-procedure radiography performed/no pneumothorax

## 2020-03-18 NOTE — OCCUPATIONAL THERAPY INITIAL EVALUATION ADULT - ADDITIONAL COMMENTS
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       in setting of MEHDI on CKD 5, course c/b likely generalized seizure on 3/5/20, and acute hypoxic respiratory failure and suspected sepsis due to suspected aspiration PNA, s/p ICU stay. Pt reports that she lives in a high ranch style house with 12 steps with handrail to enter. Pt reports that her daughter assists her at home with ADL's. Pt ambulated using a rolling walker for short distances. Pt has a stall shower. Pt owns a shower chair and rolling walker.

## 2020-03-18 NOTE — PROCEDURE NOTE - NSINFORMCONSENT_GEN_A_CORE
This was an emergent procedure.
This was an emergent procedure.
Benefits, risks, and possible complications of procedure explained to patient/caregiver who verbalized understanding and gave written consent.
This was an emergent procedure.
Benefits, risks, and possible complications of procedure explained to patient/caregiver who verbalized understanding and gave written consent.

## 2020-03-18 NOTE — PROGRESS NOTE ADULT - PROBLEM SELECTOR PLAN 1
junais sharpe noted  MS better  weakness - frail elderly -   multiple medical issues -   needs assist with ADL  oral and skin hygiene  discussion ongoing about GOC  large family - Daughters in Law and sons and daughters all involved in discussion  pt remains full code  expect visitors from family today - an opportunity to discuss GOC and Code Status  will follow.

## 2020-03-18 NOTE — OCCUPATIONAL THERAPY INITIAL EVALUATION ADULT - PERTINENT HX OF CURRENT PROBLEM, REHAB EVAL
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

## 2020-03-18 NOTE — PROGRESS NOTE ADULT - SUBJECTIVE AND OBJECTIVE BOX
Patient is a 84y old  Female who presents with a chief complaint of altered mental status (03 Mar 2020 17:47)  Patient seen in follow up for CKD 4, Hyperkalemia.     Worsening renal indices. Pt not agrees for RRT.     PAST MEDICAL HISTORY:  Wound of sacral region  Depression  Iron deficiency anemia  Eczema  HTN (hypertension)  CKD (chronic kidney disease) stage 5, GFR less than 15 ml/min  Herniated lumbar intervertebral disc  Depression  Tremor  Kidney atrophy  Colostomy status  Chronic UTI (urinary tract infection)  Cervical cancer  Dyslipidemia  Diabetes  Hernia, incisional      MEDICATIONS  (STANDING):  amLODIPine   Tablet 5 milliGRAM(s) Oral daily  aspirin enteric coated 81 milliGRAM(s) Oral daily  atorvastatin 10 milliGRAM(s) Oral at bedtime  budesonide 160 MICROgram(s)/formoterol 4.5 MICROgram(s) Inhaler 2 Puff(s) Inhalation two times a day  cholestyramine Powder (Sugar-Free) 4 Gram(s) Oral daily  clobetasol 0.05% Cream 1 Application(s) Topical two times a day  epoetin jean carlos Injectable 59559 Unit(s) SubCutaneous <User Schedule>  ferrous    sulfate 325 milliGRAM(s) Oral daily  FLUoxetine 20 milliGRAM(s) Oral daily  heparin  Injectable 5000 Unit(s) SubCutaneous every 8 hours  labetalol 200 milliGRAM(s) Oral every 8 hours  melatonin 3 milliGRAM(s) Oral at bedtime  nystatin Cream 1 Application(s) Topical two times a day  sodium bicarbonate 650 milliGRAM(s) Oral three times a day  sodium zirconium cyclosilicate 5 Gram(s) Oral once  valproic  acid Syrup 750 milliGRAM(s) Oral two times a day    MEDICATIONS  (PRN):  acetaminophen    Suspension .. 650 milliGRAM(s) Oral every 6 hours PRN Temp greater or equal to 38C (100.4F), Moderate Pain (4 - 6)  acetaminophen   Tablet .. 650 milliGRAM(s) Oral every 6 hours PRN Mild Pain (1 - 3)  hydrALAZINE Injectable 10 milliGRAM(s) IV Push every 6 hours PRN SBP over 180  traMADol 50 milliGRAM(s) Oral every 6 hours PRN Moderate Pain (4 - 6)    T(C): 36.4 (03-18-20 @ 05:25), Max: 36.9 (03-16-20 @ 21:10)  HR: 94 (03-18-20 @ 05:25) (70 - 94)  BP: 99/63 (03-18-20 @ 05:25) (99/63 - 129/67)  RR: 17 (03-18-20 @ 05:25)  SpO2: 93% (03-18-20 @ 05:25)  Wt(kg): --  I&O's Detail    17 Mar 2020 07:01  -  18 Mar 2020 07:00  --------------------------------------------------------  IN:    Oral Fluid: 360 mL  Total IN: 360 mL    OUT:    Ileostomy: 600 mL    Indwelling Catheter - Urethral: 150 mL  Total OUT: 750 mL    Total NET: -390 mL      PHYSICAL EXAM:  General: No distress  Respiratory: b/l air entry  Cardiovascular: S1 S2  Gastrointestinal: soft  Extremities:  edema, + renae               LABORATORY:                        12.6   11.98 )-----------( 333      ( 18 Mar 2020 08:34 )             40.0     03-18    135  |  97  |  81<H>  ----------------------------<  92  5.9<H>   |  16<L>  |  7.20<H>    Ca    9.1      18 Mar 2020 08:34    TPro  8.2  /  Alb  2.8<L>  /  TBili  0.3  /  DBili  x   /  AST  9<L>  /  ALT  14  /  AlkPhos  133<H>  03-18    Sodium, Serum: 135 mmol/L (03-18 @ 08:34)  Sodium, Serum: 138 mmol/L (03-17 @ 08:29)    Potassium, Serum: 5.9 mmol/L (03-18 @ 08:34)  Potassium, Serum: 5.3 mmol/L (03-17 @ 08:29)    Hemoglobin: 12.6 g/dL (03-18 @ 08:34)  Hemoglobin: 11.0 g/dL (03-17 @ 08:29)  Hemoglobin: 10.3 g/dL (03-16 @ 09:14)    Creatinine, Serum 7.20 (03-18 @ 08:34)  Creatinine, Serum 5.70 (03-17 @ 08:29)  Creatinine, Serum 4.90 (03-16 @ 09:14)        LIVER FUNCTIONS - ( 18 Mar 2020 08:34 )  Alb: 2.8 g/dL / Pro: 8.2 g/dL / ALK PHOS: 133 U/L / ALT: 14 U/L / AST: 9 U/L / GGT: x

## 2020-03-18 NOTE — PROGRESS NOTE ADULT - SUBJECTIVE AND OBJECTIVE BOX
Neurology follow up note    GURJIT [id number]      Interval History:    Patient feels ok no new complaints.    MEDICATIONS    acetaminophen    Suspension .. 650 milliGRAM(s) Oral every 6 hours PRN  acetaminophen   Tablet .. 650 milliGRAM(s) Oral every 6 hours PRN  amLODIPine   Tablet 5 milliGRAM(s) Oral daily  aspirin enteric coated 81 milliGRAM(s) Oral daily  atorvastatin 10 milliGRAM(s) Oral at bedtime  budesonide 160 MICROgram(s)/formoterol 4.5 MICROgram(s) Inhaler 2 Puff(s) Inhalation two times a day  cholestyramine Powder (Sugar-Free) 4 Gram(s) Oral daily  clobetasol 0.05% Cream 1 Application(s) Topical two times a day  dextrose 5% + sodium chloride 0.45%. 1000 milliLiter(s) IV Continuous <Continuous>  dextrose 5%. 1000 milliLiter(s) IV Continuous <Continuous>  epoetin jean carlos Injectable 51238 Unit(s) SubCutaneous <User Schedule>  ferrous    sulfate 325 milliGRAM(s) Oral daily  FLUoxetine 20 milliGRAM(s) Oral daily  heparin  Injectable 5000 Unit(s) SubCutaneous every 8 hours  hydrALAZINE Injectable 10 milliGRAM(s) IV Push every 6 hours PRN  labetalol 200 milliGRAM(s) Oral every 8 hours  melatonin 3 milliGRAM(s) Oral at bedtime  nystatin Cream 1 Application(s) Topical two times a day  sodium bicarbonate 650 milliGRAM(s) Oral three times a day  traMADol 50 milliGRAM(s) Oral every 6 hours PRN  valproic  acid Syrup 750 milliGRAM(s) Oral two times a day      Allergies    Levaquin (Pruritus)  mercury (Pruritus; Rash)  sulfa drugs (Pruritus)    Intolerances            Vital Signs Last 24 Hrs  T(C): 36.4 (18 Mar 2020 05:25), Max: 36.6 (17 Mar 2020 21:00)  T(F): 97.6 (18 Mar 2020 05:25), Max: 97.9 (17 Mar 2020 21:00)  HR: 94 (18 Mar 2020 05:25) (70 - 94)  BP: 99/63 (18 Mar 2020 05:25) (99/63 - 113/57)  BP(mean): --  RR: 17 (18 Mar 2020 05:25) (16 - 17)  SpO2: 93% (18 Mar 2020 05:25) (93% - 96%)        REVIEW OF SYSTEMS: Limited or unable to obtain secondary to patient's poor mental status.    Constitutional: No fever, chills, fatigue, generalized  weakness  Eyes: no eye pain, visual disturbances, or discharge  ENT:  No difficulty hearing, tinnitus, vertigo; No sinus or throat pain  Neck: No pain or stiffness  Respiratory: No cough, dyspnea, wheezing   Cardiovascular: No chest pain, palpitations,   Gastrointestinal: No abdominal or epigastric pain. No nausea, vomiting  No diarrhea or constipation.   Genitourinary: No dysuria, frequency, hematuria or incontinence  Neurological: No headaches, lightheadedness, vertigo, numbness or tremors  Psychiatric: No depression, anxiety, mood swings or difficulty sleeping  Musculoskeletal: No joint pain or swelling; No muscle, back or extremity pain      On Neurological Examination:    Mental Status - Patient is alert, awake,  [facility]   year 2020     Follow simple commands    Speech -   Fluent                 Cranial Nerves - Pupils 3 mm equal and reactive to light,   extraocular eye movements intact.   smile symmetric  intact bilateral NLF    Motor Exam -   Right upper 4/5   Left upper  3/5  Right lower 2/5  Left lower 2/5    Muscle tone - is normal all over.  No asymmetry is seen.      Sensory    Bilateral intact to light touch    GENERAL Exam: Nontoxic , No Acute Distress   	  HEENT:  normocephalic, atraumatic  		  LUNGS:  Decreased bilaterally  	  HEART: Normal S1S2   No murmur RRR        	  GI/ ABDOMEN:  Soft  Non tender    EXTREMITIES:   No Edema  No Clubbing  No Cyanosis   	   SKIN: Normal  No Ecchymosis              LABS:  CBC Full  -  ( 18 Mar 2020 08:34 )  WBC Count : 11.98 K/uL  RBC Count : 4.35 M/uL  Hemoglobin : 12.6 g/dL  Hematocrit : 40.0 %  Platelet Count - Automated : 333 K/uL  Mean Cell Volume : 92.0 fl  Mean Cell Hemoglobin : 29.0 pg  Mean Cell Hemoglobin Concentration : 31.5 gm/dL  Auto Neutrophil # : 9.57 K/uL  Auto Lymphocyte # : 1.41 K/uL  Auto Monocyte # : 0.79 K/uL  Auto Eosinophil # : 0.04 K/uL  Auto Basophil # : 0.04 K/uL  Auto Neutrophil % : 79.9 %  Auto Lymphocyte % : 11.8 %  Auto Monocyte % : 6.6 %  Auto Eosinophil % : 0.3 %  Auto Basophil % : 0.3 %      03-18    135  |  97  |  81<H>  ----------------------------<  92  5.9<H>   |  16<L>  |  7.20<H>    Ca    9.1      18 Mar 2020 08:34    TPro  8.2  /  Alb  2.8<L>  /  TBili  0.3  /  DBili  x   /  AST  9<L>  /  ALT  14  /  AlkPhos  133<H>  03-18    Hemoglobin A1C:     LIVER FUNCTIONS - ( 18 Mar 2020 08:34 )  Alb: 2.8 g/dL / Pro: 8.2 g/dL / ALK PHOS: 133 U/L / ALT: 14 U/L / AST: 9 U/L / GGT: x           Vitamin B12         RADIOLOGY    ANALYSIS AND PLAN:  An 84-year-old with an episode of seizure and change in mental status.  1.	For episode of seizure, will increase Depakote 750 mg twice  off dilantin   2.	EEG intermittent generalized spike and waves   3.	Ativan 1 mg IV push q.6h. p.r.n. for any type of breakthrough seizure.  4.	Seizure precautions.  5.	For history of underlying depression, at present hard to evaluate the patient's psychiatric status secondary to the patient being lethargic, continue home medication when possible.  6.	For hyperlipidemia, continue the patient on her cholesterol medication.  7.	For change in mental status, questionable this could be metabolic encephalopathy from partial complex seizures versus any type of underlying infectious type process episodes of temperatures and hypotension possible h/o of shock  overall more awake   8.	Fall precaution.  9.	overall more awake and interactive today   10.	For chronic kidney disease, monitor renal function noted   11.	Spoke with multiple family members at the bedside.  Advance directives were discussed with them.  Primary  will be daughter, Ju, her cell phone number is [phone], home number is [phone] spoke 3/18/2020  12.	EEG no new changes   13.	Greater than 24 minutes of time was spent with the patient, plan of care, reviewing data, speaking to the family and multidisciplinary healthcare team.

## 2020-03-18 NOTE — OCCUPATIONAL THERAPY INITIAL EVALUATION ADULT - TRANSFER TRAINING, PT EVAL
Patient will transfer to bedside chair with DME as needed with contact guard assistance in 2-4 sessions.

## 2020-03-18 NOTE — PROGRESS NOTE ADULT - SUBJECTIVE AND OBJECTIVE BOX
Patient is a 84y old  Female who presents with a chief complaint of altered mental status (18 Mar 2020 10:22)      INTERVAL HPI:  OVERNIGHT EVENTS:  T(F): 98.3 (03-18-20 @ 20:32), Max: 98.3 (03-18-20 @ 20:32)  HR: 95 (03-18-20 @ 20:32) (84 - 98)  BP: 115/71 (03-18-20 @ 20:32) (99/63 - 143/80)  RR: 17 (03-18-20 @ 20:32) (16 - 20)  SpO2: 95% (03-18-20 @ 20:32) (93% - 99%)  I&O's Summary    17 Mar 2020 07:01  -  18 Mar 2020 07:00  --------------------------------------------------------  IN: 360 mL / OUT: 750 mL / NET: -390 mL    18 Mar 2020 07:01  -  19 Mar 2020 00:07  --------------------------------------------------------  IN: 900 mL / OUT: 360 mL / NET: 540 mL        REVIEW OF SYSTEMS:  CONSTITUTIONAL: No fever, weight loss, or fatigue  EYES: No eye pain, visual disturbances, or discharge  ENMT:  No difficulty hearing, tinnitus, vertigo; No sinus or throat pain  NECK: No pain or stiffness  BREASTS: No pain, masses, or nipple discharge  RESPIRATORY: No cough, wheezing, chills or hemoptysis; No shortness of breath  CARDIOVASCULAR: No chest pain, palpitations, dizziness, or leg swelling  GASTROINTESTINAL: No abdominal or epigastric pain. No nausea, vomiting, or hematemesis; No diarrhea or constipation. No melena or hematochezia.  GENITOURINARY: No dysuria, frequency, hematuria, or incontinence  NEUROLOGICAL: No headaches, memory loss, loss of strength, numbness, or tremors  SKIN: No itching, burning, rashes, or lesions   LYMPH NODES: No enlarged glands  ENDOCRINE: No heat or cold intolerance; No hair loss  MUSCULOSKELETAL: No joint pain or swelling; No muscle, back, or extremity pain  PSYCHIATRIC: No depression, anxiety, mood swings, or difficulty sleeping  HEME/LYMPH: No easy bruising, or bleeding gums  ALLERY AND IMMUNOLOGIC: No hives or eczema    PHYSICAL EXAM:  GENERAL: NAD, well-groomed, well-developed  HEAD:  Atraumatic, Normocephalic  EYES: EOMI, PERRLA, conjunctiva and sclera clear  ENMT: No tonsillar erythema, exudates, or enlargement; Moist mucous membranes, Good dentition, No lesions  NECK: Supple, No JVD, Normal thyroid  NERVOUS SYSTEM:  Alert & Oriented X3, Good concentration; Motor Strength 5/5 B/L upper and lower extremities; DTRs 2+ intact and symmetric  CHEST/LUNG: Clear to percussion bilaterally; No rales, rhonchi, wheezing, or rubs  HEART: Regular rate and rhythm; No murmurs, rubs, or gallops  ABDOMEN: Soft, Nontender, Nondistended; Bowel sounds present  EXTREMITIES:  2+ Peripheral Pulses, No clubbing, cyanosis, or edema  LYMPH: No lymphadenopathy noted  SKIN: No rashes or lesions    LABS:                        12.6   11.98 )-----------( 333      ( 18 Mar 2020 08:34 )             40.0     03-18    135  |  97  |  81<H>  ----------------------------<  92  5.9<H>   |  16<L>  |  7.20<H>    Ca    9.1      18 Mar 2020 08:34    TPro  8.2  /  Alb  2.8<L>  /  TBili  0.3  /  DBili  x   /  AST  9<L>  /  ALT  14  /  AlkPhos  133<H>  03-18        CAPILLARY BLOOD GLUCOSE                  MEDICATIONS  (STANDING):  amLODIPine   Tablet 5 milliGRAM(s) Oral daily  aspirin enteric coated 81 milliGRAM(s) Oral daily  atorvastatin 10 milliGRAM(s) Oral at bedtime  budesonide 160 MICROgram(s)/formoterol 4.5 MICROgram(s) Inhaler 2 Puff(s) Inhalation two times a day  cholestyramine Powder (Sugar-Free) 4 Gram(s) Oral daily  clobetasol 0.05% Cream 1 Application(s) Topical two times a day  epoetin jean carlos Injectable 35041 Unit(s) SubCutaneous <User Schedule>  ferrous    sulfate 325 milliGRAM(s) Oral daily  FLUoxetine 20 milliGRAM(s) Oral daily  heparin  Injectable 5000 Unit(s) SubCutaneous every 8 hours  labetalol 200 milliGRAM(s) Oral every 8 hours  melatonin 3 milliGRAM(s) Oral at bedtime  nystatin Cream 1 Application(s) Topical two times a day  sodium bicarbonate 650 milliGRAM(s) Oral three times a day  valproic  acid Syrup 750 milliGRAM(s) Oral two times a day    MEDICATIONS  (PRN):  acetaminophen    Suspension .. 650 milliGRAM(s) Oral every 6 hours PRN Temp greater or equal to 38C (100.4F), Moderate Pain (4 - 6)  acetaminophen   Tablet .. 650 milliGRAM(s) Oral every 6 hours PRN Mild Pain (1 - 3)  hydrALAZINE Injectable 10 milliGRAM(s) IV Push every 6 hours PRN SBP over 180  traMADol 50 milliGRAM(s) Oral every 6 hours PRN Moderate Pain (4 - 6) Patient is a 84y old  Female who presents with a chief complaint of altered mental status (18 Mar 2020 10:22)      INTERVAL HPI: Pt seen and examined. States she is feeling well, says her son and daughter are her decision makers and give permission to share information. Pt is agreeable to dialysis at this time.     OVERNIGHT EVENTS: none noted  T(F): 98.3 (03-18-20 @ 20:32), Max: 98.3 (03-18-20 @ 20:32)  HR: 95 (03-18-20 @ 20:32) (84 - 98)  BP: 115/71 (03-18-20 @ 20:32) (99/63 - 143/80)  RR: 17 (03-18-20 @ 20:32) (16 - 20)  SpO2: 95% (03-18-20 @ 20:32) (93% - 99%)  I&O's Summary    17 Mar 2020 07:01  -  18 Mar 2020 07:00  --------------------------------------------------------  IN: 360 mL / OUT: 750 mL / NET: -390 mL    18 Mar 2020 07:01  -  19 Mar 2020 00:07  --------------------------------------------------------  IN: 900 mL / OUT: 360 mL / NET: 540 mL        REVIEW OF SYSTEMS: limited at times intermittently confused  CONSTITUTIONAL: No fever, weight loss, or fatigue  RESPIRATORY: No cough, wheezing, chills or hemoptysis; No shortness of breath  CARDIOVASCULAR: No chest pain, palpitations, dizziness, or leg swelling  GASTROINTESTINAL: No abdominal or epigastric pain. No nausea, vomiting, or hematemesis; No diarrhea or constipation. No melena or hematochezia.  GENITOURINARY: No dysuria, frequency, hematuria, or incontinence  NEUROLOGICAL: No headaches, memory loss, loss of strength, numbness, or tremors  SKIN: No itching, burning, rashes, or lesions   MUSCULOSKELETAL: No joint pain or swelling; No muscle, back, or extremity pain  PSYCHIATRIC: No depression, anxiety, mood swings, or difficulty sleeping      PHYSICAL EXAM:  GENERAL: NAD, well-groomed,   NERVOUS SYSTEM:  Alert & Oriented X2, Good concentration; Motor Strength 4/5 B/L upper and lower extremities;  CHEST/LUNG: Clear to percussion bilaterally; No rales, rhonchi, wheezing, or rubs  HEART: Regular rate and rhythm; No murmurs, rubs, or gallops  ABDOMEN: Soft, Nontender, Nondistended; Bowel sounds present  EXTREMITIES:  2+ Peripheral Pulses, No clubbing, cyanosis, or edema  SKIN: No rashes or lesions    LABS:                        12.6   11.98 )-----------( 333      ( 18 Mar 2020 08:34 )             40.0     03-18    135  |  97  |  81<H>  ----------------------------<  92  5.9<H>   |  16<L>  |  7.20<H>    Ca    9.1      18 Mar 2020 08:34    TPro  8.2  /  Alb  2.8<L>  /  TBili  0.3  /  DBili  x   /  AST  9<L>  /  ALT  14  /  AlkPhos  133<H>  03-18        CAPILLARY BLOOD GLUCOSE                  MEDICATIONS  (STANDING):  amLODIPine   Tablet 5 milliGRAM(s) Oral daily  aspirin enteric coated 81 milliGRAM(s) Oral daily  atorvastatin 10 milliGRAM(s) Oral at bedtime  budesonide 160 MICROgram(s)/formoterol 4.5 MICROgram(s) Inhaler 2 Puff(s) Inhalation two times a day  cholestyramine Powder (Sugar-Free) 4 Gram(s) Oral daily  clobetasol 0.05% Cream 1 Application(s) Topical two times a day  epoetin jean carlos Injectable 17603 Unit(s) SubCutaneous <User Schedule>  ferrous    sulfate 325 milliGRAM(s) Oral daily  FLUoxetine 20 milliGRAM(s) Oral daily  heparin  Injectable 5000 Unit(s) SubCutaneous every 8 hours  labetalol 200 milliGRAM(s) Oral every 8 hours  melatonin 3 milliGRAM(s) Oral at bedtime  nystatin Cream 1 Application(s) Topical two times a day  sodium bicarbonate 650 milliGRAM(s) Oral three times a day  valproic  acid Syrup 750 milliGRAM(s) Oral two times a day    MEDICATIONS  (PRN):  acetaminophen    Suspension .. 650 milliGRAM(s) Oral every 6 hours PRN Temp greater or equal to 38C (100.4F), Moderate Pain (4 - 6)  acetaminophen   Tablet .. 650 milliGRAM(s) Oral every 6 hours PRN Mild Pain (1 - 3)  hydrALAZINE Injectable 10 milliGRAM(s) IV Push every 6 hours PRN SBP over 180  traMADol 50 milliGRAM(s) Oral every 6 hours PRN Moderate Pain (4 - 6)

## 2020-03-18 NOTE — PROCEDURE NOTE - NSINDICATIONS_GEN_A_CORE
antibiotic therapy/emergency venous access/fluid administration
dialysis/CRRT
drainage/feeds
airway protection/mental status change/critical patient/respiratory distress
venous access/critical illness

## 2020-03-18 NOTE — PROGRESS NOTE ADULT - ASSESSMENT
·	MEHDI, CKD 4, Solitary functioning kidney: Prerenal azotemia, (Atrophic right kidney)  ·	Metabolic acidosis  ·	Hyperkalemia  ·	Diabetes  ·	Hypertension  ·	Anemia      Worsening renal indices. D/w pt and family in detail regarding worsening renal function and need for initiation of renal replacement therapy.   Risks and benefits explained in detail to the pt and her daughter over the phone.   Pt in agreement to start hemodialysis. Consent obtained from patient. Monitor BP trend. Titrate BP meds as needed. Salt restriction. D/c sodium bicarb.   Hold procrit for now. Monitor blood sugar levels. Insulin coverage as needed. Dietary restriction.   Low potassium diet. Lokelma x 1 dose. Avoid nephrotoxic meds as possible. Avoid ACEI, ARB, NSAIDs and IV contrast. D/w pt's daughter over the phone.

## 2020-03-18 NOTE — PROGRESS NOTE ADULT - ASSESSMENT
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) and chronic diarrhea, cervical cancer (s/p radical hysterectomy and radiation), tremors, eczema, depression, HTN, HLD, history of frequent UTIs with chronic indwelling renae, anemia, stage 4 sacral decubitus ulcer, and recent hospitalization for MEHDI on CKD thought to be 2/2 dehydration and urinary retention from malfunctioning chronic renae, now brought in by EMS to ED for altered mental status admitted for acute metabolic encephalopathy due to suspected UTI and hyperkalemia in setting of MEHDI on CKD 5, course c/b likely generalized seizure on 3/5/20, and acute hypoxic respiratory failure and suspected sepsis due to suspected aspiration PNA, s/p ICU stay. Mental status hae been improved.    Disposition: Mental status stable. Sodium, electrolytes improving. No plan for HD per Nephro and patient's family's decision. Per Son ( HCP) wants mom to go home with patient's daughter. 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) and chronic diarrhea, cervical cancer (s/p radical hysterectomy and radiation), tremors, eczema, depression, HTN, HLD, history of frequent UTIs with chronic indwelling renae, anemia, stage 4 sacral decubitus ulcer, and recent hospitalization for MEHDI on CKD thought to be 2/2 dehydration and urinary retention from malfunctioning chronic renae, now brought in by EMS to ED for altered mental status admitted for acute metabolic encephalopathy due to suspected UTI and hyperkalemia in setting of MEHDI on CKD 5, course c/b likely generalized seizure on 3/5/20, and acute hypoxic respiratory failure and suspected sepsis due to suspected aspiration PNA, s/p ICU stay. Mental status hae been improved.    Disposition: Mental status stable. Sodium, electrolytes improving.  Per Son ( HCP) wants mom to go home with patient's daughter. PT suggested SHELBIE MAYBERRY to talk to family  about safe d/c planning likely home with home care services, renal function elevated today, apprec nephro recs, renal fucntioning worsening pt and family now amendable to dialysis

## 2020-03-18 NOTE — PROCEDURE NOTE - NSCVLACTUALSITE_VASC_A_CORE
Pt admitted with CP s/p cath today. Pt did c/o headache last evening as well that was not relieved [facility] by tylenol.
right/internal jugular
right/internal jugular

## 2020-03-19 LAB
ALBUMIN SERPL ELPH-MCNC: 2.6 G/DL — LOW (ref 3.3–5)
ALP SERPL-CCNC: 123 U/L — HIGH (ref 40–120)
ALT FLD-CCNC: 13 U/L — SIGNIFICANT CHANGE UP (ref 12–78)
ANION GAP SERPL CALC-SCNC: 17 MMOL/L — SIGNIFICANT CHANGE UP (ref 5–17)
AST SERPL-CCNC: 10 U/L — LOW (ref 15–37)
BASOPHILS # BLD AUTO: 0.04 K/UL — SIGNIFICANT CHANGE UP (ref 0–0.2)
BASOPHILS NFR BLD AUTO: 0.3 % — SIGNIFICANT CHANGE UP (ref 0–2)
BILIRUB SERPL-MCNC: 0.3 MG/DL — SIGNIFICANT CHANGE UP (ref 0.2–1.2)
BUN SERPL-MCNC: 67 MG/DL — HIGH (ref 7–23)
CALCIUM SERPL-MCNC: 8.8 MG/DL — SIGNIFICANT CHANGE UP (ref 8.5–10.1)
CHLORIDE SERPL-SCNC: 96 MMOL/L — SIGNIFICANT CHANGE UP (ref 96–108)
CO2 SERPL-SCNC: 22 MMOL/L — SIGNIFICANT CHANGE UP (ref 22–31)
CREAT SERPL-MCNC: 6.5 MG/DL — HIGH (ref 0.5–1.3)
EOSINOPHIL # BLD AUTO: 0.1 K/UL — SIGNIFICANT CHANGE UP (ref 0–0.5)
EOSINOPHIL NFR BLD AUTO: 0.8 % — SIGNIFICANT CHANGE UP (ref 0–6)
GLUCOSE SERPL-MCNC: 75 MG/DL — SIGNIFICANT CHANGE UP (ref 70–99)
HBV CORE AB SER-ACNC: SIGNIFICANT CHANGE UP
HBV CORE IGM SER-ACNC: SIGNIFICANT CHANGE UP
HBV SURFACE AB SER-ACNC: SIGNIFICANT CHANGE UP
HBV SURFACE AG SER-ACNC: SIGNIFICANT CHANGE UP
HCT VFR BLD CALC: 37 % — SIGNIFICANT CHANGE UP (ref 34.5–45)
HCV AB S/CO SERPL IA: 0.32 S/CO — SIGNIFICANT CHANGE UP (ref 0–0.99)
HCV AB SERPL-IMP: SIGNIFICANT CHANGE UP
HGB BLD-MCNC: 11.6 G/DL — SIGNIFICANT CHANGE UP (ref 11.5–15.5)
IMM GRANULOCYTES NFR BLD AUTO: 0.8 % — SIGNIFICANT CHANGE UP (ref 0–1.5)
LYMPHOCYTES # BLD AUTO: 1.27 K/UL — SIGNIFICANT CHANGE UP (ref 1–3.3)
LYMPHOCYTES # BLD AUTO: 9.6 % — LOW (ref 13–44)
MCHC RBC-ENTMCNC: 28.9 PG — SIGNIFICANT CHANGE UP (ref 27–34)
MCHC RBC-ENTMCNC: 31.4 GM/DL — LOW (ref 32–36)
MCV RBC AUTO: 92.3 FL — SIGNIFICANT CHANGE UP (ref 80–100)
MONOCYTES # BLD AUTO: 0.97 K/UL — HIGH (ref 0–0.9)
MONOCYTES NFR BLD AUTO: 7.3 % — SIGNIFICANT CHANGE UP (ref 2–14)
NEUTROPHILS # BLD AUTO: 10.78 K/UL — HIGH (ref 1.8–7.4)
NEUTROPHILS NFR BLD AUTO: 81.2 % — HIGH (ref 43–77)
NRBC # BLD: 0 /100 WBCS — SIGNIFICANT CHANGE UP (ref 0–0)
PLATELET # BLD AUTO: 247 K/UL — SIGNIFICANT CHANGE UP (ref 150–400)
POTASSIUM SERPL-MCNC: 5.2 MMOL/L — SIGNIFICANT CHANGE UP (ref 3.5–5.3)
POTASSIUM SERPL-SCNC: 5.2 MMOL/L — SIGNIFICANT CHANGE UP (ref 3.5–5.3)
PROT SERPL-MCNC: 7.5 G/DL — SIGNIFICANT CHANGE UP (ref 6–8.3)
RBC # BLD: 4.01 M/UL — SIGNIFICANT CHANGE UP (ref 3.8–5.2)
RBC # FLD: 16.8 % — HIGH (ref 10.3–14.5)
SODIUM SERPL-SCNC: 135 MMOL/L — SIGNIFICANT CHANGE UP (ref 135–145)
WBC # BLD: 13.26 K/UL — HIGH (ref 3.8–10.5)
WBC # FLD AUTO: 13.26 K/UL — HIGH (ref 3.8–10.5)

## 2020-03-19 PROCEDURE — 99233 SBSQ HOSP IP/OBS HIGH 50: CPT

## 2020-03-19 RX ORDER — MIDODRINE HYDROCHLORIDE 2.5 MG/1
5 TABLET ORAL THREE TIMES A DAY
Refills: 0 | Status: DISCONTINUED | OUTPATIENT
Start: 2020-03-19 | End: 2020-03-27

## 2020-03-19 RX ADMIN — NYSTATIN CREAM 1 APPLICATION(S): 100000 CREAM TOPICAL at 18:20

## 2020-03-19 RX ADMIN — Medication 1 APPLICATION(S): at 06:25

## 2020-03-19 RX ADMIN — HEPARIN SODIUM 5000 UNIT(S): 5000 INJECTION INTRAVENOUS; SUBCUTANEOUS at 06:15

## 2020-03-19 RX ADMIN — ATORVASTATIN CALCIUM 10 MILLIGRAM(S): 80 TABLET, FILM COATED ORAL at 21:36

## 2020-03-19 RX ADMIN — BUDESONIDE AND FORMOTEROL FUMARATE DIHYDRATE 2 PUFF(S): 160; 4.5 AEROSOL RESPIRATORY (INHALATION) at 09:12

## 2020-03-19 RX ADMIN — Medication 650 MILLIGRAM(S): at 06:15

## 2020-03-19 RX ADMIN — Medication 81 MILLIGRAM(S): at 18:20

## 2020-03-19 RX ADMIN — Medication 650 MILLIGRAM(S): at 13:14

## 2020-03-19 RX ADMIN — AMLODIPINE BESYLATE 5 MILLIGRAM(S): 2.5 TABLET ORAL at 06:15

## 2020-03-19 RX ADMIN — NYSTATIN CREAM 1 APPLICATION(S): 100000 CREAM TOPICAL at 06:16

## 2020-03-19 RX ADMIN — HEPARIN SODIUM 5000 UNIT(S): 5000 INJECTION INTRAVENOUS; SUBCUTANEOUS at 21:36

## 2020-03-19 RX ADMIN — Medication 750 MILLIGRAM(S): at 06:16

## 2020-03-19 RX ADMIN — Medication 650 MILLIGRAM(S): at 12:38

## 2020-03-19 RX ADMIN — MIDODRINE HYDROCHLORIDE 5 MILLIGRAM(S): 2.5 TABLET ORAL at 11:04

## 2020-03-19 RX ADMIN — Medication 3 MILLIGRAM(S): at 21:36

## 2020-03-19 RX ADMIN — CHOLESTYRAMINE 4 GRAM(S): 4 POWDER, FOR SUSPENSION ORAL at 09:12

## 2020-03-19 RX ADMIN — Medication 1 APPLICATION(S): at 18:20

## 2020-03-19 RX ADMIN — MIDODRINE HYDROCHLORIDE 5 MILLIGRAM(S): 2.5 TABLET ORAL at 18:20

## 2020-03-19 RX ADMIN — Medication 20 MILLIGRAM(S): at 18:20

## 2020-03-19 RX ADMIN — Medication 750 MILLIGRAM(S): at 18:20

## 2020-03-19 NOTE — PROGRESS NOTE ADULT - SUBJECTIVE AND OBJECTIVE BOX
Patient is a 84y old  Female who presents with a chief complaint of altered mental status (03 Mar 2020 17:47)  Patient seen in follow up for CKD 4, Hyperkalemia.     Started on HD yesterday but terminated early because of intradialytic hypotension.     PAST MEDICAL HISTORY:  Wound of sacral region  Depression  Iron deficiency anemia  Eczema  HTN (hypertension)  CKD (chronic kidney disease) stage 5, GFR less than 15 ml/min  Herniated lumbar intervertebral disc  Depression  Tremor  Kidney atrophy  Colostomy status  Chronic UTI (urinary tract infection)  Cervical cancer  Dyslipidemia  Diabetes  Hernia, incisional      MEDICATIONS  (STANDING):  amLODIPine   Tablet 5 milliGRAM(s) Oral daily  aspirin enteric coated 81 milliGRAM(s) Oral daily  atorvastatin 10 milliGRAM(s) Oral at bedtime  budesonide 160 MICROgram(s)/formoterol 4.5 MICROgram(s) Inhaler 2 Puff(s) Inhalation two times a day  cholestyramine Powder (Sugar-Free) 4 Gram(s) Oral daily  clobetasol 0.05% Cream 1 Application(s) Topical two times a day  epoetin jean carlos Injectable 80327 Unit(s) SubCutaneous <User Schedule>  ferrous    sulfate 325 milliGRAM(s) Oral daily  FLUoxetine 20 milliGRAM(s) Oral daily  heparin  Injectable 5000 Unit(s) SubCutaneous every 8 hours  labetalol 200 milliGRAM(s) Oral every 8 hours  melatonin 3 milliGRAM(s) Oral at bedtime  nystatin Cream 1 Application(s) Topical two times a day  sodium bicarbonate 650 milliGRAM(s) Oral three times a day  valproic  acid Syrup 750 milliGRAM(s) Oral two times a day    MEDICATIONS  (PRN):  acetaminophen    Suspension .. 650 milliGRAM(s) Oral every 6 hours PRN Temp greater or equal to 38C (100.4F), Moderate Pain (4 - 6)  acetaminophen   Tablet .. 650 milliGRAM(s) Oral every 6 hours PRN Mild Pain (1 - 3)  hydrALAZINE Injectable 10 milliGRAM(s) IV Push every 6 hours PRN SBP over 180  traMADol 50 milliGRAM(s) Oral every 6 hours PRN Moderate Pain (4 - 6)    T(C): 36.9 (03-19-20 @ 05:56), Max: 36.9 (03-19-20 @ 05:56)  HR: 92 (03-19-20 @ 05:56) (70 - 98)  BP: 111/68 (03-19-20 @ 05:56) (99/63 - 143/80)  RR: 17 (03-19-20 @ 05:56)  SpO2: 92% (03-19-20 @ 05:56)  Wt(kg): --  I&O's Detail    18 Mar 2020 07:01  -  19 Mar 2020 07:00  --------------------------------------------------------  IN:    Oral Fluid: 240 mL    Other: 900 mL  Total IN: 1140 mL    OUT:    Ileostomy: 500 mL    Indwelling Catheter - Urethral: 100 mL    Other: 260 mL  Total OUT: 860 mL    Total NET: 280 mL      PHYSICAL EXAM:  General: No distress  Respiratory: b/l air entry  Cardiovascular: S1 S2  Gastrointestinal: soft  Extremities:  edema, + renae               LABORATORY:                        11.6   13.26 )-----------( 247      ( 19 Mar 2020 06:40 )             37.0     03-19    135  |  96  |  67<H>  ----------------------------<  75  5.2   |  22  |  6.50<H>    Ca    8.8      19 Mar 2020 06:40    TPro  7.5  /  Alb  2.6<L>  /  TBili  0.3  /  DBili  x   /  AST  10<L>  /  ALT  13  /  AlkPhos  123<H>  03-19    Sodium, Serum: 135 mmol/L (03-19 @ 06:40)  Sodium, Serum: 135 mmol/L (03-18 @ 08:34)    Potassium, Serum: 5.2 mmol/L (03-19 @ 06:40)  Potassium, Serum: 5.9 mmol/L (03-18 @ 08:34)    Hemoglobin: 11.6 g/dL (03-19 @ 06:40)  Hemoglobin: 12.6 g/dL (03-18 @ 08:34)  Hemoglobin: 11.0 g/dL (03-17 @ 08:29)    Creatinine, Serum 6.50 (03-19 @ 06:40)  Creatinine, Serum 7.20 (03-18 @ 08:34)  Creatinine, Serum 5.70 (03-17 @ 08:29)        LIVER FUNCTIONS - ( 19 Mar 2020 06:40 )  Alb: 2.6 g/dL / Pro: 7.5 g/dL / ALK PHOS: 123 U/L / ALT: 13 U/L / AST: 10 U/L / GGT: x

## 2020-03-19 NOTE — PROGRESS NOTE ADULT - SUBJECTIVE AND OBJECTIVE BOX
Date/Time Patient Seen:  		  Referring MD:   Data Reviewed	       Patient is a 84y old  Female who presents with a chief complaint of altered mental status (18 Mar 2020 17:06)      Subjective/HPI     PAST MEDICAL & SURGICAL HISTORY:  Wound of sacral region  Depression  Iron deficiency anemia  Eczema  HTN (hypertension)  CKD (chronic kidney disease) stage 5, GFR less than 15 ml/min: not on HD  Herniated lumbar intervertebral disc  Depression  Tremor  Kidney atrophy: right  Colostomy status: 2006  Chronic UTI (urinary tract infection): chronic renae  Cervical cancer: 1970&#x27;s  Dyslipidemia  Diabetes: type 2  Hernia, incisional  S/P hip replacement: right 2013  S/P colon resection: 2006  S/P hysterectomy: 1977        Medication list         MEDICATIONS  (STANDING):  amLODIPine   Tablet 5 milliGRAM(s) Oral daily  aspirin enteric coated 81 milliGRAM(s) Oral daily  atorvastatin 10 milliGRAM(s) Oral at bedtime  budesonide 160 MICROgram(s)/formoterol 4.5 MICROgram(s) Inhaler 2 Puff(s) Inhalation two times a day  cholestyramine Powder (Sugar-Free) 4 Gram(s) Oral daily  clobetasol 0.05% Cream 1 Application(s) Topical two times a day  epoetin jean carlos Injectable 64710 Unit(s) SubCutaneous <User Schedule>  ferrous    sulfate 325 milliGRAM(s) Oral daily  FLUoxetine 20 milliGRAM(s) Oral daily  heparin  Injectable 5000 Unit(s) SubCutaneous every 8 hours  labetalol 200 milliGRAM(s) Oral every 8 hours  melatonin 3 milliGRAM(s) Oral at bedtime  nystatin Cream 1 Application(s) Topical two times a day  sodium bicarbonate 650 milliGRAM(s) Oral three times a day  valproic  acid Syrup 750 milliGRAM(s) Oral two times a day    MEDICATIONS  (PRN):  acetaminophen    Suspension .. 650 milliGRAM(s) Oral every 6 hours PRN Temp greater or equal to 38C (100.4F), Moderate Pain (4 - 6)  acetaminophen   Tablet .. 650 milliGRAM(s) Oral every 6 hours PRN Mild Pain (1 - 3)  hydrALAZINE Injectable 10 milliGRAM(s) IV Push every 6 hours PRN SBP over 180  traMADol 50 milliGRAM(s) Oral every 6 hours PRN Moderate Pain (4 - 6)         Vitals log        ICU Vital Signs Last 24 Hrs  T(C): 36.9 (19 Mar 2020 05:56), Max: 36.9 (19 Mar 2020 05:56)  T(F): 98.4 (19 Mar 2020 05:56), Max: 98.4 (19 Mar 2020 05:56)  HR: 92 (19 Mar 2020 05:56) (84 - 98)  BP: 111/68 (19 Mar 2020 05:56) (111/68 - 143/80)  BP(mean): --  ABP: --  ABP(mean): --  RR: 17 (19 Mar 2020 05:56) (16 - 20)  SpO2: 92% (19 Mar 2020 05:56) (92% - 99%)           Input and Output:  I&O's Detail    17 Mar 2020 07:01  -  18 Mar 2020 07:00  --------------------------------------------------------  IN:    Oral Fluid: 360 mL  Total IN: 360 mL    OUT:    Ileostomy: 600 mL    Indwelling Catheter - Urethral: 150 mL  Total OUT: 750 mL    Total NET: -390 mL      18 Mar 2020 07:01  -  19 Mar 2020 06:10  --------------------------------------------------------  IN:    Other: 900 mL  Total IN: 900 mL    OUT:    Ileostomy: 100 mL    Other: 260 mL  Total OUT: 360 mL    Total NET: 540 mL          Lab Data                        12.6   11.98 )-----------( 333      ( 18 Mar 2020 08:34 )             40.0     03-18    135  |  97  |  81<H>  ----------------------------<  92  5.9<H>   |  16<L>  |  7.20<H>    Ca    9.1      18 Mar 2020 08:34    TPro  8.2  /  Alb  2.8<L>  /  TBili  0.3  /  DBili  x   /  AST  9<L>  /  ALT  14  /  AlkPhos  133<H>  03-18            Review of Systems	      Objective     Physical Examination    heart s1s2  lung dec BS  abd soft      Pertinent Lab findings & Imaging      Ana:  NO   Adequate UO     I&O's Detail    17 Mar 2020 07:01  -  18 Mar 2020 07:00  --------------------------------------------------------  IN:    Oral Fluid: 360 mL  Total IN: 360 mL    OUT:    Ileostomy: 600 mL    Indwelling Catheter - Urethral: 150 mL  Total OUT: 750 mL    Total NET: -390 mL      18 Mar 2020 07:01  -  19 Mar 2020 06:10  --------------------------------------------------------  IN:    Other: 900 mL  Total IN: 900 mL    OUT:    Ileostomy: 100 mL    Other: 260 mL  Total OUT: 360 mL    Total NET: 540 mL               Discussed with:     Cultures:	        Radiology

## 2020-03-19 NOTE — PROGRESS NOTE ADULT - ASSESSMENT
·	MEHDI, CKD 4, Solitary functioning kidney: Prerenal azotemia, (Atrophic right kidney)  ·	Metabolic acidosis  ·	Hyperkalemia  ·	Diabetes  ·	Hypertension  ·	Anemia      Started on HD. Will d/c amlodipine and lower labetalol dose. Second HD today. Monitor BP trend. Titrate BP meds as needed. Salt restriction. D/c sodium bicarb.   Hold procrit for now. Monitor blood sugar levels. Insulin coverage as needed. Dietary restriction.

## 2020-03-19 NOTE — PROGRESS NOTE ADULT - SUBJECTIVE AND OBJECTIVE BOX
Patient is a 84y old  Female who presents with a chief complaint of altered mental status (19 Mar 2020 09:27)      INTERVAL HPI: Pt seen and examined. States she is feeling much better, Goals of care discussed, pt ?DNR but not DNI, will get palliative RN to discuss further with son/hcp   and patient as pt is now more lucid and pt/family want dialysis. Son/hcp  phone# [phone], daughter/caretaker/Aby [phone]     OVERNIGHT EVENTS:  T(F): 98 (03-19-20 @ 22:21), Max: 98.4 (03-19-20 @ 05:56)  HR: 97 (03-19-20 @ 22:21) (84 - 97)  BP: 105/72 (03-19-20 @ 22:21) (96/59 - 115/64)  RR: 16 (03-19-20 @ 22:21) (16 - 209)  SpO2: 93% (03-19-20 @ 22:21) (92% - 98%)  I&O's Summary    18 Mar 2020 07:01  -  19 Mar 2020 07:00  --------------------------------------------------------  IN: 1140 mL / OUT: 860 mL / NET: 280 mL    19 Mar 2020 07:01  -  19 Mar 2020 23:53  --------------------------------------------------------  IN: 0 mL / OUT: 1600 mL / NET: -1600 mL        REVIEW OF SYSTEMS:  CONSTITUTIONAL: No fever, weight loss, or fatigue  EYES: No eye pain, visual disturbances, or discharge  ENMT:  No difficulty hearing, tinnitus, vertigo; No sinus or throat pain  NECK: No pain or stiffness  BREASTS: No pain, masses, or nipple discharge  RESPIRATORY: No cough, wheezing, chills or hemoptysis; No shortness of breath  CARDIOVASCULAR: No chest pain, palpitations, dizziness, or leg swelling  GASTROINTESTINAL: No abdominal or epigastric pain. No nausea, vomiting, or hematemesis; No diarrhea or constipation. No melena or hematochezia.  GENITOURINARY: No dysuria, frequency, hematuria, or incontinence  NEUROLOGICAL: No headaches, memory loss, loss of strength, numbness, or tremors  SKIN: No itching, burning, rashes, or lesions   LYMPH NODES: No enlarged glands  ENDOCRINE: No heat or cold intolerance; No hair loss  MUSCULOSKELETAL: No joint pain or swelling; No muscle, back, or extremity pain  PSYCHIATRIC: No depression, anxiety, mood swings, or difficulty sleeping  HEME/LYMPH: No easy bruising, or bleeding gums  ALLERY AND IMMUNOLOGIC: No hives or eczema    PHYSICAL EXAM:  GENERAL: NAD, well-groomed, well-developed  HEAD:  Atraumatic, Normocephalic  EYES: EOMI, PERRLA, conjunctiva and sclera clear  ENMT: No tonsillar erythema, exudates, or enlargement; Moist mucous membranes, Good dentition, No lesions  NECK: Supple, No JVD, Normal thyroid  NERVOUS SYSTEM:  Alert & Oriented X3, Good concentration; Motor Strength 5/5 B/L upper and lower extremities; DTRs 2+ intact and symmetric  CHEST/LUNG: Clear to percussion bilaterally; No rales, rhonchi, wheezing, or rubs  HEART: Regular rate and rhythm; No murmurs, rubs, or gallops  ABDOMEN: Soft, Nontender, Nondistended; Bowel sounds present  EXTREMITIES:  2+ Peripheral Pulses, No clubbing, cyanosis, or edema  LYMPH: No lymphadenopathy noted  SKIN: No rashes or lesions    LABS:                        11.6   13.26 )-----------( 247      ( 19 Mar 2020 06:40 )             37.0     03-19    135  |  96  |  67<H>  ----------------------------<  75  5.2   |  22  |  6.50<H>    Ca    8.8      19 Mar 2020 06:40    TPro  7.5  /  Alb  2.6<L>  /  TBili  0.3  /  DBili  x   /  AST  10<L>  /  ALT  13  /  AlkPhos  123<H>  03-19        CAPILLARY BLOOD GLUCOSE                  MEDICATIONS  (STANDING):  aspirin enteric coated 81 milliGRAM(s) Oral daily  atorvastatin 10 milliGRAM(s) Oral at bedtime  budesonide 160 MICROgram(s)/formoterol 4.5 MICROgram(s) Inhaler 2 Puff(s) Inhalation two times a day  cholestyramine Powder (Sugar-Free) 4 Gram(s) Oral daily  clobetasol 0.05% Cream 1 Application(s) Topical two times a day  ferrous    sulfate 325 milliGRAM(s) Oral daily  FLUoxetine 20 milliGRAM(s) Oral daily  heparin  Injectable 5000 Unit(s) SubCutaneous every 8 hours  melatonin 3 milliGRAM(s) Oral at bedtime  midodrine. 5 milliGRAM(s) Oral three times a day  nystatin Cream 1 Application(s) Topical two times a day  valproic  acid Syrup 750 milliGRAM(s) Oral two times a day    MEDICATIONS  (PRN):  acetaminophen    Suspension .. 650 milliGRAM(s) Oral every 6 hours PRN Temp greater or equal to 38C (100.4F), Moderate Pain (4 - 6)  acetaminophen   Tablet .. 650 milliGRAM(s) Oral every 6 hours PRN Mild Pain (1 - 3)  traMADol 50 milliGRAM(s) Oral every 6 hours PRN Moderate Pain (4 - 6) Patient is a 84y old  Female who presents with a chief complaint of altered mental status (19 Mar 2020 09:27)      INTERVAL HPI: Pt seen and examined. States she is feeling much better, Goals of care discussed, pt ?DNR but not DNI, will get palliative RN to discuss further with son/hcp   and patient as pt is now more lucid and pt/family want dialysis. Son/hcp  phone# [phone], daughter/caretaker/Aby [phone]     OVERNIGHT EVENTS:  T(F): 98 (03-19-20 @ 22:21), Max: 98.4 (03-19-20 @ 05:56)  HR: 97 (03-19-20 @ 22:21) (84 - 97)  BP: 105/72 (03-19-20 @ 22:21) (96/59 - 115/64)  RR: 16 (03-19-20 @ 22:21) (16 - 209)  SpO2: 93% (03-19-20 @ 22:21) (92% - 98%)  I&O's Summary    18 Mar 2020 07:01  -  19 Mar 2020 07:00  --------------------------------------------------------  IN: 1140 mL / OUT: 860 mL / NET: 280 mL    19 Mar 2020 07:01  -  19 Mar 2020 23:53  --------------------------------------------------------  IN: 0 mL / OUT: 1600 mL / NET: -1600 mL        REVIEW OF SYSTEMS:  CONSTITUTIONAL: No fever, weight loss, or fatigue  RESPIRATORY: No cough, wheezing, chills or hemoptysis; No shortness of breath  CARDIOVASCULAR: No chest pain, palpitations, dizziness, or leg swelling  GASTROINTESTINAL: No abdominal or epigastric pain. No nausea, vomiting, or hematemesis; No diarrhea or constipation. No melena or hematochezia.  GENITOURINARY: No dysuria, frequency, hematuria, or incontinence  NEUROLOGICAL: No headaches, memory loss, loss of strength, numbness, or tremors  SKIN: No itching, burning, rashes, or lesions except stage 4 sacral ulcer chronic  LYMPH NODES: No enlarged glands  ENDOCRINE: No heat or cold intolerance; No hair loss  MUSCULOSKELETAL: No joint pain or swelling; No muscle, back, or extremity pain  PSYCHIATRIC: No depression, anxiety, mood swings, or difficulty sleeping      PHYSICAL EXAM:  GENERAL: NAD, well-groomed, well-developed  NERVOUS SYSTEM:  Alert & Oriented X3, Good concentration; Motor Strength 3/5 B/L upper and lower extremities  CHEST/LUNG: Clear to percussion bilaterally; No rales, rhonchi, wheezing, or rubs  HEART: Regular rate and rhythm; No murmurs, rubs, or gallops  ABDOMEN: Soft, Nontender, Nondistended; Bowel sounds present  EXTREMITIES:  2+ Peripheral Pulses, No clubbing, cyanosis, or edema  SKIN: No rashes or lesions    LABS:                        11.6   13.26 )-----------( 247      ( 19 Mar 2020 06:40 )             37.0     03-19    135  |  96  |  67<H>  ----------------------------<  75  5.2   |  22  |  6.50<H>    Ca    8.8      19 Mar 2020 06:40    TPro  7.5  /  Alb  2.6<L>  /  TBili  0.3  /  DBili  x   /  AST  10<L>  /  ALT  13  /  AlkPhos  123<H>  03-19        CAPILLARY BLOOD GLUCOSE                  MEDICATIONS  (STANDING):  aspirin enteric coated 81 milliGRAM(s) Oral daily  atorvastatin 10 milliGRAM(s) Oral at bedtime  budesonide 160 MICROgram(s)/formoterol 4.5 MICROgram(s) Inhaler 2 Puff(s) Inhalation two times a day  cholestyramine Powder (Sugar-Free) 4 Gram(s) Oral daily  clobetasol 0.05% Cream 1 Application(s) Topical two times a day  ferrous    sulfate 325 milliGRAM(s) Oral daily  FLUoxetine 20 milliGRAM(s) Oral daily  heparin  Injectable 5000 Unit(s) SubCutaneous every 8 hours  melatonin 3 milliGRAM(s) Oral at bedtime  midodrine. 5 milliGRAM(s) Oral three times a day  nystatin Cream 1 Application(s) Topical two times a day  valproic  acid Syrup 750 milliGRAM(s) Oral two times a day    MEDICATIONS  (PRN):  acetaminophen    Suspension .. 650 milliGRAM(s) Oral every 6 hours PRN Temp greater or equal to 38C (100.4F), Moderate Pain (4 - 6)  acetaminophen   Tablet .. 650 milliGRAM(s) Oral every 6 hours PRN Mild Pain (1 - 3)  traMADol 50 milliGRAM(s) Oral every 6 hours PRN Moderate Pain (4 - 6) Patient is a 84y old  Female who presents with a chief complaint of altered mental status (19 Mar 2020 09:27)      INTERVAL HPI: Pt seen and examined. States she is feeling much better, Goals of care discussed, pt ?DNR but not DNI, will get palliative RN to discuss further with son/hcp   and patient as pt is now more lucid and pt/family want dialysis. Son/hcp  phone# [phone], daughter/caretaker/Aby [phone]     OVERNIGHT EVENTS:  T(F): 98 (03-19-20 @ 22:21), Max: 98.4 (03-19-20 @ 05:56)  HR: 97 (03-19-20 @ 22:21) (84 - 97)  BP: 105/72 (03-19-20 @ 22:21) (96/59 - 115/64)  RR: 16 (03-19-20 @ 22:21) (16 - 209)  SpO2: 93% (03-19-20 @ 22:21) (92% - 98%)  I&O's Summary    18 Mar 2020 07:01  -  19 Mar 2020 07:00  --------------------------------------------------------  IN: 1140 mL / OUT: 860 mL / NET: 280 mL    19 Mar 2020 07:01  -  19 Mar 2020 23:53  --------------------------------------------------------  IN: 0 mL / OUT: 1600 mL / NET: -1600 mL        REVIEW OF SYSTEMS:  CONSTITUTIONAL: No fever, weight loss, or fatigue  RESPIRATORY: No cough, wheezing, chills or hemoptysis; No shortness of breath  CARDIOVASCULAR: No chest pain, palpitations, dizziness, or leg swelling  GASTROINTESTINAL: No abdominal or epigastric pain. No nausea, vomiting, or hematemesis; No diarrhea or constipation. No melena or hematochezia.  GENITOURINARY: No dysuria, frequency, hematuria, or incontinence  NEUROLOGICAL: No headaches, memory loss, loss of strength, numbness, or tremors  SKIN: No itching, burning, rashes, or lesions except stage 4 sacral ulcer chronic  MUSCULOSKELETAL: No joint pain or swelling; No muscle, back, or extremity pain  PSYCHIATRIC: No depression, anxiety, mood swings, or difficulty sleeping      PHYSICAL EXAM:  GENERAL: NAD, well-groomed, well-developed  NERVOUS SYSTEM:  Alert & Oriented X3, Good concentration; Motor Strength 3/5 B/L upper and lower extremities  CHEST/LUNG: Clear to percussion bilaterally; No rales, rhonchi, wheezing, or rubs  HEART: Regular rate and rhythm; No murmurs, rubs, or gallops  ABDOMEN: Soft, Nontender, Nondistended; Bowel sounds present  EXTREMITIES:  2+ Peripheral Pulses, No clubbing, cyanosis, or edema  SKIN: No rashes or lesions except sacral ulcer of back stage 4    LABS:                        11.6   13.26 )-----------( 247      ( 19 Mar 2020 06:40 )             37.0     03-19    135  |  96  |  67<H>  ----------------------------<  75  5.2   |  22  |  6.50<H>    Ca    8.8      19 Mar 2020 06:40    TPro  7.5  /  Alb  2.6<L>  /  TBili  0.3  /  DBili  x   /  AST  10<L>  /  ALT  13  /  AlkPhos  123<H>  03-19        CAPILLARY BLOOD GLUCOSE                  MEDICATIONS  (STANDING):  aspirin enteric coated 81 milliGRAM(s) Oral daily  atorvastatin 10 milliGRAM(s) Oral at bedtime  budesonide 160 MICROgram(s)/formoterol 4.5 MICROgram(s) Inhaler 2 Puff(s) Inhalation two times a day  cholestyramine Powder (Sugar-Free) 4 Gram(s) Oral daily  clobetasol 0.05% Cream 1 Application(s) Topical two times a day  ferrous    sulfate 325 milliGRAM(s) Oral daily  FLUoxetine 20 milliGRAM(s) Oral daily  heparin  Injectable 5000 Unit(s) SubCutaneous every 8 hours  melatonin 3 milliGRAM(s) Oral at bedtime  midodrine. 5 milliGRAM(s) Oral three times a day  nystatin Cream 1 Application(s) Topical two times a day  valproic  acid Syrup 750 milliGRAM(s) Oral two times a day    MEDICATIONS  (PRN):  acetaminophen    Suspension .. 650 milliGRAM(s) Oral every 6 hours PRN Temp greater or equal to 38C (100.4F), Moderate Pain (4 - 6)  acetaminophen   Tablet .. 650 milliGRAM(s) Oral every 6 hours PRN Mild Pain (1 - 3)  traMADol 50 milliGRAM(s) Oral every 6 hours PRN Moderate Pain (4 - 6)

## 2020-03-19 NOTE — PROGRESS NOTE ADULT - PROBLEM SELECTOR PLAN 1
pt in bed  seen and examined  s/p HD cath insertion  planned for HD  renal indices noted  family is willing to trial HD  labs and imaging reviewed  monitor MS  assist with ADL  serial labs  serial clinical assessment  replete lytes  will follow

## 2020-03-19 NOTE — PROGRESS NOTE ADULT - PROBLEM SELECTOR PLAN 9
- pt's hypotension was likely related to sepsis and all anti-hypertensives held  - pt's BP now rising and home regimen is being restarted  - bp low during dialysis will hold for now

## 2020-03-19 NOTE — PROGRESS NOTE ADULT - ASSESSMENT
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) and chronic diarrhea, cervical cancer (s/p radical hysterectomy and radiation), tremors, eczema, depression, HTN, HLD, history of frequent UTIs with chronic indwelling renae, anemia, stage 4 sacral decubitus ulcer, and recent hospitalization for MEHDI on CKD thought to be 2/2 dehydration and urinary retention from malfunctioning chronic renae, now brought in by EMS to ED for altered mental status admitted for acute metabolic encephalopathy due to suspected UTI and hyperkalemia in setting of MEHDI on CKD 5, course c/b likely generalized seizure on 3/5/20, and acute hypoxic respiratory failure and suspected sepsis due to suspected aspiration PNA, s/p ICU stay. Mental status hae been improved.    Disposition: Mental status stable. Sodium, electrolytes improving.  Per Son ( HCP) wants mom to go home with patient's daughter. PT suggested SHELBIE MAYBERRY to talk to family  about safe d/c planning likely home with home care services, renal function elevated today, apprec nephro recs, renal fucntioning worsening pt and family now amendable to dialysis

## 2020-03-19 NOTE — PROGRESS NOTE ADULT - SUBJECTIVE AND OBJECTIVE BOX
Neurology follow up note    GURJIT [id number]      Interval History:    Patient feels ok no new complaint having breakfast     MEDICATIONS    acetaminophen    Suspension .. 650 milliGRAM(s) Oral every 6 hours PRN  acetaminophen   Tablet .. 650 milliGRAM(s) Oral every 6 hours PRN  amLODIPine   Tablet 5 milliGRAM(s) Oral daily  aspirin enteric coated 81 milliGRAM(s) Oral daily  atorvastatin 10 milliGRAM(s) Oral at bedtime  budesonide 160 MICROgram(s)/formoterol 4.5 MICROgram(s) Inhaler 2 Puff(s) Inhalation two times a day  cholestyramine Powder (Sugar-Free) 4 Gram(s) Oral daily  clobetasol 0.05% Cream 1 Application(s) Topical two times a day  epoetin jean carlos Injectable 12680 Unit(s) SubCutaneous <User Schedule>  ferrous    sulfate 325 milliGRAM(s) Oral daily  FLUoxetine 20 milliGRAM(s) Oral daily  heparin  Injectable 5000 Unit(s) SubCutaneous every 8 hours  hydrALAZINE Injectable 10 milliGRAM(s) IV Push every 6 hours PRN  labetalol 200 milliGRAM(s) Oral every 8 hours  melatonin 3 milliGRAM(s) Oral at bedtime  nystatin Cream 1 Application(s) Topical two times a day  sodium bicarbonate 650 milliGRAM(s) Oral three times a day  traMADol 50 milliGRAM(s) Oral every 6 hours PRN  valproic  acid Syrup 750 milliGRAM(s) Oral two times a day      Allergies    Levaquin (Pruritus)  mercury (Pruritus; Rash)  sulfa drugs (Pruritus)    Intolerances            Vital Signs Last 24 Hrs  T(C): 36.9 (19 Mar 2020 05:56), Max: 36.9 (19 Mar 2020 05:56)  T(F): 98.4 (19 Mar 2020 05:56), Max: 98.4 (19 Mar 2020 05:56)  HR: 92 (19 Mar 2020 05:56) (84 - 98)  BP: 111/68 (19 Mar 2020 05:56) (111/68 - 143/80)  BP(mean): --  RR: 17 (19 Mar 2020 05:56) (16 - 20)  SpO2: 92% (19 Mar 2020 05:56) (92% - 99%)                  LABS:  CBC Full  -  ( 19 Mar 2020 06:40 )  WBC Count : 13.26 K/uL  RBC Count : 4.01 M/uL  Hemoglobin : 11.6 g/dL  Hematocrit : 37.0 %  Platelet Count - Automated : 247 K/uL  Mean Cell Volume : 92.3 fl  Mean Cell Hemoglobin : 28.9 pg  Mean Cell Hemoglobin Concentration : 31.4 gm/dL  Auto Neutrophil # : 10.78 K/uL  Auto Lymphocyte # : 1.27 K/uL  Auto Monocyte # : 0.97 K/uL  Auto Eosinophil # : 0.10 K/uL  Auto Basophil # : 0.04 K/uL  Auto Neutrophil % : 81.2 %  Auto Lymphocyte % : 9.6 %  Auto Monocyte % : 7.3 %  Auto Eosinophil % : 0.8 %  Auto Basophil % : 0.3 %      03-19    135  |  96  |  67<H>  ----------------------------<  75  5.2   |  22  |  6.50<H>    Ca    8.8      19 Mar 2020 06:40    TPro  7.5  /  Alb  2.6<L>  /  TBili  0.3  /  DBili  x   /  AST  10<L>  /  ALT  13  /  AlkPhos  123<H>  03-19    Hemoglobin A1C:     LIVER FUNCTIONS - ( 19 Mar 2020 06:40 )  Alb: 2.6 g/dL / Pro: 7.5 g/dL / ALK PHOS: 123 U/L / ALT: 13 U/L / AST: 10 U/L / GGT: x           Vitamin B12         RADIOLOGY      ANALYSIS AND PLAN:  An 84-year-old with an episode of seizure and change in mental status.  1.	For episode of seizure, will increase Depakote 750 mg twice  off dilantin   2.	EEG intermittent generalized spike and waves   3.	Ativan 1 mg IV push q.6h. p.r.n. for any type of breakthrough seizure.  4.	Seizure precautions.  5.	For history of underlying depression, at present hard to evaluate the patient's psychiatric status secondary to the patient being lethargic, continue home medication when possible.  6.	For hyperlipidemia, continue the patient on her cholesterol medication.  7.	For change in mental status, questionable this could be metabolic encephalopathy from partial complex seizures versus any type of underlying infectious type process episodes of temperatures and hypotension possible h/o of shock  overall more awake   8.	Fall precaution.  9.	overall more awake and interactive today   10.	For chronic kidney disease, monitor renal function noted  HD as tolerated   11.	Spoke with multiple family members at the bedside.  Advance directives were discussed with them.  Primary  will be daughter, Ju, her cell phone number is [phone], home number is [phone] spoke 3/18/2020   12.	EEG no new changes   13.	MMSE 25/30  14.	Greater than 24 minutes of time was spent with the patient, plan of care, reviewing data, speaking to the family and multidisciplinary healthcare team. Neurology follow up note    GURJIT [id number]      Interval History:    Patient feels ok no new complaint having breakfast     MEDICATIONS    acetaminophen    Suspension .. 650 milliGRAM(s) Oral every 6 hours PRN  acetaminophen   Tablet .. 650 milliGRAM(s) Oral every 6 hours PRN  amLODIPine   Tablet 5 milliGRAM(s) Oral daily  aspirin enteric coated 81 milliGRAM(s) Oral daily  atorvastatin 10 milliGRAM(s) Oral at bedtime  budesonide 160 MICROgram(s)/formoterol 4.5 MICROgram(s) Inhaler 2 Puff(s) Inhalation two times a day  cholestyramine Powder (Sugar-Free) 4 Gram(s) Oral daily  clobetasol 0.05% Cream 1 Application(s) Topical two times a day  epoetin jean carlos Injectable 07957 Unit(s) SubCutaneous <User Schedule>  ferrous    sulfate 325 milliGRAM(s) Oral daily  FLUoxetine 20 milliGRAM(s) Oral daily  heparin  Injectable 5000 Unit(s) SubCutaneous every 8 hours  hydrALAZINE Injectable 10 milliGRAM(s) IV Push every 6 hours PRN  labetalol 200 milliGRAM(s) Oral every 8 hours  melatonin 3 milliGRAM(s) Oral at bedtime  nystatin Cream 1 Application(s) Topical two times a day  sodium bicarbonate 650 milliGRAM(s) Oral three times a day  traMADol 50 milliGRAM(s) Oral every 6 hours PRN  valproic  acid Syrup 750 milliGRAM(s) Oral two times a day      Allergies    Levaquin (Pruritus)  mercury (Pruritus; Rash)  sulfa drugs (Pruritus)    Intolerances            Vital Signs Last 24 Hrs  T(C): 36.9 (19 Mar 2020 05:56), Max: 36.9 (19 Mar 2020 05:56)  T(F): 98.4 (19 Mar 2020 05:56), Max: 98.4 (19 Mar 2020 05:56)  HR: 92 (19 Mar 2020 05:56) (84 - 98)  BP: 111/68 (19 Mar 2020 05:56) (111/68 - 143/80)  BP(mean): --  RR: 17 (19 Mar 2020 05:56) (16 - 20)  SpO2: 92% (19 Mar 2020 05:56) (92% - 99%)      REVIEW OF SYSTEMS: Limited or unable to obtain secondary to patient's poor mental status.    Constitutional: No fever, chills, fatigue, generalized  weakness  Eyes: no eye pain, visual disturbances, or discharge  ENT:  No difficulty hearing, tinnitus, vertigo; No sinus or throat pain  Neck: No pain or stiffness  Respiratory: No cough, dyspnea, wheezing   Cardiovascular: No chest pain, palpitations,   Gastrointestinal: No abdominal or epigastric pain. No nausea, vomiting  No diarrhea or constipation.   Genitourinary: No dysuria, frequency, hematuria or incontinence  Neurological: No headaches, lightheadedness, vertigo, numbness or tremors  Psychiatric: No depression, anxiety, mood swings or difficulty sleeping  Musculoskeletal: No joint pain or swelling; No muscle, back or extremity pain      On Neurological Examination:    Mental Status - Patient is alert, awake,  loc hospital   year 2020     Follow simple commands    Speech -   Fluent                 Cranial Nerves - Pupils 3 mm equal and reactive to light,   extraocular eye movements intact.   smile symmetric  intact bilateral NLF    Motor Exam -   Right upper 4/5   Left upper  3/5  Right lower 2/5  Left lower 2/5    Muscle tone - is normal all over.  No asymmetry is seen.      Sensory    Bilateral intact to light touch    GENERAL Exam: Nontoxic , No Acute Distress   	  HEENT:  normocephalic, atraumatic  		  LUNGS:  Decreased bilaterally  	  HEART: Normal S1S2   No murmur RRR        	  GI/ ABDOMEN:  Soft  Non tender    EXTREMITIES:   No Edema  No Clubbing  No Cyanosis   	   SKIN: Normal  No Ecchymosis              LABS:  CBC Full  -  ( 19 Mar 2020 06:40 )  WBC Count : 13.26 K/uL  RBC Count : 4.01 M/uL  Hemoglobin : 11.6 g/dL  Hematocrit : 37.0 %  Platelet Count - Automated : 247 K/uL  Mean Cell Volume : 92.3 fl  Mean Cell Hemoglobin : 28.9 pg  Mean Cell Hemoglobin Concentration : 31.4 gm/dL  Auto Neutrophil # : 10.78 K/uL  Auto Lymphocyte # : 1.27 K/uL  Auto Monocyte # : 0.97 K/uL  Auto Eosinophil # : 0.10 K/uL  Auto Basophil # : 0.04 K/uL  Auto Neutrophil % : 81.2 %  Auto Lymphocyte % : 9.6 %  Auto Monocyte % : 7.3 %  Auto Eosinophil % : 0.8 %  Auto Basophil % : 0.3 %      03-19    135  |  96  |  67<H>  ----------------------------<  75  5.2   |  22  |  6.50<H>    Ca    8.8      19 Mar 2020 06:40    TPro  7.5  /  Alb  2.6<L>  /  TBili  0.3  /  DBili  x   /  AST  10<L>  /  ALT  13  /  AlkPhos  123<H>  03-19    Hemoglobin A1C:     LIVER FUNCTIONS - ( 19 Mar 2020 06:40 )  Alb: 2.6 g/dL / Pro: 7.5 g/dL / ALK PHOS: 123 U/L / ALT: 13 U/L / AST: 10 U/L / GGT: x           Vitamin B12         RADIOLOGY      ANALYSIS AND PLAN:  An 84-year-old with an episode of seizure and change in mental status.  1.	For episode of seizure, will increase Depakote 750 mg twice  off dilantin   2.	EEG intermittent generalized spike and waves   3.	Ativan 1 mg IV push q.6h. p.r.n. for any type of breakthrough seizure.  4.	Seizure precautions.  5.	For history of underlying depression, at present hard to evaluate the patient's psychiatric status secondary to the patient being lethargic, continue home medication when possible.  6.	For hyperlipidemia, continue the patient on her cholesterol medication.  7.	For change in mental status, questionable this could be metabolic encephalopathy from partial complex seizures versus any type of underlying infectious type process episodes of temperatures and hypotension possible h/o of shock  overall more awake   8.	Fall precaution.  9.	overall more awake and interactive today   10.	For chronic kidney disease, monitor renal function noted  HD as tolerated   11.	Spoke with multiple family members at the bedside.  Advance directives were discussed with them.  Primary  will be daughterJu, her cell phone number is [phone], home number is [phone] spoke 3/18/2020   12.	EEG no new changes   13.	MMSE 25/30  14.	Greater than 24 minutes of time was spent with the patient, plan of care, reviewing data, speaking to the family and multidisciplinary healthcare team.

## 2020-03-19 NOTE — PROGRESS NOTE ADULT - PROBLEM SELECTOR PLAN 8
- Anemia likely due to LELO, and anemia of chronic disease in setting of Stage 5 CKD  - Fe panel from 2/2020 showed low total iron, low total iron binding capacity  - hemoglobin has been somewhat labile during admission; monitor closely, no sign of acute bleeding  - Continue home ferrous sulfate   - Continue epogen as per nephro WDL

## 2020-03-20 LAB
ALBUMIN SERPL ELPH-MCNC: 2.5 G/DL — LOW (ref 3.3–5)
ALP SERPL-CCNC: 124 U/L — HIGH (ref 40–120)
ALT FLD-CCNC: 12 U/L — SIGNIFICANT CHANGE UP (ref 12–78)
ANION GAP SERPL CALC-SCNC: 11 MMOL/L — SIGNIFICANT CHANGE UP (ref 5–17)
AST SERPL-CCNC: 12 U/L — LOW (ref 15–37)
BASOPHILS # BLD AUTO: 0.04 K/UL — SIGNIFICANT CHANGE UP (ref 0–0.2)
BASOPHILS NFR BLD AUTO: 0.3 % — SIGNIFICANT CHANGE UP (ref 0–2)
BILIRUB SERPL-MCNC: 0.3 MG/DL — SIGNIFICANT CHANGE UP (ref 0.2–1.2)
BUN SERPL-MCNC: 45 MG/DL — HIGH (ref 7–23)
CALCIUM SERPL-MCNC: 9.1 MG/DL — SIGNIFICANT CHANGE UP (ref 8.5–10.1)
CHLORIDE SERPL-SCNC: 98 MMOL/L — SIGNIFICANT CHANGE UP (ref 96–108)
CO2 SERPL-SCNC: 26 MMOL/L — SIGNIFICANT CHANGE UP (ref 22–31)
CREAT SERPL-MCNC: 5.5 MG/DL — HIGH (ref 0.5–1.3)
EOSINOPHIL # BLD AUTO: 0.11 K/UL — SIGNIFICANT CHANGE UP (ref 0–0.5)
EOSINOPHIL NFR BLD AUTO: 0.8 % — SIGNIFICANT CHANGE UP (ref 0–6)
GLUCOSE SERPL-MCNC: 72 MG/DL — SIGNIFICANT CHANGE UP (ref 70–99)
HCT VFR BLD CALC: 36.5 % — SIGNIFICANT CHANGE UP (ref 34.5–45)
HGB BLD-MCNC: 11.1 G/DL — LOW (ref 11.5–15.5)
IMM GRANULOCYTES NFR BLD AUTO: 0.6 % — SIGNIFICANT CHANGE UP (ref 0–1.5)
LYMPHOCYTES # BLD AUTO: 1.46 K/UL — SIGNIFICANT CHANGE UP (ref 1–3.3)
LYMPHOCYTES # BLD AUTO: 10.4 % — LOW (ref 13–44)
MCHC RBC-ENTMCNC: 28.8 PG — SIGNIFICANT CHANGE UP (ref 27–34)
MCHC RBC-ENTMCNC: 30.4 GM/DL — LOW (ref 32–36)
MCV RBC AUTO: 94.8 FL — SIGNIFICANT CHANGE UP (ref 80–100)
MONOCYTES # BLD AUTO: 1.26 K/UL — HIGH (ref 0–0.9)
MONOCYTES NFR BLD AUTO: 8.9 % — SIGNIFICANT CHANGE UP (ref 2–14)
NEUTROPHILS # BLD AUTO: 11.14 K/UL — HIGH (ref 1.8–7.4)
NEUTROPHILS NFR BLD AUTO: 79 % — HIGH (ref 43–77)
NRBC # BLD: 0 /100 WBCS — SIGNIFICANT CHANGE UP (ref 0–0)
PLATELET # BLD AUTO: 197 K/UL — SIGNIFICANT CHANGE UP (ref 150–400)
POTASSIUM SERPL-MCNC: 4.7 MMOL/L — SIGNIFICANT CHANGE UP (ref 3.5–5.3)
POTASSIUM SERPL-SCNC: 4.7 MMOL/L — SIGNIFICANT CHANGE UP (ref 3.5–5.3)
PROT SERPL-MCNC: 7.7 G/DL — SIGNIFICANT CHANGE UP (ref 6–8.3)
RBC # BLD: 3.85 M/UL — SIGNIFICANT CHANGE UP (ref 3.8–5.2)
RBC # FLD: 16.7 % — HIGH (ref 10.3–14.5)
SODIUM SERPL-SCNC: 135 MMOL/L — SIGNIFICANT CHANGE UP (ref 135–145)
WBC # BLD: 14.1 K/UL — HIGH (ref 3.8–10.5)
WBC # FLD AUTO: 14.1 K/UL — HIGH (ref 3.8–10.5)

## 2020-03-20 PROCEDURE — 99233 SBSQ HOSP IP/OBS HIGH 50: CPT

## 2020-03-20 RX ORDER — CHLORHEXIDINE GLUCONATE 213 G/1000ML
1 SOLUTION TOPICAL DAILY
Refills: 0 | Status: DISCONTINUED | OUTPATIENT
Start: 2020-03-20 | End: 2020-03-27

## 2020-03-20 RX ORDER — ACETAMINOPHEN 500 MG
1000 TABLET ORAL ONCE
Refills: 0 | Status: DISCONTINUED | OUTPATIENT
Start: 2020-03-20 | End: 2020-03-20

## 2020-03-20 RX ORDER — ACETAMINOPHEN 500 MG
1000 TABLET ORAL ONCE
Refills: 0 | Status: COMPLETED | OUTPATIENT
Start: 2020-03-20 | End: 2020-03-20

## 2020-03-20 RX ADMIN — MIDODRINE HYDROCHLORIDE 5 MILLIGRAM(S): 2.5 TABLET ORAL at 18:41

## 2020-03-20 RX ADMIN — Medication 1000 MILLIGRAM(S): at 15:57

## 2020-03-20 RX ADMIN — Medication 1 APPLICATION(S): at 06:42

## 2020-03-20 RX ADMIN — Medication 750 MILLIGRAM(S): at 18:40

## 2020-03-20 RX ADMIN — Medication 3 MILLIGRAM(S): at 21:36

## 2020-03-20 RX ADMIN — BUDESONIDE AND FORMOTEROL FUMARATE DIHYDRATE 2 PUFF(S): 160; 4.5 AEROSOL RESPIRATORY (INHALATION) at 22:16

## 2020-03-20 RX ADMIN — Medication 81 MILLIGRAM(S): at 13:30

## 2020-03-20 RX ADMIN — HEPARIN SODIUM 5000 UNIT(S): 5000 INJECTION INTRAVENOUS; SUBCUTANEOUS at 21:37

## 2020-03-20 RX ADMIN — Medication 400 MILLIGRAM(S): at 14:57

## 2020-03-20 RX ADMIN — HEPARIN SODIUM 5000 UNIT(S): 5000 INJECTION INTRAVENOUS; SUBCUTANEOUS at 13:30

## 2020-03-20 RX ADMIN — TRAMADOL HYDROCHLORIDE 50 MILLIGRAM(S): 50 TABLET ORAL at 11:00

## 2020-03-20 RX ADMIN — Medication 750 MILLIGRAM(S): at 05:36

## 2020-03-20 RX ADMIN — HEPARIN SODIUM 5000 UNIT(S): 5000 INJECTION INTRAVENOUS; SUBCUTANEOUS at 05:36

## 2020-03-20 RX ADMIN — MIDODRINE HYDROCHLORIDE 5 MILLIGRAM(S): 2.5 TABLET ORAL at 13:30

## 2020-03-20 RX ADMIN — NYSTATIN CREAM 1 APPLICATION(S): 100000 CREAM TOPICAL at 22:17

## 2020-03-20 RX ADMIN — TRAMADOL HYDROCHLORIDE 50 MILLIGRAM(S): 50 TABLET ORAL at 12:01

## 2020-03-20 RX ADMIN — NYSTATIN CREAM 1 APPLICATION(S): 100000 CREAM TOPICAL at 05:37

## 2020-03-20 RX ADMIN — MIDODRINE HYDROCHLORIDE 5 MILLIGRAM(S): 2.5 TABLET ORAL at 05:37

## 2020-03-20 RX ADMIN — CHOLESTYRAMINE 4 GRAM(S): 4 POWDER, FOR SUSPENSION ORAL at 11:02

## 2020-03-20 RX ADMIN — Medication 20 MILLIGRAM(S): at 13:30

## 2020-03-20 RX ADMIN — ATORVASTATIN CALCIUM 10 MILLIGRAM(S): 80 TABLET, FILM COATED ORAL at 21:37

## 2020-03-20 RX ADMIN — BUDESONIDE AND FORMOTEROL FUMARATE DIHYDRATE 2 PUFF(S): 160; 4.5 AEROSOL RESPIRATORY (INHALATION) at 05:40

## 2020-03-20 RX ADMIN — Medication 325 MILLIGRAM(S): at 13:30

## 2020-03-20 RX ADMIN — Medication 1 APPLICATION(S): at 18:41

## 2020-03-20 NOTE — PROGRESS NOTE ADULT - ATTENDING COMMENTS
plan:  patient full code. Ostomy 1600 output. chronic ([facility])    will need to start dc planning plan:  patient full code. Ostomy 1600 output. chronic ([facility])    will need to start dc planning . patient needs 3 succesful HD attempts. yesterday succesful.   patient to receive dialysis tomorrow    will need to discuss with renal if permacath will be needed.  cannot dc patient with IJV.

## 2020-03-20 NOTE — PROGRESS NOTE ADULT - SUBJECTIVE AND OBJECTIVE BOX
Date/Time Patient Seen:  		  Referring MD:   Data Reviewed	       Patient is a 84y old  Female who presents with a chief complaint of altered mental status (19 Mar 2020 12:52)      Subjective/HPI     PAST MEDICAL & SURGICAL HISTORY:  Wound of sacral region  Depression  Iron deficiency anemia  Eczema  HTN (hypertension)  CKD (chronic kidney disease) stage 5, GFR less than 15 ml/min: not on HD  Herniated lumbar intervertebral disc  Depression  Tremor  Kidney atrophy: right  Colostomy status: 2006  Chronic UTI (urinary tract infection): chronic renae  Cervical cancer: 1970&#x27;s  Dyslipidemia  Diabetes: type 2  Hernia, incisional  S/P hip replacement: right 2013  S/P colon resection: 2006  S/P hysterectomy: 1977        Medication list         MEDICATIONS  (STANDING):  aspirin enteric coated 81 milliGRAM(s) Oral daily  atorvastatin 10 milliGRAM(s) Oral at bedtime  budesonide 160 MICROgram(s)/formoterol 4.5 MICROgram(s) Inhaler 2 Puff(s) Inhalation two times a day  cholestyramine Powder (Sugar-Free) 4 Gram(s) Oral daily  clobetasol 0.05% Cream 1 Application(s) Topical two times a day  ferrous    sulfate 325 milliGRAM(s) Oral daily  FLUoxetine 20 milliGRAM(s) Oral daily  heparin  Injectable 5000 Unit(s) SubCutaneous every 8 hours  melatonin 3 milliGRAM(s) Oral at bedtime  midodrine. 5 milliGRAM(s) Oral three times a day  nystatin Cream 1 Application(s) Topical two times a day  valproic  acid Syrup 750 milliGRAM(s) Oral two times a day    MEDICATIONS  (PRN):  acetaminophen    Suspension .. 650 milliGRAM(s) Oral every 6 hours PRN Temp greater or equal to 38C (100.4F), Moderate Pain (4 - 6)  acetaminophen   Tablet .. 650 milliGRAM(s) Oral every 6 hours PRN Mild Pain (1 - 3)  traMADol 50 milliGRAM(s) Oral every 6 hours PRN Moderate Pain (4 - 6)         Vitals log        ICU Vital Signs Last 24 Hrs  T(C): 37.1 (20 Mar 2020 04:50), Max: 37.1 (20 Mar 2020 04:50)  T(F): 98.7 (20 Mar 2020 04:50), Max: 98.7 (20 Mar 2020 04:50)  HR: 92 (20 Mar 2020 04:50) (84 - 97)  BP: 116/77 (20 Mar 2020 04:50) (96/59 - 116/77)  BP(mean): --  ABP: --  ABP(mean): --  RR: 17 (20 Mar 2020 04:50) (16 - 209)  SpO2: 94% (20 Mar 2020 04:50) (93% - 98%)           Input and Output:  I&O's Detail    18 Mar 2020 07:01  -  19 Mar 2020 07:00  --------------------------------------------------------  IN:    Oral Fluid: 240 mL    Other: 900 mL  Total IN: 1140 mL    OUT:    Ileostomy: 500 mL    Indwelling Catheter - Urethral: 100 mL    Other: 260 mL  Total OUT: 860 mL    Total NET: 280 mL      19 Mar 2020 07:01  -  20 Mar 2020 06:00  --------------------------------------------------------  IN:  Total IN: 0 mL    OUT:    Colostomy: 1600 mL  Total OUT: 1600 mL    Total NET: -1600 mL          Lab Data                        11.6   13.26 )-----------( 247      ( 19 Mar 2020 06:40 )             37.0     03-19    135  |  96  |  67<H>  ----------------------------<  75  5.2   |  22  |  6.50<H>    Ca    8.8      19 Mar 2020 06:40    TPro  7.5  /  Alb  2.6<L>  /  TBili  0.3  /  DBili  x   /  AST  10<L>  /  ALT  13  /  AlkPhos  123<H>  03-19            Review of Systems	      Objective     Physical Examination    heart s1s2  lung dec BS    Pertinent Lab findings & Imaging      Ana:  NO   Adequate UO     I&O's Detail    18 Mar 2020 07:01  -  19 Mar 2020 07:00  --------------------------------------------------------  IN:    Oral Fluid: 240 mL    Other: 900 mL  Total IN: 1140 mL    OUT:    Ileostomy: 500 mL    Indwelling Catheter - Urethral: 100 mL    Other: 260 mL  Total OUT: 860 mL    Total NET: 280 mL      19 Mar 2020 07:01  -  20 Mar 2020 06:00  --------------------------------------------------------  IN:  Total IN: 0 mL    OUT:    Colostomy: 1600 mL  Total OUT: 1600 mL    Total NET: -1600 mL               Discussed with:     Cultures:	        Radiology

## 2020-03-20 NOTE — CHART NOTE - NSCHARTNOTEFT_GEN_A_CORE
Assessment: Pt with fair intake, % consumed per EMR, but roommate states pt's appetite not always good. Pt on HD,, cr 5.5, K+ usually well controlled. Recommend continue regular liberalized diet. Pt with variable/sometimes large ostomy output(chronic). Sacrum unstagable pressure injury.     Factors impacting intake: [ ] none [ ] nausea  [ ] vomiting [ ] diarrhea [ ] constipation  [ ]chewing problems [ ] swallowing issues  [ ] other:     Diet Presciption: Diet, Soft (03-18-20 @ 09:20)    Intake:     Current Weight:   % Weight Change    Pertinent Medications: MEDICATIONS  (STANDING):  acetaminophen  IVPB .. 1000 milliGRAM(s) IV Intermittent once  aspirin enteric coated 81 milliGRAM(s) Oral daily  atorvastatin 10 milliGRAM(s) Oral at bedtime  budesonide 160 MICROgram(s)/formoterol 4.5 MICROgram(s) Inhaler 2 Puff(s) Inhalation two times a day  cholestyramine Powder (Sugar-Free) 4 Gram(s) Oral daily  clobetasol 0.05% Cream 1 Application(s) Topical two times a day  ferrous    sulfate 325 milliGRAM(s) Oral daily  FLUoxetine 20 milliGRAM(s) Oral daily  heparin  Injectable 5000 Unit(s) SubCutaneous every 8 hours  melatonin 3 milliGRAM(s) Oral at bedtime  midodrine. 5 milliGRAM(s) Oral three times a day  nystatin Cream 1 Application(s) Topical two times a day  valproic  acid Syrup 750 milliGRAM(s) Oral two times a day    MEDICATIONS  (PRN):  acetaminophen    Suspension .. 650 milliGRAM(s) Oral every 6 hours PRN Temp greater or equal to 38C (100.4F), Moderate Pain (4 - 6)  acetaminophen   Tablet .. 650 milliGRAM(s) Oral every 6 hours PRN Mild Pain (1 - 3)  traMADol 50 milliGRAM(s) Oral every 6 hours PRN Moderate Pain (4 - 6)    Pertinent Labs: 03-20 Na135 mmol/L Glu 72 mg/dL K+ 4.7 mmol/L Cr  5.50 mg/dL<H> BUN 45 mg/dL<H> 03-20 Alb 2.5 g/dL<L>     CAPILLARY BLOOD GLUCOSE        Skin: see above    Estimated Needs:   [x ] no change since previous assessment  [ ] recalculated:     Previous Nutrition Diagnosis: (X) swallowing difficulty, altered nutrition related labs  [ ] Inadequate Energy Intake [ ]Inadequate Oral Intake [ ] Excessive Energy Intake   [ ] Underweight [ ] Increased Nutrient Needs [ ] Overweight/Obesity   [ ] Altered GI Function [ ] Unintended Weight Loss [ ] Food & Nutrition Related Knowledge Deficit [ ] Malnutrition     Nutrition Diagnosis is [x ] ongoing  [ ] resolved [ ] not applicable     New Nutrition Diagnosis: [x ] not applicable       Interventions: Snacks offered/refused at present  Recommend  [ ] Change Diet To:  [ ] Nutrition Supplement  [ ] Nutrition Support  [x ] Other:  Consider renal MVI daily    Monitoring and Evaluation:   [ ] PO intake [ x ] Tolerance to diet prescription [ x ] weights [ x ] labs[ x ] follow up per protocol  [ ] other:

## 2020-03-20 NOTE — PROGRESS NOTE ADULT - ASSESSMENT
84 female with a history of HTN, CAD, CHF, Anemia, osteomyelitis, HLD and atrophic right kidney and uretero ureteral anastomosis of the right to left and CKD stage 5 with anemia now admitted with mental status changes.   New onset seizures and is on Valproate.   Renal indices are worsening and was started on dialysis now. Will dialyze in AM and may need perma cath.

## 2020-03-20 NOTE — PROGRESS NOTE ADULT - PROBLEM SELECTOR PLAN 1
MS better  weakness - frail elderly -   multiple medical issues -   needs assist with ADL  oral and skin hygiene  discussion ongoing about GOC  large family - Daughters in Law and sons and daughters all involved in discussion  pt remains full code

## 2020-03-20 NOTE — PROGRESS NOTE ADULT - SUBJECTIVE AND OBJECTIVE BOX
Neurology follow up note    GURJIT [id number]      Interval History:    Patient feels ok no new complaints.    MEDICATIONS    acetaminophen    Suspension .. 650 milliGRAM(s) Oral every 6 hours PRN  acetaminophen   Tablet .. 650 milliGRAM(s) Oral every 6 hours PRN  aspirin enteric coated 81 milliGRAM(s) Oral daily  atorvastatin 10 milliGRAM(s) Oral at bedtime  budesonide 160 MICROgram(s)/formoterol 4.5 MICROgram(s) Inhaler 2 Puff(s) Inhalation two times a day  cholestyramine Powder (Sugar-Free) 4 Gram(s) Oral daily  clobetasol 0.05% Cream 1 Application(s) Topical two times a day  ferrous    sulfate 325 milliGRAM(s) Oral daily  FLUoxetine 20 milliGRAM(s) Oral daily  heparin  Injectable 5000 Unit(s) SubCutaneous every 8 hours  melatonin 3 milliGRAM(s) Oral at bedtime  midodrine. 5 milliGRAM(s) Oral three times a day  nystatin Cream 1 Application(s) Topical two times a day  traMADol 50 milliGRAM(s) Oral every 6 hours PRN  valproic  acid Syrup 750 milliGRAM(s) Oral two times a day      Allergies    Levaquin (Pruritus)  mercury (Pruritus; Rash)  sulfa drugs (Pruritus)    Intolerances            Vital Signs Last 24 Hrs  T(C): 37.1 (20 Mar 2020 04:50), Max: 37.1 (20 Mar 2020 04:50)  T(F): 98.7 (20 Mar 2020 04:50), Max: 98.7 (20 Mar 2020 04:50)  HR: 92 (20 Mar 2020 04:50) (84 - 97)  BP: 116/77 (20 Mar 2020 04:50) (96/59 - 116/77)  BP(mean): --  RR: 17 (20 Mar 2020 04:50) (16 - 209)  SpO2: 94% (20 Mar 2020 04:50) (93% - 98%)    REVIEW OF SYSTEMS: Limited or unable to obtain secondary to patient's poor mental status.    Constitutional: No fever, chills, fatigue, generalized  weakness  Eyes: no eye pain, visual disturbances, or discharge  ENT:  No difficulty hearing, tinnitus, vertigo; No sinus or throat pain  Neck: No pain or stiffness  Respiratory: No cough, dyspnea, wheezing   Cardiovascular: No chest pain, palpitations,   Gastrointestinal: No abdominal or epigastric pain. No nausea, vomiting  No diarrhea or constipation.   Genitourinary: No dysuria, frequency, hematuria or incontinence  Neurological: No headaches, lightheadedness, vertigo, numbness or tremors  Psychiatric: No depression, anxiety, mood swings or difficulty sleeping  Musculoskeletal: No joint pain or swelling; No muscle, back or extremity pain      On Neurological Examination:    Mental Status - Patient is alert, awake,  loc hospital   year 2020     Follow simple commands    Speech -   Fluent                 Cranial Nerves - Pupils 3 mm equal and reactive to light,   extraocular eye movements intact.   smile symmetric  intact bilateral NLF    Motor Exam -   Right upper 4/5   Left upper  3/5  Right lower 2/5  Left lower 2/5    Muscle tone - is normal all over.  No asymmetry is seen.      Sensory    Bilateral intact to light touch    GENERAL Exam: Nontoxic , No Acute Distress   	  HEENT:  normocephalic, atraumatic  		  LUNGS:  Decreased bilaterally  	  HEART: Normal S1S2   No murmur RRR        	  GI/ ABDOMEN:  Soft  Non tender    EXTREMITIES:   No Edema  No Clubbing  No Cyanosis   	   SKIN: Normal  No Ecchymosis                LABS:  CBC Full  -  ( 20 Mar 2020 08:38 )  WBC Count : 14.10 K/uL  RBC Count : 3.85 M/uL  Hemoglobin : 11.1 g/dL  Hematocrit : 36.5 %  Platelet Count - Automated : 197 K/uL  Mean Cell Volume : 94.8 fl  Mean Cell Hemoglobin : 28.8 pg  Mean Cell Hemoglobin Concentration : 30.4 gm/dL  Auto Neutrophil # : 11.14 K/uL  Auto Lymphocyte # : 1.46 K/uL  Auto Monocyte # : 1.26 K/uL  Auto Eosinophil # : 0.11 K/uL  Auto Basophil # : 0.04 K/uL  Auto Neutrophil % : 79.0 %  Auto Lymphocyte % : 10.4 %  Auto Monocyte % : 8.9 %  Auto Eosinophil % : 0.8 %  Auto Basophil % : 0.3 %      03-19    135  |  96  |  67<H>  ----------------------------<  75  5.2   |  22  |  6.50<H>    Ca    8.8      19 Mar 2020 06:40    TPro  7.5  /  Alb  2.6<L>  /  TBili  0.3  /  DBili  x   /  AST  10<L>  /  ALT  13  /  AlkPhos  123<H>  03-19    Hemoglobin A1C:     LIVER FUNCTIONS - ( 19 Mar 2020 06:40 )  Alb: 2.6 g/dL / Pro: 7.5 g/dL / ALK PHOS: 123 U/L / ALT: 13 U/L / AST: 10 U/L / GGT: x           Vitamin B12         RADIOLOGY    ANALYSIS AND PLAN:  An 84-year-old with an episode of seizure and change in mental status.  1.	For episode of seizure, will increase Depakote 750 mg twice  off dilantin   2.	EEG intermittent generalized spike and waves   3.	Ativan 1 mg IV push q.6h. p.r.n. for any type of breakthrough seizure.  4.	Seizure precautions.  5.	For history of underlying depression, at present hard to evaluate the patient's psychiatric status secondary to the patient being lethargic, continue home medication when possible.  6.	For hyperlipidemia, continue the patient on her cholesterol medication.  7.	For change in mental status, questionable this could be metabolic encephalopathy from partial complex seizures versus any type of underlying infectious type process episodes of temperatures and hypotension possible h/o of shock    8.	Fall precaution.  9.	overall more awake and interactive today   10.	For chronic kidney disease, monitor renal function noted  HD as tolerated   11.	  Advance directives were discussed with them.  Primary  will be daughter, Ju, her cell phone number is [phone], home number is [phone] spoke 3/19/2020   12.	EEG no new changes   13.	MMSE 25/30  14.	Greater than 22 minutes of time was spent with the patient, plan of care, reviewing data, speaking to the family and multidisciplinary healthcare team.

## 2020-03-20 NOTE — PROGRESS NOTE ADULT - SUBJECTIVE AND OBJECTIVE BOX
Patient seen and examined at bedside.    tolerated diaylsis yesterday    ostomy outpt at 1600 in 24 hours      family meeting held today. DNRrescinded   Family wants two weeks of trial of intubation      Review of Systems:  General:denies fever chills, headache, weakness  HEENT: denies blurry vision,diffculty swallowing, difficulty hearing, tinnitus  Cardiovascular: denies chest pain  ,palpitations  Pulmonary:denies shortness of breath, cough, wheezing, hemoptysis  Gastrointestinal: denies abdominal pain, constipation, diarrhea,nausea , vomiting, hematochezia  : denies hematuria, dysuria, or incontinence  Neurological: denies weakness, numbness , tingling, dizziness, tremors  MSK: denies muscle pain, difficulty ambulating, swelling, back pain  skin: denies skin rash, itching, burning, or  skin lesions  Psychiatrical: denies mood disturbances, anxierty, feeling depressed, depression , or difficulty sleeping    Objective:  Vitals  T(C): 37.1 (03-20-20 @ 04:50), Max: 37.1 (03-20-20 @ 04:50)  HR: 92 (03-20-20 @ 04:50) (84 - 97)  BP: 116/77 (03-20-20 @ 04:50) (105/72 - 116/77)  RR: 17 (03-20-20 @ 04:50) (16 - 209)  SpO2: 94% (03-20-20 @ 11:06) (93% - 94%)    Physical Exam:  General: comfortable, no acute distress, well nourished  HEENT: Atraumatic, no LAD, trachea midline, PERRLA  Cardiovascular: normal s1s2, no murmurs, gallops or fricition rubs  Pulmonary: clear to ausculation Bilaterally, no wheezing , rhonchi  Gastrointestinal: soft non tender non distended, no masses felt, no organomegally::: ostomy...watery output  Muscloskeletal: no lower extremity edema, intact bilateral lower extremity pulses  Neurological: CN II-12 intact. No focal weakness  Psychiatrical: normal mood, cooperative  SKIN: no rash, lesions or ulcers      Labs:                          11.1   14.10 )-----------( 197      ( 20 Mar 2020 08:38 )             36.5     03-20    135  |  98  |  45<H>  ----------------------------<  72  4.7   |  26  |  5.50<H>    Ca    9.1      20 Mar 2020 08:38    TPro  7.7  /  Alb  2.5<L>  /  TBili  0.3  /  DBili  x   /  AST  12<L>  /  ALT  12  /  AlkPhos  124<H>  03-20    LIVER FUNCTIONS - ( 20 Mar 2020 08:38 )  Alb: 2.5 g/dL / Pro: 7.7 g/dL / ALK PHOS: 124 U/L / ALT: 12 U/L / AST: 12 U/L / GGT: x                 Active Medications  MEDICATIONS  (STANDING):  aspirin enteric coated 81 milliGRAM(s) Oral daily  atorvastatin 10 milliGRAM(s) Oral at bedtime  budesonide 160 MICROgram(s)/formoterol 4.5 MICROgram(s) Inhaler 2 Puff(s) Inhalation two times a day  cholestyramine Powder (Sugar-Free) 4 Gram(s) Oral daily  clobetasol 0.05% Cream 1 Application(s) Topical two times a day  ferrous    sulfate 325 milliGRAM(s) Oral daily  FLUoxetine 20 milliGRAM(s) Oral daily  heparin  Injectable 5000 Unit(s) SubCutaneous every 8 hours  melatonin 3 milliGRAM(s) Oral at bedtime  midodrine. 5 milliGRAM(s) Oral three times a day  nystatin Cream 1 Application(s) Topical two times a day  valproic  acid Syrup 750 milliGRAM(s) Oral two times a day    MEDICATIONS  (PRN):  acetaminophen    Suspension .. 650 milliGRAM(s) Oral every 6 hours PRN Temp greater or equal to 38C (100.4F), Moderate Pain (4 - 6)  acetaminophen   Tablet .. 650 milliGRAM(s) Oral every 6 hours PRN Mild Pain (1 - 3)  traMADol 50 milliGRAM(s) Oral every 6 hours PRN Moderate Pain (4 - 6)

## 2020-03-20 NOTE — PROGRESS NOTE ADULT - PROBLEM SELECTOR PLAN 9
- pt's hypotension was likely related to sepsis and all anti-hypertensives held  - pt's BP now rising and home regimen is being restarted  - bp low during dialysis ::: NOW ON MIDODRINE: DIALYSIS SUCCESSFUL

## 2020-03-20 NOTE — PROGRESS NOTE ADULT - PROBLEM SELECTOR PLAN 1
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

## 2020-03-20 NOTE — PROGRESS NOTE ADULT - SUBJECTIVE AND OBJECTIVE BOX
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 Patient's daughter present at bedside providing history due to patient AMS.  Patient daughter reports that over the past few days, patient seems weaker than usual (not able to empty ostomy, not able to assist with sacral wound cleaning, etc.) Last night she seemed confused, and was answering questions inappropriately, and having difficulty communicating. She has not had decreased urine output, and has not had any recent complaints. Daughter reports that a similar episode occurred over the summer, when the patient had a UTI. Patient currently does not remember the events of last night/this morning. She reports feeling cold, irritation of the skin around her ostomy site, sacral pain, and suprapubic discomfort (baseline). Patient denies fevers, chest pain, palpitations, LE edema.    awake and alert.   denies any chest pain or shortness of breath.      PAST MEDICAL & SURGICAL HISTORY:  Wound of sacral region  Depression  Iron deficiency anemia  Eczema  HTN (hypertension)  CKD (chronic kidney disease) stage 5, GFR less than 15 ml/min: not on HD  Herniated lumbar intervertebral disc  Tremor  Kidney atrophy: right  Colostomy status: 2006  Chronic UTI (urinary tract infection): chronic renae  Cervical cancer: 1970&#x27;s  Dyslipidemia  Hernia, incisional  S/P hip replacement: right 2013  S/P colon resection: 2006  S/P hysterectomy: 1977          PHYSICAL EXAM:    T(C): 36.7 (03-20-20 @ 13:23), Max: 37.1 (03-20-20 @ 04:50)  HR: 99 (03-20-20 @ 13:23) (92 - 99)  BP: 117/77 (03-20-20 @ 13:23) (105/72 - 117/77)  RR: 191 (03-20-20 @ 13:23) (16 - 191)  SpO2: 92% (03-20-20 @ 13:23) (92% - 94%)  Wt(kg): --    I&O's Detail    19 Mar 2020 07:01  -  20 Mar 2020 07:00  --------------------------------------------------------  IN:  Total IN: 0 mL    OUT:    Colostomy: 1600 mL    Indwelling Catheter - Urethral: 50 mL  Total OUT: 1650 mL    Total NET: -1650 mL          Respiratory: clear anteriorly, decreased BS at bases  Cardiovascular: S1 S2  Gastrointestinal: soft NT ND +BS  Extremities: trace edema   Neuro: Awake and alert    MEDICATIONS  (STANDING):  acetaminophen  IVPB .. 1000 milliGRAM(s) IV Intermittent once  aspirin enteric coated 81 milliGRAM(s) Oral daily  atorvastatin 10 milliGRAM(s) Oral at bedtime  budesonide 160 MICROgram(s)/formoterol 4.5 MICROgram(s) Inhaler 2 Puff(s) Inhalation two times a day  cholestyramine Powder (Sugar-Free) 4 Gram(s) Oral daily  clobetasol 0.05% Cream 1 Application(s) Topical two times a day  ferrous    sulfate 325 milliGRAM(s) Oral daily  FLUoxetine 20 milliGRAM(s) Oral daily  heparin  Injectable 5000 Unit(s) SubCutaneous every 8 hours  melatonin 3 milliGRAM(s) Oral at bedtime  midodrine. 5 milliGRAM(s) Oral three times a day  nystatin Cream 1 Application(s) Topical two times a day  valproic  acid Syrup 750 milliGRAM(s) Oral two times a day    MEDICATIONS  (PRN):  acetaminophen    Suspension .. 650 milliGRAM(s) Oral every 6 hours PRN Temp greater or equal to 38C (100.4F), Moderate Pain (4 - 6)  acetaminophen   Tablet .. 650 milliGRAM(s) Oral every 6 hours PRN Mild Pain (1 - 3)  traMADol 50 milliGRAM(s) Oral every 6 hours PRN Moderate Pain (4 - 6)                            11.1   14.10 )-----------( 197      ( 20 Mar 2020 08:38 )             36.5       03-20    135  |  98  |  45<H>  ----------------------------<  72  4.7   |  26  |  5.50<H>    Ca    9.1      20 Mar 2020 08:38    TPro  7.7  /  Alb  2.5<L>  /  TBili  0.3  /  DBili  x   /  AST  12<L>  /  ALT  12  /  AlkPhos  124<H>  03-20      Creatinine Trend: Creatinine Trend: 5.50<--, 6.50<--, 7.20<--, 5.70<--, 4.90<--, 4.30<--

## 2020-03-20 NOTE — PROGRESS NOTE ADULT - PROBLEM SELECTOR PLAN 4
patient with metabolic acidosis in setting of UTI and suspected pre-renal MEHDI, dehydration   - po sodium bicarb 650 mg TID  - pt's renal status still very poor. now patient and family may be amendable to dialysis  -dc planning

## 2020-03-21 LAB
ANION GAP SERPL CALC-SCNC: 15 MMOL/L — SIGNIFICANT CHANGE UP (ref 5–17)
BUN SERPL-MCNC: 60 MG/DL — HIGH (ref 7–23)
CALCIUM SERPL-MCNC: 9.6 MG/DL — SIGNIFICANT CHANGE UP (ref 8.5–10.1)
CHLORIDE SERPL-SCNC: 93 MMOL/L — LOW (ref 96–108)
CO2 SERPL-SCNC: 22 MMOL/L — SIGNIFICANT CHANGE UP (ref 22–31)
CREAT SERPL-MCNC: 7.1 MG/DL — HIGH (ref 0.5–1.3)
GLUCOSE SERPL-MCNC: 90 MG/DL — SIGNIFICANT CHANGE UP (ref 70–99)
HCT VFR BLD CALC: 42.2 % — SIGNIFICANT CHANGE UP (ref 34.5–45)
HGB BLD-MCNC: 13 G/DL — SIGNIFICANT CHANGE UP (ref 11.5–15.5)
MCHC RBC-ENTMCNC: 28.8 PG — SIGNIFICANT CHANGE UP (ref 27–34)
MCHC RBC-ENTMCNC: 30.8 GM/DL — LOW (ref 32–36)
MCV RBC AUTO: 93.6 FL — SIGNIFICANT CHANGE UP (ref 80–100)
NRBC # BLD: 0 /100 WBCS — SIGNIFICANT CHANGE UP (ref 0–0)
PLATELET # BLD AUTO: 207 K/UL — SIGNIFICANT CHANGE UP (ref 150–400)
POTASSIUM SERPL-MCNC: 5.3 MMOL/L — SIGNIFICANT CHANGE UP (ref 3.5–5.3)
POTASSIUM SERPL-SCNC: 5.3 MMOL/L — SIGNIFICANT CHANGE UP (ref 3.5–5.3)
RBC # BLD: 4.51 M/UL — SIGNIFICANT CHANGE UP (ref 3.8–5.2)
RBC # FLD: 16.1 % — HIGH (ref 10.3–14.5)
SODIUM SERPL-SCNC: 130 MMOL/L — LOW (ref 135–145)
WBC # BLD: 14.04 K/UL — HIGH (ref 3.8–10.5)
WBC # FLD AUTO: 14.04 K/UL — HIGH (ref 3.8–10.5)

## 2020-03-21 PROCEDURE — 99233 SBSQ HOSP IP/OBS HIGH 50: CPT

## 2020-03-21 RX ADMIN — HEPARIN SODIUM 5000 UNIT(S): 5000 INJECTION INTRAVENOUS; SUBCUTANEOUS at 06:22

## 2020-03-21 RX ADMIN — BUDESONIDE AND FORMOTEROL FUMARATE DIHYDRATE 2 PUFF(S): 160; 4.5 AEROSOL RESPIRATORY (INHALATION) at 21:40

## 2020-03-21 RX ADMIN — CHLORHEXIDINE GLUCONATE 1 APPLICATION(S): 213 SOLUTION TOPICAL at 14:46

## 2020-03-21 RX ADMIN — MIDODRINE HYDROCHLORIDE 5 MILLIGRAM(S): 2.5 TABLET ORAL at 06:22

## 2020-03-21 RX ADMIN — Medication 20 MILLIGRAM(S): at 14:44

## 2020-03-21 RX ADMIN — Medication 750 MILLIGRAM(S): at 06:21

## 2020-03-21 RX ADMIN — TRAMADOL HYDROCHLORIDE 50 MILLIGRAM(S): 50 TABLET ORAL at 14:51

## 2020-03-21 RX ADMIN — CHOLESTYRAMINE 4 GRAM(S): 4 POWDER, FOR SUSPENSION ORAL at 08:25

## 2020-03-21 RX ADMIN — Medication 1 APPLICATION(S): at 06:22

## 2020-03-21 RX ADMIN — NYSTATIN CREAM 1 APPLICATION(S): 100000 CREAM TOPICAL at 06:22

## 2020-03-21 RX ADMIN — Medication 1 APPLICATION(S): at 18:10

## 2020-03-21 RX ADMIN — HEPARIN SODIUM 5000 UNIT(S): 5000 INJECTION INTRAVENOUS; SUBCUTANEOUS at 14:44

## 2020-03-21 RX ADMIN — NYSTATIN CREAM 1 APPLICATION(S): 100000 CREAM TOPICAL at 18:07

## 2020-03-21 RX ADMIN — HEPARIN SODIUM 5000 UNIT(S): 5000 INJECTION INTRAVENOUS; SUBCUTANEOUS at 21:36

## 2020-03-21 RX ADMIN — ATORVASTATIN CALCIUM 10 MILLIGRAM(S): 80 TABLET, FILM COATED ORAL at 21:36

## 2020-03-21 RX ADMIN — MIDODRINE HYDROCHLORIDE 5 MILLIGRAM(S): 2.5 TABLET ORAL at 18:07

## 2020-03-21 RX ADMIN — Medication 81 MILLIGRAM(S): at 14:45

## 2020-03-21 RX ADMIN — Medication 750 MILLIGRAM(S): at 18:07

## 2020-03-21 RX ADMIN — MIDODRINE HYDROCHLORIDE 5 MILLIGRAM(S): 2.5 TABLET ORAL at 11:52

## 2020-03-21 RX ADMIN — Medication 3 MILLIGRAM(S): at 21:36

## 2020-03-21 RX ADMIN — TRAMADOL HYDROCHLORIDE 50 MILLIGRAM(S): 50 TABLET ORAL at 15:16

## 2020-03-21 RX ADMIN — BUDESONIDE AND FORMOTEROL FUMARATE DIHYDRATE 2 PUFF(S): 160; 4.5 AEROSOL RESPIRATORY (INHALATION) at 06:24

## 2020-03-21 RX ADMIN — Medication 325 MILLIGRAM(S): at 14:46

## 2020-03-21 NOTE — PROGRESS NOTE ADULT - ASSESSMENT
·	MEHDI, CKD 4, Solitary functioning kidney: Prerenal azotemia, (Atrophic right kidney)  ·	Metabolic acidosis  ·	Hyperkalemia  ·	Diabetes  ·	Hypertension  ·	Anemia      HD today. fluid removal as BP allows. Will increase midodrine dose. Will need perma cath. Monitor BP trend.   Hold procrit for now. Monitor blood sugar levels. Insulin coverage as needed. Dietary restriction. Overall prognosis poor.

## 2020-03-21 NOTE — PROGRESS NOTE ADULT - SUBJECTIVE AND OBJECTIVE BOX
Patient is a 84y old  Female who presents with a chief complaint of altered mental status (03 Mar 2020 17:47)  Patient seen in follow up for CKD 4, Hyperkalemia.     Seen on HD. BP labile. On midodrine. Poor catheter flow.     PAST MEDICAL HISTORY:  Wound of sacral region  Depression  Iron deficiency anemia  Eczema  HTN (hypertension)  CKD (chronic kidney disease) stage 5, GFR less than 15 ml/min  Herniated lumbar intervertebral disc  Depression  Tremor  Kidney atrophy  Colostomy status  Chronic UTI (urinary tract infection)  Cervical cancer  Dyslipidemia  Diabetes  Hernia, incisional      MEDICATIONS  (STANDING):  aspirin enteric coated 81 milliGRAM(s) Oral daily  atorvastatin 10 milliGRAM(s) Oral at bedtime  budesonide 160 MICROgram(s)/formoterol 4.5 MICROgram(s) Inhaler 2 Puff(s) Inhalation two times a day  chlorhexidine 2% Cloths 1 Application(s) Topical daily  cholestyramine Powder (Sugar-Free) 4 Gram(s) Oral daily  clobetasol 0.05% Cream 1 Application(s) Topical two times a day  ferrous    sulfate 325 milliGRAM(s) Oral daily  FLUoxetine 20 milliGRAM(s) Oral daily  heparin  Injectable 5000 Unit(s) SubCutaneous every 8 hours  melatonin 3 milliGRAM(s) Oral at bedtime  midodrine. 5 milliGRAM(s) Oral three times a day  nystatin Cream 1 Application(s) Topical two times a day  valproic  acid Syrup 750 milliGRAM(s) Oral two times a day    MEDICATIONS  (PRN):  acetaminophen    Suspension .. 650 milliGRAM(s) Oral every 6 hours PRN Temp greater or equal to 38C (100.4F), Moderate Pain (4 - 6)  acetaminophen   Tablet .. 650 milliGRAM(s) Oral every 6 hours PRN Mild Pain (1 - 3)  traMADol 50 milliGRAM(s) Oral every 6 hours PRN Moderate Pain (4 - 6)    T(C): 36.2 (03-21-20 @ 05:15), Max: 37.1 (03-20-20 @ 04:50)  HR: 87 (03-21-20 @ 05:15) (84 - 99)  BP: 128/78 (03-21-20 @ 05:15) (105/72 - 132/77)  RR: 17 (03-21-20 @ 05:15)  SpO2: 96% (03-21-20 @ 05:15)  Wt(kg): --  I&O's Detail    20 Mar 2020 07:01  -  21 Mar 2020 07:00  --------------------------------------------------------  IN:    Oral Fluid: 240 mL  Total IN: 240 mL    OUT:    Colostomy: 2800 mL    Indwelling Catheter - Urethral: 150 mL  Total OUT: 2950 mL    Total NET: -2710 mL          PHYSICAL EXAM:  General: No distress, dialysis catheter  Respiratory: b/l air entry  Cardiovascular: S1 S2  Gastrointestinal: soft, ileostomy  Extremities:  edema, + renae               LABORATORY:                        13.0   14.04 )-----------( 207      ( 21 Mar 2020 07:14 )             42.2     03-21    130<L>  |  93<L>  |  60<H>  ----------------------------<  90  5.3   |  22  |  7.10<H>    Ca    9.6      21 Mar 2020 07:14    TPro  7.7  /  Alb  2.5<L>  /  TBili  0.3  /  DBili  x   /  AST  12<L>  /  ALT  12  /  AlkPhos  124<H>  03-20    Sodium, Serum: 130 mmol/L (03-21 @ 07:14)  Sodium, Serum: 135 mmol/L (03-20 @ 08:38)    Potassium, Serum: 5.3 mmol/L (03-21 @ 07:14)  Potassium, Serum: 4.7 mmol/L (03-20 @ 08:38)    Hemoglobin: 13.0 g/dL (03-21 @ 07:14)  Hemoglobin: 11.1 g/dL (03-20 @ 08:38)  Hemoglobin: 11.6 g/dL (03-19 @ 06:40)    Creatinine, Serum 7.10 (03-21 @ 07:14)  Creatinine, Serum 5.50 (03-20 @ 08:38)  Creatinine, Serum 6.50 (03-19 @ 06:40)        LIVER FUNCTIONS - ( 20 Mar 2020 08:38 )  Alb: 2.5 g/dL / Pro: 7.7 g/dL / ALK PHOS: 124 U/L / ALT: 12 U/L / AST: 12 U/L / GGT: x

## 2020-03-21 NOTE — PROGRESS NOTE ADULT - SUBJECTIVE AND OBJECTIVE BOX
Neurology follow up note    GURJIT [id number]      Interval History:    Patient feels ok no new complaints.    MEDICATIONS    acetaminophen    Suspension .. 650 milliGRAM(s) Oral every 6 hours PRN  acetaminophen   Tablet .. 650 milliGRAM(s) Oral every 6 hours PRN  aspirin enteric coated 81 milliGRAM(s) Oral daily  atorvastatin 10 milliGRAM(s) Oral at bedtime  budesonide 160 MICROgram(s)/formoterol 4.5 MICROgram(s) Inhaler 2 Puff(s) Inhalation two times a day  chlorhexidine 2% Cloths 1 Application(s) Topical daily  cholestyramine Powder (Sugar-Free) 4 Gram(s) Oral daily  clobetasol 0.05% Cream 1 Application(s) Topical two times a day  ferrous    sulfate 325 milliGRAM(s) Oral daily  FLUoxetine 20 milliGRAM(s) Oral daily  heparin  Injectable 5000 Unit(s) SubCutaneous every 8 hours  melatonin 3 milliGRAM(s) Oral at bedtime  midodrine. 5 milliGRAM(s) Oral three times a day  nystatin Cream 1 Application(s) Topical two times a day  traMADol 50 milliGRAM(s) Oral every 6 hours PRN  valproic  acid Syrup 750 milliGRAM(s) Oral two times a day      Allergies    Levaquin (Pruritus)  mercury (Pruritus; Rash)  sulfa drugs (Pruritus)    Intolerances            Vital Signs Last 24 Hrs  T(C): 36.2 (21 Mar 2020 05:15), Max: 36.7 (20 Mar 2020 13:23)  T(F): 97.2 (21 Mar 2020 05:15), Max: 98 (20 Mar 2020 13:23)  HR: 87 (21 Mar 2020 05:15) (87 - 99)  BP: 128/78 (21 Mar 2020 05:15) (117/77 - 132/77)  BP(mean): --  RR: 17 (21 Mar 2020 05:15) (17 - 191)  SpO2: 96% (21 Mar 2020 05:15) (92% - 97%)    REVIEW OF SYSTEMS: Limited or unable to obtain secondary to patient's poor mental status.    Constitutional: No fever, chills, fatigue, generalized  weakness  Eyes: no eye pain, visual disturbances, or discharge  ENT:  No difficulty hearing, tinnitus, vertigo; No sinus or throat pain  Neck: No pain or stiffness  Respiratory: No cough, dyspnea, wheezing   Cardiovascular: No chest pain, palpitations,   Gastrointestinal: No abdominal or epigastric pain. No nausea, vomiting  No diarrhea or constipation.   Genitourinary: No dysuria, frequency, hematuria or incontinence  Neurological: No headaches, lightheadedness, vertigo, numbness or tremors  Psychiatric: No depression, anxiety, mood swings or difficulty sleeping  Musculoskeletal: No joint pain or swelling; No muscle, back or extremity pain      On Neurological Examination:    Mental Status - Patient is alert, awake,  [facility]   year 2020     Follow simple commands    Speech -   Fluent                 Cranial Nerves - Pupils 3 mm equal and reactive to light,   extraocular eye movements intact.   smile symmetric  intact bilateral NLF    Motor Exam -   Right upper 4/5   Left upper  3/5  Right lower 2/5  Left lower 2/5    Muscle tone - is normal all over.  No asymmetry is seen.      Sensory    Bilateral intact to light touch    GENERAL Exam: Nontoxic , No Acute Distress   	  HEENT:  normocephalic, atraumatic  		  LUNGS:  Decreased bilaterally  	  HEART: Normal S1S2   No murmur RRR        	  GI/ ABDOMEN:  Soft  Non tender    EXTREMITIES:   No Edema  No Clubbing  No Cyanosis   	   SKIN: Normal  No Ecchymosis                     LABS:  CBC Full  -  ( 21 Mar 2020 07:14 )  WBC Count : 14.04 K/uL  RBC Count : 4.51 M/uL  Hemoglobin : 13.0 g/dL  Hematocrit : 42.2 %  Platelet Count - Automated : 207 K/uL  Mean Cell Volume : 93.6 fl  Mean Cell Hemoglobin : 28.8 pg  Mean Cell Hemoglobin Concentration : 30.8 gm/dL  Auto Neutrophil # : x  Auto Lymphocyte # : x  Auto Monocyte # : x  Auto Eosinophil # : x  Auto Basophil # : x  Auto Neutrophil % : x  Auto Lymphocyte % : x  Auto Monocyte % : x  Auto Eosinophil % : x  Auto Basophil % : x      03-21    130<L>  |  93<L>  |  60<H>  ----------------------------<  90  5.3   |  22  |  7.10<H>    Ca    9.6      21 Mar 2020 07:14    TPro  7.7  /  Alb  2.5<L>  /  TBili  0.3  /  DBili  x   /  AST  12<L>  /  ALT  12  /  AlkPhos  124<H>  03-20    Hemoglobin A1C:     LIVER FUNCTIONS - ( 20 Mar 2020 08:38 )  Alb: 2.5 g/dL / Pro: 7.7 g/dL / ALK PHOS: 124 U/L / ALT: 12 U/L / AST: 12 U/L / GGT: x           Vitamin B12         RADIOLOGY    ANALYSIS AND PLAN:  An 84-year-old with an episode of seizure and change in mental status.  1.	For episode of seizure, will increase Depakote 750 mg twice  off dilantin   2.	EEG intermittent generalized spike and waves   3.	Ativan 1 mg IV push q.6h. p.r.n. for any type of breakthrough seizure.  4.	Seizure precautions.  5.	For history of underlying depression, at present hard to evaluate the patient's psychiatric status secondary to the patient being lethargic, continue home medication when possible.  6.	For hyperlipidemia, continue the patient on her cholesterol medication.  7.	For change in mental status, questionable this could be metabolic encephalopathy from partial complex seizures versus any type of underlying infectious type process episodes of temperatures and hypotension possible h/o of shock    8.	Fall precaution.  9.	overall more awake and interactive today   10.	For chronic kidney disease, monitor renal function noted  HD as tolerated   11.	  Advance directives were discussed with them.  Primary  will be daughter, Ju, her cell phone number is [phone], home number is [phone] spoke 3/19/2020   12.	EEG no new changes   13.	MMSE 25/30  14.	monitor oral intake   15.	Greater than 20 minutes of time was spent with the patient, plan of care, reviewing data, speaking to the family and multidisciplinary healthcare team.

## 2020-03-21 NOTE — PROGRESS NOTE ADULT - PROBLEM SELECTOR PROBLEM 4
B positive Acute renal failure superimposed on stage 5 chronic kidney disease, not on chronic dialysis, unspecified acute renal failure type

## 2020-03-21 NOTE — PROGRESS NOTE ADULT - PROBLEM SELECTOR PLAN 1
now on HD  renal follow up noted  vs and HD and Sat reviewed  monitor Mental Status  monitor leukocytosis  cvs rx regimen and BP control  on Midodrine  will follow

## 2020-03-21 NOTE — PROGRESS NOTE ADULT - ASSESSMENT
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 Course c/b likely generalized seizure on 3/5/20, and acute hypoxic respiratory failure and suspected sepsis due to suspected aspiration PNA, s/p ICU stay. Now downgraded to medicine service. Patient now with ESRD and likely needing long term dialysis

## 2020-03-21 NOTE — PROGRESS NOTE ADULT - ATTENDING COMMENTS
At this time patient remains medically acute    1. The main issue now is patients Kidney function. she is now ESRD and receiving dialysis via central line. Patient will receive dialysis today. Nephrology to determine if a permacath is warranted prior to discharge.    2. on dc will need to find patient a location foroutpatient dialysis    3. Diarrhea: ischronic, outpt 1 liter. in reviewing trends , this is chronic for patient  continue to monitor

## 2020-03-21 NOTE — PROGRESS NOTE ADULT - SUBJECTIVE AND OBJECTIVE BOX
Patient seen and examined at bedside  reports she does not feel well  vitals stable    ostomy outpt over 24 hours now 1000    going for dialysis today    +/- need for permacath in am on Monday    Review of Systems:  General:denies fever chills, headache, ++weakness  HEENT: denies blurry vision,diffculty swallowing, difficulty hearing, tinnitus  Cardiovascular: denies chest pain  ,palpitations  Pulmonary:denies shortness of breath, cough, wheezing, hemoptysis  Gastrointestinal: denies abdominal pain, constipation, diarrhea,nausea , vomiting, hematochezia  : denies hematuria, dysuria, or incontinence  Neurological: denies weakness, numbness , tingling, dizziness, tremors  MSK: denies muscle pain, difficulty ambulating, swelling, back pain  skin: denies skin rash, itching, burning, or  skin lesions  Psychiatrical: denies mood disturbances, anxierty, feeling depressed, depression , or difficulty sleeping    Objective:  Vitals  T(C): 36.2 (03-21-20 @ 05:15), Max: 36.7 (03-20-20 @ 13:23)  HR: 87 (03-21-20 @ 05:15) (87 - 99)  BP: 128/78 (03-21-20 @ 05:15) (117/77 - 132/77)  RR: 17 (03-21-20 @ 05:15) (17 - 191)  SpO2: 96% (03-21-20 @ 05:15) (92% - 97%)    Physical Exam:  General: comfortable, no acute distress, well nourished  HEENT: Atraumatic, no LAD, trachea midline, PERRLA  Cardiovascular: normal s1s2, no murmurs, gallops or fricition rubs  Pulmonary: clear to ausculation Bilaterally, no wheezing , rhonchi  Gastrointestinal: soft non tender non distended, no masses felt, no organomegally  Muscloskeletal: no lower extremity edema, intact bilateral lower extremity pulses  Neurological: CN II-12 intact. No focal weakness  Psychiatrical: normal mood, cooperative  SKIN: no rash, lesions or ulcers      Labs:                          13.0   14.04 )-----------( 207      ( 21 Mar 2020 07:14 )             42.2     03-21    130<L>  |  93<L>  |  60<H>  ----------------------------<  90  5.3   |  22  |  7.10<H>    Ca    9.6      21 Mar 2020 07:14    TPro  7.7  /  Alb  2.5<L>  /  TBili  0.3  /  DBili  x   /  AST  12<L>  /  ALT  12  /  AlkPhos  124<H>  03-20    LIVER FUNCTIONS - ( 20 Mar 2020 08:38 )  Alb: 2.5 g/dL / Pro: 7.7 g/dL / ALK PHOS: 124 U/L / ALT: 12 U/L / AST: 12 U/L / GGT: x                 Active Medications  MEDICATIONS  (STANDING):  aspirin enteric coated 81 milliGRAM(s) Oral daily  atorvastatin 10 milliGRAM(s) Oral at bedtime  budesonide 160 MICROgram(s)/formoterol 4.5 MICROgram(s) Inhaler 2 Puff(s) Inhalation two times a day  chlorhexidine 2% Cloths 1 Application(s) Topical daily  cholestyramine Powder (Sugar-Free) 4 Gram(s) Oral daily  clobetasol 0.05% Cream 1 Application(s) Topical two times a day  ferrous    sulfate 325 milliGRAM(s) Oral daily  FLUoxetine 20 milliGRAM(s) Oral daily  heparin  Injectable 5000 Unit(s) SubCutaneous every 8 hours  melatonin 3 milliGRAM(s) Oral at bedtime  midodrine. 5 milliGRAM(s) Oral three times a day  nystatin Cream 1 Application(s) Topical two times a day  valproic  acid Syrup 750 milliGRAM(s) Oral two times a day    MEDICATIONS  (PRN):  acetaminophen    Suspension .. 650 milliGRAM(s) Oral every 6 hours PRN Temp greater or equal to 38C (100.4F), Moderate Pain (4 - 6)  acetaminophen   Tablet .. 650 milliGRAM(s) Oral every 6 hours PRN Mild Pain (1 - 3)  traMADol 50 milliGRAM(s) Oral every 6 hours PRN Moderate Pain (4 - 6)

## 2020-03-21 NOTE — PROGRESS NOTE ADULT - SUBJECTIVE AND OBJECTIVE BOX
Date/Time Patient Seen:  		  Referring MD:   Data Reviewed	       Patient is a 84y old  Female who presents with a chief complaint of altered mental status (20 Mar 2020 14:55)      Subjective/HPI     PAST MEDICAL & SURGICAL HISTORY:  Wound of sacral region  Depression  Iron deficiency anemia  Eczema  HTN (hypertension)  CKD (chronic kidney disease) stage 5, GFR less than 15 ml/min: not on HD  Herniated lumbar intervertebral disc  Depression  Tremor  Kidney atrophy: right  Colostomy status: 2006  Chronic UTI (urinary tract infection): chronic renae  Cervical cancer: 1970&#x27;s  Dyslipidemia  Diabetes: type 2  Hernia, incisional  S/P hip replacement: right 2013  S/P colon resection: 2006  S/P hysterectomy: 1977        Medication list         MEDICATIONS  (STANDING):  aspirin enteric coated 81 milliGRAM(s) Oral daily  atorvastatin 10 milliGRAM(s) Oral at bedtime  budesonide 160 MICROgram(s)/formoterol 4.5 MICROgram(s) Inhaler 2 Puff(s) Inhalation two times a day  chlorhexidine 2% Cloths 1 Application(s) Topical daily  cholestyramine Powder (Sugar-Free) 4 Gram(s) Oral daily  clobetasol 0.05% Cream 1 Application(s) Topical two times a day  ferrous    sulfate 325 milliGRAM(s) Oral daily  FLUoxetine 20 milliGRAM(s) Oral daily  heparin  Injectable 5000 Unit(s) SubCutaneous every 8 hours  melatonin 3 milliGRAM(s) Oral at bedtime  midodrine. 5 milliGRAM(s) Oral three times a day  nystatin Cream 1 Application(s) Topical two times a day  valproic  acid Syrup 750 milliGRAM(s) Oral two times a day    MEDICATIONS  (PRN):  acetaminophen    Suspension .. 650 milliGRAM(s) Oral every 6 hours PRN Temp greater or equal to 38C (100.4F), Moderate Pain (4 - 6)  acetaminophen   Tablet .. 650 milliGRAM(s) Oral every 6 hours PRN Mild Pain (1 - 3)  traMADol 50 milliGRAM(s) Oral every 6 hours PRN Moderate Pain (4 - 6)         Vitals log        ICU Vital Signs Last 24 Hrs  T(C): 36.2 (21 Mar 2020 05:15), Max: 36.7 (20 Mar 2020 13:23)  T(F): 97.2 (21 Mar 2020 05:15), Max: 98 (20 Mar 2020 13:23)  HR: 87 (21 Mar 2020 05:15) (87 - 99)  BP: 128/78 (21 Mar 2020 05:15) (117/77 - 132/77)  BP(mean): --  ABP: --  ABP(mean): --  RR: 17 (21 Mar 2020 05:15) (17 - 191)  SpO2: 96% (21 Mar 2020 05:15) (92% - 97%)           Input and Output:  I&O's Detail    19 Mar 2020 07:01  -  20 Mar 2020 07:00  --------------------------------------------------------  IN:  Total IN: 0 mL    OUT:    Colostomy: 1600 mL    Indwelling Catheter - Urethral: 50 mL  Total OUT: 1650 mL    Total NET: -1650 mL      20 Mar 2020 07:01  -  21 Mar 2020 06:28  --------------------------------------------------------  IN:  Total IN: 0 mL    OUT:    Colostomy: 2500 mL  Total OUT: 2500 mL    Total NET: -2500 mL          Lab Data                        11.1   14.10 )-----------( 197      ( 20 Mar 2020 08:38 )             36.5     03-20    135  |  98  |  45<H>  ----------------------------<  72  4.7   |  26  |  5.50<H>    Ca    9.1      20 Mar 2020 08:38    TPro  7.7  /  Alb  2.5<L>  /  TBili  0.3  /  DBili  x   /  AST  12<L>  /  ALT  12  /  AlkPhos  124<H>  03-20            Review of Systems	      Objective     Physical Examination    heart s1s2  lung dec BS      Pertinent Lab findings & Imaging      Ana:  NO   Adequate UO     I&O's Detail    19 Mar 2020 07:01  -  20 Mar 2020 07:00  --------------------------------------------------------  IN:  Total IN: 0 mL    OUT:    Colostomy: 1600 mL    Indwelling Catheter - Urethral: 50 mL  Total OUT: 1650 mL    Total NET: -1650 mL      20 Mar 2020 07:01  -  21 Mar 2020 06:28  --------------------------------------------------------  IN:  Total IN: 0 mL    OUT:    Colostomy: 2500 mL  Total OUT: 2500 mL    Total NET: -2500 mL               Discussed with:     Cultures:	        Radiology

## 2020-03-22 LAB
ALBUMIN SERPL ELPH-MCNC: 2.6 G/DL — LOW (ref 3.3–5)
ALP SERPL-CCNC: 155 U/L — HIGH (ref 40–120)
ALT FLD-CCNC: 12 U/L — SIGNIFICANT CHANGE UP (ref 12–78)
ANION GAP SERPL CALC-SCNC: 13 MMOL/L — SIGNIFICANT CHANGE UP (ref 5–17)
AST SERPL-CCNC: 13 U/L — LOW (ref 15–37)
BASOPHILS # BLD AUTO: 0.08 K/UL — SIGNIFICANT CHANGE UP (ref 0–0.2)
BASOPHILS NFR BLD AUTO: 0.7 % — SIGNIFICANT CHANGE UP (ref 0–2)
BILIRUB SERPL-MCNC: 0.3 MG/DL — SIGNIFICANT CHANGE UP (ref 0.2–1.2)
BUN SERPL-MCNC: 51 MG/DL — HIGH (ref 7–23)
CALCIUM SERPL-MCNC: 9.6 MG/DL — SIGNIFICANT CHANGE UP (ref 8.5–10.1)
CHLORIDE SERPL-SCNC: 94 MMOL/L — LOW (ref 96–108)
CO2 SERPL-SCNC: 24 MMOL/L — SIGNIFICANT CHANGE UP (ref 22–31)
CREAT SERPL-MCNC: 6.4 MG/DL — HIGH (ref 0.5–1.3)
EOSINOPHIL # BLD AUTO: 0.09 K/UL — SIGNIFICANT CHANGE UP (ref 0–0.5)
EOSINOPHIL NFR BLD AUTO: 0.8 % — SIGNIFICANT CHANGE UP (ref 0–6)
GLUCOSE SERPL-MCNC: 94 MG/DL — SIGNIFICANT CHANGE UP (ref 70–99)
HCT VFR BLD CALC: 41.4 % — SIGNIFICANT CHANGE UP (ref 34.5–45)
HGB BLD-MCNC: 12.6 G/DL — SIGNIFICANT CHANGE UP (ref 11.5–15.5)
IMM GRANULOCYTES NFR BLD AUTO: 0.6 % — SIGNIFICANT CHANGE UP (ref 0–1.5)
LYMPHOCYTES # BLD AUTO: 1.31 K/UL — SIGNIFICANT CHANGE UP (ref 1–3.3)
LYMPHOCYTES # BLD AUTO: 11.8 % — LOW (ref 13–44)
MCHC RBC-ENTMCNC: 28.4 PG — SIGNIFICANT CHANGE UP (ref 27–34)
MCHC RBC-ENTMCNC: 30.4 GM/DL — LOW (ref 32–36)
MCV RBC AUTO: 93.2 FL — SIGNIFICANT CHANGE UP (ref 80–100)
MONOCYTES # BLD AUTO: 0.99 K/UL — HIGH (ref 0–0.9)
MONOCYTES NFR BLD AUTO: 8.9 % — SIGNIFICANT CHANGE UP (ref 2–14)
NEUTROPHILS # BLD AUTO: 8.53 K/UL — HIGH (ref 1.8–7.4)
NEUTROPHILS NFR BLD AUTO: 77.2 % — HIGH (ref 43–77)
NRBC # BLD: 0 /100 WBCS — SIGNIFICANT CHANGE UP (ref 0–0)
PLATELET # BLD AUTO: 199 K/UL — SIGNIFICANT CHANGE UP (ref 150–400)
POTASSIUM SERPL-MCNC: 5 MMOL/L — SIGNIFICANT CHANGE UP (ref 3.5–5.3)
POTASSIUM SERPL-SCNC: 5 MMOL/L — SIGNIFICANT CHANGE UP (ref 3.5–5.3)
PROT SERPL-MCNC: 8.5 G/DL — HIGH (ref 6–8.3)
RBC # BLD: 4.44 M/UL — SIGNIFICANT CHANGE UP (ref 3.8–5.2)
RBC # FLD: 15.9 % — HIGH (ref 10.3–14.5)
SODIUM SERPL-SCNC: 131 MMOL/L — LOW (ref 135–145)
WBC # BLD: 11.07 K/UL — HIGH (ref 3.8–10.5)
WBC # FLD AUTO: 11.07 K/UL — HIGH (ref 3.8–10.5)

## 2020-03-22 PROCEDURE — 99233 SBSQ HOSP IP/OBS HIGH 50: CPT

## 2020-03-22 RX ADMIN — Medication 1 APPLICATION(S): at 17:34

## 2020-03-22 RX ADMIN — ATORVASTATIN CALCIUM 10 MILLIGRAM(S): 80 TABLET, FILM COATED ORAL at 21:32

## 2020-03-22 RX ADMIN — MIDODRINE HYDROCHLORIDE 5 MILLIGRAM(S): 2.5 TABLET ORAL at 05:23

## 2020-03-22 RX ADMIN — HEPARIN SODIUM 5000 UNIT(S): 5000 INJECTION INTRAVENOUS; SUBCUTANEOUS at 12:20

## 2020-03-22 RX ADMIN — Medication 750 MILLIGRAM(S): at 05:23

## 2020-03-22 RX ADMIN — TRAMADOL HYDROCHLORIDE 50 MILLIGRAM(S): 50 TABLET ORAL at 21:37

## 2020-03-22 RX ADMIN — CHOLESTYRAMINE 4 GRAM(S): 4 POWDER, FOR SUSPENSION ORAL at 09:04

## 2020-03-22 RX ADMIN — BUDESONIDE AND FORMOTEROL FUMARATE DIHYDRATE 2 PUFF(S): 160; 4.5 AEROSOL RESPIRATORY (INHALATION) at 09:04

## 2020-03-22 RX ADMIN — NYSTATIN CREAM 1 APPLICATION(S): 100000 CREAM TOPICAL at 17:34

## 2020-03-22 RX ADMIN — MIDODRINE HYDROCHLORIDE 5 MILLIGRAM(S): 2.5 TABLET ORAL at 12:20

## 2020-03-22 RX ADMIN — Medication 3 MILLIGRAM(S): at 21:37

## 2020-03-22 RX ADMIN — HEPARIN SODIUM 5000 UNIT(S): 5000 INJECTION INTRAVENOUS; SUBCUTANEOUS at 21:32

## 2020-03-22 RX ADMIN — Medication 20 MILLIGRAM(S): at 12:20

## 2020-03-22 RX ADMIN — TRAMADOL HYDROCHLORIDE 50 MILLIGRAM(S): 50 TABLET ORAL at 22:37

## 2020-03-22 RX ADMIN — NYSTATIN CREAM 1 APPLICATION(S): 100000 CREAM TOPICAL at 05:23

## 2020-03-22 RX ADMIN — TRAMADOL HYDROCHLORIDE 50 MILLIGRAM(S): 50 TABLET ORAL at 05:23

## 2020-03-22 RX ADMIN — Medication 1 APPLICATION(S): at 05:23

## 2020-03-22 RX ADMIN — Medication 81 MILLIGRAM(S): at 12:20

## 2020-03-22 RX ADMIN — BUDESONIDE AND FORMOTEROL FUMARATE DIHYDRATE 2 PUFF(S): 160; 4.5 AEROSOL RESPIRATORY (INHALATION) at 21:32

## 2020-03-22 RX ADMIN — Medication 750 MILLIGRAM(S): at 17:34

## 2020-03-22 RX ADMIN — MIDODRINE HYDROCHLORIDE 5 MILLIGRAM(S): 2.5 TABLET ORAL at 17:34

## 2020-03-22 RX ADMIN — Medication 325 MILLIGRAM(S): at 12:20

## 2020-03-22 RX ADMIN — TRAMADOL HYDROCHLORIDE 50 MILLIGRAM(S): 50 TABLET ORAL at 06:23

## 2020-03-22 RX ADMIN — HEPARIN SODIUM 5000 UNIT(S): 5000 INJECTION INTRAVENOUS; SUBCUTANEOUS at 05:23

## 2020-03-22 RX ADMIN — CHLORHEXIDINE GLUCONATE 1 APPLICATION(S): 213 SOLUTION TOPICAL at 12:20

## 2020-03-22 NOTE — PROGRESS NOTE ADULT - SUBJECTIVE AND OBJECTIVE BOX
Patient seen and examined at bedside,  today she is reporting pain in her right hip. specifically at the bony prominence of right buttock denies fever chills although reports she doesnt feel well  discussed colostomy with her. she reports she "always has chronic diarrhea'    Vitals stable  LFts slightly uptrendig  Colostomy outpt at almost 3 liters    Review of Systems:  General:denies fever chills, headache, weakness  HEENT: denies blurry vision,diffculty swallowing, difficulty hearing, tinnitus  Cardiovascular: denies chest pain  ,palpitations  Pulmonary:denies shortness of breath, cough, wheezing, hemoptysis  Gastrointestinal: denies abdominal pain, constipation, diarrhea,nausea , vomiting, hematochezia  : denies hematuria, dysuria, or incontinence  Neurological: denies weakness, numbness , tingling, dizziness, tremors  MSK: denies muscle pain, difficulty ambulating, swelling, back pain , ++++RIGHT HIP PAIN  skin: denies skin rash, itching, burning, or  skin lesions  Psychiatrical: denies mood disturbances, anxierty, feeling depressed, depression , or difficulty sleeping    ~Objective:  Vitals  T(C): 36.7 (03-22-20 @ 05:00), Max: 36.7 (03-21-20 @ 14:42)  HR: 91 (03-22-20 @ 05:00) (82 - 109)  BP: 118/78 (03-22-20 @ 05:00) (112/77 - 127/85)  RR: 18 (03-22-20 @ 05:00) (18 - 19)  SpO2: 95% (03-22-20 @ 05:00) (94% - 97%)    Physical Exam:  General: comfortable, no acute distress, well nourished  HEENT: Atraumatic, no LAD, trachea midline, PERRLA  Cardiovascular: normal s1s2, no murmurs, gallops or fricition rubs  Pulmonary: clear to ausculation Bilaterally, no wheezing , rhonchi  Gastrointestinal: soft non tender non distended, no masses felt, no organomegally, ++++COLOSTOMY draining perfuse watery stool.  Muscloskeletal: no lower extremity edema, intact bilateral lower extremity pulses  Neurological: CN II-12 intact. No focal weakness  Psychiatrical: normal mood, cooperative  SKIN: no rash, lesions or ulcers    ~  Labs:                          12.6   11.07 )-----------( 199      ( 22 Mar 2020 08:59 )             41.4     03-22    131<L>  |  94<L>  |  51<H>  ----------------------------<  94  5.0   |  24  |  6.40<H>    Ca    9.6      22 Mar 2020 08:59    TPro  8.5<H>  /  Alb  2.6<L>  /  TBili  0.3  /  DBili  x   /  AST  13<L>  /  ALT  12  /  AlkPhos  155<H>  03-22    LIVER FUNCTIONS - ( 22 Mar 2020 08:59 )  Alb: 2.6 g/dL / Pro: 8.5 g/dL / ALK PHOS: 155 U/L / ALT: 12 U/L / AST: 13 U/L / GGT: x                 Active Medications  MEDICATIONS  (STANDING):  aspirin enteric coated 81 milliGRAM(s) Oral daily  atorvastatin 10 milliGRAM(s) Oral at bedtime  budesonide 160 MICROgram(s)/formoterol 4.5 MICROgram(s) Inhaler 2 Puff(s) Inhalation two times a day  chlorhexidine 2% Cloths 1 Application(s) Topical daily  cholestyramine Powder (Sugar-Free) 4 Gram(s) Oral daily  clobetasol 0.05% Cream 1 Application(s) Topical two times a day  ferrous    sulfate 325 milliGRAM(s) Oral daily  FLUoxetine 20 milliGRAM(s) Oral daily  heparin  Injectable 5000 Unit(s) SubCutaneous every 8 hours  melatonin 3 milliGRAM(s) Oral at bedtime  midodrine. 5 milliGRAM(s) Oral three times a day  nystatin Cream 1 Application(s) Topical two times a day  valproic  acid Syrup 750 milliGRAM(s) Oral two times a day    MEDICATIONS  (PRN):  acetaminophen   Tablet .. 650 milliGRAM(s) Oral every 6 hours PRN Mild Pain (1 - 3)  traMADol 50 milliGRAM(s) Oral every 6 hours PRN Moderate Pain (4 - 6)

## 2020-03-22 NOTE — PROGRESS NOTE ADULT - PROBLEM SELECTOR PLAN 1
now on HD  renal follow up noted  vs and HD and Sat reviewed  monitor Mental Status  monitor leukocytosis  cvs rx regimen and BP control  on Midodrine  will follow.

## 2020-03-22 NOTE — PROGRESS NOTE ADULT - PROBLEM SELECTOR PLAN 8
----- Message from Vivian Durant sent at 10/14/2019  1:27 PM CDT -----  Contact: Patient  Type: Needs Medical Advice    Who Called:  Patient  Symptoms (please be specific):  car[place]  How long has patient had these symptoms:  carlton  Pharmacy name and phone #:  [facility]  Best Call Back Number: [phone] (home)    Additional Information: Patient is requesting a doctor's excuse for her visit on today, to cover today, 10/14/19 and tomorrow, 10/15/19. Thank you!     - Anemia likely due to LELO, and anemia of chronic disease in setting of Stage 5 CKD  - Fe panel from 2/2020 showed low total iron, low total iron binding capacity  - hemoglobin has been somewhat labile during admission; monitor closely, no sign of acute bleeding  - Continue home ferrous sulfate   - Continue epogen as per nephro

## 2020-03-22 NOTE — PROGRESS NOTE ADULT - SUBJECTIVE AND OBJECTIVE BOX
Patient is a 84y old  Female who presents with a chief complaint of altered mental status (03 Mar 2020 17:47)  Patient seen in follow up for CKD 4, Hyperkalemia.     Seen on HD. BP labile. On midodrine. Poor catheter flow.     PAST MEDICAL HISTORY:  Wound of sacral region  Depression  Iron deficiency anemia  Eczema  HTN (hypertension)  CKD (chronic kidney disease) stage 5, GFR less than 15 ml/min  Herniated lumbar intervertebral disc  Depression  Tremor  Kidney atrophy  Colostomy status  Chronic UTI (urinary tract infection)  Cervical cancer  Dyslipidemia  Diabetes  Hernia, incisional      MEDICATIONS  (STANDING):  atorvastatin 10 milliGRAM(s) Oral at bedtime  budesonide 160 MICROgram(s)/formoterol 4.5 MICROgram(s) Inhaler 2 Puff(s) Inhalation two times a day  chlorhexidine 2% Cloths 1 Application(s) Topical daily  cholestyramine Powder (Sugar-Free) 4 Gram(s) Oral daily  clobetasol 0.05% Cream 1 Application(s) Topical two times a day  ferrous    sulfate 325 milliGRAM(s) Oral daily  FLUoxetine 20 milliGRAM(s) Oral daily  heparin  Injectable 5000 Unit(s) SubCutaneous every 8 hours  melatonin 3 milliGRAM(s) Oral at bedtime  midodrine. 5 milliGRAM(s) Oral three times a day  nystatin Cream 1 Application(s) Topical two times a day  valproic  acid Syrup 750 milliGRAM(s) Oral two times a day    MEDICATIONS  (PRN):  acetaminophen   Tablet .. 650 milliGRAM(s) Oral every 6 hours PRN Mild Pain (1 - 3)  traMADol 50 milliGRAM(s) Oral every 6 hours PRN Moderate Pain (4 - 6)    T(C): 36.8 (03-22-20 @ 14:24), Max: 36.8 (03-22-20 @ 14:24)  HR: 90 (03-22-20 @ 14:24) (82 - 109)  BP: 133/81 (03-22-20 @ 14:24) (112/77 - 139/78)  RR: 17 (03-22-20 @ 14:24)  SpO2: 96% (03-22-20 @ 14:24)  Wt(kg): --  I&O's Detail    21 Mar 2020 07:01  -  22 Mar 2020 07:00  --------------------------------------------------------  IN:    Other: 200 mL  Total IN: 200 mL    OUT:    Colostomy: 2000 mL  Total OUT: 2000 mL    Total NET: -1800 mL      22 Mar 2020 07:01  -  22 Mar 2020 14:46  --------------------------------------------------------  IN:  Total IN: 0 mL    OUT:    Colostomy: 400 mL    Indwelling Catheter - Urethral: 100 mL  Total OUT: 500 mL    Total NET: -500 mL        PHYSICAL EXAM:  General: No distress, dialysis catheter  Respiratory: b/l air entry  Cardiovascular: S1 S2  Gastrointestinal: soft, ileostomy  Extremities:  edema, + renae               LABORATORY:                        12.6   11.07 )-----------( 199      ( 22 Mar 2020 08:59 )             41.4     03-22    131<L>  |  94<L>  |  51<H>  ----------------------------<  94  5.0   |  24  |  6.40<H>    Ca    9.6      22 Mar 2020 08:59    TPro  8.5<H>  /  Alb  2.6<L>  /  TBili  0.3  /  DBili  x   /  AST  13<L>  /  ALT  12  /  AlkPhos  155<H>  03-22    Sodium, Serum: 131 mmol/L (03-22 @ 08:59)  Sodium, Serum: 130 mmol/L (03-21 @ 07:14)    Potassium, Serum: 5.0 mmol/L (03-22 @ 08:59)  Potassium, Serum: 5.3 mmol/L (03-21 @ 07:14)    Hemoglobin: 12.6 g/dL (03-22 @ 08:59)  Hemoglobin: 13.0 g/dL (03-21 @ 07:14)  Hemoglobin: 11.1 g/dL (03-20 @ 08:38)    Creatinine, Serum 6.40 (03-22 @ 08:59)  Creatinine, Serum 7.10 (03-21 @ 07:14)  Creatinine, Serum 5.50 (03-20 @ 08:38)        LIVER FUNCTIONS - ( 22 Mar 2020 08:59 )  Alb: 2.6 g/dL / Pro: 8.5 g/dL / ALK PHOS: 155 U/L / ALT: 12 U/L / AST: 13 U/L / GGT: x

## 2020-03-22 NOTE — PROGRESS NOTE ADULT - PROBLEM SELECTOR PROBLEM 10
Need for prophylactic measure

## 2020-03-22 NOTE — PROGRESS NOTE ADULT - ATTENDING COMMENTS
At this time:  patient will need diaylsis moving forward. Patient to receive permacath on Monday    As for LFT and water diarrhea (2 to 3 liters a day)  1. diarrhea is chronic.. maybe beneficial from a volume overload perspective.  c/w choletyramine  2. LFT:: monitor am labs:: check morning depakote

## 2020-03-22 NOTE — PROGRESS NOTE ADULT - ASSESSMENT
·	MEHDI, CKD 4, Solitary functioning kidney: Prerenal azotemia, (Atrophic right kidney)  ·	Metabolic acidosis  ·	Hyperkalemia  ·	Diabetes  ·	Hypertension  ·	Anemia      Will have IR place perma cath on monday if schedule allows. fluid removal as BP allows. On midodrine. Monitor BP trend.   Hold procrit for now. Monitor blood sugar levels. Insulin coverage as needed. Dietary restriction. Overall prognosis poor.

## 2020-03-22 NOTE — PROGRESS NOTE ADULT - PROBLEM SELECTOR PROBLEM 9
Dyslipidemia
HTN (hypertension)
Dyslipidemia
HTN (hypertension)

## 2020-03-22 NOTE — PROGRESS NOTE ADULT - SUBJECTIVE AND OBJECTIVE BOX
Neurology follow up note    GURJIT [id number]      Interval History:    Patient feels ok no new complaints.    MEDICATIONS    acetaminophen   Tablet .. 650 milliGRAM(s) Oral every 6 hours PRN  aspirin enteric coated 81 milliGRAM(s) Oral daily  atorvastatin 10 milliGRAM(s) Oral at bedtime  budesonide 160 MICROgram(s)/formoterol 4.5 MICROgram(s) Inhaler 2 Puff(s) Inhalation two times a day  chlorhexidine 2% Cloths 1 Application(s) Topical daily  cholestyramine Powder (Sugar-Free) 4 Gram(s) Oral daily  clobetasol 0.05% Cream 1 Application(s) Topical two times a day  ferrous    sulfate 325 milliGRAM(s) Oral daily  FLUoxetine 20 milliGRAM(s) Oral daily  heparin  Injectable 5000 Unit(s) SubCutaneous every 8 hours  melatonin 3 milliGRAM(s) Oral at bedtime  midodrine. 5 milliGRAM(s) Oral three times a day  nystatin Cream 1 Application(s) Topical two times a day  traMADol 50 milliGRAM(s) Oral every 6 hours PRN  valproic  acid Syrup 750 milliGRAM(s) Oral two times a day      Allergies    Levaquin (Pruritus)  mercury (Pruritus; Rash)  sulfa drugs (Pruritus)    Intolerances            Vital Signs Last 24 Hrs  T(C): 36.7 (22 Mar 2020 05:00), Max: 36.7 (21 Mar 2020 14:42)  T(F): 98.1 (22 Mar 2020 05:00), Max: 98.1 (22 Mar 2020 05:00)  HR: 91 (22 Mar 2020 05:00) (82 - 109)  BP: 118/78 (22 Mar 2020 05:00) (112/77 - 139/78)  BP(mean): --  RR: 18 (22 Mar 2020 05:00) (18 - 20)  SpO2: 95% (22 Mar 2020 05:00) (94% - 99%)    REVIEW OF SYSTEMS: Limited or unable to obtain secondary to patient's poor mental status.    Constitutional: No fever, chills, fatigue, generalized  weakness  Eyes: no eye pain, visual disturbances, or discharge  ENT:  No difficulty hearing, tinnitus, vertigo; No sinus or throat pain  Neck: No pain or stiffness  Respiratory: No cough, dyspnea, wheezing   Cardiovascular: No chest pain, palpitations,   Gastrointestinal: No abdominal or epigastric pain. No nausea, vomiting  No diarrhea or constipation.   Genitourinary: No dysuria, frequency, hematuria or incontinence  Neurological: No headaches, lightheadedness, vertigo, numbness or tremors  Psychiatric: No depression, anxiety, mood swings or difficulty sleeping  Musculoskeletal: No joint pain or swelling; No muscle, back or extremity pain      On Neurological Examination:    Mental Status - Patient is alert, awake,  [facility]   year 2020     Follow simple commands    Speech -   Fluent                 Cranial Nerves - Pupils 3 mm equal and reactive to light,   extraocular eye movements intact.   smile symmetric  intact bilateral NLF    Motor Exam -   Right upper 4/5   Left upper  3/5  Right lower 2/5  Left lower 2/5    Muscle tone - is normal all over.  No asymmetry is seen.      Sensory    Bilateral intact to light touch    GENERAL Exam: Nontoxic , No Acute Distress   	  HEENT:  normocephalic, atraumatic  		  LUNGS:  Decreased bilaterally  	  HEART: Normal S1S2   No murmur RRR        	  GI/ ABDOMEN:  Soft  Non tender    EXTREMITIES:   No Edema  No Clubbing  No Cyanosis   	   SKIN: Normal  No Ecchymosis                LABS:  CBC Full  -  ( 22 Mar 2020 08:59 )  WBC Count : 11.07 K/uL  RBC Count : 4.44 M/uL  Hemoglobin : 12.6 g/dL  Hematocrit : 41.4 %  Platelet Count - Automated : 199 K/uL  Mean Cell Volume : 93.2 fl  Mean Cell Hemoglobin : 28.4 pg  Mean Cell Hemoglobin Concentration : 30.4 gm/dL  Auto Neutrophil # : 8.53 K/uL  Auto Lymphocyte # : 1.31 K/uL  Auto Monocyte # : 0.99 K/uL  Auto Eosinophil # : 0.09 K/uL  Auto Basophil # : 0.08 K/uL  Auto Neutrophil % : 77.2 %  Auto Lymphocyte % : 11.8 %  Auto Monocyte % : 8.9 %  Auto Eosinophil % : 0.8 %  Auto Basophil % : 0.7 %      03-22    131<L>  |  94<L>  |  51<H>  ----------------------------<  94  5.0   |  24  |  6.40<H>    Ca    9.6      22 Mar 2020 08:59    TPro  8.5<H>  /  Alb  2.6<L>  /  TBili  0.3  /  DBili  x   /  AST  13<L>  /  ALT  12  /  AlkPhos  155<H>  03-22    Hemoglobin A1C:     LIVER FUNCTIONS - ( 22 Mar 2020 08:59 )  Alb: 2.6 g/dL / Pro: 8.5 g/dL / ALK PHOS: 155 U/L / ALT: 12 U/L / AST: 13 U/L / GGT: x           Vitamin B12         RADIOLOGY    ANALYSIS AND PLAN:  An 84-year-old with an episode of seizure and change in mental status.  1.	For episode of seizure, will increase Depakote 750 mg twice  off dilantin   2.	EEG intermittent generalized spike and waves   3.	Ativan 1 mg IV push q.6h. p.r.n. for any type of breakthrough seizure.  4.	Seizure precautions.  5.	For history of underlying depression, at present hard to evaluate the patient's psychiatric status secondary to the patient being lethargic, continue home medication when possible.  6.	For hyperlipidemia, continue the patient on her cholesterol medication.  7.	For change in mental status, questionable this could be metabolic encephalopathy from partial complex seizures versus any type of underlying infectious type process episodes of temperatures and hypotension possible h/o of shock    8.	Fall precaution.  9.	overall more awake and interactive today   10.	For chronic kidney disease, monitor renal function noted  HD as tolerated   11.	  Advance directives were discussed with them.  Primary  will be daughter, Ju, her cell phone number is [phone], home number is [phone] spoke 3/19/2020   12.	EEG no new changes   13.	MMSE 25/30  14.	monitor oral intake   15.	Greater than 20 minutes of time was spent with the patient, plan of care, reviewing data, speaking to the family and multidisciplinary healthcare team.

## 2020-03-22 NOTE — PROGRESS NOTE ADULT - SUBJECTIVE AND OBJECTIVE BOX
Date/Time Patient Seen:  		  Referring MD:   Data Reviewed	       Patient is a 84y old  Female who presents with a chief complaint of altered mental status (21 Mar 2020 12:30)      Subjective/HPI     PAST MEDICAL & SURGICAL HISTORY:  Wound of sacral region  Depression  Iron deficiency anemia  Eczema  HTN (hypertension)  CKD (chronic kidney disease) stage 5, GFR less than 15 ml/min: not on HD  Herniated lumbar intervertebral disc  Depression  Tremor  Kidney atrophy: right  Colostomy status: 2006  Chronic UTI (urinary tract infection): chronic renae  Cervical cancer: 1970&#x27;s  Dyslipidemia  Diabetes: type 2  Hernia, incisional  S/P hip replacement: right 2013  S/P colon resection: 2006  S/P hysterectomy: 1977        Medication list         MEDICATIONS  (STANDING):  aspirin enteric coated 81 milliGRAM(s) Oral daily  atorvastatin 10 milliGRAM(s) Oral at bedtime  budesonide 160 MICROgram(s)/formoterol 4.5 MICROgram(s) Inhaler 2 Puff(s) Inhalation two times a day  chlorhexidine 2% Cloths 1 Application(s) Topical daily  cholestyramine Powder (Sugar-Free) 4 Gram(s) Oral daily  clobetasol 0.05% Cream 1 Application(s) Topical two times a day  ferrous    sulfate 325 milliGRAM(s) Oral daily  FLUoxetine 20 milliGRAM(s) Oral daily  heparin  Injectable 5000 Unit(s) SubCutaneous every 8 hours  melatonin 3 milliGRAM(s) Oral at bedtime  midodrine. 5 milliGRAM(s) Oral three times a day  nystatin Cream 1 Application(s) Topical two times a day  valproic  acid Syrup 750 milliGRAM(s) Oral two times a day    MEDICATIONS  (PRN):  acetaminophen   Tablet .. 650 milliGRAM(s) Oral every 6 hours PRN Mild Pain (1 - 3)  traMADol 50 milliGRAM(s) Oral every 6 hours PRN Moderate Pain (4 - 6)         Vitals log        ICU Vital Signs Last 24 Hrs  T(C): 36.7 (22 Mar 2020 05:00), Max: 36.7 (21 Mar 2020 14:42)  T(F): 98.1 (22 Mar 2020 05:00), Max: 98.1 (22 Mar 2020 05:00)  HR: 91 (22 Mar 2020 05:00) (82 - 109)  BP: 118/78 (22 Mar 2020 05:00) (112/77 - 139/78)  BP(mean): --  ABP: --  ABP(mean): --  RR: 18 (22 Mar 2020 05:00) (18 - 20)  SpO2: 95% (22 Mar 2020 05:00) (94% - 99%)           Input and Output:  I&O's Detail    20 Mar 2020 07:01  -  21 Mar 2020 07:00  --------------------------------------------------------  IN:    Oral Fluid: 240 mL  Total IN: 240 mL    OUT:    Colostomy: 2800 mL    Indwelling Catheter - Urethral: 150 mL  Total OUT: 2950 mL    Total NET: -2710 mL      21 Mar 2020 07:01  -  22 Mar 2020 06:05  --------------------------------------------------------  IN:    Other: 200 mL  Total IN: 200 mL    OUT:    Colostomy: 2000 mL  Total OUT: 2000 mL    Total NET: -1800 mL          Lab Data                        13.0   14.04 )-----------( 207      ( 21 Mar 2020 07:14 )             42.2     03-21    130<L>  |  93<L>  |  60<H>  ----------------------------<  90  5.3   |  22  |  7.10<H>    Ca    9.6      21 Mar 2020 07:14    TPro  7.7  /  Alb  2.5<L>  /  TBili  0.3  /  DBili  x   /  AST  12<L>  /  ALT  12  /  AlkPhos  124<H>  03-20            Review of Systems	      Objective     Physical Examination    heart s1s2  lung dec BS      Pertinent Lab findings & Imaging      Ana:  NO   Adequate UO     I&O's Detail    20 Mar 2020 07:01  -  21 Mar 2020 07:00  --------------------------------------------------------  IN:    Oral Fluid: 240 mL  Total IN: 240 mL    OUT:    Colostomy: 2800 mL    Indwelling Catheter - Urethral: 150 mL  Total OUT: 2950 mL    Total NET: -2710 mL      21 Mar 2020 07:01  -  22 Mar 2020 06:05  --------------------------------------------------------  IN:    Other: 200 mL  Total IN: 200 mL    OUT:    Colostomy: 2000 mL  Total OUT: 2000 mL    Total NET: -1800 mL               Discussed with:     Cultures:	        Radiology

## 2020-03-23 LAB
ALBUMIN SERPL ELPH-MCNC: 2.8 G/DL — LOW (ref 3.3–5)
ALP SERPL-CCNC: 173 U/L — HIGH (ref 40–120)
ALT FLD-CCNC: 13 U/L — SIGNIFICANT CHANGE UP (ref 12–78)
ANION GAP SERPL CALC-SCNC: 17 MMOL/L — SIGNIFICANT CHANGE UP (ref 5–17)
ANION GAP SERPL CALC-SCNC: 20 MMOL/L — HIGH (ref 5–17)
AST SERPL-CCNC: 14 U/L — LOW (ref 15–37)
BASOPHILS # BLD AUTO: 0.05 K/UL — SIGNIFICANT CHANGE UP (ref 0–0.2)
BASOPHILS NFR BLD AUTO: 0.4 % — SIGNIFICANT CHANGE UP (ref 0–2)
BILIRUB SERPL-MCNC: 0.3 MG/DL — SIGNIFICANT CHANGE UP (ref 0.2–1.2)
BUN SERPL-MCNC: 78 MG/DL — HIGH (ref 7–23)
BUN SERPL-MCNC: 79 MG/DL — HIGH (ref 7–23)
CALCIUM SERPL-MCNC: 9.4 MG/DL — SIGNIFICANT CHANGE UP (ref 8.5–10.1)
CALCIUM SERPL-MCNC: 9.6 MG/DL — SIGNIFICANT CHANGE UP (ref 8.5–10.1)
CHLORIDE SERPL-SCNC: 89 MMOL/L — LOW (ref 96–108)
CHLORIDE SERPL-SCNC: 89 MMOL/L — LOW (ref 96–108)
CO2 SERPL-SCNC: 19 MMOL/L — LOW (ref 22–31)
CO2 SERPL-SCNC: 21 MMOL/L — LOW (ref 22–31)
CREAT SERPL-MCNC: 8.1 MG/DL — HIGH (ref 0.5–1.3)
CREAT SERPL-MCNC: 8.1 MG/DL — HIGH (ref 0.5–1.3)
EOSINOPHIL # BLD AUTO: 0.06 K/UL — SIGNIFICANT CHANGE UP (ref 0–0.5)
EOSINOPHIL NFR BLD AUTO: 0.5 % — SIGNIFICANT CHANGE UP (ref 0–6)
GLUCOSE SERPL-MCNC: 72 MG/DL — SIGNIFICANT CHANGE UP (ref 70–99)
GLUCOSE SERPL-MCNC: 74 MG/DL — SIGNIFICANT CHANGE UP (ref 70–99)
HCT VFR BLD CALC: 43.3 % — SIGNIFICANT CHANGE UP (ref 34.5–45)
HGB BLD-MCNC: 13.5 G/DL — SIGNIFICANT CHANGE UP (ref 11.5–15.5)
IMM GRANULOCYTES NFR BLD AUTO: 0.4 % — SIGNIFICANT CHANGE UP (ref 0–1.5)
LYMPHOCYTES # BLD AUTO: 1.36 K/UL — SIGNIFICANT CHANGE UP (ref 1–3.3)
LYMPHOCYTES # BLD AUTO: 11.5 % — LOW (ref 13–44)
MCHC RBC-ENTMCNC: 28.6 PG — SIGNIFICANT CHANGE UP (ref 27–34)
MCHC RBC-ENTMCNC: 31.2 GM/DL — LOW (ref 32–36)
MCV RBC AUTO: 91.7 FL — SIGNIFICANT CHANGE UP (ref 80–100)
MONOCYTES # BLD AUTO: 1.09 K/UL — HIGH (ref 0–0.9)
MONOCYTES NFR BLD AUTO: 9.2 % — SIGNIFICANT CHANGE UP (ref 2–14)
NEUTROPHILS # BLD AUTO: 9.24 K/UL — HIGH (ref 1.8–7.4)
NEUTROPHILS NFR BLD AUTO: 78 % — HIGH (ref 43–77)
NRBC # BLD: 0 /100 WBCS — SIGNIFICANT CHANGE UP (ref 0–0)
PLATELET # BLD AUTO: 207 K/UL — SIGNIFICANT CHANGE UP (ref 150–400)
POTASSIUM SERPL-MCNC: 6.4 MMOL/L — CRITICAL HIGH (ref 3.5–5.3)
POTASSIUM SERPL-MCNC: 6.5 MMOL/L — CRITICAL HIGH (ref 3.5–5.3)
POTASSIUM SERPL-SCNC: 6.4 MMOL/L — CRITICAL HIGH (ref 3.5–5.3)
POTASSIUM SERPL-SCNC: 6.5 MMOL/L — CRITICAL HIGH (ref 3.5–5.3)
PROT SERPL-MCNC: 9.1 G/DL — HIGH (ref 6–8.3)
RBC # BLD: 4.72 M/UL — SIGNIFICANT CHANGE UP (ref 3.8–5.2)
RBC # FLD: 15.3 % — HIGH (ref 10.3–14.5)
SODIUM SERPL-SCNC: 127 MMOL/L — LOW (ref 135–145)
SODIUM SERPL-SCNC: 128 MMOL/L — LOW (ref 135–145)
VALPROATE SERPL-MCNC: 110 UG/ML — HIGH (ref 50–100)
WBC # BLD: 11.85 K/UL — HIGH (ref 3.8–10.5)
WBC # FLD AUTO: 11.85 K/UL — HIGH (ref 3.8–10.5)

## 2020-03-23 PROCEDURE — 77001 FLUOROGUIDE FOR VEIN DEVICE: CPT | Mod: 26

## 2020-03-23 PROCEDURE — 36558 INSERT TUNNELED CV CATH: CPT | Mod: RT

## 2020-03-23 PROCEDURE — 99232 SBSQ HOSP IP/OBS MODERATE 35: CPT

## 2020-03-23 RX ADMIN — Medication 3 MILLIGRAM(S): at 22:23

## 2020-03-23 RX ADMIN — Medication 650 MILLIGRAM(S): at 13:50

## 2020-03-23 RX ADMIN — MIDODRINE HYDROCHLORIDE 5 MILLIGRAM(S): 2.5 TABLET ORAL at 13:50

## 2020-03-23 RX ADMIN — Medication 750 MILLIGRAM(S): at 06:32

## 2020-03-23 RX ADMIN — Medication 650 MILLIGRAM(S): at 14:50

## 2020-03-23 RX ADMIN — MIDODRINE HYDROCHLORIDE 5 MILLIGRAM(S): 2.5 TABLET ORAL at 22:25

## 2020-03-23 RX ADMIN — Medication 325 MILLIGRAM(S): at 13:50

## 2020-03-23 RX ADMIN — Medication 20 MILLIGRAM(S): at 13:50

## 2020-03-23 RX ADMIN — NYSTATIN CREAM 1 APPLICATION(S): 100000 CREAM TOPICAL at 06:41

## 2020-03-23 RX ADMIN — Medication 1 APPLICATION(S): at 06:41

## 2020-03-23 RX ADMIN — Medication 1 APPLICATION(S): at 22:22

## 2020-03-23 RX ADMIN — MIDODRINE HYDROCHLORIDE 5 MILLIGRAM(S): 2.5 TABLET ORAL at 06:32

## 2020-03-23 RX ADMIN — NYSTATIN CREAM 1 APPLICATION(S): 100000 CREAM TOPICAL at 22:23

## 2020-03-23 RX ADMIN — BUDESONIDE AND FORMOTEROL FUMARATE DIHYDRATE 2 PUFF(S): 160; 4.5 AEROSOL RESPIRATORY (INHALATION) at 22:23

## 2020-03-23 RX ADMIN — Medication 750 MILLIGRAM(S): at 22:23

## 2020-03-23 RX ADMIN — ATORVASTATIN CALCIUM 10 MILLIGRAM(S): 80 TABLET, FILM COATED ORAL at 22:24

## 2020-03-23 RX ADMIN — CHLORHEXIDINE GLUCONATE 1 APPLICATION(S): 213 SOLUTION TOPICAL at 13:51

## 2020-03-23 NOTE — PROGRESS NOTE ADULT - ASSESSMENT
·	MEHDI, CKD 4, Solitary functioning kidney: Prerenal azotemia, (Atrophic right kidney) started on hemodialysis  ·	Metabolic acidosis  ·	Hyperkalemia  ·	Diabetes  ·	Hypertension  ·	Anemia      HD today. IR to place perma cath. Fluid removal as BP allows. On midodrine. Monitor BP trend.   Hold procrit for now. Monitor blood sugar levels. Insulin coverage as needed. Dietary restriction. Overall prognosis poor.

## 2020-03-23 NOTE — PROGRESS NOTE ADULT - ASSESSMENT
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 Course c/b likely generalized seizure on 3/5/20, and acute hypoxic respiratory failure and suspected sepsis due to suspected aspiration PNA, s/p ICU stay. Now downgraded to medicine service. Patient now with ESRD and likely needing long term dialysis  Problem/Plan - 1:  ·  Problem: AMS (altered mental status).  Plan: MS better  weakness - frail elderly -   multiple medical issues -   needs assist with ADL  oral and skin hygiene  discussion ongoing about GOC  large family - Daughters in Law and sons and daughters all involved in discussion  pt remains full code.      Problem/Plan - 2:  ·  Problem: Generalized seizure.  Plan: - unresponsive episode with shaking and spike in lactic acidosis on 3/5, consistent with seizure (first EEG on 3/5 done after IV ativan given, which can mask seizure activity)  -2nd EEG on 3/8 showing some generalized spike/wave patterns concerning for increased seizure risk  - CT head, MRI brain showed no acute infarct or hemorrhage  - Continue Depakote.  -Dilantin stopped due to lethargy  -Seizure precautions  -Ativan PRn for breakthrough seizures.      Problem/Plan - 3:  ·  Problem: Sepsis.  Plan: - no new fevers  - there was suspicion for UTI on admission though unclear significance of the UCx isolates in the presence of chronic renae   - Doubt infection. No longer on antibiotics  - patient's lethargy improved today, which removal of dilantin  - continue to monitor  - Patient remains full code. Daughter, Rosy, HCP, states her mother would want CPR and intubation, but would not want to remain on life support for long.  - Dr. Wiley, palliative, recs appreciated.      Problem/Plan - 4:  ·  Problem: Acute renal failure superimposed on stage 5 chronic kidney disease, not on chronic dialysis, unspecified acute renal failure type.  Plan: patient with metabolic acidosis in setting of UTI and suspected pre-renal MEHDI, dehydration   - po sodium bicarb 650 mg TID  - pt's renal status still very poor. now patient and family may be amendable to dialysis  -dc planning afte rpermacath      Problem/Plan - 5:  ·  Problem: Hypernatremia.  Plan: Oral Free water intake. Improving   S/p IV fluids  Monitor Na level.      Problem/Plan - 6:  Problem: Hyperkalemia. Plan: - due to renal failure  - currently WNL  - Dr. You, nephro, recs appreciated.     Problem/Plan - 7:  ·  Problem: Wound of sacral region.  Plan: - Tylenol PRN  - does not appear actively infected currently  - Wound care RN, recs appreciated   - Aquacel dressing every other day  - Wound care, Dr. Blas.      Problem/Plan - 8:  ·  Problem: Iron deficiency anemia.  Plan: - Anemia likely due to LELO, and anemia of chronic disease in setting of Stage 5 CKD  - Fe panel from 2/2020 showed low total iron, low total iron binding capacity  - hemoglobin has been somewhat labile during admission; monitor closely, no sign of acute bleeding  - Continue home ferrous sulfate   - Continue epogen as per nephro.      Problem/Plan - 9:  ·  Problem: HTN (hypertension).  Plan: - pt's hypotension was likely related to sepsis and all anti-hypertensives held  - pt's BP now rising and home regimen is being restarted  - bp low during dialysis ::: NOW ON MIDODRINE: DIALYSIS SUCCESSFUL.      Problem/Plan - 10:  Problem: Need for prophylactic measure. Plan; DVT ppx: heparin 5000un sq Q8h

## 2020-03-23 NOTE — PROGRESS NOTE ADULT - SUBJECTIVE AND OBJECTIVE BOX
Patient is a 84y old  Female who presents with a chief complaint of altered mental status (03 Mar 2020 17:47)  Patient seen in follow up for CKD 4, Hyperkalemia.     For perma cath today. On midodrine. High potassium.     PAST MEDICAL HISTORY:  Wound of sacral region  Depression  Iron deficiency anemia  Eczema  HTN (hypertension)  CKD (chronic kidney disease) stage 5, GFR less than 15 ml/min  Herniated lumbar intervertebral disc  Depression  Tremor  Kidney atrophy  Colostomy status  Chronic UTI (urinary tract infection)  Cervical cancer  Dyslipidemia  Diabetes  Hernia, incisional      MEDICATIONS  (STANDING):  atorvastatin 10 milliGRAM(s) Oral at bedtime  budesonide 160 MICROgram(s)/formoterol 4.5 MICROgram(s) Inhaler 2 Puff(s) Inhalation two times a day  chlorhexidine 2% Cloths 1 Application(s) Topical daily  cholestyramine Powder (Sugar-Free) 4 Gram(s) Oral daily  clobetasol 0.05% Cream 1 Application(s) Topical two times a day  ferrous    sulfate 325 milliGRAM(s) Oral daily  FLUoxetine 20 milliGRAM(s) Oral daily  melatonin 3 milliGRAM(s) Oral at bedtime  midodrine. 5 milliGRAM(s) Oral three times a day  nystatin Cream 1 Application(s) Topical two times a day  valproic  acid Syrup 750 milliGRAM(s) Oral two times a day    MEDICATIONS  (PRN):  acetaminophen   Tablet .. 650 milliGRAM(s) Oral every 6 hours PRN Mild Pain (1 - 3)    T(C): 36.3 (03-23-20 @ 06:08), Max: 36.9 (03-22-20 @ 20:56)  HR: 88 (03-23-20 @ 06:08) (82 - 109)  BP: 126/74 (03-23-20 @ 06:08) (112/77 - 138/79)  RR: 18 (03-23-20 @ 06:08)  SpO2: 94% (03-23-20 @ 06:08)  Wt(kg): --  I&O's Detail    22 Mar 2020 07:01  -  23 Mar 2020 07:00  --------------------------------------------------------  IN:  Total IN: 0 mL    OUT:    Colostomy: 800 mL    Indwelling Catheter - Urethral: 100 mL  Total OUT: 900 mL    Total NET: -900 mL            PHYSICAL EXAM:  General: No distress, dialysis catheter  Respiratory: b/l air entry  Cardiovascular: S1 S2  Gastrointestinal: soft, ileostomy  Extremities:  edema, + renae               LABORATORY:                        13.5   11.85 )-----------( 207      ( 23 Mar 2020 10:02 )             43.3     03-23    127<L>  |  89<L>  |  78<H>  ----------------------------<  72  6.4<HH>   |  21<L>  |  8.10<H>    Ca    9.6      23 Mar 2020 10:02    TPro  9.1<H>  /  Alb  2.8<L>  /  TBili  0.3  /  DBili  x   /  AST  14<L>  /  ALT  13  /  AlkPhos  173<H>  03-23    Sodium, Serum: 127 mmol/L (03-23 @ 10:02)  Sodium, Serum: 131 mmol/L (03-22 @ 08:59)    Potassium, Serum: 6.4 mmol/L (03-23 @ 10:02)  Potassium, Serum: 5.0 mmol/L (03-22 @ 08:59)    Hemoglobin: 13.5 g/dL (03-23 @ 10:02)  Hemoglobin: 12.6 g/dL (03-22 @ 08:59)  Hemoglobin: 13.0 g/dL (03-21 @ 07:14)    Creatinine, Serum 8.10 (03-23 @ 10:02)  Creatinine, Serum 6.40 (03-22 @ 08:59)  Creatinine, Serum 7.10 (03-21 @ 07:14)        LIVER FUNCTIONS - ( 23 Mar 2020 10:02 )  Alb: 2.8 g/dL / Pro: 9.1 g/dL / ALK PHOS: 173 U/L / ALT: 13 U/L / AST: 14 U/L / GGT: x

## 2020-03-23 NOTE — PROGRESS NOTE ADULT - SUBJECTIVE AND OBJECTIVE BOX
Patient is a 84y old  Female who presents with a chief complaint of altered mental status (23 Mar 2020 11:54)      SUBJECTIVE / OVERNIGHT EVENTS:pt seen and examined. permacath today        Vital Signs Last 24 Hrs  T(C): 36.3 (23 Mar 2020 06:08), Max: 36.9 (22 Mar 2020 20:56)  T(F): 97.3 (23 Mar 2020 06:08), Max: 98.5 (22 Mar 2020 20:56)  HR: 88 (23 Mar 2020 06:08) (86 - 90)  BP: 126/74 (23 Mar 2020 06:08) (126/74 - 138/79)  BP(mean): --  RR: 18 (23 Mar 2020 06:08) (17 - 18)  SpO2: 94% (23 Mar 2020 06:08) (94% - 96%)  I&O's Summary    22 Mar 2020 07:01  -  23 Mar 2020 07:00  --------------------------------------------------------  IN: 0 mL / OUT: 900 mL / NET: -900 mL        PHYSICAL EXAM:  GENERAL: NAD, Comfortable  HEAD:  Atraumatic, Normocephalic  EYES: EOMI, PERRLA, conjunctiva and sclera clear  NECK: Supple, No JVD  CHEST/LUNG: Clear to auscultation bilaterally; No wheeze  HEART: Regular rate and rhythm; No murmurs, rubs, or gallops  SKIN: No rashes or lesions    LABS:                        13.5   11.85 )-----------( 207      ( 23 Mar 2020 10:02 )             43.3     03-23    127<L>  |  89<L>  |  78<H>  ----------------------------<  72  6.4<HH>   |  21<L>  |  8.10<H>    Ca    9.6      23 Mar 2020 10:02    TPro  9.1<H>  /  Alb  2.8<L>  /  TBili  0.3  /  DBili  x   /  AST  14<L>  /  ALT  13  /  AlkPhos  173<H>  03-23      CAPILLARY BLOOD GLUCOSE                RADIOLOGY & ADDITIONAL TESTS:    Imaging Personally Reviewed:  [x] YES  [ ] NO    Consultant(s) Notes Reviewed:  [x] YES  [ ] NO      MEDICATIONS  (STANDING):  atorvastatin 10 milliGRAM(s) Oral at bedtime  budesonide 160 MICROgram(s)/formoterol 4.5 MICROgram(s) Inhaler 2 Puff(s) Inhalation two times a day  chlorhexidine 2% Cloths 1 Application(s) Topical daily  cholestyramine Powder (Sugar-Free) 4 Gram(s) Oral daily  clobetasol 0.05% Cream 1 Application(s) Topical two times a day  ferrous    sulfate 325 milliGRAM(s) Oral daily  FLUoxetine 20 milliGRAM(s) Oral daily  melatonin 3 milliGRAM(s) Oral at bedtime  midodrine. 5 milliGRAM(s) Oral three times a day  nystatin Cream 1 Application(s) Topical two times a day  valproic  acid Syrup 750 milliGRAM(s) Oral two times a day    MEDICATIONS  (PRN):  acetaminophen   Tablet .. 650 milliGRAM(s) Oral every 6 hours PRN Mild Pain (1 - 3)      Care Discussed with Consultants/Other Providers [x] YES  [ ] NO    HEALTH ISSUES - PROBLEM Dx:  Palliative care encounter: Palliative care encounter  Hypernatremia: Hypernatremia  Sodium level improved  Sepsis: Sepsis  Altered mental status: Altered mental status  Generalized seizure: Generalized seizure  Unresponsiveness: Unresponsiveness  HTN (hypertension): HTN (hypertension)  Acute renal failure superimposed on stage 5 chronic kidney disease, not on chronic dialysis, unspecified acute renal failure type: Acute renal failure superimposed on stage 5 chronic kidney disease, not on chronic dialysis, unspecified acute renal failure type  Pressure injury of sacral region, stage 4: Pressure injury of sacral region, stage 4  Need for prophylactic measure: Need for prophylactic measure  Dyslipidemia: Dyslipidemia  Eczema: Eczema  CKD (chronic kidney disease) stage 5, GFR less than 15 ml/min: CKD (chronic kidney disease) stage 5, GFR less than 15 ml/min  Iron deficiency anemia: Iron deficiency anemia  Wound of sacral region: Wound of sacral region  Infiltrate of lung present on imaging of chest: Infiltrate of lung present on imaging of chest  Hyperkalemia: Hyperkalemia  AMS (altered mental status): AMS (altered mental status)  UTI (urinary tract infection): UTI (urinary tract infection)

## 2020-03-23 NOTE — PROGRESS NOTE ADULT - SUBJECTIVE AND OBJECTIVE BOX
Neurology follow up note    GURJIT [id number]      Interval History:    Patient feels ok no new complaints.    MEDICATIONS    acetaminophen   Tablet .. 650 milliGRAM(s) Oral every 6 hours PRN  atorvastatin 10 milliGRAM(s) Oral at bedtime  budesonide 160 MICROgram(s)/formoterol 4.5 MICROgram(s) Inhaler 2 Puff(s) Inhalation two times a day  chlorhexidine 2% Cloths 1 Application(s) Topical daily  cholestyramine Powder (Sugar-Free) 4 Gram(s) Oral daily  clobetasol 0.05% Cream 1 Application(s) Topical two times a day  ferrous    sulfate 325 milliGRAM(s) Oral daily  FLUoxetine 20 milliGRAM(s) Oral daily  melatonin 3 milliGRAM(s) Oral at bedtime  midodrine. 5 milliGRAM(s) Oral three times a day  nystatin Cream 1 Application(s) Topical two times a day  valproic  acid Syrup 750 milliGRAM(s) Oral two times a day      Allergies    Levaquin (Pruritus)  mercury (Pruritus; Rash)  sulfa drugs (Pruritus)    Intolerances            Vital Signs Last 24 Hrs  T(C): 36.3 (23 Mar 2020 06:08), Max: 36.9 (22 Mar 2020 20:56)  T(F): 97.3 (23 Mar 2020 06:08), Max: 98.5 (22 Mar 2020 20:56)  HR: 88 (23 Mar 2020 06:08) (86 - 90)  BP: 126/74 (23 Mar 2020 06:08) (126/74 - 138/79)  BP(mean): --  RR: 18 (23 Mar 2020 06:08) (17 - 18)  SpO2: 94% (23 Mar 2020 06:08) (94% - 96%)      REVIEW OF SYSTEMS: Limited or unable to obtain secondary to patient's poor mental status.    Constitutional: No fever, chills, fatigue, generalized  weakness  Eyes: no eye pain, visual disturbances, or discharge  ENT:  No difficulty hearing, tinnitus, vertigo; No sinus or throat pain  Neck: No pain or stiffness  Respiratory: No cough, dyspnea, wheezing   Cardiovascular: No chest pain, palpitations,   Gastrointestinal: No abdominal or epigastric pain. No nausea, vomiting  No diarrhea or constipation.   Genitourinary: No dysuria, frequency, hematuria or incontinence  Neurological: No headaches, lightheadedness, vertigo, numbness or tremors  Psychiatric: No depression, anxiety, mood swings or difficulty sleeping  Musculoskeletal: No joint pain or swelling; No muscle, back or extremity pain      On Neurological Examination:    Mental Status - Patient is alert, awake,  [facility]   year 2020     Follow simple commands    Speech -   Fluent                 Cranial Nerves - Pupils 3 mm equal and reactive to light,   extraocular eye movements intact.   smile symmetric  intact bilateral NLF    Motor Exam -   Right upper 4/5   Left upper  3/5  Right lower 2/5  Left lower 2/5    Muscle tone - is normal all over.  No asymmetry is seen.      Sensory    Bilateral intact to light touch    GENERAL Exam: Nontoxic , No Acute Distress   	  HEENT:  normocephalic, atraumatic  		  LUNGS:  Decreased bilaterally  	  HEART: Normal S1S2   No murmur RRR        	  GI/ ABDOMEN:  Soft  Non tender    EXTREMITIES:   No Edema  No Clubbing  No Cyanosis   	   SKIN: Normal  No Ecchymosis              LABS:  CBC Full  -  ( 23 Mar 2020 10:02 )  WBC Count : 11.85 K/uL  RBC Count : 4.72 M/uL  Hemoglobin : 13.5 g/dL  Hematocrit : 43.3 %  Platelet Count - Automated : 207 K/uL  Mean Cell Volume : 91.7 fl  Mean Cell Hemoglobin : 28.6 pg  Mean Cell Hemoglobin Concentration : 31.2 gm/dL  Auto Neutrophil # : 9.24 K/uL  Auto Lymphocyte # : 1.36 K/uL  Auto Monocyte # : 1.09 K/uL  Auto Eosinophil # : 0.06 K/uL  Auto Basophil # : 0.05 K/uL  Auto Neutrophil % : 78.0 %  Auto Lymphocyte % : 11.5 %  Auto Monocyte % : 9.2 %  Auto Eosinophil % : 0.5 %  Auto Basophil % : 0.4 %      03-23    127<L>  |  89<L>  |  78<H>  ----------------------------<  72  6.4<HH>   |  21<L>  |  8.10<H>    Ca    9.6      23 Mar 2020 10:02    TPro  9.1<H>  /  Alb  2.8<L>  /  TBili  0.3  /  DBili  x   /  AST  14<L>  /  ALT  13  /  AlkPhos  173<H>  03-23    Hemoglobin A1C:     LIVER FUNCTIONS - ( 23 Mar 2020 10:02 )  Alb: 2.8 g/dL / Pro: 9.1 g/dL / ALK PHOS: 173 U/L / ALT: 13 U/L / AST: 14 U/L / GGT: x           Vitamin B12         RADIOLOGY    ANALYSIS AND PLAN:  An 84-year-old with an episode of seizure and change in mental status.  1.	For episode of seizure, will increase Depakote 750 mg twice  off dilantin   2.	EEG intermittent generalized spike and waves   3.	Ativan 1 mg IV push q.6h. p.r.n. for any type of breakthrough seizure.  4.	Seizure precautions.  5.	For history of underlying depression, at present hard to evaluate the patient's psychiatric status secondary to the patient being lethargic, continue home medication when possible.  6.	For hyperlipidemia, continue the patient on her cholesterol medication.  7.	For change in mental status, questionable this could be metabolic encephalopathy from partial complex seizures versus any type of underlying infectious type process episodes of temperatures and hypotension possible h/o of shock    8.	Fall precaution.  9.	overall more awake and interactive today   10.	For chronic kidney disease, monitor renal function noted  HD as tolerated   11.	  Advance directives were discussed with them.  Primary  will be daughterJu, her cell phone number is [phone], home number is [phone] spoke 3/19/2020   12.	EEG no new changes   13.	MMSE 25/30  14.	monitor oral intake   15.	Greater than 20 minutes of time was spent with the patient, plan of care, reviewing data, speaking to the family and multidisciplinary healthcare team.

## 2020-03-23 NOTE — PROCEDURE NOTE - GENERAL PROCEDURE DETAILS
indwelling rt ij non tunneled dialysis catheter exchanged for a tunneled dialysis cathter. tip in svc. ok to access.

## 2020-03-23 NOTE — PROCEDURE NOTE - NSPOSTCAREGUIDE_GEN_A_CORE
Instructed patient/caregiver regarding signs and symptoms of infection/Instructed patient/caregiver to follow-up with primary care physician/Keep the cast/splint/dressing clean and dry/Verbal/written post procedure instructions were given to patient/caregiver/Care for catheter as per unit/ICU protocols
Verbal/written post procedure instructions were given to patient/caregiver
Verbal/written post procedure instructions were given to patient/caregiver/Keep the cast/splint/dressing clean and dry/Instructed patient/caregiver to follow-up with primary care physician/Instructed patient/caregiver regarding signs and symptoms of infection
Verbal/written post procedure instructions were given to patient/caregiver
Care for catheter as per unit/ICU protocols

## 2020-03-23 NOTE — PROGRESS NOTE ADULT - SUBJECTIVE AND OBJECTIVE BOX
Date/Time Patient Seen:  		  Referring MD:   Data Reviewed	       Patient is a 84y old  Female who presents with a chief complaint of altered mental status (22 Mar 2020 14:45)      Subjective/HPI     PAST MEDICAL & SURGICAL HISTORY:  Wound of sacral region  Depression  Iron deficiency anemia  Eczema  HTN (hypertension)  CKD (chronic kidney disease) stage 5, GFR less than 15 ml/min: not on HD  Herniated lumbar intervertebral disc  Depression  Tremor  Kidney atrophy: right  Colostomy status: 2006  Chronic UTI (urinary tract infection): chronic renae  Cervical cancer: 1970&#x27;s  Dyslipidemia  Diabetes: type 2  Hernia, incisional  S/P hip replacement: right 2013  S/P colon resection: 2006  S/P hysterectomy: 1977        Medication list         MEDICATIONS  (STANDING):  atorvastatin 10 milliGRAM(s) Oral at bedtime  budesonide 160 MICROgram(s)/formoterol 4.5 MICROgram(s) Inhaler 2 Puff(s) Inhalation two times a day  chlorhexidine 2% Cloths 1 Application(s) Topical daily  cholestyramine Powder (Sugar-Free) 4 Gram(s) Oral daily  clobetasol 0.05% Cream 1 Application(s) Topical two times a day  ferrous    sulfate 325 milliGRAM(s) Oral daily  FLUoxetine 20 milliGRAM(s) Oral daily  melatonin 3 milliGRAM(s) Oral at bedtime  midodrine. 5 milliGRAM(s) Oral three times a day  nystatin Cream 1 Application(s) Topical two times a day  valproic  acid Syrup 750 milliGRAM(s) Oral two times a day    MEDICATIONS  (PRN):  acetaminophen   Tablet .. 650 milliGRAM(s) Oral every 6 hours PRN Mild Pain (1 - 3)         Vitals log        ICU Vital Signs Last 24 Hrs  T(C): 36.3 (23 Mar 2020 06:08), Max: 36.9 (22 Mar 2020 20:56)  T(F): 97.3 (23 Mar 2020 06:08), Max: 98.5 (22 Mar 2020 20:56)  HR: 88 (23 Mar 2020 06:08) (86 - 90)  BP: 126/74 (23 Mar 2020 06:08) (126/74 - 138/79)  BP(mean): --  ABP: --  ABP(mean): --  RR: 18 (23 Mar 2020 06:08) (17 - 18)  SpO2: 94% (23 Mar 2020 06:08) (94% - 96%)           Input and Output:  I&O's Detail    21 Mar 2020 07:01  -  22 Mar 2020 07:00  --------------------------------------------------------  IN:    Other: 200 mL  Total IN: 200 mL    OUT:    Colostomy: 2000 mL  Total OUT: 2000 mL    Total NET: -1800 mL      22 Mar 2020 07:01  -  23 Mar 2020 06:18  --------------------------------------------------------  IN:  Total IN: 0 mL    OUT:    Colostomy: 400 mL    Indwelling Catheter - Urethral: 100 mL  Total OUT: 500 mL    Total NET: -500 mL          Lab Data                        12.6   11.07 )-----------( 199      ( 22 Mar 2020 08:59 )             41.4     03-22    131<L>  |  94<L>  |  51<H>  ----------------------------<  94  5.0   |  24  |  6.40<H>    Ca    9.6      22 Mar 2020 08:59    TPro  8.5<H>  /  Alb  2.6<L>  /  TBili  0.3  /  DBili  x   /  AST  13<L>  /  ALT  12  /  AlkPhos  155<H>  03-22            Review of Systems	      Objective     Physical Examination  heart s1s2  lung dec BS      Pertinent Lab findings & Imaging      Ana:  NO   Adequate UO     I&O's Detail    21 Mar 2020 07:01  -  22 Mar 2020 07:00  --------------------------------------------------------  IN:    Other: 200 mL  Total IN: 200 mL    OUT:    Colostomy: 2000 mL  Total OUT: 2000 mL    Total NET: -1800 mL      22 Mar 2020 07:01  -  23 Mar 2020 06:18  --------------------------------------------------------  IN:  Total IN: 0 mL    OUT:    Colostomy: 400 mL    Indwelling Catheter - Urethral: 100 mL  Total OUT: 500 mL    Total NET: -500 mL               Discussed with:     Cultures:	        Radiology

## 2020-03-24 LAB
ALBUMIN SERPL ELPH-MCNC: 2.4 G/DL — LOW (ref 3.3–5)
ALP SERPL-CCNC: 139 U/L — HIGH (ref 40–120)
ALT FLD-CCNC: 11 U/L — LOW (ref 12–78)
ANION GAP SERPL CALC-SCNC: 14 MMOL/L — SIGNIFICANT CHANGE UP (ref 5–17)
AST SERPL-CCNC: 17 U/L — SIGNIFICANT CHANGE UP (ref 15–37)
BASOPHILS # BLD AUTO: 0.04 K/UL — SIGNIFICANT CHANGE UP (ref 0–0.2)
BASOPHILS NFR BLD AUTO: 0.6 % — SIGNIFICANT CHANGE UP (ref 0–2)
BILIRUB SERPL-MCNC: 0.3 MG/DL — SIGNIFICANT CHANGE UP (ref 0.2–1.2)
BUN SERPL-MCNC: 36 MG/DL — HIGH (ref 7–23)
CALCIUM SERPL-MCNC: 8.7 MG/DL — SIGNIFICANT CHANGE UP (ref 8.5–10.1)
CHLORIDE SERPL-SCNC: 92 MMOL/L — LOW (ref 96–108)
CO2 SERPL-SCNC: 26 MMOL/L — SIGNIFICANT CHANGE UP (ref 22–31)
CREAT SERPL-MCNC: 4.9 MG/DL — HIGH (ref 0.5–1.3)
EOSINOPHIL # BLD AUTO: 0.03 K/UL — SIGNIFICANT CHANGE UP (ref 0–0.5)
EOSINOPHIL NFR BLD AUTO: 0.4 % — SIGNIFICANT CHANGE UP (ref 0–6)
GLUCOSE SERPL-MCNC: 64 MG/DL — LOW (ref 70–99)
HCT VFR BLD CALC: 37.2 % — SIGNIFICANT CHANGE UP (ref 34.5–45)
HGB BLD-MCNC: 11.5 G/DL — SIGNIFICANT CHANGE UP (ref 11.5–15.5)
IMM GRANULOCYTES NFR BLD AUTO: 0.4 % — SIGNIFICANT CHANGE UP (ref 0–1.5)
LYMPHOCYTES # BLD AUTO: 0.69 K/UL — LOW (ref 1–3.3)
LYMPHOCYTES # BLD AUTO: 9.9 % — LOW (ref 13–44)
MCHC RBC-ENTMCNC: 28.8 PG — SIGNIFICANT CHANGE UP (ref 27–34)
MCHC RBC-ENTMCNC: 30.9 GM/DL — LOW (ref 32–36)
MCV RBC AUTO: 93 FL — SIGNIFICANT CHANGE UP (ref 80–100)
MONOCYTES # BLD AUTO: 0.72 K/UL — SIGNIFICANT CHANGE UP (ref 0–0.9)
MONOCYTES NFR BLD AUTO: 10.3 % — SIGNIFICANT CHANGE UP (ref 2–14)
NEUTROPHILS # BLD AUTO: 5.49 K/UL — SIGNIFICANT CHANGE UP (ref 1.8–7.4)
NEUTROPHILS NFR BLD AUTO: 78.4 % — HIGH (ref 43–77)
NRBC # BLD: 0 /100 WBCS — SIGNIFICANT CHANGE UP (ref 0–0)
PLATELET # BLD AUTO: 156 K/UL — SIGNIFICANT CHANGE UP (ref 150–400)
POTASSIUM SERPL-MCNC: 4.7 MMOL/L — SIGNIFICANT CHANGE UP (ref 3.5–5.3)
POTASSIUM SERPL-SCNC: 4.7 MMOL/L — SIGNIFICANT CHANGE UP (ref 3.5–5.3)
PROT SERPL-MCNC: 7.7 G/DL — SIGNIFICANT CHANGE UP (ref 6–8.3)
RBC # BLD: 4 M/UL — SIGNIFICANT CHANGE UP (ref 3.8–5.2)
RBC # FLD: 15.5 % — HIGH (ref 10.3–14.5)
SODIUM SERPL-SCNC: 132 MMOL/L — LOW (ref 135–145)
WBC # BLD: 7 K/UL — SIGNIFICANT CHANGE UP (ref 3.8–10.5)
WBC # FLD AUTO: 7 K/UL — SIGNIFICANT CHANGE UP (ref 3.8–10.5)

## 2020-03-24 PROCEDURE — 99232 SBSQ HOSP IP/OBS MODERATE 35: CPT | Mod: 25

## 2020-03-24 RX ORDER — TRAMADOL HYDROCHLORIDE 50 MG/1
25 TABLET ORAL ONCE
Refills: 0 | Status: DISCONTINUED | OUTPATIENT
Start: 2020-03-24 | End: 2020-03-24

## 2020-03-24 RX ADMIN — CHLORHEXIDINE GLUCONATE 1 APPLICATION(S): 213 SOLUTION TOPICAL at 12:46

## 2020-03-24 RX ADMIN — CHOLESTYRAMINE 4 GRAM(S): 4 POWDER, FOR SUSPENSION ORAL at 15:41

## 2020-03-24 RX ADMIN — Medication 1 APPLICATION(S): at 18:44

## 2020-03-24 RX ADMIN — Medication 650 MILLIGRAM(S): at 06:38

## 2020-03-24 RX ADMIN — Medication 3 MILLIGRAM(S): at 21:29

## 2020-03-24 RX ADMIN — Medication 750 MILLIGRAM(S): at 18:26

## 2020-03-24 RX ADMIN — ATORVASTATIN CALCIUM 10 MILLIGRAM(S): 80 TABLET, FILM COATED ORAL at 21:29

## 2020-03-24 RX ADMIN — Medication 325 MILLIGRAM(S): at 11:36

## 2020-03-24 RX ADMIN — NYSTATIN CREAM 1 APPLICATION(S): 100000 CREAM TOPICAL at 06:35

## 2020-03-24 RX ADMIN — Medication 750 MILLIGRAM(S): at 06:37

## 2020-03-24 RX ADMIN — BUDESONIDE AND FORMOTEROL FUMARATE DIHYDRATE 2 PUFF(S): 160; 4.5 AEROSOL RESPIRATORY (INHALATION) at 06:37

## 2020-03-24 RX ADMIN — MIDODRINE HYDROCHLORIDE 5 MILLIGRAM(S): 2.5 TABLET ORAL at 06:37

## 2020-03-24 RX ADMIN — BUDESONIDE AND FORMOTEROL FUMARATE DIHYDRATE 2 PUFF(S): 160; 4.5 AEROSOL RESPIRATORY (INHALATION) at 18:29

## 2020-03-24 RX ADMIN — MIDODRINE HYDROCHLORIDE 5 MILLIGRAM(S): 2.5 TABLET ORAL at 18:26

## 2020-03-24 RX ADMIN — MIDODRINE HYDROCHLORIDE 5 MILLIGRAM(S): 2.5 TABLET ORAL at 11:35

## 2020-03-24 RX ADMIN — Medication 20 MILLIGRAM(S): at 11:36

## 2020-03-24 RX ADMIN — Medication 650 MILLIGRAM(S): at 07:38

## 2020-03-24 RX ADMIN — TRAMADOL HYDROCHLORIDE 25 MILLIGRAM(S): 50 TABLET ORAL at 23:22

## 2020-03-24 RX ADMIN — Medication 650 MILLIGRAM(S): at 19:28

## 2020-03-24 RX ADMIN — Medication 650 MILLIGRAM(S): at 18:28

## 2020-03-24 RX ADMIN — NYSTATIN CREAM 1 APPLICATION(S): 100000 CREAM TOPICAL at 18:44

## 2020-03-24 RX ADMIN — Medication 1 APPLICATION(S): at 06:35

## 2020-03-24 NOTE — ADVANCED PRACTICE NURSE CONSULT - ASSESSMENT
Ostomy appliance in place, no leakage noted:  abdomen skin injuries resolved, at pubic bone remains three small wounds together measuring about 3 x 4
Patient colostomy pouch intact abdominal skin resolving no erythema present   colostomy fast transit time 800mL watery stool present MD made aware
Patient is an 83yo female presenting with a chronic stage 4 pressure injury 4 x 4 x 0.1 periwound blanchable erythema, mild serous drainage, epibole in over 95% of the wound, no odor, no warmth mild pain to the touch.   Patient states she has had the wound for more than one year.
Patient is an 85yo female presenting with a chronic stage 4 pressure injury 4 x 4 x 0.1 periwound blanchable erythema, mild serous drainage, epibole in over 95% of the wound, no odor, no warmth mild pain to the touch.   Abdomen is severely excoriated from colostomy leak pain to the touch Stoma powder and cavilon used to help heal skin and keep pouch in place: Eakins used around the stoma
Patients ostomy pouch found to have been leaking yesterday:  Entire abdomen, bilateral groin was found to be denuded: Crusting applied, pouch reapplied Perez attached: Today abdomen, bilateral groin are healing, colostomy appliance secure, MD made aware of the fast transit time and large amount of effluent

## 2020-03-24 NOTE — PROGRESS NOTE ADULT - SUBJECTIVE AND OBJECTIVE BOX
Neurology follow up note    GURJIT [id number]      Interval History:    Patient feels ok no new complaints.    MEDICATIONS    acetaminophen   Tablet .. 650 milliGRAM(s) Oral every 6 hours PRN  atorvastatin 10 milliGRAM(s) Oral at bedtime  budesonide 160 MICROgram(s)/formoterol 4.5 MICROgram(s) Inhaler 2 Puff(s) Inhalation two times a day  chlorhexidine 2% Cloths 1 Application(s) Topical daily  cholestyramine Powder (Sugar-Free) 4 Gram(s) Oral daily  clobetasol 0.05% Cream 1 Application(s) Topical two times a day  ferrous    sulfate 325 milliGRAM(s) Oral daily  FLUoxetine 20 milliGRAM(s) Oral daily  melatonin 3 milliGRAM(s) Oral at bedtime  midodrine. 5 milliGRAM(s) Oral three times a day  nystatin Cream 1 Application(s) Topical two times a day  valproic  acid Syrup 750 milliGRAM(s) Oral two times a day      Allergies    Levaquin (Pruritus)  mercury (Pruritus; Rash)  sulfa drugs (Pruritus)    Intolerances            Vital Signs Last 24 Hrs  T(C): 36.4 (24 Mar 2020 05:15), Max: 36.4 (23 Mar 2020 22:35)  T(F): 97.6 (24 Mar 2020 05:15), Max: 97.6 (24 Mar 2020 05:15)  HR: 79 (24 Mar 2020 05:15) (50 - 86)  BP: 111/79 (24 Mar 2020 05:15) (103/44 - 145/70)  BP(mean): --  RR: 18 (24 Mar 2020 05:15) (16 - 18)  SpO2: 98% (24 Mar 2020 05:15) (94% - 100%)    REVIEW OF SYSTEMS: Limited or unable to obtain secondary to patient's poor mental status.    Constitutional: No fever, chills, fatigue, generalized  weakness  Eyes: no eye pain, visual disturbances, or discharge  ENT:  No difficulty hearing, tinnitus, vertigo; No sinus or throat pain  Neck: No pain or stiffness  Respiratory: No cough, dyspnea, wheezing   Cardiovascular: No chest pain, palpitations,   Gastrointestinal: No abdominal or epigastric pain. No nausea, vomiting  No diarrhea or constipation.   Genitourinary: No dysuria, frequency, hematuria or incontinence  Neurological: No headaches, lightheadedness, vertigo, numbness or tremors  Psychiatric: No depression, anxiety, mood swings or difficulty sleeping  Musculoskeletal: No joint pain or swelling; No muscle, back or extremity pain      On Neurological Examination:    Mental Status - Patient is alert, awake,  [facility]   year 2020     Follow simple commands    Speech -   Fluent                 Cranial Nerves - Pupils 3 mm equal and reactive to light,   extraocular eye movements intact.   smile symmetric  intact bilateral NLF    Motor Exam -   Right upper 4/5   Left upper  3/5  Right lower 2/5  Left lower 2/5    Muscle tone - is normal all over.  No asymmetry is seen.      Sensory    Bilateral intact to light touch    GENERAL Exam: Nontoxic , No Acute Distress   	  HEENT:  normocephalic, atraumatic  		  LUNGS:  Decreased bilaterally  	  HEART: Normal S1S2   No murmur RRR        	  GI/ ABDOMEN:  Soft  Non tender    EXTREMITIES:   No Edema  No Clubbing  No Cyanosis   	   SKIN: Normal  No Ecchymosis                LABS:  CBC Full  -  ( 23 Mar 2020 10:02 )  WBC Count : 11.85 K/uL  RBC Count : 4.72 M/uL  Hemoglobin : 13.5 g/dL  Hematocrit : 43.3 %  Platelet Count - Automated : 207 K/uL  Mean Cell Volume : 91.7 fl  Mean Cell Hemoglobin : 28.6 pg  Mean Cell Hemoglobin Concentration : 31.2 gm/dL  Auto Neutrophil # : 9.24 K/uL  Auto Lymphocyte # : 1.36 K/uL  Auto Monocyte # : 1.09 K/uL  Auto Eosinophil # : 0.06 K/uL  Auto Basophil # : 0.05 K/uL  Auto Neutrophil % : 78.0 %  Auto Lymphocyte % : 11.5 %  Auto Monocyte % : 9.2 %  Auto Eosinophil % : 0.5 %  Auto Basophil % : 0.4 %      03-23    128<L>  |  89<L>  |  79<H>  ----------------------------<  74  6.5<HH>   |  19<L>  |  8.10<H>    Ca    9.4      23 Mar 2020 16:46    TPro  9.1<H>  /  Alb  2.8<L>  /  TBili  0.3  /  DBili  x   /  AST  14<L>  /  ALT  13  /  AlkPhos  173<H>  03-23    Hemoglobin A1C:     LIVER FUNCTIONS - ( 23 Mar 2020 10:02 )  Alb: 2.8 g/dL / Pro: 9.1 g/dL / ALK PHOS: 173 U/L / ALT: 13 U/L / AST: 14 U/L / GGT: x           Vitamin B12         RADIOLOGY    ANALYSIS AND PLAN:  An 84-year-old with an episode of seizure and change in mental status.  1.	For episode of seizure, will increase Depakote 750 mg twice  off dilantin   2.	EEG intermittent generalized spike and waves   3.	Ativan 1 mg IV push q.6h. p.r.n. for any type of breakthrough seizure.  4.	Seizure precautions.  5.	For history of underlying depression, at present hard to evaluate the patient's psychiatric status secondary to the patient being lethargic, continue home medication when possible.  6.	For hyperlipidemia, continue the patient on her cholesterol medication.  7.	For change in mental status, questionable this could be metabolic encephalopathy from partial complex seizures versus any type of underlying infectious type process episodes of temperatures and hypotension possible h/o of shock   8.	Fall precaution.  9.	overall more awake and interactive today   10.	For chronic kidney disease, monitor renal function noted  HD as tolerated   11.	  Advance directives were discussed with them.  Primary  will be daughterJu, her cell phone number is [phone], home number is [phone] spoke 3/19/2020 callled today went to voicemail which is full   12.	EEG no new changes   13.	MMSE 25/30  14.	monitor oral intake   15.	S/P IR perm cath exchange   16.	Greater than 20 minutes of time was spent with the patient, plan of care, reviewing data, speaking to the family and multidisciplinary healthcare team.

## 2020-03-24 NOTE — ADVANCED PRACTICE NURSE CONSULT - RECOMMEDATIONS
continue with current plan of care
1. Deandre JEREZ  RN educated in the care of this patients wound  MD made aware of recommendations
1. Stage 4 pressure injury; Continue with aquacel   2. Abdomen and bilateral groin use crusting technique  RN educated in the care of this patients skin  MD made aware of recommendations order obtained
Continue with current plan of care

## 2020-03-24 NOTE — PROGRESS NOTE ADULT - ASSESSMENT
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 Course c/b likely generalized seizure on 3/5/20, and acute hypoxic respiratory failure and suspected sepsis due to suspected aspiration PNA, s/p ICU stay. Now downgraded to medicine service. Patient now with ESRD and likely needing long term dialysis  Problem/Plan - 1:  ·  Problem: AMS (altered mental status).  Plan: MS better  weakness - frail elderly -   multiple medical issues -   needs assist with ADL  oral and skin hygiene  discussion ongoing about GOC  large family - Daughters in Law and sons and daughters all involved in discussion  pt remains full code.      Problem/Plan - 2:  ·  Problem: Generalized seizure.  Plan: - unresponsive episode with shaking and spike in lactic acidosis on 3/5, consistent with seizure (first EEG on 3/5 done after IV ativan given, which can mask seizure activity)  -2nd EEG on 3/8 showing some generalized spike/wave patterns concerning for increased seizure risk  - CT head, MRI brain showed no acute infarct or hemorrhage  - Continue Depakote.  -Dilantin stopped due to lethargy  -Seizure precautions  -Ativan PRn for breakthrough seizures.      Problem/Plan - 3:  ·  Problem: Sepsis.  Plan: - no new fevers  - there was suspicion for UTI on admission though unclear significance of the UCx isolates in the presence of chronic renae   - Doubt infection. No longer on antibiotics  - patient's lethargy improved today, which removal of dilantin  - continue to monitor  - Patient remains full code. Daughter, Rosy, HCP, states her mother would want CPR and intubation, but would not want to remain on life support for long.  - Dr. Wiley, palliative, recs appreciated.      Problem/Plan - 4:  ·  Problem: Acute renal failure superimposed on stage 5 chronic kidney disease, not on chronic dialysis, unspecified acute renal failure type.  Plan: patient with metabolic acidosis in setting of UTI and suspected pre-renal MEHDI, dehydration   - po sodium bicarb 650 mg TID  - pt's renal status still very poor. now patient and family may be amendable to dialysis  -s/p permacath now     Problem/Plan - 5:  ·  Problem: Hypernatremia.  Plan: Oral Free water intake. Improving   S/p IV fluids  Monitor Na level.      Problem/Plan - 6:  Problem: Hyperkalemia. Plan: - due to renal failure  - currently WNL  - Dr. You, nephro, recs appreciated.     Problem/Plan - 7:  ·  Problem: Wound of sacral region.  Plan: - Tylenol PRN  - does not appear actively infected currently  - Wound care RN, recs appreciated   - Aquacel dressing every other day  - Wound care, Dr. Blas.      Problem/Plan - 8:  ·  Problem: Iron deficiency anemia.  Plan: - Anemia likely due to LELO, and anemia of chronic disease in setting of Stage 5 CKD  - Fe panel from 2/2020 showed low total iron, low total iron binding capacity  - hemoglobin has been somewhat labile during admission; monitor closely, no sign of acute bleeding  - Continue home ferrous sulfate   - Continue epogen as per nephro.      Problem/Plan - 9:  ·  Problem: HTN (hypertension).  Plan: - pt's hypotension was likely related to sepsis and all anti-hypertensives held  - bp low during dialysis ::: NOW ON MIDODRINE: DIALYSIS SUCCESSFUL.      Problem/Plan - 10:  Problem: Need for prophylactic measure. Plan; DVT ppx: heparin 5000un sq Q8h

## 2020-03-24 NOTE — PROGRESS NOTE ADULT - SUBJECTIVE AND OBJECTIVE BOX
JOSE GUADALUPE HERRERACE  84y  Female    Patient is a 84y old  Female who presents with a chief complaint of altered mental status (24 Mar 2020 12:59)      awake and alert  s/p perma cath.       PAST MEDICAL & SURGICAL HISTORY:  Wound of sacral region  Depression  Iron deficiency anemia  Eczema  HTN (hypertension)  CKD (chronic kidney disease) stage 5, GFR less than 15 ml/min: not on HD  Herniated lumbar intervertebral disc  Tremor  Kidney atrophy: right  Colostomy status: 2006  Chronic UTI (urinary tract infection): chronic renae  Cervical cancer: 1970&#x27;s  Dyslipidemia  Hernia, incisional  S/P hip replacement: right 2013  S/P colon resection: 2006  S/P hysterectomy: 1977          PHYSICAL EXAM:    T(C): 36.4 (03-24-20 @ 05:15), Max: 36.4 (03-23-20 @ 22:35)  HR: 79 (03-24-20 @ 05:15) (50 - 85)  BP: 111/79 (03-24-20 @ 05:15) (103/44 - 124/85)  RR: 18 (03-24-20 @ 05:15) (16 - 18)  SpO2: 98% (03-24-20 @ 05:15) (94% - 98%)  Wt(kg): --    I&O's Detail    23 Mar 2020 07:01  -  24 Mar 2020 07:00  --------------------------------------------------------  IN:    Other: 600 mL  Total IN: 600 mL    OUT:    Ileostomy: 600 mL    Indwelling Catheter - Urethral: 150 mL  Total OUT: 750 mL    Total NET: -150 mL          Respiratory: clear anteriorly, decreased BS at bases  Cardiovascular: S1 S2  Gastrointestinal: soft NT ND +BS  Extremities: trace edema   Neuro: Awake and alert    MEDICATIONS  (STANDING):  atorvastatin 10 milliGRAM(s) Oral at bedtime  budesonide 160 MICROgram(s)/formoterol 4.5 MICROgram(s) Inhaler 2 Puff(s) Inhalation two times a day  chlorhexidine 2% Cloths 1 Application(s) Topical daily  cholestyramine Powder (Sugar-Free) 4 Gram(s) Oral daily  clobetasol 0.05% Cream 1 Application(s) Topical two times a day  ferrous    sulfate 325 milliGRAM(s) Oral daily  FLUoxetine 20 milliGRAM(s) Oral daily  melatonin 3 milliGRAM(s) Oral at bedtime  midodrine. 5 milliGRAM(s) Oral three times a day  nystatin Cream 1 Application(s) Topical two times a day  valproic  acid Syrup 750 milliGRAM(s) Oral two times a day    MEDICATIONS  (PRN):  acetaminophen   Tablet .. 650 milliGRAM(s) Oral every 6 hours PRN Mild Pain (1 - 3)                            11.5   7.00  )-----------( 156      ( 24 Mar 2020 10:04 )             37.2       03-24    132<L>  |  92<L>  |  36<H>  ----------------------------<  64<L>  4.7   |  26  |  4.90<H>    Ca    8.7      24 Mar 2020 10:04    TPro  7.7  /  Alb  2.4<L>  /  TBili  0.3  /  DBili  x   /  AST  17  /  ALT  11<L>  /  AlkPhos  139<H>  03-24      Creatinine Trend: Creatinine Trend: 4.90<--, 8.10<--, 8.10<--, 6.40<--, 7.10<--, 5.50<--

## 2020-03-24 NOTE — PROGRESS NOTE ADULT - PROBLEM SELECTOR PLAN 1
s/p HD yesterday - s/p IR perm cath exchange -   renal follow up noted  vs and HD and Sat reviewed  monitor Mental Status  monitor leukocytosis  cvs rx regimen and BP control  on Midodrine

## 2020-03-24 NOTE — PROGRESS NOTE ADULT - SUBJECTIVE AND OBJECTIVE BOX
Date/Time Patient Seen:  		  Referring MD:   Data Reviewed	       Patient is a 84y old  Female who presents with a chief complaint of altered mental status (23 Mar 2020 12:31)      Subjective/HPI     PAST MEDICAL & SURGICAL HISTORY:  Wound of sacral region  Depression  Iron deficiency anemia  Eczema  HTN (hypertension)  CKD (chronic kidney disease) stage 5, GFR less than 15 ml/min: not on HD  Herniated lumbar intervertebral disc  Depression  Tremor  Kidney atrophy: right  Colostomy status: 2006  Chronic UTI (urinary tract infection): chronic renae  Cervical cancer: 1970&#x27;s  Dyslipidemia  Diabetes: type 2  Hernia, incisional  S/P hip replacement: right 2013  S/P colon resection: 2006  S/P hysterectomy: 1977        Medication list         MEDICATIONS  (STANDING):  atorvastatin 10 milliGRAM(s) Oral at bedtime  budesonide 160 MICROgram(s)/formoterol 4.5 MICROgram(s) Inhaler 2 Puff(s) Inhalation two times a day  chlorhexidine 2% Cloths 1 Application(s) Topical daily  cholestyramine Powder (Sugar-Free) 4 Gram(s) Oral daily  clobetasol 0.05% Cream 1 Application(s) Topical two times a day  ferrous    sulfate 325 milliGRAM(s) Oral daily  FLUoxetine 20 milliGRAM(s) Oral daily  melatonin 3 milliGRAM(s) Oral at bedtime  midodrine. 5 milliGRAM(s) Oral three times a day  nystatin Cream 1 Application(s) Topical two times a day  valproic  acid Syrup 750 milliGRAM(s) Oral two times a day    MEDICATIONS  (PRN):  acetaminophen   Tablet .. 650 milliGRAM(s) Oral every 6 hours PRN Mild Pain (1 - 3)         Vitals log        ICU Vital Signs Last 24 Hrs  T(C): 36.4 (24 Mar 2020 05:15), Max: 36.4 (23 Mar 2020 22:35)  T(F): 97.6 (24 Mar 2020 05:15), Max: 97.6 (24 Mar 2020 05:15)  HR: 79 (24 Mar 2020 05:15) (50 - 86)  BP: 111/79 (24 Mar 2020 05:15) (103/44 - 145/70)  BP(mean): --  ABP: --  ABP(mean): --  RR: 18 (24 Mar 2020 05:15) (16 - 18)  SpO2: 98% (24 Mar 2020 05:15) (94% - 100%)           Input and Output:  I&O's Detail    22 Mar 2020 07:01  -  23 Mar 2020 07:00  --------------------------------------------------------  IN:  Total IN: 0 mL    OUT:    Colostomy: 800 mL    Indwelling Catheter - Urethral: 100 mL  Total OUT: 900 mL    Total NET: -900 mL      23 Mar 2020 07:01  -  24 Mar 2020 06:12  --------------------------------------------------------  IN:    Other: 600 mL  Total IN: 600 mL    OUT:  Total OUT: 0 mL    Total NET: 600 mL          Lab Data                        13.5   11.85 )-----------( 207      ( 23 Mar 2020 10:02 )             43.3     03-23    128<L>  |  89<L>  |  79<H>  ----------------------------<  74  6.5<HH>   |  19<L>  |  8.10<H>    Ca    9.4      23 Mar 2020 16:46    TPro  9.1<H>  /  Alb  2.8<L>  /  TBili  0.3  /  DBili  x   /  AST  14<L>  /  ALT  13  /  AlkPhos  173<H>  03-23            Review of Systems	      Objective     Physical Examination    heart s1s2  lung dec BS    Pertinent Lab findings & Imaging      Ana:  NO   Adequate UO     I&O's Detail    22 Mar 2020 07:01  -  23 Mar 2020 07:00  --------------------------------------------------------  IN:  Total IN: 0 mL    OUT:    Colostomy: 800 mL    Indwelling Catheter - Urethral: 100 mL  Total OUT: 900 mL    Total NET: -900 mL      23 Mar 2020 07:01  -  24 Mar 2020 06:12  --------------------------------------------------------  IN:    Other: 600 mL  Total IN: 600 mL    OUT:  Total OUT: 0 mL    Total NET: 600 mL               Discussed with:     Cultures:	        Radiology

## 2020-03-24 NOTE — PROGRESS NOTE ADULT - SUBJECTIVE AND OBJECTIVE BOX
Patient is a 84y old  Female who presents with a chief complaint of altered mental status (24 Mar 2020 09:57)      SUBJECTIVE / OVERNIGHT EVENTS: pt seen and evaluated. d/w dr andrade and jesica gonzales domingo        Vital Signs Last 24 Hrs  T(C): 36.4 (24 Mar 2020 05:15), Max: 36.4 (23 Mar 2020 22:35)  T(F): 97.6 (24 Mar 2020 05:15), Max: 97.6 (24 Mar 2020 05:15)  HR: 79 (24 Mar 2020 05:15) (50 - 85)  BP: 111/79 (24 Mar 2020 05:15) (103/44 - 124/85)  BP(mean): --  RR: 18 (24 Mar 2020 05:15) (16 - 18)  SpO2: 98% (24 Mar 2020 05:15) (94% - 98%)  I&O's Summary    23 Mar 2020 07:01  -  24 Mar 2020 07:00  --------------------------------------------------------  IN: 600 mL / OUT: 750 mL / NET: -150 mL        PHYSICAL EXAM:  GENERAL: NAD, Comfortable  HEAD:  Atraumatic, Normocephalic  EYES: EOMI, PERRLA, conjunctiva and sclera clear  NECK: Supple, No JVD  CHEST/LUNG: Clear to auscultation bilaterally; No wheeze  HEART: Regular rate and rhythm; No murmurs, rubs, or gallops  ABDOMEN: Soft, Nontender, Nondistended; Bowel sounds present  Neuro: AAO x 2  EXTREMITIES:  2+ Peripheral Pulses, No clubbing, cyanosis, or edema  SKIN: No rashes or lesions    LABS:                        11.5   7.00  )-----------( 156      ( 24 Mar 2020 10:04 )             37.2     03-24    132<L>  |  92<L>  |  36<H>  ----------------------------<  64<L>  4.7   |  26  |  4.90<H>    Ca    8.7      24 Mar 2020 10:04    TPro  7.7  /  Alb  2.4<L>  /  TBili  0.3  /  DBili  x   /  AST  17  /  ALT  11<L>  /  AlkPhos  139<H>  03-24      CAPILLARY BLOOD GLUCOSE      POCT Blood Glucose.: 79 mg/dL (24 Mar 2020 07:53)            RADIOLOGY & ADDITIONAL TESTS:    Imaging Personally Reviewed:  [x] YES  [ ] NO    Consultant(s) Notes Reviewed:  [x] YES  [ ] NO      MEDICATIONS  (STANDING):  atorvastatin 10 milliGRAM(s) Oral at bedtime  budesonide 160 MICROgram(s)/formoterol 4.5 MICROgram(s) Inhaler 2 Puff(s) Inhalation two times a day  chlorhexidine 2% Cloths 1 Application(s) Topical daily  cholestyramine Powder (Sugar-Free) 4 Gram(s) Oral daily  clobetasol 0.05% Cream 1 Application(s) Topical two times a day  ferrous    sulfate 325 milliGRAM(s) Oral daily  FLUoxetine 20 milliGRAM(s) Oral daily  melatonin 3 milliGRAM(s) Oral at bedtime  midodrine. 5 milliGRAM(s) Oral three times a day  nystatin Cream 1 Application(s) Topical two times a day  valproic  acid Syrup 750 milliGRAM(s) Oral two times a day    MEDICATIONS  (PRN):  acetaminophen   Tablet .. 650 milliGRAM(s) Oral every 6 hours PRN Mild Pain (1 - 3)      Care Discussed with Consultants/Other Providers [x] YES  [ ] NO    HEALTH ISSUES - PROBLEM Dx:  Palliative care encounter: Palliative care encounter  Hypernatremia: Hypernatremia  Sodium level improved  Sepsis: Sepsis  Altered mental status: Altered mental status  Generalized seizure: Generalized seizure  Unresponsiveness: Unresponsiveness  HTN (hypertension): HTN (hypertension)  Acute renal failure superimposed on stage 5 chronic kidney disease, not on chronic dialysis, unspecified acute renal failure type: Acute renal failure superimposed on stage 5 chronic kidney disease, not on chronic dialysis, unspecified acute renal failure type  Pressure injury of sacral region, stage 4: Pressure injury of sacral region, stage 4  Need for prophylactic measure: Need for prophylactic measure  Dyslipidemia: Dyslipidemia  Eczema: Eczema  CKD (chronic kidney disease) stage 5, GFR less than 15 ml/min: CKD (chronic kidney disease) stage 5, GFR less than 15 ml/min  Iron deficiency anemia: Iron deficiency anemia  Wound of sacral region: Wound of sacral region  Infiltrate of lung present on imaging of chest: Infiltrate of lung present on imaging of chest  Hyperkalemia: Hyperkalemia  AMS (altered mental status): AMS (altered mental status)  UTI (urinary tract infection): UTI (urinary tract infection)

## 2020-03-24 NOTE — PROGRESS NOTE ADULT - ASSESSMENT
84 female with a history of HTN, CAD, CHF, Anemia, osteomyelitis, HLD and atrophic right kidney and uretero ureteral anastomosis of the right to left and CKD stage 5 with anemia now admitted with mental status changes.   New onset seizures and is on Valproate. Started on dialysis due to worsening renal indices and is S/P perma cath. Discharge planning to [facility] after dialysis tomorrow. will follow. 84 female with a history of HTN, CAD, CHF, Anemia, osteomyelitis, HLD and atrophic right kidney and uretero ureteral anastomosis of the right to left and CKD stage 5 with anemia now admitted with mental status changes.   New onset seizures and is on Valproate. Started on dialysis due to worsening renal indices and is S/P perma cath.     spoke to the family over telephone and advised them that she is not tolerating dialysis and is more lethargic today.   advised them on possible palliative and comfort care.

## 2020-03-24 NOTE — PROGRESS NOTE ADULT - SUBJECTIVE AND OBJECTIVE BOX
DEPARTMENT OF ANESTHESIA  POST ANESTHETIC EVALUATION    The Patient was interviewed and evaluated.  The patient is S/P a permacath insertion    Vital Signs Last 24 Hrs  T(C): 36.4 (24 Mar 2020 05:15), Max: 36.4 (23 Mar 2020 22:35)  T(F): 97.6 (24 Mar 2020 05:15), Max: 97.6 (24 Mar 2020 05:15)  HR: 79 (24 Mar 2020 05:15) (50 - 86)  BP: 111/79 (24 Mar 2020 05:15) (103/44 - 145/70)  BP(mean): --  RR: 18 (24 Mar 2020 05:15) (16 - 18)  SpO2: 98% (24 Mar 2020 05:15) (94% - 100%)    Evaluation:  The patient is stable    ( x) No apparent complications or complaints regarding anesthesia care at this time  (x ) All questions were answered    Condition:  ( x) Stable      ( ) Guarded      ( ) Critical    Recommendations:  (x ) None     ( ) Other:

## 2020-03-25 LAB
ALBUMIN SERPL ELPH-MCNC: 2.6 G/DL — LOW (ref 3.3–5)
ALP SERPL-CCNC: 140 U/L — HIGH (ref 40–120)
ALT FLD-CCNC: 11 U/L — LOW (ref 12–78)
ANION GAP SERPL CALC-SCNC: 20 MMOL/L — HIGH (ref 5–17)
AST SERPL-CCNC: 11 U/L — LOW (ref 15–37)
BILIRUB SERPL-MCNC: 0.3 MG/DL — SIGNIFICANT CHANGE UP (ref 0.2–1.2)
BUN SERPL-MCNC: 58 MG/DL — HIGH (ref 7–23)
CALCIUM SERPL-MCNC: 8.9 MG/DL — SIGNIFICANT CHANGE UP (ref 8.5–10.1)
CHLORIDE SERPL-SCNC: 92 MMOL/L — LOW (ref 96–108)
CO2 SERPL-SCNC: 19 MMOL/L — LOW (ref 22–31)
CREAT SERPL-MCNC: 6.6 MG/DL — HIGH (ref 0.5–1.3)
GLUCOSE SERPL-MCNC: 100 MG/DL — HIGH (ref 70–99)
HCT VFR BLD CALC: 37.2 % — SIGNIFICANT CHANGE UP (ref 34.5–45)
HGB BLD-MCNC: 11.8 G/DL — SIGNIFICANT CHANGE UP (ref 11.5–15.5)
MCHC RBC-ENTMCNC: 28.8 PG — SIGNIFICANT CHANGE UP (ref 27–34)
MCHC RBC-ENTMCNC: 31.7 GM/DL — LOW (ref 32–36)
MCV RBC AUTO: 90.7 FL — SIGNIFICANT CHANGE UP (ref 80–100)
NRBC # BLD: 0 /100 WBCS — SIGNIFICANT CHANGE UP (ref 0–0)
PLATELET # BLD AUTO: 186 K/UL — SIGNIFICANT CHANGE UP (ref 150–400)
POTASSIUM SERPL-MCNC: 4.6 MMOL/L — SIGNIFICANT CHANGE UP (ref 3.5–5.3)
POTASSIUM SERPL-SCNC: 4.6 MMOL/L — SIGNIFICANT CHANGE UP (ref 3.5–5.3)
PROT SERPL-MCNC: 8.2 G/DL — SIGNIFICANT CHANGE UP (ref 6–8.3)
RBC # BLD: 4.1 M/UL — SIGNIFICANT CHANGE UP (ref 3.8–5.2)
RBC # FLD: 15.1 % — HIGH (ref 10.3–14.5)
SODIUM SERPL-SCNC: 131 MMOL/L — LOW (ref 135–145)
WBC # BLD: 12.16 K/UL — HIGH (ref 3.8–10.5)
WBC # FLD AUTO: 12.16 K/UL — HIGH (ref 3.8–10.5)

## 2020-03-25 PROCEDURE — 77001 FLUOROGUIDE FOR VEIN DEVICE: CPT | Mod: 26

## 2020-03-25 PROCEDURE — 99233 SBSQ HOSP IP/OBS HIGH 50: CPT

## 2020-03-25 PROCEDURE — 36589 REMOVAL TUNNELED CV CATH: CPT

## 2020-03-25 RX ORDER — OXYCODONE HYDROCHLORIDE 5 MG/1
5 TABLET ORAL EVERY 6 HOURS
Refills: 0 | Status: DISCONTINUED | OUTPATIENT
Start: 2020-03-25 | End: 2020-03-25

## 2020-03-25 RX ORDER — TRAMADOL HYDROCHLORIDE 50 MG/1
25 TABLET ORAL EVERY 8 HOURS
Refills: 0 | Status: DISCONTINUED | OUTPATIENT
Start: 2020-03-25 | End: 2020-03-27

## 2020-03-25 RX ORDER — ATORVASTATIN CALCIUM 80 MG/1
10 TABLET, FILM COATED ORAL AT BEDTIME
Refills: 0 | Status: DISCONTINUED | OUTPATIENT
Start: 2020-03-25 | End: 2020-03-27

## 2020-03-25 RX ORDER — OXYCODONE HYDROCHLORIDE 5 MG/1
5 TABLET ORAL EVERY 6 HOURS
Refills: 0 | Status: DISCONTINUED | OUTPATIENT
Start: 2020-03-25 | End: 2020-03-27

## 2020-03-25 RX ORDER — TRAMADOL HYDROCHLORIDE 50 MG/1
25 TABLET ORAL EVERY 8 HOURS
Refills: 0 | Status: DISCONTINUED | OUTPATIENT
Start: 2020-03-25 | End: 2020-03-25

## 2020-03-25 RX ADMIN — Medication 1 APPLICATION(S): at 17:22

## 2020-03-25 RX ADMIN — Medication 3 MILLIGRAM(S): at 21:22

## 2020-03-25 RX ADMIN — NYSTATIN CREAM 1 APPLICATION(S): 100000 CREAM TOPICAL at 06:25

## 2020-03-25 RX ADMIN — NYSTATIN CREAM 1 APPLICATION(S): 100000 CREAM TOPICAL at 19:24

## 2020-03-25 RX ADMIN — Medication 750 MILLIGRAM(S): at 17:21

## 2020-03-25 RX ADMIN — MIDODRINE HYDROCHLORIDE 5 MILLIGRAM(S): 2.5 TABLET ORAL at 13:33

## 2020-03-25 RX ADMIN — BUDESONIDE AND FORMOTEROL FUMARATE DIHYDRATE 2 PUFF(S): 160; 4.5 AEROSOL RESPIRATORY (INHALATION) at 06:25

## 2020-03-25 RX ADMIN — ATORVASTATIN CALCIUM 10 MILLIGRAM(S): 80 TABLET, FILM COATED ORAL at 21:22

## 2020-03-25 RX ADMIN — BUDESONIDE AND FORMOTEROL FUMARATE DIHYDRATE 2 PUFF(S): 160; 4.5 AEROSOL RESPIRATORY (INHALATION) at 21:22

## 2020-03-25 RX ADMIN — CHLORHEXIDINE GLUCONATE 1 APPLICATION(S): 213 SOLUTION TOPICAL at 17:18

## 2020-03-25 RX ADMIN — TRAMADOL HYDROCHLORIDE 25 MILLIGRAM(S): 50 TABLET ORAL at 00:22

## 2020-03-25 RX ADMIN — MIDODRINE HYDROCHLORIDE 5 MILLIGRAM(S): 2.5 TABLET ORAL at 06:26

## 2020-03-25 RX ADMIN — Medication 750 MILLIGRAM(S): at 06:26

## 2020-03-25 RX ADMIN — Medication 20 MILLIGRAM(S): at 13:33

## 2020-03-25 RX ADMIN — Medication 325 MILLIGRAM(S): at 13:33

## 2020-03-25 RX ADMIN — MIDODRINE HYDROCHLORIDE 5 MILLIGRAM(S): 2.5 TABLET ORAL at 17:21

## 2020-03-25 RX ADMIN — Medication 1 APPLICATION(S): at 06:25

## 2020-03-25 NOTE — PROGRESS NOTE ADULT - SUBJECTIVE AND OBJECTIVE BOX
Neurology follow up note    GURJIT [id number]      Interval History:    Patient feels ok no new complaints.    MEDICATIONS    acetaminophen   Tablet .. 650 milliGRAM(s) Oral every 6 hours PRN  atorvastatin 10 milliGRAM(s) Oral at bedtime  budesonide 160 MICROgram(s)/formoterol 4.5 MICROgram(s) Inhaler 2 Puff(s) Inhalation two times a day  chlorhexidine 2% Cloths 1 Application(s) Topical daily  cholestyramine Powder (Sugar-Free) 4 Gram(s) Oral daily  clobetasol 0.05% Cream 1 Application(s) Topical two times a day  ferrous    sulfate 325 milliGRAM(s) Oral daily  FLUoxetine 20 milliGRAM(s) Oral daily  melatonin 3 milliGRAM(s) Oral at bedtime  midodrine. 5 milliGRAM(s) Oral three times a day  nystatin Cream 1 Application(s) Topical two times a day  valproic  acid Syrup 750 milliGRAM(s) Oral two times a day      Allergies    Levaquin (Pruritus)  mercury (Pruritus; Rash)  sulfa drugs (Pruritus)    Intolerances            Vital Signs Last 24 Hrs  T(C): 36.7 (25 Mar 2020 06:00), Max: 36.9 (24 Mar 2020 20:20)  T(F): 98.1 (25 Mar 2020 06:00), Max: 98.4 (24 Mar 2020 20:20)  HR: 86 (25 Mar 2020 06:00) (78 - 87)  BP: 124/77 (25 Mar 2020 06:00) (113/63 - 124/77)  BP(mean): --  RR: 16 (25 Mar 2020 06:00) (16 - 18)  SpO2: 95% (24 Mar 2020 20:20) (95% - 96%)    REVIEW OF SYSTEMS    Constitutional: No fever, chills, fatigue, generalized  weakness  Eyes: no eye pain, visual disturbances, or discharge  ENT:  No difficulty hearing, tinnitus, vertigo; No sinus or throat pain  Neck: No pain or stiffness  Respiratory: No cough, dyspnea, wheezing   Cardiovascular: No chest pain, palpitations,   Gastrointestinal: No abdominal or epigastric pain. No nausea, vomiting  No diarrhea or constipation.   Genitourinary: No dysuria, frequency, hematuria or incontinence  Neurological: No headaches, lightheadedness, vertigo, numbness or tremors  Psychiatric: No depression, anxiety, mood swings or difficulty sleeping  Musculoskeletal: No joint pain or swelling; No muscle, back or extremity pain      On Neurological Examination:    Mental Status - Patient is alert, awake,  [facility]   year 2020     Follow simple commands    Speech -   Fluent                 Cranial Nerves - Pupils 3 mm equal and reactive to light,   extraocular eye movements intact.   smile symmetric  intact bilateral NLF    Motor Exam -   Right upper 4/5   Left upper  3/5  Right lower 2/5  Left lower 2/5    Muscle tone - is normal all over.  No asymmetry is seen.      Sensory    Bilateral intact to light touch    GENERAL Exam: Nontoxic , No Acute Distress   	  HEENT:  normocephalic, atraumatic  		  LUNGS:  Decreased bilaterally  	  HEART: Normal S1S2   No murmur RRR        	  GI/ ABDOMEN:  Soft  Non tender    EXTREMITIES:   No Edema  No Clubbing  No Cyanosis   	   SKIN: Normal  No Ecchymosis                LABS:  CBC Full  -  ( 24 Mar 2020 10:04 )  WBC Count : 7.00 K/uL  RBC Count : 4.00 M/uL  Hemoglobin : 11.5 g/dL  Hematocrit : 37.2 %  Platelet Count - Automated : 156 K/uL  Mean Cell Volume : 93.0 fl  Mean Cell Hemoglobin : 28.8 pg  Mean Cell Hemoglobin Concentration : 30.9 gm/dL  Auto Neutrophil # : 5.49 K/uL  Auto Lymphocyte # : 0.69 K/uL  Auto Monocyte # : 0.72 K/uL  Auto Eosinophil # : 0.03 K/uL  Auto Basophil # : 0.04 K/uL  Auto Neutrophil % : 78.4 %  Auto Lymphocyte % : 9.9 %  Auto Monocyte % : 10.3 %  Auto Eosinophil % : 0.4 %  Auto Basophil % : 0.6 %      03-24    132<L>  |  92<L>  |  36<H>  ----------------------------<  64<L>  4.7   |  26  |  4.90<H>    Ca    8.7      24 Mar 2020 10:04    TPro  7.7  /  Alb  2.4<L>  /  TBili  0.3  /  DBili  x   /  AST  17  /  ALT  11<L>  /  AlkPhos  139<H>  03-24    Hemoglobin A1C:     LIVER FUNCTIONS - ( 24 Mar 2020 10:04 )  Alb: 2.4 g/dL / Pro: 7.7 g/dL / ALK PHOS: 139 U/L / ALT: 11 U/L / AST: 17 U/L / GGT: x           Vitamin B12         RADIOLOGY    ANALYSIS AND PLAN:  An 84-year-old with an episode of seizure and change in mental status.  1.	For episode of seizure, will increase Depakote 750 mg twice  off dilantin   2.	EEG intermittent generalized spike and waves   3.	Ativan 1 mg IV push q.6h. p.r.n. for any type of breakthrough seizure.  4.	Seizure precautions.  5.	For history of underlying depression, at present hard to evaluate the patient's psychiatric status secondary to the patient being lethargic, continue home medication when possible.  6.	For hyperlipidemia, continue the patient on her cholesterol medication.  7.	For change in mental status, questionable this could be metabolic encephalopathy from partial complex seizures versus any type of underlying infectious type process episodes of temperatures and hypotension possible h/o of shock   8.	Fall precaution.  9.	overall more awake and interactive today   10.	For chronic kidney disease, monitor renal function noted  HD as tolerated   11.	  Advance directives were discussed with them.  Primary  will be daughter, Ju, her cell phone number is [phone], home number is [phone] spoke 3/19/2020 callled today went to voicemail which is full   12.	EEG no new changes   13.	MMSE 25/30  14.	monitor oral intake   15.	S/P IR perm cath exchange   16.	no new events   17.	Greater than 20 minutes of time was spent with the patient, plan of care, reviewing data, speaking to the family and multidisciplinary healthcare team.

## 2020-03-25 NOTE — PROGRESS NOTE ADULT - PROBLEM SELECTOR PLAN 1
HD as per renal  CM notes reviewed   renal follow up noted  vs and HD and Sat reviewed  monitor Mental Status  monitor leukocytosis  cvs rx regimen and BP control  on Midodrine.

## 2020-03-25 NOTE — PROGRESS NOTE ADULT - ASSESSMENT
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 Course c/b likely generalized seizure on 3/5/20, and acute hypoxic respiratory failure and suspected sepsis due to suspected aspiration PNA, s/p ICU stay. Now downgraded to medicine service. Patient now with ESRD and likely needing long term dialysis. Unable to do HD due to   Problem/Plan - 1:  ·  Problem: AMS (altered mental status).  Plan: MS better  weakness - frail elderly -   multiple medical issues -   needs assist with ADL  oral and skin hygiene  large family - Daughters in Law and sons and daughters all involved in discussion  Family interested in pursuing Hospice. Referral sent per        Problem/Plan - 2:  ·  Problem: Generalized seizure.  Plan: - unresponsive episode with shaking and spike in lactic acidosis on 3/5, consistent with seizure (first EEG on 3/5 done after IV ativan given, which can mask seizure activity)  -2nd EEG on 3/8 showing some generalized spike/wave patterns concerning for increased seizure risk  - CT head, MRI brain showed no acute infarct or hemorrhage  - Continue Depakote.  -Dilantin stopped due to lethargy  -Seizure precautions  -Ativan PRn for breakthrough seizures.      Problem/Plan - 3:  ·  Problem: Sepsis.  Plan: - no new fevers  - there was suspicion for UTI on admission though unclear significance of the UCx isolates in the presence of chronic renae   - Doubt infection. No longer on antibiotics  - patient's lethargy improved today, which removal of dilantin  - continue to monitor  -Family interested in hospice care, referral sent   - Dr. Wiley, palliative, recs appreciated.      Problem/Plan - 4:  ·  Problem: Acute renal failure superimposed on stage 5 chronic kidney disease, not on chronic dialysis, unspecified acute renal failure type.  Plan: patient with metabolic acidosis in setting of UTI and suspected pre-renal MEHDI, dehydration   - po sodium bicarb 650 mg TID  - pt's renal status still very poor. Unable to tolerate HD due to hypotension  -Permacath to be removed  -s/p permacath now     Problem/Plan - 5:  ·  Problem: Hypernatremia.  Plan: Oral Free water intake. Improving   S/p IV fluids  Monitor Na level.      Problem/Plan - 6:  Problem: Hyperkalemia. Plan: - due to renal failure  - currently WNL  - Dr. You, nephro, recs appreciated.     Problem/Plan - 7:  ·  Problem: Wound of sacral region.  Plan: - Tylenol PRN  - does not appear actively infected currently  - Wound care RN, recs appreciated   - Aquacel dressing every other day  - Wound care, Dr. Blas.      Problem/Plan - 8:  ·  Problem: Iron deficiency anemia.  Plan: - Anemia likely due to LELO, and anemia of chronic disease in setting of Stage 5 CKD  - Fe panel from 2/2020 showed low total iron, low total iron binding capacity  - hemoglobin has been somewhat labile during admission; monitor closely, no sign of acute bleeding  - Continue home ferrous sulfate   - Continue epogen as per nephro.      Problem/Plan - 9:  ·  Problem: HTN (hypertension).  Plan:   - bp low during dialysis  - Continue midodrine     Problem/Plan - 10:  Problem: Need for prophylactic measure. 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 Course c/b likely generalized seizure on 3/5/20, and acute hypoxic respiratory failure and suspected sepsis due to suspected aspiration PNA, s/p ICU stay. Now downgraded to medicine service. Patient now with ESRD and likely needing long term dialysis. Unable to do HD due to hypotension  Problem/Plan - 1:  ·  Problem: AMS (altered mental status).  Plan: MS better  weakness - frail elderly -   multiple medical issues -   needs assist with ADL  oral and skin hygiene  large family - Daughters in Law and sons and daughters all involved in discussion  Family interested in pursuing Hospice. Referral sent per SW  Started on pain regimen with hold parameters       Problem/Plan - 2:  ·  Problem: Generalized seizure.  Plan: - unresponsive episode with shaking and spike in lactic acidosis on 3/5, consistent with seizure (first EEG on 3/5 done after IV ativan given, which can mask seizure activity)  -2nd EEG on 3/8 showing some generalized spike/wave patterns concerning for increased seizure risk  - CT head, MRI brain showed no acute infarct or hemorrhage  - Continue Depakote.  -Dilantin stopped due to lethargy  -Seizure precautions  -Ativan PRn for breakthrough seizures.      Problem/Plan - 3:  ·  Problem: Sepsis.  Plan: - no new fevers  - there was suspicion for UTI on admission though unclear significance of the UCx isolates in the presence of chronic renae   - Doubt infection. No longer on antibiotics  - patient's lethargy improved today, which removal of dilantin  - continue to monitor  -Family interested in hospice care, referral sent   - Dr. Wiley, palliative, recs appreciated.      Problem/Plan - 4:  ·  Problem: Acute renal failure superimposed on stage 5 chronic kidney disease, not on chronic dialysis, unspecified acute renal failure type.  Plan: patient with metabolic acidosis in setting of UTI and suspected pre-renal MEHDI, dehydration   - po sodium bicarb 650 mg TID  - pt's renal status still very poor. Unable to tolerate HD due to hypotension  -Permacath to be removed  -s/p permacath now     Problem/Plan - 5:  ·  Problem: Hypernatremia.  Plan: Oral Free water intake. Improving   S/p IV fluids  Monitor Na level.      Problem/Plan - 6:  Problem: Hyperkalemia. Plan: - due to renal failure  - currently WNL  - Dr. You, nephro, recs appreciated.     Problem/Plan - 7:  ·  Problem: Wound of sacral region.  Plan: - Tylenol PRN  - does not appear actively infected currently  - Wound care RN, recs appreciated   - Aquacel dressing every other day  - Wound care, Dr. Blas.      Problem/Plan - 8:  ·  Problem: Iron deficiency anemia.  Plan: - Anemia likely due to LELO, and anemia of chronic disease in setting of Stage 5 CKD  - Fe panel from 2/2020 showed low total iron, low total iron binding capacity  - hemoglobin has been somewhat labile during admission; monitor closely, no sign of acute bleeding  - Continue home ferrous sulfate   - Continue epogen as per nephro.      Problem/Plan - 9:  ·  Problem: HTN (hypertension).  Plan:   - bp low during dialysis  - Continue midodrine     Problem/Plan - 10:  Problem: Need for prophylactic measure. Plan; DVT ppx: heparin 5000un sq Q8h

## 2020-03-25 NOTE — PROGRESS NOTE ADULT - SUBJECTIVE AND OBJECTIVE BOX
Patient is a 84y old  Female who presents with a chief complaint of altered mental status (03 Mar 2020 17:47)  Patient seen in follow up for CKD 4, Hyperkalemia.     Pt not tolerating HD. BP low.     PAST MEDICAL HISTORY:  Wound of sacral region  Depression  Iron deficiency anemia  Eczema  HTN (hypertension)  CKD (chronic kidney disease) stage 5, GFR less than 15 ml/min  Herniated lumbar intervertebral disc  Depression  Tremor  Kidney atrophy  Colostomy status  Chronic UTI (urinary tract infection)  Cervical cancer  Dyslipidemia  Diabetes  Hernia, incisional      MEDICATIONS  (STANDING):  atorvastatin 10 milliGRAM(s) Oral at bedtime  budesonide 160 MICROgram(s)/formoterol 4.5 MICROgram(s) Inhaler 2 Puff(s) Inhalation two times a day  chlorhexidine 2% Cloths 1 Application(s) Topical daily  cholestyramine Powder (Sugar-Free) 4 Gram(s) Oral daily  clobetasol 0.05% Cream 1 Application(s) Topical two times a day  ferrous    sulfate 325 milliGRAM(s) Oral daily  FLUoxetine 20 milliGRAM(s) Oral daily  melatonin 3 milliGRAM(s) Oral at bedtime  midodrine. 5 milliGRAM(s) Oral three times a day  nystatin Cream 1 Application(s) Topical two times a day  valproic  acid Syrup 750 milliGRAM(s) Oral two times a day    MEDICATIONS  (PRN):  acetaminophen   Tablet .. 650 milliGRAM(s) Oral every 6 hours PRN Mild Pain (1 - 3)    T(C): 36.2 (03-25-20 @ 11:00), Max: 36.9 (03-24-20 @ 20:20)  HR: 72 (03-25-20 @ 11:00) (50 - 94)  BP: 127/54 (03-25-20 @ 11:00) (103/44 - 127/54)  RR: 16 (03-25-20 @ 11:00)  SpO2: 99% (03-25-20 @ 11:00)  Wt(kg): --  I&O's Detail    24 Mar 2020 07:01  -  25 Mar 2020 07:00  --------------------------------------------------------  IN:  Total IN: 0 mL    OUT:    Colostomy: 2500 mL    Indwelling Catheter - Urethral: 105 mL  Total OUT: 2605 mL    Total NET: -2605 mL      25 Mar 2020 07:01  -  25 Mar 2020 13:35  --------------------------------------------------------  IN:    Other: 800 mL  Total IN: 800 mL    OUT:  Total OUT: 0 mL    Total NET: 800 mL                PHYSICAL EXAM:  General: No distress, dialysis catheter  Respiratory: b/l air entry  Cardiovascular: S1 S2  Gastrointestinal: soft, ileostomy  Extremities:  edema, + renae               LABORATORY:                        11.8   12.16 )-----------( 186      ( 25 Mar 2020 08:50 )             37.2     03-25    131<L>  |  92<L>  |  58<H>  ----------------------------<  100<H>  4.6   |  19<L>  |  6.60<H>    Ca    8.9      25 Mar 2020 08:50    TPro  8.2  /  Alb  2.6<L>  /  TBili  0.3  /  DBili  x   /  AST  11<L>  /  ALT  11<L>  /  AlkPhos  140<H>  03-25    Sodium, Serum: 131 mmol/L (03-25 @ 08:50)  Sodium, Serum: 132 mmol/L (03-24 @ 10:04)  Sodium, Serum: 128 mmol/L (03-23 @ 16:46)    Potassium, Serum: 4.6 mmol/L (03-25 @ 08:50)  Potassium, Serum: 4.7 mmol/L (03-24 @ 10:04)  Potassium, Serum: 6.5 mmol/L (03-23 @ 16:46)    Hemoglobin: 11.8 g/dL (03-25 @ 08:50)  Hemoglobin: 11.5 g/dL (03-24 @ 10:04)  Hemoglobin: 13.5 g/dL (03-23 @ 10:02)    Creatinine, Serum 6.60 (03-25 @ 08:50)  Creatinine, Serum 4.90 (03-24 @ 10:04)  Creatinine, Serum 8.10 (03-23 @ 16:46)  Creatinine, Serum 8.10 (03-23 @ 10:02)        LIVER FUNCTIONS - ( 25 Mar 2020 08:50 )  Alb: 2.6 g/dL / Pro: 8.2 g/dL / ALK PHOS: 140 U/L / ALT: 11 U/L / AST: 11 U/L / GGT: x

## 2020-03-25 NOTE — PROGRESS NOTE ADULT - SUBJECTIVE AND OBJECTIVE BOX
Patient is a 84y old  Female who presents with a chief complaint of altered mental status (24 Mar 2020 09:57)      SUBJECTIVE / OVERNIGHT EVENTS: pt seen and evaluated. Patient did not tolerate HD due to hypotension. Family interested in pursuing palliative eval      Vital Signs Last 24 Hrs  T(C): 36.5 (25 Mar 2020 14:11), Max: 36.9 (24 Mar 2020 20:20)  T(F): 97.7 (25 Mar 2020 14:11), Max: 98.4 (24 Mar 2020 20:20)  HR: 81 (25 Mar 2020 14:11) (72 - 94)  BP: 128/85 (25 Mar 2020 14:11) (112/62 - 128/85)  BP(mean): --  RR: 17 (25 Mar 2020 14:11) (16 - 18)  SpO2: 97% (25 Mar 2020 14:11) (94% - 99%)                          11.8   12.16 )-----------( 186      ( 25 Mar 2020 08:50 )             37.2   03-25    131<L>  |  92<L>  |  58<H>  ----------------------------<  100<H>  4.6   |  19<L>  |  6.60<H>    Ca    8.9      25 Mar 2020 08:50    TPro  8.2  /  Alb  2.6<L>  /  TBili  0.3  /  DBili  x   /  AST  11<L>  /  ALT  11<L>  /  AlkPhos  140<H>  03-25  I&O's Summary    24 Mar 2020 07:01  -  25 Mar 2020 07:00  --------------------------------------------------------  IN: 0 mL / OUT: 2605 mL / NET: -2605 mL    25 Mar 2020 07:01  -  25 Mar 2020 16:42  --------------------------------------------------------  IN: 800 mL / OUT: 450 mL / NET: 350 mL          PHYSICAL EXAM:  GENERAL: NAD, Comfortable  HEAD:  Atraumatic, Normocephalic  EYES: EOMI, PERRLA, conjunctiva and sclera clear  NECK: Supple, No JVD  CHEST/LUNG: Clear to auscultation bilaterally; No wheeze  HEART: Regular rate and rhythm; No murmurs, rubs, or gallops  ABDOMEN: Soft, Nontender, Nondistended; Bowel sounds present  Neuro: AAO x 2  EXTREMITIES:  2+ Peripheral Pulses, No clubbing, cyanosis, or edema  SKIN: No rashes or lesions        RADIOLOGY & ADDITIONAL TESTS:    Imaging Personally Reviewed:  [x] YES  [ ] NO    Consultant(s) Notes Reviewed:  [x] YES  [ ] NO      MEDICATIONS  (STANDING):MEDICATIONS  (STANDING):  budesonide 160 MICROgram(s)/formoterol 4.5 MICROgram(s) Inhaler 2 Puff(s) Inhalation two times a day  chlorhexidine 2% Cloths 1 Application(s) Topical daily  cholestyramine Powder (Sugar-Free) 4 Gram(s) Oral daily  clobetasol 0.05% Cream 1 Application(s) Topical two times a day  FLUoxetine 20 milliGRAM(s) Oral daily  melatonin 3 milliGRAM(s) Oral at bedtime  midodrine. 5 milliGRAM(s) Oral three times a day  nystatin Cream 1 Application(s) Topical two times a day  valproic  acid Syrup 750 milliGRAM(s) Oral two times a day    MEDICATIONS  (PRN):  acetaminophen   Tablet .. 650 milliGRAM(s) Oral every 6 hours PRN Mild Pain (1 - 3)  oxyCODONE    IR 5 milliGRAM(s) Oral every 6 hours PRN Severe Pain (7 - 10)  traMADol 25 milliGRAM(s) Oral every 8 hours PRN Moderate Pain (4 - 6)      Care Discussed with Consultants/Other Providers [x] YES  [ ] NO    HEALTH ISSUES - PROBLEM Dx:  Palliative care encounter: Palliative care encounter  Hypernatremia: Hypernatremia  Sodium level improved  Sepsis: Sepsis  Altered mental status: Altered mental status  Generalized seizure: Generalized seizure  Unresponsiveness: Unresponsiveness  HTN (hypertension): HTN (hypertension)  Acute renal failure superimposed on stage 5 chronic kidney disease, not on chronic dialysis, unspecified acute renal failure type: Acute renal failure superimposed on stage 5 chronic kidney disease, not on chronic dialysis, unspecified acute renal failure type  Pressure injury of sacral region, stage 4: Pressure injury of sacral region, stage 4  Need for prophylactic measure: Need for prophylactic measure  Dyslipidemia: Dyslipidemia  Eczema: Eczema  CKD (chronic kidney disease) stage 5, GFR less than 15 ml/min: CKD (chronic kidney disease) stage 5, GFR less than 15 ml/min  Iron deficiency anemia: Iron deficiency anemia  Wound of sacral region: Wound of sacral region  Infiltrate of lung present on imaging of chest: Infiltrate of lung present on imaging of chest  Hyperkalemia: Hyperkalemia  AMS (altered mental status): AMS (altered mental status)  UTI (urinary tract infection): UTI (urinary tract infection) Patient is a 84y old  Female who presents with a chief complaint of altered mental status (24 Mar 2020 09:57)      SUBJECTIVE / OVERNIGHT EVENTS: pt seen and evaluated. Patient did not tolerate HD due to hypotension. Family interested in pursuing hospice at home, referral placed by barbie.       Vital Signs Last 24 Hrs  T(C): 36.5 (25 Mar 2020 14:11), Max: 36.9 (24 Mar 2020 20:20)  T(F): 97.7 (25 Mar 2020 14:11), Max: 98.4 (24 Mar 2020 20:20)  HR: 81 (25 Mar 2020 14:11) (72 - 94)  BP: 128/85 (25 Mar 2020 14:11) (112/62 - 128/85)  BP(mean): --  RR: 17 (25 Mar 2020 14:11) (16 - 18)  SpO2: 97% (25 Mar 2020 14:11) (94% - 99%)                          11.8   12.16 )-----------( 186      ( 25 Mar 2020 08:50 )             37.2   03-25    131<L>  |  92<L>  |  58<H>  ----------------------------<  100<H>  4.6   |  19<L>  |  6.60<H>    Ca    8.9      25 Mar 2020 08:50    TPro  8.2  /  Alb  2.6<L>  /  TBili  0.3  /  DBili  x   /  AST  11<L>  /  ALT  11<L>  /  AlkPhos  140<H>  03-25  I&O's Summary    24 Mar 2020 07:01  -  25 Mar 2020 07:00  --------------------------------------------------------  IN: 0 mL / OUT: 2605 mL / NET: -2605 mL    25 Mar 2020 07:01  -  25 Mar 2020 16:42  --------------------------------------------------------  IN: 800 mL / OUT: 450 mL / NET: 350 mL          PHYSICAL EXAM:  GENERAL: NAD, Comfortable  HEAD:  Atraumatic, Normocephalic  EYES: EOMI, PERRLA, conjunctiva and sclera clear  NECK: Supple, No JVD  CHEST/LUNG: Clear to auscultation bilaterally; No wheeze  HEART: Regular rate and rhythm; No murmurs, rubs, or gallops  ABDOMEN: Soft, Nontender, Nondistended; Bowel sounds present  Neuro: AAO x 2  EXTREMITIES:  2+ Peripheral Pulses, No clubbing, cyanosis, or edema  SKIN: No rashes or lesions        RADIOLOGY & ADDITIONAL TESTS:    Imaging Personally Reviewed:  [x] YES  [ ] NO    Consultant(s) Notes Reviewed:  [x] YES  [ ] NO      MEDICATIONS  (STANDING):MEDICATIONS  (STANDING):  budesonide 160 MICROgram(s)/formoterol 4.5 MICROgram(s) Inhaler 2 Puff(s) Inhalation two times a day  chlorhexidine 2% Cloths 1 Application(s) Topical daily  cholestyramine Powder (Sugar-Free) 4 Gram(s) Oral daily  clobetasol 0.05% Cream 1 Application(s) Topical two times a day  FLUoxetine 20 milliGRAM(s) Oral daily  melatonin 3 milliGRAM(s) Oral at bedtime  midodrine. 5 milliGRAM(s) Oral three times a day  nystatin Cream 1 Application(s) Topical two times a day  valproic  acid Syrup 750 milliGRAM(s) Oral two times a day    MEDICATIONS  (PRN):  acetaminophen   Tablet .. 650 milliGRAM(s) Oral every 6 hours PRN Mild Pain (1 - 3)  oxyCODONE    IR 5 milliGRAM(s) Oral every 6 hours PRN Severe Pain (7 - 10)  traMADol 25 milliGRAM(s) Oral every 8 hours PRN Moderate Pain (4 - 6)      Care Discussed with Consultants/Other Providers [x] YES  [ ] NO    HEALTH ISSUES - PROBLEM Dx:  Palliative care encounter: Palliative care encounter  Hypernatremia: Hypernatremia  Sodium level improved  Sepsis: Sepsis  Altered mental status: Altered mental status  Generalized seizure: Generalized seizure  Unresponsiveness: Unresponsiveness  HTN (hypertension): HTN (hypertension)  Acute renal failure superimposed on stage 5 chronic kidney disease, not on chronic dialysis, unspecified acute renal failure type: Acute renal failure superimposed on stage 5 chronic kidney disease, not on chronic dialysis, unspecified acute renal failure type  Pressure injury of sacral region, stage 4: Pressure injury of sacral region, stage 4  Need for prophylactic measure: Need for prophylactic measure  Dyslipidemia: Dyslipidemia  Eczema: Eczema  CKD (chronic kidney disease) stage 5, GFR less than 15 ml/min: CKD (chronic kidney disease) stage 5, GFR less than 15 ml/min  Iron deficiency anemia: Iron deficiency anemia  Wound of sacral region: Wound of sacral region  Infiltrate of lung present on imaging of chest: Infiltrate of lung present on imaging of chest  Hyperkalemia: Hyperkalemia  AMS (altered mental status): AMS (altered mental status)  UTI (urinary tract infection): UTI (urinary tract infection)

## 2020-03-25 NOTE — CHART NOTE - NSCHARTNOTEFT_GEN_A_CORE
Assessment: Pt seen for nutrition follow-up. 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 Course c/b likely generalized seizure on 3/5/20, and acute hypoxic respiratory failure and suspected sepsis due to suspected aspiration PNA, s/p ICU stay. Now downgraded to medicine service. Patient now with ESRD and likely needing long term dialysis Pt currently not tolerating HD due to low BP, stopped dialysis. Per pt and RN pt with fair to good appetite, consuming about % of meals. No GI distress noted at this time, however continues with occasional high ostomy output. Ostomy output: (3/24) 900ml, (3/25) 1,600ml thus far.      Factors impacting intake: [x ] none     Diet Presciption: Diet, Soft (03-18-20 @ 09:20)    Intake: fair to good     Current Weight: (3/25) 109.7lbs  Previous Weight: (3/21) 104lbs  5.5% Weight Change- question accuracy of weights, continue to monitor daily weights     Pertinent Medications: MEDICATIONS  (STANDING):  atorvastatin 10 milliGRAM(s) Oral at bedtime  budesonide 160 MICROgram(s)/formoterol 4.5 MICROgram(s) Inhaler 2 Puff(s) Inhalation two times a day  chlorhexidine 2% Cloths 1 Application(s) Topical daily  cholestyramine Powder (Sugar-Free) 4 Gram(s) Oral daily  clobetasol 0.05% Cream 1 Application(s) Topical two times a day  ferrous    sulfate 325 milliGRAM(s) Oral daily  FLUoxetine 20 milliGRAM(s) Oral daily  melatonin 3 milliGRAM(s) Oral at bedtime  midodrine. 5 milliGRAM(s) Oral three times a day  nystatin Cream 1 Application(s) Topical two times a day  valproic  acid Syrup 750 milliGRAM(s) Oral two times a day    MEDICATIONS  (PRN):  acetaminophen   Tablet .. 650 milliGRAM(s) Oral every 6 hours PRN Mild Pain (1 - 3)    Pertinent Labs: 03-25 Na131 mmol/L<L> Glu 100 mg/dL<H> K+ 4.6 mmol/L Cr  6.60 mg/dL<H> BUN 58 mg/dL<H> 03-25 Alb 2.6 g/dL<L>, Chloride 92     CAPILLARY BLOOD GLUCOSE      Skin: Stage 4 sacrum pressure injury  No edema noted at this time     Estimated Needs:   [ x] no change since previous assessment  [ ] recalculated:     Previous Nutrition Diagnosis:   [x ] Swallowing difficulty [x] Altered Nutrition Related Labs     Nutrition Diagnosis is [ x] ongoing    New Nutrition Diagnosis: [x ] not applicable       Interventions: Continue with liberalized Soft diet   Recommend  [ ] Change Diet To:  [ ] Nutrition Supplement  [ ] Nutrition Support  [ x] Other:   1) Encourage adequate PO intake  2) Monitor pt's PO intake, weight, skin, edema, GI distress     Monitoring and Evaluation:   [x ] PO intake [ x ] Tolerance to diet prescription [ x ] weights [ x ] labs[ x ] follow up per protocol  [ x] other: RD to remain available

## 2020-03-25 NOTE — PROGRESS NOTE ADULT - SUBJECTIVE AND OBJECTIVE BOX
Date/Time Patient Seen:  		  Referring MD:   Data Reviewed	       Patient is a 84y old  Female who presents with a chief complaint of altered mental status (24 Mar 2020 13:55)      Subjective/HPI     PAST MEDICAL & SURGICAL HISTORY:  Wound of sacral region  Depression  Iron deficiency anemia  Eczema  HTN (hypertension)  CKD (chronic kidney disease) stage 5, GFR less than 15 ml/min: not on HD  Herniated lumbar intervertebral disc  Depression  Tremor  Kidney atrophy: right  Colostomy status: 2006  Chronic UTI (urinary tract infection): chronic renae  Cervical cancer: 1970&#x27;s  Dyslipidemia  Diabetes: type 2  Hernia, incisional  S/P hip replacement: right 2013  S/P colon resection: 2006  S/P hysterectomy: 1977        Medication list         MEDICATIONS  (STANDING):  atorvastatin 10 milliGRAM(s) Oral at bedtime  budesonide 160 MICROgram(s)/formoterol 4.5 MICROgram(s) Inhaler 2 Puff(s) Inhalation two times a day  chlorhexidine 2% Cloths 1 Application(s) Topical daily  cholestyramine Powder (Sugar-Free) 4 Gram(s) Oral daily  clobetasol 0.05% Cream 1 Application(s) Topical two times a day  ferrous    sulfate 325 milliGRAM(s) Oral daily  FLUoxetine 20 milliGRAM(s) Oral daily  melatonin 3 milliGRAM(s) Oral at bedtime  midodrine. 5 milliGRAM(s) Oral three times a day  nystatin Cream 1 Application(s) Topical two times a day  valproic  acid Syrup 750 milliGRAM(s) Oral two times a day    MEDICATIONS  (PRN):  acetaminophen   Tablet .. 650 milliGRAM(s) Oral every 6 hours PRN Mild Pain (1 - 3)         Vitals log        ICU Vital Signs Last 24 Hrs  T(C): 36.9 (24 Mar 2020 20:20), Max: 36.9 (24 Mar 2020 20:20)  T(F): 98.4 (24 Mar 2020 20:20), Max: 98.4 (24 Mar 2020 20:20)  HR: 87 (24 Mar 2020 20:20) (78 - 87)  BP: 113/63 (24 Mar 2020 20:20) (113/63 - 117/68)  BP(mean): --  ABP: --  ABP(mean): --  RR: 18 (24 Mar 2020 20:20) (16 - 18)  SpO2: 95% (24 Mar 2020 20:20) (95% - 96%)           Input and Output:  I&O's Detail    23 Mar 2020 07:01  -  24 Mar 2020 07:00  --------------------------------------------------------  IN:    Other: 600 mL  Total IN: 600 mL    OUT:    Ileostomy: 600 mL    Indwelling Catheter - Urethral: 150 mL  Total OUT: 750 mL    Total NET: -150 mL      24 Mar 2020 07:01  -  25 Mar 2020 06:07  --------------------------------------------------------  IN:  Total IN: 0 mL    OUT:    Colostomy: 900 mL  Total OUT: 900 mL    Total NET: -900 mL          Lab Data                        11.5   7.00  )-----------( 156      ( 24 Mar 2020 10:04 )             37.2     03-24    132<L>  |  92<L>  |  36<H>  ----------------------------<  64<L>  4.7   |  26  |  4.90<H>    Ca    8.7      24 Mar 2020 10:04    TPro  7.7  /  Alb  2.4<L>  /  TBili  0.3  /  DBili  x   /  AST  17  /  ALT  11<L>  /  AlkPhos  139<H>  03-24            Review of Systems	      Objective     Physical Examination    heart s1s2  lung dec BS      Pertinent Lab findings & Imaging      Ana:  NO   Adequate UO     I&O's Detail    23 Mar 2020 07:01  -  24 Mar 2020 07:00  --------------------------------------------------------  IN:    Other: 600 mL  Total IN: 600 mL    OUT:    Ileostomy: 600 mL    Indwelling Catheter - Urethral: 150 mL  Total OUT: 750 mL    Total NET: -150 mL      24 Mar 2020 07:01  -  25 Mar 2020 06:07  --------------------------------------------------------  IN:  Total IN: 0 mL    OUT:    Colostomy: 900 mL  Total OUT: 900 mL    Total NET: -900 mL               Discussed with:     Cultures:	        Radiology

## 2020-03-25 NOTE — PROGRESS NOTE ADULT - ASSESSMENT
·	MEHDI, CKD 4, Solitary functioning kidney: Prerenal azotemia, (Atrophic right kidney) started on hemodialysis  ·	Metabolic acidosis  ·	Hyperkalemia  ·	Diabetes  ·	Hypertension  ·	Anemia      Attempted HD today. Pt not tolerating HD because of hypotension. D/w family. Decision made to stop dialysis.   IR to remove perma cath. Monitor blood sugar levels. Insulin coverage as needed. Dietary restriction. Overall prognosis poor.   Palliative care. D/w family over the phone.

## 2020-03-26 LAB
ALBUMIN SERPL ELPH-MCNC: 2.3 G/DL — LOW (ref 3.3–5)
ALP SERPL-CCNC: 131 U/L — HIGH (ref 40–120)
ALT FLD-CCNC: 11 U/L — LOW (ref 12–78)
ANION GAP SERPL CALC-SCNC: 17 MMOL/L — SIGNIFICANT CHANGE UP (ref 5–17)
AST SERPL-CCNC: 16 U/L — SIGNIFICANT CHANGE UP (ref 15–37)
BILIRUB SERPL-MCNC: 0.2 MG/DL — SIGNIFICANT CHANGE UP (ref 0.2–1.2)
BUN SERPL-MCNC: 54 MG/DL — HIGH (ref 7–23)
CALCIUM SERPL-MCNC: 8.4 MG/DL — LOW (ref 8.5–10.1)
CHLORIDE SERPL-SCNC: 93 MMOL/L — LOW (ref 96–108)
CO2 SERPL-SCNC: 22 MMOL/L — SIGNIFICANT CHANGE UP (ref 22–31)
CREAT SERPL-MCNC: 6.1 MG/DL — HIGH (ref 0.5–1.3)
GLUCOSE SERPL-MCNC: 81 MG/DL — SIGNIFICANT CHANGE UP (ref 70–99)
HCT VFR BLD CALC: 37.4 % — SIGNIFICANT CHANGE UP (ref 34.5–45)
HGB BLD-MCNC: 11.6 G/DL — SIGNIFICANT CHANGE UP (ref 11.5–15.5)
MCHC RBC-ENTMCNC: 28.9 PG — SIGNIFICANT CHANGE UP (ref 27–34)
MCHC RBC-ENTMCNC: 31 GM/DL — LOW (ref 32–36)
MCV RBC AUTO: 93 FL — SIGNIFICANT CHANGE UP (ref 80–100)
NRBC # BLD: 0 /100 WBCS — SIGNIFICANT CHANGE UP (ref 0–0)
PLATELET # BLD AUTO: 163 K/UL — SIGNIFICANT CHANGE UP (ref 150–400)
POTASSIUM SERPL-MCNC: 5.3 MMOL/L — SIGNIFICANT CHANGE UP (ref 3.5–5.3)
POTASSIUM SERPL-SCNC: 5.3 MMOL/L — SIGNIFICANT CHANGE UP (ref 3.5–5.3)
PROT SERPL-MCNC: 7.6 G/DL — SIGNIFICANT CHANGE UP (ref 6–8.3)
RBC # BLD: 4.02 M/UL — SIGNIFICANT CHANGE UP (ref 3.8–5.2)
RBC # FLD: 15 % — HIGH (ref 10.3–14.5)
SODIUM SERPL-SCNC: 132 MMOL/L — LOW (ref 135–145)
WBC # BLD: 7.19 K/UL — SIGNIFICANT CHANGE UP (ref 3.8–10.5)
WBC # FLD AUTO: 7.19 K/UL — SIGNIFICANT CHANGE UP (ref 3.8–10.5)

## 2020-03-26 PROCEDURE — 99232 SBSQ HOSP IP/OBS MODERATE 35: CPT

## 2020-03-26 RX ADMIN — ATORVASTATIN CALCIUM 10 MILLIGRAM(S): 80 TABLET, FILM COATED ORAL at 22:11

## 2020-03-26 RX ADMIN — NYSTATIN CREAM 1 APPLICATION(S): 100000 CREAM TOPICAL at 19:04

## 2020-03-26 RX ADMIN — Medication 1 APPLICATION(S): at 19:03

## 2020-03-26 RX ADMIN — TRAMADOL HYDROCHLORIDE 25 MILLIGRAM(S): 50 TABLET ORAL at 19:02

## 2020-03-26 RX ADMIN — MIDODRINE HYDROCHLORIDE 5 MILLIGRAM(S): 2.5 TABLET ORAL at 07:29

## 2020-03-26 RX ADMIN — OXYCODONE HYDROCHLORIDE 5 MILLIGRAM(S): 5 TABLET ORAL at 11:39

## 2020-03-26 RX ADMIN — Medication 1 APPLICATION(S): at 07:29

## 2020-03-26 RX ADMIN — BUDESONIDE AND FORMOTEROL FUMARATE DIHYDRATE 2 PUFF(S): 160; 4.5 AEROSOL RESPIRATORY (INHALATION) at 19:11

## 2020-03-26 RX ADMIN — Medication 20 MILLIGRAM(S): at 13:23

## 2020-03-26 RX ADMIN — Medication 3 MILLIGRAM(S): at 22:11

## 2020-03-26 RX ADMIN — MIDODRINE HYDROCHLORIDE 5 MILLIGRAM(S): 2.5 TABLET ORAL at 19:03

## 2020-03-26 RX ADMIN — Medication 750 MILLIGRAM(S): at 07:28

## 2020-03-26 RX ADMIN — MIDODRINE HYDROCHLORIDE 5 MILLIGRAM(S): 2.5 TABLET ORAL at 13:23

## 2020-03-26 RX ADMIN — Medication 750 MILLIGRAM(S): at 19:03

## 2020-03-26 RX ADMIN — NYSTATIN CREAM 1 APPLICATION(S): 100000 CREAM TOPICAL at 07:29

## 2020-03-26 RX ADMIN — TRAMADOL HYDROCHLORIDE 25 MILLIGRAM(S): 50 TABLET ORAL at 20:17

## 2020-03-26 RX ADMIN — CHLORHEXIDINE GLUCONATE 1 APPLICATION(S): 213 SOLUTION TOPICAL at 13:22

## 2020-03-26 NOTE — PROGRESS NOTE ADULT - SUBJECTIVE AND OBJECTIVE BOX
Patient is a 84y old  Female who presents with a chief complaint of altered mental status (03 Mar 2020 17:47)  Patient seen in follow up for CKD 4, Hyperkalemia.     For hospice care.     PAST MEDICAL HISTORY:  Wound of sacral region  Depression  Iron deficiency anemia  Eczema  HTN (hypertension)  CKD (chronic kidney disease) stage 5, GFR less than 15 ml/min  Herniated lumbar intervertebral disc  Depression  Tremor  Kidney atrophy  Colostomy status  Chronic UTI (urinary tract infection)  Cervical cancer  Dyslipidemia  Diabetes  Hernia, incisional        MEDICATIONS  (STANDING):  atorvastatin 10 milliGRAM(s) Oral at bedtime  budesonide 160 MICROgram(s)/formoterol 4.5 MICROgram(s) Inhaler 2 Puff(s) Inhalation two times a day  chlorhexidine 2% Cloths 1 Application(s) Topical daily  cholestyramine Powder (Sugar-Free) 4 Gram(s) Oral daily  clobetasol 0.05% Cream 1 Application(s) Topical two times a day  FLUoxetine 20 milliGRAM(s) Oral daily  melatonin 3 milliGRAM(s) Oral at bedtime  midodrine. 5 milliGRAM(s) Oral three times a day  nystatin Cream 1 Application(s) Topical two times a day  valproic  acid Syrup 750 milliGRAM(s) Oral two times a day    MEDICATIONS  (PRN):  acetaminophen   Tablet .. 650 milliGRAM(s) Oral every 6 hours PRN Mild Pain (1 - 3)  oxyCODONE    IR 5 milliGRAM(s) Oral every 6 hours PRN Severe Pain (7 - 10)  traMADol 25 milliGRAM(s) Oral every 8 hours PRN Moderate Pain (4 - 6)    T(C): 36.6 (03-26-20 @ 04:39), Max: 36.9 (03-24-20 @ 20:20)  HR: 88 (03-26-20 @ 04:39) (72 - 94)  BP: 125/83 (03-26-20 @ 04:39) (112/62 - 128/85)  RR: 18 (03-26-20 @ 04:39)  SpO2: 95% (03-26-20 @ 04:39)  Wt(kg): --  I&O's Detail    25 Mar 2020 07:01  -  26 Mar 2020 07:00  --------------------------------------------------------  IN:    Other: 800 mL  Total IN: 800 mL    OUT:    Colostomy: 550 mL    Indwelling Catheter - Urethral: 150 mL  Total OUT: 700 mL    Total NET: 100 mL        PHYSICAL EXAM:  General: No distress, dialysis catheter  Respiratory: b/l air entry  Cardiovascular: S1 S2  Gastrointestinal: soft, ileostomy  Extremities:  edema, + renae                   LABORATORY:                        11.6   7.19  )-----------( 163      ( 26 Mar 2020 08:46 )             37.4     03-26    132<L>  |  93<L>  |  54<H>  ----------------------------<  81  5.3   |  22  |  6.10<H>    Ca    8.4<L>      26 Mar 2020 08:46    TPro  7.6  /  Alb  2.3<L>  /  TBili  0.2  /  DBili  x   /  AST  16  /  ALT  11<L>  /  AlkPhos  131<H>  03-26    Sodium, Serum: 132 mmol/L (03-26 @ 08:46)  Sodium, Serum: 131 mmol/L (03-25 @ 08:50)    Potassium, Serum: 5.3 mmol/L (03-26 @ 08:46)  Potassium, Serum: 4.6 mmol/L (03-25 @ 08:50)    Hemoglobin: 11.6 g/dL (03-26 @ 08:46)  Hemoglobin: 11.8 g/dL (03-25 @ 08:50)  Hemoglobin: 11.5 g/dL (03-24 @ 10:04)    Creatinine, Serum 6.10 (03-26 @ 08:46)  Creatinine, Serum 6.60 (03-25 @ 08:50)  Creatinine, Serum 4.90 (03-24 @ 10:04)  Creatinine, Serum 8.10 (03-23 @ 16:46)        LIVER FUNCTIONS - ( 26 Mar 2020 08:46 )  Alb: 2.3 g/dL / Pro: 7.6 g/dL / ALK PHOS: 131 U/L / ALT: 11 U/L / AST: 16 U/L / GGT: x

## 2020-03-26 NOTE — PROGRESS NOTE ADULT - SUBJECTIVE AND OBJECTIVE BOX
Date/Time Patient Seen:  		  Referring MD:   Data Reviewed	       Patient is a 84y old  Female who presents with a chief complaint of altered mental status (25 Mar 2020 16:38)      Subjective/HPI     PAST MEDICAL & SURGICAL HISTORY:  Wound of sacral region  Depression  Iron deficiency anemia  Eczema  HTN (hypertension)  CKD (chronic kidney disease) stage 5, GFR less than 15 ml/min: not on HD  Herniated lumbar intervertebral disc  Depression  Tremor  Kidney atrophy: right  Colostomy status: 2006  Chronic UTI (urinary tract infection): chronic renae  Cervical cancer: 1970&#x27;s  Dyslipidemia  Diabetes: type 2  Hernia, incisional  S/P hip replacement: right 2013  S/P colon resection: 2006  S/P hysterectomy: 1977        Medication list         MEDICATIONS  (STANDING):  atorvastatin 10 milliGRAM(s) Oral at bedtime  budesonide 160 MICROgram(s)/formoterol 4.5 MICROgram(s) Inhaler 2 Puff(s) Inhalation two times a day  chlorhexidine 2% Cloths 1 Application(s) Topical daily  cholestyramine Powder (Sugar-Free) 4 Gram(s) Oral daily  clobetasol 0.05% Cream 1 Application(s) Topical two times a day  FLUoxetine 20 milliGRAM(s) Oral daily  melatonin 3 milliGRAM(s) Oral at bedtime  midodrine. 5 milliGRAM(s) Oral three times a day  nystatin Cream 1 Application(s) Topical two times a day  valproic  acid Syrup 750 milliGRAM(s) Oral two times a day    MEDICATIONS  (PRN):  acetaminophen   Tablet .. 650 milliGRAM(s) Oral every 6 hours PRN Mild Pain (1 - 3)  oxyCODONE    IR 5 milliGRAM(s) Oral every 6 hours PRN Severe Pain (7 - 10)  traMADol 25 milliGRAM(s) Oral every 8 hours PRN Moderate Pain (4 - 6)         Vitals log        ICU Vital Signs Last 24 Hrs  T(C): 36.6 (26 Mar 2020 04:39), Max: 36.7 (25 Mar 2020 06:00)  T(F): 97.8 (26 Mar 2020 04:39), Max: 98.1 (25 Mar 2020 06:00)  HR: 88 (26 Mar 2020 04:39) (72 - 94)  BP: 125/83 (26 Mar 2020 04:39) (112/62 - 128/85)  BP(mean): --  ABP: --  ABP(mean): --  RR: 18 (26 Mar 2020 04:39) (16 - 18)  SpO2: 95% (26 Mar 2020 04:39) (94% - 99%)           Input and Output:  I&O's Detail    24 Mar 2020 07:01  -  25 Mar 2020 07:00  --------------------------------------------------------  IN:  Total IN: 0 mL    OUT:    Colostomy: 2500 mL    Indwelling Catheter - Urethral: 105 mL  Total OUT: 2605 mL    Total NET: -2605 mL      25 Mar 2020 07:01  -  26 Mar 2020 05:51  --------------------------------------------------------  IN:    Other: 800 mL  Total IN: 800 mL    OUT:    Colostomy: 550 mL    Indwelling Catheter - Urethral: 150 mL  Total OUT: 700 mL    Total NET: 100 mL          Lab Data                        11.8   12.16 )-----------( 186      ( 25 Mar 2020 08:50 )             37.2     03-25    131<L>  |  92<L>  |  58<H>  ----------------------------<  100<H>  4.6   |  19<L>  |  6.60<H>    Ca    8.9      25 Mar 2020 08:50    TPro  8.2  /  Alb  2.6<L>  /  TBili  0.3  /  DBili  x   /  AST  11<L>  /  ALT  11<L>  /  AlkPhos  140<H>  03-25            Review of Systems	      Objective     Physical Examination    heart s1s2  lung dec BS      Pertinent Lab findings & Imaging      Ana:  NO   Adequate UO     I&O's Detail    24 Mar 2020 07:01  -  25 Mar 2020 07:00  --------------------------------------------------------  IN:  Total IN: 0 mL    OUT:    Colostomy: 2500 mL    Indwelling Catheter - Urethral: 105 mL  Total OUT: 2605 mL    Total NET: -2605 mL      25 Mar 2020 07:01  -  26 Mar 2020 05:51  --------------------------------------------------------  IN:    Other: 800 mL  Total IN: 800 mL    OUT:    Colostomy: 550 mL    Indwelling Catheter - Urethral: 150 mL  Total OUT: 700 mL    Total NET: 100 mL               Discussed with:     Cultures:	        Radiology Date/Time Patient Seen:  		  Referring MD:   Data Reviewed	       Patient is a 84y old  Female who presents with a chief complaint of altered mental status (25 Mar 2020 16:38)      Subjective/HPI     PAST MEDICAL & SURGICAL HISTORY:  Wound of sacral region  Depression  Iron deficiency anemia  Eczema  HTN (hypertension)  CKD (chronic kidney disease) stage 5, GFR less than 15 ml/min: not on HD  Herniated lumbar intervertebral disc  Depression  Tremor  Kidney atrophy: right  Colostomy status: 2006  Chronic UTI (urinary tract infection): chronic renae  Cervical cancer: 1970&#x27;s  Dyslipidemia  Diabetes: type 2  Hernia, incisional  S/P hip replacement: right 2013  S/P colon resection: 2006  S/P hysterectomy: 1977        Medication list         MEDICATIONS  (STANDING):  atorvastatin 10 milliGRAM(s) Oral at bedtime  budesonide 160 MICROgram(s)/formoterol 4.5 MICROgram(s) Inhaler 2 Puff(s) Inhalation two times a day  chlorhexidine 2% Cloths 1 Application(s) Topical daily  cholestyramine Powder (Sugar-Free) 4 Gram(s) Oral daily  clobetasol 0.05% Cream 1 Application(s) Topical two times a day  FLUoxetine 20 milliGRAM(s) Oral daily  melatonin 3 milliGRAM(s) Oral at bedtime  midodrine. 5 milliGRAM(s) Oral three times a day  nystatin Cream 1 Application(s) Topical two times a day  valproic  acid Syrup 750 milliGRAM(s) Oral two times a day    MEDICATIONS  (PRN):  acetaminophen   Tablet .. 650 milliGRAM(s) Oral every 6 hours PRN Mild Pain (1 - 3)  oxyCODONE    IR 5 milliGRAM(s) Oral every 6 hours PRN Severe Pain (7 - 10)  traMADol 25 milliGRAM(s) Oral every 8 hours PRN Moderate Pain (4 - 6)         Vitals log        ICU Vital Signs Last 24 Hrs  T(C): 36.6 (26 Mar 2020 04:39), Max: 36.7 (25 Mar 2020 06:00)  T(F): 97.8 (26 Mar 2020 04:39), Max: 98.1 (25 Mar 2020 06:00)  HR: 88 (26 Mar 2020 04:39) (72 - 94)  BP: 125/83 (26 Mar 2020 04:39) (112/62 - 128/85)  BP(mean): --  ABP: --  ABP(mean): --  RR: 18 (26 Mar 2020 04:39) (16 - 18)  SpO2: 95% (26 Mar 2020 04:39) (94% - 99%)           Input and Output:  I&O's Detail    24 Mar 2020 07:01  -  25 Mar 2020 07:00  --------------------------------------------------------  IN:  Total IN: 0 mL    OUT:    Colostomy: 2500 mL    Indwelling Catheter - Urethral: 105 mL  Total OUT: 2605 mL    Total NET: -2605 mL      25 Mar 2020 07:01  -  26 Mar 2020 05:51  --------------------------------------------------------  IN:    Other: 800 mL  Total IN: 800 mL    OUT:    Colostomy: 550 mL    Indwelling Catheter - Urethral: 150 mL  Total OUT: 700 mL    Total NET: 100 mL          Lab Data                        11.8   12.16 )-----------( 186      ( 25 Mar 2020 08:50 )             37.2     03-25    131<L>  |  92<L>  |  58<H>  ----------------------------<  100<H>  4.6   |  19<L>  |  6.60<H>    Ca    8.9      25 Mar 2020 08:50    TPro  8.2  /  Alb  2.6<L>  /  TBili  0.3  /  DBili  x   /  AST  11<L>  /  ALT  11<L>  /  AlkPhos  140<H>  03-25            Review of Systems	      Objective     Physical Examination  confused   heart s1s2  lung dec BS  No edema LE.       Pertinent Lab findings & Imaging      Ana:  NO   Adequate UO     I&O's Detail    24 Mar 2020 07:01  -  25 Mar 2020 07:00  --------------------------------------------------------  IN:  Total IN: 0 mL    OUT:    Colostomy: 2500 mL    Indwelling Catheter - Urethral: 105 mL  Total OUT: 2605 mL    Total NET: -2605 mL      25 Mar 2020 07:01  -  26 Mar 2020 05:51  --------------------------------------------------------  IN:    Other: 800 mL  Total IN: 800 mL    OUT:    Colostomy: 550 mL    Indwelling Catheter - Urethral: 150 mL  Total OUT: 700 mL    Total NET: 100 mL               Discussed with:     Cultures:	        Radiology

## 2020-03-26 NOTE — PROGRESS NOTE ADULT - SUBJECTIVE AND OBJECTIVE BOX
Patient is a 84y old  Female who presents with a chief complaint of altered mental status (26 Mar 2020 09:25)      INTERVAL HPI/OVERNIGHT EVENTS:    MEDICATIONS  (STANDING):  atorvastatin 10 milliGRAM(s) Oral at bedtime  budesonide 160 MICROgram(s)/formoterol 4.5 MICROgram(s) Inhaler 2 Puff(s) Inhalation two times a day  chlorhexidine 2% Cloths 1 Application(s) Topical daily  cholestyramine Powder (Sugar-Free) 4 Gram(s) Oral daily  clobetasol 0.05% Cream 1 Application(s) Topical two times a day  FLUoxetine 20 milliGRAM(s) Oral daily  melatonin 3 milliGRAM(s) Oral at bedtime  midodrine. 5 milliGRAM(s) Oral three times a day  nystatin Cream 1 Application(s) Topical two times a day  valproic  acid Syrup 750 milliGRAM(s) Oral two times a day    MEDICATIONS  (PRN):  acetaminophen   Tablet .. 650 milliGRAM(s) Oral every 6 hours PRN Mild Pain (1 - 3)  oxyCODONE    IR 5 milliGRAM(s) Oral every 6 hours PRN Severe Pain (7 - 10)  traMADol 25 milliGRAM(s) Oral every 8 hours PRN Moderate Pain (4 - 6)      Allergies    Levaquin (Pruritus)  mercury (Pruritus; Rash)  sulfa drugs (Pruritus)    Intolerances        REVIEW OF SYSTEMS:  CONSTITUTIONAL: No fever or chills  HEENT:  No headache, no sore throat  RESPIRATORY: No cough, wheezing, or shortness of breath  CARDIOVASCULAR: No chest pain, palpitations  GASTROINTESTINAL: No abd pain, nausea, vomiting, or diarrhea  GENITOURINARY: No dysuria, frequency, or hematuria  NEUROLOGICAL: no focal weakness or dizziness  MUSCULOSKELETAL: no myalgias     Vital Signs Last 24 Hrs  T(C): 36.6 (26 Mar 2020 04:39), Max: 36.7 (25 Mar 2020 20:39)  T(F): 97.8 (26 Mar 2020 04:39), Max: 98 (25 Mar 2020 20:39)  HR: 88 (26 Mar 2020 04:39) (72 - 90)  BP: 125/83 (26 Mar 2020 04:39) (125/83 - 128/85)  BP(mean): --  RR: 18 (26 Mar 2020 04:39) (16 - 18)  SpO2: 95% (26 Mar 2020 04:39) (95% - 99%)    PHYSICAL EXAM:  GENERAL: NAD  HEENT:  anicteric, moist mucous membranes  CHEST/LUNG:  CTA b/l, no rales, wheezes, or rhonchi  HEART:  RRR, S1, S2  ABDOMEN:  BS+, soft, nontender, nondistended  EXTREMITIES: no edema, cyanosis, or calf tenderness  NERVOUS SYSTEM: answers questions and follows commands appropriately    LABS:                        11.6   7.19  )-----------( 163      ( 26 Mar 2020 08:46 )             37.4     CBC Full  -  ( 26 Mar 2020 08:46 )  WBC Count : 7.19 K/uL  Hemoglobin : 11.6 g/dL  Hematocrit : 37.4 %  Platelet Count - Automated : 163 K/uL  Mean Cell Volume : 93.0 fl  Mean Cell Hemoglobin : 28.9 pg  Mean Cell Hemoglobin Concentration : 31.0 gm/dL  Auto Neutrophil # : x  Auto Lymphocyte # : x  Auto Monocyte # : x  Auto Eosinophil # : x  Auto Basophil # : x  Auto Neutrophil % : x  Auto Lymphocyte % : x  Auto Monocyte % : x  Auto Eosinophil % : x  Auto Basophil % : x    26 Mar 2020 08:46    132    |  93     |  54     ----------------------------<  81     5.3     |  22     |  6.10     Ca    8.4        26 Mar 2020 08:46    TPro  7.6    /  Alb  2.3    /  TBili  0.2    /  DBili  x      /  AST  16     /  ALT  11     /  AlkPhos  131    26 Mar 2020 08:46        CAPILLARY BLOOD GLUCOSE              RADIOLOGY & ADDITIONAL TESTS:    Personally reviewed.     Consultant(s) Notes Reviewed:  [x] YES  [ ] NO Patient is a 84y old  Female who presents with a chief complaint of altered mental status (26 Mar 2020 09:25)      INTERVAL HPI/OVERNIGHT EVENTS: Palliative care consulted     MEDICATIONS  (STANDING):  atorvastatin 10 milliGRAM(s) Oral at bedtime  budesonide 160 MICROgram(s)/formoterol 4.5 MICROgram(s) Inhaler 2 Puff(s) Inhalation two times a day  chlorhexidine 2% Cloths 1 Application(s) Topical daily  cholestyramine Powder (Sugar-Free) 4 Gram(s) Oral daily  clobetasol 0.05% Cream 1 Application(s) Topical two times a day  FLUoxetine 20 milliGRAM(s) Oral daily  melatonin 3 milliGRAM(s) Oral at bedtime  midodrine. 5 milliGRAM(s) Oral three times a day  nystatin Cream 1 Application(s) Topical two times a day  valproic  acid Syrup 750 milliGRAM(s) Oral two times a day    MEDICATIONS  (PRN):  acetaminophen   Tablet .. 650 milliGRAM(s) Oral every 6 hours PRN Mild Pain (1 - 3)  oxyCODONE    IR 5 milliGRAM(s) Oral every 6 hours PRN Severe Pain (7 - 10)  traMADol 25 milliGRAM(s) Oral every 8 hours PRN Moderate Pain (4 - 6)      Allergies    Levaquin (Pruritus)  mercury (Pruritus; Rash)  sulfa drugs (Pruritus)    Intolerances        REVIEW OF SYSTEMS:  Unable to obtain due to medical condition.     Vital Signs Last 24 Hrs  T(C): 36.6 (26 Mar 2020 04:39), Max: 36.7 (25 Mar 2020 20:39)  T(F): 97.8 (26 Mar 2020 04:39), Max: 98 (25 Mar 2020 20:39)  HR: 88 (26 Mar 2020 04:39) (72 - 90)  BP: 125/83 (26 Mar 2020 04:39) (125/83 - 128/85)  BP(mean): --  RR: 18 (26 Mar 2020 04:39) (16 - 18)  SpO2: 95% (26 Mar 2020 04:39) (95% - 99%)    PHYSICAL EXAM:  GENERAL: NAD, Comfortable  HEAD:  Atraumatic, Normocephalic  EYES: EOMI, PERRLA, conjunctiva and sclera clear  NECK: Supple, No JVD  CHEST/LUNG: Clear to auscultation bilaterally; No wheeze  HEART: Regular rate and rhythm; No murmurs, rubs, or gallops  ABDOMEN: Soft, Nontender, Nondistended; Bowel sounds present  Neuro: AAO x 2  EXTREMITIES:  2+ Peripheral Pulses, No clubbing, cyanosis, or edema  SKIN: No rashes or lesions    LABS:                        11.6   7.19  )-----------( 163      ( 26 Mar 2020 08:46 )             37.4     CBC Full  -  ( 26 Mar 2020 08:46 )  WBC Count : 7.19 K/uL  Hemoglobin : 11.6 g/dL  Hematocrit : 37.4 %  Platelet Count - Automated : 163 K/uL  Mean Cell Volume : 93.0 fl  Mean Cell Hemoglobin : 28.9 pg  Mean Cell Hemoglobin Concentration : 31.0 gm/dL  Auto Neutrophil # : x  Auto Lymphocyte # : x  Auto Monocyte # : x  Auto Eosinophil # : x  Auto Basophil # : x  Auto Neutrophil % : x  Auto Lymphocyte % : x  Auto Monocyte % : x  Auto Eosinophil % : x  Auto Basophil % : x    26 Mar 2020 08:46    132    |  93     |  54     ----------------------------<  81     5.3     |  22     |  6.10     Ca    8.4        26 Mar 2020 08:46    TPro  7.6    /  Alb  2.3    /  TBili  0.2    /  DBili  x      /  AST  16     /  ALT  11     /  AlkPhos  131    26 Mar 2020 08:46        CAPILLARY BLOOD GLUCOSE              RADIOLOGY & ADDITIONAL TESTS:    Personally reviewed.     Consultant(s) Notes Reviewed:  [x] YES  [ ] NO

## 2020-03-26 NOTE — PROGRESS NOTE ADULT - SUBJECTIVE AND OBJECTIVE BOX
Neurology follow up note    GURJIT [id number]      Interval History:    Patient with lbp and hip pain     MEDICATIONS    acetaminophen   Tablet .. 650 milliGRAM(s) Oral every 6 hours PRN  atorvastatin 10 milliGRAM(s) Oral at bedtime  budesonide 160 MICROgram(s)/formoterol 4.5 MICROgram(s) Inhaler 2 Puff(s) Inhalation two times a day  chlorhexidine 2% Cloths 1 Application(s) Topical daily  cholestyramine Powder (Sugar-Free) 4 Gram(s) Oral daily  clobetasol 0.05% Cream 1 Application(s) Topical two times a day  FLUoxetine 20 milliGRAM(s) Oral daily  melatonin 3 milliGRAM(s) Oral at bedtime  midodrine. 5 milliGRAM(s) Oral three times a day  nystatin Cream 1 Application(s) Topical two times a day  oxyCODONE    IR 5 milliGRAM(s) Oral every 6 hours PRN  traMADol 25 milliGRAM(s) Oral every 8 hours PRN  valproic  acid Syrup 750 milliGRAM(s) Oral two times a day      Allergies    Levaquin (Pruritus)  mercury (Pruritus; Rash)  sulfa drugs (Pruritus)    Intolerances            Vital Signs Last 24 Hrs  T(C): 36.6 (26 Mar 2020 04:39), Max: 36.7 (25 Mar 2020 20:39)  T(F): 97.8 (26 Mar 2020 04:39), Max: 98 (25 Mar 2020 20:39)  HR: 88 (26 Mar 2020 04:39) (72 - 94)  BP: 125/83 (26 Mar 2020 04:39) (112/62 - 128/85)  BP(mean): --  RR: 18 (26 Mar 2020 04:39) (16 - 18)  SpO2: 95% (26 Mar 2020 04:39) (94% - 99%)      REVIEW OF SYSTEMS    Constitutional: No fever, chills, fatigue, generalized  weakness  Eyes: no eye pain, visual disturbances, or discharge  ENT:  No difficulty hearing, tinnitus, vertigo; No sinus or throat pain  Neck: No pain or stiffness  Respiratory: No cough, dyspnea, wheezing   Cardiovascular: No chest pain, palpitations,   Gastrointestinal: No abdominal or epigastric pain. No nausea, vomiting  No diarrhea or constipation.   Genitourinary: No dysuria, frequency, hematuria or incontinence  Neurological: No headaches, lightheadedness, vertigo, numbness or tremors  Psychiatric: No depression, anxiety, mood swings or difficulty sleeping  Musculoskeletal: No joint pain or swelling; No muscle, back or extremity pain      On Neurological Examination:    Mental Status - Patient is alert, awake,  [facility]   year 2020     Follow simple commands    Speech -   Fluent                 Cranial Nerves - Pupils 3 mm equal and reactive to light,   extraocular eye movements intact.   smile symmetric  intact bilateral NLF    Motor Exam -   Right upper 4/5   Left upper  3/5  Right lower 2/5  Left lower 2/5    Muscle tone - is normal all over.  No asymmetry is seen.      Sensory    Bilateral intact to light touch    GENERAL Exam: Nontoxic , No Acute Distress   	  HEENT:  normocephalic, atraumatic  		  LUNGS:  Decreased bilaterally  	  HEART: Normal S1S2   No murmur RRR        	  GI/ ABDOMEN:  Soft  Non tender    EXTREMITIES:   No Edema  No Clubbing  No Cyanosis   	   SKIN: Normal  No Ecchymosis              LABS:  CBC Full  -  ( 26 Mar 2020 08:46 )  WBC Count : 7.19 K/uL  RBC Count : 4.02 M/uL  Hemoglobin : 11.6 g/dL  Hematocrit : 37.4 %  Platelet Count - Automated : 163 K/uL  Mean Cell Volume : 93.0 fl  Mean Cell Hemoglobin : 28.9 pg  Mean Cell Hemoglobin Concentration : 31.0 gm/dL  Auto Neutrophil # : x  Auto Lymphocyte # : x  Auto Monocyte # : x  Auto Eosinophil # : x  Auto Basophil # : x  Auto Neutrophil % : x  Auto Lymphocyte % : x  Auto Monocyte % : x  Auto Eosinophil % : x  Auto Basophil % : x      03-26    132<L>  |  93<L>  |  54<H>  ----------------------------<  81  5.3   |  22  |  6.10<H>    Ca    8.4<L>      26 Mar 2020 08:46    TPro  7.6  /  Alb  2.3<L>  /  TBili  0.2  /  DBili  x   /  AST  16  /  ALT  11<L>  /  AlkPhos  131<H>  03-26    Hemoglobin A1C:     LIVER FUNCTIONS - ( 26 Mar 2020 08:46 )  Alb: 2.3 g/dL / Pro: 7.6 g/dL / ALK PHOS: 131 U/L / ALT: 11 U/L / AST: 16 U/L / GGT: x           Vitamin B12         RADIOLOGY    ANALYSIS AND PLAN:  An 84-year-old with an episode of seizure and change in mental status.  1.	For episode of seizure, will increase Depakote 750 mg twice  off dilantin   2.	EEG intermittent generalized spike and waves   3.	Ativan 1 mg IV push q.6h. p.r.n. for any type of breakthrough seizure.  4.	Seizure precautions.  5.	For history of underlying depression, at present hard to evaluate the patient's psychiatric status secondary to the patient being lethargic, continue home medication when possible.  6.	For hyperlipidemia, continue the patient on her cholesterol medication.  7.	For change in mental status, questionable this could be metabolic encephalopathy from partial complex seizures versus any type of underlying infectious type process episodes of temperatures and hypotension possible h/o of shock   8.	Fall precaution.  9.	overall more awake and interactive today   10.	For chronic kidney disease, monitor renal function noted  HD as tolerated   11.	  Advance directives were discussed with them.  Primary  will be daughterJu, her cell phone number is [phone], home number is [phone] spoke 3/19/2020 callled today went to voicemail which is full   12.	EEG no new changes   13.	MMSE 25/30  14.	monitor oral intake   15.	S/P IR perm cath exchange   16.	no new events   17.	Greater than 17 minutes of time was spent with the patient, plan of care, reviewing data, speaking to the family and multidisciplinary healthcare team.

## 2020-03-26 NOTE — PROGRESS NOTE ADULT - ASSESSMENT
·	MEHDI, CKD 4, Solitary functioning kidney: Prerenal azotemia, (Atrophic right kidney) started on hemodialysis, HD stopped because of hypotension.   ·	Metabolic acidosis  ·	Hyperkalemia  ·	Diabetes  ·	Hypertension  ·	Anemia      For hospice evaluation and care. Perma cath removed. Pt not tolerating HD because of hypotension.   Should stop blood work. Overall prognosis poor.

## 2020-03-26 NOTE — PROGRESS NOTE ADULT - ASSESSMENT
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 Course c/b likely generalized seizure on 3/5/20, and acute hypoxic respiratory failure and suspected sepsis due to suspected aspiration PNA, s/p ICU stay. Now downgraded to medicine service. Patient now with ESRD and likely needing long term dialysis. Unable to do HD due to hypotension  Problem/Plan - 1:  ·  Problem: AMS (altered mental status).  Plan: MS better  weakness - frail elderly -   multiple medical issues -   needs assist with ADL  oral and skin hygiene  large family - Daughters in Law and sons and daughters all involved in discussion  Family interested in pursuing Hospice. Referral sent per SW  Started on pain regimen with hold parameters       Problem/Plan - 2:  ·  Problem: Generalized seizure.  Plan: - unresponsive episode with shaking and spike in lactic acidosis on 3/5, consistent with seizure (first EEG on 3/5 done after IV ativan given, which can mask seizure activity)  -2nd EEG on 3/8 showing some generalized spike/wave patterns concerning for increased seizure risk  - CT head, MRI brain showed no acute infarct or hemorrhage  - Continue Depakote.  -Dilantin stopped due to lethargy  -Seizure precautions  -Ativan PRn for breakthrough seizures.      Problem/Plan - 3:  ·  Problem: Sepsis.  Plan: - no new fevers  - there was suspicion for UTI on admission though unclear significance of the UCx isolates in the presence of chronic renae   - Doubt infection. No longer on antibiotics  - patient's lethargy improved today, which removal of dilantin  - continue to monitor  -Family interested in hospice care, referral sent   - Dr. Wiley, palliative, recs appreciated.      Problem/Plan - 4:  ·  Problem: Acute renal failure superimposed on stage 5 chronic kidney disease, not on chronic dialysis, unspecified acute renal failure type.  Plan: patient with metabolic acidosis in setting of UTI and suspected pre-renal MEHDI, dehydration   - po sodium bicarb 650 mg TID  - pt's renal status still very poor. Unable to tolerate HD due to hypotension  -Permacath to be removed  -s/p permacath now     Problem/Plan - 5:  ·  Problem: Hypernatremia.  Plan: Oral Free water intake. Improving   S/p IV fluids  Monitor Na level.      Problem/Plan - 6:  Problem: Hyperkalemia. Plan: - due to renal failure  - currently WNL  - Dr. You, nephro, recs appreciated.     Problem/Plan - 7:  ·  Problem: Wound of sacral region.  Plan: - Tylenol PRN  - does not appear actively infected currently  - Wound care RN, recs appreciated   - Aquacel dressing every other day  - Wound care, Dr. Blas.      Problem/Plan - 8:  ·  Problem: Iron deficiency anemia.  Plan: - Anemia likely due to LELO, and anemia of chronic disease in setting of Stage 5 CKD  - Fe panel from 2/2020 showed low total iron, low total iron binding capacity  - hemoglobin has been somewhat labile during admission; monitor closely, no sign of acute bleeding  - Continue home ferrous sulfate   - Continue epogen as per nephro.      Problem/Plan - 9:  ·  Problem: HTN (hypertension).  Plan:   - bp low during dialysis  - Continue midodrine     Problem/Plan - 10:  Problem: Need for prophylactic measure. 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 Course c/b likely generalized seizure on 3/5/20, and acute hypoxic respiratory failure and suspected sepsis due to suspected aspiration PNA, s/p ICU stay. Now downgraded to medicine service. Patient now with ESRD and likely needing long term dialysis. Unable to do HD due to hypotension  Problem/Plan - 1:  ·  Problem: AMS (altered mental status).  Plan: MS better  weakness - frail elderly -   multiple medical issues -   needs assist with ADL  oral and skin hygiene  large family - Daughters in Law and sons and daughters all involved in discussion  Family interested in pursuing Hospice. Referral sent per SW  Started on pain regimen with hold parameters  Palliative care consulted       Problem/Plan - 2:  ·  Problem: Generalized seizure.  Plan: - unresponsive episode with shaking and spike in lactic acidosis on 3/5, consistent with seizure (first EEG on 3/5 done after IV ativan given, which can mask seizure activity)  -2nd EEG on 3/8 showing some generalized spike/wave patterns concerning for increased seizure risk  - CT head, MRI brain showed no acute infarct or hemorrhage  - Continue Depakote.  -Dilantin stopped due to lethargy  -Seizure precautions  -Ativan PRn for breakthrough seizures.      Problem/Plan - 3:  ·  Problem: Sepsis.  Plan: - no new fevers  - there was suspicion for UTI on admission though unclear significance of the UCx isolates in the presence of chronic renae   - Doubt infection. No longer on antibiotics  - patient's lethargy improved today, which removal of dilantin  - continue to monitor  -Family interested in hospice care, referral sent   - Dr. Wiley, palliative, recs appreciated.      Problem/Plan - 4:  ·  Problem: Acute renal failure superimposed on stage 5 chronic kidney disease, not on chronic dialysis, unspecified acute renal failure type.  Plan: patient with metabolic acidosis in setting of UTI and suspected pre-renal MEHDI, dehydration   - po sodium bicarb 650 mg TID  - pt's renal status still very poor. Unable to tolerate HD due to hypotension  -Permacath to be removed  -s/p permacath now     Problem/Plan - 5:  ·  Problem: Hypernatremia.  Plan: Oral Free water intake. Improving   S/p IV fluids  Monitor Na level.      Problem/Plan - 6:  Problem: Hyperkalemia. Plan: - due to renal failure  - currently WNL  - Dr. You, nephro, recs appreciated.     Problem/Plan - 7:  ·  Problem: Wound of sacral region.  Plan: - Tylenol PRN  - does not appear actively infected currently  - Wound care RN, recs appreciated   - Aquacel dressing every other day  - Wound care, Dr. Blas.      Problem/Plan - 8:  ·  Problem: Iron deficiency anemia.  Plan: - Anemia likely due to LELO, and anemia of chronic disease in setting of Stage 5 CKD  - Fe panel from 2/2020 showed low total iron, low total iron binding capacity  - hemoglobin has been somewhat labile during admission; monitor closely, no sign of acute bleeding  - Continue home ferrous sulfate   - Continue epogen as per nephro.      Problem/Plan - 9:  ·  Problem: HTN (hypertension).  Plan:   - bp low during dialysis  - Continue midodrine     Problem/Plan - 10:  Problem: Need for prophylactic measure. Plan; DVT ppx: heparin 5000un sq Q8h

## 2020-03-26 NOTE — PROGRESS NOTE ADULT - PROBLEM SELECTOR PLAN 1
pt is not tolerating HD  family decided to stop HD  referred for Hospice care - prob Hospice at home referral and DC plan  prognosis very poor  on room air  VS noted  will touch base with family to discuss Code Status - DNR - which would be an appropriate designation at this point  supportive care  prognosis very poor

## 2020-03-27 ENCOUNTER — TRANSCRIPTION ENCOUNTER (OUTPATIENT)
Age: 85
End: 2020-03-27

## 2020-03-27 VITALS
TEMPERATURE: 98 F | DIASTOLIC BLOOD PRESSURE: 70 MMHG | SYSTOLIC BLOOD PRESSURE: 122 MMHG | RESPIRATION RATE: 18 BRPM | HEART RATE: 86 BPM | OXYGEN SATURATION: 98 %

## 2020-03-27 PROCEDURE — 70551 MRI BRAIN STEM W/O DYE: CPT

## 2020-03-27 PROCEDURE — 76000 FLUOROSCOPY <1 HR PHYS/QHP: CPT

## 2020-03-27 PROCEDURE — 94760 N-INVAS EAR/PLS OXIMETRY 1: CPT

## 2020-03-27 PROCEDURE — 96365 THER/PROPH/DIAG IV INF INIT: CPT

## 2020-03-27 PROCEDURE — 70544 MR ANGIOGRAPHY HEAD W/O DYE: CPT

## 2020-03-27 PROCEDURE — 96375 TX/PRO/DX INJ NEW DRUG ADDON: CPT

## 2020-03-27 PROCEDURE — 74176 CT ABD & PELVIS W/O CONTRAST: CPT

## 2020-03-27 PROCEDURE — 97166 OT EVAL MOD COMPLEX 45 MIN: CPT

## 2020-03-27 PROCEDURE — 82962 GLUCOSE BLOOD TEST: CPT

## 2020-03-27 PROCEDURE — 99285 EMERGENCY DEPT VISIT HI MDM: CPT | Mod: 25

## 2020-03-27 PROCEDURE — 83605 ASSAY OF LACTIC ACID: CPT

## 2020-03-27 PROCEDURE — 97162 PT EVAL MOD COMPLEX 30 MIN: CPT

## 2020-03-27 PROCEDURE — 77001 FLUOROGUIDE FOR VEIN DEVICE: CPT

## 2020-03-27 PROCEDURE — 85730 THROMBOPLASTIN TIME PARTIAL: CPT

## 2020-03-27 PROCEDURE — 87186 SC STD MICRODIL/AGAR DIL: CPT

## 2020-03-27 PROCEDURE — 70450 CT HEAD/BRAIN W/O DYE: CPT

## 2020-03-27 PROCEDURE — 99261: CPT

## 2020-03-27 PROCEDURE — P9047: CPT

## 2020-03-27 PROCEDURE — 97163 PT EVAL HIGH COMPLEX 45 MIN: CPT

## 2020-03-27 PROCEDURE — 84132 ASSAY OF SERUM POTASSIUM: CPT

## 2020-03-27 PROCEDURE — 36558 INSERT TUNNELED CV CATH: CPT

## 2020-03-27 PROCEDURE — 97116 GAIT TRAINING THERAPY: CPT

## 2020-03-27 PROCEDURE — 84443 ASSAY THYROID STIM HORMONE: CPT

## 2020-03-27 PROCEDURE — 36600 WITHDRAWAL OF ARTERIAL BLOOD: CPT

## 2020-03-27 PROCEDURE — 92610 EVALUATE SWALLOWING FUNCTION: CPT

## 2020-03-27 PROCEDURE — 86705 HEP B CORE ANTIBODY IGM: CPT

## 2020-03-27 PROCEDURE — 81001 URINALYSIS AUTO W/SCOPE: CPT

## 2020-03-27 PROCEDURE — 36415 COLL VENOUS BLD VENIPUNCTURE: CPT

## 2020-03-27 PROCEDURE — 85027 COMPLETE CBC AUTOMATED: CPT

## 2020-03-27 PROCEDURE — 97530 THERAPEUTIC ACTIVITIES: CPT

## 2020-03-27 PROCEDURE — 80185 ASSAY OF PHENYTOIN TOTAL: CPT

## 2020-03-27 PROCEDURE — 84145 PROCALCITONIN (PCT): CPT

## 2020-03-27 PROCEDURE — 99221 1ST HOSP IP/OBS SF/LOW 40: CPT

## 2020-03-27 PROCEDURE — 86706 HEP B SURFACE ANTIBODY: CPT

## 2020-03-27 PROCEDURE — C1894: CPT

## 2020-03-27 PROCEDURE — 82140 ASSAY OF AMMONIA: CPT

## 2020-03-27 PROCEDURE — 87340 HEPATITIS B SURFACE AG IA: CPT

## 2020-03-27 PROCEDURE — 99239 HOSP IP/OBS DSCHRG MGMT >30: CPT

## 2020-03-27 PROCEDURE — 80164 ASSAY DIPROPYLACETIC ACD TOT: CPT

## 2020-03-27 PROCEDURE — 82803 BLOOD GASES ANY COMBINATION: CPT

## 2020-03-27 PROCEDURE — C1752: CPT

## 2020-03-27 PROCEDURE — 94640 AIRWAY INHALATION TREATMENT: CPT

## 2020-03-27 PROCEDURE — 97110 THERAPEUTIC EXERCISES: CPT

## 2020-03-27 PROCEDURE — 93306 TTE W/DOPPLER COMPLETE: CPT

## 2020-03-27 PROCEDURE — 99231 SBSQ HOSP IP/OBS SF/LOW 25: CPT

## 2020-03-27 PROCEDURE — 87086 URINE CULTURE/COLONY COUNT: CPT

## 2020-03-27 PROCEDURE — 36556 INSERT NON-TUNNEL CV CATH: CPT

## 2020-03-27 PROCEDURE — C1887: CPT

## 2020-03-27 PROCEDURE — 94002 VENT MGMT INPAT INIT DAY: CPT

## 2020-03-27 PROCEDURE — 36589 REMOVAL TUNNELED CV CATH: CPT

## 2020-03-27 PROCEDURE — 94003 VENT MGMT INPAT SUBQ DAY: CPT

## 2020-03-27 PROCEDURE — 86803 HEPATITIS C AB TEST: CPT

## 2020-03-27 PROCEDURE — 96361 HYDRATE IV INFUSION ADD-ON: CPT

## 2020-03-27 PROCEDURE — 92526 ORAL FUNCTION THERAPY: CPT

## 2020-03-27 PROCEDURE — 94660 CPAP INITIATION&MGMT: CPT

## 2020-03-27 PROCEDURE — 87040 BLOOD CULTURE FOR BACTERIA: CPT

## 2020-03-27 PROCEDURE — 83735 ASSAY OF MAGNESIUM: CPT

## 2020-03-27 PROCEDURE — 84100 ASSAY OF PHOSPHORUS: CPT

## 2020-03-27 PROCEDURE — 85610 PROTHROMBIN TIME: CPT

## 2020-03-27 PROCEDURE — C1750: CPT

## 2020-03-27 PROCEDURE — 76937 US GUIDE VASCULAR ACCESS: CPT

## 2020-03-27 PROCEDURE — 95816 EEG AWAKE AND DROWSY: CPT

## 2020-03-27 PROCEDURE — C1769: CPT

## 2020-03-27 PROCEDURE — 86704 HEP B CORE ANTIBODY TOTAL: CPT

## 2020-03-27 PROCEDURE — 80048 BASIC METABOLIC PNL TOTAL CA: CPT

## 2020-03-27 PROCEDURE — 80053 COMPREHEN METABOLIC PANEL: CPT

## 2020-03-27 PROCEDURE — 93005 ELECTROCARDIOGRAM TRACING: CPT

## 2020-03-27 PROCEDURE — 71045 X-RAY EXAM CHEST 1 VIEW: CPT

## 2020-03-27 RX ORDER — FLUOXETINE HCL 10 MG
1 CAPSULE ORAL
Qty: 0 | Refills: 0 | DISCHARGE

## 2020-03-27 RX ORDER — OXYCODONE HYDROCHLORIDE 5 MG/1
1 TABLET ORAL
Qty: 28 | Refills: 0
Start: 2020-03-27 | End: 2020-04-02

## 2020-03-27 RX ORDER — NYSTATIN CREAM 100000 [USP'U]/G
1 CREAM TOPICAL
Qty: 1 | Refills: 0
Start: 2020-03-27 | End: 2020-04-02

## 2020-03-27 RX ORDER — FERROUS SULFATE 325(65) MG
1 TABLET ORAL
Qty: 0 | Refills: 0 | DISCHARGE

## 2020-03-27 RX ORDER — BUDESONIDE AND FORMOTEROL FUMARATE DIHYDRATE 160; 4.5 UG/1; UG/1
2 AEROSOL RESPIRATORY (INHALATION)
Qty: 0 | Refills: 0 | DISCHARGE

## 2020-03-27 RX ORDER — CHOLESTYRAMINE 4 G/9G
4 POWDER, FOR SUSPENSION ORAL
Qty: 28 | Refills: 0
Start: 2020-03-27 | End: 2020-04-02

## 2020-03-27 RX ORDER — TRAMADOL HYDROCHLORIDE 50 MG/1
1 TABLET ORAL
Qty: 28 | Refills: 0
Start: 2020-03-27 | End: 2020-04-02

## 2020-03-27 RX ORDER — ACETAMINOPHEN 500 MG
2 TABLET ORAL
Qty: 56 | Refills: 0
Start: 2020-03-27 | End: 2020-04-02

## 2020-03-27 RX ORDER — CHOLECALCIFEROL (VITAMIN D3) 125 MCG
1 CAPSULE ORAL
Qty: 0 | Refills: 0 | DISCHARGE

## 2020-03-27 RX ORDER — NEBIVOLOL HYDROCHLORIDE 5 MG/1
1 TABLET ORAL
Qty: 0 | Refills: 0 | DISCHARGE

## 2020-03-27 RX ORDER — BUDESONIDE AND FORMOTEROL FUMARATE DIHYDRATE 160; 4.5 UG/1; UG/1
2 AEROSOL RESPIRATORY (INHALATION)
Qty: 1 | Refills: 0
Start: 2020-03-27 | End: 2020-04-02

## 2020-03-27 RX ORDER — CALCIUM CARBONATE 500(1250)
1 TABLET ORAL
Qty: 0 | Refills: 0 | DISCHARGE

## 2020-03-27 RX ORDER — CHOLESTYRAMINE 4 G/9G
4 POWDER, FOR SUSPENSION ORAL
Qty: 0 | Refills: 0 | DISCHARGE

## 2020-03-27 RX ORDER — LANOLIN ALCOHOL/MO/W.PET/CERES
1 CREAM (GRAM) TOPICAL
Qty: 7 | Refills: 0
Start: 2020-03-27 | End: 2020-04-02

## 2020-03-27 RX ORDER — ACETAMINOPHEN 500 MG
2 TABLET ORAL
Qty: 0 | Refills: 0 | DISCHARGE

## 2020-03-27 RX ORDER — LIDOCAINE 4 G/100G
1 CREAM TOPICAL
Qty: 0 | Refills: 0 | DISCHARGE

## 2020-03-27 RX ORDER — HYDROXYZINE HCL 10 MG
1 TABLET ORAL
Qty: 0 | Refills: 0 | DISCHARGE

## 2020-03-27 RX ORDER — NYSTATIN CREAM 100000 [USP'U]/G
1 CREAM TOPICAL
Qty: 0 | Refills: 0 | DISCHARGE

## 2020-03-27 RX ORDER — ASPIRIN/CALCIUM CARB/MAGNESIUM 324 MG
1 TABLET ORAL
Qty: 0 | Refills: 0 | DISCHARGE

## 2020-03-27 RX ORDER — FLUOXETINE HCL 10 MG
1 CAPSULE ORAL
Qty: 7 | Refills: 0
Start: 2020-03-27 | End: 2020-04-02

## 2020-03-27 RX ORDER — VALPROIC ACID (AS SODIUM SALT) 250 MG/5ML
15 SOLUTION, ORAL ORAL
Qty: 210 | Refills: 0
Start: 2020-03-27 | End: 2020-04-02

## 2020-03-27 RX ORDER — LACTOBACILLUS ACIDOPHILUS 100MM CELL
1 CAPSULE ORAL
Qty: 0 | Refills: 0 | DISCHARGE

## 2020-03-27 RX ORDER — AMLODIPINE BESYLATE 2.5 MG/1
1 TABLET ORAL
Qty: 0 | Refills: 0 | DISCHARGE

## 2020-03-27 RX ORDER — LEVOCETIRIZINE DIHYDROCHLORIDE 0.5 MG/ML
1 SOLUTION ORAL
Qty: 0 | Refills: 0 | DISCHARGE

## 2020-03-27 RX ORDER — LOPERAMIDE HCL 2 MG
1 TABLET ORAL
Qty: 0 | Refills: 0 | DISCHARGE

## 2020-03-27 RX ORDER — MIDODRINE HYDROCHLORIDE 2.5 MG/1
1 TABLET ORAL
Qty: 21 | Refills: 0
Start: 2020-03-27 | End: 2020-04-02

## 2020-03-27 RX ORDER — SODIUM BICARBONATE 1 MEQ/ML
1 SYRINGE (ML) INTRAVENOUS
Qty: 0 | Refills: 0 | DISCHARGE

## 2020-03-27 RX ADMIN — MIDODRINE HYDROCHLORIDE 5 MILLIGRAM(S): 2.5 TABLET ORAL at 05:25

## 2020-03-27 RX ADMIN — Medication 20 MILLIGRAM(S): at 14:57

## 2020-03-27 RX ADMIN — MIDODRINE HYDROCHLORIDE 5 MILLIGRAM(S): 2.5 TABLET ORAL at 14:56

## 2020-03-27 RX ADMIN — NYSTATIN CREAM 1 APPLICATION(S): 100000 CREAM TOPICAL at 05:24

## 2020-03-27 RX ADMIN — Medication 1 APPLICATION(S): at 05:25

## 2020-03-27 RX ADMIN — Medication 750 MILLIGRAM(S): at 05:25

## 2020-03-27 RX ADMIN — OXYCODONE HYDROCHLORIDE 5 MILLIGRAM(S): 5 TABLET ORAL at 09:54

## 2020-03-27 NOTE — PROGRESS NOTE ADULT - SUBJECTIVE AND OBJECTIVE BOX
Patient is a 84y old  Female who presents with a chief complaint of altered mental status (27 Mar 2020 07:02)      INTERVAL HPI/OVERNIGHT EVENTS:    MEDICATIONS  (STANDING):  atorvastatin 10 milliGRAM(s) Oral at bedtime  budesonide 160 MICROgram(s)/formoterol 4.5 MICROgram(s) Inhaler 2 Puff(s) Inhalation two times a day  chlorhexidine 2% Cloths 1 Application(s) Topical daily  cholestyramine Powder (Sugar-Free) 4 Gram(s) Oral daily  clobetasol 0.05% Cream 1 Application(s) Topical two times a day  FLUoxetine 20 milliGRAM(s) Oral daily  melatonin 3 milliGRAM(s) Oral at bedtime  midodrine. 5 milliGRAM(s) Oral three times a day  nystatin Cream 1 Application(s) Topical two times a day  valproic  acid Syrup 750 milliGRAM(s) Oral two times a day    MEDICATIONS  (PRN):  acetaminophen   Tablet .. 650 milliGRAM(s) Oral every 6 hours PRN Mild Pain (1 - 3)  oxyCODONE    IR 5 milliGRAM(s) Oral every 6 hours PRN Severe Pain (7 - 10)  traMADol 25 milliGRAM(s) Oral every 8 hours PRN Moderate Pain (4 - 6)      Allergies    Levaquin (Pruritus)  mercury (Pruritus; Rash)  sulfa drugs (Pruritus)    Intolerances        REVIEW OF SYSTEMS:  Unable to obtain due to medical condition.    Vital Signs Last 24 Hrs  T(C): 36.3 (27 Mar 2020 05:00), Max: 36.8 (26 Mar 2020 14:26)  T(F): 97.3 (27 Mar 2020 05:00), Max: 98.3 (26 Mar 2020 14:26)  HR: 86 (27 Mar 2020 05:00) (86 - 95)  BP: 132/84 (27 Mar 2020 05:00) (109/64 - 132/84)  BP(mean): --  RR: 18 (27 Mar 2020 05:00) (16 - 18)  SpO2: 98% (27 Mar 2020 05:00) (94% - 98%)    PHYSICAL EXAM:  GENERAL: NAD, Comfortable  HEAD:  Atraumatic, Normocephalic  EYES: EOMI, PERRLA, conjunctiva and sclera clear  NECK: Supple, No JVD  CHEST/LUNG: Clear to auscultation bilaterally; No wheeze  HEART: Regular rate and rhythm; No murmurs, rubs, or gallops  ABDOMEN: Soft, Nontender, Nondistended; Bowel sounds present  Neuro: AAO x 2  EXTREMITIES:  2+ Peripheral Pulses, No clubbing, cyanosis, or edema  SKIN: No rashes or lesions    LABS:    CBC Full  -  ( 26 Mar 2020 08:46 )  WBC Count : 7.19 K/uL  Hemoglobin : 11.6 g/dL  Hematocrit : 37.4 %  Platelet Count - Automated : 163 K/uL  Mean Cell Volume : 93.0 fl  Mean Cell Hemoglobin : 28.9 pg  Mean Cell Hemoglobin Concentration : 31.0 gm/dL  Auto Neutrophil # : x  Auto Lymphocyte # : x  Auto Monocyte # : x  Auto Eosinophil # : x  Auto Basophil # : x  Auto Neutrophil % : x  Auto Lymphocyte % : x  Auto Monocyte % : x  Auto Eosinophil % : x  Auto Basophil % : x      Ca    8.4        26 Mar 2020 08:46          CAPILLARY BLOOD GLUCOSE              RADIOLOGY & ADDITIONAL TESTS:    Personally reviewed.     Consultant(s) Notes Reviewed:  [x] YES  [ ] NO Patient is a 84y old  Female who presents with a chief complaint of altered mental status (27 Mar 2020 07:02)      INTERVAL HPI/OVERNIGHT EVENTS: Palliative care consult appreciated. Considering hospice care.     MEDICATIONS  (STANDING):  atorvastatin 10 milliGRAM(s) Oral at bedtime  budesonide 160 MICROgram(s)/formoterol 4.5 MICROgram(s) Inhaler 2 Puff(s) Inhalation two times a day  chlorhexidine 2% Cloths 1 Application(s) Topical daily  cholestyramine Powder (Sugar-Free) 4 Gram(s) Oral daily  clobetasol 0.05% Cream 1 Application(s) Topical two times a day  FLUoxetine 20 milliGRAM(s) Oral daily  melatonin 3 milliGRAM(s) Oral at bedtime  midodrine. 5 milliGRAM(s) Oral three times a day  nystatin Cream 1 Application(s) Topical two times a day  valproic  acid Syrup 750 milliGRAM(s) Oral two times a day    MEDICATIONS  (PRN):  acetaminophen   Tablet .. 650 milliGRAM(s) Oral every 6 hours PRN Mild Pain (1 - 3)  oxyCODONE    IR 5 milliGRAM(s) Oral every 6 hours PRN Severe Pain (7 - 10)  traMADol 25 milliGRAM(s) Oral every 8 hours PRN Moderate Pain (4 - 6)      Allergies    Levaquin (Pruritus)  mercury (Pruritus; Rash)  sulfa drugs (Pruritus)    Intolerances        REVIEW OF SYSTEMS:  Unable to obtain due to medical condition.    Vital Signs Last 24 Hrs  T(C): 36.3 (27 Mar 2020 05:00), Max: 36.8 (26 Mar 2020 14:26)  T(F): 97.3 (27 Mar 2020 05:00), Max: 98.3 (26 Mar 2020 14:26)  HR: 86 (27 Mar 2020 05:00) (86 - 95)  BP: 132/84 (27 Mar 2020 05:00) (109/64 - 132/84)  BP(mean): --  RR: 18 (27 Mar 2020 05:00) (16 - 18)  SpO2: 98% (27 Mar 2020 05:00) (94% - 98%)    PHYSICAL EXAM:  GENERAL: NAD, Comfortable  HEAD:  Atraumatic, Normocephalic  EYES: EOMI, PERRLA, conjunctiva and sclera clear  NECK: Supple, No JVD  CHEST/LUNG: Clear to auscultation bilaterally; No wheeze  HEART: Regular rate and rhythm; No murmurs, rubs, or gallops  ABDOMEN: Soft, Nontender, Nondistended; Bowel sounds present  Neuro: AAO x 2  EXTREMITIES:  2+ Peripheral Pulses, No clubbing, cyanosis, or edema  SKIN: No rashes or lesions    LABS:    CBC Full  -  ( 26 Mar 2020 08:46 )  WBC Count : 7.19 K/uL  Hemoglobin : 11.6 g/dL  Hematocrit : 37.4 %  Platelet Count - Automated : 163 K/uL  Mean Cell Volume : 93.0 fl  Mean Cell Hemoglobin : 28.9 pg  Mean Cell Hemoglobin Concentration : 31.0 gm/dL  Auto Neutrophil # : x  Auto Lymphocyte # : x  Auto Monocyte # : x  Auto Eosinophil # : x  Auto Basophil # : x  Auto Neutrophil % : x  Auto Lymphocyte % : x  Auto Monocyte % : x  Auto Eosinophil % : x  Auto Basophil % : x      Ca    8.4        26 Mar 2020 08:46          CAPILLARY BLOOD GLUCOSE              RADIOLOGY & ADDITIONAL TESTS:    Personally reviewed.     Consultant(s) Notes Reviewed:  [x] YES  [ ] NO

## 2020-03-27 NOTE — DISCHARGE NOTE NURSING/CASE MANAGEMENT/SOCIAL WORK - PATIENT PORTAL LINK FT
You can access the FollowMyHealth Patient Portal offered by [facility] by registering at the following website: [email or web address]. By joining [facility]’s FollowMyHealth portal, you will also be able to view your health information using other applications (apps) compatible with our system.

## 2020-03-27 NOTE — PROGRESS NOTE ADULT - SUBJECTIVE AND OBJECTIVE BOX
JOSE GUADALUPE HERRERACE  84y  Female    Patient is a 84y old  Female who presents with a chief complaint of altered mental status (27 Mar 2020 10:10)      lethargic but arousable.       PAST MEDICAL & SURGICAL HISTORY:  Wound of sacral region  Depression  Iron deficiency anemia  Eczema  HTN (hypertension)  CKD (chronic kidney disease) stage 5, GFR less than 15 ml/min: not on HD  Herniated lumbar intervertebral disc  Tremor  Kidney atrophy: right  Colostomy status: 2006  Chronic UTI (urinary tract infection): chronic renae  Cervical cancer: 1970&#x27;s  Dyslipidemia  Hernia, incisional  S/P hip replacement: right 2013  S/P colon resection: 2006  S/P hysterectomy: 1977          PHYSICAL EXAM:    T(C): 36.3 (03-27-20 @ 05:00), Max: 36.8 (03-26-20 @ 14:26)  HR: 86 (03-27-20 @ 05:00) (86 - 95)  BP: 132/84 (03-27-20 @ 05:00) (109/64 - 132/84)  RR: 18 (03-27-20 @ 05:00) (16 - 18)  SpO2: 98% (03-27-20 @ 05:00) (94% - 98%)  Wt(kg): --    I&O's Detail    26 Mar 2020 07:01  -  27 Mar 2020 07:00  --------------------------------------------------------  IN:  Total IN: 0 mL    OUT:    Colostomy: 650 mL    Indwelling Catheter - Urethral: 200 mL  Total OUT: 850 mL    Total NET: -850 mL          Respiratory: clear anteriorly, decreased BS at bases  Cardiovascular: S1 S2  Gastrointestinal: soft NT ND +BS  Extremities: edema   Neuro: lethargic    MEDICATIONS  (STANDING):  atorvastatin 10 milliGRAM(s) Oral at bedtime  budesonide 160 MICROgram(s)/formoterol 4.5 MICROgram(s) Inhaler 2 Puff(s) Inhalation two times a day  chlorhexidine 2% Cloths 1 Application(s) Topical daily  cholestyramine Powder (Sugar-Free) 4 Gram(s) Oral daily  clobetasol 0.05% Cream 1 Application(s) Topical two times a day  FLUoxetine 20 milliGRAM(s) Oral daily  melatonin 3 milliGRAM(s) Oral at bedtime  midodrine. 5 milliGRAM(s) Oral three times a day  nystatin Cream 1 Application(s) Topical two times a day  valproic  acid Syrup 750 milliGRAM(s) Oral two times a day    MEDICATIONS  (PRN):  acetaminophen   Tablet .. 650 milliGRAM(s) Oral every 6 hours PRN Mild Pain (1 - 3)  oxyCODONE    IR 5 milliGRAM(s) Oral every 6 hours PRN Severe Pain (7 - 10)  traMADol 25 milliGRAM(s) Oral every 8 hours PRN Moderate Pain (4 - 6)                            11.6   7.19  )-----------( 163      ( 26 Mar 2020 08:46 )             37.4       03-26    132<L>  |  93<L>  |  54<H>  ----------------------------<  81  5.3   |  22  |  6.10<H>    Ca    8.4<L>      26 Mar 2020 08:46    TPro  7.6  /  Alb  2.3<L>  /  TBili  0.2  /  DBili  x   /  AST  16  /  ALT  11<L>  /  AlkPhos  131<H>  03-26      Creatinine Trend: Creatinine Trend: 6.10<--, 6.60<--, 4.90<--, 8.10<--, 8.10<--, 6.40<--

## 2020-03-27 NOTE — PROGRESS NOTE ADULT - ASSESSMENT
84 female with a history of HTN, CAD, CHF, Anemia, osteomyelitis, HLD and atrophic right kidney and uretero ureteral anastomosis of the right to left and CKD stage 5 with anemia now admitted with mental status changes.   New onset seizures and is on Valproate. Dialysis stopped and the patient is waiting to go home on hospice. Family aware.

## 2020-03-27 NOTE — PROGRESS NOTE ADULT - PROBLEM SELECTOR PLAN 1
pt is not tolerating HD  family decided to stop HD  referred for Hospice care - prob Hospice at home referral and DC plan  prognosis very poor  on room air  VS noted  will touch base with family to discuss Code Status - DNR - which would be an appropriate designation at this point  supportive care  prognosis very poor.

## 2020-03-27 NOTE — PROGRESS NOTE ADULT - SUBJECTIVE AND OBJECTIVE BOX
Neurology follow up note    GURJIT [id number]      Interval History:    Patient feels ok    MEDICATIONS    acetaminophen   Tablet .. 650 milliGRAM(s) Oral every 6 hours PRN  atorvastatin 10 milliGRAM(s) Oral at bedtime  budesonide 160 MICROgram(s)/formoterol 4.5 MICROgram(s) Inhaler 2 Puff(s) Inhalation two times a day  chlorhexidine 2% Cloths 1 Application(s) Topical daily  cholestyramine Powder (Sugar-Free) 4 Gram(s) Oral daily  clobetasol 0.05% Cream 1 Application(s) Topical two times a day  FLUoxetine 20 milliGRAM(s) Oral daily  melatonin 3 milliGRAM(s) Oral at bedtime  midodrine. 5 milliGRAM(s) Oral three times a day  nystatin Cream 1 Application(s) Topical two times a day  oxyCODONE    IR 5 milliGRAM(s) Oral every 6 hours PRN  traMADol 25 milliGRAM(s) Oral every 8 hours PRN  valproic  acid Syrup 750 milliGRAM(s) Oral two times a day      Allergies    Levaquin (Pruritus)  mercury (Pruritus; Rash)  sulfa drugs (Pruritus)    Intolerances            Vital Signs Last 24 Hrs  T(C): 36.3 (27 Mar 2020 05:00), Max: 36.8 (26 Mar 2020 14:26)  T(F): 97.3 (27 Mar 2020 05:00), Max: 98.3 (26 Mar 2020 14:26)  HR: 86 (27 Mar 2020 05:00) (86 - 95)  BP: 132/84 (27 Mar 2020 05:00) (109/64 - 132/84)  BP(mean): --  RR: 18 (27 Mar 2020 05:00) (16 - 18)  SpO2: 98% (27 Mar 2020 05:00) (94% - 98%)    REVIEW OF SYSTEMS    Constitutional: No fever, chills, fatigue, generalized  weakness  Eyes: no eye pain, visual disturbances, or discharge  ENT:  No difficulty hearing, tinnitus, vertigo; No sinus or throat pain  Neck: No pain or stiffness  Respiratory: No cough, dyspnea, wheezing   Cardiovascular: No chest pain, palpitations,   Gastrointestinal: No abdominal or epigastric pain. No nausea, vomiting  No diarrhea or constipation.   Genitourinary: No dysuria, frequency, hematuria or incontinence  Neurological: No headaches, lightheadedness, vertigo, numbness or tremors  Psychiatric: No depression, anxiety, mood swings or difficulty sleeping  Musculoskeletal: No joint pain or swelling; No muscle, back or extremity pain      On Neurological Examination:    Mental Status - Patient is alert, awake,  [facility] hospital   year 2020     Follow simple commands    Speech -   Fluent                 Cranial Nerves - Pupils 3 mm equal and reactive to light,   extraocular eye movements intact.   smile symmetric  intact bilateral NLF    Motor Exam -   Right upper 4/5   Left upper  3/5  Right lower 2/5  Left lower 2/5    Muscle tone - is normal all over.  No asymmetry is seen.      Sensory    Bilateral intact to light touch    GENERAL Exam: Nontoxic , No Acute Distress   	  HEENT:  normocephalic, atraumatic  		  LUNGS:  Decreased bilaterally  	  HEART: Normal S1S2   No murmur RRR        	  GI/ ABDOMEN:  Soft  Non tender    EXTREMITIES:   No Edema  No Clubbing  No Cyanosis   	   SKIN: Normal  No Ecchymosis                   LABS:  CBC Full  -  ( 26 Mar 2020 08:46 )  WBC Count : 7.19 K/uL  RBC Count : 4.02 M/uL  Hemoglobin : 11.6 g/dL  Hematocrit : 37.4 %  Platelet Count - Automated : 163 K/uL  Mean Cell Volume : 93.0 fl  Mean Cell Hemoglobin : 28.9 pg  Mean Cell Hemoglobin Concentration : 31.0 gm/dL  Auto Neutrophil # : x  Auto Lymphocyte # : x  Auto Monocyte # : x  Auto Eosinophil # : x  Auto Basophil # : x  Auto Neutrophil % : x  Auto Lymphocyte % : x  Auto Monocyte % : x  Auto Eosinophil % : x  Auto Basophil % : x      03-26    132<L>  |  93<L>  |  54<H>  ----------------------------<  81  5.3   |  22  |  6.10<H>    Ca    8.4<L>      26 Mar 2020 08:46    TPro  7.6  /  Alb  2.3<L>  /  TBili  0.2  /  DBili  x   /  AST  16  /  ALT  11<L>  /  AlkPhos  131<H>  03-26    Hemoglobin A1C:     LIVER FUNCTIONS - ( 26 Mar 2020 08:46 )  Alb: 2.3 g/dL / Pro: 7.6 g/dL / ALK PHOS: 131 U/L / ALT: 11 U/L / AST: 16 U/L / GGT: x           Vitamin B12         RADIOLOGY      ANALYSIS AND PLAN:  An 84-year-old with an episode of seizure and change in mental status.  1.	For episode of seizure, will increase Depakote 750 mg twice  off dilantin   2.	EEG intermittent generalized spike and waves   3.	Ativan 1 mg IV push q.6h. p.r.n. for any type of breakthrough seizure.  4.	Seizure precautions.  5.	For history of underlying depression, at present hard to evaluate the patient's psychiatric status secondary to the patient being lethargic, continue home medication when possible.  6.	For hyperlipidemia, continue the patient on her cholesterol medication.  7.	For change in mental status, questionable this could be metabolic encephalopathy from partial complex seizures versus any type of underlying infectious type process episodes of temperatures and hypotension possible h/o of shock   8.	Fall precaution.  9.	overall more awake and interactive today   10.	For chronic kidney disease, monitor renal function noted  HD as tolerated   11.	  Advance directives were discussed with them.  Primary  will be daughterJu, her cell phone number is [phone], home number is [phone] spoke 3/19/2020 callled today went to voicemail which is full   12.	EEG no new changes   13.	MMSE 25/30  14.	monitor oral intake   15.	S/P IR perm cath exchange   16.	no new events   17.	Greater than 16 minutes of time was spent with the patient, plan of care, reviewing data, speaking to the family and multidisciplinary healthcare team.

## 2020-03-27 NOTE — PROGRESS NOTE ADULT - SUBJECTIVE AND OBJECTIVE BOX
Date/Time Patient Seen:  		  Referring MD:   Data Reviewed	       Patient is a 84y old  Female who presents with a chief complaint of altered mental status (26 Mar 2020 10:18)      Subjective/HPI     PAST MEDICAL & SURGICAL HISTORY:  Wound of sacral region  Depression  Iron deficiency anemia  Eczema  HTN (hypertension)  CKD (chronic kidney disease) stage 5, GFR less than 15 ml/min: not on HD  Herniated lumbar intervertebral disc  Depression  Tremor  Kidney atrophy: right  Colostomy status: 2006  Chronic UTI (urinary tract infection): chronic renae  Cervical cancer: 1970&#x27;s  Dyslipidemia  Diabetes: type 2  Hernia, incisional  S/P hip replacement: right 2013  S/P colon resection: 2006  S/P hysterectomy: 1977        Medication list         MEDICATIONS  (STANDING):  atorvastatin 10 milliGRAM(s) Oral at bedtime  budesonide 160 MICROgram(s)/formoterol 4.5 MICROgram(s) Inhaler 2 Puff(s) Inhalation two times a day  chlorhexidine 2% Cloths 1 Application(s) Topical daily  cholestyramine Powder (Sugar-Free) 4 Gram(s) Oral daily  clobetasol 0.05% Cream 1 Application(s) Topical two times a day  FLUoxetine 20 milliGRAM(s) Oral daily  melatonin 3 milliGRAM(s) Oral at bedtime  midodrine. 5 milliGRAM(s) Oral three times a day  nystatin Cream 1 Application(s) Topical two times a day  valproic  acid Syrup 750 milliGRAM(s) Oral two times a day    MEDICATIONS  (PRN):  acetaminophen   Tablet .. 650 milliGRAM(s) Oral every 6 hours PRN Mild Pain (1 - 3)  oxyCODONE    IR 5 milliGRAM(s) Oral every 6 hours PRN Severe Pain (7 - 10)  traMADol 25 milliGRAM(s) Oral every 8 hours PRN Moderate Pain (4 - 6)         Vitals log        ICU Vital Signs Last 24 Hrs  T(C): 36.3 (27 Mar 2020 05:00), Max: 36.8 (26 Mar 2020 14:26)  T(F): 97.3 (27 Mar 2020 05:00), Max: 98.3 (26 Mar 2020 14:26)  HR: 86 (27 Mar 2020 05:00) (86 - 95)  BP: 132/84 (27 Mar 2020 05:00) (109/64 - 132/84)  BP(mean): --  ABP: --  ABP(mean): --  RR: 18 (27 Mar 2020 05:00) (16 - 18)  SpO2: 98% (27 Mar 2020 05:00) (94% - 98%)           Input and Output:  I&O's Detail    26 Mar 2020 07:01  -  27 Mar 2020 07:00  --------------------------------------------------------  IN:  Total IN: 0 mL    OUT:    Colostomy: 650 mL    Indwelling Catheter - Urethral: 200 mL  Total OUT: 850 mL    Total NET: -850 mL          Lab Data                        11.6   7.19  )-----------( 163      ( 26 Mar 2020 08:46 )             37.4     03-26    132<L>  |  93<L>  |  54<H>  ----------------------------<  81  5.3   |  22  |  6.10<H>    Ca    8.4<L>      26 Mar 2020 08:46    TPro  7.6  /  Alb  2.3<L>  /  TBili  0.2  /  DBili  x   /  AST  16  /  ALT  11<L>  /  AlkPhos  131<H>  03-26            Review of Systems	      Objective     Physical Examination    heart 1s2  lung dec BS  abd soft      Pertinent Lab findings & Imaging      Ana:  NO   Adequate UO     I&O's Detail    26 Mar 2020 07:01  -  27 Mar 2020 07:00  --------------------------------------------------------  IN:  Total IN: 0 mL    OUT:    Colostomy: 650 mL    Indwelling Catheter - Urethral: 200 mL  Total OUT: 850 mL    Total NET: -850 mL               Discussed with:     Cultures:	        Radiology

## 2020-03-27 NOTE — PROGRESS NOTE ADULT - ASSESSMENT
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 Course c/b likely generalized seizure on 3/5/20, and acute hypoxic respiratory failure and suspected sepsis due to suspected aspiration PNA, s/p ICU stay. Now downgraded to medicine service. Patient now with ESRD and likely needing long term dialysis. Unable to do HD due to hypotension  Problem/Plan - 1:  ·  Problem: AMS (altered mental status).  Plan: MS better  weakness - frail elderly -   multiple medical issues -   needs assist with ADL  oral and skin hygiene  large family - Daughters in Law and sons and daughters all involved in discussion  Family interested in pursuing Hospice. Referral sent per SW  Started on pain regimen with hold parameters  Palliative care consulted, hospice care considered        Problem/Plan - 2:  ·  Problem: Generalized seizure.  Plan: - unresponsive episode with shaking and spike in lactic acidosis on 3/5, consistent with seizure (first EEG on 3/5 done after IV ativan given, which can mask seizure activity)  -2nd EEG on 3/8 showing some generalized spike/wave patterns concerning for increased seizure risk  - CT head, MRI brain showed no acute infarct or hemorrhage  - Continue Depakote.  -Dilantin stopped due to lethargy  -Seizure precautions  -Ativan PRn for breakthrough seizures.      Problem/Plan - 3:  ·  Problem: Sepsis.  Plan: - no new fevers  - there was suspicion for UTI on admission though unclear significance of the UCx isolates in the presence of chronic renae   - Doubt infection. No longer on antibiotics  - patient's lethargy improved today, which removal of dilantin  - continue to monitor  -Family interested in hospice care, referral sent   - Dr. Wiley, palliative, recs appreciated.      Problem/Plan - 4:  ·  Problem: Acute renal failure superimposed on stage 5 chronic kidney disease, not on chronic dialysis, unspecified acute renal failure type.  Plan: patient with metabolic acidosis in setting of UTI and suspected pre-renal MEHDI, dehydration   - po sodium bicarb 650 mg TID  - pt's renal status still very poor. Unable to tolerate HD due to hypotension  -Permacath to be removed  -s/p permacath now     Problem/Plan - 5:  ·  Problem: Hypernatremia.  Plan: Oral Free water intake. Improving   S/p IV fluids  Monitor Na level.      Problem/Plan - 6:  Problem: Hyperkalemia. Plan: - due to renal failure  - currently WNL  - Dr. You, nephro, recs appreciated.     Problem/Plan - 7:  ·  Problem: Wound of sacral region.  Plan: - Tylenol PRN  - does not appear actively infected currently  - Wound care RN, recs appreciated   - Aquacel dressing every other day  - Wound care, Dr. Blas.      Problem/Plan - 8:  ·  Problem: Iron deficiency anemia.  Plan: - Anemia likely due to LELO, and anemia of chronic disease in setting of Stage 5 CKD  - Fe panel from 2/2020 showed low total iron, low total iron binding capacity  - hemoglobin has been somewhat labile during admission; monitor closely, no sign of acute bleeding  - Continue home ferrous sulfate   - Continue epogen as per nephro.      Problem/Plan - 9:  ·  Problem: HTN (hypertension).  Plan:   - bp low during dialysis  - Continue midodrine     Problem/Plan - 10:  Problem: Need for prophylactic measure. Plan; DVT ppx: heparin 5000un sq Q8h

## 2020-03-27 NOTE — PROGRESS NOTE ADULT - REASON FOR ADMISSION
altered mental status

## 2020-04-30 RX ADMIN — Medication 650 MILLIGRAM(S): at 22:19

## 2020-11-19 NOTE — PHYSICAL THERAPY INITIAL EVALUATION ADULT - WEIGHT-BEARING RESTRICTIONS: STAND/SIT, REHAB EVAL
Constellation of symptoms (rhinorrhea, low grade fever, cough diarrhea etc) consistent with viral URI, cannot rule out Covid. Hydration status: good  - discussed need for rescreening for Covid for the entire family, provided community testing site, discussed quarantine guidelines  - Encouraged supportive measures (rest/fluids/saline rinses, warm baths, humidifier, soups)  - Antibiotics not indicated  - Encouraged consistent hand hygiene and droplet precautions to prevent transmission  - Tylenol PRN and Ibuprofen PRN for comfort    
full weight-bearing

## 2021-01-29 NOTE — ED ADULT TRIAGE NOTE - NSWEIGHTCALCTOOLDRUG_GEN_A_CORE
Verbal/written post procedure instructions were given to patient/caregiver/Instructed patient/caregiver regarding signs and symptoms of infection/Keep the cast/splint/dressing clean and dry/Care for catheter as per unit/ICU protocols
 used

## 2021-06-03 NOTE — DIETITIAN INITIAL EVALUATION ADULT. - CONTINUE CURRENT NUTRITION CARE PLAN
FYI - Status Update  Who is Calling: FriendYandy  Update: Wanted the PCP to know that the patient was taken via ambulance last night to [facility], and was admitted with possible MI, and the patient is in room 303  Okay to leave a detailed message?:  No return call needed     yes

## 2021-10-28 NOTE — ED ADULT NURSE NOTE - EXTERNAL GENITALIA
Department of Cardiology                                                                  [facility]/[place] [address] [facility] [address]                                                            Telephone: [phone]. Fax:[phone]                                                                             Pre- Procedure H + P/Progress Note      HPI:  54yo male with h/o HTN, HLD, strong FH premature CAD, LLE DVT (??year not on A/C), hypothyroid, obesity, DIANE (***?CPAP), latent TB, HSV, GERD, BPH with obstructive uropathy, erectile dysfunction (PRN Viagra), cervical radiculopathy, PTSD, anxiety, ADD, followed by Dr. Ge, known abnormal ECG and CCTA with  (LMCA-0, LAD-165, RI-0, LCx-0, RCA-14), pLAD 25-49% and pRCA <25% stenosis, experiencing worsening and more frequent episodes of exertional left-sided CP and ASHBY, 2 recent ED presentations ([place] with neg CE and refused adm and [facility] and eloped due to wait time in ED), now presents for LHC +/- PCI to be performed by Dr. Larson.       Symptoms:        Angina (Class): III       Ischemic Symptoms: CP and ASHBY    Heart Failure: No       Systolic/Diastolic/Combined:        NYHA Class (within 2 weeks):     TTE 2018 with LVEF 65%         Stress Test 2018 neg    Risk Assessments:  ASA:  Mallampati:  Bleeding Risk:  Creatinine:  GFR:    Associated Risk Factors:        Cerebrovascular Disease: N/A       Chronic Lung Disease: N/A       Peripheral Arterial Disease: N/A       Chronic Kidney Disease (if yes, what is GFR): N/A       Uncontrolled Diabetes (if yes, what is HgbA1C or FBS): N/A       Poorly Controlled Hypertension (if yes, what is SBP): N/A       Morbid Obesity (if yes, what is BMI): N/A       History of Recent Ventricular Arrhythmia: N/A       Inability to Ambulate Safely: N/A       Need for Therapeutic Anticoagulation: N/A       Antiplatelet or Contrast Allergy: N/A    Antianginal Therapies:        Beta Blockers:         Calcium Channel Blockers:        Long Acting Nitrates:        Ranexa:     TELEMETRY: 	      ECG:  	      Impression:      Plan:  -plan for LHC via RA vs FA  -patient seen and examined  -confirmed appropriate NPO duration  -ECG and Labs reviewed  -Aspirin 81mg po pre-cath  -procedure discussed with patient; risks and benefits explained, questions answered  -consent obtained by attending IC                                                                               Department of Cardiology                                                                  [facility]/[place] [address]                                                            Telephone: [phone]. Fax:[phone]                                                                             Pre- Procedure H + P/Progress Note      HPI:  52yo male with h/o HTN, HLD, strong FH premature CAD, LLE DVT (??year not on A/C), hypothyroid, obesity, DIANE (***?CPAP), latent TB, HSV, GERD, BPH with obstructive uropathy, erectile dysfunction (PRN Viagra), cervical radiculopathy, PTSD, anxiety, ADD, followed by Dr. Ge, known abnormal ECG and CCTA 9/2020 with  (LMCA-0, LAD-165, RI-0, LCx-0, RCA-14), pLAD 25-49% and pRCA <25% stenosis, experiencing worsening and more frequent episodes of exertional left-sided CP and ASHBY, 2 recent ED presentations ([place] with neg CE and refused adm and [facility] and eloped due to wait time in ED), now presents for LHC +/- PCI to be performed by Dr. Larson.       Symptoms:        Angina (Class): III       Ischemic Symptoms: CP and ASHBY    Heart Failure: No       Systolic/Diastolic/Combined:        NYHA Class (within 2 weeks):     TTE 2018 with LVEF 65%         Stress Test 2018 neg    Risk Assessments:  ASA:  Mallampati:  Bleeding Risk:  Creatinine:  GFR:    Associated Risk Factors:        Cerebrovascular Disease: N/A       Chronic Lung Disease: N/A       Peripheral Arterial Disease: N/A       Chronic Kidney Disease (if yes, what is GFR): N/A       Uncontrolled Diabetes (if yes, what is HgbA1C or FBS): N/A       Poorly Controlled Hypertension (if yes, what is SBP): N/A       Morbid Obesity (if yes, what is BMI): N/A       History of Recent Ventricular Arrhythmia: N/A       Inability to Ambulate Safely: N/A       Need for Therapeutic Anticoagulation: N/A       Antiplatelet or Contrast Allergy: N/A    Antianginal Therapies:        Beta Blockers:         Calcium Channel Blockers:        Long Acting Nitrates:        Ranexa:     TELEMETRY: 	      ECG:  	      Impression:      Plan:  -plan for LHC via RA vs FA  -patient seen and examined  -confirmed appropriate NPO duration  -ECG and Labs reviewed  -Aspirin 81mg po pre-cath  -procedure discussed with patient; risks and benefits explained, questions answered  -consent obtained by attending IC                                                                               Department of Cardiology                                                                  [facility]/[place] [address]                                                            Telephone: [phone]. Fax:[phone]                                                                  Pre- Procedure H + P/Progress Note      HPI:  54yo male with h/o HTN, HLD, strong FH premature CAD, LLE DVT (??year not on A/C), hypothyroid, obesity, DIANE (***?CPAP), latent TB, HSV, GERD, BPH with obstructive uropathy, erectile dysfunction (PRN Viagra), cervical radiculopathy, PTSD, anxiety, ADD, followed by Dr. Ge, known abnormal ECG and CCTA 9/2020 with  (LMCA-0, LAD-165, RI-0, LCx-0, RCA-14), pLAD 25-49% and pRCA <25% stenosis, experiencing worsening and more frequent episodes of exertional left-sided CP and ASHBY, 2 recent ED presentations ([place] with neg CE and refused adm and [facility] and eloped due to wait time in ED), now presents for C +/- PCI to be performed by Dr. Larson.     Risk Assessments:  ASA:   2  Mallampati:   2  Bleeding Risk:     Creatinine:  GFR:    Symptoms:        Angina (Class): III       Ischemic Symptoms: CP and ASHBY    Heart Failure: No       Systolic/Diastolic/Combined:        NYHA Class (within 2 weeks):     TTE 2018 with LVEF 65%         Stress Test 2018 neg      Associated Risk Factors:        Cerebrovascular Disease: N/A       Chronic Lung Disease: N/A       Peripheral Arterial Disease: N/A       Chronic Kidney Disease (if yes, what is GFR): N/A       Uncontrolled Diabetes (if yes, what is HgbA1C or FBS): N/A       Poorly Controlled Hypertension (if yes, what is SBP): N/A       Morbid Obesity (if yes, what is BMI): N/A       History of Recent Ventricular Arrhythmia: N/A       Inability to Ambulate Safely: N/A       Need for Therapeutic Anticoagulation: N/A       Antiplatelet or Contrast Allergy: N/A    Antianginal Therapies:        Beta Blockers:         Calcium Channel Blockers:        Long Acting Nitrates:        Ranexa:    normal                                                                         Department of Cardiology                                                                  [facility]/[place] [address] [address]                                                            Telephone: [phone]. Fax:[phone]                                                                  Pre- Procedure H + P/Progress Note      HPI:  52yo male with h/o HTN, HLD, strong FH premature CAD, LLE DVT (??year not on A/C), hypothyroid, obesity, DIANE (***?CPAP), latent TB, HSV, GERD, BPH with obstructive uropathy, erectile dysfunction (PRN Viagra), cervical radiculopathy, PTSD, anxiety, ADD, followed by Dr. Ge, known abnormal ECG and CCTA 9/2020 with  (LMCA-0, LAD-165, RI-0, LCx-0, RCA-14), pLAD 25-49% and pRCA <25% stenosis, experiencing worsening and more frequent episodes of exertional left-sided CP and ASHBY, 2 recent ED presentations ([place] with neg CE and refused adm and [facility] and eloped due to wait time in ED), now presents for C +/- PCI to be performed by Dr. Larson.     Risk Assessments:  ASA:   2  Mallampati:   2  Bleeding Risk:   1.0%  Creatinine:  1.06  GFR:  81    Symptoms:        Angina (Class): III       Ischemic Symptoms: CP and ASHBY    Heart Failure: No       Systolic/Diastolic/Combined:        NYHA Class (within 2 weeks):     TTE 2018 with LVEF 65%         Stress Test 2018 neg      Associated Risk Factors:        Cerebrovascular Disease: N/A       Chronic Lung Disease: N/A       Peripheral Arterial Disease: N/A       Chronic Kidney Disease (if yes, what is GFR): N/A       Uncontrolled Diabetes (if yes, what is HgbA1C or FBS): N/A       Poorly Controlled Hypertension (if yes, what is SBP): N/A       Morbid Obesity (if yes, what is BMI): N/A       History of Recent Ventricular Arrhythmia: N/A       Inability to Ambulate Safely: N/A       Need for Therapeutic Anticoagulation: N/A       Antiplatelet or Contrast Allergy: N/A    Antianginal Therapies:        Beta Blockers:         Calcium Channel Blockers:        Long Acting Nitrates:        Ranexa:

## 2022-05-11 NOTE — OCCUPATIONAL THERAPY INITIAL EVALUATION ADULT - NAME OF CLINICIAN
Goal Outcome Evaluation:  Plan of Care Reviewed With: patient        Progress: no change  Outcome Evaluation: Pt sleepy at night but once awaken able to communicate but slow to respond with low voice.  Seemed more alert after walking to the bathroom and back.  No diarhea episode during the shift and lomotil x 1 given.  NPO at midnight.  Called Cardiology during the beginning of the shift to inform no consent order placed for ANJEL and was informed that Dr. Shook will want to see the patient before determining if patient is alert enough to do proceedure.  This information was convey to PACU when they called the floor and we spoke.  Clean gown and soaks applied and took pt to the bathroom and is ready for  at 0600 to prepare for ANJEL.  Dialysis orders placed for today.  The order was called during dayshift yesterday and will be performed today.  NIHSS 2.  No new neurological changes.  VSS.  WCTM.   Yudi MUNOZ

## 2022-05-13 NOTE — PATIENT PROFILE ADULT - DO YOU FEEL LIKE HURTING YOURSELF OR OTHERS?
We are committed to providing our patients with their test results in a timely manner. If you have access to [facility], you will receive your results within 5 to 7 days. If your results are normal, a member of our office may call you or you may receive a letter in the mail within 10 to 15 days of your testing. If your results have something additional to discuss, a member of our office will contact you by phone. If at any time you have questions related your results, please feel free to call our office at [phone]   no

## 2022-06-16 NOTE — PROGRESS NOTE ADULT - PROBLEM SELECTOR PLAN 1
Subjective   Patient ID: Hazel is a 18 year old female who is accompanied by:mother     Chief Complaint   Patient presents with   • Groin Pain     Bump in that area   • Office Visit       17 yo with bump in groin since Tuesday night.  Mild erythema, pain with palpation.  Brownish dot in center  Child does martial arts.  No fever  No urinary symptoms  Going to [place] on mission trip    Review of Systems   Constitutional: Negative.    HENT: Negative.    Eyes: Negative.    Respiratory: Negative.    Cardiovascular: Negative.    Gastrointestinal: Negative.    Musculoskeletal: Negative.    Skin: Negative.    Allergic/Immunologic: Negative.    Neurological: Negative.        Patient's medications, allergies, past medical, surgical, social and family histories were reviewed and updated as appropriate.    Objective   Vitals:Temp 98 °F (36.7 °C) (Temporal)   Wt 69.3 kg (152 lb 10.7 oz)   LMP 05/31/2022   Physical Exam  Vitals reviewed.   Constitutional:       Appearance: Normal appearance.   HENT:      Head: Normocephalic and atraumatic.      Right Ear: External ear normal.      Left Ear: External ear normal.      Nose: Nose normal.      Mouth/Throat:      Mouth: Mucous membranes are moist.      Pharynx: Oropharynx is clear.      Neck: Normal range of motion and neck supple.   Eyes:      Extraocular Movements: Extraocular movements intact.      Conjunctiva/sclera: Conjunctivae normal.      Pupils: Pupils are equal, round, and reactive to light.   Cardiovascular:      Rate and Rhythm: Normal rate and regular rhythm.      Pulses: Normal pulses.   Pulmonary:      Effort: Pulmonary effort is normal.   Abdominal:      General: Abdomen is flat.   Musculoskeletal:         General: Normal range of motion.      Left foot: Normal range of motion. No swelling, deformity or tenderness.   Skin:     General: Skin is warm and dry.      Capillary Refill: Capillary refill takes less than 2 seconds.          Neurological:      General: No  focal deficit present.      Mental Status: She is alert and oriented to person, place, and time.   Psychiatric:         Mood and Affect: Mood normal.         Behavior: Behavior normal.         Assessment   Problem List Items Addressed This Visit    None     Visit Diagnoses     Furuncle of groin    -  Primary        Likely ingrown hair.  Warm compress to area.  Antibiotic ointment if needed due to international travel   - unresponsive episode with shaking and spike in lactic acidosis this morning likely a generalized seizure  - CT head, MRI brain showed no acute infarct or hemorrhage  - EEG no sign of seizure, but performed after pt received ativan, so that can mask findings  - lactate of 5.2, continue IVF, follow up repeat lactate  - neuro, Dr. Quintanilla, consulted, will start pt on depakote  - ICU consult for increased work of breathing and worsening encephalopathy making it contraindicated to use BiPAP / inability to protect airway - unresponsive episode with shaking and spike in lactic acidosis yesterday, consistent with seizure  - CT head, MRI brain showed no acute infarct or hemorrhage  - EEG no sign of seizure, but performed after pt received ativan, so that can mask findings.   - Per neuro, will repeat EEG this weekend if signs/symptoms of seizure activity present  - continue depakote  - neuro, Dr. Quintanilla, recs appreciated  - seizure precautions

## 2022-09-07 NOTE — SWALLOW BEDSIDE ASSESSMENT ADULT - SWALLOW EVAL: THERAPY FREQUENCY
seen on HD with Dr Bonilla, agree with above  tolerating rx , VSS  cont as above-- short rx for clearance and gentle UF   plan again tomorrow
as schedule permits
as schedule permits

## 2022-11-18 NOTE — PROGRESS NOTE ADULT - SUBJECTIVE AND OBJECTIVE BOX
Patient is a 84y old  Female who presents with a chief complaint of altered mental status (12 Mar 2020 11:41)   SOB    INTERVAL HPI/OVERNIGHT EVENTS:  Patient seen and examined at bedside. Lethargic, able to answer some questions, follow some commands, requesting something to drink. Discussed with daughter-in-law, Chelsea, who says patient was more verbal in previous days.      MEDICATIONS  (STANDING):  amLODIPine   Tablet 5 milliGRAM(s) Oral daily  aspirin enteric coated 81 milliGRAM(s) Oral daily  atorvastatin 10 milliGRAM(s) Oral at bedtime  budesonide 160 MICROgram(s)/formoterol 4.5 MICROgram(s) Inhaler 2 Puff(s) Inhalation two times a day  chlorhexidine 2% Cloths 1 Application(s) Topical <User Schedule>  cholestyramine Powder (Sugar-Free) 4 Gram(s) Oral daily  clobetasol 0.05% Cream 1 Application(s) Topical two times a day  dextrose 5% + sodium chloride 0.45%. 1000 milliLiter(s) (50 mL/Hr) IV Continuous <Continuous>  dextrose 5%. 1000 milliLiter(s) (50 mL/Hr) IV Continuous <Continuous>  epoetin jean carlos Injectable 60318 Unit(s) SubCutaneous <User Schedule>  ferrous    sulfate 325 milliGRAM(s) Oral daily  FLUoxetine 20 milliGRAM(s) Oral daily  heparin  Injectable 5000 Unit(s) SubCutaneous every 8 hours  labetalol 200 milliGRAM(s) Oral every 8 hours  melatonin 3 milliGRAM(s) Oral at bedtime  nystatin Cream 1 Application(s) Topical two times a day  sodium bicarbonate 650 milliGRAM(s) Oral three times a day  valproic  acid Syrup 750 milliGRAM(s) Oral two times a day    MEDICATIONS  (PRN):  acetaminophen    Suspension .. 650 milliGRAM(s) Oral every 6 hours PRN Temp greater or equal to 38C (100.4F), Moderate Pain (4 - 6)  hydrALAZINE Injectable 10 milliGRAM(s) IV Push every 6 hours PRN SBP over 180  QUEtiapine 25 milliGRAM(s) Oral at bedtime PRN Delerium      Allergies    Levaquin (Pruritus)  mercury (Pruritus; Rash)  sulfa drugs (Pruritus)    Intolerances        REVIEW OF SYSTEMS:  Unable to obtain due to lethargy  Vital Signs Last 24 Hrs  T(C): 36.9 (12 Mar 2020 05:10), Max: 36.9 (12 Mar 2020 05:10)  T(F): 98.5 (12 Mar 2020 05:10), Max: 98.5 (12 Mar 2020 05:10)  HR: 73 (12 Mar 2020 05:10) (61 - 73)  BP: 127/59 (12 Mar 2020 05:10) (90/59 - 151/63)  BP(mean): 84 (11 Mar 2020 19:00) (69 - 107)  RR: 18 (12 Mar 2020 05:10) (18 - 25)  SpO2: 94% (12 Mar 2020 05:10) (91% - 98%)    PHYSICAL EXAM:  GENERAL: lethargic, no acute distress  EYES: PERRLA, upward deviation of left ee  ENMT: dry mucus membranes, poor dentition   NERVOUS SYSTEM:  lethargic, but able to follow simple commands to squeeze fingers  CHEST/LUNG: Diminished breath sounds in bilateral bases  HEART: Regular rate and rhythm; No murmurs, rubs, or gallops  ABDOMEN: Colostomy draining liquid brown stool, soft, Nontender, Nondistended; Bowel sounds present  EXTREMITIES:  trace pitting edema in bilateral LE      LABS:                        8.8    6.34  )-----------( 253      ( 12 Mar 2020 08:10 )             28.6     12 Mar 2020 08:10    152    |  120    |  64     ----------------------------<  116    3.7     |  18     |  4.40     Ca    8.4        12 Mar 2020 08:10  Phos  4.3       12 Mar 2020 08:10  Mg     2.1       12 Mar 2020 08:10      PT/INR - ( 11 Mar 2020 05:12 )   PT: 13.3 sec;   INR: 1.18 ratio         PTT - ( 11 Mar 2020 05:12 )  PTT:27.4 sec  CAPILLARY BLOOD GLUCOSE        BLOOD CULTURE    RADIOLOGY & ADDITIONAL TESTS:  PROCEDURE DATE:  03/11/2020        INTERPRETATION:  GENERAL DESCRIPTION:  The EEG was recorded with a 32-channel digital EEG machine. The standard international 10/20 electrode replacements were used.    CONDITIONS OF RECORDING:  The EEG was carried out with the patient in the awake tense state.  Muscle and movement artifacts are present during the waking record at times making EEG unreadable.    DESCRIPTION:  The resting waking  background rhythms consist of moderate voltage, symmetrical activity which is fairly well organized. The frequency spectrum showed a moderate amount of theta and beta activity and a negligible amount of delta activity. No clear posterior dominant rhythm was seen.    Hyperventilation was not performed because of medical reasons. Intermittent photic stimulation from 2-20 flashes per second fails to elicit any abnormality.    There were no areas of focal slowing, epilepticform discharges or electrographic seizures.    IMPRESSION: Normal limited awake tense EEG.    COMMENT:    A video 24-48 hours EEG might be desirable to further evaluate for possible seizure disorder, if clinically necessary. This EEG does not exclude the clinical diagnosis of seizure or epilepsy.          SANTO HARPER M.D., Attending Neurologist  This document has been electronically signed. Mar 11 2020 12:30PM      Consultant(s) Notes Reviewed:  y    Care Discussed with Consultants/Other Providers:faraz Patient is a 84y old  Female who presents with a chief complaint of altered mental status (12 Mar 2020 11:41)       INTERVAL HPI/OVERNIGHT EVENTS:  Patient seen and examined at bedside. Lethargic, able to answer some questions, follow some commands, requesting something to drink. Discussed with daughter-in-law, Chelsea, who says patient was more verbal in previous days.      MEDICATIONS  (STANDING):  amLODIPine   Tablet 5 milliGRAM(s) Oral daily  aspirin enteric coated 81 milliGRAM(s) Oral daily  atorvastatin 10 milliGRAM(s) Oral at bedtime  budesonide 160 MICROgram(s)/formoterol 4.5 MICROgram(s) Inhaler 2 Puff(s) Inhalation two times a day  chlorhexidine 2% Cloths 1 Application(s) Topical <User Schedule>  cholestyramine Powder (Sugar-Free) 4 Gram(s) Oral daily  clobetasol 0.05% Cream 1 Application(s) Topical two times a day  dextrose 5% + sodium chloride 0.45%. 1000 milliLiter(s) (50 mL/Hr) IV Continuous <Continuous>  dextrose 5%. 1000 milliLiter(s) (50 mL/Hr) IV Continuous <Continuous>  epoetin jean carlos Injectable 35228 Unit(s) SubCutaneous <User Schedule>  ferrous    sulfate 325 milliGRAM(s) Oral daily  FLUoxetine 20 milliGRAM(s) Oral daily  heparin  Injectable 5000 Unit(s) SubCutaneous every 8 hours  labetalol 200 milliGRAM(s) Oral every 8 hours  melatonin 3 milliGRAM(s) Oral at bedtime  nystatin Cream 1 Application(s) Topical two times a day  sodium bicarbonate 650 milliGRAM(s) Oral three times a day  valproic  acid Syrup 750 milliGRAM(s) Oral two times a day    MEDICATIONS  (PRN):  acetaminophen    Suspension .. 650 milliGRAM(s) Oral every 6 hours PRN Temp greater or equal to 38C (100.4F), Moderate Pain (4 - 6)  hydrALAZINE Injectable 10 milliGRAM(s) IV Push every 6 hours PRN SBP over 180  QUEtiapine 25 milliGRAM(s) Oral at bedtime PRN Delerium      Allergies    Levaquin (Pruritus)  mercury (Pruritus; Rash)  sulfa drugs (Pruritus)    Intolerances        REVIEW OF SYSTEMS:  Unable to obtain due to lethargy  Vital Signs Last 24 Hrs  T(C): 36.9 (12 Mar 2020 05:10), Max: 36.9 (12 Mar 2020 05:10)  T(F): 98.5 (12 Mar 2020 05:10), Max: 98.5 (12 Mar 2020 05:10)  HR: 73 (12 Mar 2020 05:10) (61 - 73)  BP: 127/59 (12 Mar 2020 05:10) (90/59 - 151/63)  BP(mean): 84 (11 Mar 2020 19:00) (69 - 107)  RR: 18 (12 Mar 2020 05:10) (18 - 25)  SpO2: 94% (12 Mar 2020 05:10) (91% - 98%)    PHYSICAL EXAM:  GENERAL: lethargic, no acute distress  EYES: PERRLA, upward deviation of left ee  ENMT: dry mucus membranes, poor dentition   NERVOUS SYSTEM:  lethargic, but able to follow simple commands to squeeze fingers  CHEST/LUNG: Diminished breath sounds in bilateral bases  HEART: Regular rate and rhythm; No murmurs, rubs, or gallops  ABDOMEN: Colostomy draining liquid brown stool, soft, Nontender, Nondistended; Bowel sounds present  EXTREMITIES:  trace pitting edema in bilateral LE      LABS:                        8.8    6.34  )-----------( 253      ( 12 Mar 2020 08:10 )             28.6     12 Mar 2020 08:10    152    |  120    |  64     ----------------------------<  116    3.7     |  18     |  4.40     Ca    8.4        12 Mar 2020 08:10  Phos  4.3       12 Mar 2020 08:10  Mg     2.1       12 Mar 2020 08:10      PT/INR - ( 11 Mar 2020 05:12 )   PT: 13.3 sec;   INR: 1.18 ratio         PTT - ( 11 Mar 2020 05:12 )  PTT:27.4 sec  CAPILLARY BLOOD GLUCOSE        BLOOD CULTURE    RADIOLOGY & ADDITIONAL TESTS:  PROCEDURE DATE:  03/11/2020        INTERPRETATION:  GENERAL DESCRIPTION:  The EEG was recorded with a 32-channel digital EEG machine. The standard international 10/20 electrode replacements were used.    CONDITIONS OF RECORDING:  The EEG was carried out with the patient in the awake tense state.  Muscle and movement artifacts are present during the waking record at times making EEG unreadable.    DESCRIPTION:  The resting waking  background rhythms consist of moderate voltage, symmetrical activity which is fairly well organized. The frequency spectrum showed a moderate amount of theta and beta activity and a negligible amount of delta activity. No clear posterior dominant rhythm was seen.    Hyperventilation was not performed because of medical reasons. Intermittent photic stimulation from 2-20 flashes per second fails to elicit any abnormality.    There were no areas of focal slowing, epilepticform discharges or electrographic seizures.    IMPRESSION: Normal limited awake tense EEG.    COMMENT:    A video 24-48 hours EEG might be desirable to further evaluate for possible seizure disorder, if clinically necessary. This EEG does not exclude the clinical diagnosis of seizure or epilepsy.          SANTO HARPER M.D., Attending Neurologist  This document has been electronically signed. Mar 11 2020 12:30PM      Consultant(s) Notes Reviewed:  y    Care Discussed with Consultants/Other Providers:faraz in [facility]:

## 2023-02-08 NOTE — PROGRESS NOTE ADULT - ASSESSMENT
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 Renal indices are now improving with IVF resuscitation. she also most likely have urosepsis and is feeling better with IV antibiotics. No need to initiate RRT at this time. Baseline creatinine is around 3.0. Will follow. Primary Defect Length In Cm (Final Defect Size - Required For Flaps/Grafts): 1.2

## 2023-02-08 NOTE — PROGRESS NOTE ADULT - ASSESSMENT
83 yo female admitted with altered mental status with no clear explanation  Question whether altered mental status at home could have been postictal phenomena given what appears to be seizure activity here.    No clear or convincing evidence of active/uncontrolled infection on exam.  In setting of chronic renae catheter the urine isolates is questionable significance, but only clue other than her sacral sore which seems unlikely.  CXR with R effusion, doubt pneumonia  Sacral sore not infected appearing. While patient almost certainly has sacral osteomyelitis based on the CT scan I am skeptical that its responsible for her presentation.  No concern of potential other SSTI  Benign abdomen  WBC declined Detail Level: Detailed Quality 226: Preventive Care And Screening: Tobacco Use: Screening And Cessation Intervention: Patient screened for tobacco use and is an ex/non-smoker Quality 111:Pneumonia Vaccination Status For Older Adults: Pneumococcal vaccine (PPSV23) administered on or after patient’s 60th birthday and before the end of the measurement period

## 2023-03-17 NOTE — PROVIDER CONTACT NOTE (CRITICAL VALUE NOTIFICATION) - SITUATION
Rx Refill Note  Requested Prescriptions     Pending Prescriptions Disp Refills   • isosorbide mononitrate (IMDUR) 30 MG 24 hr tablet [Pharmacy Med Name: Isosorbide Mononitrate ER 30 MG Oral Tablet Extended Release 24 Hour] 180 tablet 0     Sig: TAKE 1 TABLET BY MOUTH EVERY 12 HOURS      Last office visit with prescribing clinician: 12/13/2022     Next office visit with prescribing clinician: f/u in [place]                            Would you like a call back once the refill request has been completed: [] Yes [] No    If the office needs to give you a call back, can they leave a voicemail: [] Yes [] No    Zarina Kenney MA  03/17/23, 10:39 EDT  
Pt wit mg 1.0. Asymptomatic. Vital signs stable.
Gustavo Matias, from the  Lab reported calcium level of 6.6

## 2023-05-01 NOTE — CONSULT NOTE ADULT - PROBLEM SELECTOR RECOMMENDATION 2
add cholestyramine and also add imodium if safe for renal insufficiency Adjacent Tissue Transfer Text: The defect edges were debeveled with a #15 scalpel blade.  Given the location of the defect and the proximity to free margins an adjacent tissue transfer was deemed most appropriate.  Using a sterile surgical marker, an appropriate flap was drawn incorporating the defect and placing the expected incisions within the relaxed skin tension lines where possible.    The area thus outlined was incised deep to adipose tissue with a #15 scalpel blade.  The skin margins were undermined to an appropriate distance in all directions utilizing iris scissors.

## 2023-09-12 NOTE — DIETITIAN INITIAL EVALUATION ADULT. - PROBLEM SELECTOR PROBLEM 4
Detail Level: Generalized
General Sunscreen Counseling: I recommended a broad spectrum sunscreen with a SPF of 30 or higher. I explained that SPF 30 sunscreens block approximately 97 percent of the sun's harmful rays. Sunscreens should be applied at least 15 minutes prior to expected sun exposure and then every 2 hours after that as long as sun exposure continues. If swimming or exercising sunscreen should be reapplied every 45 minutes to an hour after getting wet or sweating. One ounce, or the equivalent of a shot glass full of sunscreen, is adequate to protect the skin not covered by a bathing suit. I also recommended a lip balm with a sunscreen as well. Sun protective clothing can be used in lieu of sunscreen but must be worn the entire time you are exposed to the sun's rays. Zinc Based sunscreen preferred.
Products Recommended: Suzan Concepcion MD, Adela sunscreens
Infiltrate of lung present on imaging of chest

## 2023-11-26 NOTE — PROGRESS NOTE ADULT - PROBLEM SELECTOR PROBLEM 3
Sepsis - Hb downtrending during admission  - unclear baseline Hb  - likely due to worsening renal function  - f/u ferritin and iron studies

## 2024-01-03 NOTE — DIETITIAN INITIAL EVALUATION ADULT. - PROBLEM SELECTOR PROBLEM 8
Hospitalist Progress Note      PCP: Stefano Suarez MD    Date of Admission: 12/30/2023    Chief Complaint: Left knee    Hospital Course:   Patient seen and examined.   Afebrile  Severe left knee pain rating it 7/10  No cough, dyspnea or chest pain.    Medications:  Reviewed      Exam:    /85   Pulse 93   Temp 97.8 °F (36.6 °C) (Axillary)   Resp 16   Ht 1.626 m (5' 4\")   Wt 86.5 kg (190 lb 12.8 oz)   SpO2 98%   BMI 32.75 kg/m²     General appearance: No apparent distress, appears stated age and cooperative.  HEENT: Pupils equal, round, and reactive to light. Conjunctivae clear.  Neck: Supple, with full range of motion. No jugular venous distention. Trachea midline.  Respiratory:  Normal respiratory effort. Clear to auscultation, bilaterally without RALES/WHEEZES/Rhonchi.  Cardiovascular: Regular rate and rhythm with normal S1/S2 without MURMURS, rubs or gallops.  Abdomen: Soft, non-tender, non-distended with normal bowel sounds.  Musculoskeletal: No clubbing, cyanosis.  Full range of motion without deformity.  Skin: Skin color, texture, turgor normal.  No rashes or lesions.  Neurologic:  Neurovascularly intact without any focal sensory/motor deficits. Cranial nerves: II-XII intact, grossly non-focal.  Left knee swollen decreased range of motion no erythema    Labs:   Recent Labs     01/01/24  0827 01/02/24  0640 01/03/24  0632   WBC 8.5 7.8 6.5   HGB 11.1* 11.0* 10.4*   HCT 33.5* 32.5* 31.0*   * 97* 89*     Recent Labs     01/01/24  0827 01/02/24  0640 01/03/24  0632    136 138   K 3.2* 3.6 3.6    99 103   CO2 27 28 27   BUN 14 10 18   CREATININE 0.8 0.6 1.2   CALCIUM 9.3 9.6 9.3   PHOS  --   --  2.8     Recent Labs     01/03/24  0632   AST 32   ALT 18   BILITOT 2.4*   ALKPHOS 86     Recent Labs     01/01/24  0827   INR 1.32*     No results for input(s): \"CKTOTAL\", \"TROPONINI\" in the last 72 hours.    Urinalysis:      Lab Results   Component Value Date/Time    NITRU  HTN (hypertension)

## 2024-01-10 NOTE — PROGRESS NOTE ADULT - ASSESSMENT
[FreeTextEntry1] : A portion of this note was written by [Bala Martinez] on 12/16/2022 acting as a scribe for Dr. Mcmahon. \par  \par  I have personally reviewed the chart and agree that the record accurately reflects my personal performance of the history, physical exam, assessment, and plan. ·	CKD 4, Solitary functioning kidney: Prerenal azotemia, (Atrophic right kidney)  ·	Metabolic acidosis  ·	Hyperkalemia  ·	Diabetes  ·	Hypertension  ·	Anemia    Renal indices stable. Poor PO intake. Increase PO fluids as tolerated. Monitor urine out put. Monitor BP trend. Titrate BP meds as needed. Salt restriction.   Sodium levels trending up. Will start IVF x 1 liter D5W. Monitor blood sugar levels. Insulin coverage as needed. Dietary restriction. Procrit for anemia. Continue sodium bicarbonate.   Avoid nephrotoxic meds as possible. Avoid ACEI, ARB, NSAIDs and IV contrast. Will follow electrolytes and renal function trend. Overall prognosis poor.

## 2024-01-30 NOTE — PROGRESS NOTE ADULT - SUBJECTIVE AND OBJECTIVE BOX
Update History & Physical    The patient's History and Physical of January 22, 2024 was reviewed. There was no change. The surgical site was confirmed by the patient and me.     Plan: The risks, benefits, expected outcome, and alternative to the recommended procedure have been discussed with the patient. Patient understands and wants to proceed with the procedure.     Electronically signed by DIANE BROWN MD on 1/30/2024 at 10:58 AM       Patient is a 84y old  Female who presents with a chief complaint of altered mental status (10 Mar 2020 14:15)      INTERVAL HPI/OVERNIGHT EVENTS: Patient seen and examined at bedside. Patient agitated this mrjamari, refusing blood draws, labs    MEDICATIONS  (STANDING):  amLODIPine   Tablet 5 milliGRAM(s) Oral daily  aspirin enteric coated 81 milliGRAM(s) Oral daily  atorvastatin 10 milliGRAM(s) Oral at bedtime  budesonide 160 MICROgram(s)/formoterol 4.5 MICROgram(s) Inhaler 2 Puff(s) Inhalation two times a day  chlorhexidine 2% Cloths 1 Application(s) Topical <User Schedule>  cholestyramine Powder (Sugar-Free) 4 Gram(s) Oral daily  clobetasol 0.05% Cream 1 Application(s) Topical two times a day  dextrose 5% + sodium chloride 0.45%. 1000 milliLiter(s) (50 mL/Hr) IV Continuous <Continuous>  epoetin jean carlos Injectable 38011 Unit(s) SubCutaneous <User Schedule>  ferrous    sulfate 325 milliGRAM(s) Oral daily  FLUoxetine 20 milliGRAM(s) Oral daily  heparin  Injectable 5000 Unit(s) SubCutaneous every 8 hours  labetalol 100 milliGRAM(s) Oral every 8 hours  melatonin 3 milliGRAM(s) Oral at bedtime  nystatin Cream 1 Application(s) Topical two times a day  phenytoin   Suspension 100 milliGRAM(s) Oral three times a day  QUEtiapine 12.5 milliGRAM(s) Oral at bedtime  sodium bicarbonate 650 milliGRAM(s) Oral three times a day  valproic  acid Syrup 750 milliGRAM(s) Oral two times a day    MEDICATIONS  (PRN):  acetaminophen    Suspension .. 650 milliGRAM(s) Oral every 6 hours PRN Temp greater or equal to 38C (100.4F), Moderate Pain (4 - 6)      Allergies    Levaquin (Pruritus)  mercury (Pruritus; Rash)  sulfa drugs (Pruritus)    Intolerances        REVIEW OF SYSTEMS:  Unable to obtain, patient not answering questions    Vital Signs Last 24 Hrs  T(C): 36.7 (10 Mar 2020 16:00), Max: 36.8 (10 Mar 2020 12:00)  T(F): 98 (10 Mar 2020 16:00), Max: 98.2 (10 Mar 2020 12:00)  HR: 73 (10 Mar 2020 16:00) (71 - 119)  BP: 145/89 (10 Mar 2020 16:00) (132/74 - 224/89)  BP(mean): 98 (10 Mar 2020 16:00) (73 - 156)  RR: 29 (10 Mar 2020 16:00) (20 - 55)  SpO2: 99% (10 Mar 2020 16:00) (86% - 100%)    PHYSICAL EXAM:  GENERAL: agitated  HEENT:  anicteric, dry mucus membranes  CHEST/LUNG:  diminished breath sounds in bilateral lung bases  HEART:  RRR, S1, S2  ABDOMEN:  BS+, soft, nontender, nondistended  EXTREMITIES: no edema, cyanosis, or calf tenderness  NERVOUS SYSTEM: not answering questions, appears delirious    LABS:                        8.6    10.94 )-----------( 242      ( 10 Mar 2020 05:09 )             26.8     CBC Full  -  ( 10 Mar 2020 05:09 )  WBC Count : 10.94 K/uL  Hemoglobin : 8.6 g/dL  Hematocrit : 26.8 %  Platelet Count - Automated : 242 K/uL  Mean Cell Volume : 91.2 fl  Mean Cell Hemoglobin : 29.3 pg  Mean Cell Hemoglobin Concentration : 32.1 gm/dL  Auto Neutrophil # : 9.60 K/uL  Auto Lymphocyte # : 0.56 K/uL  Auto Monocyte # : 0.54 K/uL  Auto Eosinophil # : 0.12 K/uL  Auto Basophil # : 0.03 K/uL  Auto Neutrophil % : 87.8 %  Auto Lymphocyte % : 5.1 %  Auto Monocyte % : 4.9 %  Auto Eosinophil % : 1.1 %  Auto Basophil % : 0.3 %    10 Mar 2020 05:09    150    |  119    |  64     ----------------------------<  100    3.8     |  15     |  4.70     Ca    9.0        10 Mar 2020 05:09  Phos  3.8       10 Mar 2020 05:09  Mg     1.7       10 Mar 2020 05:09    TPro  5.9    /  Alb  2.4    /  TBili  0.4    /  DBili  x      /  AST  18     /  ALT  21     /  AlkPhos  89     10 Mar 2020 05:09        CAPILLARY BLOOD GLUCOSE            Culture - Blood (collected 03-06-20 @ 01:23)  Source: .Blood Blood-Peripheral  Preliminary Report (03-07-20 @ 02:03):    No growth to date.    Culture - Blood (collected 03-06-20 @ 01:23)  Source: .Blood Blood  Preliminary Report (03-07-20 @ 02:03):    No growth to date.    Culture - Urine (collected 03-03-20 @ 21:30)  Source: .Urine Clean Catch (Midstream)  Final Report (03-05-20 @ 22:43):    >100,000 CFU/ml Escherichia coli    >100,000 CFU/ml Enterobacter aerogenes  Organism: Escherichia coli  Enterobacter aerogenes (03-05-20 @ 22:43)  Organism: Enterobacter aerogenes (03-05-20 @ 22:43)      -  Amikacin: S <=16      -  Ampicillin: R >16 These ampicillin results predict results for amoxicillin      -  Ampicillin/Sulbactam: R >16/8 Enterobacter, Citrobacter, and Serratia may develop resistance during prolonged therapy (3-4 days)      -  Aztreonam: R >16      -  Cefazolin: R >16      -  Cefepime: S <=4      -  Cefoxitin: R >16      -  Ceftriaxone: R >32 Enterobacter, Citrobacter, and Serratia may develop resistance during prolonged therapy      -  Ciprofloxacin: S <=1      -  Ertapenem: S <=1      -  Gentamicin: S <=4      -  Imipenem: S <=1      -  Levofloxacin: S <=2      -  Meropenem: S <=1      -  Nitrofurantoin: I 64 Should not be used to treat pyelonephritis      -  Piperacillin/Tazobactam: I 64      -  Tigecycline: S <=2      -  Tobramycin: S <=4      -  Trimethoprim/Sulfamethoxazole: R >2/38      Method Type: SUBHASH  Organism: Escherichia coli (03-05-20 @ 22:43)      -  Amikacin: S <=16      -  Ampicillin: R >16 These ampicillin results predict results for amoxicillin      -  Ampicillin/Sulbactam: R >16/8 Enterobacter, Citrobacter, and Serratia may develop resistance during prolonged therapy (3-4 days)      -  Aztreonam: S <=4      -  Cefazolin: S <=8 (MIC_CL_COM_ENTERIC_CEFAZU) For uncomplicated UTI with K. pneumoniae, E. coli, or P. mirablis: SUBHASH <=16 is sensitive and SUBHASH >=32 is resistant. This also predicts results for oral agents cefaclor, cefdinir, cefpodoxime, cefprozil, cefuroxime axetil, cephalexin and locarbef for uncomplicated UTI. Note that some isolates may be susceptible to these agents while testing resistant to cefazolin.      -  Cefepime: S <=4      -  Cefoxitin: S <=8      -  Ceftriaxone: S <=1 Enterobacter, Citrobacter, and Serratia may develop resistance during prolonged therapy      -  Ciprofloxacin: S <=1      -  Gentamicin: S <=4      -  Imipenem: S <=1      -  Levofloxacin: S <=2      -  Meropenem: S <=1      -  Nitrofurantoin: S <=32 Should not be used to treat pyelonephritis      -  Piperacillin/Tazobactam: S <=16      -  Tigecycline: S <=2      -  Tobramycin: S <=4      -  Trimethoprim/Sulfamethoxazole: R >2/38      Method Type: SUBHASH    Culture - Blood (collected 03-03-20 @ 21:12)  Source: .Blood Blood-Peripheral  Final Report (03-08-20 @ 23:00):    No growth at 5 days.    Culture - Blood (collected 03-03-20 @ 21:10)  Source: .Blood Blood-Peripheral  Final Report (03-08-20 @ 23:00):    No growth at 5 days.        RADIOLOGY & ADDITIONAL TESTS:  EXAM:  XR CHEST PORTABLE IMMED 1V                            PROCEDURE DATE:  03/09/2020          INTERPRETATION:  Clinical information: NG tube placement    Portable study of the chest, 4:58 AM    Comparison exam dated 3/8/2020, 10:48 AM    NG tube has been introduced, the tip of the tube is below the bottom of the image in the left upper quadrant presumed in  the stomach. Otherwise the appearance of the chest is not significantly changed since the most recent exam.    IMPRESSION: See above report          FRANCISCO J SY M.D.,ATTENDING RADIOLOGIST  This document has been electronically signed. Mar  9 2020 11:30AM    Personally reviewed. y    Consultant(s) Notes Reviewed:  [x] YES  [ ] NO Patient is a 84y old  Female who presents with a chief complaint of altered mental status.      INTERVAL HPI/OVERNIGHT EVENTS: Patient agitated this morning, with increase in delirium. Answering few questions appropriately and following few commands. Afebrile.    MEDICATIONS  (STANDING):  amLODIPine   Tablet 5 milliGRAM(s) Oral daily  aspirin enteric coated 81 milliGRAM(s) Oral daily  atorvastatin 10 milliGRAM(s) Oral at bedtime  budesonide 160 MICROgram(s)/formoterol 4.5 MICROgram(s) Inhaler 2 Puff(s) Inhalation two times a day  chlorhexidine 2% Cloths 1 Application(s) Topical <User Schedule>  cholestyramine Powder (Sugar-Free) 4 Gram(s) Oral daily  clobetasol 0.05% Cream 1 Application(s) Topical two times a day  dextrose 5% + sodium chloride 0.45%. 1000 milliLiter(s) (50 mL/Hr) IV Continuous <Continuous>  epoetin jean carlos Injectable 42869 Unit(s) SubCutaneous <User Schedule>  ferrous    sulfate 325 milliGRAM(s) Oral daily  FLUoxetine 20 milliGRAM(s) Oral daily  heparin  Injectable 5000 Unit(s) SubCutaneous every 8 hours  labetalol 100 milliGRAM(s) Oral every 8 hours  melatonin 3 milliGRAM(s) Oral at bedtime  nystatin Cream 1 Application(s) Topical two times a day  phenytoin   Suspension 100 milliGRAM(s) Oral three times a day  QUEtiapine 12.5 milliGRAM(s) Oral at bedtime  sodium bicarbonate 650 milliGRAM(s) Oral three times a day  valproic  acid Syrup 750 milliGRAM(s) Oral two times a day    MEDICATIONS  (PRN):  acetaminophen    Suspension .. 650 milliGRAM(s) Oral every 6 hours PRN Temp greater or equal to 38C (100.4F), Moderate Pain (4 - 6)      Allergies    Levaquin (Pruritus)  mercury (Pruritus; Rash)  sulfa drugs (Pruritus)    Intolerances        REVIEW OF SYSTEMS:  Unable to obtain, patient not answering questions    Vital Signs Last 24 Hrs  T(C): 36.7 (10 Mar 2020 16:00), Max: 36.8 (10 Mar 2020 12:00)  T(F): 98 (10 Mar 2020 16:00), Max: 98.2 (10 Mar 2020 12:00)  HR: 73 (10 Mar 2020 16:00) (71 - 119)  BP: 145/89 (10 Mar 2020 16:00) (132/74 - 224/89)  BP(mean): 98 (10 Mar 2020 16:00) (73 - 156)  RR: 29 (10 Mar 2020 16:00) (20 - 55)  SpO2: 99% (10 Mar 2020 16:00) (86% - 100%)    PHYSICAL EXAM:  GENERAL: agitated  HEENT:  anicteric, dry mucus membranes  CHEST/LUNG:  diminished breath sounds in bilateral lung bases R > L  HEART:  RRR, S1, S2, systolic murmur  ABDOMEN:  BS+, soft, nontender, nondistended; ostomy in place with brown stool  EXTREMITIES: no edema, cyanosis, or calf tenderness  NERVOUS SYSTEM: not answering questions, appears delirious  PSYCH: delirious, agitated    LABS:                        8.6    10.94 )-----------( 242      ( 10 Mar 2020 05:09 )             26.8     CBC Full  -  ( 10 Mar 2020 05:09 )  WBC Count : 10.94 K/uL  Hemoglobin : 8.6 g/dL  Hematocrit : 26.8 %  Platelet Count - Automated : 242 K/uL  Mean Cell Volume : 91.2 fl  Mean Cell Hemoglobin : 29.3 pg  Mean Cell Hemoglobin Concentration : 32.1 gm/dL  Auto Neutrophil # : 9.60 K/uL  Auto Lymphocyte # : 0.56 K/uL  Auto Monocyte # : 0.54 K/uL  Auto Eosinophil # : 0.12 K/uL  Auto Basophil # : 0.03 K/uL  Auto Neutrophil % : 87.8 %  Auto Lymphocyte % : 5.1 %  Auto Monocyte % : 4.9 %  Auto Eosinophil % : 1.1 %  Auto Basophil % : 0.3 %    10 Mar 2020 05:09    150    |  119    |  64     ----------------------------<  100    3.8     |  15     |  4.70     Ca    9.0        10 Mar 2020 05:09  Phos  3.8       10 Mar 2020 05:09  Mg     1.7       10 Mar 2020 05:09    TPro  5.9    /  Alb  2.4    /  TBili  0.4    /  DBili  x      /  AST  18     /  ALT  21     /  AlkPhos  89     10 Mar 2020 05:09        CAPILLARY BLOOD GLUCOSE            Culture - Blood (collected 03-06-20 @ 01:23)  Source: .Blood Blood-Peripheral  Preliminary Report (03-07-20 @ 02:03):    No growth to date.    Culture - Blood (collected 03-06-20 @ 01:23)  Source: .Blood Blood  Preliminary Report (03-07-20 @ 02:03):    No growth to date.    Culture - Urine (collected 03-03-20 @ 21:30)  Source: .Urine Clean Catch (Midstream)  Final Report (03-05-20 @ 22:43):    >100,000 CFU/ml Escherichia coli    >100,000 CFU/ml Enterobacter aerogenes  Organism: Escherichia coli  Enterobacter aerogenes (03-05-20 @ 22:43)  Organism: Enterobacter aerogenes (03-05-20 @ 22:43)      -  Amikacin: S <=16      -  Ampicillin: R >16 These ampicillin results predict results for amoxicillin      -  Ampicillin/Sulbactam: R >16/8 Enterobacter, Citrobacter, and Serratia may develop resistance during prolonged therapy (3-4 days)      -  Aztreonam: R >16      -  Cefazolin: R >16      -  Cefepime: S <=4      -  Cefoxitin: R >16      -  Ceftriaxone: R >32 Enterobacter, Citrobacter, and Serratia may develop resistance during prolonged therapy      -  Ciprofloxacin: S <=1      -  Ertapenem: S <=1      -  Gentamicin: S <=4      -  Imipenem: S <=1      -  Levofloxacin: S <=2      -  Meropenem: S <=1      -  Nitrofurantoin: I 64 Should not be used to treat pyelonephritis      -  Piperacillin/Tazobactam: I 64      -  Tigecycline: S <=2      -  Tobramycin: S <=4      -  Trimethoprim/Sulfamethoxazole: R >2/38      Method Type: SUBHASH  Organism: Escherichia coli (03-05-20 @ 22:43)      -  Amikacin: S <=16      -  Ampicillin: R >16 These ampicillin results predict results for amoxicillin      -  Ampicillin/Sulbactam: R >16/8 Enterobacter, Citrobacter, and Serratia may develop resistance during prolonged therapy (3-4 days)      -  Aztreonam: S <=4      -  Cefazolin: S <=8 (MIC_CL_COM_ENTERIC_CEFAZU) For uncomplicated UTI with K. pneumoniae, E. coli, or P. mirablis: SUBHASH <=16 is sensitive and SUBHASH >=32 is resistant. This also predicts results for oral agents cefaclor, cefdinir, cefpodoxime, cefprozil, cefuroxime axetil, cephalexin and locarbef for uncomplicated UTI. Note that some isolates may be susceptible to these agents while testing resistant to cefazolin.      -  Cefepime: S <=4      -  Cefoxitin: S <=8      -  Ceftriaxone: S <=1 Enterobacter, Citrobacter, and Serratia may develop resistance during prolonged therapy      -  Ciprofloxacin: S <=1      -  Gentamicin: S <=4      -  Imipenem: S <=1      -  Levofloxacin: S <=2      -  Meropenem: S <=1      -  Nitrofurantoin: S <=32 Should not be used to treat pyelonephritis      -  Piperacillin/Tazobactam: S <=16      -  Tigecycline: S <=2      -  Tobramycin: S <=4      -  Trimethoprim/Sulfamethoxazole: R >2/38      Method Type: SUBHASH    Culture - Blood (collected 03-03-20 @ 21:12)  Source: .Blood Blood-Peripheral  Final Report (03-08-20 @ 23:00):    No growth at 5 days.    Culture - Blood (collected 03-03-20 @ 21:10)  Source: .Blood Blood-Peripheral  Final Report (03-08-20 @ 23:00):    No growth at 5 days.        RADIOLOGY & ADDITIONAL TESTS:  EXAM:  XR CHEST PORTABLE IMMED 1V                            PROCEDURE DATE:  03/09/2020          INTERPRETATION:  Clinical information: NG tube placement    Portable study of the chest, 4:58 AM    Comparison exam dated 3/8/2020, 10:48 AM    NG tube has been introduced, the tip of the tube is below the bottom of the image in the left upper quadrant presumed in  the stomach. Otherwise the appearance of the chest is not significantly changed since the most recent exam.    IMPRESSION: See above report          FRANCISCO J SY M.D.,ATTENDING RADIOLOGIST  This document has been electronically signed. Mar  9 2020 11:30AM    Personally reviewed. y    Consultant(s) Notes Reviewed:  [x] YES  [ ] NO

## 2024-05-08 NOTE — PHYSICAL THERAPY INITIAL EVALUATION ADULT - PERTINENT HX OF CURRENT PROBLEM, REHAB EVAL
84 y.o female brought in by EMS to ED for altered mental status. Patient's daughter present at bedside providing history due to patient AMS.  Patient daughter reports that over the past few days, patient seems weaker than usual (not able to empty ostomy, not able to assist with sacral wound cleaning, etc.) Last night she seemed confused, and was answering questions inappropriately, and having difficulty communicating.
26-Apr-2024 10:00

## 2024-05-09 NOTE — DIETITIAN INITIAL EVALUATION ADULT. - PROBLEM SELECTOR PROBLEM 2
----- Message from Bela Amaya MD sent at 5/9/2024 11:17 AM CDT -----  Mammo and US were normal. I would advise decreasing any caffeine intake and adding Vit E 800 IU daily for breast pain. Can discuss further at her appt in June   Hypocalcemia

## 2024-06-19 NOTE — PROGRESS NOTE ADULT - ASSESSMENT
Sent May 6th, 2024 note to [phone].    ·	CKD 4, Solitary functioning kidney: Prerenal azotemia, (Atrophic right kidney)  ·	Metabolic acidosis  ·	Hyperkalemia  ·	Diabetes  ·	Hypertension  ·	Anemia    Renal indices improving. To continue current meds. Monitor urine out put. Monitor BP trend. Titrate BP meds as needed. Monitor blood sugar levels. Salt restriction.   Increase PO fluids. Monitor blood sugar levels. Insulin coverage as needed. Dietary restriction. Procrit for anemia.   Avoid nephrotoxic meds as possible. Avoid ACEI, ARB, NSAIDs and IV contrast. Will follow electrolytes and renal function trend. Overall prognosis poor. ·	CKD 4, Solitary functioning kidney: Prerenal azotemia, (Atrophic right kidney)  ·	Metabolic acidosis  ·	Hyperkalemia  ·	Diabetes  ·	Hypertension  ·	Anemia    Renal indices improving. Poor PO intake. Increase PO fluids as tolerated. Monitor urine out put. Monitor BP trend. Titrate BP meds as needed. Salt restriction.   Monitor blood sugar levels. Insulin coverage as needed. Dietary restriction. Procrit for anemia.   Avoid nephrotoxic meds as possible. Avoid ACEI, ARB, NSAIDs and IV contrast. Will follow electrolytes and renal function trend. Overall prognosis poor.

## 2024-08-14 NOTE — DISCHARGE NOTE ADULT - [id number]_GEN_ALL_CORE
Patient: Yudi Salas    Procedure Summary       Date: 08/14/24 Room / Location: [facility] OR 06 / Virtual [facility] OR    Anesthesia Start: 1035 Anesthesia Stop: 1117    Procedure: Left ring finger trigger finger release (Left: Fingers) Diagnosis:       Trigger finger, left ring finger      (M65.342)    Surgeons: Daniel Bridges MD Responsible Provider: Anne Raymond MD    Anesthesia Type: MAC ASA Status: 2            Anesthesia Type: MAC    Vitals Value Taken Time   /74 08/14/24 1145   Temp 36.2 °C (97.2 °F) 08/14/24 1145   Pulse 82 08/14/24 1145   Resp 17 08/14/24 1145   SpO2 97 % 08/14/24 1145       Anesthesia Post Evaluation    Patient location during evaluation: PACU  Patient participation: complete - patient cannot participate  Level of consciousness: awake and alert  Pain score: 0  Pain management: adequate  Multimodal analgesia pain management approach  Airway patency: patent  Two or more strategies used to mitigate risk of obstructive sleep apnea  Cardiovascular status: acceptable and stable  Respiratory status: acceptable and room air  Hydration status: acceptable  Postoperative Nausea and Vomiting: none        No notable events documented.     rehabilitation services/teaching and training/observation and assessment

## 2024-08-30 NOTE — PATIENT PROFILE ADULT. - NS PRO PT REFERRAL QUES 2 YN
"CLINICAL NUTRITION SERVICES - ASSESSMENT NOTE     Nutrition Prescription    RECOMMENDATIONS FOR MDs/PROVIDERS TO ORDER:  None at this time    Malnutrition Status:    Patient does not meet two of the established criteria necessary for diagnosing malnutrition    Recommendations already ordered by Registered Dietitian (RD):  -Gatorade w/ meals  -Mandarin oranges @ 10am, cheese + crackers @ 2pm    Future/Additional Recommendations:  RD to sign off at this time, but will remain available by consult if new nutrition problem arises.       REASON FOR ASSESSMENT  Stephanie Porras is a/an 41 year old female assessed by the dietitian for Admission Nutrition Risk Screen for positive decreased appetite and negative weight loss.     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 Admitted from ED 8/29/24 due to concern for suicidal ideation.     NUTRITION HISTORY  RD visited with Louisa in the milieu who reports hx of gastric bypass requiring small frequent meals. Pt is agreeable to scheduled snack. Writer also discussed ongoing hypotension and pt is agreeable to Gatorade with meals to encourage fluid intake. Pt reports severe diarrhea and loss of appetite PTA that have since resolved.     GI: Pt denied N/V/D/C     CURRENT NUTRITION ORDERS  Diet: Regular  Supplement: none  Intake/Tolerance: eating and drinking adequately per pt    LABS  Reviewed     MEDICATIONS  Protonix, NS bolus, zyprexa prn, oxycodone prn    ANTHROPOMETRICS  Height: 170.2 cm (5' 7\")  Most Recent Weight: 75.3 kg (165 lb 14.4 oz)    IBW: 61.4 kg   BMI: Overweight BMI 25-29.9  Weight History:   Wt Readings from Last 15 Encounters:   08/29/24 75.3 kg (165 lb 14.4 oz)   08/27/24 74.8 kg (164 lb 14.5 oz)   08/06/24 75.6 kg (166 lb 9.6 oz)      02/13/24 76 kg (167 lb 9.6 oz)   01/23/24 74.8 kg (165 lb)   01/08/24 76.1 kg (167 lb 11.2 oz)   12/13/23 73 kg (161 lb)   12/05/23 73.4 kg " (161 lb 14.4 oz)   12/03/23 73.9 kg (163 lb)   11/15/23 74.5 kg (164 lb 4.8 oz)   10/31/23 73.1 kg (161 lb 3.2 oz)   09/07/23 76.2 kg (168 lb)   09/03/23 76.5 kg (168 lb 10.4 oz)   07/20/23 73 kg (161 lb)   05/22/23 73 kg (161 lb)   04/19/23 69.4 kg (153 lb)   No significant weight loss noted    Dosing Weight: 75 kg (actual)    ASSESSED NUTRITION NEEDS  Estimated Energy Needs: 1500 - 1875 kcals/day (20 - 25 kcals/kg)  Justification: Maintenance  Estimated Protein Needs: 60 - 75 grams protein/day (0.8 - 1 grams of pro/kg)  Justification: Maintenance  Estimated Fluid Needs: 1 mL/kcal  Justification: Maintenance or Per Provider    PHYSICAL FINDINGS  See malnutrition section below.  Harman: 20    MALNUTRITION  % Intake: No decreased intake noted  % Weight Loss: None noted  Subcutaneous Fat Loss: None observed  Muscle Loss: None observed  Fluid Accumulation/Edema: None noted  Malnutrition Diagnosis: Patient does not meet two of the established criteria necessary for diagnosing malnutrition    NUTRITION DIAGNOSIS  No nutrition diagnosis at this time     INTERVENTIONS  Implementation  Nutrition Education: RD role in care   Modify composition of meals/snacks     Goals  Patient to consume % of nutritionally adequate meal trays TID, or the equivalent with supplements/snacks.     Monitoring/Evaluation  RD to sign off at this time, but will remain available by consult if new nutrition problem arises.      Ashley Joiner, MPH, RDN, LD  Behavioral [facility] Adult & Pediatric Dietitian  BE[facility] Clinical Dietitian [place]man, Teams, or Desk: [phone]  Weekend/Holiday Aman: Weekend Holiday Clinical Dietitian [Multi Site Groups]    no

## 2024-09-09 NOTE — ED ADULT TRIAGE NOTE - DIRECT TO ROOM CARE INITIATED:
Laparoscopic Tubal Ligation  Care After    Refer to this sheet in the next few weeks. These instructions provide you with information on caring for yourself after your procedure. Your caregiver may also give you more specific instructions. Your treatment has been planned according to current medical practices, but problems sometimes occur. Call your caregiver if you have any problems or questions after your procedure.    HOME CARE INSTRUCTIONS    Rest the remainder of the day.    Only take over-the-counter or prescription medicines for pain, discomfort, or fever as directed by your caregiver. Do not take aspirin. It can cause bleeding.    Gradually resume daily activities, diet, rest, driving, and work.    Avoid sexual intercourse for 1 weeks.    Do not use tampons or douche for 1 week.    Do not drive while taking pain medicine.    Do not lift anything over 10 pounds for 2 weeks or as directed.    Only take showers until the skin glue has come off.    You can take off the bandaids and put new ones on if needed.       Try to have help for the first 2-3 days for your household needs.      SEEK MEDICAL CARE IF:    You have redness, swelling, or increasing pain in a wound.     You have drainage from a wound lasting longer than 1 day.    Your pain is getting worse.    You have a rash.    You become dizzy or lightheaded.    You have a reaction to your medicine.    You need stronger medicine or a change in your pain medicine.    You notice a bad smell coming from a wound or dressing.    Your wound breaks open after the sutures have been removed.    You are constipated.     SEEK IMMEDIATE MEDICAL CARE IF:    You faint.     You have a fever >100.5.    You have increasing abdominal pain.    You have severe pain in your shoulders.     You have bleeding or drainage from the suture sites following surgery.    You have shortness of breath or difficulty breathing.    You have chest or leg pain.    You have persistent nausea,  vomiting, or diarrhea.    MAKE SURE YOU:    Understand these instructions.     Watch your condition.    Get help right away if you are not doing well or get worse.    Document Released: 07/07/2006 Document Revised: 06/18/2013 Document Reviewed: 03/30/2013  [employer]® Patient Information ©2015 [employer]. This information is not intended to replace advice given to you by your health care provider. Make sure you discuss any questions you have with your health care provider.     Post Anesthesia Care    No driving for 24 hours after anesthesia   No driving when taking prescription pain medications   No alcohol or smoking for 24 hours   Ambulate with assistance  Someone should stay with you for first 24 hours after anesthesia  Drink plenty of fluids  Sore throat is common after surgery: cough drops, cold liquids, or gargling warm salt water can help alleviate discomfort  Rest today, ease into normal activity or refer to surgeons activity restrictions starting tomorrow  Resume normal diet, avoid greasy and spicy foods    03-Mar-2020 12:47

## 2024-09-14 NOTE — PROVIDER CONTACT NOTE (OTHER) - REASON
/72 Hyponatremia is likely due to renal insufficiency. The patient's most recent sodium results are listed below.  Recent Labs     09/13/24  0344   *     Plan  - Correct the sodium by 4-6mEq in 24 hours.   - Obtained the following studies: TSH - normal  - Will treat the hyponatremia with Hemodialysis  - Monitor sodium MWF  - Patient hyponatremia is stable

## 2025-05-01 NOTE — PROGRESS NOTE ADULT - PROBLEM SELECTOR PROBLEM 1
Hello Team,    Can you let patient know to make an appointment with me so that we can check her BP and then decide. Can you also let her know to check her BP daily until our next visit. We might have to make changes at this point with the hydrochlorothiazide.    Thanks,  Velma AMS (altered mental status)

## 2025-05-15 NOTE — PROGRESS NOTE ADULT - PROBLEM SELECTOR PROBLEM 1
CKD (chronic kidney disease) stage 5, GFR less than 15 ml/min Bed/Stretcher in lowest position, wheels locked, appropriate side rails in place/Call bell, personal items and telephone in reach/Instruct patient to call for assistance before getting out of bed/chair/stretcher/Non-slip footwear applied when patient is off stretcher/Roy to call system/Physically safe environment - no spills, clutter or unnecessary equipment/Purposeful proactive rounding/Room/bathroom lighting operational, light cord in reach
